# Patient Record
Sex: MALE | Race: BLACK OR AFRICAN AMERICAN | NOT HISPANIC OR LATINO | Employment: OTHER | URBAN - METROPOLITAN AREA
[De-identification: names, ages, dates, MRNs, and addresses within clinical notes are randomized per-mention and may not be internally consistent; named-entity substitution may affect disease eponyms.]

---

## 2017-03-02 DIAGNOSIS — E53.8 B12 DEFICIENCY: ICD-10-CM

## 2017-03-02 DIAGNOSIS — L30.9 ECZEMA, UNSPECIFIED TYPE: ICD-10-CM

## 2017-03-02 DIAGNOSIS — I10 ESSENTIAL HYPERTENSION, BENIGN: ICD-10-CM

## 2017-03-02 DIAGNOSIS — I10 HTN (HYPERTENSION), BENIGN: ICD-10-CM

## 2017-03-02 RX ORDER — LOSARTAN POTASSIUM 100 MG/1
100 TABLET ORAL DAILY
Qty: 90 TABLET | Refills: 1 | Status: SHIPPED | OUTPATIENT
Start: 2017-03-02 | End: 2018-01-03 | Stop reason: SDUPTHER

## 2017-03-02 RX ORDER — METOPROLOL SUCCINATE 50 MG/1
50 TABLET, EXTENDED RELEASE ORAL DAILY
Qty: 90 TABLET | Refills: 1 | Status: SHIPPED | OUTPATIENT
Start: 2017-03-02 | End: 2018-01-03 | Stop reason: SDUPTHER

## 2017-03-02 RX ORDER — CLOTRIMAZOLE AND BETAMETHASONE DIPROPIONATE 10; .64 MG/G; MG/G
CREAM TOPICAL 2 TIMES DAILY
Qty: 1 TUBE | Refills: 1 | Status: SHIPPED | OUTPATIENT
Start: 2017-03-02 | End: 2017-08-08 | Stop reason: SDUPTHER

## 2017-03-02 RX ORDER — CYANOCOBALAMIN 1000 UG/ML
1500 INJECTION, SOLUTION INTRAMUSCULAR; SUBCUTANEOUS
Qty: 10 ML | Refills: 5 | Status: SHIPPED | OUTPATIENT
Start: 2017-03-02 | End: 2018-05-09 | Stop reason: SDUPTHER

## 2017-05-16 ENCOUNTER — OFFICE VISIT (OUTPATIENT)
Dept: INTERNAL MEDICINE | Facility: CLINIC | Age: 72
End: 2017-05-16
Attending: INTERNAL MEDICINE
Payer: MEDICARE

## 2017-05-16 VITALS
BODY MASS INDEX: 34.54 KG/M2 | HEART RATE: 97 BPM | HEIGHT: 72 IN | OXYGEN SATURATION: 97 % | WEIGHT: 255 LBS | DIASTOLIC BLOOD PRESSURE: 62 MMHG | SYSTOLIC BLOOD PRESSURE: 112 MMHG

## 2017-05-16 DIAGNOSIS — E53.8 B12 DEFICIENCY: ICD-10-CM

## 2017-05-16 DIAGNOSIS — E11.9 TYPE 2 DIABETES MELLITUS WITHOUT COMPLICATION, UNSPECIFIED LONG TERM INSULIN USE STATUS: Primary | ICD-10-CM

## 2017-05-16 DIAGNOSIS — J30.1 SEASONAL ALLERGIC RHINITIS DUE TO POLLEN: ICD-10-CM

## 2017-05-16 DIAGNOSIS — E78.9 DISORDER OF LIPID METABOLISM: ICD-10-CM

## 2017-05-16 DIAGNOSIS — E03.9 HYPOTHYROIDISM, UNSPECIFIED TYPE: ICD-10-CM

## 2017-05-16 DIAGNOSIS — D51.0 PERNICIOUS ANEMIA: ICD-10-CM

## 2017-05-16 DIAGNOSIS — E66.9 OBESITY (BMI 30-39.9): ICD-10-CM

## 2017-05-16 DIAGNOSIS — I25.10 CORONARY ARTERY DISEASE INVOLVING NATIVE CORONARY ARTERY WITHOUT ANGINA PECTORIS, UNSPECIFIED WHETHER NATIVE OR TRANSPLANTED HEART: ICD-10-CM

## 2017-05-16 DIAGNOSIS — R79.89 OTHER ABNORMAL BLOOD CHEMISTRY: ICD-10-CM

## 2017-05-16 DIAGNOSIS — E55.9 VITAMIN D DEFICIENCY: ICD-10-CM

## 2017-05-16 DIAGNOSIS — C85.90 LYMPHOMA IN REMISSION: Primary | ICD-10-CM

## 2017-05-16 DIAGNOSIS — D50.9 IRON DEFICIENCY ANEMIA, UNSPECIFIED IRON DEFICIENCY ANEMIA TYPE: ICD-10-CM

## 2017-05-16 DIAGNOSIS — Z12.5 SCREENING FOR PROSTATE CANCER: ICD-10-CM

## 2017-05-16 PROCEDURE — 99214 OFFICE O/P EST MOD 30 MIN: CPT | Mod: 25,S$GLB,, | Performed by: INTERNAL MEDICINE

## 2017-05-16 PROCEDURE — 90715 TDAP VACCINE 7 YRS/> IM: CPT | Mod: S$GLB,,, | Performed by: INTERNAL MEDICINE

## 2017-05-16 PROCEDURE — G0447 BEHAVIOR COUNSEL OBESITY 15M: HCPCS | Mod: 59,S$GLB,, | Performed by: INTERNAL MEDICINE

## 2017-05-16 PROCEDURE — 3017F COLORECTAL CA SCREEN DOC REV: CPT | Mod: S$GLB,,, | Performed by: INTERNAL MEDICINE

## 2017-05-16 PROCEDURE — 1101F PT FALLS ASSESS-DOCD LE1/YR: CPT | Mod: S$GLB,,, | Performed by: INTERNAL MEDICINE

## 2017-05-16 PROCEDURE — G0444 DEPRESSION SCREEN ANNUAL: HCPCS | Mod: 59,S$GLB,, | Performed by: INTERNAL MEDICINE

## 2017-05-16 PROCEDURE — 1090F PRES/ABSN URINE INCON ASSESS: CPT | Mod: S$GLB,,, | Performed by: INTERNAL MEDICINE

## 2017-05-16 PROCEDURE — G0439 PPPS, SUBSEQ VISIT: HCPCS | Mod: S$GLB,,, | Performed by: INTERNAL MEDICINE

## 2017-05-16 PROCEDURE — 90471 IMMUNIZATION ADMIN: CPT | Mod: S$GLB,,, | Performed by: INTERNAL MEDICINE

## 2017-05-16 PROCEDURE — 3066F NEPHROPATHY DOC TX: CPT | Mod: S$GLB,,, | Performed by: INTERNAL MEDICINE

## 2017-05-16 PROCEDURE — 3008F BODY MASS INDEX DOCD: CPT | Mod: S$GLB,,, | Performed by: INTERNAL MEDICINE

## 2017-05-16 PROCEDURE — 1160F RVW MEDS BY RX/DR IN RCRD: CPT | Mod: S$GLB,,, | Performed by: INTERNAL MEDICINE

## 2017-05-16 PROCEDURE — G0442 ANNUAL ALCOHOL SCREEN 15 MIN: HCPCS | Mod: 59,S$GLB,, | Performed by: INTERNAL MEDICINE

## 2017-05-16 PROCEDURE — 3074F SYST BP LT 130 MM HG: CPT | Mod: S$GLB,,, | Performed by: INTERNAL MEDICINE

## 2017-05-16 PROCEDURE — 1170F FXNL STATUS ASSESSED: CPT | Mod: S$GLB,,, | Performed by: INTERNAL MEDICINE

## 2017-05-16 PROCEDURE — 0521F PLAN OF CARE 4 PAIN DOCD: CPT | Mod: S$GLB,,, | Performed by: INTERNAL MEDICINE

## 2017-05-16 PROCEDURE — 1159F MED LIST DOCD IN RCRD: CPT | Mod: S$GLB,,, | Performed by: INTERNAL MEDICINE

## 2017-05-16 PROCEDURE — 3288F FALL RISK ASSESSMENT DOCD: CPT | Mod: S$GLB,,, | Performed by: INTERNAL MEDICINE

## 2017-05-16 PROCEDURE — 3078F DIAST BP <80 MM HG: CPT | Mod: S$GLB,,, | Performed by: INTERNAL MEDICINE

## 2017-05-16 NOTE — MR AVS SNAPSHOT
"[facility]  [address], [address]  [facility] [address]  Phone: [phone]  Fax: [phone]                  Sam Gamino   2017 1:45 PM   Office Visit    Description:  Male : 1945   Provider:  LUDIVINA Zamora MD   Department:  [facility]           Reason for Visit     Follow-up           Diagnoses this Visit        Comments    Lymphoma in remission    -  Primary     B12 deficiency                To Do List           Goals (5 Years of Data)     None      Follow-Up and Disposition     Return in about 6 months (around 2017).       These Medications        Disp Refills Start End    syringe with needle 3 mL 23 x 1" Syrg 50 Syringe 11 2017     1 Syringe by Misc.(Non-Drug; Combo Route) route every 30 days. - Misc.(Non-Drug; Combo Route)    [facility] [address] [place] [address] Roost  AT [employer] [address] [employer] [address] Ph #: [phone]         [facility]     [facility] -  Assistance  Unless otherwise directed by your provider, please contact [facility], our nurse care line that is available for  assistance.     Registered nurses in the [facility] provide: appointment scheduling, clinical advisement, health education, and other advisory services.  Call: [phone] (toll free)               Medications           Message regarding Medications     Verify the changes and/or additions to your medication regime listed below are the same as discussed with your clinician today.  If any of these changes or additions are incorrect, please notify your healthcare provider.             Verify that the below list of medications is an accurate representation of the medications you are currently taking.  If none reported, the list may be blank. If incorrect, please contact your healthcare provider. Carry this list with you in case of emergency.           Current Medications     aspirin 81 MG Chew Take 81 mg by " "mouth once daily.    atorvastatin (LIPITOR) 80 MG tablet Take 0.5 tablets (40 mg total) by mouth once daily.    cholecalciferol, vitamin D3, (VITAMIN D3) 2,000 unit Cap Take 1 capsule by mouth once daily.    clotrimazole-betamethasone 1-0.05% (LOTRISONE) cream Apply topically 2 (two) times daily.    cyanocobalamin 1,000 mcg/mL injection Inject 1.5 mLs (1,500 mcg total) into the muscle every 30 days.    DUREZOL 0.05 % Drop ophthalmic solution     fluticasone (FLONASE) 50 mcg/actuation nasal spray 2 sprays by Each Nare route once daily.    ILEVRO 0.3 % DrpS     losartan (COZAAR) 100 MG tablet Take 1 tablet (100 mg total) by mouth once daily.    metoprolol succinate (TOPROL-XL) 50 MG 24 hr tablet Take 1 tablet (50 mg total) by mouth once daily.    syringe with needle 3 mL 23 x 1" Syrg 1 Syringe by Misc.(Non-Drug; Combo Route) route every 30 days.           Clinical Reference Information           Your Vitals Were     BP Pulse Height Weight SpO2 BMI    112/62 97 5' 11.5" (1.816 m) 115.7 kg (255 lb) 97% 35.07 kg/m2      Blood Pressure          Most Recent Value    BP  112/62      Allergies as of 5/16/2017     Lotensin [Benazepril]      Immunizations Administered on Date of Encounter - 5/16/2017     Name Date Dose VIS Date Route    TDAP 5/16/2017 0.5 mL 2/24/2015 Intramuscular      Orders Placed During Today's Visit      Normal Orders This Visit    Tdap Vaccine       Instructions    Coordinated Plan of Care - 2017        You have been identified as having 2 or more chronic medical problems.  The goal of this plan is to minimize the impact of these medical problems on your health and lifespan.      Regaurdless of your medical problems there are 7 things you can do to likely make you live longer.  They are:  1. Do not smoke or quit smoking.   Quit smoking aids (gum, lozenges, and medications) are paid for by registering with the [place] [employer] at   [facility] - [phone]  Toll Free - [phone]  Web - " "[email or web address]    2. Maintain a Body Mass Index < 27. BMI = Your weight in Pounds / (Your Height in Inches)2 × 703.  Examples: 5'0" < 138 lbs., 5'4" < 157 lbs, 5'8" < 177 lbs., 6' < 199 lbs.  (Add about 5 lbs. per additional inch).    3. Engage in regular physical activity.   Exercise aerobically with a target heart rate of (220-age) x 0.8  Exercise 30-45 minutes on most days of the week.  If you are out of shape you may have to start with 5-10 minutes per day and slowly increase your time.    4. Eat a healthy diet.  Fat intake: Not to exceed 30% of total daily calories.  Salt: 1.2 - 3 grams of Sodium each day.  (If you have high blood pressure it should be < 1.2 grams each day).  Supplements  All supplements can be obtained through a varied, healthy diet.   Calcium: 1,000 - 1,200 mg each day.  (1,500 mg each day if you have osteoporosis).   8 oz milk or Calcium fortified O.J. = 300 mg,  1oz of cheese = 100-200 mg  Vitamin D: 1,000 iu each day- Can probably be obtained by 30 min. of direct sunlight each day       3 oz. Dqfxxg=639 iu,  3 oz. Tuna =150 iu, Milk or fortified O.J. = 120 iu  Fish oil: 1-2 grams each day or about 840 mg of EPA and DHA (Omega-3 fatty acids) each day       3 oz. [place]=2 grams,  3 oz. Tuna=1.3 grams,  3 oz. drained light Tuna= 0.25 grams    5. Maintain a normal cholesterol.  Your Bad cholesterol (LDL) should be < 130.  (LDL < 100 if you have heart artery disease, a stroke, vascular disease, or diabetes).    6. Maintain a normal Blood Pressure.  BP < 150/90 if over 60 years old.  BP < 140/90 if age under 60, if you have diabetes, or kidney disease.    7. Maintain a normal Glucose (blood sugar).  Fasting Glucose < 100.  Hgb A1c < 6.0.  If you have diabetes a Hgb A1C < 7.0.  If you have diabetes and are older than 65 a Hgb A1C < 8.0.        Other Lifestyle guidelines  Alcohol: 1 drink = 12 oz. domestic beer, 4 oz. wine, or 1 oz. hard (80 proof) liquor             Males: </= " 14 drinks per week with no more than 4 in any one day             Females: </= 7 drinks per week with no more than 3 in any one day    Depression: If you feel depressed discuss with your doctor.    Routine tests  You should be see by your Primary Care Doctor at least every 6 months.  Blood pressure check at each visit.  Cholesterol check every year.  Glucose check every year. Every 3-6 months if you have diabetes.  TSH (thyroid screen) every 2 years. Every 6-12 months if you take thyroid medicine.  Colonoscopy at age 50, and repeat every 10 years. until age 75.  Vision screen at age 65. Annual dilated retinal exam if you have diabetes.  Females - Mammogram every 1-2 years from age 40-75.                   Bone density scan at about age 65.  Males: Consider PSA screening annually at age 50, age 45 for  Americans, up to age 75.    Immunizations  Influenza vaccine every year in the fall.  Tetnus/Diptheria/Pertusis(Tdap) vaccine once, then Tetnus/Diptheria(Td) vaccine every 10 years.  Shingles vaccine at age 60.  Pneumonia vaccine at age 65. 2nd Pneumonia vaccine at least 1 year later (1st should be Prevnar-13 and 2nd should be Pneumovax-23).                 Language Assistance Services     ATTENTION: Language assistance services are available, free of charge. Please call [phone].      ATENCIÓN: Si amauri ayers, tiene a jordan disposición servicios gratuitos de asistencia lingüística. Llame al [phone].     [facility] Ý: N?u b?n nói Ti?ng Vi?t, có các d?ch v? h? tr? ngôn ng? mi?n phí dành cho b?n. G?i s? [phone].         [facility] complies with applicable Federal civil rights laws and does not discriminate on the basis of race, color, national origin, age, disability, or sex.

## 2017-05-16 NOTE — PATIENT INSTRUCTIONS
"Coordinated Plan of Care - 2017        You have been identified as having 2 or more chronic medical problems.  The goal of this plan is to minimize the impact of these medical problems on your health and lifespan.      Regaurdless of your medical problems there are 7 things you can do to likely make you live longer.  They are:  1. Do not smoke or quit smoking.   Quit smoking aids (gum, lozenges, and medications) are paid for by registering with the [place] [employer] at   [facility] - [phone]  Toll Free - [phone]  Web - [email or web address]    2. Maintain a Body Mass Index < 27. BMI = Your weight in Pounds / (Your Height in Inches)2 × 703.  Examples: 5'0" < 138 lbs., 5'4" < 157 lbs, 5'8" < 177 lbs., 6' < 199 lbs.  (Add about 5 lbs. per additional inch).    3. Engage in regular physical activity.   Exercise aerobically with a target heart rate of (220-age) x 0.8  Exercise 30-45 minutes on most days of the week.  If you are out of shape you may have to start with 5-10 minutes per day and slowly increase your time.    4. Eat a healthy diet.  Fat intake: Not to exceed 30% of total daily calories.  Salt: 1.2 - 3 grams of Sodium each day.  (If you have high blood pressure it should be < 1.2 grams each day).  Supplements  All supplements can be obtained through a varied, healthy diet.   Calcium: 1,000 - 1,200 mg each day.  (1,500 mg each day if you have osteoporosis).   8 oz milk or Calcium fortified O.J. = 300 mg,  1oz of cheese = 100-200 mg  Vitamin D: 1,000 iu each day- Can probably be obtained by 30 min. of direct sunlight each day       3 oz. Xuuqtw=598 iu,  3 oz. Tuna =150 iu, Milk or fortified O.J. = 120 iu  Fish oil: 1-2 grams each day or about 840 mg of EPA and DHA (Omega-3 fatty acids) each day       3 oz. [place]=2 grams,  3 oz. Tuna=1.3 grams,  3 oz. drained light Tuna= 0.25 grams    5. Maintain a normal cholesterol.  Your Bad cholesterol (LDL) should be < 130.  (LDL < 100 if you have " heart artery disease, a stroke, vascular disease, or diabetes).    6. Maintain a normal Blood Pressure.  BP < 150/90 if over 60 years old.  BP < 140/90 if age under 60, if you have diabetes, or kidney disease.    7. Maintain a normal Glucose (blood sugar).  Fasting Glucose < 100.  Hgb A1c < 6.0.  If you have diabetes a Hgb A1C < 7.0.  If you have diabetes and are older than 65 a Hgb A1C < 8.0.        Other Lifestyle guidelines  Alcohol: 1 drink = 12 oz. domestic beer, 4 oz. wine, or 1 oz. hard (80 proof) liquor             Males: </= 14 drinks per week with no more than 4 in any one day             Females: </= 7 drinks per week with no more than 3 in any one day    Depression: If you feel depressed discuss with your doctor.    Routine tests  You should be see by your Primary Care Doctor at least every 6 months.  Blood pressure check at each visit.  Cholesterol check every year.  Glucose check every year. Every 3-6 months if you have diabetes.  TSH (thyroid screen) every 2 years. Every 6-12 months if you take thyroid medicine.  Colonoscopy at age 50, and repeat every 10 years. until age 75.  Vision screen at age 65. Annual dilated retinal exam if you have diabetes.  Females - Mammogram every 1-2 years from age 40-75.                   Bone density scan at about age 65.  Males: Consider PSA screening annually at age 50, age 45 for  Americans, up to age 75.    Immunizations  Influenza vaccine every year in the fall.  Tetnus/Diptheria/Pertusis(Tdap) vaccine once, then Tetnus/Diptheria(Td) vaccine every 10 years.  Shingles vaccine at age 60.  Pneumonia vaccine at age 65. 2nd Pneumonia vaccine at least 1 year later (1st should be Prevnar-13 and 2nd should be Pneumovax-23).

## 2017-05-17 PROBLEM — E66.9 OBESITY (BMI 30-39.9): Status: ACTIVE | Noted: 2017-05-17

## 2017-05-17 NOTE — PROGRESS NOTES
Subjective:       Patient ID: Sam Gamino is a 71 y.o. male.    Chief Complaint: Follow-up (eye itching, sneezing)    HPI Comments: The patient presents today for an annual wellness exam and also has specific complaints and medical problems to be addressed and managed.    Runny nose.    Review of Systems   Constitutional: Negative.    HENT: Positive for postnasal drip.    Respiratory: Negative.    Cardiovascular: Negative.    Psychiatric/Behavioral: Negative for dysphoric mood.       Objective:      Physical Exam   Constitutional: He appears well-developed and well-nourished.   HENT:   Head: Normocephalic and atraumatic.   Eyes: Pupils are equal, round, and reactive to light.   Cardiovascular: Normal rate, regular rhythm and normal heart sounds.    Pulmonary/Chest: Effort normal.   Musculoskeletal: He exhibits no edema.   Neurological: He is alert.       Assessment:       1. Lymphoma in remission    2. B12 deficiency        Plan:       See A/P for other Progress Note dated today.

## 2017-05-17 NOTE — PROGRESS NOTES
Subjective:       Patient ID: Sam Gamino is a 71 y.o. male.    Chief Complaint: Follow-up (eye itching, sneezing)    HPI Comments: Annual Wellness Exam:    Cognitive Impairment: None    Mental Conditions: None    Depression Risk Factors: None    Functional Ability:    Steady gait: Yes  Self reliant: Yes   Safe home: Yes  Hearing Difficulties: No   Vision Difficulties: No    Physical Impairment: None    Living Will: See Patient Demographics    Functional assessment:  Able to do all ADL's  Fall Risk: Low  Urinary incontinence: No  Energy level: Good  Mental well-being: Good    HEIDS Measure screening dates:  BMI:  35     DEXA:               See Health Maintenance Report  colon screen:    See Health Maintenance Report      Mammogram:   See Health Maintenance Report                   Glaucoma screen:  per Opth[facility]                    Vaccines: See Health Maintenance Report  Flu:            Pneumovax:  Zostavax:       Pain management: Denies pain       The patient's current health status is: Good   Patient was educated on all medical problems. See A/P.                             Review of Systems   Constitutional: Negative.    HENT: Positive for rhinorrhea.    Respiratory: Negative.    Cardiovascular: Negative.    Psychiatric/Behavioral: Negative for dysphoric mood.       Objective:      Physical Exam   Constitutional: He appears well-developed and well-nourished.   HENT:   Head: Normocephalic and atraumatic.   Eyes: Pupils are equal, round, and reactive to light.   Cardiovascular: Normal rate, regular rhythm and normal heart sounds.    Pulmonary/Chest: Effort normal.   Musculoskeletal: He exhibits no edema.   Neurological: He is alert.       Assessment:       1. Lymphoma in remission    2. B12 deficiency    3. Coronary artery disease involving native coronary artery without angina pectoris, unspecified whether native or transplanted heart    4. Pernicious anemia    5. Seasonal allergic rhinitis due to pollen         Plan:       Per orders and D/C instructions.  Continue meds/diet for Lymphoma, B12 defic, DAD, and anemia, which are stable.  Check labs.  Xyzal for AR.    Screening: The patient was screened for depression with the PHQ2 questionnaire and possible health consequences were discussed with the patient, who understands (15 minutes spent). The patient was screened for the misuse of alcohol, by asking the number of drinks per average week, and if pt has had more than 4 drinks (more than 3 for women and elderly) in 1 day within the past year. The health and legal consequences of misuse were discussed (15 minutes spent). The patient was screened for obesity (BMI>30), If the current BMI > 30, then the possible consequences of obesity, as well as the benefits of diet, exercise, and weight loss were discussed (15 minutes spent).

## 2017-05-18 LAB
25(OH)D3+25(OH)D2 SERPL-MCNC: 40 NG/ML (ref 30–100)
ALBUMIN SERPL-MCNC: 3.9 G/DL (ref 3.6–5.1)
ALBUMIN/GLOB SERPL: 1.3 (CALC) (ref 1–2.5)
ALP SERPL-CCNC: 60 U/L (ref 40–115)
ALT SERPL-CCNC: 16 U/L (ref 9–46)
AST SERPL-CCNC: 18 U/L (ref 10–35)
BASOPHILS # BLD AUTO: 27 CELLS/UL (ref 0–200)
BASOPHILS NFR BLD AUTO: 0.5 %
BILIRUB SERPL-MCNC: 0.7 MG/DL (ref 0.2–1.2)
BUN SERPL-MCNC: 21 MG/DL (ref 7–25)
BUN/CREAT SERPL: ABNORMAL (CALC) (ref 6–22)
CALCIUM SERPL-MCNC: 9.1 MG/DL (ref 8.6–10.3)
CHLORIDE SERPL-SCNC: 108 MMOL/L (ref 98–110)
CHOLEST SERPL-MCNC: 143 MG/DL (ref 125–200)
CHOLEST/HDLC SERPL: 3.7 (CALC)
CK SERPL-CCNC: 199 U/L (ref 44–196)
CO2 SERPL-SCNC: 24 MMOL/L (ref 20–31)
CREAT SERPL-MCNC: 0.92 MG/DL (ref 0.7–1.25)
EOSINOPHIL # BLD AUTO: 170 CELLS/UL (ref 15–500)
EOSINOPHIL NFR BLD AUTO: 3.2 %
ERYTHROCYTE [DISTWIDTH] IN BLOOD BY AUTOMATED COUNT: 13.2 % (ref 11–15)
GFR SERPL CREATININE-BSD FRML MDRD: 85 ML/MIN/1.73M2
GLOBULIN SER CALC-MCNC: 3.1 G/DL (CALC) (ref 1.9–3.7)
GLUCOSE SERPL-MCNC: 101 MG/DL (ref 65–99)
HBA1C MFR BLD: 4.8 % OF TOTAL HGB
HCT VFR BLD AUTO: 40.9 % (ref 38.5–50)
HDLC SERPL-MCNC: 39 MG/DL
HGB BLD-MCNC: 13.3 G/DL (ref 13.2–17.1)
LDLC SERPL CALC-MCNC: 83 MG/DL (CALC)
LYMPHOCYTES # BLD AUTO: 1659 CELLS/UL (ref 850–3900)
LYMPHOCYTES NFR BLD AUTO: 31.3 %
MCH RBC QN AUTO: 32.3 PG (ref 27–33)
MCHC RBC AUTO-ENTMCNC: 32.5 G/DL (ref 32–36)
MCV RBC AUTO: 99.4 FL (ref 80–100)
MONOCYTES # BLD AUTO: 429 CELLS/UL (ref 200–950)
MONOCYTES NFR BLD AUTO: 8.1 %
NEUTROPHILS # BLD AUTO: 3016 CELLS/UL (ref 1500–7800)
NEUTROPHILS NFR BLD AUTO: 56.9 %
NONHDLC SERPL-MCNC: 104 MG/DL (CALC)
PLATELET # BLD AUTO: 80 THOUSAND/UL (ref 140–400)
PMV BLD REES-ECKER: 10.5 FL (ref 7.5–12.5)
POTASSIUM SERPL-SCNC: 4.2 MMOL/L (ref 3.5–5.3)
PROT SERPL-MCNC: 7 G/DL (ref 6.1–8.1)
PSA SERPL-MCNC: 0.6 NG/ML
RBC # BLD AUTO: 4.11 MILLION/UL (ref 4.2–5.8)
SODIUM SERPL-SCNC: 140 MMOL/L (ref 135–146)
TRIGL SERPL-MCNC: 103 MG/DL
TSH SERPL-ACNC: 1.49 MIU/L (ref 0.4–4.5)
VIT B12 SERPL-MCNC: 464 PG/ML (ref 200–1100)
VITAMIN D2 SERPL-MCNC: <4 NG/ML
VITAMIN D3 SERPL-MCNC: 40 NG/ML
WBC # BLD AUTO: 5.3 THOUSAND/UL (ref 3.8–10.8)

## 2017-08-08 ENCOUNTER — OFFICE VISIT (OUTPATIENT)
Dept: INTERNAL MEDICINE | Facility: CLINIC | Age: 72
End: 2017-08-08
Attending: INTERNAL MEDICINE
Payer: MEDICARE

## 2017-08-08 VITALS
WEIGHT: 260 LBS | SYSTOLIC BLOOD PRESSURE: 108 MMHG | DIASTOLIC BLOOD PRESSURE: 70 MMHG | HEIGHT: 72 IN | OXYGEN SATURATION: 98 % | HEART RATE: 98 BPM | BODY MASS INDEX: 35.21 KG/M2

## 2017-08-08 DIAGNOSIS — K59.01 SLOW TRANSIT CONSTIPATION: ICD-10-CM

## 2017-08-08 DIAGNOSIS — L30.9 ECZEMA, UNSPECIFIED TYPE: ICD-10-CM

## 2017-08-08 PROCEDURE — 1159F MED LIST DOCD IN RCRD: CPT | Mod: S$GLB,,, | Performed by: INTERNAL MEDICINE

## 2017-08-08 PROCEDURE — 3008F BODY MASS INDEX DOCD: CPT | Mod: S$GLB,,, | Performed by: INTERNAL MEDICINE

## 2017-08-08 PROCEDURE — 3078F DIAST BP <80 MM HG: CPT | Mod: S$GLB,,, | Performed by: INTERNAL MEDICINE

## 2017-08-08 PROCEDURE — 99213 OFFICE O/P EST LOW 20 MIN: CPT | Mod: S$GLB,,, | Performed by: INTERNAL MEDICINE

## 2017-08-08 PROCEDURE — 3074F SYST BP LT 130 MM HG: CPT | Mod: S$GLB,,, | Performed by: INTERNAL MEDICINE

## 2017-08-08 RX ORDER — AMOXICILLIN 250 MG
1 CAPSULE ORAL DAILY
Start: 2017-08-08 | End: 2021-12-06

## 2017-08-08 RX ORDER — CLOTRIMAZOLE AND BETAMETHASONE DIPROPIONATE 10; .64 MG/G; MG/G
CREAM TOPICAL 2 TIMES DAILY
Qty: 1 TUBE | Refills: 1 | Status: SHIPPED | OUTPATIENT
Start: 2017-08-08 | End: 2018-09-18 | Stop reason: SDUPTHER

## 2017-08-08 NOTE — PROGRESS NOTES
Subjective:       Patient ID: Sam Gamino is a 72 y.o. male.    Chief Complaint: GI Problem (left side pain) and Groin Pain (both sides)    Left side aches when leaning forward.  Conspipation. Takes MOM and Senokot-S 2-4x/week.      Groin Pain   Associated symptoms include constipation.     Review of Systems   Constitutional: Negative.    Respiratory: Negative.    Cardiovascular: Negative.    Gastrointestinal: Positive for constipation.       Objective:      Physical Exam   Constitutional: He appears well-developed and well-nourished.   HENT:   Head: Normocephalic and atraumatic.   Eyes: Pupils are equal, round, and reactive to light.   Cardiovascular: Normal rate, regular rhythm and normal heart sounds.    Pulmonary/Chest: Effort normal.   Abdominal: There is no hepatosplenomegaly. There is tenderness in the left upper quadrant. There is no rigidity and no rebound.       Mildly tender abdominal muscles.   Musculoskeletal: He exhibits no edema.   Neurological: He is alert.       Assessment:       1. Eczema, unspecified type    2. Slow transit constipation        Plan:       Per orders and D/C instructions.  Senokot-S daily.

## 2017-09-05 ENCOUNTER — OFFICE VISIT (OUTPATIENT)
Dept: INTERNAL MEDICINE | Facility: CLINIC | Age: 72
End: 2017-09-05
Attending: INTERNAL MEDICINE
Payer: MEDICARE

## 2017-09-05 VITALS
OXYGEN SATURATION: 97 % | HEART RATE: 95 BPM | BODY MASS INDEX: 35.62 KG/M2 | HEIGHT: 72 IN | SYSTOLIC BLOOD PRESSURE: 102 MMHG | WEIGHT: 263 LBS | DIASTOLIC BLOOD PRESSURE: 68 MMHG

## 2017-09-05 DIAGNOSIS — K59.01 SLOW TRANSIT CONSTIPATION: ICD-10-CM

## 2017-09-05 DIAGNOSIS — R49.0 HOARSE VOICE QUALITY: ICD-10-CM

## 2017-09-05 DIAGNOSIS — I25.10 CORONARY ARTERY DISEASE INVOLVING NATIVE CORONARY ARTERY WITHOUT ANGINA PECTORIS, UNSPECIFIED WHETHER NATIVE OR TRANSPLANTED HEART: Primary | ICD-10-CM

## 2017-09-05 DIAGNOSIS — K21.9 GASTROESOPHAGEAL REFLUX DISEASE, ESOPHAGITIS PRESENCE NOT SPECIFIED: ICD-10-CM

## 2017-09-05 PROCEDURE — 3074F SYST BP LT 130 MM HG: CPT | Mod: S$GLB,,, | Performed by: INTERNAL MEDICINE

## 2017-09-05 PROCEDURE — G0008 ADMIN INFLUENZA VIRUS VAC: HCPCS | Mod: S$GLB,,, | Performed by: INTERNAL MEDICINE

## 2017-09-05 PROCEDURE — 99214 OFFICE O/P EST MOD 30 MIN: CPT | Mod: S$GLB,,, | Performed by: INTERNAL MEDICINE

## 2017-09-05 PROCEDURE — 3078F DIAST BP <80 MM HG: CPT | Mod: S$GLB,,, | Performed by: INTERNAL MEDICINE

## 2017-09-05 PROCEDURE — 1159F MED LIST DOCD IN RCRD: CPT | Mod: S$GLB,,, | Performed by: INTERNAL MEDICINE

## 2017-09-05 PROCEDURE — 90686 IIV4 VACC NO PRSV 0.5 ML IM: CPT | Mod: S$GLB,,, | Performed by: INTERNAL MEDICINE

## 2017-09-05 RX ORDER — PHENOL/SODIUM PHENOLATE
1 AEROSOL, SPRAY (ML) MUCOUS MEMBRANE DAILY
COMMUNITY

## 2017-09-05 RX ORDER — IBUPROFEN 800 MG/1
800 TABLET ORAL 2 TIMES DAILY PRN
Qty: 60 TABLET | Refills: 5 | Status: SHIPPED | OUTPATIENT
Start: 2017-09-05 | End: 2018-09-13 | Stop reason: SDUPTHER

## 2017-09-05 NOTE — PROGRESS NOTES
Subjective:       Patient ID: Sam Gamino is a 72 y.o. male.    Chief Complaint: Follow-up    Conspitation better with Senokot-S.  Gets hoarse voice. Saw Dr. Solorio in the past. Told it was GERD. Gets better with Omeprazole.      Review of Systems   Constitutional: Negative.    HENT: Positive for voice change.    Respiratory: Negative.    Cardiovascular: Negative.        Objective:      Physical Exam   Constitutional: He appears well-developed and well-nourished.   HENT:   Head: Normocephalic and atraumatic.   Mouth/Throat: Oropharynx is clear and moist and mucous membranes are normal.   Eyes: Pupils are equal, round, and reactive to light.   Cardiovascular: Normal rate, regular rhythm and normal heart sounds.    Pulmonary/Chest: Effort normal.   Musculoskeletal: He exhibits no edema.   Neurological: He is alert.       Assessment:       1. Coronary artery disease involving native coronary artery without angina pectoris, unspecified whether native or transplanted heart    2. Slow transit constipation    3. Hoarse voice quality    4. Gastroesophageal reflux disease, esophagitis presence not specified        Plan:       Per orders and D/C instructions.  Continue meds/diet for CAD, constipation, and GERD, which are stable.  Omeprazole and GERD diet, prn hoarseness.

## 2017-09-05 NOTE — PATIENT INSTRUCTIONS
Tips to Control Acid Reflux    To control acid reflux, youll need to make some basic diet and lifestyle changes. The simple steps outlined below may be all youll need to ease discomfort.  Watch what you eat  · Avoid fatty foods and spicy foods.  · Eat fewer acidic foods, such as citrus and tomato-based foods. These can increase symptoms.  · Limit drinking alcohol, caffeine, and fizzy beverages. All increase acid reflux.  · Try limiting chocolate, peppermint, and spearmint. These can worsen acid reflux in some people.  Watch when you eat  · Avoid lying down for 3 hours after eating.  · Do not snack before going to bed.  Raise your head  Raising your head and upper body by 4 to 6 inches helps limit reflux when youre lying down. Put blocks under the head of your bed frame to raise it.  Other changes  · Lose weight, if you need to  · Dont exercise near bedtime  · Avoid tight-fitting clothes  · Limit aspirin and ibuprofen  · Stop smoking   Date Last Reviewed: 7/1/2016  © 2120-8977 [employer]. [address] [place] [address], [place] [address]. All rights reserved. This information is not intended as a substitute for professional medical care. Always follow your healthcare professional's instructions.

## 2018-01-03 DIAGNOSIS — I10 HTN (HYPERTENSION), BENIGN: ICD-10-CM

## 2018-01-03 DIAGNOSIS — I10 ESSENTIAL HYPERTENSION, BENIGN: ICD-10-CM

## 2018-01-03 DIAGNOSIS — E78.00 ELEVATED CHOLESTEROL: ICD-10-CM

## 2018-01-03 RX ORDER — LOSARTAN POTASSIUM 100 MG/1
100 TABLET ORAL DAILY
Qty: 90 TABLET | Refills: 1 | Status: SHIPPED | OUTPATIENT
Start: 2018-01-03 | End: 2018-06-22 | Stop reason: SDUPTHER

## 2018-01-03 RX ORDER — ATORVASTATIN CALCIUM 80 MG/1
40 TABLET, FILM COATED ORAL DAILY
Qty: 90 TABLET | Refills: 1 | Status: SHIPPED | OUTPATIENT
Start: 2018-01-03 | End: 2019-02-28 | Stop reason: SDUPTHER

## 2018-01-03 RX ORDER — METOPROLOL SUCCINATE 50 MG/1
50 TABLET, EXTENDED RELEASE ORAL DAILY
Qty: 90 TABLET | Refills: 1 | Status: SHIPPED | OUTPATIENT
Start: 2018-01-03 | End: 2018-06-22 | Stop reason: SDUPTHER

## 2018-04-17 ENCOUNTER — DOCUMENTATION ONLY (OUTPATIENT)
Dept: INTERNAL MEDICINE | Facility: CLINIC | Age: 73
End: 2018-04-17
Payer: MEDICARE

## 2018-04-17 DIAGNOSIS — C85.90 LYMPHOMA IN REMISSION: Primary | ICD-10-CM

## 2018-04-17 DIAGNOSIS — I25.10 CORONARY ARTERY DISEASE INVOLVING NATIVE CORONARY ARTERY WITHOUT ANGINA PECTORIS, UNSPECIFIED WHETHER NATIVE OR TRANSPLANTED HEART: ICD-10-CM

## 2018-04-17 DIAGNOSIS — Z78.9 KNOWN MEDICAL PROBLEMS: ICD-10-CM

## 2018-04-17 PROCEDURE — 99490 CHRNC CARE MGMT STAFF 1ST 20: CPT | Mod: S$GLB,,, | Performed by: INTERNAL MEDICINE

## 2018-04-17 NOTE — PROGRESS NOTES
Reviewed and scanned medical records from an outside source.          The patient's electronic chart was reviewed during this month. The patient's medical, functional, and psychosocial needs were assessed. Need for Home health care, PT, OT, , psychiatric care, and hospice was assessed. All preventative care measures were reviewed and updated. All medications were reviewed and reconciled. Potential drug interactions and medication adherence was reviewed. Prescriptions were renewed as appropriate. Education was provided to the patient and/or caregiver. All questions were answered. Over 20 minutes were spent providing these non-face-to-face services during this calendar month.

## 2018-05-09 DIAGNOSIS — E53.8 B12 DEFICIENCY: ICD-10-CM

## 2018-05-09 RX ORDER — CYANOCOBALAMIN 1000 UG/ML
INJECTION, SOLUTION INTRAMUSCULAR; SUBCUTANEOUS
Qty: 10 ML | Refills: 0 | Status: SHIPPED | OUTPATIENT
Start: 2018-05-09 | End: 2018-12-24 | Stop reason: SDUPTHER

## 2018-06-19 ENCOUNTER — HOSPITAL ENCOUNTER (EMERGENCY)
Facility: OTHER | Age: 73
Discharge: HOME | End: 2018-06-19
Attending: EMERGENCY MEDICINE
Payer: MEDICARE

## 2018-06-19 VITALS
OXYGEN SATURATION: 96 % | HEIGHT: 72 IN | HEART RATE: 82 BPM | WEIGHT: 270.06 LBS | RESPIRATION RATE: 20 BRPM | SYSTOLIC BLOOD PRESSURE: 125 MMHG | TEMPERATURE: 98 F | BODY MASS INDEX: 36.58 KG/M2 | DIASTOLIC BLOOD PRESSURE: 74 MMHG

## 2018-06-19 DIAGNOSIS — R51.9 NONINTRACTABLE EPISODIC HEADACHE, UNSPECIFIED HEADACHE TYPE: ICD-10-CM

## 2018-06-19 DIAGNOSIS — M54.2 NECK PAIN: Primary | ICD-10-CM

## 2018-06-19 PROCEDURE — 99283 EMERGENCY DEPT VISIT LOW MDM: CPT | Mod: 25

## 2018-06-19 PROCEDURE — 25000003 PHARM REV CODE 250: Performed by: EMERGENCY MEDICINE

## 2018-06-19 PROCEDURE — 96372 THER/PROPH/DIAG INJ SC/IM: CPT

## 2018-06-19 PROCEDURE — 63600175 PHARM REV CODE 636 W HCPCS: Performed by: EMERGENCY MEDICINE

## 2018-06-19 RX ORDER — KETOROLAC TROMETHAMINE 30 MG/ML
15 INJECTION, SOLUTION INTRAMUSCULAR; INTRAVENOUS
Status: COMPLETED | OUTPATIENT
Start: 2018-06-19 | End: 2018-06-19

## 2018-06-19 RX ORDER — ACETAMINOPHEN 500 MG
1000 TABLET ORAL
Status: COMPLETED | OUTPATIENT
Start: 2018-06-19 | End: 2018-06-19

## 2018-06-19 RX ORDER — CETIRIZINE HYDROCHLORIDE 10 MG/1
10 TABLET ORAL DAILY
COMMUNITY
End: 2021-12-06

## 2018-06-19 RX ADMIN — ACETAMINOPHEN 1000 MG: 500 TABLET, FILM COATED ORAL at 03:06

## 2018-06-19 RX ADMIN — KETOROLAC TROMETHAMINE 15 MG: 30 INJECTION, SOLUTION INTRAMUSCULAR at 03:06

## 2018-06-19 NOTE — ED NOTES
Pt presents with HA, onset 1 month ago. Pt points to occipital and left temporal scalp to locate pain, pt rates pain 6/10 and describes as dull. Pt states HA worse in the past 3 days and pain radiates to left neck. Pt presented to ED as he was afraid he was having a stroke. Pt denies any paraesthesias, denies any unilateral weakness. Pt denies any vision changes or loss in vision. PMH lymphoma in remission, HTN. Pt is well appearing, pt in NAD.

## 2018-06-19 NOTE — ED PROVIDER NOTES
Encounter Date: 6/19/2018    SCRIBE #1 NOTE: I, Terri Quintero, am scribing for, and in the presence of, Dr. Boothe.       History     Chief Complaint   Patient presents with    Headache     x 1 month, worse in the last 2 days, also pain to the L side of the neck w/ limited mobility when turning the neck     Time seen by provider: 2:40 AM    This is a 72 y.o. male who presents with complaint of chronic intermittent headache. Pain has worsened over the last three days. He reports sore throat, worsening neck pain, neck stiffness, and bilateral shoulder pain. He has experienced similar neck pain in the past and had ultrasound of the neck with no significant findings. He notes improvement with aspirin. He denies fever, chills, blurred vision, nausea, vomiting, difficulty ambulating, numbness, or weakness. He notes driving trucks for work. 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      The history is provided by the patient.     Review of patient's allergies indicates:   Allergen Reactions    Lotensin [benazepril]      cough     Past Medical History:   Diagnosis Date    Abnormal echocardiogram 2/8/2013    Mild MVR 3/07    CAD (coronary artery disease) 2/8/2013    Angio 3/07 Dr. Lin    Lymphoma in remission 2/8/2013    S/p chemo/XRT 1992 Relapse 1993 BMT 1993    Normal cardiac stress test 2/8/2013    Nuclear stress 2/09    Pernicious anemia 2/8/2013    Thyroid nodule 2/8/2013    Right s/p Bx 6/08 Dr. Pelayo     Past Surgical History:   Procedure Laterality Date    EYE SURGERY Bilateral 2016    Cataracts     History reviewed. No pertinent family history.  Social History   Substance Use Topics    Smoking status: Never Smoker    Smokeless tobacco: Never Used    Alcohol use 0.6 oz/week     1 Standard drinks or equivalent per week      Comment: ocas     Review of Systems   Constitutional: Negative for chills, diaphoresis and fever.   HENT: Positive for sore throat. Negative for  facial swelling, trouble swallowing and voice change.    Eyes: Negative for visual disturbance.   Respiratory: Negative for shortness of breath.    Cardiovascular: Negative for chest pain.   Gastrointestinal: Negative for abdominal pain, blood in stool, constipation, diarrhea, nausea and vomiting.   Genitourinary: Negative for dysuria.   Musculoskeletal: Positive for arthralgias (shoulders), neck pain and neck stiffness. Negative for back pain, gait problem, joint swelling and myalgias.   Skin: Negative for rash.   Neurological: Positive for headaches. Negative for dizziness, weakness, light-headedness and numbness.   Hematological: Does not bruise/bleed easily.       Physical Exam     Initial Vitals [06/19/18 0231]   BP Pulse Resp Temp SpO2   122/66 87 20 97.7 °F (36.5 °C) 96 %      MAP       --         Physical Exam    Nursing note and vitals reviewed.  Constitutional: He appears well-developed and well-nourished. He is not diaphoretic. No distress.   HENT:   Head: Normocephalic and atraumatic.   Mouth/Throat: Oropharynx is clear and moist.   Eyes: Conjunctivae and EOM are normal. Pupils are equal, round, and reactive to light. No scleral icterus.   Neck: Normal range of motion. Neck supple.   Cardiovascular: Normal rate, regular rhythm and normal heart sounds. Exam reveals no gallop and no friction rub.    No murmur heard.  Pulmonary/Chest: Breath sounds normal. No respiratory distress. He has no wheezes. He has no rhonchi. He has no rales.   Musculoskeletal: Normal range of motion. He exhibits no edema or tenderness.   Lymphadenopathy:     He has no cervical adenopathy.   Neurological: He is alert and oriented to person, place, and time.   Skin: Skin is warm and dry. No rash noted. No pallor.   Psychiatric: He has a normal mood and affect. His behavior is normal. Judgment and thought content normal.         ED Course   Procedures  Labs Reviewed - No data to display       Imaging Results    None          Medical  Decision Making:   Initial Assessment:   Urgent evaluation of renetta 80-year-old gentleman with history of remote lymphoma, here with concerns for gradual onset of posterior headache, and left-sided neck pain.  Patient has had similar discomfort in the past, had a carotid ultrasound without abnormalities.  Patient denies vomiting, no vision changes, pain worsens with head movement, but without paresthesias.  On exam patient has no meningismus, nonfocal neuro exam, mild spasm to the left paracervical region, but otherwise no concerning exam findings to suggest needed further imaging at this time.The patient has no focal weakness, numbness, meningismus, other focal neurologic deficit, history of trauma, fevers, elevated blood pressure to suggest meningitis, subarachnoid hemorrhage, TIA, stroke, mass, or hypertensive urgency. I do not feel a CT of the brain or blood work are necessary at this time. I will treat the patient with analgesia and have them follow up with their regular doctor.               Scribe Attestation:   Scribe #1: I performed the above scribed service and the documentation accurately describes the services I performed. I attest to the accuracy of the note.    Attending Attestation:           Physician Attestation for Scribe:  Physician Attestation Statement for Scribe #1: I, Dr. Boothe, reviewed documentation, as scribed by Terri Quintero in my presence, and it is both accurate and complete.                    Clinical Impression:     1. Neck pain    2. Nonintractable episodic headache, unspecified headache type          Disposition:   Disposition: Discharged  Condition: Stable                        Janice Boothe MD  06/19/18 0673

## 2018-06-19 NOTE — ED NOTES
NEURO: Pt AAO x 4. Behavior and speech appropriate to situation.   CARDIAC: pt denies chest pain   RESPIRATORY: Respirations even and unlabored   MUSCULOSKELETAL: Active ROM noted to extremities. Pain with lateral rotation

## 2018-06-22 DIAGNOSIS — I10 HTN (HYPERTENSION), BENIGN: ICD-10-CM

## 2018-06-22 DIAGNOSIS — I10 ESSENTIAL HYPERTENSION, BENIGN: ICD-10-CM

## 2018-06-22 RX ORDER — LOSARTAN POTASSIUM 100 MG/1
TABLET ORAL
Qty: 90 TABLET | Refills: 0 | Status: SHIPPED | OUTPATIENT
Start: 2018-06-22 | End: 2019-02-03 | Stop reason: SDUPTHER

## 2018-06-22 RX ORDER — METOPROLOL SUCCINATE 50 MG/1
TABLET, EXTENDED RELEASE ORAL
Qty: 90 TABLET | Refills: 0 | Status: SHIPPED | OUTPATIENT
Start: 2018-06-22 | End: 2019-02-03 | Stop reason: SDUPTHER

## 2018-09-06 ENCOUNTER — DOCUMENTATION ONLY (OUTPATIENT)
Dept: INTERNAL MEDICINE | Facility: CLINIC | Age: 73
End: 2018-09-06
Payer: MEDICARE

## 2018-09-06 DIAGNOSIS — D51.0 PERNICIOUS ANEMIA: ICD-10-CM

## 2018-09-06 DIAGNOSIS — I25.10 CORONARY ARTERY DISEASE INVOLVING NATIVE CORONARY ARTERY WITHOUT ANGINA PECTORIS, UNSPECIFIED WHETHER NATIVE OR TRANSPLANTED HEART: Primary | ICD-10-CM

## 2018-09-06 DIAGNOSIS — Z78.9 KNOWN MEDICAL PROBLEMS: ICD-10-CM

## 2018-09-06 PROCEDURE — 99490 CHRNC CARE MGMT STAFF 1ST 20: CPT | Mod: S$GLB,,, | Performed by: INTERNAL MEDICINE

## 2018-09-06 NOTE — PROGRESS NOTES
Contacted patient to re-schedule no show appointment.          The patient's electronic chart was reviewed during this month. The patient's medical, functional, and psychosocial needs were assessed. Need for Home health care, PT, OT, , psychiatric care, and hospice was assessed. All preventative care measures were reviewed and updated. All medications were reviewed and reconciled. Potential drug interactions and medication adherence was reviewed. Prescriptions were renewed as appropriate. Education was provided to the patient and/or caregiver. All questions were answered. Over 20 minutes were spent providing these non-face-to-face services during this calendar month.

## 2018-09-13 ENCOUNTER — LAB VISIT (OUTPATIENT)
Dept: LAB | Facility: OTHER | Age: 73
End: 2018-09-13
Attending: INTERNAL MEDICINE
Payer: MEDICARE

## 2018-09-13 ENCOUNTER — OFFICE VISIT (OUTPATIENT)
Dept: INTERNAL MEDICINE | Facility: CLINIC | Age: 73
End: 2018-09-13
Attending: INTERNAL MEDICINE
Payer: MEDICARE

## 2018-09-13 VITALS
DIASTOLIC BLOOD PRESSURE: 62 MMHG | SYSTOLIC BLOOD PRESSURE: 110 MMHG | WEIGHT: 255 LBS | OXYGEN SATURATION: 97 % | BODY MASS INDEX: 34.54 KG/M2 | HEIGHT: 72 IN | HEART RATE: 92 BPM

## 2018-09-13 DIAGNOSIS — E55.9 VITAMIN D DEFICIENCY: ICD-10-CM

## 2018-09-13 DIAGNOSIS — Z13.31 SCREENING FOR DEPRESSION: ICD-10-CM

## 2018-09-13 DIAGNOSIS — Z00.00 ROUTINE ADULT HEALTH MAINTENANCE: ICD-10-CM

## 2018-09-13 DIAGNOSIS — E66.9 OBESITY (BMI 30-39.9): ICD-10-CM

## 2018-09-13 DIAGNOSIS — K59.01 SLOW TRANSIT CONSTIPATION: ICD-10-CM

## 2018-09-13 DIAGNOSIS — M54.41 MIDLINE LOW BACK PAIN WITH BILATERAL SCIATICA, UNSPECIFIED CHRONICITY: ICD-10-CM

## 2018-09-13 DIAGNOSIS — E03.9 HYPOTHYROIDISM, UNSPECIFIED TYPE: ICD-10-CM

## 2018-09-13 DIAGNOSIS — D50.8 OTHER IRON DEFICIENCY ANEMIA: ICD-10-CM

## 2018-09-13 DIAGNOSIS — Z12.5 SCREENING FOR PROSTATE CANCER: ICD-10-CM

## 2018-09-13 DIAGNOSIS — E78.9 DISORDER OF LIPID METABOLISM: ICD-10-CM

## 2018-09-13 DIAGNOSIS — D51.0 PERNICIOUS ANEMIA: ICD-10-CM

## 2018-09-13 DIAGNOSIS — C85.90 LYMPHOMA IN REMISSION: ICD-10-CM

## 2018-09-13 DIAGNOSIS — Z13.39 SCREENING FOR ALCOHOLISM: ICD-10-CM

## 2018-09-13 DIAGNOSIS — M54.42 MIDLINE LOW BACK PAIN WITH BILATERAL SCIATICA, UNSPECIFIED CHRONICITY: ICD-10-CM

## 2018-09-13 DIAGNOSIS — R79.89 OTHER SPECIFIED ABNORMAL FINDINGS OF BLOOD CHEMISTRY: ICD-10-CM

## 2018-09-13 DIAGNOSIS — Z13.89 SCREENING FOR OBESITY: ICD-10-CM

## 2018-09-13 DIAGNOSIS — D51.0 PERNICIOUS ANEMIA: Primary | ICD-10-CM

## 2018-09-13 DIAGNOSIS — I25.10 CORONARY ARTERY DISEASE INVOLVING NATIVE CORONARY ARTERY WITHOUT ANGINA PECTORIS, UNSPECIFIED WHETHER NATIVE OR TRANSPLANTED HEART: ICD-10-CM

## 2018-09-13 LAB
25(OH)D3+25(OH)D2 SERPL-MCNC: 51 NG/ML
ALBUMIN SERPL BCP-MCNC: 3.7 G/DL
ALP SERPL-CCNC: 67 U/L
ALT SERPL W/O P-5'-P-CCNC: 18 U/L
ANION GAP SERPL CALC-SCNC: 7 MMOL/L
AST SERPL-CCNC: 19 U/L
BASOPHILS # BLD AUTO: 0.02 K/UL
BASOPHILS NFR BLD: 0.4 %
BILIRUB SERPL-MCNC: 1.3 MG/DL
BUN SERPL-MCNC: 19 MG/DL
CALCIUM SERPL-MCNC: 9.4 MG/DL
CHLORIDE SERPL-SCNC: 110 MMOL/L
CHOLEST SERPL-MCNC: 129 MG/DL
CHOLEST/HDLC SERPL: 3.3 {RATIO}
CK SERPL-CCNC: 149 U/L
CO2 SERPL-SCNC: 25 MMOL/L
COMPLEXED PSA SERPL-MCNC: 0.45 NG/ML
CREAT SERPL-MCNC: 0.9 MG/DL
DIFFERENTIAL METHOD: ABNORMAL
EOSINOPHIL # BLD AUTO: 0.1 K/UL
EOSINOPHIL NFR BLD: 2 %
ERYTHROCYTE [DISTWIDTH] IN BLOOD BY AUTOMATED COUNT: 12.3 %
EST. GFR  (AFRICAN AMERICAN): >60 ML/MIN/1.73 M^2
EST. GFR  (NON AFRICAN AMERICAN): >60 ML/MIN/1.73 M^2
ESTIMATED AVG GLUCOSE: 88 MG/DL
GLUCOSE SERPL-MCNC: 80 MG/DL
HBA1C MFR BLD HPLC: 4.7 %
HCT VFR BLD AUTO: 37.2 %
HDLC SERPL-MCNC: 39 MG/DL
HDLC SERPL: 30.2 %
HGB BLD-MCNC: 11.9 G/DL
LDLC SERPL CALC-MCNC: 74.2 MG/DL
LYMPHOCYTES # BLD AUTO: 1.7 K/UL
LYMPHOCYTES NFR BLD: 33.4 %
MCH RBC QN AUTO: 32.5 PG
MCHC RBC AUTO-ENTMCNC: 32 G/DL
MCV RBC AUTO: 102 FL
MONOCYTES # BLD AUTO: 0.4 K/UL
MONOCYTES NFR BLD: 8 %
NEUTROPHILS # BLD AUTO: 2.9 K/UL
NEUTROPHILS NFR BLD: 56 %
NONHDLC SERPL-MCNC: 90 MG/DL
PLATELET # BLD AUTO: 212 K/UL
PMV BLD AUTO: 10.3 FL
POTASSIUM SERPL-SCNC: 4.1 MMOL/L
PROT SERPL-MCNC: 7.1 G/DL
RBC # BLD AUTO: 3.66 M/UL
SODIUM SERPL-SCNC: 142 MMOL/L
TRIGL SERPL-MCNC: 79 MG/DL
TSH SERPL DL<=0.005 MIU/L-ACNC: 1.61 UIU/ML
VIT B12 SERPL-MCNC: 680 PG/ML
WBC # BLD AUTO: 5.12 K/UL

## 2018-09-13 PROCEDURE — 90662 IIV NO PRSV INCREASED AG IM: CPT | Mod: S$GLB,,, | Performed by: INTERNAL MEDICINE

## 2018-09-13 PROCEDURE — 80061 LIPID PANEL: CPT

## 2018-09-13 PROCEDURE — 36415 COLL VENOUS BLD VENIPUNCTURE: CPT

## 2018-09-13 PROCEDURE — 82607 VITAMIN B-12: CPT

## 2018-09-13 PROCEDURE — 3017F COLORECTAL CA SCREEN DOC REV: CPT | Mod: S$GLB,,, | Performed by: INTERNAL MEDICINE

## 2018-09-13 PROCEDURE — 1090F PRES/ABSN URINE INCON ASSESS: CPT | Mod: S$GLB,,, | Performed by: INTERNAL MEDICINE

## 2018-09-13 PROCEDURE — 84153 ASSAY OF PSA TOTAL: CPT

## 2018-09-13 PROCEDURE — 1160F RVW MEDS BY RX/DR IN RCRD: CPT | Mod: S$GLB,,, | Performed by: INTERNAL MEDICINE

## 2018-09-13 PROCEDURE — G0439 PPPS, SUBSEQ VISIT: HCPCS | Mod: S$GLB,,, | Performed by: INTERNAL MEDICINE

## 2018-09-13 PROCEDURE — 1170F FXNL STATUS ASSESSED: CPT | Mod: S$GLB,,, | Performed by: INTERNAL MEDICINE

## 2018-09-13 PROCEDURE — 85025 COMPLETE CBC W/AUTO DIFF WBC: CPT

## 2018-09-13 PROCEDURE — 3066F NEPHROPATHY DOC TX: CPT | Mod: S$GLB,,, | Performed by: INTERNAL MEDICINE

## 2018-09-13 PROCEDURE — 82550 ASSAY OF CK (CPK): CPT

## 2018-09-13 PROCEDURE — G0008 ADMIN INFLUENZA VIRUS VAC: HCPCS | Mod: S$GLB,,, | Performed by: INTERNAL MEDICINE

## 2018-09-13 PROCEDURE — 83036 HEMOGLOBIN GLYCOSYLATED A1C: CPT

## 2018-09-13 PROCEDURE — 80053 COMPREHEN METABOLIC PANEL: CPT

## 2018-09-13 PROCEDURE — G0447 BEHAVIOR COUNSEL OBESITY 15M: HCPCS | Mod: 59,S$GLB,, | Performed by: INTERNAL MEDICINE

## 2018-09-13 PROCEDURE — 1159F MED LIST DOCD IN RCRD: CPT | Mod: S$GLB,,, | Performed by: INTERNAL MEDICINE

## 2018-09-13 PROCEDURE — G0444 DEPRESSION SCREEN ANNUAL: HCPCS | Mod: 59,S$GLB,, | Performed by: INTERNAL MEDICINE

## 2018-09-13 PROCEDURE — 82306 VITAMIN D 25 HYDROXY: CPT

## 2018-09-13 PROCEDURE — 84443 ASSAY THYROID STIM HORMONE: CPT

## 2018-09-13 PROCEDURE — G0442 ANNUAL ALCOHOL SCREEN 15 MIN: HCPCS | Mod: 59,S$GLB,, | Performed by: INTERNAL MEDICINE

## 2018-09-13 RX ORDER — IBUPROFEN 800 MG/1
800 TABLET ORAL 2 TIMES DAILY PRN
Qty: 60 TABLET | Refills: 5 | Status: SHIPPED | OUTPATIENT
Start: 2018-09-13 | End: 2019-06-17 | Stop reason: SDUPTHER

## 2018-09-13 NOTE — PATIENT INSTRUCTIONS
"Coordinated Plan of Care - 2017        You have been identified as having 2 or more chronic medical problems.  The goal of this plan is to minimize the impact of these medical problems on your health and lifespan.      Regaurdless of your medical problems there are 7 things you can do to likely make you live longer.  They are:  1. Do not smoke or quit smoking.   Quit smoking aids (gum, lozenges, and medications) are paid for by registering with the [place] [employer] at   [facility] - [phone]  Toll Free - [phone]  Web - service@smokingcessationtrust.org    2. Maintain a Body Mass Index < 27. BMI = Your weight in Pounds / (Your Height in Inches)2 × 703.  Examples: 5'0" < 138 lbs., 5'4" < 157 lbs, 5'8" < 177 lbs., 6' < 199 lbs.  (Add about 5 lbs. per additional inch).    3. Engage in regular physical activity.   Exercise aerobically with a target heart rate of (220-age) x 0.8  Exercise 30-45 minutes on most days of the week.  If you are out of shape you may have to start with 5-10 minutes per day and slowly increase your time.    4. Eat a healthy diet.  Salt: 1.2 - 3 grams of Sodium each day.  (If you have high blood pressure it should be < 1.2 grams each day).  Supplements  All supplements can be obtained through a varied, healthy diet.   Calcium: 1,000 - 1,200 mg each day.  (1,500 mg each day if you have osteoporosis).   8 oz milk or Calcium fortified O.J. = 300 mg,  1oz of cheese = 100-200 mg  Vitamin D: 800 iu each day- Can probably be obtained by 30 min. of direct sunlight each day       3 oz. Otwsib=002 iu,  3 oz. Tuna =150 iu, Milk or fortified O.J. = 120 iu  Fish oil: 1-2 grams each day or about 840 mg of EPA and DHA (Omega-3 fatty acids) each day       3 oz. Jeannette=2 grams,  3 oz. Tuna=1.3 grams,  3 oz. drained light Tuna= 0.25 grams    5. Maintain a normal cholesterol.  Your Bad cholesterol (LDL) should be < 130.  (LDL < 100 if you have heart artery disease, a stroke, vascular disease, or " diabetes).    6. Maintain a normal Blood Pressure.  BP < 130/80    7. Maintain a normal Glucose (blood sugar).  Fasting Glucose < 100.  Hgb A1c < 6.0.  If you have diabetes a Hgb A1C < 7.0.  If you have diabetes and are older than 65 a Hgb A1C < 8.0.        Other Lifestyle guidelines  Alcohol: 1 drink = 12 oz. domestic beer, 4 oz. wine, or 1 oz. hard (80 proof) liquor             Males: </= 14 drinks per week with no more than 4 in any one day             Females: </= 7 drinks per week with no more than 3 in any one day    Depression: If you feel depressed discuss with your doctor.    Routine tests  You should be see by your Primary Care Doctor at least every 6 months.  Blood pressure check at each visit.  Cholesterol check every year.  Glucose check every year. Every 3-6 months if you have diabetes.  TSH (thyroid screen) every 2 years. Every 6-12 months if you take thyroid medicine.  Colonoscopy at age 50, and repeat every 10 years. until age 75.  Vision screen at age 65. Annual dilated retinal exam if you have diabetes.  Females - Mammogram every 1-2 years from age 40-75.                   Bone density scan at about age 65.  Males: Consider PSA screening annually at age 50, age 45 for  Americans, up to age 75.    Immunizations  Influenza vaccine every year in the fall.  Tetnus/Diptheria/Pertusis(Tdap) vaccine once, then Tetnus/Diptheria(Td) vaccine every 10 years.  Shingles vaccine at age 60.  Pneumonia vaccine at age 65. 2nd Pneumonia vaccine at least 1 year later (1st should be Prevnar-13 and 2nd should be Pneumovax-23).

## 2018-09-14 NOTE — PROGRESS NOTES
Subjective:       Patient ID: Sam Gamino is a 73 y.o. male.    Chief Complaint: Follow-up (6 month ); GI Problem (pressure in the center of the chest upper abdomen); Back Pain (wants script for motrin); and Extremity Weakness (sometimes legs tingle)    Gets LBP to posterior thighs while walking. Has to sit down for it to go away. Relief with Motrin.      Annual Wellness Exam:    Cognitive Impairment: None    Mental Conditions: None    Depression Risk Factors: None    Functional Ability:    Steady gait: Yes  Self reliant: Yes   Safe home: Yes  Hearing Difficulties: No   Vision Difficulties: No    Physical Impairment: None    Living Will: See Patient Demographics    Functional assessment:  Able to do all ADL's  Fall Risk: Low  Urinary incontinence: No  Energy level: Good  Mental well-being: Good    HEIDS Measure screening dates:  BMI: See Vital signs      DEXA:               See Health Maintenance Report  colon screen:    See Health Maintenance Report      Mammogram:   See Health Maintenance Report                   Glaucoma screen:  per Optho                    Vaccines: See Health Maintenance Report  Flu:            Pneumovax:  Shingrix:       Pain management: Takes Motrin prn LBP.       The patient's current health status is: Good   Patient was educated on all medical problems. See A/P from note dated today.                               Back Pain   The current episode started more than 1 month ago.     Review of Systems   Constitutional: Negative.    Respiratory: Negative.    Cardiovascular: Negative.    Musculoskeletal: Positive for back pain.   Psychiatric/Behavioral: Negative for dysphoric mood.       Objective:      Physical Exam   Constitutional: He appears well-developed and well-nourished.   HENT:   Head: Normocephalic and atraumatic.   Mouth/Throat: Oropharynx is clear and moist and mucous membranes are normal.   Eyes: Pupils are equal, round, and reactive to light.   Cardiovascular: Normal rate,  regular rhythm and normal heart sounds.   Pulmonary/Chest: Effort normal.   Musculoskeletal: He exhibits no edema.   Neurological: He is alert.       Assessment:       1. Pernicious anemia    2. Obesity (BMI 30-39.9)    3. Slow transit constipation    4. Lymphoma in remission    5. Coronary artery disease involving native coronary artery without angina pectoris, unspecified whether native or transplanted heart    6. Midline low back pain with bilateral sciatica, unspecified chronicity    7. Routine adult health maintenance    8. Screening for depression    9. Screening for alcoholism    10. Screening for obesity        Plan:       Per orders and D/C instructions.  Continue meds/diet for anemia, constipation, CAD, and Hx lymphoma, which are stable.  Motrin prn LBP.  Check labs.    Screening: The patient was screened for depression with the PHQ2 questionnaire and possible health consequences were discussed with the patient, who understands (15 minutes spent). The patient was screened for the misuse of alcohol, by asking the number of drinks per average week, and if pt has had more than 4 drinks (more than 3 for women and elderly) in 1 day within the past year. The health and legal consequences of misuse were discussed (15 minutes spent). The patient was screened for obesity (BMI>30), If the current BMI > 30, then the possible consequences of obesity, as well as the benefits of diet, exercise, and weight loss were discussed (15 minutes spent).

## 2018-09-18 DIAGNOSIS — L30.9 ECZEMA, UNSPECIFIED TYPE: ICD-10-CM

## 2018-09-18 RX ORDER — CLOTRIMAZOLE AND BETAMETHASONE DIPROPIONATE 10; .64 MG/G; MG/G
CREAM TOPICAL
Qty: 15 G | Refills: 0 | Status: SHIPPED | OUTPATIENT
Start: 2018-09-18 | End: 2018-11-26 | Stop reason: SDUPTHER

## 2018-11-26 DIAGNOSIS — L30.9 ECZEMA, UNSPECIFIED TYPE: ICD-10-CM

## 2018-11-26 RX ORDER — CLOTRIMAZOLE AND BETAMETHASONE DIPROPIONATE 10; .64 MG/G; MG/G
CREAM TOPICAL
Qty: 15 G | Refills: 0 | Status: SHIPPED | OUTPATIENT
Start: 2018-11-26 | End: 2018-12-11 | Stop reason: SDUPTHER

## 2018-12-11 DIAGNOSIS — L30.9 ECZEMA, UNSPECIFIED TYPE: ICD-10-CM

## 2018-12-12 ENCOUNTER — DOCUMENTATION ONLY (OUTPATIENT)
Dept: INTERNAL MEDICINE | Facility: CLINIC | Age: 73
End: 2018-12-12
Payer: MEDICARE

## 2018-12-12 DIAGNOSIS — I25.10 CORONARY ARTERY DISEASE INVOLVING NATIVE CORONARY ARTERY WITHOUT ANGINA PECTORIS, UNSPECIFIED WHETHER NATIVE OR TRANSPLANTED HEART: ICD-10-CM

## 2018-12-12 DIAGNOSIS — Z78.9 KNOWN MEDICAL PROBLEMS: ICD-10-CM

## 2018-12-12 DIAGNOSIS — D51.0 PERNICIOUS ANEMIA: Primary | ICD-10-CM

## 2018-12-12 PROCEDURE — 99490 CHRNC CARE MGMT STAFF 1ST 20: CPT | Mod: S$GLB,,, | Performed by: INTERNAL MEDICINE

## 2018-12-12 RX ORDER — CLOTRIMAZOLE AND BETAMETHASONE DIPROPIONATE 10; .64 MG/G; MG/G
CREAM TOPICAL
Qty: 15 G | Refills: 0 | Status: SHIPPED | OUTPATIENT
Start: 2018-12-12 | End: 2019-02-03 | Stop reason: SDUPTHER

## 2018-12-24 DIAGNOSIS — E53.8 B12 DEFICIENCY: ICD-10-CM

## 2018-12-24 RX ORDER — CYANOCOBALAMIN 1000 UG/ML
INJECTION, SOLUTION INTRAMUSCULAR; SUBCUTANEOUS
Qty: 10 ML | Refills: 0 | Status: SHIPPED | OUTPATIENT
Start: 2018-12-24 | End: 2019-11-30 | Stop reason: SDUPTHER

## 2018-12-28 NOTE — PROGRESS NOTES
Refilled clotrimazole-betamethasone cream          The patient's electronic chart was reviewed during this month. The patient's medical, functional, and psychosocial needs were assessed. Need for Home health care, PT, OT, , psychiatric care, and hospice was assessed. All preventative care measures were reviewed and updated. All medications were reviewed and reconciled. Potential drug interactions and medication adherence was reviewed. Prescriptions were renewed as appropriate. Education was provided to the patient and/or caregiver. All questions were answered. Over 20 minutes were spent providing these non-face-to-face services during this calendar month.

## 2019-02-03 DIAGNOSIS — I10 HTN (HYPERTENSION), BENIGN: ICD-10-CM

## 2019-02-03 DIAGNOSIS — L30.9 ECZEMA, UNSPECIFIED TYPE: ICD-10-CM

## 2019-02-03 DIAGNOSIS — I10 ESSENTIAL HYPERTENSION, BENIGN: ICD-10-CM

## 2019-02-04 RX ORDER — METOPROLOL SUCCINATE 50 MG/1
TABLET, EXTENDED RELEASE ORAL
Qty: 90 TABLET | Refills: 0 | Status: SHIPPED | OUTPATIENT
Start: 2019-02-04 | End: 2019-09-23 | Stop reason: SDUPTHER

## 2019-02-04 RX ORDER — LOSARTAN POTASSIUM 100 MG/1
TABLET ORAL
Qty: 90 TABLET | Refills: 0 | Status: SHIPPED | OUTPATIENT
Start: 2019-02-04 | End: 2019-09-04 | Stop reason: SDUPTHER

## 2019-02-04 RX ORDER — CLOTRIMAZOLE AND BETAMETHASONE DIPROPIONATE 10; .64 MG/G; MG/G
CREAM TOPICAL
Qty: 15 G | Refills: 0 | Status: SHIPPED | OUTPATIENT
Start: 2019-02-04 | End: 2019-07-14 | Stop reason: SDUPTHER

## 2019-02-28 DIAGNOSIS — E78.00 ELEVATED CHOLESTEROL: ICD-10-CM

## 2019-02-28 RX ORDER — ATORVASTATIN CALCIUM 80 MG/1
TABLET, FILM COATED ORAL
Qty: 90 TABLET | Refills: 1 | Status: SHIPPED | OUTPATIENT
Start: 2019-02-28 | End: 2020-04-09

## 2019-03-18 ENCOUNTER — DOCUMENTATION ONLY (OUTPATIENT)
Dept: INTERNAL MEDICINE | Facility: CLINIC | Age: 74
End: 2019-03-18
Payer: MEDICARE

## 2019-03-18 DIAGNOSIS — Z78.9 KNOWN MEDICAL PROBLEMS: ICD-10-CM

## 2019-03-18 DIAGNOSIS — I25.10 CORONARY ARTERY DISEASE INVOLVING NATIVE CORONARY ARTERY WITHOUT ANGINA PECTORIS, UNSPECIFIED WHETHER NATIVE OR TRANSPLANTED HEART: Primary | ICD-10-CM

## 2019-03-18 DIAGNOSIS — D51.0 PERNICIOUS ANEMIA: ICD-10-CM

## 2019-03-18 PROCEDURE — 99490 PR CHRONIC CARE MGMT, 1ST 20 MIN: ICD-10-PCS | Mod: S$GLB,,, | Performed by: INTERNAL MEDICINE

## 2019-03-18 PROCEDURE — 99490 CHRNC CARE MGMT STAFF 1ST 20: CPT | Mod: S$GLB,,, | Performed by: INTERNAL MEDICINE

## 2019-03-18 NOTE — PROGRESS NOTES
Contacted patient to R/S missed appointment with DAB.  Updated demographics          The patient's electronic chart was reviewed during this month. The patient's medical, functional, and psychosocial needs were assessed. Need for Home health care, PT, OT, , psychiatric care, and hospice was assessed. All preventative care measures were reviewed and updated. All medications were reviewed and reconciled. Potential drug interactions and medication adherence was reviewed. Prescriptions were renewed as appropriate. Education was provided to the patient and/or caregiver. All questions were answered. Over 20 minutes were spent providing these non-face-to-face services during this calendar month.

## 2019-06-07 ENCOUNTER — DOCUMENTATION ONLY (OUTPATIENT)
Dept: INTERNAL MEDICINE | Facility: CLINIC | Age: 74
End: 2019-06-07
Payer: MEDICARE

## 2019-06-07 DIAGNOSIS — D51.0 PERNICIOUS ANEMIA: ICD-10-CM

## 2019-06-07 DIAGNOSIS — I25.10 CORONARY ARTERY DISEASE INVOLVING NATIVE CORONARY ARTERY WITHOUT ANGINA PECTORIS, UNSPECIFIED WHETHER NATIVE OR TRANSPLANTED HEART: Primary | ICD-10-CM

## 2019-06-07 DIAGNOSIS — Z78.9 KNOWN MEDICAL PROBLEMS: ICD-10-CM

## 2019-06-13 NOTE — PROGRESS NOTES
Contacted patient to re-schedule his missed appointment with           The patient's electronic chart was reviewed during this month. The patient's medical, functional, and psychosocial needs were assessed. Need for Home health care, PT, OT, , psychiatric care, and hospice was assessed. All preventative care measures were reviewed and updated. All medications were reviewed and reconciled. Potential drug interactions and medication adherence was reviewed. Prescriptions were renewed as appropriate. Education was provided to the patient and/or caregiver. All questions were answered. Over 20 minutes were spent providing these non-face-to-face services during this calendar month.

## 2019-06-17 ENCOUNTER — OFFICE VISIT (OUTPATIENT)
Dept: INTERNAL MEDICINE | Facility: CLINIC | Age: 74
End: 2019-06-17
Attending: INTERNAL MEDICINE
Payer: MEDICARE

## 2019-06-17 ENCOUNTER — LAB VISIT (OUTPATIENT)
Dept: LAB | Facility: OTHER | Age: 74
End: 2019-06-17
Attending: INTERNAL MEDICINE
Payer: MEDICARE

## 2019-06-17 VITALS
HEART RATE: 107 BPM | SYSTOLIC BLOOD PRESSURE: 120 MMHG | HEIGHT: 72 IN | OXYGEN SATURATION: 98 % | BODY MASS INDEX: 35.47 KG/M2 | WEIGHT: 261.88 LBS | DIASTOLIC BLOOD PRESSURE: 70 MMHG

## 2019-06-17 DIAGNOSIS — G89.29 CHRONIC BILATERAL LOW BACK PAIN WITH BILATERAL SCIATICA: ICD-10-CM

## 2019-06-17 DIAGNOSIS — E78.9 DISORDER OF LIPID METABOLISM: ICD-10-CM

## 2019-06-17 DIAGNOSIS — M54.42 CHRONIC BILATERAL LOW BACK PAIN WITH BILATERAL SCIATICA: ICD-10-CM

## 2019-06-17 DIAGNOSIS — M54.41 CHRONIC BILATERAL LOW BACK PAIN WITH BILATERAL SCIATICA: ICD-10-CM

## 2019-06-17 DIAGNOSIS — Z12.5 SCREENING FOR PROSTATE CANCER: ICD-10-CM

## 2019-06-17 DIAGNOSIS — D51.0 PERNICIOUS ANEMIA: ICD-10-CM

## 2019-06-17 DIAGNOSIS — E55.9 VITAMIN D DEFICIENCY: ICD-10-CM

## 2019-06-17 DIAGNOSIS — K63.5 POLYP OF COLON, UNSPECIFIED PART OF COLON, UNSPECIFIED TYPE: Primary | ICD-10-CM

## 2019-06-17 DIAGNOSIS — E03.9 HYPOTHYROIDISM, UNSPECIFIED TYPE: ICD-10-CM

## 2019-06-17 DIAGNOSIS — R79.89 OTHER SPECIFIED ABNORMAL FINDINGS OF BLOOD CHEMISTRY: ICD-10-CM

## 2019-06-17 DIAGNOSIS — D50.8 OTHER IRON DEFICIENCY ANEMIA: ICD-10-CM

## 2019-06-17 LAB
25(OH)D3+25(OH)D2 SERPL-MCNC: 49 NG/ML (ref 30–96)
ALBUMIN SERPL BCP-MCNC: 3.7 G/DL (ref 3.5–5.2)
ALP SERPL-CCNC: 57 U/L (ref 55–135)
ALT SERPL W/O P-5'-P-CCNC: 28 U/L (ref 10–44)
ANION GAP SERPL CALC-SCNC: 9 MMOL/L (ref 8–16)
AST SERPL-CCNC: 23 U/L (ref 10–40)
BASOPHILS # BLD AUTO: 0.01 K/UL (ref 0–0.2)
BASOPHILS NFR BLD: 0.2 % (ref 0–1.9)
BILIRUB SERPL-MCNC: 0.9 MG/DL (ref 0.1–1)
BUN SERPL-MCNC: 18 MG/DL (ref 8–23)
CALCIUM SERPL-MCNC: 9.2 MG/DL (ref 8.7–10.5)
CHLORIDE SERPL-SCNC: 108 MMOL/L (ref 95–110)
CHOLEST SERPL-MCNC: 145 MG/DL (ref 120–199)
CHOLEST/HDLC SERPL: 3.2 {RATIO} (ref 2–5)
CK SERPL-CCNC: 162 U/L (ref 20–200)
CO2 SERPL-SCNC: 25 MMOL/L (ref 23–29)
COMPLEXED PSA SERPL-MCNC: 0.36 NG/ML (ref 0–4)
CREAT SERPL-MCNC: 1.4 MG/DL (ref 0.5–1.4)
DIFFERENTIAL METHOD: ABNORMAL
EOSINOPHIL # BLD AUTO: 0.1 K/UL (ref 0–0.5)
EOSINOPHIL NFR BLD: 1.6 % (ref 0–8)
ERYTHROCYTE [DISTWIDTH] IN BLOOD BY AUTOMATED COUNT: 12.3 % (ref 11.5–14.5)
EST. GFR  (AFRICAN AMERICAN): 58 ML/MIN/1.73 M^2
EST. GFR  (NON AFRICAN AMERICAN): 50 ML/MIN/1.73 M^2
ESTIMATED AVG GLUCOSE: 91 MG/DL (ref 68–131)
GLUCOSE SERPL-MCNC: 106 MG/DL (ref 70–110)
HBA1C MFR BLD HPLC: 4.8 % (ref 4–5.6)
HCT VFR BLD AUTO: 36.8 % (ref 40–54)
HDLC SERPL-MCNC: 46 MG/DL (ref 40–75)
HDLC SERPL: 31.7 % (ref 20–50)
HGB BLD-MCNC: 12.1 G/DL (ref 14–18)
LDLC SERPL CALC-MCNC: 79.4 MG/DL (ref 63–159)
LYMPHOCYTES # BLD AUTO: 1.9 K/UL (ref 1–4.8)
LYMPHOCYTES NFR BLD: 30.1 % (ref 18–48)
MCH RBC QN AUTO: 33.2 PG (ref 27–31)
MCHC RBC AUTO-ENTMCNC: 32.9 G/DL (ref 32–36)
MCV RBC AUTO: 101 FL (ref 82–98)
MONOCYTES # BLD AUTO: 0.5 K/UL (ref 0.3–1)
MONOCYTES NFR BLD: 7.5 % (ref 4–15)
NEUTROPHILS # BLD AUTO: 3.7 K/UL (ref 1.8–7.7)
NEUTROPHILS NFR BLD: 60.3 % (ref 38–73)
NONHDLC SERPL-MCNC: 99 MG/DL
PLATELET # BLD AUTO: 205 K/UL (ref 150–350)
PMV BLD AUTO: 10 FL (ref 9.2–12.9)
POTASSIUM SERPL-SCNC: 4.2 MMOL/L (ref 3.5–5.1)
PROT SERPL-MCNC: 7.1 G/DL (ref 6–8.4)
RBC # BLD AUTO: 3.65 M/UL (ref 4.6–6.2)
SODIUM SERPL-SCNC: 142 MMOL/L (ref 136–145)
TRIGL SERPL-MCNC: 98 MG/DL (ref 30–150)
TSH SERPL DL<=0.005 MIU/L-ACNC: 2.18 UIU/ML (ref 0.4–4)
VIT B12 SERPL-MCNC: 417 PG/ML (ref 210–950)
WBC # BLD AUTO: 6.14 K/UL (ref 3.9–12.7)

## 2019-06-17 PROCEDURE — 80061 LIPID PANEL: CPT

## 2019-06-17 PROCEDURE — 82550 ASSAY OF CK (CPK): CPT

## 2019-06-17 PROCEDURE — 99214 PR OFFICE/OUTPT VISIT, EST, LEVL IV, 30-39 MIN: ICD-10-PCS | Mod: S$GLB,,, | Performed by: INTERNAL MEDICINE

## 2019-06-17 PROCEDURE — 84153 ASSAY OF PSA TOTAL: CPT

## 2019-06-17 PROCEDURE — 82607 VITAMIN B-12: CPT

## 2019-06-17 PROCEDURE — 83036 HEMOGLOBIN GLYCOSYLATED A1C: CPT

## 2019-06-17 PROCEDURE — 36415 COLL VENOUS BLD VENIPUNCTURE: CPT

## 2019-06-17 PROCEDURE — 85025 COMPLETE CBC W/AUTO DIFF WBC: CPT

## 2019-06-17 PROCEDURE — 82306 VITAMIN D 25 HYDROXY: CPT

## 2019-06-17 PROCEDURE — 80053 COMPREHEN METABOLIC PANEL: CPT

## 2019-06-17 PROCEDURE — 99214 OFFICE O/P EST MOD 30 MIN: CPT | Mod: S$GLB,,, | Performed by: INTERNAL MEDICINE

## 2019-06-17 PROCEDURE — 84443 ASSAY THYROID STIM HORMONE: CPT

## 2019-06-17 RX ORDER — IBUPROFEN 800 MG/1
800 TABLET ORAL 2 TIMES DAILY PRN
Qty: 60 TABLET | Refills: 5 | Status: SHIPPED | OUTPATIENT
Start: 2019-06-17 | End: 2019-12-30 | Stop reason: SDUPTHER

## 2019-06-17 NOTE — PROGRESS NOTES
Subjective:       Patient ID: Sam Gamino is a 73 y.o. male.    Chief Complaint: Annual Exam and Leg Pain (bilat leg cramping)    LBP rad to posterior thighs. Better with Ibuprofen.    Leg Pain    The incident occurred more than 1 week ago.     Review of Systems   Constitutional: Negative.    Respiratory: Negative.    Cardiovascular: Negative.    Musculoskeletal: Positive for back pain.       Objective:      Physical Exam   Constitutional: He appears well-developed and well-nourished.   HENT:   Head: Normocephalic and atraumatic.   Mouth/Throat: Oropharynx is clear and moist and mucous membranes are normal.   Eyes: Pupils are equal, round, and reactive to light.   Cardiovascular: Normal rate, regular rhythm and normal heart sounds.   Pulmonary/Chest: Effort normal.   Musculoskeletal: He exhibits no edema.        Lumbar back: Normal.   Neurological: He is alert.   SLR neg       Assessment:       1. Polyp of colon, unspecified part of colon, unspecified type    2. Chronic bilateral low back pain with bilateral sciatica    3. Pernicious anemia        Plan:       Per orders and D/C instructions.  C-scope for colon polyps.  Ibuprofen for LBP.  Check labs for anemia.

## 2019-06-27 ENCOUNTER — DOCUMENTATION ONLY (OUTPATIENT)
Dept: INTERNAL MEDICINE | Facility: CLINIC | Age: 74
End: 2019-06-27
Payer: MEDICARE

## 2019-06-27 DIAGNOSIS — M54.42 CHRONIC BILATERAL LOW BACK PAIN WITH BILATERAL SCIATICA: ICD-10-CM

## 2019-06-27 DIAGNOSIS — I25.10 CORONARY ARTERY DISEASE INVOLVING NATIVE CORONARY ARTERY WITHOUT ANGINA PECTORIS, UNSPECIFIED WHETHER NATIVE OR TRANSPLANTED HEART: Primary | ICD-10-CM

## 2019-06-27 DIAGNOSIS — M54.41 CHRONIC BILATERAL LOW BACK PAIN WITH BILATERAL SCIATICA: ICD-10-CM

## 2019-06-27 DIAGNOSIS — Z78.9 KNOWN MEDICAL PROBLEMS: ICD-10-CM

## 2019-06-27 DIAGNOSIS — G89.29 CHRONIC BILATERAL LOW BACK PAIN WITH BILATERAL SCIATICA: ICD-10-CM

## 2019-06-27 PROCEDURE — 99490 CHRNC CARE MGMT STAFF 1ST 20: CPT | Mod: S$GLB,,, | Performed by: INTERNAL MEDICINE

## 2019-06-27 PROCEDURE — 99490 PR CHRONIC CARE MGMT, 1ST 20 MIN: ICD-10-PCS | Mod: S$GLB,,, | Performed by: INTERNAL MEDICINE

## 2019-06-27 NOTE — PROGRESS NOTES
Patient called to let us know he had contacted  to set up colonoscopy and was told he it was not necessary for 2 more years.  I discussed the reccomendation from  was because the colonoscopy done by  in 2015 says to repeat the test in 3 years (2018).  I did not adjust the overdue health maintenance because the previous tests states to repeat in 3 years not 5 years.          The patient's electronic chart was reviewed during this month. The patient's medical, functional, and psychosocial needs were assessed. Need for Home health care, PT, OT, , psychiatric care, and hospice was assessed. All preventative care measures were reviewed and updated. All medications were reviewed and reconciled. Potential drug interactions and medication adherence was reviewed. Prescriptions were renewed as appropriate. Education was provided to the patient and/or caregiver. All questions were answered. Over 20 minutes were spent providing these non-face-to-face services during this calendar month.

## 2019-07-14 DIAGNOSIS — L30.9 ECZEMA, UNSPECIFIED TYPE: ICD-10-CM

## 2019-07-14 RX ORDER — CLOTRIMAZOLE AND BETAMETHASONE DIPROPIONATE 10; .64 MG/G; MG/G
CREAM TOPICAL 2 TIMES DAILY PRN
Qty: 15 G | Refills: 0 | Status: SHIPPED | OUTPATIENT
Start: 2019-07-14 | End: 2019-07-15 | Stop reason: SDUPTHER

## 2019-07-15 RX ORDER — CLOTRIMAZOLE AND BETAMETHASONE DIPROPIONATE 10; .64 MG/G; MG/G
CREAM TOPICAL 2 TIMES DAILY PRN
Qty: 15 G | Refills: 0 | OUTPATIENT
Start: 2019-07-15

## 2019-07-15 RX ORDER — CLOTRIMAZOLE AND BETAMETHASONE DIPROPIONATE 10; .64 MG/G; MG/G
CREAM TOPICAL 2 TIMES DAILY PRN
Qty: 15 G | Refills: 0 | Status: SHIPPED | OUTPATIENT
Start: 2019-07-15 | End: 2019-09-23 | Stop reason: SDUPTHER

## 2019-09-04 DIAGNOSIS — I10 HTN (HYPERTENSION), BENIGN: ICD-10-CM

## 2019-09-04 RX ORDER — LOSARTAN POTASSIUM 100 MG/1
TABLET ORAL
Qty: 90 TABLET | Refills: 1 | Status: SHIPPED | OUTPATIENT
Start: 2019-09-04 | End: 2020-07-19

## 2019-09-23 DIAGNOSIS — I10 ESSENTIAL HYPERTENSION, BENIGN: ICD-10-CM

## 2019-09-23 DIAGNOSIS — L30.9 ECZEMA, UNSPECIFIED TYPE: ICD-10-CM

## 2019-09-23 RX ORDER — CLOTRIMAZOLE AND BETAMETHASONE DIPROPIONATE 10; .64 MG/G; MG/G
CREAM TOPICAL
Qty: 15 G | Refills: 0 | Status: SHIPPED | OUTPATIENT
Start: 2019-09-23 | End: 2019-12-27

## 2019-09-23 RX ORDER — METOPROLOL SUCCINATE 50 MG/1
TABLET, EXTENDED RELEASE ORAL
Qty: 90 TABLET | Refills: 1 | Status: SHIPPED | OUTPATIENT
Start: 2019-09-23 | End: 2020-06-22

## 2019-11-30 DIAGNOSIS — E53.8 B12 DEFICIENCY: ICD-10-CM

## 2019-12-01 RX ORDER — CYANOCOBALAMIN 1000 UG/ML
INJECTION, SOLUTION INTRAMUSCULAR; SUBCUTANEOUS
Qty: 10 ML | Refills: 0 | Status: SHIPPED | OUTPATIENT
Start: 2019-12-01 | End: 2020-05-10

## 2019-12-26 DIAGNOSIS — L30.9 ECZEMA, UNSPECIFIED TYPE: ICD-10-CM

## 2019-12-27 RX ORDER — CLOTRIMAZOLE AND BETAMETHASONE DIPROPIONATE 10; .64 MG/G; MG/G
CREAM TOPICAL 2 TIMES DAILY PRN
Qty: 15 G | Refills: 0 | Status: SHIPPED | OUTPATIENT
Start: 2019-12-27 | End: 2020-06-08

## 2019-12-30 ENCOUNTER — OFFICE VISIT (OUTPATIENT)
Dept: INTERNAL MEDICINE | Facility: CLINIC | Age: 74
End: 2019-12-30
Attending: INTERNAL MEDICINE
Payer: MEDICARE

## 2019-12-30 VITALS
OXYGEN SATURATION: 99 % | HEART RATE: 95 BPM | BODY MASS INDEX: 35.21 KG/M2 | SYSTOLIC BLOOD PRESSURE: 122 MMHG | HEIGHT: 72 IN | DIASTOLIC BLOOD PRESSURE: 70 MMHG | WEIGHT: 260 LBS

## 2019-12-30 DIAGNOSIS — I25.10 CORONARY ARTERY DISEASE INVOLVING NATIVE CORONARY ARTERY WITHOUT ANGINA PECTORIS, UNSPECIFIED WHETHER NATIVE OR TRANSPLANTED HEART: ICD-10-CM

## 2019-12-30 DIAGNOSIS — Z23 FLU VACCINE NEED: Primary | ICD-10-CM

## 2019-12-30 PROCEDURE — 99213 OFFICE O/P EST LOW 20 MIN: CPT | Mod: 25,S$GLB,, | Performed by: INTERNAL MEDICINE

## 2019-12-30 PROCEDURE — 90662 FLU VACCINE - HIGH DOSE (65+) PRESERVATIVE FREE IM: ICD-10-PCS | Mod: S$GLB,,, | Performed by: INTERNAL MEDICINE

## 2019-12-30 PROCEDURE — 1159F MED LIST DOCD IN RCRD: CPT | Mod: S$GLB,,, | Performed by: INTERNAL MEDICINE

## 2019-12-30 PROCEDURE — 90662 IIV NO PRSV INCREASED AG IM: CPT | Mod: S$GLB,,, | Performed by: INTERNAL MEDICINE

## 2019-12-30 PROCEDURE — 1159F PR MEDICATION LIST DOCUMENTED IN MEDICAL RECORD: ICD-10-PCS | Mod: S$GLB,,, | Performed by: INTERNAL MEDICINE

## 2019-12-30 PROCEDURE — 99213 PR OFFICE/OUTPT VISIT, EST, LEVL III, 20-29 MIN: ICD-10-PCS | Mod: 25,S$GLB,, | Performed by: INTERNAL MEDICINE

## 2019-12-30 PROCEDURE — G0008 ADMIN INFLUENZA VIRUS VAC: HCPCS | Mod: S$GLB,,, | Performed by: INTERNAL MEDICINE

## 2019-12-30 PROCEDURE — G0008 FLU VACCINE - HIGH DOSE (65+) PRESERVATIVE FREE IM: ICD-10-PCS | Mod: S$GLB,,, | Performed by: INTERNAL MEDICINE

## 2019-12-30 RX ORDER — IBUPROFEN 800 MG/1
800 TABLET ORAL 2 TIMES DAILY PRN
Qty: 60 TABLET | Refills: 5 | Status: SHIPPED | OUTPATIENT
Start: 2019-12-30 | End: 2021-01-04

## 2019-12-30 NOTE — PROGRESS NOTES
Subjective:       Patient ID: aSm Gamino is a 74 y.o. male.    Chief Complaint: Follow-up (6 month / concerned about prostate cancer)    He generally feels well.  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     Follow-up   This is a chronic problem. The current episode started more than 1 year ago.     Review of Systems   Constitutional: Negative.    Respiratory: Negative.    Cardiovascular: Negative.        Objective:      Physical Exam   Constitutional: He appears well-developed and well-nourished.   HENT:   Head: Normocephalic and atraumatic.   Mouth/Throat: Oropharynx is clear and moist and mucous membranes are normal.   Eyes: Pupils are equal, round, and reactive to light.   Cardiovascular: Normal rate, regular rhythm and normal heart sounds.   Pulmonary/Chest: Effort normal.   Musculoskeletal: He exhibits no edema.        Lumbar back: Normal.   Neurological: He is alert.   SLR neg       Assessment:       1. Flu vaccine need    2. Coronary artery disease involving native coronary artery without angina pectoris, unspecified whether native or transplanted heart        Plan:       Per orders and D/C instructions.    Healthy.

## 2020-04-09 DIAGNOSIS — E78.00 ELEVATED CHOLESTEROL: ICD-10-CM

## 2020-04-09 RX ORDER — ATORVASTATIN CALCIUM 80 MG/1
TABLET, FILM COATED ORAL
Qty: 90 TABLET | Refills: 1 | Status: SHIPPED | OUTPATIENT
Start: 2020-04-09 | End: 2021-05-15

## 2020-05-09 DIAGNOSIS — E53.8 B12 DEFICIENCY: ICD-10-CM

## 2020-05-10 RX ORDER — CYANOCOBALAMIN 1000 UG/ML
INJECTION, SOLUTION INTRAMUSCULAR; SUBCUTANEOUS
Qty: 10 ML | Refills: 0 | Status: SHIPPED | OUTPATIENT
Start: 2020-05-10 | End: 2020-06-29

## 2020-05-29 ENCOUNTER — HOSPITAL ENCOUNTER (OUTPATIENT)
Dept: RADIOLOGY | Facility: OTHER | Age: 75
Discharge: HOME | End: 2020-05-29
Attending: INTERNAL MEDICINE
Payer: MEDICARE

## 2020-05-29 ENCOUNTER — OFFICE VISIT (OUTPATIENT)
Dept: INTERNAL MEDICINE | Facility: CLINIC | Age: 75
End: 2020-05-29
Attending: INTERNAL MEDICINE
Payer: MEDICARE

## 2020-05-29 VITALS
DIASTOLIC BLOOD PRESSURE: 70 MMHG | BODY MASS INDEX: 35.89 KG/M2 | HEART RATE: 101 BPM | WEIGHT: 265 LBS | HEIGHT: 72 IN | SYSTOLIC BLOOD PRESSURE: 100 MMHG | OXYGEN SATURATION: 97 %

## 2020-05-29 DIAGNOSIS — Z00.00 ROUTINE ADULT HEALTH MAINTENANCE: ICD-10-CM

## 2020-05-29 DIAGNOSIS — Z13.39 SCREENING FOR ALCOHOLISM: ICD-10-CM

## 2020-05-29 DIAGNOSIS — D50.8 OTHER IRON DEFICIENCY ANEMIA: ICD-10-CM

## 2020-05-29 DIAGNOSIS — M54.41 CHRONIC BILATERAL LOW BACK PAIN WITH BILATERAL SCIATICA: Primary | ICD-10-CM

## 2020-05-29 DIAGNOSIS — R79.89 OTHER SPECIFIED ABNORMAL FINDINGS OF BLOOD CHEMISTRY: ICD-10-CM

## 2020-05-29 DIAGNOSIS — C85.90 LYMPHOMA IN REMISSION: ICD-10-CM

## 2020-05-29 DIAGNOSIS — E03.9 HYPOTHYROIDISM, UNSPECIFIED TYPE: ICD-10-CM

## 2020-05-29 DIAGNOSIS — Z13.89 SCREENING FOR OBESITY: ICD-10-CM

## 2020-05-29 DIAGNOSIS — G89.29 CHRONIC BILATERAL LOW BACK PAIN WITH BILATERAL SCIATICA: Primary | ICD-10-CM

## 2020-05-29 DIAGNOSIS — D51.0 PERNICIOUS ANEMIA: ICD-10-CM

## 2020-05-29 DIAGNOSIS — E78.9 DISORDER OF LIPID METABOLISM: ICD-10-CM

## 2020-05-29 DIAGNOSIS — E55.9 VITAMIN D DEFICIENCY: ICD-10-CM

## 2020-05-29 DIAGNOSIS — Z12.5 SCREENING FOR PROSTATE CANCER: ICD-10-CM

## 2020-05-29 DIAGNOSIS — R60.9 EDEMA, UNSPECIFIED TYPE: ICD-10-CM

## 2020-05-29 DIAGNOSIS — I25.10 CORONARY ARTERY DISEASE INVOLVING NATIVE CORONARY ARTERY WITHOUT ANGINA PECTORIS, UNSPECIFIED WHETHER NATIVE OR TRANSPLANTED HEART: ICD-10-CM

## 2020-05-29 DIAGNOSIS — Z13.31 SCREENING FOR DEPRESSION: ICD-10-CM

## 2020-05-29 DIAGNOSIS — M54.42 CHRONIC BILATERAL LOW BACK PAIN WITH BILATERAL SCIATICA: Primary | ICD-10-CM

## 2020-05-29 DIAGNOSIS — R97.20 ELEVATED PSA: Primary | ICD-10-CM

## 2020-05-29 DIAGNOSIS — E66.9 OBESITY (BMI 30-39.9): ICD-10-CM

## 2020-05-29 PROCEDURE — 99214 PR OFFICE/OUTPT VISIT, EST, LEVL IV, 30-39 MIN: ICD-10-PCS | Mod: S$GLB,,, | Performed by: INTERNAL MEDICINE

## 2020-05-29 PROCEDURE — 93970 US LOWER EXTREMITY VEINS BILATERAL: ICD-10-PCS | Mod: 26,,, | Performed by: RADIOLOGY

## 2020-05-29 PROCEDURE — 3017F PR COLORECTAL CANCER SCREEN RESULTS DOCUMENT/REVIEW: ICD-10-PCS | Mod: S$GLB,,, | Performed by: INTERNAL MEDICINE

## 2020-05-29 PROCEDURE — G0444 DEPRESSION SCREEN ANNUAL: HCPCS | Mod: 59,S$GLB,, | Performed by: INTERNAL MEDICINE

## 2020-05-29 PROCEDURE — 93970 EXTREMITY STUDY: CPT | Mod: 26,,, | Performed by: RADIOLOGY

## 2020-05-29 PROCEDURE — 93970 EXTREMITY STUDY: CPT | Mod: TC

## 2020-05-29 PROCEDURE — 3017F COLORECTAL CA SCREEN DOC REV: CPT | Mod: S$GLB,,, | Performed by: INTERNAL MEDICINE

## 2020-05-29 PROCEDURE — G0444 PR DEPRESSION SCREENING: ICD-10-PCS | Mod: 59,S$GLB,, | Performed by: INTERNAL MEDICINE

## 2020-05-29 PROCEDURE — 99214 OFFICE O/P EST MOD 30 MIN: CPT | Mod: S$GLB,,, | Performed by: INTERNAL MEDICINE

## 2020-05-29 NOTE — PROGRESS NOTES
Annual Wellness Exam:    Cognitive Impairment: None    Mental Conditions: None    Depression Risk Factors: None    Functional Ability:    Steady gait: Yes  Self reliant: Yes   Safe home: Yes  Hearing Difficulties: No   Vision Difficulties: No    Physical Impairment: None    Living Will: See Patient Demographics    Functional assessment:  Able to do all ADL's (including dressing, feeding, toileting, grooming, bathing, and ambulating).  Fall Risk: Low  Energy level: Good  Mental well-being: Good    HEIDS Measure screening dates:  BMI: See Vital signs      DEXA:               See Health Maintenance Report  colon screen:    See Health Maintenance Report      Mammogram:   See Health Maintenance Report                   Glaucoma screen:  per Optho                    Vaccines: See Health Maintenance Report  Flu:            Pneumovax:  Shingrix:           The patient's current health status is: Good   Patient was educated on all medical problems. See A/P from note dated today.    A written screening and health advice schedule was printed for the patient under Patient Instructions.    Screening: The patient was screened for depression with the PHQ2 questionnaire and possible health consequences were discussed with the patient, who understands (15 minutes spent). The patient was screened for the misuse of alcohol, by asking the number of drinks per average week, and if pt has had more than 4 drinks (more than 3 for women and elderly) in 1 day within the past year. The health and legal consequences of misuse were discussed (15 minutes spent). The patient was screened for obesity (BMI>30), If the current BMI > 30, then the possible consequences of obesity, as well as the benefits of diet, exercise, and weight loss were discussed. Any behavioral risks were identified, and methods to achieve appropriate treatment goals were discussed (15 minutes spent).

## 2020-05-29 NOTE — PROGRESS NOTES
Subjective:       Patient ID: Sam Gamino is a 74 y.o. male.    Chief Complaint: Leg Swelling (bilat. 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  He has noticed swelling in both of his legs for over 1 week.  It has gotten better over the last 2 days.  He denies any trauma to his legs.    Review of Systems   Constitutional: Negative.    Respiratory: Negative.    Cardiovascular: Negative.    Psychiatric/Behavioral: Negative for dysphoric mood.       Objective:      Physical Exam   Constitutional: He appears well-developed and well-nourished.   HENT:   Head: Normocephalic and atraumatic.   Mouth/Throat: Oropharynx is clear and moist and mucous membranes are normal.   Eyes: Pupils are equal, round, and reactive to light.   Cardiovascular: Normal rate, regular rhythm and normal heart sounds.   Pulmonary/Chest: Effort normal.   Musculoskeletal: He exhibits edema.        Lumbar back: Normal.   Calf size equal.  1-2+ edema bilateral.   Neurological: He is alert.   SLR neg       Assessment:       1. Chronic bilateral low back pain with bilateral sciatica    2. Edema, unspecified type    3. Lymphoma in remission    4. Pernicious anemia    5. Coronary artery disease involving native coronary artery without angina pectoris, unspecified whether native or transplanted heart    6. Obesity (BMI 30-39.9)    7. Routine adult health maintenance    8. Screening for alcoholism    9. Screening for obesity    10. Screening for depression        Plan:       Per orders and D/C instructions.  Continue meds/diet for CAD, Hx lymphoma, anemia, and LBP, which are stable.     ultrasound to evaluate edema.  Elevate legs and low-sodium diet for edema.  Check labs.     Screening: The patient was screened for depression with the PHQ2 questionnaire and possible health consequences were discussed with the patient, who understands (15 minutes spent). The patient was screened for the misuse of alcohol, by asking the number of drinks per average  week, and if pt has had more than 4 drinks (more than 3 for women and elderly) in 1 day within the past year. The health and legal consequences of misuse were discussed (15 minutes spent). The patient was screened for obesity (BMI>30), If the current BMI > 30, then the possible consequences of obesity, as well as the benefits of diet, exercise, and weight loss were discussed. Any behavioral risks were identified, and methods to achieve appropriate treatment goals were discussed (15 minutes spent).

## 2020-05-29 NOTE — PATIENT INSTRUCTIONS
"Elevate your feet over the weekend.  Follow a low-sodium diet.  Less than 2000 mg of sodium per day.    Coordinated Plan of Care - 2020        You have been identified as having 2 or more chronic medical problems.  The goal of this plan is to minimize the impact of these medical problems on your health and lifespan.      Regardless of your medical problems there are 7 things you can do to likely make you live longer.  They are:  1. Do not smoke or quit smoking.   Quit smoking aids (gum, lozenges, and medications) are paid for by registering with the [place] [employer] at   [facility] - [phone]  Toll Free - [phone]  Web - service@smokingcessationtrust.org    2. Maintain a Body Mass Index < 27. BMI = Your weight in Pounds / (Your Height in Inches)2 × 703.  Examples: 5'0" < 138 lbs., 5'4" < 157 lbs, 5'8" < 177 lbs., 6' < 199 lbs.  (Add about 5 lbs. per additional inch).    3. Engage in regular physical activity.   Exercise aerobically with a target heart rate of (220-age) x 0.8  Exercise 30-45 minutes on most days of the week.  If you are out of shape you may have to start with 5-10 minutes per day and slowly increase your time.    4. Eat a healthy diet.  Salt: 1.2 - 3 grams of Sodium each day.  (If you have high blood pressure it should be < 1.2 grams each day).  Supplements  All supplements can be obtained through a varied, healthy diet.   Calcium: 1,000 - 1,200 mg each day.  (1,500 mg each day if you have osteoporosis).   8 oz milk or Calcium fortified O.J. = 300 mg,  1oz of cheese = 100-200 mg  Vitamin D: 800 iu each day- Can probably be obtained by 30 min. of direct sunlight each day       3 oz. Akwgwh=905 iu,  3 oz. Tuna =150 iu, Milk or fortified O.J. = 120 iu  Fish oil: 1-2 grams each day or about 840 mg of EPA and DHA (Omega-3 fatty acids) each day       3 oz. Concrete=2 grams,  3 oz. Tuna=1.3 grams,  3 oz. drained light Tuna= 0.25 grams    5. Maintain a normal cholesterol.  Your Bad cholesterol " (LDL) should be < 130.  (LDL < 100 if you have heart artery disease, a stroke, vascular disease, or diabetes).    6. Maintain a normal Blood Pressure.  BP < 130/80    7. Maintain a normal Glucose (blood sugar).  Fasting Glucose < 100.  Hgb A1c < 6.0.  If you have diabetes a Hgb A1C < 7.0.  If you have diabetes and are older than 65 a Hgb A1C < 8.0.        Other Lifestyle guidelines  Alcohol: 1 drink = 12 oz. domestic beer, 4 oz. wine, or 1 oz. hard (80 proof) liquor             Males: </= 14 drinks per week with no more than 4 in any one day             Females: </= 7 drinks per week with no more than 3 in any one day    Depression: If you feel depressed discuss with your doctor.    Routine tests  You should be seen by your Primary Care Doctor at least every 6 months.  Blood pressure check at each visit.  Cholesterol check every year.  Glucose check every year. Every 3-6 months if you have diabetes.  TSH (thyroid screen) every 2 years. Every 6-12 months if you take thyroid medicine.  Colonoscopy at age 50 and repeat every 10 years. until age 75.  Vision screen at age 65. Annual dilated retinal exam if you have diabetes.  Females - Mammogram every 1-2 years from age 40-75.                   Bone density scan at about age 65.  Males: Consider PSA screening annually at age 50, age 45 for  Americans, up to age 75.    Immunizations  Influenza vaccine every year in the fall.  Tetnus/Diptheria/Pertusis(Tdap) vaccine once, then Tetnus/Diptheria(Td) vaccine every 10 years.  Shingrix vaccine after age 50 and repeat in 2-6 months.  Pneumonia vaccine at age 65. 2nd Pneumonia vaccine at least 1 year later (1st should be Prevnar-13 and 2nd should be Pneumovax-23).

## 2020-07-17 DIAGNOSIS — Z71.89 COMPLEX CARE COORDINATION: ICD-10-CM

## 2020-08-10 ENCOUNTER — TELEPHONE (OUTPATIENT)
Dept: PRIMARY CARE CLINIC | Facility: CLINIC | Age: 75
End: 2020-08-10

## 2021-03-29 ENCOUNTER — LAB VISIT (OUTPATIENT)
Dept: LAB | Facility: OTHER | Age: 76
End: 2021-03-29
Attending: INTERNAL MEDICINE
Payer: MEDICARE

## 2021-03-29 ENCOUNTER — OFFICE VISIT (OUTPATIENT)
Dept: INTERNAL MEDICINE | Facility: CLINIC | Age: 76
End: 2021-03-29
Attending: INTERNAL MEDICINE
Payer: MEDICARE

## 2021-03-29 VITALS
DIASTOLIC BLOOD PRESSURE: 62 MMHG | SYSTOLIC BLOOD PRESSURE: 110 MMHG | WEIGHT: 259 LBS | BODY MASS INDEX: 35.08 KG/M2 | HEART RATE: 117 BPM | HEIGHT: 72 IN | OXYGEN SATURATION: 99 %

## 2021-03-29 DIAGNOSIS — D50.8 OTHER IRON DEFICIENCY ANEMIA: ICD-10-CM

## 2021-03-29 DIAGNOSIS — D51.0 PERNICIOUS ANEMIA: ICD-10-CM

## 2021-03-29 DIAGNOSIS — Z12.5 SCREENING FOR PROSTATE CANCER: ICD-10-CM

## 2021-03-29 DIAGNOSIS — E03.9 HYPOTHYROIDISM, UNSPECIFIED TYPE: ICD-10-CM

## 2021-03-29 DIAGNOSIS — E78.9 DISORDER OF LIPID METABOLISM: ICD-10-CM

## 2021-03-29 DIAGNOSIS — E55.9 VITAMIN D DEFICIENCY: ICD-10-CM

## 2021-03-29 DIAGNOSIS — R79.89 OTHER SPECIFIED ABNORMAL FINDINGS OF BLOOD CHEMISTRY: ICD-10-CM

## 2021-03-29 DIAGNOSIS — J02.9 SORE THROAT: ICD-10-CM

## 2021-03-29 DIAGNOSIS — Z12.11 COLON CANCER SCREENING: Primary | ICD-10-CM

## 2021-03-29 LAB
ALBUMIN SERPL BCP-MCNC: 3.9 G/DL (ref 3.5–5.2)
ALP SERPL-CCNC: 65 U/L (ref 55–135)
ALT SERPL W/O P-5'-P-CCNC: 26 U/L (ref 10–44)
ANION GAP SERPL CALC-SCNC: 9 MMOL/L (ref 8–16)
AST SERPL-CCNC: 29 U/L (ref 10–40)
BASOPHILS # BLD AUTO: 0.03 K/UL (ref 0–0.2)
BASOPHILS NFR BLD: 0.5 % (ref 0–1.9)
BILIRUB SERPL-MCNC: 1 MG/DL (ref 0.1–1)
BUN SERPL-MCNC: 27 MG/DL (ref 8–23)
CALCIUM SERPL-MCNC: 9.6 MG/DL (ref 8.7–10.5)
CHLORIDE SERPL-SCNC: 109 MMOL/L (ref 95–110)
CO2 SERPL-SCNC: 25 MMOL/L (ref 23–29)
CREAT SERPL-MCNC: 1.2 MG/DL (ref 0.5–1.4)
DIFFERENTIAL METHOD: ABNORMAL
EOSINOPHIL # BLD AUTO: 0.1 K/UL (ref 0–0.5)
EOSINOPHIL NFR BLD: 1.7 % (ref 0–8)
ERYTHROCYTE [DISTWIDTH] IN BLOOD BY AUTOMATED COUNT: 11.9 % (ref 11.5–14.5)
EST. GFR  (AFRICAN AMERICAN): >60 ML/MIN/1.73 M^2
EST. GFR  (NON AFRICAN AMERICAN): 60 ML/MIN/1.73 M^2
GLUCOSE SERPL-MCNC: 94 MG/DL (ref 70–110)
HCT VFR BLD AUTO: 37.4 % (ref 40–54)
HGB BLD-MCNC: 11.9 G/DL (ref 14–18)
IMM GRANULOCYTES # BLD AUTO: 0.02 K/UL (ref 0–0.04)
IMM GRANULOCYTES NFR BLD AUTO: 0.3 % (ref 0–0.5)
LYMPHOCYTES # BLD AUTO: 1.7 K/UL (ref 1–4.8)
LYMPHOCYTES NFR BLD: 26.4 % (ref 18–48)
MCH RBC QN AUTO: 32.8 PG (ref 27–31)
MCHC RBC AUTO-ENTMCNC: 31.8 G/DL (ref 32–36)
MCV RBC AUTO: 103 FL (ref 82–98)
MONOCYTES # BLD AUTO: 0.5 K/UL (ref 0.3–1)
MONOCYTES NFR BLD: 7.8 % (ref 4–15)
NEUTROPHILS # BLD AUTO: 4 K/UL (ref 1.8–7.7)
NEUTROPHILS NFR BLD: 63.3 % (ref 38–73)
NRBC BLD-RTO: 0 /100 WBC
PLATELET # BLD AUTO: 197 K/UL (ref 150–450)
PMV BLD AUTO: 10.3 FL (ref 9.2–12.9)
POTASSIUM SERPL-SCNC: 3.9 MMOL/L (ref 3.5–5.1)
PROT SERPL-MCNC: 7.5 G/DL (ref 6–8.4)
RBC # BLD AUTO: 3.63 M/UL (ref 4.6–6.2)
SODIUM SERPL-SCNC: 143 MMOL/L (ref 136–145)
TSH SERPL DL<=0.005 MIU/L-ACNC: 2.02 UIU/ML (ref 0.4–4)
WBC # BLD AUTO: 6.32 K/UL (ref 3.9–12.7)

## 2021-03-29 PROCEDURE — 85025 COMPLETE CBC W/AUTO DIFF WBC: CPT | Performed by: INTERNAL MEDICINE

## 2021-03-29 PROCEDURE — 84443 ASSAY THYROID STIM HORMONE: CPT | Performed by: INTERNAL MEDICINE

## 2021-03-29 PROCEDURE — 80053 COMPREHEN METABOLIC PANEL: CPT | Performed by: INTERNAL MEDICINE

## 2021-03-29 PROCEDURE — 99213 PR OFFICE/OUTPT VISIT, EST, LEVL III, 20-29 MIN: ICD-10-PCS | Mod: S$GLB,,, | Performed by: INTERNAL MEDICINE

## 2021-03-29 PROCEDURE — 99213 OFFICE O/P EST LOW 20 MIN: CPT | Mod: S$GLB,,, | Performed by: INTERNAL MEDICINE

## 2021-03-29 PROCEDURE — 83036 HEMOGLOBIN GLYCOSYLATED A1C: CPT | Performed by: INTERNAL MEDICINE

## 2021-03-29 PROCEDURE — 82607 VITAMIN B-12: CPT | Performed by: INTERNAL MEDICINE

## 2021-03-29 PROCEDURE — 80061 LIPID PANEL: CPT | Performed by: INTERNAL MEDICINE

## 2021-03-29 PROCEDURE — 36415 COLL VENOUS BLD VENIPUNCTURE: CPT | Performed by: INTERNAL MEDICINE

## 2021-03-29 PROCEDURE — 84153 ASSAY OF PSA TOTAL: CPT | Performed by: INTERNAL MEDICINE

## 2021-03-29 PROCEDURE — 82306 VITAMIN D 25 HYDROXY: CPT | Performed by: INTERNAL MEDICINE

## 2021-03-29 RX ORDER — CETIRIZINE HYDROCHLORIDE 10 MG/1
10 TABLET ORAL DAILY PRN
COMMUNITY

## 2021-03-30 LAB
25(OH)D3+25(OH)D2 SERPL-MCNC: 60 NG/ML (ref 30–96)
CHOLEST SERPL-MCNC: 128 MG/DL (ref 120–199)
CHOLEST/HDLC SERPL: 3.2 {RATIO} (ref 2–5)
COMPLEXED PSA SERPL-MCNC: 0.3 NG/ML (ref 0–4)
ESTIMATED AVG GLUCOSE: 85 MG/DL (ref 68–131)
HBA1C MFR BLD: 4.6 % (ref 4–5.6)
HDLC SERPL-MCNC: 40 MG/DL (ref 40–75)
HDLC SERPL: 31.3 % (ref 20–50)
LDLC SERPL CALC-MCNC: 67.4 MG/DL (ref 63–159)
NONHDLC SERPL-MCNC: 88 MG/DL
TRIGL SERPL-MCNC: 103 MG/DL (ref 30–150)
VIT B12 SERPL-MCNC: 393 PG/ML (ref 210–950)

## 2021-05-25 DIAGNOSIS — L30.9 ECZEMA, UNSPECIFIED TYPE: ICD-10-CM

## 2021-05-25 DIAGNOSIS — E53.8 B12 DEFICIENCY: ICD-10-CM

## 2021-05-25 RX ORDER — CYANOCOBALAMIN 1000 UG/ML
1000 INJECTION, SOLUTION INTRAMUSCULAR; SUBCUTANEOUS
Qty: 10 ML | Refills: 0 | Status: SHIPPED | OUTPATIENT
Start: 2021-05-25 | End: 2021-12-06 | Stop reason: SDUPTHER

## 2021-05-25 RX ORDER — CLOTRIMAZOLE AND BETAMETHASONE DIPROPIONATE 10; .64 MG/G; MG/G
CREAM TOPICAL
Qty: 15 G | Refills: 0 | Status: SHIPPED | OUTPATIENT
Start: 2021-05-25 | End: 2023-01-23

## 2021-06-07 RX ORDER — IBUPROFEN 800 MG/1
800 TABLET ORAL 2 TIMES DAILY PRN
Qty: 60 TABLET | Refills: 2 | Status: SHIPPED | OUTPATIENT
Start: 2021-06-07 | End: 2022-04-19

## 2021-06-17 ENCOUNTER — PES CALL (OUTPATIENT)
Dept: ADMINISTRATIVE | Facility: CLINIC | Age: 76
End: 2021-06-17

## 2021-11-23 ENCOUNTER — CLINICAL SUPPORT (OUTPATIENT)
Dept: INTERNAL MEDICINE | Facility: CLINIC | Age: 76
End: 2021-11-23
Payer: MEDICARE

## 2021-11-23 DIAGNOSIS — Z23 FLU VACCINE NEED: Primary | ICD-10-CM

## 2021-11-23 PROCEDURE — 90694 VACC AIIV4 NO PRSRV 0.5ML IM: CPT | Mod: S$GLB,,, | Performed by: INTERNAL MEDICINE

## 2021-11-23 PROCEDURE — G0008 FLU VACCINE - QUADRIVALENT - ADJUVANTED: ICD-10-PCS | Mod: S$GLB,,, | Performed by: INTERNAL MEDICINE

## 2021-11-23 PROCEDURE — 90694 FLU VACCINE - QUADRIVALENT - ADJUVANTED: ICD-10-PCS | Mod: S$GLB,,, | Performed by: INTERNAL MEDICINE

## 2021-11-23 PROCEDURE — G0008 ADMIN INFLUENZA VIRUS VAC: HCPCS | Mod: S$GLB,,, | Performed by: INTERNAL MEDICINE

## 2021-12-06 ENCOUNTER — OFFICE VISIT (OUTPATIENT)
Dept: INTERNAL MEDICINE | Facility: CLINIC | Age: 76
End: 2021-12-06
Attending: INTERNAL MEDICINE
Payer: MEDICARE

## 2021-12-06 VITALS
WEIGHT: 253 LBS | SYSTOLIC BLOOD PRESSURE: 122 MMHG | HEART RATE: 85 BPM | DIASTOLIC BLOOD PRESSURE: 75 MMHG | OXYGEN SATURATION: 98 % | HEIGHT: 72 IN | BODY MASS INDEX: 34.27 KG/M2

## 2021-12-06 DIAGNOSIS — K59.01 SLOW TRANSIT CONSTIPATION: ICD-10-CM

## 2021-12-06 DIAGNOSIS — R35.1 NOCTURIA: ICD-10-CM

## 2021-12-06 DIAGNOSIS — N40.1 BENIGN PROSTATIC HYPERPLASIA WITH URINARY FREQUENCY: ICD-10-CM

## 2021-12-06 DIAGNOSIS — E53.8 B12 DEFICIENCY: ICD-10-CM

## 2021-12-06 DIAGNOSIS — R35.0 BENIGN PROSTATIC HYPERPLASIA WITH URINARY FREQUENCY: ICD-10-CM

## 2021-12-06 DIAGNOSIS — D51.0 PERNICIOUS ANEMIA: Primary | ICD-10-CM

## 2021-12-06 PROCEDURE — 99214 PR OFFICE/OUTPT VISIT, EST, LEVL IV, 30-39 MIN: ICD-10-PCS | Mod: S$GLB,,, | Performed by: INTERNAL MEDICINE

## 2021-12-06 PROCEDURE — 99214 OFFICE O/P EST MOD 30 MIN: CPT | Mod: S$GLB,,, | Performed by: INTERNAL MEDICINE

## 2021-12-06 RX ORDER — DUTASTERIDE AND TAMSULOSIN HYDROCHLORIDE CAPSULES .5; .4 MG/1; MG/1
1 CAPSULE ORAL DAILY
Qty: 30 CAPSULE | Refills: 11 | Status: SHIPPED | OUTPATIENT
Start: 2021-12-06 | End: 2022-04-12 | Stop reason: SDUPTHER

## 2021-12-06 RX ORDER — CYANOCOBALAMIN 1000 UG/ML
1000 INJECTION, SOLUTION INTRAMUSCULAR; SUBCUTANEOUS
Qty: 10 ML | Refills: 1 | Status: SHIPPED | OUTPATIENT
Start: 2021-12-06 | End: 2022-08-09

## 2021-12-29 DIAGNOSIS — K59.00 CONSTIPATION, UNSPECIFIED CONSTIPATION TYPE: Primary | ICD-10-CM

## 2022-01-07 RX ORDER — FLUTICASONE PROPIONATE 50 MCG
2 SPRAY, SUSPENSION (ML) NASAL DAILY
Qty: 16 G | Refills: 3 | Status: SHIPPED | OUTPATIENT
Start: 2022-01-07 | End: 2023-01-07

## 2022-02-09 ENCOUNTER — PES CALL (OUTPATIENT)
Dept: ADMINISTRATIVE | Facility: CLINIC | Age: 77
End: 2022-02-09
Payer: MEDICARE

## 2022-02-15 ENCOUNTER — OFFICE VISIT (OUTPATIENT)
Dept: INTERNAL MEDICINE | Facility: CLINIC | Age: 77
End: 2022-02-15
Payer: MEDICARE

## 2022-02-15 VITALS
BODY MASS INDEX: 35.44 KG/M2 | HEART RATE: 76 BPM | DIASTOLIC BLOOD PRESSURE: 58 MMHG | OXYGEN SATURATION: 98 % | SYSTOLIC BLOOD PRESSURE: 122 MMHG | WEIGHT: 261.69 LBS | HEIGHT: 72 IN

## 2022-02-15 DIAGNOSIS — M54.41 CHRONIC BILATERAL LOW BACK PAIN WITH BILATERAL SCIATICA: ICD-10-CM

## 2022-02-15 DIAGNOSIS — C85.90 LYMPHOMA IN REMISSION: ICD-10-CM

## 2022-02-15 DIAGNOSIS — I25.10 CORONARY ARTERY DISEASE INVOLVING NATIVE CORONARY ARTERY WITHOUT ANGINA PECTORIS, UNSPECIFIED WHETHER NATIVE OR TRANSPLANTED HEART: ICD-10-CM

## 2022-02-15 DIAGNOSIS — N40.1 BENIGN PROSTATIC HYPERPLASIA WITH URINARY FREQUENCY: ICD-10-CM

## 2022-02-15 DIAGNOSIS — R93.1 ABNORMAL ECHOCARDIOGRAM: ICD-10-CM

## 2022-02-15 DIAGNOSIS — R35.0 BENIGN PROSTATIC HYPERPLASIA WITH URINARY FREQUENCY: ICD-10-CM

## 2022-02-15 DIAGNOSIS — Z00.00 ENCOUNTER FOR PREVENTIVE HEALTH EXAMINATION: Primary | ICD-10-CM

## 2022-02-15 DIAGNOSIS — G89.29 CHRONIC BILATERAL LOW BACK PAIN WITH BILATERAL SCIATICA: ICD-10-CM

## 2022-02-15 DIAGNOSIS — E66.9 OBESITY (BMI 30-39.9): ICD-10-CM

## 2022-02-15 DIAGNOSIS — E04.1 THYROID NODULE: ICD-10-CM

## 2022-02-15 DIAGNOSIS — M54.42 CHRONIC BILATERAL LOW BACK PAIN WITH BILATERAL SCIATICA: ICD-10-CM

## 2022-02-15 DIAGNOSIS — K59.01 SLOW TRANSIT CONSTIPATION: ICD-10-CM

## 2022-02-15 DIAGNOSIS — D51.0 PERNICIOUS ANEMIA: ICD-10-CM

## 2022-02-15 PROCEDURE — G0439 PR MEDICARE ANNUAL WELLNESS SUBSEQUENT VISIT: ICD-10-PCS | Mod: S$PBB,,, | Performed by: NURSE PRACTITIONER

## 2022-02-15 PROCEDURE — G0439 PPPS, SUBSEQ VISIT: HCPCS | Mod: S$PBB,,, | Performed by: NURSE PRACTITIONER

## 2022-02-15 PROCEDURE — 99999 PR PBB SHADOW E&M-EST. PATIENT-LVL III: CPT | Mod: PBBFAC,,, | Performed by: NURSE PRACTITIONER

## 2022-02-15 PROCEDURE — 99213 OFFICE O/P EST LOW 20 MIN: CPT | Mod: PBBFAC | Performed by: NURSE PRACTITIONER

## 2022-02-15 PROCEDURE — 99999 PR PBB SHADOW E&M-EST. PATIENT-LVL III: ICD-10-PCS | Mod: PBBFAC,,, | Performed by: NURSE PRACTITIONER

## 2022-02-15 NOTE — PATIENT INSTRUCTIONS
Counseling and Referral of Other Preventative  (Italic type indicates deductible and co-insurance are waived)    Patient Name: Sam Gamino  Today's Date: 2/15/2022    Health Maintenance       Date Due Completion Date    Shingles Vaccine (2 of 2) 06/04/2018 4/9/2018    PROSTATE-SPECIFIC ANTIGEN 03/29/2022 3/29/2021    Lipid Panel 03/29/2022 3/29/2021    High Dose Statin 05/15/2022 5/15/2021    Colorectal Cancer Screening 04/16/2023 4/16/2018    Override on 3/26/2015: Done    TETANUS VACCINE 05/16/2027 5/16/2017        No orders of the defined types were placed in this encounter.    The following information is provided to all patients.  This information is to help you find resources for any of the problems found today that may be affecting your health:                Living healthy guide: www.[facility].[place].gov      Understanding Diabetes: www.diabetes.org      Eating healthy: www.cdc.gov/healthyweight      [facility] home safety checklist: www.cdc.gov/steadi/patient.html      Agency on Aging: www.goea.[place].[facility]      Alcoholics anonymous (AA): www.aa.org      Physical Activity: www.sola.nih.gov/bc9lyft      Tobacco use: www.quitwithusla.org

## 2022-02-15 NOTE — PROGRESS NOTES
Sam Gamino presented for a  Medicare AWV and comprehensive Health Risk Assessment today. The following components were reviewed and updated:    · Medical history  · Family History  · Social history  · Allergies and Current Medications  · Health Risk Assessment  · Health Maintenance  · Care Team         ** See Completed Assessments for Annual Wellness Visit within the encounter summary.**         The following assessments were completed:  · Living Situation  · CAGE  · Depression Screening  · Timed Get Up and Go  · Whisper Test  · Cognitive Function Screening  · Nutrition Screening  · ADL Screening  · PAQ Screening            Vitals:    02/15/22 1507   BP: (!) 122/58   Pulse: 76   SpO2: 98%   Weight: 118.7 kg (261 lb 11 oz)   Height: 6' (1.829 m)     Body mass index is 35.49 kg/m².     Physical Exam  Constitutional:       Appearance: He is well-developed and well-nourished. He is obese.   HENT:      Head: Normocephalic and atraumatic. Not macrocephalic and not microcephalic. No raccoon eyes, Pride's sign, abrasion, contusion, right periorbital erythema, left periorbital erythema or laceration. Hair is normal.      Right Ear: No decreased hearing noted. No laceration, drainage, swelling or tenderness. No middle ear effusion. No foreign body. No mastoid tenderness. No hemotympanum. Tympanic membrane is not injected, scarred, perforated, erythematous, retracted or bulging. Tympanic membrane has normal mobility.      Left Ear: No decreased hearing noted. No laceration, drainage, swelling or tenderness.  No middle ear effusion. No foreign body. No mastoid tenderness. No hemotympanum. Tympanic membrane is not injected, scarred, perforated, erythematous, retracted or bulging. Tympanic membrane has normal mobility.      Nose: Nose normal. No nasal deformity, laceration, sinus tenderness or mucosal edema.      Mouth/Throat:      Pharynx: Uvula midline.   Eyes:      General: Lids are normal. No scleral icterus.      Extraocular Movements: EOM normal.      Conjunctiva/sclera: Conjunctivae normal.   Neck:      Thyroid: No thyroid mass or thyromegaly.      Trachea: Trachea normal.   Cardiovascular:      Rate and Rhythm: Normal rate and regular rhythm.      Heart sounds: No murmur heard.  No friction rub. No gallop.    Pulmonary:      Effort: Pulmonary effort is normal. No respiratory distress.      Breath sounds: Normal breath sounds. No stridor. No wheezing, rhonchi or rales.   Chest:      Chest wall: No tenderness.   Abdominal:      Palpations: Abdomen is soft.   Musculoskeletal:         General: Normal range of motion.      Cervical back: Neck supple. No edema or erythema. No spinous process tenderness or muscular tenderness. Normal range of motion.   Lymphadenopathy:      Head:      Right side of head: No submental, submandibular, tonsillar, preauricular or posterior auricular adenopathy.      Left side of head: No submental, submandibular, tonsillar, preauricular, posterior auricular or occipital adenopathy.   Skin:     General: Skin is warm and dry.   Neurological:      Mental Status: He is alert and oriented to person, place, and time.   Psychiatric:         Mood and Affect: Mood and affect normal.         Behavior: Behavior normal.         Thought Content: Thought content normal.         Judgment: Judgment normal.             Diagnoses and health risks identified today and associated recommendations/orders:    1. Encounter for preventive health examination  Annual Health Risk Assessment (HRA) visit today.  Counseling and referral of health maintenance and preventative health measures performed.  Patient given annual wellness paperwork to take home.  Encouraged to return in 1 year for subsequent HRA visit.     2. Coronary artery disease involving native coronary artery without angina pectoris, unspecified whether native or transplanted heart  Chronic. Stable. Continue current treatment plan as previously prescribed by  PCP.    3. Obesity (BMI 30-39.9)  Chronic. Stable. Encouraged to increase exercise as tolerated and improve diet to heart healthy, low sodium diet. Continue current treatment plan as previously prescribed by PCP.    4. Benign prostatic hyperplasia with urinary frequency  Chronic. Stable. Continue current treatment plan as previously prescribed by PCP.    5. Abnormal echocardiogram  Chronic. Stable. Continue current treatment plan as previously prescribed by PCP.    6. Pernicious anemia  Chronic. Stable. Continue current treatment plan as previously prescribed by PCP.    7. Thyroid nodule  Chronic. Stable. Continue current treatment plan as previously prescribed by PCP.    8. Slow transit constipation  Chronic. Stable. Continue current treatment plan as previously prescribed by PCP.    9. Chronic bilateral low back pain with bilateral sciatica  Chronic. Stable. Continue current treatment plan as previously prescribed by PCP.    10. Lymphoma in remission  Chronic. Stable. Continue current treatment plan as previously prescribed by PCP.        Provided Sam with a 5-10 year written screening schedule and personal prevention plan. Recommendations were developed using the USPSTF age appropriate recommendations. Education, counseling, and referrals were provided as needed. After Visit Summary printed and given to patient which includes a list of additional screenings\tests needed.      I offered to discuss end of life issues, including information on how to make advance directives that the patient could use to name someone who would make medical decisions on their behalf if they became too ill to make themselves.    ___Patient declined  _X_Patient is interested, I provided paper work and offered to discuss.    Follow up in about 1 year (around 2/15/2023).    Isaac Castro NP  I offered to discuss advanced care planning, including how to pick a person who would make decisions for you if you were unable to make them  for yourself, called a health care power of , and what kind of decisions you might make such as use of life sustaining treatments such as ventilators and tube feeding when faced with a life limiting illness recorded on a living will that they will need to know. (How you want to be cared for as you near the end of your natural life)     X Patient is interested in learning more about how to make advanced directives.  I provided them paperwork and offered to discuss this with them.

## 2022-04-12 ENCOUNTER — OFFICE VISIT (OUTPATIENT)
Dept: INTERNAL MEDICINE | Facility: CLINIC | Age: 77
End: 2022-04-12
Attending: INTERNAL MEDICINE
Payer: MEDICARE

## 2022-04-12 ENCOUNTER — LAB VISIT (OUTPATIENT)
Dept: LAB | Facility: OTHER | Age: 77
End: 2022-04-12
Attending: INTERNAL MEDICINE
Payer: MEDICARE

## 2022-04-12 VITALS
HEART RATE: 98 BPM | OXYGEN SATURATION: 98 % | SYSTOLIC BLOOD PRESSURE: 100 MMHG | HEIGHT: 72 IN | WEIGHT: 264 LBS | BODY MASS INDEX: 35.76 KG/M2 | DIASTOLIC BLOOD PRESSURE: 62 MMHG

## 2022-04-12 DIAGNOSIS — D50.8 OTHER IRON DEFICIENCY ANEMIA: ICD-10-CM

## 2022-04-12 DIAGNOSIS — R79.89 OTHER SPECIFIED ABNORMAL FINDINGS OF BLOOD CHEMISTRY: ICD-10-CM

## 2022-04-12 DIAGNOSIS — I34.0 NONRHEUMATIC MITRAL VALVE REGURGITATION: ICD-10-CM

## 2022-04-12 DIAGNOSIS — E03.9 HYPOTHYROIDISM, UNSPECIFIED TYPE: ICD-10-CM

## 2022-04-12 DIAGNOSIS — Z12.5 SCREENING FOR PROSTATE CANCER: ICD-10-CM

## 2022-04-12 DIAGNOSIS — E66.9 OBESITY (BMI 30-39.9): ICD-10-CM

## 2022-04-12 DIAGNOSIS — R60.9 EDEMA, UNSPECIFIED TYPE: Primary | ICD-10-CM

## 2022-04-12 DIAGNOSIS — R60.9 EDEMA, UNSPECIFIED TYPE: ICD-10-CM

## 2022-04-12 DIAGNOSIS — Z13.31 SCREENING FOR DEPRESSION: ICD-10-CM

## 2022-04-12 DIAGNOSIS — Z13.89 SCREENING FOR OBESITY: ICD-10-CM

## 2022-04-12 DIAGNOSIS — E78.9 DISORDER OF LIPID METABOLISM: ICD-10-CM

## 2022-04-12 DIAGNOSIS — D51.0 PERNICIOUS ANEMIA: ICD-10-CM

## 2022-04-12 DIAGNOSIS — N40.1 BENIGN PROSTATIC HYPERPLASIA WITH URINARY FREQUENCY: Primary | ICD-10-CM

## 2022-04-12 DIAGNOSIS — Z13.39 SCREENING FOR ALCOHOLISM: ICD-10-CM

## 2022-04-12 DIAGNOSIS — C85.90 LYMPHOMA IN REMISSION: ICD-10-CM

## 2022-04-12 DIAGNOSIS — R35.0 BENIGN PROSTATIC HYPERPLASIA WITH URINARY FREQUENCY: Primary | ICD-10-CM

## 2022-04-12 DIAGNOSIS — E55.9 VITAMIN D DEFICIENCY: ICD-10-CM

## 2022-04-12 LAB
ALBUMIN SERPL BCP-MCNC: 3.9 G/DL (ref 3.5–5.2)
ALP SERPL-CCNC: 62 U/L (ref 55–135)
ALT SERPL W/O P-5'-P-CCNC: 24 U/L (ref 10–44)
ANION GAP SERPL CALC-SCNC: 13 MMOL/L (ref 8–16)
AST SERPL-CCNC: 20 U/L (ref 10–40)
BASOPHILS # BLD AUTO: 0.03 K/UL (ref 0–0.2)
BASOPHILS NFR BLD: 0.6 % (ref 0–1.9)
BILIRUB SERPL-MCNC: 1.1 MG/DL (ref 0.1–1)
BNP SERPL-MCNC: 80 PG/ML (ref 0–99)
BUN SERPL-MCNC: 19 MG/DL (ref 8–23)
CALCIUM SERPL-MCNC: 9.3 MG/DL (ref 8.7–10.5)
CHLORIDE SERPL-SCNC: 108 MMOL/L (ref 95–110)
CHOLEST SERPL-MCNC: 132 MG/DL (ref 120–199)
CHOLEST/HDLC SERPL: 3.2 {RATIO} (ref 2–5)
CK SERPL-CCNC: 134 U/L (ref 20–200)
CO2 SERPL-SCNC: 21 MMOL/L (ref 23–29)
COMPLEXED PSA SERPL-MCNC: 0.34 NG/ML (ref 0–4)
CREAT SERPL-MCNC: 1 MG/DL (ref 0.5–1.4)
D DIMER PPP IA.FEU-MCNC: 0.89 MG/L FEU
DIFFERENTIAL METHOD: ABNORMAL
EOSINOPHIL # BLD AUTO: 0.1 K/UL (ref 0–0.5)
EOSINOPHIL NFR BLD: 1.4 % (ref 0–8)
ERYTHROCYTE [DISTWIDTH] IN BLOOD BY AUTOMATED COUNT: 12.2 % (ref 11.5–14.5)
EST. GFR  (AFRICAN AMERICAN): >60 ML/MIN/1.73 M^2
EST. GFR  (NON AFRICAN AMERICAN): >60 ML/MIN/1.73 M^2
ESTIMATED AVG GLUCOSE: 88 MG/DL (ref 68–131)
GLUCOSE SERPL-MCNC: 83 MG/DL (ref 70–110)
HBA1C MFR BLD: 4.7 % (ref 4–5.6)
HCT VFR BLD AUTO: 39.4 % (ref 40–54)
HDLC SERPL-MCNC: 41 MG/DL (ref 40–75)
HDLC SERPL: 31.1 % (ref 20–50)
HGB BLD-MCNC: 12.8 G/DL (ref 14–18)
IMM GRANULOCYTES # BLD AUTO: 0.01 K/UL (ref 0–0.04)
IMM GRANULOCYTES NFR BLD AUTO: 0.2 % (ref 0–0.5)
LDLC SERPL CALC-MCNC: 70.8 MG/DL (ref 63–159)
LYMPHOCYTES # BLD AUTO: 1.3 K/UL (ref 1–4.8)
LYMPHOCYTES NFR BLD: 25.5 % (ref 18–48)
MCH RBC QN AUTO: 34 PG (ref 27–31)
MCHC RBC AUTO-ENTMCNC: 32.5 G/DL (ref 32–36)
MCV RBC AUTO: 105 FL (ref 82–98)
MONOCYTES # BLD AUTO: 0.4 K/UL (ref 0.3–1)
MONOCYTES NFR BLD: 6.9 % (ref 4–15)
NEUTROPHILS # BLD AUTO: 3.3 K/UL (ref 1.8–7.7)
NEUTROPHILS NFR BLD: 65.4 % (ref 38–73)
NONHDLC SERPL-MCNC: 91 MG/DL
NRBC BLD-RTO: 0 /100 WBC
PLATELET # BLD AUTO: 164 K/UL (ref 150–450)
PMV BLD AUTO: 10.3 FL (ref 9.2–12.9)
POTASSIUM SERPL-SCNC: 4 MMOL/L (ref 3.5–5.1)
PROT SERPL-MCNC: 7.5 G/DL (ref 6–8.4)
RBC # BLD AUTO: 3.77 M/UL (ref 4.6–6.2)
SODIUM SERPL-SCNC: 142 MMOL/L (ref 136–145)
TRIGL SERPL-MCNC: 101 MG/DL (ref 30–150)
TSH SERPL DL<=0.005 MIU/L-ACNC: 3.09 UIU/ML (ref 0.4–4)
VIT B12 SERPL-MCNC: 582 PG/ML (ref 210–950)
WBC # BLD AUTO: 5.06 K/UL (ref 3.9–12.7)

## 2022-04-12 PROCEDURE — 84153 ASSAY OF PSA TOTAL: CPT | Performed by: INTERNAL MEDICINE

## 2022-04-12 PROCEDURE — 85379 FIBRIN DEGRADATION QUANT: CPT | Performed by: INTERNAL MEDICINE

## 2022-04-12 PROCEDURE — G0442 ANNUAL ALCOHOL SCREEN 15 MIN: HCPCS | Mod: 59,S$GLB,, | Performed by: INTERNAL MEDICINE

## 2022-04-12 PROCEDURE — G0447 PR OBESITY COUNSELING: ICD-10-PCS | Mod: 59,S$GLB,, | Performed by: INTERNAL MEDICINE

## 2022-04-12 PROCEDURE — 82550 ASSAY OF CK (CPK): CPT | Performed by: INTERNAL MEDICINE

## 2022-04-12 PROCEDURE — 82607 VITAMIN B-12: CPT | Performed by: INTERNAL MEDICINE

## 2022-04-12 PROCEDURE — 83036 HEMOGLOBIN GLYCOSYLATED A1C: CPT | Performed by: INTERNAL MEDICINE

## 2022-04-12 PROCEDURE — 99214 PR OFFICE/OUTPT VISIT, EST, LEVL IV, 30-39 MIN: ICD-10-PCS | Mod: 25,S$GLB,, | Performed by: INTERNAL MEDICINE

## 2022-04-12 PROCEDURE — 84443 ASSAY THYROID STIM HORMONE: CPT | Performed by: INTERNAL MEDICINE

## 2022-04-12 PROCEDURE — 80053 COMPREHEN METABOLIC PANEL: CPT | Performed by: INTERNAL MEDICINE

## 2022-04-12 PROCEDURE — 80061 LIPID PANEL: CPT | Performed by: INTERNAL MEDICINE

## 2022-04-12 PROCEDURE — G0447 BEHAVIOR COUNSEL OBESITY 15M: HCPCS | Mod: 59,S$GLB,, | Performed by: INTERNAL MEDICINE

## 2022-04-12 PROCEDURE — G0444 PR DEPRESSION SCREENING: ICD-10-PCS | Mod: 59,S$GLB,, | Performed by: INTERNAL MEDICINE

## 2022-04-12 PROCEDURE — G0444 DEPRESSION SCREEN ANNUAL: HCPCS | Mod: 59,S$GLB,, | Performed by: INTERNAL MEDICINE

## 2022-04-12 PROCEDURE — 83880 ASSAY OF NATRIURETIC PEPTIDE: CPT | Performed by: INTERNAL MEDICINE

## 2022-04-12 PROCEDURE — G0442 PR  ALCOHOL SCREENING: ICD-10-PCS | Mod: 59,S$GLB,, | Performed by: INTERNAL MEDICINE

## 2022-04-12 PROCEDURE — 36415 COLL VENOUS BLD VENIPUNCTURE: CPT | Performed by: INTERNAL MEDICINE

## 2022-04-12 PROCEDURE — 85025 COMPLETE CBC W/AUTO DIFF WBC: CPT | Performed by: INTERNAL MEDICINE

## 2022-04-12 PROCEDURE — 99214 OFFICE O/P EST MOD 30 MIN: CPT | Mod: 25,S$GLB,, | Performed by: INTERNAL MEDICINE

## 2022-04-12 RX ORDER — DUTASTERIDE AND TAMSULOSIN HYDROCHLORIDE CAPSULES .5; .4 MG/1; MG/1
1 CAPSULE ORAL DAILY
Qty: 30 CAPSULE | Refills: 11 | Status: SHIPPED | OUTPATIENT
Start: 2022-04-12 | End: 2023-01-23

## 2022-04-12 NOTE — PROGRESS NOTES
Subjective:       Patient ID: Sam Gamino is a 76 y.o. male.    Chief Complaint: Leg Swelling (Left ankle/leg) and Urinary Frequency    His legs have been swollen for several months.  Occasionally he wears support hose which helps.  His left leg is more swollen than his right.    He took Rosa for over a month with good improvement in his BPH symptoms, but then he quit taking it and the symptoms returned.    Urinary Frequency   This is a recurrent problem. The current episode started more than 1 month ago. Associated symptoms include frequency. His past medical history is significant for hypertension.   Hypertension  This is a chronic problem. The current episode started more than 1 year ago. The problem is controlled.     Review of Systems   Constitutional: Negative.    Respiratory: Negative.    Cardiovascular: Negative.    Genitourinary: Positive for frequency.         Objective:      Physical Exam  Vitals and nursing note reviewed.   Constitutional:       Appearance: He is well-developed.   HENT:      Head: Normocephalic and atraumatic.   Eyes:      Pupils: Pupils are equal, round, and reactive to light.   Cardiovascular:      Rate and Rhythm: Normal rate and regular rhythm.      Heart sounds: Normal heart sounds.      Comments: The left calf is 3 cm larger than the right calf.  Pulmonary:      Effort: Pulmonary effort is normal.   Musculoskeletal:      Right lower le+ Edema present.      Left lower le+ Edema present.   Neurological:      Mental Status: He is alert.         Assessment:       Problem List Items Addressed This Visit        Unprioritized    Pernicious anemia    Obesity (BMI 30-39.9)    Lymphoma in remission    Benign prostatic hyperplasia with urinary frequency - Primary    Relevant Medications    dutasteride-tamsulosin 0.5-0.4 mg CM24      Other Visit Diagnoses     Nonrheumatic mitral valve regurgitation        Relevant Orders    Echo Saline Bubble? No    EKG 12-lead    Edema,  unspecified type        Relevant Orders    US Lower Extremity Veins Bilateral    Screening for depression        Screening for alcoholism        Screening for obesity              Plan:       Per orders and D/C instructions.  Continue diet and/or meds for pernicious anemia and obesity, which are stable.     he has no sign of recurrence of lymphoma.  Restart Rosa for BPH with nocturia.  Ultrasound of the lower extremity veins to evaluate peripheral edema.  Echo to evaluate mitral valve regurg.  Check routine labs and CK, BNP, and D-dimer.    Screening: The patient was screened for depression with the PHQ2 questionnaire and possible health consequences were discussed with the patient, who understands (15 minutes spent).       The patient was screened for the misuse of alcohol, by asking the number of drinks per average week, and if pt has had more than 4 drinks (more than 3 for women and elderly) in 1 day within the past year. The health and legal consequences of misuse were discussed (15 minutes spent).       The patient was screened for obesity (BMI>30), Since the current BMI is > 30, the possible consequences of obesity, as well as the benefits of diet, exercise, and weight loss were discussed. Any behavioral risks were identified, and methods to achieve appropriate treatment goals were discussed (15 minutes spent).

## 2022-04-26 ENCOUNTER — CLINICAL SUPPORT (OUTPATIENT)
Dept: INTERNAL MEDICINE | Facility: CLINIC | Age: 77
End: 2022-04-26
Attending: INTERNAL MEDICINE
Payer: MEDICARE

## 2022-04-26 DIAGNOSIS — I34.0 NONRHEUMATIC MITRAL VALVE REGURGITATION: ICD-10-CM

## 2022-04-26 DIAGNOSIS — I38 MURMUR, DIASTOLIC: ICD-10-CM

## 2022-04-26 DIAGNOSIS — R60.0 EDEMA OF BOTH LOWER EXTREMITIES: Primary | ICD-10-CM

## 2022-04-26 DIAGNOSIS — R06.02 SOB (SHORTNESS OF BREATH): Primary | ICD-10-CM

## 2022-04-26 DIAGNOSIS — R60.9 EDEMA, UNSPECIFIED TYPE: ICD-10-CM

## 2022-04-26 PROCEDURE — 93970 EXTREMITY STUDY: CPT | Mod: S$GLB,,, | Performed by: INTERNAL MEDICINE

## 2022-04-26 PROCEDURE — 93000 ELECTROCARDIOGRAM COMPLETE: CPT | Mod: S$GLB,,, | Performed by: INTERNAL MEDICINE

## 2022-04-26 PROCEDURE — 93000 PR ELECTROCARDIOGRAM, COMPLETE: ICD-10-PCS | Mod: S$GLB,,, | Performed by: INTERNAL MEDICINE

## 2022-04-26 PROCEDURE — 93306 TTE W/DOPPLER COMPLETE: CPT | Mod: S$GLB,,, | Performed by: INTERNAL MEDICINE

## 2022-04-26 PROCEDURE — 93970 PR US DUPLEX, UPPER OR LOWER EXT VENOUS,COMPLETE BILAT: ICD-10-PCS | Mod: S$GLB,,, | Performed by: INTERNAL MEDICINE

## 2022-04-26 PROCEDURE — 93306 PR ECHO HEART XTHORACIC,COMPLETE W DOPPLER: ICD-10-PCS | Mod: S$GLB,,, | Performed by: INTERNAL MEDICINE

## 2022-04-27 DIAGNOSIS — I07.1 SEVERE TRICUSPID VALVE REGURGITATION: ICD-10-CM

## 2022-04-27 DIAGNOSIS — I50.811 ACUTE RIGHT-SIDED HEART FAILURE: ICD-10-CM

## 2022-04-27 DIAGNOSIS — I34.0 NONRHEUMATIC MITRAL VALVE REGURGITATION: ICD-10-CM

## 2022-04-27 DIAGNOSIS — R60.9 EDEMA, UNSPECIFIED TYPE: ICD-10-CM

## 2022-04-27 DIAGNOSIS — R79.89 POSITIVE D-DIMER: Primary | ICD-10-CM

## 2022-04-28 ENCOUNTER — HOSPITAL ENCOUNTER (OUTPATIENT)
Dept: RADIOLOGY | Facility: OTHER | Age: 77
Discharge: HOME | End: 2022-04-28
Attending: INTERNAL MEDICINE
Payer: MEDICARE

## 2022-04-28 DIAGNOSIS — R79.89 POSITIVE D-DIMER: ICD-10-CM

## 2022-04-28 DIAGNOSIS — I50.811 ACUTE RIGHT-SIDED HEART FAILURE: ICD-10-CM

## 2022-04-28 DIAGNOSIS — R60.9 EDEMA, UNSPECIFIED TYPE: ICD-10-CM

## 2022-04-28 PROCEDURE — 71275 CT ANGIOGRAPHY CHEST: CPT | Mod: TC

## 2022-04-28 PROCEDURE — 71275 CTA CHEST NON CORONARY: ICD-10-PCS | Mod: 26,,, | Performed by: RADIOLOGY

## 2022-04-28 PROCEDURE — 25500020 PHARM REV CODE 255: Performed by: INTERNAL MEDICINE

## 2022-04-28 PROCEDURE — 71275 CT ANGIOGRAPHY CHEST: CPT | Mod: 26,,, | Performed by: RADIOLOGY

## 2022-04-28 RX ADMIN — IOHEXOL 75 ML: 350 INJECTION, SOLUTION INTRAVENOUS at 10:04

## 2022-05-16 ENCOUNTER — HOSPITAL ENCOUNTER (OUTPATIENT)
Facility: OTHER | Age: 77
Discharge: HOME | End: 2022-05-18
Attending: EMERGENCY MEDICINE | Admitting: INTERNAL MEDICINE
Payer: MEDICARE

## 2022-05-16 DIAGNOSIS — I50.33 ACUTE ON CHRONIC DIASTOLIC CONGESTIVE HEART FAILURE: ICD-10-CM

## 2022-05-16 DIAGNOSIS — I50.9 CHF (CONGESTIVE HEART FAILURE): ICD-10-CM

## 2022-05-16 DIAGNOSIS — R07.9 CHEST PAIN: ICD-10-CM

## 2022-05-16 DIAGNOSIS — R60.0 LOWER EXTREMITY EDEMA: ICD-10-CM

## 2022-05-16 DIAGNOSIS — I50.9 CONGESTIVE HEART FAILURE, UNSPECIFIED HF CHRONICITY, UNSPECIFIED HEART FAILURE TYPE: ICD-10-CM

## 2022-05-16 DIAGNOSIS — J81.0 ACUTE PULMONARY EDEMA: Primary | ICD-10-CM

## 2022-05-16 DIAGNOSIS — D51.0 PERNICIOUS ANEMIA: ICD-10-CM

## 2022-05-16 DIAGNOSIS — I34.0 NONRHEUMATIC MITRAL VALVE REGURGITATION: ICD-10-CM

## 2022-05-16 DIAGNOSIS — I10 ESSENTIAL HYPERTENSION: ICD-10-CM

## 2022-05-16 PROBLEM — I50.31 ACUTE DIASTOLIC CONGESTIVE HEART FAILURE: Status: ACTIVE | Noted: 2022-05-16

## 2022-05-16 PROBLEM — R06.02 SOB (SHORTNESS OF BREATH): Status: ACTIVE | Noted: 2022-05-16

## 2022-05-16 LAB
ALBUMIN SERPL BCP-MCNC: 3.5 G/DL (ref 3.5–5.2)
ALP SERPL-CCNC: 58 U/L (ref 55–135)
ALT SERPL W/O P-5'-P-CCNC: 27 U/L (ref 10–44)
ANION GAP SERPL CALC-SCNC: 11 MMOL/L (ref 8–16)
ASCENDING AORTA: 3.31 CM
AST SERPL-CCNC: 28 U/L (ref 10–40)
AV INDEX (PROSTH): 1.28
AV MEAN GRADIENT: 2 MMHG
AV PEAK GRADIENT: 3 MMHG
AV REGURGITATION PRESSURE HALF TIME: 448 MS
AV VALVE AREA: 4.95 CM2
AV VELOCITY RATIO: 1.29
BASOPHILS # BLD AUTO: 0.04 K/UL (ref 0–0.2)
BASOPHILS NFR BLD: 0.8 % (ref 0–1.9)
BILIRUB SERPL-MCNC: 1 MG/DL (ref 0.1–1)
BNP SERPL-MCNC: 95 PG/ML (ref 0–99)
BSA FOR ECHO PROCEDURE: 2.46 M2
BUN SERPL-MCNC: 22 MG/DL (ref 8–23)
CALCIUM SERPL-MCNC: 8.9 MG/DL (ref 8.7–10.5)
CHLORIDE SERPL-SCNC: 110 MMOL/L (ref 95–110)
CO2 SERPL-SCNC: 19 MMOL/L (ref 23–29)
CREAT SERPL-MCNC: 1 MG/DL (ref 0.5–1.4)
CTP QC/QA: YES
CV ECHO LV RWT: 0.41 CM
DIFFERENTIAL METHOD: ABNORMAL
DOP CALC AO PEAK VEL: 0.87 M/S
DOP CALC AO VTI: 16.11 CM
DOP CALC LVOT AREA: 3.9 CM2
DOP CALC LVOT DIAMETER: 2.22 CM
DOP CALC LVOT PEAK VEL: 1.12 M/S
DOP CALC LVOT STROKE VOLUME: 79.81 CM3
DOP CALCLVOT PEAK VEL VTI: 20.63 CM
E WAVE DECELERATION TIME: 78.31 MSEC
E/A RATIO: 0.77
E/E' RATIO: 13.67 M/S
ECHO LV POSTERIOR WALL: 0.86 CM (ref 0.6–1.1)
EJECTION FRACTION: 55 %
EOSINOPHIL # BLD AUTO: 0.1 K/UL (ref 0–0.5)
EOSINOPHIL NFR BLD: 2.8 % (ref 0–8)
ERYTHROCYTE [DISTWIDTH] IN BLOOD BY AUTOMATED COUNT: 12.3 % (ref 11.5–14.5)
EST. GFR  (AFRICAN AMERICAN): >60 ML/MIN/1.73 M^2
EST. GFR  (NON AFRICAN AMERICAN): >60 ML/MIN/1.73 M^2
ESTIMATED AVG GLUCOSE: 85 MG/DL (ref 68–131)
FRACTIONAL SHORTENING: 29 % (ref 28–44)
GLUCOSE SERPL-MCNC: 101 MG/DL (ref 70–110)
HBA1C MFR BLD: 4.6 % (ref 4–5.6)
HCT VFR BLD AUTO: 35.1 % (ref 40–54)
HGB BLD-MCNC: 11.5 G/DL (ref 14–18)
IMM GRANULOCYTES # BLD AUTO: 0.01 K/UL (ref 0–0.04)
IMM GRANULOCYTES NFR BLD AUTO: 0.2 % (ref 0–0.5)
INTERVENTRICULAR SEPTUM: 0.93 CM (ref 0.6–1.1)
IVRT: 82.78 MSEC
LA MAJOR: 5.5 CM
LA MINOR: 5.45 CM
LA WIDTH: 3.77 CM
LEFT ATRIUM SIZE: 4.23 CM
LEFT ATRIUM VOLUME INDEX MOD: 23 ML/M2
LEFT ATRIUM VOLUME INDEX: 31.1 ML/M2
LEFT ATRIUM VOLUME MOD: 55 CM3
LEFT ATRIUM VOLUME: 74.21 CM3
LEFT INTERNAL DIMENSION IN SYSTOLE: 3.01 CM (ref 2.1–4)
LEFT VENTRICLE DIASTOLIC VOLUME INDEX: 33.07 ML/M2
LEFT VENTRICLE DIASTOLIC VOLUME: 79.04 ML
LEFT VENTRICLE MASS INDEX: 49 G/M2
LEFT VENTRICLE SYSTOLIC VOLUME INDEX: 14.8 ML/M2
LEFT VENTRICLE SYSTOLIC VOLUME: 35.32 ML
LEFT VENTRICULAR INTERNAL DIMENSION IN DIASTOLE: 4.21 CM (ref 3.5–6)
LEFT VENTRICULAR MASS: 118.23 G
LV LATERAL E/E' RATIO: 10.25 M/S
LV SEPTAL E/E' RATIO: 20.5 M/S
LYMPHOCYTES # BLD AUTO: 1.5 K/UL (ref 1–4.8)
LYMPHOCYTES NFR BLD: 29.2 % (ref 18–48)
MAGNESIUM SERPL-MCNC: 1.8 MG/DL (ref 1.6–2.6)
MCH RBC QN AUTO: 34.3 PG (ref 27–31)
MCHC RBC AUTO-ENTMCNC: 32.8 G/DL (ref 32–36)
MCV RBC AUTO: 105 FL (ref 82–98)
MONOCYTES # BLD AUTO: 0.5 K/UL (ref 0.3–1)
MONOCYTES NFR BLD: 9.1 % (ref 4–15)
MV A" WAVE DURATION": 8.75 MSEC
MV PEAK A VEL: 1.06 M/S
MV PEAK E VEL: 0.82 M/S
MV STENOSIS PRESSURE HALF TIME: 22.71 MS
MV VALVE AREA P 1/2 METHOD: 9.69 CM2
NEUTROPHILS # BLD AUTO: 2.9 K/UL (ref 1.8–7.7)
NEUTROPHILS NFR BLD: 57.9 % (ref 38–73)
NRBC BLD-RTO: 0 /100 WBC
PISA MRMAX VEL: 0.04 M/S
PISA TR MAX VEL: 3.18 M/S
PLATELET # BLD AUTO: 164 K/UL (ref 150–450)
PMV BLD AUTO: 10.1 FL (ref 9.2–12.9)
POTASSIUM SERPL-SCNC: 4 MMOL/L (ref 3.5–5.1)
PROT SERPL-MCNC: 6.8 G/DL (ref 6–8.4)
PULM VEIN S/D RATIO: 1.94
PV PEAK D VEL: 0.33 M/S
PV PEAK S VEL: 0.64 M/S
PV PEAK VELOCITY: 0.98 CM/S
RA MAJOR: 4.6 CM
RA PRESSURE: 3 MMHG
RA WIDTH: 3.64 CM
RBC # BLD AUTO: 3.35 M/UL (ref 4.6–6.2)
RIGHT VENTRICULAR END-DIASTOLIC DIMENSION: 3.1 CM
RV TISSUE DOPPLER FREE WALL SYSTOLIC VELOCITY 1 (APICAL 4 CHAMBER VIEW): 12.78 CM/S
SARS-COV-2 RDRP RESP QL NAA+PROBE: NEGATIVE
SINUS: 3.24 CM
SODIUM SERPL-SCNC: 140 MMOL/L (ref 136–145)
STJ: 3.27 CM
TDI LATERAL: 0.08 M/S
TDI SEPTAL: 0.04 M/S
TDI: 0.06 M/S
TR MAX PG: 40 MMHG
TRICUSPID ANNULAR PLANE SYSTOLIC EXCURSION: 1.62 CM
TROPONIN I SERPL DL<=0.01 NG/ML-MCNC: 0.01 NG/ML (ref 0–0.03)
TV REST PULMONARY ARTERY PRESSURE: 43 MMHG
WBC # BLD AUTO: 5.07 K/UL (ref 3.9–12.7)

## 2022-05-16 PROCEDURE — 99220 PR INITIAL OBSERVATION CARE,LEVL III: ICD-10-PCS | Mod: 25,,, | Performed by: INTERNAL MEDICINE

## 2022-05-16 PROCEDURE — 84484 ASSAY OF TROPONIN QUANT: CPT | Performed by: EMERGENCY MEDICINE

## 2022-05-16 PROCEDURE — 99220 PR INITIAL OBSERVATION CARE,LEVL III: CPT | Mod: ,,, | Performed by: PHYSICIAN ASSISTANT

## 2022-05-16 PROCEDURE — G0378 HOSPITAL OBSERVATION PER HR: HCPCS

## 2022-05-16 PROCEDURE — 99220 PR INITIAL OBSERVATION CARE,LEVL III: CPT | Mod: 25,,, | Performed by: INTERNAL MEDICINE

## 2022-05-16 PROCEDURE — U0002 COVID-19 LAB TEST NON-CDC: HCPCS | Performed by: EMERGENCY MEDICINE

## 2022-05-16 PROCEDURE — 99285 EMERGENCY DEPT VISIT HI MDM: CPT | Mod: 25

## 2022-05-16 PROCEDURE — 63600175 PHARM REV CODE 636 W HCPCS: Performed by: PHYSICIAN ASSISTANT

## 2022-05-16 PROCEDURE — 63600175 PHARM REV CODE 636 W HCPCS: Performed by: EMERGENCY MEDICINE

## 2022-05-16 PROCEDURE — 93005 ELECTROCARDIOGRAM TRACING: CPT

## 2022-05-16 PROCEDURE — 83735 ASSAY OF MAGNESIUM: CPT | Performed by: EMERGENCY MEDICINE

## 2022-05-16 PROCEDURE — 25000003 PHARM REV CODE 250: Performed by: PHYSICIAN ASSISTANT

## 2022-05-16 PROCEDURE — 83036 HEMOGLOBIN GLYCOSYLATED A1C: CPT | Performed by: PHYSICIAN ASSISTANT

## 2022-05-16 PROCEDURE — 80053 COMPREHEN METABOLIC PANEL: CPT | Performed by: EMERGENCY MEDICINE

## 2022-05-16 PROCEDURE — 96372 THER/PROPH/DIAG INJ SC/IM: CPT | Mod: 59 | Performed by: PHYSICIAN ASSISTANT

## 2022-05-16 PROCEDURE — 93010 ELECTROCARDIOGRAM REPORT: CPT | Mod: ,,, | Performed by: INTERNAL MEDICINE

## 2022-05-16 PROCEDURE — 83880 ASSAY OF NATRIURETIC PEPTIDE: CPT | Performed by: EMERGENCY MEDICINE

## 2022-05-16 PROCEDURE — 96376 TX/PRO/DX INJ SAME DRUG ADON: CPT

## 2022-05-16 PROCEDURE — 93010 EKG 12-LEAD: ICD-10-PCS | Mod: ,,, | Performed by: INTERNAL MEDICINE

## 2022-05-16 PROCEDURE — 85025 COMPLETE CBC W/AUTO DIFF WBC: CPT | Performed by: EMERGENCY MEDICINE

## 2022-05-16 PROCEDURE — 96374 THER/PROPH/DIAG INJ IV PUSH: CPT

## 2022-05-16 PROCEDURE — 99220 PR INITIAL OBSERVATION CARE,LEVL III: ICD-10-PCS | Mod: ,,, | Performed by: PHYSICIAN ASSISTANT

## 2022-05-16 RX ORDER — ENOXAPARIN SODIUM 100 MG/ML
40 INJECTION SUBCUTANEOUS EVERY 24 HOURS
Status: DISCONTINUED | OUTPATIENT
Start: 2022-05-16 | End: 2022-05-18 | Stop reason: HOSPADM

## 2022-05-16 RX ORDER — LOSARTAN POTASSIUM 50 MG/1
100 TABLET ORAL DAILY
Status: DISCONTINUED | OUTPATIENT
Start: 2022-05-16 | End: 2022-05-18 | Stop reason: HOSPADM

## 2022-05-16 RX ORDER — SODIUM CHLORIDE 0.9 % (FLUSH) 0.9 %
10 SYRINGE (ML) INJECTION
Status: DISCONTINUED | OUTPATIENT
Start: 2022-05-16 | End: 2022-05-18 | Stop reason: HOSPADM

## 2022-05-16 RX ORDER — ATORVASTATIN CALCIUM 20 MG/1
40 TABLET, FILM COATED ORAL DAILY
Status: DISCONTINUED | OUTPATIENT
Start: 2022-05-16 | End: 2022-05-18 | Stop reason: HOSPADM

## 2022-05-16 RX ORDER — NAPROXEN SODIUM 220 MG/1
81 TABLET, FILM COATED ORAL DAILY
Status: DISCONTINUED | OUTPATIENT
Start: 2022-05-16 | End: 2022-05-18 | Stop reason: HOSPADM

## 2022-05-16 RX ORDER — CETIRIZINE HYDROCHLORIDE 10 MG/1
10 TABLET ORAL DAILY
Status: DISCONTINUED | OUTPATIENT
Start: 2022-05-16 | End: 2022-05-18 | Stop reason: HOSPADM

## 2022-05-16 RX ORDER — FUROSEMIDE 10 MG/ML
40 INJECTION INTRAMUSCULAR; INTRAVENOUS
Status: DISCONTINUED | OUTPATIENT
Start: 2022-05-16 | End: 2022-05-18 | Stop reason: HOSPADM

## 2022-05-16 RX ORDER — FUROSEMIDE 10 MG/ML
40 INJECTION INTRAMUSCULAR; INTRAVENOUS
Status: COMPLETED | OUTPATIENT
Start: 2022-05-16 | End: 2022-05-16

## 2022-05-16 RX ORDER — PANTOPRAZOLE SODIUM 40 MG/1
40 TABLET, DELAYED RELEASE ORAL DAILY
Status: DISCONTINUED | OUTPATIENT
Start: 2022-05-16 | End: 2022-05-18 | Stop reason: HOSPADM

## 2022-05-16 RX ORDER — METOPROLOL SUCCINATE 50 MG/1
50 TABLET, EXTENDED RELEASE ORAL DAILY
Status: DISCONTINUED | OUTPATIENT
Start: 2022-05-16 | End: 2022-05-18 | Stop reason: HOSPADM

## 2022-05-16 RX ADMIN — PANTOPRAZOLE SODIUM 40 MG: 40 TABLET, DELAYED RELEASE ORAL at 10:05

## 2022-05-16 RX ADMIN — FUROSEMIDE 40 MG: 10 INJECTION, SOLUTION INTRAMUSCULAR; INTRAVENOUS at 07:05

## 2022-05-16 RX ADMIN — ATORVASTATIN CALCIUM 40 MG: 20 TABLET, FILM COATED ORAL at 10:05

## 2022-05-16 RX ADMIN — FUROSEMIDE 40 MG: 40 INJECTION, SOLUTION INTRAMUSCULAR; INTRAVENOUS at 10:05

## 2022-05-16 RX ADMIN — ENOXAPARIN SODIUM 40 MG: 100 INJECTION SUBCUTANEOUS at 05:05

## 2022-05-16 RX ADMIN — ASPIRIN 81 MG CHEWABLE TABLET 81 MG: 81 TABLET CHEWABLE at 10:05

## 2022-05-16 RX ADMIN — METOPROLOL SUCCINATE 50 MG: 50 TABLET, EXTENDED RELEASE ORAL at 10:05

## 2022-05-16 RX ADMIN — LOSARTAN POTASSIUM 100 MG: 50 TABLET, FILM COATED ORAL at 10:05

## 2022-05-16 RX ADMIN — CETIRIZINE HYDROCHLORIDE 10 MG: 10 TABLET, FILM COATED ORAL at 10:05

## 2022-05-16 NOTE — ED PROVIDER NOTES
Encounter Date: 5/16/2022    SCRIBE #1 NOTE: I, Lawanda Raygoza, am scribing for, and in the presence of, Marck Garcia MD.       History     Chief Complaint   Patient presents with    Shortness of Breath     Pt c/o SOB x2d worsening this am, also c/o weakness     Time seen by provider: 6:14 AM    This is a 76 y.o. male with PMHx of HTN and lymphoma who presents with complaint of shortness of breath onset 2 days ago. The patient reports he is a recently retired  and went to his PCP 1 month ago for bilateral leg swelling. He has a follow up with his PCP this week but his wife states his shortness of breath has been worsening. Associating symptoms include dyspnea which is exacerbated when laying down. He also reports being congested and taking Zyrtec daily. Denies chest pain, cough, fever, or chills. The patient states he does not smoke but drinks alcohol three times a week. This is the extent of the patient's complaints at this time.    The history is provided by the patient and the spouse.     Review of patient's allergies indicates:   Allergen Reactions    Lotensin [benazepril]      cough     Past Medical History:   Diagnosis Date    Abnormal echocardiogram 2/8/2013    Mild MVR 3/07    CAD (coronary artery disease) 2/8/2013    Angio 3/07 Dr. Lin    Lymphoma in remission 2/8/2013    S/p chemo/XRT 1992 Relapse 1993 BMT 1993    Normal cardiac stress test 2/8/2013    Nuclear stress 2/09    Pernicious anemia 2/8/2013    Thyroid nodule 2/8/2013    Right s/p Bx 6/08 Dr. Pelayo     Past Surgical History:   Procedure Laterality Date    EYE SURGERY Bilateral 2016    Cataracts     Family History   Problem Relation Age of Onset    Cancer Mother         uterine    No Known Problems Father     Hypertension Brother     No Known Problems Daughter     No Known Problems Sister     No Known Problems Sister     No Known Problems Brother     No Known Problems Brother     No Known Problems Brother      Thyroid disease Sister      Social History     Tobacco Use    Smoking status: Never Smoker    Smokeless tobacco: Never Used   Substance Use Topics    Alcohol use: Yes     Alcohol/week: 1.0 standard drink     Types: 1 Standard drinks or equivalent per week     Comment: ocassionally    Drug use: No     Review of Systems   Constitutional: Negative for fever.   HENT: Negative for sore throat.    Respiratory: Positive for shortness of breath. Negative for cough.         Positive for dyspnea.   Cardiovascular: Negative for chest pain.   Gastrointestinal: Negative for nausea.   Genitourinary: Negative for dysuria.   Musculoskeletal: Negative for back pain.        Positive for bilateral lower extremity swelling.   Skin: Negative for rash.   Neurological: Negative for weakness.   Hematological: Does not bruise/bleed easily.       Physical Exam     Initial Vitals [05/16/22 0445]   BP Pulse Resp Temp SpO2   133/87 97 20 97.7 °F (36.5 °C) 98 %      MAP       --         Physical Exam    Nursing note and vitals reviewed.  Constitutional: He appears well-developed and well-nourished. He is not diaphoretic. No distress.   HENT:   Head: Normocephalic and atraumatic.   Right Ear: External ear normal.   Left Ear: External ear normal.   Nose: Nose normal.   Eyes: Conjunctivae and EOM are normal. Pupils are equal, round, and reactive to light.   Neck: Neck supple. No tracheal deviation present. No JVD present.   Normal range of motion.  Cardiovascular: Normal rate, regular rhythm, normal heart sounds and intact distal pulses. Exam reveals no gallop and no friction rub.    No murmur heard.  Pulmonary/Chest: No respiratory distress. He has no wheezes. He has no rhonchi. He has rales. He exhibits no tenderness.   Positive for abnormal lung sounds.   Abdominal: Abdomen is soft. Bowel sounds are normal. He exhibits no distension and no mass. There is no abdominal tenderness. There is no rebound and no guarding.   Musculoskeletal:          General: No tenderness. Normal range of motion.      Cervical back: Normal range of motion and neck supple.      Comments: Positive for bilateral pitting edema.     Neurological: He is alert and oriented to person, place, and time. He has normal strength. He displays normal reflexes. No cranial nerve deficit or sensory deficit.   Skin: Skin is warm and dry. No rash noted. No erythema.   Psychiatric: He has a normal mood and affect. His behavior is normal. Judgment and thought content normal.         ED Course   Critical Care    Date/Time: 5/16/2022 6:18 AM  Performed by: Marck Garcia MD  Authorized by: Estrella Bryant MD   Direct patient critical care time: 15 minutes  Additional history critical care time: 7 minutes  Ordering / reviewing critical care time: 7 minutes  Documentation critical care time: 10 minutes  Consulting other physicians critical care time: 7 minutes  Total critical care time (exclusive of procedural time) : 46 minutes  Critical care time was exclusive of separately billable procedures and treating other patients and teaching time.  Critical care was necessary to treat or prevent imminent or life-threatening deterioration of the following conditions: Pulmonary edema requiring Lasix.  Critical care was time spent personally by me on the following activities: discussions with consultants, evaluation of patient's response to treatment, obtaining history from patient or surrogate, pulse oximetry, review of old charts, examination of patient, ordering and performing treatments and interventions, ordering and review of radiographic studies and re-evaluation of patient's condition.        Labs Reviewed   CBC W/ AUTO DIFFERENTIAL - Abnormal; Notable for the following components:       Result Value    RBC 3.35 (*)     Hemoglobin 11.5 (*)     Hematocrit 35.1 (*)      (*)     MCH 34.3 (*)     All other components within normal limits   COMPREHENSIVE METABOLIC PANEL - Abnormal; Notable for the  "following components:    CO2 19 (*)     All other components within normal limits   TROPONIN I   B-TYPE NATRIURETIC PEPTIDE   B-TYPE NATRIURETIC PEPTIDE   MAGNESIUM   MAGNESIUM   SARS-COV-2 RDRP GENE     EKG Readings: (Independently Interpreted)   Initial Reading: No STEMI. Rhythm: Normal Sinus Rhythm. Heart Rate: 91. Conduction: 1st Degree AV Block.       Imaging Results          X-Ray Chest PA And Lateral (Final result)  Result time 05/16/22 06:06:45    Final result by Alfonso Ramirez MD (05/16/22 06:06:45)                 Impression:      Bibasilar subsegmental atelectasis.  No confluent area of consolidation.      Electronically signed by: Alfonso Ramirez MD  Date:    05/16/2022  Time:    06:06             Narrative:    EXAMINATION:  XR CHEST PA AND LATERAL    CLINICAL HISTORY:  Provided history is "Chest Pain;  ".    TECHNIQUE:  Frontal and lateral views of the chest were performed.    COMPARISON:  01/05/2009.    FINDINGS:  Cardiac wires overlie the chest.  Cardiomediastinal silhouette is not significantly enlarged.  Coarsened interstitial lung markings but no large focal area of consolidation.  Probable bilateral lower lobe subsegmental atelectasis.  Right hemidiaphragm is slightly elevated.  No sizable pleural effusion.  No pneumothorax.                              X-Rays:   Independently Interpreted Readings:   Chest X-Ray: No focal infiltrate. No pleural effusion. No pneumothorax.     Medications   furosemide injection 40 mg (40 mg Intravenous Given 5/16/22 0727)     Medical Decision Making:   History:   Old Medical Records: I decided to obtain old medical records.  Differential Diagnosis:   Asthma exacerbation, COPD exacerbation, pulmonary edema, aspiration, pulmonary embolism, acute bronchitis, carcinoid syndrome, bronchiectasis, parasitic lung infection, malignancy, anaphylaxis, CHF Exacerbation    Independently Interpreted Test(s):   I have ordered and independently interpreted X-rays - see prior " notes.  I have ordered and independently interpreted EKG Reading(s) - see prior notes  Clinical Tests:   Lab Tests: Ordered and Reviewed  Radiological Study: Ordered and Reviewed  Medical Tests: Ordered and Reviewed  ED Management:  75yo with hx of mitral regurgitation presents with acute  pulmonary edema by exam - his CXR and BNP are normal, but his lungs are 3/4 full of rales and faint wheezes, plus 3+ pitting bilateral edema. Recent echo has worsening regurgitation which I believe is the cause of his edema. Needs to be admitted for diuresis and cards eval.          Scribe Attestation:   Scribe #1: I performed the above scribed service and the documentation accurately describes the services I performed. I attest to the accuracy of the note.        ED Course as of 06/15/22 8993   Mon May 16, 2022   0711 Case discussed with Dr. Carnes, will admit to Dr. Sanchez under observation. [MA]      ED Course User Index  [MA] Marck Garcia MD           Physician Attestation for Scribe: I, MAA, reviewed documentation as scribed in my presence, which is both accurate and complete.  Clinical Impression:   Final diagnoses:  [R07.9] Chest pain  [J81.0] Acute pulmonary edema (Primary)  [I34.0] Nonrheumatic mitral valve regurgitation          ED Disposition Condition    Observation               Marck Garcia MD  05/19/22 5742       Marck Garcia MD  06/15/22 6511

## 2022-05-16 NOTE — ED NOTES
PT arrives to Ed with c/o SOB when walking, and increased SOB when lying flat. Pt reports increased pressure to chest. Pt lying in bed, respirations even unlabored, NAD noted, pt placed on BP cycling, pulse ox, and cardiac monitoring.

## 2022-05-16 NOTE — NURSING
Pt arrived to unit via stretcher accompanied by transport w/ nad noted. Pt admission currently in progress

## 2022-05-16 NOTE — ED NOTES
Report received from MAT [facility]. Pt resting comfortably in bed, AAO x4. Pt reports pressure to chest, resp even & unlabored. Comfort/restroom needs addressed. Call light and personal items within reach. bed in lowest position, wheels locked, siderails up x2. Pt on continuous cardiac monitoring & pulse ox with BP cycling every 30 min.

## 2022-05-16 NOTE — CONSULTS
Cardiology Consult  5/16/2022  10:17 AM    Attending Cardiologist: Tyree Amezcua M.D.  Primary Care Provider: JAYLYN Zamora MD  Chief Complaint/Reason For Consultation:  CHF      Problem list  Patient Active Problem List   Diagnosis    Lymphoma in remission    Pernicious anemia    CAD (coronary artery disease)    Thyroid nodule    Normal cardiac stress test    Abnormal echocardiogram    Obesity (BMI 30-39.9)    Slow transit constipation    Chronic bilateral low back pain with bilateral sciatica    Benign prostatic hyperplasia with urinary frequency    Nonrheumatic mitral valve regurgitation    Severe tricuspid valve regurgitation    Lower extremity edema    Essential hypertension    SOB (shortness of breath)    Acute diastolic congestive heart failure       CC:  SOB    HPI:  Sam Gamino is a 76 y.o.year-old male with PMH of HTN, lymphoma 1992, pernicious anemia, BPH, mitral regurgitation who came to ER with 2 day of worsening SOB, PND.  He states he has been having lower extremity swelling for the past few months.  She had an echocardiogram performed in Dr. Zamora's office which reportedly showed EF of 60% with moderately severe mitral regurgitation and severe TR with PAP 93mmHg.  CTA of lungs were ordered by Dr. Zamora.  While in ER, he has been treated with IV lasix and feels better.      Medications  Current Facility-Administered Medications   Medication Dose Route Frequency Provider Last Rate Last Admin    aspirin chewable tablet 81 mg  81 mg Oral Daily Anastacia Tenorio PA-C        atorvastatin tablet 40 mg  40 mg Oral Daily Anastacia Tenorio PA-C        cetirizine tablet 10 mg  10 mg Oral Daily Anastacia Tenorio PA-C        enoxaparin injection 40 mg  40 mg Subcutaneous Daily Anastacia Tenorio PA-C        furosemide injection 40 mg  40 mg Intravenous Q12H Anastacia Tenorio PA-C        losartan tablet 100 mg  100 mg Oral Daily Anastacia Tenorio PA-C        metoprolol succinate  "(TOPROL-XL) 24 hr tablet 50 mg  50 mg Oral Daily Anastacia Tenorio PA-C        pantoprazole EC tablet 40 mg  40 mg Oral Daily Anastacia Tenorio PA-C        sodium chloride 0.9% flush 10 mL  10 mL Intravenous PRN Marck Garcia MD        sodium chloride 0.9% flush 10 mL  10 mL Intravenous PRN Anastacia Tenorio PA-C         Current Outpatient Medications   Medication Sig Dispense Refill    aspirin 81 MG Chew Take 81 mg by mouth once daily.      atorvastatin (LIPITOR) 80 MG tablet TAKE ONE-HALF TABLET BY MOUTH EVERY DAY 90 tablet 1    cetirizine (ZYRTEC) 10 MG tablet Take 10 mg by mouth daily as needed.      cholecalciferol, vitamin D3, (VITAMIN D3) 50 mcg (2,000 unit) Cap Take 1 capsule by mouth once daily.      clotrimazole-betamethasone 1-0.05% (LOTRISONE) cream APPLY TOPICALLY TO THE AFFECTED AREA TWICE DAILY AS NEEDED 15 g 0    cyanocobalamin 1,000 mcg/mL injection Inject 1 mL (1,000 mcg total) into the muscle every 14 (fourteen) days. 10 mL 1    dutasteride-tamsulosin 0.5-0.4 mg CM24 Take 1 capsule by mouth once daily. 30 capsule 11    fluticasone propionate (FLONASE) 50 mcg/actuation nasal spray 2 sprays (100 mcg total) by Each Nostril route once daily. 16 g 3    ibuprofen (ADVIL,MOTRIN) 800 MG tablet TAKE 1 TABLET(800 MG) BY MOUTH TWICE DAILY AS NEEDED FOR PAIN 60 tablet 2    linaCLOtide (LINZESS) 145 mcg Cap capsule Take 1 capsule (145 mcg total) by mouth before breakfast. 30 capsule 11    losartan (COZAAR) 100 MG tablet TAKE 1 TABLET(100 MG) BY MOUTH EVERY DAY 90 tablet 1    metoprolol succinate (TOPROL-XL) 50 MG 24 hr tablet TAKE 1 TABLET(50 MG) BY MOUTH EVERY DAY 90 tablet 1    omeprazole 20 mg TbEC Take 1 tablet by mouth once daily.      syringe with needle 3 mL 23 x 1" Syrg 1 Syringe by Misc.(Non-Drug; Combo Route) route every 30 days. 50 Syringe 11      Prior to Admission medications    Medication Sig Start Date End Date Taking? Authorizing Provider   aspirin 81 MG Chew Take 81 mg by " "mouth once daily.    Historical Provider   atorvastatin (LIPITOR) 80 MG tablet TAKE ONE-HALF TABLET BY MOUTH EVERY DAY 4/28/22   D Herber Zamora MD   cetirizine (ZYRTEC) 10 MG tablet Take 10 mg by mouth daily as needed.    Historical Provider   cholecalciferol, vitamin D3, (VITAMIN D3) 50 mcg (2,000 unit) Cap Take 1 capsule by mouth once daily.    Historical Provider   clotrimazole-betamethasone 1-0.05% (LOTRISONE) cream APPLY TOPICALLY TO THE AFFECTED AREA TWICE DAILY AS NEEDED 5/25/21   D Herber Zamora MD   cyanocobalamin 1,000 mcg/mL injection Inject 1 mL (1,000 mcg total) into the muscle every 14 (fourteen) days. 12/6/21   D Herber Zamora MD   dutasteride-tamsulosin 0.5-0.4 mg CM24 Take 1 capsule by mouth once daily. 4/12/22 4/12/23  D Herber Zamora MD   fluticasone propionate (FLONASE) 50 mcg/actuation nasal spray 2 sprays (100 mcg total) by Each Nostril route once daily. 1/7/22   D Herber Zamora MD   ibuprofen (ADVIL,MOTRIN) 800 MG tablet TAKE 1 TABLET(800 MG) BY MOUTH TWICE DAILY AS NEEDED FOR PAIN 4/19/22   D Herber Zamora MD   linaCLOtide (LINZESS) 145 mcg Cap capsule Take 1 capsule (145 mcg total) by mouth before breakfast. 12/29/21   D Herber Zamora MD   losartan (COZAAR) 100 MG tablet TAKE 1 TABLET(100 MG) BY MOUTH EVERY DAY 1/1/22   D Herber Zamora MD   metoprolol succinate (TOPROL-XL) 50 MG 24 hr tablet TAKE 1 TABLET(50 MG) BY MOUTH EVERY DAY 1/1/22   JAYLYN Zamora MD   omeprazole 20 mg TbEC Take 1 tablet by mouth once daily.    Historical Provider   syringe with needle 3 mL 23 x 1" Syrg 1 Syringe by Misc.(Non-Drug; Combo Route) route every 30 days. 5/16/17   D Herber Zamora MD   DUREZOL 0.05 % Drop ophthalmic solution  4/2/16 5/16/22  Historical Provider         History  Past Medical History:   Diagnosis Date    Abnormal echocardiogram 2/8/2013    Mild MVR 3/07    CAD (coronary artery disease) 2/8/2013    Angio 3/07 Dr. Lin    Lymphoma in remission 2/8/2013    S/p chemo/XRT 1992 Relapse 1993 BMT 1993 "    Normal cardiac stress test 2/8/2013    Nuclear stress 2/09    Pernicious anemia 2/8/2013    Thyroid nodule 2/8/2013    Right s/p Bx 6/08 Dr. Pelayo     Past Surgical History:   Procedure Laterality Date    EYE SURGERY Bilateral 2016    Cataracts     Social History     Socioeconomic History    Marital status:      Spouse name: Eileen    Number of children: 1+3   Occupational History    Occupation:    Tobacco Use    Smoking status: Never Smoker    Smokeless tobacco: Never Used   Substance and Sexual Activity    Alcohol use: Yes     Alcohol/week: 1.0 standard drink     Types: 1 Standard drinks or equivalent per week     Comment: ocassionally    Drug use: No    Sexual activity: Not Currently     Partners: Female   Social History Narrative    No exercise.    Wants to retire in 2019    Going to retire - 12/21         Allergies  Review of patient's allergies indicates:   Allergen Reactions    Lotensin [benazepril]      cough         Review of Systems   Review of Systems   Constitutional: Negative for decreased appetite, fever and weight loss.   HENT: Negative for congestion and nosebleeds.    Eyes: Negative for double vision, vision loss in left eye, vision loss in right eye and visual disturbance.   Cardiovascular: Positive for dyspnea on exertion, leg swelling, orthopnea and paroxysmal nocturnal dyspnea. Negative for chest pain, claudication, cyanosis, irregular heartbeat, near-syncope, palpitations and syncope.   Respiratory: Positive for shortness of breath. Negative for cough, hemoptysis, sleep disturbances due to breathing, snoring, sputum production and wheezing.    Endocrine: Negative for cold intolerance and heat intolerance.   Skin: Negative for nail changes and rash.   Musculoskeletal: Negative for joint pain, muscle cramps, muscle weakness and myalgias.   Gastrointestinal: Negative for change in bowel habit, heartburn, hematemesis, hematochezia, hemorrhoids and melena.    Neurological: Negative for dizziness, focal weakness and headaches.         Physical Exam  Wt Readings from Last 1 Encounters:   05/16/22 119.3 kg (263 lb)     BP Readings from Last 3 Encounters:   05/16/22 135/73   04/12/22 100/62   02/15/22 (!) 122/58     Pulse Readings from Last 1 Encounters:   05/16/22 97     Body mass index is 35.67 kg/m².    Physical Exam  Constitutional:       General: He is not in acute distress.  Neck:      Vascular: JVD present. No carotid bruit.   Cardiovascular:      Rate and Rhythm: Normal rate and regular rhythm.      Pulses:           Carotid pulses are 2+ on the right side and 2+ on the left side.       Radial pulses are 2+ on the right side and 2+ on the left side.      Heart sounds: S1 normal and S2 normal. Murmur heard.    Systolic murmur is present with a grade of 2/6.  Pulmonary:      Breath sounds: Rhonchi present.   Musculoskeletal:      Right lower leg: Edema present.      Left lower leg: Edema present.   Neurological:      Mental Status: He is alert.                   Assessment and Plan:  SOB, PND.  Acute diastolic HF  -continue iv lasix 40mg BID  -continue losartan 100mg and metoprolol XL 50mg qd  -f/u echo to assess MR severity    Severe TR with severe PHTN (SPAP 93mmHg)  -need to evaluate for PE        Thank you for allowing me to participate in the care of Sam Gamino.      Tyree Amezcua MD, F.A.C.C, F.S.C.A.I.

## 2022-05-16 NOTE — HPI
"From H&P by Anastacia CHAN:  "76-year-old male with past medical history hypertension, pernicious anemia, lymphoma in remission, BPH, nonrheumatic mitral valve regurgitation presented to the ED with complaint of shortness of breath associated with lower extremity edema.  Also reported some congestion and dry cough.  Patient reported symptoms started 2 nights ago but worsened last night, worse with lying flat.  Stated he saw his primary care provider about a month ago and had an echocardiogram and was scheduled to follow-up with cardiology next week but symptoms worsened.  Denies any sick contacts, fever, chills, chest pain, abdominal pain, N/V/D.  Denied tobacco use, occasional alcohol use. ED evaluation tachypneic on presentation, received 40 mg Lasix IV, Labs unremarkable, BNP 95, troponin 0.06.  Patient placed in observation for further evaluation and treatment."  "

## 2022-05-16 NOTE — H&P
[facility]  History & Physical    Patient Name: Sam Gamino  MRN: [id number]  Patient Class: OP- Observation  Admission Date: 5/16/2022  Attending Physician: Isabella Sanchez MD   Primary Care Provider: JAYLYN Zamora MD         Patient information was obtained from patient, past medical records and ER records.     Subjective:     Principal Problem:Acute diastolic congestive heart failure    Chief Complaint:   Chief Complaint   Patient presents with    Shortness of Breath     Pt c/o SOB x2d worsening this am, also c/o weakness        HPI: 76-year-old male with past medical history hypertension, pernicious anemia, lymphoma in remission, BPH, nonrheumatic mitral valve regurgitation presented to the ED with complaint of shortness of breath associated with lower extremity edema.  Also reported some congestion and dry cough.  Patient reported symptoms started 2 nights ago but worsened last night, worse with lying flat.  Stated he saw his primary care provider about a month ago and had an echocardiogram and was scheduled to follow-up with cardiology next week but symptoms worsened.  Denies any sick contacts, fever, chills, chest pain, abdominal pain, N/V/D.  Denied tobacco use, occasional alcohol use. ED evaluation tachypneic on presentation, received 40 mg Lasix IV, Labs unremarkable, BNP 95, troponin 0.06.  Patient placed in observation for further evaluation and treatment.      Past Medical History:   Diagnosis Date    Abnormal echocardiogram 2/8/2013    Mild MVR 3/07    CAD (coronary artery disease) 2/8/2013    Angio 3/07 Dr. Lin    Lymphoma in remission 2/8/2013    S/p chemo/XRT 1992 Relapse 1993 BMT 1993    Normal cardiac stress test 2/8/2013    Nuclear stress 2/09    Pernicious anemia 2/8/2013    Thyroid nodule 2/8/2013    Right s/p Bx 6/08 Dr. Pelayo       Past Surgical History:   Procedure Laterality Date    EYE SURGERY Bilateral 2016    Cataracts       Review of  "patient's allergies indicates:   Allergen Reactions    Lotensin [benazepril]      cough       No current facility-administered medications on file prior to encounter.     Current Outpatient Medications on File Prior to Encounter   Medication Sig    aspirin 81 MG Chew Take 81 mg by mouth once daily.    atorvastatin (LIPITOR) 80 MG tablet TAKE ONE-HALF TABLET BY MOUTH EVERY DAY    cetirizine (ZYRTEC) 10 MG tablet Take 10 mg by mouth daily as needed.    cholecalciferol, vitamin D3, (VITAMIN D3) 50 mcg (2,000 unit) Cap Take 1 capsule by mouth once daily.    clotrimazole-betamethasone 1-0.05% (LOTRISONE) cream APPLY TOPICALLY TO THE AFFECTED AREA TWICE DAILY AS NEEDED    cyanocobalamin 1,000 mcg/mL injection Inject 1 mL (1,000 mcg total) into the muscle every 14 (fourteen) days.    dutasteride-tamsulosin 0.5-0.4 mg CM24 Take 1 capsule by mouth once daily.    fluticasone propionate (FLONASE) 50 mcg/actuation nasal spray 2 sprays (100 mcg total) by Each Nostril route once daily.    ibuprofen (ADVIL,MOTRIN) 800 MG tablet TAKE 1 TABLET(800 MG) BY MOUTH TWICE DAILY AS NEEDED FOR PAIN    linaCLOtide (LINZESS) 145 mcg Cap capsule Take 1 capsule (145 mcg total) by mouth before breakfast.    losartan (COZAAR) 100 MG tablet TAKE 1 TABLET(100 MG) BY MOUTH EVERY DAY    metoprolol succinate (TOPROL-XL) 50 MG 24 hr tablet TAKE 1 TABLET(50 MG) BY MOUTH EVERY DAY    omeprazole 20 mg TbEC Take 1 tablet by mouth once daily.    syringe with needle 3 mL 23 x 1" Syrg 1 Syringe by Misc.(Non-Drug; Combo Route) route every 30 days.    [DISCONTINUED] DUREZOL 0.05 % Drop ophthalmic solution      Family History       Problem Relation (Age of Onset)    Cancer Mother    Hypertension Brother    No Known Problems Father, Daughter, Sister, Sister, Brother, Brother, Brother    Thyroid disease Sister          Tobacco Use    Smoking status: Never Smoker    Smokeless tobacco: Never Used   Substance and Sexual Activity    Alcohol use: Yes "     Alcohol/week: 1.0 standard drink     Types: 1 Standard drinks or equivalent per week     Comment: ocassionally    Drug use: No    Sexual activity: Not Currently     Partners: Female     Review of Systems   Constitutional:  Negative for appetite change, chills and fever.   HENT:  Negative for congestion, rhinorrhea and sore throat.    Eyes: Negative.    Respiratory:  Positive for cough and shortness of breath. Negative for wheezing.    Cardiovascular:  Positive for leg swelling. Negative for chest pain and palpitations.   Gastrointestinal:  Negative for abdominal distention, abdominal pain, constipation, diarrhea, nausea and vomiting.   Endocrine: Negative for polydipsia and polyuria.   Genitourinary:  Negative for difficulty urinating, frequency and hematuria.   Musculoskeletal:  Negative for back pain and neck pain.   Skin: Negative.    Neurological:  Negative for dizziness, syncope, weakness, light-headedness and headaches.   Psychiatric/Behavioral:  Negative for confusion and decreased concentration.    Objective:     Vital Signs (Most Recent):  Temp: 97.7 °F (36.5 °C) (05/16/22 0445)  Pulse: 97 (05/16/22 0831)  Resp: 17 (05/16/22 0507)  BP: 135/73 (05/16/22 0831)  SpO2: 97 % (05/16/22 0831) Vital Signs (24h Range):  Temp:  [97.7 °F (36.5 °C)] 97.7 °F (36.5 °C)  Pulse:  [87-97] 97  Resp:  [17-20] 17  SpO2:  [97 %-98 %] 97 %  BP: (133-145)/(73-87) 135/73     Weight: 119.3 kg (263 lb)  Body mass index is 35.67 kg/m².    Physical Exam  Vitals and nursing note reviewed.   Constitutional:       General: He is not in acute distress.     Appearance: He is well-developed. He is not diaphoretic.   HENT:      Head: Normocephalic and atraumatic.   Eyes:      Conjunctiva/sclera: Conjunctivae normal.   Cardiovascular:      Rate and Rhythm: Normal rate and regular rhythm.      Heart sounds: Murmur heard.   Pulmonary:      Effort: Pulmonary effort is normal.      Breath sounds: Rales present.   Abdominal:      General:  "Bowel sounds are normal. There is no distension.      Palpations: Abdomen is soft.      Tenderness: There is no abdominal tenderness.   Musculoskeletal:         General: Normal range of motion.      Cervical back: Normal range of motion and neck supple.      Right lower leg: Edema (3+) present.      Left lower leg: Edema (3+) present.   Skin:     General: Skin is warm and dry.   Neurological:      Mental Status: He is alert and oriented to person, place, and time.           Significant Labs: All pertinent labs within the past 24 hours have been reviewed.  CBC:   Recent Labs   Lab 05/16/22  0510   WBC 5.07   HGB 11.5*   HCT 35.1*        CMP:   Recent Labs   Lab 05/16/22  0510      K 4.0      CO2 19*      BUN 22   CREATININE 1.0   CALCIUM 8.9   PROT 6.8   ALBUMIN 3.5   BILITOT 1.0   ALKPHOS 58   AST 28   ALT 27   ANIONGAP 11   EGFRNONAA >60     Cardiac Markers:   Recent Labs   Lab 05/16/22  0651   BNP 95     Troponin:   Recent Labs   Lab 05/16/22  0510   TROPONINI 0.006       Significant Imaging: I have reviewed all pertinent imaging results/findings within the past 24 hours.  Imaging Results              X-Ray Chest PA And Lateral (Final result)  Result time 05/16/22 06:06:45      Final result by Alfonso Ramirez MD (05/16/22 06:06:45)                   Impression:      Bibasilar subsegmental atelectasis.  No confluent area of consolidation.      Electronically signed by: Alfonso Ramirez MD  Date:    05/16/2022  Time:    06:06               Narrative:    EXAMINATION:  XR CHEST PA AND LATERAL    CLINICAL HISTORY:  Provided history is "Chest Pain;  ".    TECHNIQUE:  Frontal and lateral views of the chest were performed.    COMPARISON:  01/05/2009.    FINDINGS:  Cardiac wires overlie the chest.  Cardiomediastinal silhouette is not significantly enlarged.  Coarsened interstitial lung markings but no large focal area of consolidation.  Probable bilateral lower lobe subsegmental atelectasis.  " Right hemidiaphragm is slightly elevated.  No sizable pleural effusion.  No pneumothorax.                                        Assessment/Plan:     * Acute diastolic congestive heart failure  BISHOP  CAD  - present there with shortness of breath unable to lie flat, symptoms have been worsening  - rec'd 40 of IV Lasix in the ED  - recent echo 04/26/2022 showed LV normal in size with estimated EF 60% no WMA, unable to estimate diastolic function RV hypertrophied, normal systolic function and the no evidence of stenosis, moderate to severe mitral regurgitation noted, severe tricuspid regurgitation and moderate pulmonic regurgitation RSVP 93 mmHg  - CTA from 4/28/2022 neg for PE  - CHF pathway initiated  - continue aspirin statin metoprolol and losartan  - patient was scheduled to see Dr. Montemayor on 05/27 will consult  - monitor    Essential hypertension  - stable  - continue losartan 100 mg, metoprolol succinate 50 mg daily  - monitor      Pernicious anemia  - stable      VTE Risk Mitigation (From admission, onward)         Ordered     enoxaparin injection 40 mg  Daily         05/16/22 0842     IP VTE HIGH RISK PATIENT  Once         05/16/22 0842     Place sequential compression device  Until discontinued         05/16/22 0842                   Anastacia Tenorio PA-C  Department of Hospital Medicine   [facility] - Emergency Dept

## 2022-05-16 NOTE — SUBJECTIVE & OBJECTIVE
Past Medical History:   Diagnosis Date    Abnormal echocardiogram 2/8/2013    Mild MVR 3/07    CAD (coronary artery disease) 2/8/2013    Angio 3/07 Dr. Lin    Lymphoma in remission 2/8/2013    S/p chemo/XRT 1992 Relapse 1993 BMT 1993    Normal cardiac stress test 2/8/2013    Nuclear stress 2/09    Pernicious anemia 2/8/2013    Thyroid nodule 2/8/2013    Right s/p Bx 6/08 Dr. Pelayo       Past Surgical History:   Procedure Laterality Date    EYE SURGERY Bilateral 2016    Cataracts       Review of patient's allergies indicates:   Allergen Reactions    Lotensin [benazepril]      cough       No current facility-administered medications on file prior to encounter.     Current Outpatient Medications on File Prior to Encounter   Medication Sig    aspirin 81 MG Chew Take 81 mg by mouth once daily.    atorvastatin (LIPITOR) 80 MG tablet TAKE ONE-HALF TABLET BY MOUTH EVERY DAY    cetirizine (ZYRTEC) 10 MG tablet Take 10 mg by mouth daily as needed.    cholecalciferol, vitamin D3, (VITAMIN D3) 50 mcg (2,000 unit) Cap Take 1 capsule by mouth once daily.    clotrimazole-betamethasone 1-0.05% (LOTRISONE) cream APPLY TOPICALLY TO THE AFFECTED AREA TWICE DAILY AS NEEDED    cyanocobalamin 1,000 mcg/mL injection Inject 1 mL (1,000 mcg total) into the muscle every 14 (fourteen) days.    dutasteride-tamsulosin 0.5-0.4 mg CM24 Take 1 capsule by mouth once daily.    fluticasone propionate (FLONASE) 50 mcg/actuation nasal spray 2 sprays (100 mcg total) by Each Nostril route once daily.    ibuprofen (ADVIL,MOTRIN) 800 MG tablet TAKE 1 TABLET(800 MG) BY MOUTH TWICE DAILY AS NEEDED FOR PAIN    linaCLOtide (LINZESS) 145 mcg Cap capsule Take 1 capsule (145 mcg total) by mouth before breakfast.    losartan (COZAAR) 100 MG tablet TAKE 1 TABLET(100 MG) BY MOUTH EVERY DAY    metoprolol succinate (TOPROL-XL) 50 MG 24 hr tablet TAKE 1 TABLET(50 MG) BY MOUTH EVERY DAY    omeprazole 20 mg TbEC Take 1 tablet by mouth once daily.    syringe with  "needle 3 mL 23 x 1" Syrg 1 Syringe by Misc.(Non-Drug; Combo Route) route every 30 days.    [DISCONTINUED] DUREZOL 0.05 % Drop ophthalmic solution      Family History       Problem Relation (Age of Onset)    Cancer Mother    Hypertension Brother    No Known Problems Father, Daughter, Sister, Sister, Brother, Brother, Brother    Thyroid disease Sister          Tobacco Use    Smoking status: Never Smoker    Smokeless tobacco: Never Used   Substance and Sexual Activity    Alcohol use: Yes     Alcohol/week: 1.0 standard drink     Types: 1 Standard drinks or equivalent per week     Comment: ocassionally    Drug use: No    Sexual activity: Not Currently     Partners: Female     Review of Systems   Constitutional:  Negative for appetite change, chills and fever.   HENT:  Negative for congestion, rhinorrhea and sore throat.    Eyes: Negative.    Respiratory:  Positive for cough and shortness of breath. Negative for wheezing.    Cardiovascular:  Positive for leg swelling. Negative for chest pain and palpitations.   Gastrointestinal:  Negative for abdominal distention, abdominal pain, constipation, diarrhea, nausea and vomiting.   Endocrine: Negative for polydipsia and polyuria.   Genitourinary:  Negative for difficulty urinating, frequency and hematuria.   Musculoskeletal:  Negative for back pain and neck pain.   Skin: Negative.    Neurological:  Negative for dizziness, syncope, weakness, light-headedness and headaches.   Psychiatric/Behavioral:  Negative for confusion and decreased concentration.    Objective:     Vital Signs (Most Recent):  Temp: 97.7 °F (36.5 °C) (05/16/22 0445)  Pulse: 97 (05/16/22 0831)  Resp: 17 (05/16/22 0507)  BP: 135/73 (05/16/22 0831)  SpO2: 97 % (05/16/22 0831) Vital Signs (24h Range):  Temp:  [97.7 °F (36.5 °C)] 97.7 °F (36.5 °C)  Pulse:  [87-97] 97  Resp:  [17-20] 17  SpO2:  [97 %-98 %] 97 %  BP: (133-145)/(73-87) 135/73     Weight: 119.3 kg (263 lb)  Body mass index is 35.67 kg/m².    Physical " Exam  Vitals and nursing note reviewed.   Constitutional:       General: He is not in acute distress.     Appearance: He is well-developed. He is not diaphoretic.   HENT:      Head: Normocephalic and atraumatic.   Eyes:      Conjunctiva/sclera: Conjunctivae normal.   Cardiovascular:      Rate and Rhythm: Normal rate and regular rhythm.      Heart sounds: Murmur heard.   Pulmonary:      Effort: Pulmonary effort is normal.      Breath sounds: Rales present.   Abdominal:      General: Bowel sounds are normal. There is no distension.      Palpations: Abdomen is soft.      Tenderness: There is no abdominal tenderness.   Musculoskeletal:         General: Normal range of motion.      Cervical back: Normal range of motion and neck supple.      Right lower leg: Edema (3+) present.      Left lower leg: Edema (3+) present.   Skin:     General: Skin is warm and dry.   Neurological:      Mental Status: He is alert and oriented to person, place, and time.           Significant Labs: All pertinent labs within the past 24 hours have been reviewed.  CBC:   Recent Labs   Lab 05/16/22  0510   WBC 5.07   HGB 11.5*   HCT 35.1*        CMP:   Recent Labs   Lab 05/16/22  0510      K 4.0      CO2 19*      BUN 22   CREATININE 1.0   CALCIUM 8.9   PROT 6.8   ALBUMIN 3.5   BILITOT 1.0   ALKPHOS 58   AST 28   ALT 27   ANIONGAP 11   EGFRNONAA >60     Cardiac Markers:   Recent Labs   Lab 05/16/22  0651   BNP 95     Troponin:   Recent Labs   Lab 05/16/22  0510   TROPONINI 0.006       Significant Imaging: I have reviewed all pertinent imaging results/findings within the past 24 hours.  Imaging Results              X-Ray Chest PA And Lateral (Final result)  Result time 05/16/22 06:06:45      Final result by Alfonso Ramirez MD (05/16/22 06:06:45)                   Impression:      Bibasilar subsegmental atelectasis.  No confluent area of consolidation.      Electronically signed by: Alfonso Ramirez  "MD  Date:    05/16/2022  Time:    06:06               Narrative:    EXAMINATION:  XR CHEST PA AND LATERAL    CLINICAL HISTORY:  Provided history is "Chest Pain;  ".    TECHNIQUE:  Frontal and lateral views of the chest were performed.    COMPARISON:  01/05/2009.    FINDINGS:  Cardiac wires overlie the chest.  Cardiomediastinal silhouette is not significantly enlarged.  Coarsened interstitial lung markings but no large focal area of consolidation.  Probable bilateral lower lobe subsegmental atelectasis.  Right hemidiaphragm is slightly elevated.  No sizable pleural effusion.  No pneumothorax.                                      "

## 2022-05-16 NOTE — ASSESSMENT & PLAN NOTE
BISHOP  CAD  - present there with shortness of breath unable to lie flat, symptoms have been worsening  - rec'd 40 of IV Lasix in the ED  - recent echo 04/26/2022 showed LV normal in size with estimated EF 60% no WMA, unable to estimate diastolic function RV hypertrophied, normal systolic function and the no evidence of stenosis, moderate to severe mitral regurgitation noted, severe tricuspid regurgitation and moderate pulmonic regurgitation RSVP 93 mmHg  - CHF pathway initiated  - continue aspirin statin metoprolol and losartan  - patient was scheduled to see Dr. Montemayor on 05/27 will consult  - monitor

## 2022-05-16 NOTE — ASSESSMENT & PLAN NOTE
BISHOP  CAD  Bilateral lower extremity edema  - present there with shortness of breath unable to lie flat, symptoms have been worsening  - rec'd 40 of IV Lasix in the ED  - recent echo 04/26/2022 showed LV normal in size with estimated EF 60% no WMA, unable to estimate diastolic function RV hypertrophied, normal systolic function and the no evidence of stenosis, moderate to severe mitral regurgitation noted, severe tricuspid regurgitation and moderate pulmonic regurgitation RSVP 93 mmHg  - Repeat echo 5/16 - normal LV and RV function, still unable to estimate diastolic function, moderate MR and mild-moderate TR.  - CTA from 4/28/2022 neg for PE  - CHF pathway initiated  - continue aspirin statin metoprolol and losartan  - patient was scheduled to see Dr. Montemayor on 05/27 will consult  - monitor  - Edema improved but he has been sitting with legs down and this afternoon he has pitting edema.

## 2022-05-16 NOTE — ED NOTES
Pt rounding complete. Pt resting comfortably in bed, AAO x4. Resp even & unlabored, denies dyspnea or pain at this time. Comfort/restroom needs addressed. Call light and personal items within reach. Bed in lowest position, wheels locked, siderails up x2. Pt on continuous cardiac monitoring & pulse ox with BP cycling every 30 min. Will continue to monitor.

## 2022-05-17 PROBLEM — I50.33 ACUTE ON CHRONIC DIASTOLIC CONGESTIVE HEART FAILURE: Status: ACTIVE | Noted: 2022-05-16

## 2022-05-17 LAB
ANION GAP SERPL CALC-SCNC: 13 MMOL/L (ref 8–16)
BUN SERPL-MCNC: 19 MG/DL (ref 8–23)
CALCIUM SERPL-MCNC: 9.4 MG/DL (ref 8.7–10.5)
CHLORIDE SERPL-SCNC: 105 MMOL/L (ref 95–110)
CO2 SERPL-SCNC: 21 MMOL/L (ref 23–29)
CREAT SERPL-MCNC: 1 MG/DL (ref 0.5–1.4)
EST. GFR  (AFRICAN AMERICAN): >60 ML/MIN/1.73 M^2
EST. GFR  (NON AFRICAN AMERICAN): >60 ML/MIN/1.73 M^2
GLUCOSE SERPL-MCNC: 90 MG/DL (ref 70–110)
POTASSIUM SERPL-SCNC: 4.2 MMOL/L (ref 3.5–5.1)
SODIUM SERPL-SCNC: 139 MMOL/L (ref 136–145)

## 2022-05-17 PROCEDURE — 25000003 PHARM REV CODE 250: Performed by: PHYSICIAN ASSISTANT

## 2022-05-17 PROCEDURE — 99225 PR SUBSEQUENT OBSERVATION CARE,LEVEL II: CPT | Mod: ,,, | Performed by: INTERNAL MEDICINE

## 2022-05-17 PROCEDURE — 94761 N-INVAS EAR/PLS OXIMETRY MLT: CPT

## 2022-05-17 PROCEDURE — 96376 TX/PRO/DX INJ SAME DRUG ADON: CPT

## 2022-05-17 PROCEDURE — 99226 PR SUBSEQUENT OBSERVATION CARE,LEVEL III: ICD-10-PCS | Mod: ,,, | Performed by: HOSPITALIST

## 2022-05-17 PROCEDURE — 96372 THER/PROPH/DIAG INJ SC/IM: CPT | Performed by: PHYSICIAN ASSISTANT

## 2022-05-17 PROCEDURE — 63600175 PHARM REV CODE 636 W HCPCS: Performed by: PHYSICIAN ASSISTANT

## 2022-05-17 PROCEDURE — 80048 BASIC METABOLIC PNL TOTAL CA: CPT | Performed by: PHYSICIAN ASSISTANT

## 2022-05-17 PROCEDURE — G0378 HOSPITAL OBSERVATION PER HR: HCPCS

## 2022-05-17 PROCEDURE — 99226 PR SUBSEQUENT OBSERVATION CARE,LEVEL III: CPT | Mod: ,,, | Performed by: HOSPITALIST

## 2022-05-17 PROCEDURE — 36415 COLL VENOUS BLD VENIPUNCTURE: CPT | Performed by: PHYSICIAN ASSISTANT

## 2022-05-17 PROCEDURE — 99225 PR SUBSEQUENT OBSERVATION CARE,LEVEL II: ICD-10-PCS | Mod: ,,, | Performed by: INTERNAL MEDICINE

## 2022-05-17 RX ADMIN — ENOXAPARIN SODIUM 40 MG: 100 INJECTION SUBCUTANEOUS at 05:05

## 2022-05-17 RX ADMIN — ASPIRIN 81 MG CHEWABLE TABLET 81 MG: 81 TABLET CHEWABLE at 09:05

## 2022-05-17 RX ADMIN — METOPROLOL SUCCINATE 50 MG: 50 TABLET, EXTENDED RELEASE ORAL at 09:05

## 2022-05-17 RX ADMIN — ATORVASTATIN CALCIUM 40 MG: 20 TABLET, FILM COATED ORAL at 09:05

## 2022-05-17 RX ADMIN — FUROSEMIDE 40 MG: 40 INJECTION, SOLUTION INTRAMUSCULAR; INTRAVENOUS at 11:05

## 2022-05-17 RX ADMIN — FUROSEMIDE 40 MG: 40 INJECTION, SOLUTION INTRAMUSCULAR; INTRAVENOUS at 12:05

## 2022-05-17 RX ADMIN — CETIRIZINE HYDROCHLORIDE 10 MG: 10 TABLET, FILM COATED ORAL at 09:05

## 2022-05-17 RX ADMIN — PANTOPRAZOLE SODIUM 40 MG: 40 TABLET, DELAYED RELEASE ORAL at 09:05

## 2022-05-17 NOTE — PROGRESS NOTES
Cardiology Progress Note    5/17/2022  12:59 PM    Subjective/Interim:      He feels much better today.  He was able to lie down last night to sleep without being short of breath.  Edema in both legs has almost resolved.  Diuresed 3.2L     Objective:   24-hour Vitals:  Temp:  [97.6 °F (36.4 °C)-98.5 °F (36.9 °C)] 97.6 °F (36.4 °C)  Pulse:  [85-98] 92  Resp:  [16-18] 16  SpO2:  [91 %-98 %] 91 %  BP: (104-135)/(58-85) 104/59    Physical Examination:  Vitals: BP (!) 104/59 (BP Location: Left arm, Patient Position: Lying)   Pulse 92   Temp 97.6 °F (36.4 °C) (Oral)   Resp 16   Ht 6' (1.829 m)   Wt 114.9 kg (253 lb 4.8 oz)   SpO2 (!) 91%   BMI 34.35 kg/m²     Physical Exam  Constitutional:       General: He is not in acute distress.  Neck:      Vascular: JVD present. No carotid bruit.   Cardiovascular:      Rate and Rhythm: Normal rate and regular rhythm.      Pulses:           Carotid pulses are 2+ on the right side and 2+ on the left side.       Radial pulses are 2+ on the right side and 2+ on the left side.      Heart sounds: S1 normal and S2 normal. Murmur heard.    Systolic murmur is present with a grade of 2/6.  Pulmonary:      Breath sounds: Rhonchi present.   Musculoskeletal:      Right lower leg: Edema (improved to trace) present.      Left lower leg: Edema (improved to trace) present.   Neurological:      Mental Status: He is alert.           Intake/Output Summary (Last 24 hours) at 5/17/2022 1259  Last data filed at 5/17/2022 1100  Gross per 24 hour   Intake --   Output 1775 ml   Net -1775 ml       Laboratory:  Trended Lab Data:  Recent Labs   Lab 05/16/22  0510   WBC 5.07   HGB 11.5*   HCT 35.1*          Recent Labs   Lab 05/16/22  0510 05/17/22  0454    139   K 4.0 4.2    105   CO2 19* 21*   BUN 22 19    90   CALCIUM 8.9 9.4   MG 1.8  --        Recent Labs   Lab 05/16/22  0510   PROT 6.8   ALBUMIN 3.5   BILITOT 1.0   AST 28   ALT 27   ALKPHOS 58       No results for  input(s): PROTIME, PTT, INR in the last 168 hours.    Cardiac:   Recent Labs   Lab 05/16/22  0510 05/16/22  0651   TROPONINI 0.006  --    BNP  --  95       FLP:   Lab Results   Component Value Date    CHOL 132 04/12/2022    HDL 41 04/12/2022    LDLCALC 70.8 04/12/2022    TRIG 101 04/12/2022    CHOLHDL 31.1 04/12/2022     DM:   Lab Results   Component Value Date    HGBA1C 4.6 05/16/2022    HGBA1C 4.7 04/12/2022    HGBA1C 4.6 03/29/2021    LDLCALC 70.8 04/12/2022    CREATININE 1.0 05/17/2022     Thyroid:   Lab Results   Component Value Date    TSH 3.086 04/12/2022     Anemia:   Lab Results   Component Value Date    IRON 74 06/01/2016    TIBC 313 06/01/2016    FERRITIN 272 12/10/2015    KTOXMHYO85 582 04/12/2022     Urinalysis: No results found for: LABURIN, COLORU, CLARITYU, SPECGRAV, LABSPEC, NITRITE, PROTEINUR, GLUCOSEU, KETONESU, UROBILINOGEN, BILIRUBINUR, BLOODU  @      Other Results:  EKG (my interpretation):    TELEMETRY:  sinus    Echo:   Results for orders placed or performed during the hospital encounter of 05/16/22   Echo   Result Value Ref Range    Ascending aorta 3.31 cm    STJ 3.27 cm    AV mean gradient 2 mmHg    Ao peak mak 0.87 m/s    Ao VTI 16.11 cm    IVRT 82.78 msec    IVS 0.93 0.6 - 1.1 cm    LA size 4.23 cm    Left Atrium Major Axis 5.50 cm    Left Atrium Minor Axis 5.45 cm    LVIDd 4.21 3.5 - 6.0 cm    LVIDs 3.01 2.1 - 4.0 cm    LVOT diameter 2.22 cm    LVOT peak VTI 20.63 cm    Posterior Wall 0.86 0.6 - 1.1 cm    MV Peak A Mak 1.06 m/s    E wave deceleration time 78.31 msec    MV Peak E Mak 0.82 m/s    PV Peak D Mak 0.33 m/s    PV Peak S Mak 0.64 m/s    RA Major Axis 4.60 cm    RA Width 3.64 cm    RVDD 3.10 cm    Sinus 3.24 cm    TAPSE 1.62 cm    TR Max Mak 3.18 m/s    LA WIDTH 3.77 cm    PV PEAK VELOCITY 0.98 cm/s    MV stenosis pressure 1/2 time 22.71 ms    LV Diastolic Volume 79.04 mL    LV Systolic Volume 35.32 mL    RV S' 12.78 cm/s    LVOT peak mak 1.12 m/s    TDI LATERAL 0.08 m/s    TDI  "SEPTAL 0.04 m/s    Mr max petr 0.04 m/s    LA volume (mod) 55.00 cm3    MV "A" wave duration 8.75 msec    LV LATERAL E/E' RATIO 10.25 m/s    LV SEPTAL E/E' RATIO 20.50 m/s    FS 29 %    LA volume 74.21 cm3    LV mass 118.23 g    Left Ventricle Relative Wall Thickness 0.41 cm    AV valve area 4.95 cm2    AV Velocity Ratio 1.29     AV index (prosthetic) 1.28     MV valve area p 1/2 method 9.69 cm2    E/A ratio 0.77     Mean e' 0.06 m/s    Pulm vein S/D ratio 1.94     LVOT area 3.9 cm2    LVOT stroke volume 79.81 cm3    AV peak gradient 3 mmHg    E/E' ratio 13.67 m/s    LV Systolic Volume Index 14.8 mL/m2    LV Diastolic Volume Index 33.07 mL/m2    LA Volume Index 31.1 mL/m2    LV Mass Index 49 g/m2    Triscuspid Valve Regurgitation Peak Gradient 40 mmHg    LA Volume Index (Mod) 23.0 mL/m2    BSA 2.46 m2    Right Atrial Pressure (from IVC) 3 mmHg    AV regurgitation pressure 1/2 time 448 ms    TV rest pulmonary artery pressure 43 mmHg    EF 55 %    Narrative    · Normal central venous pressure (3 mmHg).  · The estimated PA systolic pressure is 43 mmHg.  · The left ventricle is normal in size with normal systolic function.  · The estimated ejection fraction is 55%.  · Indeterminate left ventricular diastolic function.  · Normal right ventricular size with normal right ventricular systolic   function.  · Mild aortic regurgitation.  · Moderate mitral regurgitation.  · Mild to moderate tricuspid regurgitation.  · Trivial circumferential pericardial effusion.          Radiology:  X-Ray Chest PA And Lateral    Result Date: 5/16/2022  EXAMINATION: XR CHEST PA AND LATERAL CLINICAL HISTORY: Provided history is "Chest Pain;  ". TECHNIQUE: Frontal and lateral views of the chest were performed. COMPARISON: 01/05/2009. FINDINGS: Cardiac wires overlie the chest.  Cardiomediastinal silhouette is not significantly enlarged.  Coarsened interstitial lung markings but no large focal area of consolidation.  Probable bilateral lower lobe " subsegmental atelectasis.  Right hemidiaphragm is slightly elevated.  No sizable pleural effusion.  No pneumothorax.     Bibasilar subsegmental atelectasis.  No confluent area of consolidation. Electronically signed by: Alfonso Ramirez MD Date:    05/16/2022 Time:    06:06    CTA Chest Non-Coronary(PE Studies)    Result Date: 4/28/2022  EXAMINATION: CTA CHEST NON CORONARY CLINICAL HISTORY: Pulmonary embolism (PE) suspected, high prob;Pulmonary embolism (PE) suspected, positive D-dimer;Right heart failure on Echo;Other specified abnormal findings of blood chemistry TECHNIQUE: Low dose axial images, sagittal and coronal reformations were obtained from the thoracic inlet to the lung bases following the IV administration of 75 mL of Omnipaque 350.  Contrast timing was optimized to evaluate the pulmonary arteries.  MIP images were performed. FINDINGS: This is an adequate pulmonary embolus study which is negative for pulmonary embolus.  There is no thoracic aortic aneurysm. The soft tissues and vascular structures at the base the neck are unremarkable.  The mediastinum including the heart and great vessels is significant for calcification of the aorta, aortic annulus, and coronary arteries.  The visualized intra-abdominal content is unremarkable.  The osseous structures demonstrate degenerative change. The trachea is patent and free of any intraluminal filling defects.  The lungs are well expanded.  There is mild bilateral apical pleural thickening and/or scar.  There is no pleural or pericardial fluid.     No evidence of pulmonary embolus. Electronically signed by: Victor Hugo Larson MD Date:    04/28/2022 Time:    11:10    Echo    Result Date: 5/16/2022  · Normal central venous pressure (3 mmHg). · The estimated PA systolic pressure is 43 mmHg. · The left ventricle is normal in size with normal systolic function. · The estimated ejection fraction is 55%. · Indeterminate left ventricular diastolic function. · Normal right  ventricular size with normal right ventricular systolic function. · Mild aortic regurgitation. · Moderate mitral regurgitation. · Mild to moderate tricuspid regurgitation. · Trivial circumferential pericardial effusion.          Current Medications:     Infusions:       Scheduled:   aspirin  81 mg Oral Daily    atorvastatin  40 mg Oral Daily    cetirizine  10 mg Oral Daily    enoxaparin  40 mg Subcutaneous Daily    furosemide (LASIX) injection  40 mg Intravenous Q12H    losartan  100 mg Oral Daily    metoprolol succinate  50 mg Oral Daily    pantoprazole  40 mg Oral Daily        PRN:  sodium chloride 0.9%, sodium chloride 0.9%     Assessment and Plan:     Sam Gamino is a 76 y.o.male with  SOB, PND.  Acute diastolic HF  -continue iv lasix 40mg BID today  -continue losartan 100mg and metoprolol XL 50mg qd  -echo noted     Severe TR with severe PHTN (SPAP 93mmHg)  -CTA negative for PE            Tyree Amezcua MD        Disclaimer: This document was created using voice recognition software (M*WorkTouch Fluency Direct). Although it may be edited, this document may contain errors related to incorrect recognition of the spoken word. Please call the physician if clarification is needed.

## 2022-05-17 NOTE — PLAN OF CARE
05/17/22 1246   Discharge Assessment   Assessment Type Discharge Planning Assessment   Confirmed/corrected address, phone number and insurance Yes   Confirmed Demographics Correct on Facesheet   Source of Information patient   When was your last doctors appointment? 05/03/22   Does patient/caregiver understand observation status Yes   Communicated ROLANDO with patient/caregiver Yes;Date not available/Unable to determine   Lives With spouse   Do you expect to return to your current living situation? Yes   Do you have help at home or someone to help you manage your care at home? Yes   Who are your caregiver(s) and their phone number(s)? Spouse   Prior to hospitilization cognitive status: Alert/Oriented   Current cognitive status: Alert/Oriented   Walking or Climbing Stairs Difficulty none   Dressing/Bathing Difficulty none   Equipment Currently Used at Home none   Readmission within 30 days? No   Patient currently being followed by outpatient case management? No   Do you currently have service(s) that help you manage your care at home? No   Do you take prescription medications? Yes   Who is going to help you get home at discharge? Spouse   Are you on dialysis? No   Do you take coumadin? Yes   Discharge Plan A Home   Discharge Plan B Home with family   Discharge Barriers Identified None

## 2022-05-17 NOTE — PROGRESS NOTES
"[facility] Medicine  Progress Note    Patient Name: Sam Gamino  MRN: [id number]  Patient Class: OP- Observation   Admission Date: 5/16/2022  Length of Stay: 0 days  Attending Physician: Estrella Bryant MD  Primary Care Provider: JAYLYN Zamora MD        Subjective:     Principal Problem:Acute diastolic congestive heart failure        HPI:  From H&P by Anastacia Tenorio PA:  "76-year-old male with past medical history hypertension, pernicious anemia, lymphoma in remission, BPH, nonrheumatic mitral valve regurgitation presented to the ED with complaint of shortness of breath associated with lower extremity edema.  Also reported some congestion and dry cough.  Patient reported symptoms started 2 nights ago but worsened last night, worse with lying flat.  Stated he saw his primary care provider about a month ago and had an echocardiogram and was scheduled to follow-up with cardiology next week but symptoms worsened.  Denies any sick contacts, fever, chills, chest pain, abdominal pain, N/V/D.  Denied tobacco use, occasional alcohol use. ED evaluation tachypneic on presentation, received 40 mg Lasix IV, Labs unremarkable, BNP 95, troponin 0.06.  Patient placed in observation for further evaluation and treatment."      Overview/Hospital Course:  Patient was admitted for treatment of heart failure.  2D echo was repeated and showed indeterminate diastolic function and valvular heart disease with MR and TR.  He was seen by cardiology and treated with IV diuresis with improvement in shortness of breath and leg swelling.      Interval History:  Patient is new to me.  Tells me he feels much better and leg swelling has gone down.  He is sitting with his legs down and edema appears to be significant.  He has been urinating quite a bit.  Wife at bedside.  He is eager to go home soon.    Review of Systems   Constitutional:  Negative for chills and fever.   Respiratory:  Negative for cough and shortness of " breath.    Cardiovascular:  Positive for leg swelling. Negative for chest pain and palpitations.   Objective:     Vital Signs (Most Recent):  Temp: 97.2 °F (36.2 °C) (05/17/22 1650)  Pulse: 82 (05/17/22 1800)  Resp: 18 (05/17/22 1650)  BP: 113/63 (05/17/22 1650)  SpO2: (!) 93 % (05/17/22 1650)   Vital Signs (24h Range):  Temp:  [97.2 °F (36.2 °C)-98.5 °F (36.9 °C)] 97.2 °F (36.2 °C)  Pulse:  [82-98] 82  Resp:  [16-18] 18  SpO2:  [91 %-97 %] 93 %  BP: (104-132)/(58-83) 113/63     Weight: 114.9 kg (253 lb 4.8 oz)  Body mass index is 34.35 kg/m².    Intake/Output Summary (Last 24 hours) at 5/17/2022 1841  Last data filed at 5/17/2022 1500  Gross per 24 hour   Intake 830 ml   Output 2275 ml   Net -1445 ml      Physical Exam  Cardiovascular:      Rate and Rhythm: Normal rate and regular rhythm.      Pulses: Normal pulses.      Heart sounds: Murmur heard.     No gallop.   Pulmonary:      Effort: Pulmonary effort is normal.      Breath sounds: Normal breath sounds.   Abdominal:      General: Bowel sounds are normal.      Palpations: Abdomen is soft.   Musculoskeletal:      Right lower leg: Edema present.      Left lower leg: Edema present.      Comments: Has bilateral pitting with legs in dependent position.   Skin:     General: Skin is warm and dry.   Neurological:      General: No focal deficit present.      Mental Status: He is alert and oriented to person, place, and time.   Psychiatric:         Mood and Affect: Mood normal.         Behavior: Behavior normal.         Thought Content: Thought content normal.       Significant Labs: All pertinent labs within the past 24 hours have been reviewed.    Significant Imaging: I have reviewed all pertinent imaging results/findings within the past 24 hours.      Assessment/Plan:      * Acute diastolic congestive heart failure  BISHOP  CAD  Bilateral lower extremity edema  - present there with shortness of breath unable to lie flat, symptoms have been worsening  - rec'd 40 of IV  "Lasix in the ED  - recent echo 04/26/2022 showed LV normal in size with estimated EF 60% no WMA, unable to estimate diastolic function RV hypertrophied, normal systolic function and the no evidence of stenosis, moderate to severe mitral regurgitation noted, severe tricuspid regurgitation and moderate pulmonic regurgitation RSVP 93 mmHg  - Repeat echo 5/16 - normal LV and RV function, still unable to estimate diastolic function, moderate MR and mild-moderate TR.  - CTA from 4/28/2022 neg for PE  - CHF pathway initiated  - continue aspirin statin metoprolol and losartan  - patient was scheduled to see Dr. Montemayor on 05/27 will consult  - monitor  - Edema improved but he has been sitting with legs down and this afternoon he has pitting edema.    Essential hypertension  - stable  - continue losartan 100 mg, metoprolol succinate 50 mg daily  - monitor      Lower extremity edema  See "heart failure"      Severe tricuspid valve regurgitation  See "heart failure."  Has improved, now reported as moderate TR.      CAD (coronary artery disease)        Pernicious anemia  - stable        VTE Risk Mitigation (From admission, onward)         Ordered     enoxaparin injection 40 mg  Daily         05/16/22 0842     IP VTE HIGH RISK PATIENT  Once         05/16/22 0842     Place sequential compression device  Until discontinued         05/16/22 0842                          Estrella Blue MD  Department of Hospital Medicine   [facility] - Med Surg ([place])  "

## 2022-05-17 NOTE — HOSPITAL COURSE
Patient was admitted for treatment of heart failure.  2D echo was repeated and showed indeterminate diastolic function and valvular heart disease with MR and TR.  He was seen by cardiology and treated with IV diuresis with improvement in shortness of breath and leg swelling.  Added furosemide to his usual outpatient medications and he was discharged in improved condition.

## 2022-05-17 NOTE — SUBJECTIVE & OBJECTIVE
Interval History:  Patient is new to me.  Tells me he feels much better and leg swelling has gone down.  He is sitting with his legs down and edema appears to be significant.  He has been urinating quite a bit.  Wife at bedside.  He is eager to go home soon.    Review of Systems   Constitutional:  Negative for chills and fever.   Respiratory:  Negative for cough and shortness of breath.    Cardiovascular:  Positive for leg swelling. Negative for chest pain and palpitations.   Objective:     Vital Signs (Most Recent):  Temp: 97.2 °F (36.2 °C) (05/17/22 1650)  Pulse: 82 (05/17/22 1800)  Resp: 18 (05/17/22 1650)  BP: 113/63 (05/17/22 1650)  SpO2: (!) 93 % (05/17/22 1650)   Vital Signs (24h Range):  Temp:  [97.2 °F (36.2 °C)-98.5 °F (36.9 °C)] 97.2 °F (36.2 °C)  Pulse:  [82-98] 82  Resp:  [16-18] 18  SpO2:  [91 %-97 %] 93 %  BP: (104-132)/(58-83) 113/63     Weight: 114.9 kg (253 lb 4.8 oz)  Body mass index is 34.35 kg/m².    Intake/Output Summary (Last 24 hours) at 5/17/2022 1841  Last data filed at 5/17/2022 1500  Gross per 24 hour   Intake 830 ml   Output 2275 ml   Net -1445 ml      Physical Exam  Cardiovascular:      Rate and Rhythm: Normal rate and regular rhythm.      Pulses: Normal pulses.      Heart sounds: Murmur heard.     No gallop.   Pulmonary:      Effort: Pulmonary effort is normal.      Breath sounds: Normal breath sounds.   Abdominal:      General: Bowel sounds are normal.      Palpations: Abdomen is soft.   Musculoskeletal:      Right lower leg: Edema present.      Left lower leg: Edema present.      Comments: Has bilateral pitting with legs in dependent position.   Skin:     General: Skin is warm and dry.   Neurological:      General: No focal deficit present.      Mental Status: He is alert and oriented to person, place, and time.   Psychiatric:         Mood and Affect: Mood normal.         Behavior: Behavior normal.         Thought Content: Thought content normal.       Significant Labs: All pertinent  labs within the past 24 hours have been reviewed.    Significant Imaging: I have reviewed all pertinent imaging results/findings within the past 24 hours.

## 2022-05-18 VITALS
WEIGHT: 253.31 LBS | SYSTOLIC BLOOD PRESSURE: 114 MMHG | DIASTOLIC BLOOD PRESSURE: 71 MMHG | OXYGEN SATURATION: 96 % | BODY MASS INDEX: 34.31 KG/M2 | HEIGHT: 72 IN | RESPIRATION RATE: 16 BRPM | HEART RATE: 97 BPM | TEMPERATURE: 98 F

## 2022-05-18 LAB
ANION GAP SERPL CALC-SCNC: 11 MMOL/L (ref 8–16)
BUN SERPL-MCNC: 25 MG/DL (ref 8–23)
CALCIUM SERPL-MCNC: 9.6 MG/DL (ref 8.7–10.5)
CHLORIDE SERPL-SCNC: 104 MMOL/L (ref 95–110)
CO2 SERPL-SCNC: 26 MMOL/L (ref 23–29)
CREAT SERPL-MCNC: 1.2 MG/DL (ref 0.5–1.4)
EST. GFR  (AFRICAN AMERICAN): >60 ML/MIN/1.73 M^2
EST. GFR  (NON AFRICAN AMERICAN): 59 ML/MIN/1.73 M^2
GLUCOSE SERPL-MCNC: 104 MG/DL (ref 70–110)
POTASSIUM SERPL-SCNC: 3.8 MMOL/L (ref 3.5–5.1)
SODIUM SERPL-SCNC: 141 MMOL/L (ref 136–145)

## 2022-05-18 PROCEDURE — 99217 PR OBSERVATION CARE DISCHARGE: ICD-10-PCS | Mod: ,,, | Performed by: HOSPITALIST

## 2022-05-18 PROCEDURE — 99213 OFFICE O/P EST LOW 20 MIN: CPT | Mod: ,,, | Performed by: INTERNAL MEDICINE

## 2022-05-18 PROCEDURE — 94761 N-INVAS EAR/PLS OXIMETRY MLT: CPT

## 2022-05-18 PROCEDURE — 36415 COLL VENOUS BLD VENIPUNCTURE: CPT | Performed by: PHYSICIAN ASSISTANT

## 2022-05-18 PROCEDURE — 63600175 PHARM REV CODE 636 W HCPCS: Performed by: PHYSICIAN ASSISTANT

## 2022-05-18 PROCEDURE — 80048 BASIC METABOLIC PNL TOTAL CA: CPT | Performed by: PHYSICIAN ASSISTANT

## 2022-05-18 PROCEDURE — 99213 PR OFFICE/OUTPT VISIT, EST, LEVL III, 20-29 MIN: ICD-10-PCS | Mod: ,,, | Performed by: INTERNAL MEDICINE

## 2022-05-18 PROCEDURE — 99217 PR OBSERVATION CARE DISCHARGE: CPT | Mod: ,,, | Performed by: HOSPITALIST

## 2022-05-18 PROCEDURE — G0378 HOSPITAL OBSERVATION PER HR: HCPCS

## 2022-05-18 PROCEDURE — 25000003 PHARM REV CODE 250: Performed by: PHYSICIAN ASSISTANT

## 2022-05-18 PROCEDURE — 96376 TX/PRO/DX INJ SAME DRUG ADON: CPT

## 2022-05-18 RX ORDER — FUROSEMIDE 40 MG/1
40 TABLET ORAL DAILY
Qty: 90 TABLET | Refills: 0 | Status: SHIPPED | OUTPATIENT
Start: 2022-05-18 | End: 2022-06-27 | Stop reason: SDUPTHER

## 2022-05-18 RX ORDER — POTASSIUM CHLORIDE 20 MEQ/1
20 TABLET, EXTENDED RELEASE ORAL DAILY
Qty: 90 TABLET | Refills: 0 | Status: SHIPPED | OUTPATIENT
Start: 2022-05-18 | End: 2022-06-27 | Stop reason: SDUPTHER

## 2022-05-18 RX ADMIN — LOSARTAN POTASSIUM 100 MG: 50 TABLET, FILM COATED ORAL at 09:05

## 2022-05-18 RX ADMIN — METOPROLOL SUCCINATE 50 MG: 50 TABLET, EXTENDED RELEASE ORAL at 09:05

## 2022-05-18 RX ADMIN — ATORVASTATIN CALCIUM 40 MG: 20 TABLET, FILM COATED ORAL at 09:05

## 2022-05-18 RX ADMIN — CETIRIZINE HYDROCHLORIDE 10 MG: 10 TABLET, FILM COATED ORAL at 09:05

## 2022-05-18 RX ADMIN — PANTOPRAZOLE SODIUM 40 MG: 40 TABLET, DELAYED RELEASE ORAL at 09:05

## 2022-05-18 RX ADMIN — FUROSEMIDE 40 MG: 40 INJECTION, SOLUTION INTRAMUSCULAR; INTRAVENOUS at 10:05

## 2022-05-18 RX ADMIN — ASPIRIN 81 MG CHEWABLE TABLET 81 MG: 81 TABLET CHEWABLE at 09:05

## 2022-05-18 NOTE — PROGRESS NOTES
Cardiology Progress Note    5/18/2022  12:01 PM    Subjective/Interim:      No complaints today.  He was able to lie down last night to sleep without being short of breath.  Edema in both legs has resolved.  Diuresed 1.5L     Objective:   24-hour Vitals:  Temp:  [97.2 °F (36.2 °C)-98.1 °F (36.7 °C)] 98 °F (36.7 °C)  Pulse:  [81-97] 97  Resp:  [16-18] 16  SpO2:  [91 %-96 %] 96 %  BP: (104-142)/(59-75) 114/71    Physical Examination:  Vitals: /71 (Patient Position: Sitting)   Pulse 97   Temp 98 °F (36.7 °C) (Oral)   Resp 16   Ht 6' (1.829 m)   Wt 114.9 kg (253 lb 4.8 oz)   SpO2 96%   BMI 34.35 kg/m²     Physical Exam  Constitutional:       General: He is not in acute distress.  Neck:      Vascular: JVD present. No carotid bruit.   Cardiovascular:      Rate and Rhythm: Normal rate and regular rhythm.      Pulses:           Carotid pulses are 2+ on the right side and 2+ on the left side.       Radial pulses are 2+ on the right side and 2+ on the left side.      Heart sounds: S1 normal and S2 normal. Murmur heard.    Systolic murmur is present with a grade of 2/6.  Pulmonary:      Breath sounds: No rhonchi.   Musculoskeletal:      Right lower leg: No edema.      Left lower leg: No edema.   Neurological:      Mental Status: He is alert.           Intake/Output Summary (Last 24 hours) at 5/18/2022 1201  Last data filed at 5/18/2022 0900  Gross per 24 hour   Intake 590 ml   Output 1450 ml   Net -860 ml       Laboratory:  Trended Lab Data:  Recent Labs   Lab 05/16/22  0510   WBC 5.07   HGB 11.5*   HCT 35.1*          Recent Labs   Lab 05/16/22  0510 05/17/22  0454 05/18/22  0515    139 141   K 4.0 4.2 3.8    105 104   CO2 19* 21* 26   BUN 22 19 25*    90 104   CALCIUM 8.9 9.4 9.6   MG 1.8  --   --        Recent Labs   Lab 05/16/22  0510   PROT 6.8   ALBUMIN 3.5   BILITOT 1.0   AST 28   ALT 27   ALKPHOS 58       No results for input(s): PROTIME, PTT, INR in the last 168  hours.    Cardiac:   Recent Labs   Lab 05/16/22  0510 05/16/22  0651   TROPONINI 0.006  --    BNP  --  95       FLP:   Lab Results   Component Value Date    CHOL 132 04/12/2022    HDL 41 04/12/2022    LDLCALC 70.8 04/12/2022    TRIG 101 04/12/2022    CHOLHDL 31.1 04/12/2022     DM:   Lab Results   Component Value Date    HGBA1C 4.6 05/16/2022    HGBA1C 4.7 04/12/2022    HGBA1C 4.6 03/29/2021    LDLCALC 70.8 04/12/2022    CREATININE 1.2 05/18/2022     Thyroid:   Lab Results   Component Value Date    TSH 3.086 04/12/2022     Anemia:   Lab Results   Component Value Date    IRON 74 06/01/2016    TIBC 313 06/01/2016    FERRITIN 272 12/10/2015    NVDYYAWD43 582 04/12/2022     Urinalysis: No results found for: LABURIN, COLORU, CLARITYU, SPECGRAV, LABSPEC, NITRITE, PROTEINUR, GLUCOSEU, KETONESU, UROBILINOGEN, BILIRUBINUR, BLOODU  @      Other Results:  EKG (my interpretation):    TELEMETRY:  sinus    Echo:   Results for orders placed or performed during the hospital encounter of 05/16/22   Echo   Result Value Ref Range    Ascending aorta 3.31 cm    STJ 3.27 cm    AV mean gradient 2 mmHg    Ao peak mak 0.87 m/s    Ao VTI 16.11 cm    IVRT 82.78 msec    IVS 0.93 0.6 - 1.1 cm    LA size 4.23 cm    Left Atrium Major Axis 5.50 cm    Left Atrium Minor Axis 5.45 cm    LVIDd 4.21 3.5 - 6.0 cm    LVIDs 3.01 2.1 - 4.0 cm    LVOT diameter 2.22 cm    LVOT peak VTI 20.63 cm    Posterior Wall 0.86 0.6 - 1.1 cm    MV Peak A Mak 1.06 m/s    E wave deceleration time 78.31 msec    MV Peak E Mak 0.82 m/s    PV Peak D Mak 0.33 m/s    PV Peak S Mak 0.64 m/s    RA Major Axis 4.60 cm    RA Width 3.64 cm    RVDD 3.10 cm    Sinus 3.24 cm    TAPSE 1.62 cm    TR Max Mak 3.18 m/s    LA WIDTH 3.77 cm    PV PEAK VELOCITY 0.98 cm/s    MV stenosis pressure 1/2 time 22.71 ms    LV Diastolic Volume 79.04 mL    LV Systolic Volume 35.32 mL    RV S' 12.78 cm/s    LVOT peak mak 1.12 m/s    TDI LATERAL 0.08 m/s    TDI SEPTAL 0.04 m/s    Mr max mak 0.04 m/s    LA  "volume (mod) 55.00 cm3    MV "A" wave duration 8.75 msec    LV LATERAL E/E' RATIO 10.25 m/s    LV SEPTAL E/E' RATIO 20.50 m/s    FS 29 %    LA volume 74.21 cm3    LV mass 118.23 g    Left Ventricle Relative Wall Thickness 0.41 cm    AV valve area 4.95 cm2    AV Velocity Ratio 1.29     AV index (prosthetic) 1.28     MV valve area p 1/2 method 9.69 cm2    E/A ratio 0.77     Mean e' 0.06 m/s    Pulm vein S/D ratio 1.94     LVOT area 3.9 cm2    LVOT stroke volume 79.81 cm3    AV peak gradient 3 mmHg    E/E' ratio 13.67 m/s    LV Systolic Volume Index 14.8 mL/m2    LV Diastolic Volume Index 33.07 mL/m2    LA Volume Index 31.1 mL/m2    LV Mass Index 49 g/m2    Triscuspid Valve Regurgitation Peak Gradient 40 mmHg    LA Volume Index (Mod) 23.0 mL/m2    BSA 2.46 m2    Right Atrial Pressure (from IVC) 3 mmHg    AV regurgitation pressure 1/2 time 448 ms    TV rest pulmonary artery pressure 43 mmHg    EF 55 %    Narrative    · Normal central venous pressure (3 mmHg).  · The estimated PA systolic pressure is 43 mmHg.  · The left ventricle is normal in size with normal systolic function.  · The estimated ejection fraction is 55%.  · Indeterminate left ventricular diastolic function.  · Normal right ventricular size with normal right ventricular systolic   function.  · Mild aortic regurgitation.  · Moderate mitral regurgitation.  · Mild to moderate tricuspid regurgitation.  · Trivial circumferential pericardial effusion.          Radiology:  X-Ray Chest PA And Lateral    Result Date: 5/16/2022  EXAMINATION: XR CHEST PA AND LATERAL CLINICAL HISTORY: Provided history is "Chest Pain;  ". TECHNIQUE: Frontal and lateral views of the chest were performed. COMPARISON: 01/05/2009. FINDINGS: Cardiac wires overlie the chest.  Cardiomediastinal silhouette is not significantly enlarged.  Coarsened interstitial lung markings but no large focal area of consolidation.  Probable bilateral lower lobe subsegmental atelectasis.  Right hemidiaphragm " is slightly elevated.  No sizable pleural effusion.  No pneumothorax.     Bibasilar subsegmental atelectasis.  No confluent area of consolidation. Electronically signed by: Alfonso Ramirez MD Date:    05/16/2022 Time:    06:06    CTA Chest Non-Coronary(PE Studies)    Result Date: 4/28/2022  EXAMINATION: CTA CHEST NON CORONARY CLINICAL HISTORY: Pulmonary embolism (PE) suspected, high prob;Pulmonary embolism (PE) suspected, positive D-dimer;Right heart failure on Echo;Other specified abnormal findings of blood chemistry TECHNIQUE: Low dose axial images, sagittal and coronal reformations were obtained from the thoracic inlet to the lung bases following the IV administration of 75 mL of Omnipaque 350.  Contrast timing was optimized to evaluate the pulmonary arteries.  MIP images were performed. FINDINGS: This is an adequate pulmonary embolus study which is negative for pulmonary embolus.  There is no thoracic aortic aneurysm. The soft tissues and vascular structures at the base the neck are unremarkable.  The mediastinum including the heart and great vessels is significant for calcification of the aorta, aortic annulus, and coronary arteries.  The visualized intra-abdominal content is unremarkable.  The osseous structures demonstrate degenerative change. The trachea is patent and free of any intraluminal filling defects.  The lungs are well expanded.  There is mild bilateral apical pleural thickening and/or scar.  There is no pleural or pericardial fluid.     No evidence of pulmonary embolus. Electronically signed by: Victor Hugo Larson MD Date:    04/28/2022 Time:    11:10    Echo    Result Date: 5/16/2022  · Normal central venous pressure (3 mmHg). · The estimated PA systolic pressure is 43 mmHg. · The left ventricle is normal in size with normal systolic function. · The estimated ejection fraction is 55%. · Indeterminate left ventricular diastolic function. · Normal right ventricular size with normal right ventricular  systolic function. · Mild aortic regurgitation. · Moderate mitral regurgitation. · Mild to moderate tricuspid regurgitation. · Trivial circumferential pericardial effusion.          Current Medications:     Infusions:       Scheduled:   aspirin  81 mg Oral Daily    atorvastatin  40 mg Oral Daily    cetirizine  10 mg Oral Daily    enoxaparin  40 mg Subcutaneous Daily    furosemide (LASIX) injection  40 mg Intravenous Q12H    losartan  100 mg Oral Daily    metoprolol succinate  50 mg Oral Daily    pantoprazole  40 mg Oral Daily        PRN:  sodium chloride 0.9%, sodium chloride 0.9%     Assessment and Plan:     Sam Gamino is a 76 y.o.male with  SOB, PND.  Acute diastolic HF  -ok to discharge on lasix 40mg BID   -continue losartan 100mg and metoprolol XL 50mg qd  -echo noted  -f/u in office 2-4 weeks     Severe TR with severe PHTN (SPAP 93mmHg)  -CTA negative for PE            Tyree Amezcua MD        Disclaimer: This document was created using voice recognition software ([email or web address]). Although it may be edited, this document may contain errors related to incorrect recognition of the spoken word. Please call the physician if clarification is needed.

## 2022-05-18 NOTE — CONSULTS
Food & Nutrition  Education    Diet Education: Heart Failure    Time Spent: 15 minuets   Learners: Patient       Nutrition Education provided with handouts: Heart Failure Nutrition Therapy; Shake the Salt Habit; Fluid Restriction Nutrition Therapy       Comments: RD consulted for low Na + FR diet. Presented with SOB + LE edema. Is eager to go home.  Pt was unsure of diet restrictions. Explains how this will be a challenge at home for him. Was interactive/ receptive with diet edu. Asked appropriate questions. Will continue to monitor.      All questions and concerns answered. Dietitian's contact information provided.     Follow-up: 5/25/2022   Please Re-consult as needed        Thanks!

## 2022-05-18 NOTE — NURSING
Pt is being discharged home per MD orders, no distressed noted. Pt discharged planning and follow up appt reviewed with pt at bedside. Pt stated understanding of AVS. Pt medication was delivery at bedside. Pt is being transported via wheelchair to personal car.

## 2022-05-18 NOTE — DISCHARGE SUMMARY
"[facility] Medicine  Discharge Summary      Patient Name: Sam Gamino  MRN: [id number]  Patient Class: OP- Observation  Admission Date: 5/16/2022  Hospital Length of Stay: 0 days  Discharge Date and Time: 5/18/2022  2:36 PM  Attending Physician: No att. providers found   Discharging Provider: Estrella Blue MD  Primary Care Provider: JAYLYN Zamora MD      HPI:   From H&P by Anastacia Tenorio PA:  "76-year-old male with past medical history hypertension, pernicious anemia, lymphoma in remission, BPH, nonrheumatic mitral valve regurgitation presented to the ED with complaint of shortness of breath associated with lower extremity edema.  Also reported some congestion and dry cough.  Patient reported symptoms started 2 nights ago but worsened last night, worse with lying flat.  Stated he saw his primary care provider about a month ago and had an echocardiogram and was scheduled to follow-up with cardiology next week but symptoms worsened.  Denies any sick contacts, fever, chills, chest pain, abdominal pain, N/V/D.  Denied tobacco use, occasional alcohol use. ED evaluation tachypneic on presentation, received 40 mg Lasix IV, Labs unremarkable, BNP 95, troponin 0.06.  Patient placed in observation for further evaluation and treatment."            Hospital Course:   Patient was admitted for treatment of heart failure.  2D echo was repeated and showed indeterminate diastolic function and valvular heart disease with MR and TR.  He was seen by cardiology and treated with IV diuresis with improvement in shortness of breath and leg swelling.  Added furosemide to his usual outpatient medications and he was discharged in improved condition.       Goals of Care Treatment Preferences:  Code Status: Full Code      Consults:   Consults (From admission, onward)        Status Ordering Provider     Inpatient consult to Registered Dietitian/Nutritionist  Once        Provider:  (Not yet assigned)    Completed " TOMY JONES     Inpatient consult to Cardiology  Once        Provider:  Tyree Amezcua MD    Completed TOMY JONES            Final Active Diagnoses:    Diagnosis Date Noted POA    PRINCIPAL PROBLEM:  Acute on chronic diastolic congestive heart failure [I50.33] 05/16/2022 Yes    Acute pulmonary edema [J81.0]  Yes    Lower extremity edema [R60.0] 05/16/2022 Yes    Essential hypertension [I10] 05/16/2022 Yes    Severe tricuspid valve regurgitation [I07.1] 04/27/2022 Yes    CAD (coronary artery disease) [I25.10] 02/08/2013 Yes    Pernicious anemia [D51.0] 02/08/2013 Yes      Problems Resolved During this Admission:       Discharged Condition: stable    Disposition: Home or Self Care    Follow Up:   Follow-up Information     D Herber Zamora MD Follow up.    Specialty: Internal Medicine  Why: Follow up in 1-2 weeks  Contact information:  [address]  [address]  [facility] [address]  [phone]             Tyree Amezcua MD Follow up.    Specialties: Interventional Cardiology, Nuclear Medicine, Cardiovascular Disease, Cardiology  Why: Clinic will contact you to make appointment for next week.  Contact information:  [address]   [address]  [facility] [id number] [phone]                       Patient Instructions:      Diet Adult Regular     Order Specific Question Answer Comments   Na restriction, if any: 2gNa      Activity as tolerated       Significant Diagnostic Studies:  2D Echocardiogram    Summary  · Normal central venous pressure (3 mmHg).  · The estimated PA systolic pressure is 43 mmHg.  · The left ventricle is normal in size with normal systolic function.  · The estimated ejection fraction is 55%.  · Indeterminate left ventricular diastolic function.  · Normal right ventricular size with normal right ventricular systolic function.  · Mild aortic regurgitation.  · Moderate mitral regurgitation.  · Mild to moderate tricuspid regurgitation.  · Trivial circumferential pericardial  "effusion.      Medications:  Reconciled Home Medications:      Medication List      START taking these medications    furosemide 40 MG tablet  Commonly known as: LASIX  Take 1 tablet (40 mg total) by mouth once daily.     potassium chloride SA 20 MEQ tablet  Commonly known as: K-DUR,KLOR-CON  Take 1 tablet (20 mEq total) by mouth once daily.        CONTINUE taking these medications    aspirin 81 MG Chew  Take 81 mg by mouth once daily.     atorvastatin 80 MG tablet  Commonly known as: LIPITOR  TAKE ONE-HALF TABLET BY MOUTH EVERY DAY     cetirizine 10 MG tablet  Commonly known as: ZYRTEC  Take 10 mg by mouth daily as needed.     cholecalciferol (vitamin D3) 50 mcg (2,000 unit) Cap capsule  Commonly known as: VITAMIN D3  Take 1 capsule by mouth once daily.     clotrimazole-betamethasone 1-0.05% cream  Commonly known as: LOTRISONE  APPLY TOPICALLY TO THE AFFECTED AREA TWICE DAILY AS NEEDED     cyanocobalamin 1,000 mcg/mL injection  Inject 1 mL (1,000 mcg total) into the muscle every 14 (fourteen) days.     dutasteride-tamsulosin 0.5-0.4 mg Cm24  Take 1 capsule by mouth once daily.     fluticasone propionate 50 mcg/actuation nasal spray  Commonly known as: FLONASE  2 sprays (100 mcg total) by Each Nostril route once daily.     ibuprofen 800 MG tablet  Commonly known as: ADVIL,MOTRIN  TAKE 1 TABLET(800 MG) BY MOUTH TWICE DAILY AS NEEDED FOR PAIN     linaCLOtide 145 mcg Cap capsule  Commonly known as: LINZESS  Take 1 capsule (145 mcg total) by mouth before breakfast.     losartan 100 MG tablet  Commonly known as: COZAAR  TAKE 1 TABLET(100 MG) BY MOUTH EVERY DAY     metoprolol succinate 50 MG 24 hr tablet  Commonly known as: TOPROL-XL  TAKE 1 TABLET(50 MG) BY MOUTH EVERY DAY     omeprazole 20 mg Tbec  Take 1 tablet by mouth once daily.     syringe with needle 3 mL 23 x 1" Syrg  1 Syringe by Misc.(Non-Drug; Combo Route) route every 30 days.            Time spent on the discharge of patient: >30 minutes         Estrella CHAVES " MD Mirza  Department of Hospital Medicine  [facility] - Med Surg ([place])

## 2022-05-18 NOTE — PLAN OF CARE
05/18/22 1401   Final Note   Assessment Type Final Discharge Note   Anticipated Discharge Disposition Home   Post-Acute Status   Discharge Delays None known at this time

## 2022-06-06 ENCOUNTER — OFFICE VISIT (OUTPATIENT)
Dept: INTERNAL MEDICINE | Facility: CLINIC | Age: 77
End: 2022-06-06
Attending: INTERNAL MEDICINE
Payer: MEDICARE

## 2022-06-06 VITALS
HEIGHT: 72 IN | BODY MASS INDEX: 35.21 KG/M2 | SYSTOLIC BLOOD PRESSURE: 104 MMHG | HEART RATE: 97 BPM | OXYGEN SATURATION: 98 % | DIASTOLIC BLOOD PRESSURE: 66 MMHG | WEIGHT: 260 LBS

## 2022-06-06 DIAGNOSIS — I10 ESSENTIAL HYPERTENSION: ICD-10-CM

## 2022-06-06 DIAGNOSIS — I34.0 NONRHEUMATIC MITRAL VALVE REGURGITATION: Primary | ICD-10-CM

## 2022-06-06 DIAGNOSIS — C85.90 LYMPHOMA IN REMISSION: ICD-10-CM

## 2022-06-06 DIAGNOSIS — Z13.31 SCREENING FOR DEPRESSION: ICD-10-CM

## 2022-06-06 DIAGNOSIS — R35.0 BENIGN PROSTATIC HYPERPLASIA WITH URINARY FREQUENCY: ICD-10-CM

## 2022-06-06 DIAGNOSIS — R60.0 LOWER EXTREMITY EDEMA: ICD-10-CM

## 2022-06-06 DIAGNOSIS — I07.1 SEVERE TRICUSPID VALVE REGURGITATION: ICD-10-CM

## 2022-06-06 DIAGNOSIS — N40.1 BENIGN PROSTATIC HYPERPLASIA WITH URINARY FREQUENCY: ICD-10-CM

## 2022-06-06 DIAGNOSIS — Z13.89 SCREENING FOR OBESITY: ICD-10-CM

## 2022-06-06 DIAGNOSIS — Z13.39 SCREENING FOR ALCOHOLISM: ICD-10-CM

## 2022-06-06 PROCEDURE — G0447 BEHAVIOR COUNSEL OBESITY 15M: HCPCS | Mod: 59,S$GLB,, | Performed by: INTERNAL MEDICINE

## 2022-06-06 PROCEDURE — G0447 PR OBESITY COUNSELING: ICD-10-PCS | Mod: 59,S$GLB,, | Performed by: INTERNAL MEDICINE

## 2022-06-06 PROCEDURE — 99214 OFFICE O/P EST MOD 30 MIN: CPT | Mod: 25,S$GLB,, | Performed by: INTERNAL MEDICINE

## 2022-06-06 PROCEDURE — 99214 PR OFFICE/OUTPT VISIT, EST, LEVL IV, 30-39 MIN: ICD-10-PCS | Mod: 25,S$GLB,, | Performed by: INTERNAL MEDICINE

## 2022-06-06 NOTE — PROGRESS NOTES
Subjective:       Patient ID: Sam Gamino is a 76 y.o. male.    Chief Complaint: Follow-up (Discuss recent hospital visit and testing)    He developed orthopnea and shortness of breath and was hospitalized from May 16th through May 18th.  He was given IV Lasix with significant improvement, and discharged on Lasix 40 mg daily.  Repeat echo revealed improvement in the pulmonary artery pressure and moderate mitral valve regurgitation.  His weight is down 4 lb since he last saw me.  He currently denies shortness of breath or orthopnea.    Follow-up    Hypertension  This is a chronic problem. The current episode started more than 1 year ago. The problem is controlled.     Review of Systems   Constitutional: Negative.    Respiratory: Negative.    Cardiovascular: Negative.          Objective:      Physical Exam  Vitals and nursing note reviewed.   Constitutional:       Appearance: He is well-developed.   HENT:      Head: Normocephalic and atraumatic.   Eyes:      Pupils: Pupils are equal, round, and reactive to light.   Cardiovascular:      Rate and Rhythm: Normal rate and regular rhythm.      Heart sounds: Normal heart sounds.   Pulmonary:      Effort: Pulmonary effort is normal.   Musculoskeletal:      Right lower le+ Edema present.      Left lower le+ Edema present.   Neurological:      Mental Status: He is alert.         Assessment:       Problem List Items Addressed This Visit        Unprioritized    Severe tricuspid valve regurgitation    Nonrheumatic mitral valve regurgitation - Primary    Lymphoma in remission    Lower extremity edema    Essential hypertension    Benign prostatic hyperplasia with urinary frequency      Other Visit Diagnoses     Screening for depression        Screening for alcoholism        Screening for obesity              Plan:       Per orders and D/C instructions.  Continue diet and/or meds for Obesity, HTN, and BPH, which are stable.     follow-up with cardiology for recent CHF,  mitral valve regurgitation, and tricuspid valve regurgitation.    Screening: The patient was screened for depression with the PHQ2 questionnaire and possible health consequences were discussed with the patient, who understands (15 minutes spent).       The patient was screened for the misuse of alcohol, by asking the number of drinks per average week, and if pt has had more than 4 drinks (more than 3 for women and elderly) in 1 day within the past year. The health and legal consequences of misuse were discussed (15 minutes spent).       The patient was screened for obesity (BMI>30), Since the current BMI is > 30, the possible consequences of obesity, as well as the benefits of diet, exercise, and weight loss were discussed. Any behavioral risks were identified, and methods to achieve appropriate treatment goals were discussed (15 minutes spent).

## 2022-06-27 ENCOUNTER — OFFICE VISIT (OUTPATIENT)
Dept: CARDIOLOGY | Facility: CLINIC | Age: 77
End: 2022-06-27
Attending: INTERNAL MEDICINE
Payer: MEDICARE

## 2022-06-27 VITALS
HEIGHT: 72 IN | WEIGHT: 257.94 LBS | BODY MASS INDEX: 34.94 KG/M2 | HEART RATE: 92 BPM | DIASTOLIC BLOOD PRESSURE: 60 MMHG | SYSTOLIC BLOOD PRESSURE: 118 MMHG | OXYGEN SATURATION: 97 %

## 2022-06-27 DIAGNOSIS — I34.0 NONRHEUMATIC MITRAL VALVE REGURGITATION: ICD-10-CM

## 2022-06-27 DIAGNOSIS — I10 ESSENTIAL HYPERTENSION: ICD-10-CM

## 2022-06-27 DIAGNOSIS — I25.10 CORONARY ARTERY DISEASE INVOLVING NATIVE CORONARY ARTERY OF NATIVE HEART WITHOUT ANGINA PECTORIS: ICD-10-CM

## 2022-06-27 DIAGNOSIS — C85.90 LYMPHOMA IN REMISSION: ICD-10-CM

## 2022-06-27 DIAGNOSIS — I07.1 SEVERE TRICUSPID VALVE REGURGITATION: ICD-10-CM

## 2022-06-27 DIAGNOSIS — I50.811 ACUTE RIGHT-SIDED HEART FAILURE: ICD-10-CM

## 2022-06-27 DIAGNOSIS — E78.00 HYPERCHOLESTEROLEMIA: ICD-10-CM

## 2022-06-27 DIAGNOSIS — I50.33 ACUTE ON CHRONIC DIASTOLIC CONGESTIVE HEART FAILURE: ICD-10-CM

## 2022-06-27 DIAGNOSIS — E04.1 THYROID NODULE: ICD-10-CM

## 2022-06-27 DIAGNOSIS — E66.9 OBESITY (BMI 30-39.9): ICD-10-CM

## 2022-06-27 PROCEDURE — 99214 OFFICE O/P EST MOD 30 MIN: CPT | Mod: PBBFAC | Performed by: INTERNAL MEDICINE

## 2022-06-27 PROCEDURE — 99999 PR PBB SHADOW E&M-EST. PATIENT-LVL IV: ICD-10-PCS | Mod: PBBFAC,,, | Performed by: INTERNAL MEDICINE

## 2022-06-27 PROCEDURE — 99214 PR OFFICE/OUTPT VISIT, EST, LEVL IV, 30-39 MIN: ICD-10-PCS | Mod: S$PBB,,, | Performed by: INTERNAL MEDICINE

## 2022-06-27 PROCEDURE — 99999 PR PBB SHADOW E&M-EST. PATIENT-LVL IV: CPT | Mod: PBBFAC,,, | Performed by: INTERNAL MEDICINE

## 2022-06-27 PROCEDURE — 99214 OFFICE O/P EST MOD 30 MIN: CPT | Mod: S$PBB,,, | Performed by: INTERNAL MEDICINE

## 2022-06-27 RX ORDER — POTASSIUM CHLORIDE 20 MEQ/1
20 TABLET, EXTENDED RELEASE ORAL DAILY
Qty: 120 TABLET | Refills: 3 | Status: SHIPPED | OUTPATIENT
Start: 2022-06-27 | End: 2022-08-08

## 2022-06-27 RX ORDER — FUROSEMIDE 40 MG/1
40 TABLET ORAL DAILY
Qty: 120 TABLET | Refills: 3 | Status: SHIPPED | OUTPATIENT
Start: 2022-06-27 | End: 2022-11-22 | Stop reason: SDUPTHER

## 2022-06-27 NOTE — PROGRESS NOTES
Subjective:     Sam Gamino is a 76 y.o. male with hypertension and hypercholesterolemia. He is severely obese. He had lymphoma in 1992. He underwent an autologous bone marrow transplantation at [facility]. In 5/2022 he was admitted with heart failure. He was noted to have moderate to severe mitral regurgitation and severe pulmonary hypertension. He was diuresed. He denies any exertional chest pain. He is able to walk about 2 blocks before he has to stop due to dyspnea. No orthopnea. No palpitations or weak spells. Feeling fair overall. He set up an appointment with me for follow up.        Congestive Heart Failure  Presents for follow-up visit. Associated symptoms include shortness of breath. Pertinent negatives include no abdominal pain, chest pain, chest pressure, claudication, edema, fatigue, muscle weakness, near-syncope, nocturia, orthopnea, palpitations, paroxysmal nocturnal dyspnea or unexpected weight change. The symptoms have been improving.   Hypertension  This is a chronic problem. The current episode started more than 1 year ago. The problem is unchanged. The problem is controlled (usually 110-120/70-80 mmHg at home). Associated symptoms include shortness of breath. Pertinent negatives include no anxiety, blurred vision, chest pain, headaches, malaise/fatigue, neck pain, orthopnea, palpitations, peripheral edema, PND or sweats. There is no history of chronic renal disease.   Hyperlipidemia  This is a chronic problem. The current episode started more than 1 year ago. The problem is controlled. Recent lipid tests were reviewed and are normal. Exacerbating diseases include obesity. He has no history of chronic renal disease, diabetes, hypothyroidism, liver disease or nephrotic syndrome. Associated symptoms include shortness of breath. Pertinent negatives include no chest pain, focal sensory loss, focal weakness, leg pain or myalgias.       Review of Systems   Constitutional: Negative  for chills, fatigue, fever, malaise/fatigue and unexpected weight change.   HENT: Negative for nosebleeds and tinnitus.    Eyes: Negative for blurred vision, double vision, vision loss in left eye and vision loss in right eye.   Cardiovascular: Positive for dyspnea on exertion. Negative for chest pain, claudication, irregular heartbeat, leg swelling, near-syncope, orthopnea, palpitations, paroxysmal nocturnal dyspnea and syncope.   Respiratory: Positive for shortness of breath. Negative for cough, hemoptysis and wheezing.    Endocrine: Negative for cold intolerance and heat intolerance.   Hematologic/Lymphatic: Negative for bleeding problem. Does not bruise/bleed easily.   Skin: Negative for color change and rash.   Musculoskeletal: Negative for back pain, falls, muscle weakness, myalgias and neck pain.   Gastrointestinal: Negative for abdominal pain, diarrhea, dysphagia, heartburn, hematemesis, hematochezia, hemorrhoids, jaundice, melena, nausea and vomiting.   Genitourinary: Negative for dysuria, hematuria and nocturia.   Neurological: Negative for dizziness, focal weakness, headaches, light-headedness, loss of balance, numbness, tremors, vertigo and weakness.   Psychiatric/Behavioral: Negative for altered mental status, depression and memory loss. The patient is not nervous/anxious.    Allergic/Immunologic: Negative for hives and persistent infections.     Current Outpatient Medications on File Prior to Visit   Medication Sig Dispense Refill    aspirin 81 MG Chew Take 81 mg by mouth once daily.      atorvastatin (LIPITOR) 80 MG tablet TAKE ONE-HALF TABLET BY MOUTH EVERY DAY 90 tablet 1    cetirizine (ZYRTEC) 10 MG tablet Take 10 mg by mouth daily as needed.      cholecalciferol, vitamin D3, (VITAMIN D3) 50 mcg (2,000 unit) Cap Take 1 capsule by mouth once daily.      clotrimazole-betamethasone 1-0.05% (LOTRISONE) cream APPLY TOPICALLY TO THE AFFECTED AREA TWICE DAILY AS NEEDED 15 g 0    cyanocobalamin 1,000  "mcg/mL injection Inject 1 mL (1,000 mcg total) into the muscle every 14 (fourteen) days. 10 mL 1    dutasteride-tamsulosin 0.5-0.4 mg CM24 Take 1 capsule by mouth once daily. 30 capsule 11    fluticasone propionate (FLONASE) 50 mcg/actuation nasal spray 2 sprays (100 mcg total) by Each Nostril route once daily. 16 g 3    furosemide (LASIX) 40 MG tablet Take 1 tablet (40 mg total) by mouth once daily. 90 tablet 0    ibuprofen (ADVIL,MOTRIN) 800 MG tablet TAKE 1 TABLET(800 MG) BY MOUTH TWICE DAILY AS NEEDED FOR PAIN 60 tablet 2    linaCLOtide (LINZESS) 145 mcg Cap capsule Take 1 capsule (145 mcg total) by mouth before breakfast. 30 capsule 11    metoprolol succinate (TOPROL-XL) 50 MG 24 hr tablet TAKE 1 TABLET(50 MG) BY MOUTH EVERY DAY 90 tablet 1    omeprazole 20 mg TbEC Take 1 tablet by mouth once daily.      potassium chloride SA (K-DUR,KLOR-CON) 20 MEQ tablet Take 1 tablet (20 mEq total) by mouth once daily. 90 tablet 0    syringe with needle 3 mL 23 x 1" Syrg 1 Syringe by Misc.(Non-Drug; Combo Route) route every 30 days. 50 Syringe 11    [DISCONTINUED] losartan (COZAAR) 100 MG tablet TAKE 1 TABLET(100 MG) BY MOUTH EVERY DAY 90 tablet 1     No current facility-administered medications on file prior to visit.       /60 (BP Location: Left arm, Patient Position: Sitting, BP Method: Large (Manual))   Pulse 92   Ht 6' (1.829 m)   Wt 117 kg (257 lb 15 oz)   SpO2 97%   BMI 34.98 kg/m²       Objective:     Physical Exam  Constitutional:       General: He is not in acute distress.     Appearance: Normal appearance. He is well-developed. He is not toxic-appearing or diaphoretic.   HENT:      Head: Normocephalic and atraumatic.      Nose: Nose normal.   Eyes:      General:         Right eye: No discharge.         Left eye: No discharge.      Conjunctiva/sclera:      Right eye: Right conjunctiva is not injected.      Left eye: Left conjunctiva is not injected.      Pupils: Pupils are equal.      Right " eye: Pupil is round.      Left eye: Pupil is round.   Neck:      Thyroid: No thyromegaly.      Vascular: No carotid bruit or JVD.   Cardiovascular:      Rate and Rhythm: Normal rate and regular rhythm.  No extrasystoles are present.     Chest Wall: PMI is not displaced.      Pulses:           Radial pulses are 2+ on the right side and 2+ on the left side.        Femoral pulses are 2+ on the right side and 2+ on the left side.       Dorsalis pedis pulses are 2+ on the right side and 2+ on the left side.        Posterior tibial pulses are 2+ on the right side and 2+ on the left side.      Heart sounds: S1 normal. Murmur heard.   High-pitched blowing holosystolic murmur is present at the apex. P2 pronounced.     No gallop.   Pulmonary:      Effort: Pulmonary effort is normal.      Breath sounds: Examination of the right-lower field reveals rales. Examination of the left-lower field reveals rales. Rales present.   Abdominal:      Palpations: Abdomen is soft.      Tenderness: There is no abdominal tenderness.   Musculoskeletal:      Cervical back: Neck supple.      Right lower leg: Swelling present. 1+ Edema present.      Left lower leg: Swelling present. 1+ Edema present.   Lymphadenopathy:      Head:      Right side of head: No submandibular adenopathy.      Left side of head: No submandibular adenopathy.      Cervical: No cervical adenopathy.   Skin:     General: Skin is warm and dry.      Findings: No rash.      Nails: There is no clubbing.   Neurological:      General: No focal deficit present.      Mental Status: He is alert and oriented to person, place, and time. He is not disoriented.      Cranial Nerves: No cranial nerve deficit.   Psychiatric:         Attention and Perception: Attention and perception normal.         Mood and Affect: Mood and affect normal.         Speech: Speech normal.         Behavior: Behavior normal.         Thought Content: Thought content normal.         Cognition and Memory: Cognition  and memory normal.         Judgment: Judgment normal.         Assessment:     1. Acute right-sided heart failure    2. Acute on chronic diastolic congestive heart failure    3. Nonrheumatic mitral valve regurgitation    4. Severe tricuspid valve regurgitation    5. Coronary artery disease involving native coronary artery of native heart without angina pectoris    6. Essential hypertension    7. Hypercholesterolemia    8. Obesity (BMI 30-39.9)    9. Lymphoma in remission    10. Thyroid nodule        Plan:     1. Heart Failure, Diastolic, Chronic   5/16/2022: Echo: Normal left ventricular size and systolic function. EF 55%.Moderate MR. Mild to moderate TR.   On metoprolol 50 mg Q24, losartan 100 mg Q24, furosemide 40 mg Q24 and KCl 20 mEq Q24.   Appears well compensated.   May take additional furosemide 40 mg Q24 PRN for edema.   Spironolactone and SGLT2i would be reasonable options long term.    2. Mitral Regurgitation   5/16/2022: Echo: Moderate MR.   As above.    3. Tricuspid Regurgitation   5/16/2022: Echo: Mild to moderate TR.    4. Coronary Artery Disease   3/2007: Underwent coronary angiogram. Appears to have had mild disease.    5. Hypertension   2002: Diagnosed.   On metoprolol 50 mg Q24, losartan 100 mg Q24, furosemide 40 mg Q24 and KCl 20 mEq Q24.   Keeping log at home.   Appears to have been well controlled.    6. Hypercholesterolemia   2002: Statin was begun.   On atorvastatin 40 mg Q24.    7. Mild Obesity   6/27/2022: Weight 117 kg. BMI 35.    8. 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    9. Primary Care   Dr. Herber Zamora.    F/u 6 weeks.    Ema Montemayor M.D.

## 2022-08-08 ENCOUNTER — OFFICE VISIT (OUTPATIENT)
Dept: CARDIOLOGY | Facility: CLINIC | Age: 77
End: 2022-08-08
Attending: INTERNAL MEDICINE
Payer: MEDICARE

## 2022-08-08 VITALS
SYSTOLIC BLOOD PRESSURE: 116 MMHG | WEIGHT: 257.94 LBS | BODY MASS INDEX: 34.94 KG/M2 | OXYGEN SATURATION: 97 % | HEIGHT: 72 IN | HEART RATE: 89 BPM | DIASTOLIC BLOOD PRESSURE: 60 MMHG

## 2022-08-08 DIAGNOSIS — E78.00 HYPERCHOLESTEROLEMIA: ICD-10-CM

## 2022-08-08 DIAGNOSIS — E66.9 MILD OBESITY: ICD-10-CM

## 2022-08-08 DIAGNOSIS — I50.33 HEART FAILURE, DIASTOLIC, ACUTE ON CHRONIC: ICD-10-CM

## 2022-08-08 DIAGNOSIS — I34.0 NONRHEUMATIC MITRAL VALVE REGURGITATION: ICD-10-CM

## 2022-08-08 DIAGNOSIS — I36.1 NONRHEUMATIC TRICUSPID VALVE REGURGITATION: ICD-10-CM

## 2022-08-08 DIAGNOSIS — I10 PRIMARY HYPERTENSION: ICD-10-CM

## 2022-08-08 DIAGNOSIS — I25.10 CORONARY ARTERY DISEASE INVOLVING NATIVE CORONARY ARTERY OF NATIVE HEART WITHOUT ANGINA PECTORIS: ICD-10-CM

## 2022-08-08 DIAGNOSIS — C85.90 LYMPHOMA IN REMISSION: ICD-10-CM

## 2022-08-08 DIAGNOSIS — E04.1 THYROID NODULE: ICD-10-CM

## 2022-08-08 PROCEDURE — 99213 OFFICE O/P EST LOW 20 MIN: CPT | Mod: PBBFAC | Performed by: INTERNAL MEDICINE

## 2022-08-08 PROCEDURE — 99999 PR PBB SHADOW E&M-EST. PATIENT-LVL III: CPT | Mod: PBBFAC,,, | Performed by: INTERNAL MEDICINE

## 2022-08-08 PROCEDURE — 99214 PR OFFICE/OUTPT VISIT, EST, LEVL IV, 30-39 MIN: ICD-10-PCS | Mod: S$PBB,,, | Performed by: INTERNAL MEDICINE

## 2022-08-08 PROCEDURE — 99999 PR PBB SHADOW E&M-EST. PATIENT-LVL III: ICD-10-PCS | Mod: PBBFAC,,, | Performed by: INTERNAL MEDICINE

## 2022-08-08 PROCEDURE — 99214 OFFICE O/P EST MOD 30 MIN: CPT | Mod: S$PBB,,, | Performed by: INTERNAL MEDICINE

## 2022-08-08 RX ORDER — SPIRONOLACTONE 25 MG/1
25 TABLET ORAL DAILY
Qty: 90 TABLET | Refills: 3 | Status: SHIPPED | OUTPATIENT
Start: 2022-08-08 | End: 2022-11-22 | Stop reason: SDUPTHER

## 2022-08-08 NOTE — PROGRESS NOTES
Subjective:     Sam Gamino is a 77 y.o. male with hypertension and hypercholesterolemia. He is mildly obese but used to be severely obese. He had lymphoma in 1992. He underwent an autologous bone marrow transplantation at [facility]. In 5/2022 he was admitted with heart failure. He was noted to have moderate to severe mitral regurgitation and severe pulmonary hypertension. He was diuresed. On 5/16/2022 he had an echocardiogram that revealed normal left ventricular size and systolic function. The ejection fraction was 55%. There was moderate mitral regurgitation. There was mild to moderate tricuspid regurgitation. He denies any exertional chest pain. He is able to walk about two blocks before he has to stop due to dyspnea. He has mild edema of his legs. No orthopnea. No palpitations or weak spells. Feeling fair overall.         Congestive Heart Failure  Presents for follow-up visit. Associated symptoms include shortness of breath. Pertinent negatives include no abdominal pain, chest pain, chest pressure, claudication, edema, fatigue, muscle weakness, near-syncope, nocturia, orthopnea, palpitations, paroxysmal nocturnal dyspnea or unexpected weight change. The symptoms have been improving. His past medical history is significant for CAD.   Hypertension  This is a chronic problem. The current episode started more than 1 year ago. The problem is unchanged. The problem is controlled (usually 110-120/70-80 mmHg at home). Associated symptoms include shortness of breath. Pertinent negatives include no anxiety, blurred vision, chest pain, headaches, malaise/fatigue, neck pain, orthopnea, palpitations, peripheral edema, PND or sweats. There is no history of chronic renal disease.   Hyperlipidemia  This is a chronic problem. The current episode started more than 1 year ago. The problem is controlled. Recent lipid tests were reviewed and are normal. Exacerbating diseases include obesity. He has no history  of chronic renal disease, diabetes, hypothyroidism, liver disease or nephrotic syndrome. Associated symptoms include shortness of breath. Pertinent negatives include no chest pain, focal sensory loss, focal weakness, leg pain or myalgias.   Coronary Artery Disease  Symptoms include shortness of breath. Pertinent negatives include no chest pain, chest pressure, dizziness, leg swelling, muscle weakness or palpitations. Risk factors include hyperlipidemia and obesity. His past medical history is significant for CHF.       Review of Systems   Constitutional: Negative for chills, fatigue, fever, malaise/fatigue and unexpected weight change.   HENT: Negative for nosebleeds and tinnitus.    Eyes: Negative for blurred vision, double vision, vision loss in left eye and vision loss in right eye.   Cardiovascular: Positive for dyspnea on exertion. Negative for chest pain, claudication, irregular heartbeat, leg swelling, near-syncope, orthopnea, palpitations, paroxysmal nocturnal dyspnea and syncope.   Respiratory: Positive for shortness of breath. Negative for cough, hemoptysis and wheezing.    Endocrine: Negative for cold intolerance and heat intolerance.   Hematologic/Lymphatic: Negative for bleeding problem. Does not bruise/bleed easily.   Skin: Negative for color change and rash.   Musculoskeletal: Negative for back pain, falls, muscle weakness, myalgias and neck pain.   Gastrointestinal: Negative for abdominal pain, diarrhea, dysphagia, heartburn, hematemesis, hematochezia, hemorrhoids, jaundice, melena, nausea and vomiting.   Genitourinary: Negative for dysuria, hematuria and nocturia.   Neurological: Negative for dizziness, focal weakness, headaches, light-headedness, loss of balance, numbness, tremors, vertigo and weakness.   Psychiatric/Behavioral: Negative for altered mental status, depression and memory loss. The patient is not nervous/anxious.    Allergic/Immunologic: Negative for hives and persistent infections.  "      Current Outpatient Medications on File Prior to Visit   Medication Sig Dispense Refill    aspirin 81 MG Chew Take 81 mg by mouth once daily.      atorvastatin (LIPITOR) 80 MG tablet TAKE ONE-HALF TABLET BY MOUTH EVERY DAY 90 tablet 1    cetirizine (ZYRTEC) 10 MG tablet Take 10 mg by mouth daily as needed.      cholecalciferol, vitamin D3, (VITAMIN D3) 50 mcg (2,000 unit) Cap Take 1 capsule by mouth once daily.      cyanocobalamin 1,000 mcg/mL injection Inject 1 mL (1,000 mcg total) into the muscle every 14 (fourteen) days. 10 mL 1    dutasteride-tamsulosin 0.5-0.4 mg CM24 Take 1 capsule by mouth once daily. 30 capsule 11    fluticasone propionate (FLONASE) 50 mcg/actuation nasal spray 2 sprays (100 mcg total) by Each Nostril route once daily. 16 g 3    furosemide (LASIX) 40 MG tablet Take 1 tablet (40 mg total) by mouth once daily. 120 tablet 3    ibuprofen (ADVIL,MOTRIN) 800 MG tablet TAKE 1 TABLET(800 MG) BY MOUTH TWICE DAILY AS NEEDED FOR PAIN 60 tablet 2    losartan (COZAAR) 100 MG tablet TAKE 1 TABLET(100 MG) BY MOUTH EVERY DAY 90 tablet 1    metoprolol succinate (TOPROL-XL) 50 MG 24 hr tablet TAKE 1 TABLET(50 MG) BY MOUTH EVERY DAY 90 tablet 1    omeprazole 20 mg TbEC Take 1 tablet by mouth once daily.      potassium chloride SA (K-DUR,KLOR-CON) 20 MEQ tablet Take 1 tablet (20 mEq total) by mouth once daily. 120 tablet 3    clotrimazole-betamethasone 1-0.05% (LOTRISONE) cream APPLY TOPICALLY TO THE AFFECTED AREA TWICE DAILY AS NEEDED 15 g 0    linaCLOtide (LINZESS) 145 mcg Cap capsule Take 1 capsule (145 mcg total) by mouth before breakfast. 30 capsule 11    syringe with needle 3 mL 23 x 1" Syrg 1 Syringe by Misc.(Non-Drug; Combo Route) route every 30 days. 50 Syringe 11     No current facility-administered medications on file prior to visit.       /60 (BP Location: Right arm, Patient Position: Sitting)   Pulse 89   Ht 6' (1.829 m)   Wt 117 kg (257 lb 15 oz)   SpO2 97%   BMI " 34.98 kg/m²       Objective:     Physical Exam  Constitutional:       General: He is not in acute distress.     Appearance: Normal appearance. He is well-developed. He is not toxic-appearing or diaphoretic.   HENT:      Head: Normocephalic and atraumatic.      Nose: Nose normal.   Eyes:      General:         Right eye: No discharge.         Left eye: No discharge.      Conjunctiva/sclera:      Right eye: Right conjunctiva is not injected.      Left eye: Left conjunctiva is not injected.      Pupils: Pupils are equal.      Right eye: Pupil is round.      Left eye: Pupil is round.   Neck:      Thyroid: No thyromegaly.      Vascular: No carotid bruit or JVD.   Cardiovascular:      Rate and Rhythm: Normal rate and regular rhythm.  No extrasystoles are present.     Chest Wall: PMI is not displaced.      Pulses:           Radial pulses are 2+ on the right side and 2+ on the left side.        Femoral pulses are 2+ on the right side and 2+ on the left side.       Dorsalis pedis pulses are 2+ on the right side and 2+ on the left side.        Posterior tibial pulses are 2+ on the right side and 2+ on the left side.      Heart sounds: S1 normal. Murmur heard.   High-pitched blowing holosystolic murmur is present at the apex. P2 pronounced.     No gallop.   Pulmonary:      Effort: Pulmonary effort is normal.      Breath sounds: Examination of the right-lower field reveals rales. Examination of the left-lower field reveals rales. Rales present.   Abdominal:      Palpations: Abdomen is soft.      Tenderness: There is no abdominal tenderness.   Musculoskeletal:      Cervical back: Neck supple.      Right lower leg: Swelling present. 1+ Edema present.      Left lower leg: Swelling present. 1+ Edema present.   Lymphadenopathy:      Head:      Right side of head: No submandibular adenopathy.      Left side of head: No submandibular adenopathy.      Cervical: No cervical adenopathy.   Skin:     General: Skin is warm and dry.       Findings: No rash.      Nails: There is no clubbing.   Neurological:      General: No focal deficit present.      Mental Status: He is alert and oriented to person, place, and time. He is not disoriented.      Cranial Nerves: No cranial nerve deficit.   Psychiatric:         Attention and Perception: Attention and perception normal.         Mood and Affect: Mood and affect normal.         Speech: Speech normal.         Behavior: Behavior normal.         Thought Content: Thought content normal.         Cognition and Memory: Cognition and memory normal.         Judgment: Judgment normal.         Assessment:     1. Heart failure, diastolic, acute on chronic    2. Nonrheumatic mitral valve regurgitation    3. Nonrheumatic tricuspid valve regurgitation    4. Coronary artery disease involving native coronary artery of native heart without angina pectoris    5. Primary hypertension    6. Hypercholesterolemia    7. Mild obesity    8. Lymphoma in remission    9. Thyroid nodule        Plan:     1. Heart Failure, Diastolic, Chronic   5/16/2022: Echo: Normal left ventricular size and systolic function. EF 55%.Moderate MR. Mild to moderate TR.   On metoprolol 50 mg Q24, losartan 100 mg Q24, furosemide 40 mg Q24 and KCl 20 mEq Q24.   Appears well compensated.   May take additional furosemide 40 mg Q24 PRN for edema.   8/8/2022: Spironolactone 25 mg Q24 to begin and KCl 20 mEq Q24 to be discontinued. Do BMP and BNP in 2 weeks.    SGLT2i would be reasonable options long term.    2. Mitral Regurgitation   5/16/2022: Echo: Moderate MR.   As above.    3. Tricuspid Regurgitation   5/16/2022: Echo: Mild to moderate TR.    4. Coronary Artery Disease   3/2007: Underwent coronary angiogram. Appears to have had mild disease.    5. Hypertension   2002: Diagnosed.   On metoprolol 50 mg Q24, losartan 100 mg Q24, furosemide 40 mg Q24 and KCl 20 mEq Q24.   Keeping log at home.   Appears to have been well controlled.   As above.     6.  Hypercholesterolemia   2002: Statin was begun.   On atorvastatin 40 mg Q24.    7. Mild Obesity   6/27/2022: Weight 117 kg. BMI 35.   Encouraged to lose weight.    8. 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    9. Primary Care   Dr. Herber Zamora.    F/u 2 months.    Ema Montemayor M.D.

## 2022-08-22 ENCOUNTER — LAB VISIT (OUTPATIENT)
Dept: LAB | Facility: OTHER | Age: 77
End: 2022-08-22
Attending: INTERNAL MEDICINE
Payer: MEDICARE

## 2022-08-22 DIAGNOSIS — I50.33 HEART FAILURE, DIASTOLIC, ACUTE ON CHRONIC: ICD-10-CM

## 2022-08-22 LAB
ANION GAP SERPL CALC-SCNC: 10 MMOL/L (ref 8–16)
BNP SERPL-MCNC: 87 PG/ML (ref 0–99)
BUN SERPL-MCNC: 20 MG/DL (ref 8–23)
CALCIUM SERPL-MCNC: 9.7 MG/DL (ref 8.7–10.5)
CHLORIDE SERPL-SCNC: 108 MMOL/L (ref 95–110)
CO2 SERPL-SCNC: 23 MMOL/L (ref 23–29)
CREAT SERPL-MCNC: 1 MG/DL (ref 0.5–1.4)
EST. GFR  (NO RACE VARIABLE): >60 ML/MIN/1.73 M^2
GLUCOSE SERPL-MCNC: 90 MG/DL (ref 70–110)
POTASSIUM SERPL-SCNC: 4 MMOL/L (ref 3.5–5.1)
SODIUM SERPL-SCNC: 141 MMOL/L (ref 136–145)

## 2022-08-22 PROCEDURE — 80048 BASIC METABOLIC PNL TOTAL CA: CPT | Performed by: INTERNAL MEDICINE

## 2022-08-22 PROCEDURE — 36415 COLL VENOUS BLD VENIPUNCTURE: CPT | Performed by: INTERNAL MEDICINE

## 2022-08-22 PROCEDURE — 83880 ASSAY OF NATRIURETIC PEPTIDE: CPT | Performed by: INTERNAL MEDICINE

## 2022-09-06 ENCOUNTER — OFFICE VISIT (OUTPATIENT)
Dept: INTERNAL MEDICINE | Facility: CLINIC | Age: 77
End: 2022-09-06
Attending: INTERNAL MEDICINE
Payer: MEDICARE

## 2022-09-06 VITALS
HEIGHT: 72 IN | WEIGHT: 259 LBS | BODY MASS INDEX: 35.08 KG/M2 | OXYGEN SATURATION: 98 % | DIASTOLIC BLOOD PRESSURE: 60 MMHG | HEART RATE: 87 BPM | SYSTOLIC BLOOD PRESSURE: 110 MMHG

## 2022-09-06 DIAGNOSIS — E78.00 HYPERCHOLESTEROLEMIA: ICD-10-CM

## 2022-09-06 DIAGNOSIS — I50.33 HEART FAILURE, DIASTOLIC, ACUTE ON CHRONIC: ICD-10-CM

## 2022-09-06 DIAGNOSIS — K59.00 CONSTIPATION, UNSPECIFIED CONSTIPATION TYPE: ICD-10-CM

## 2022-09-06 DIAGNOSIS — I34.0 NONRHEUMATIC MITRAL VALVE REGURGITATION: Primary | ICD-10-CM

## 2022-09-06 DIAGNOSIS — I10 PRIMARY HYPERTENSION: ICD-10-CM

## 2022-09-06 PROCEDURE — G0008 ADMIN INFLUENZA VIRUS VAC: HCPCS | Mod: S$GLB,,, | Performed by: INTERNAL MEDICINE

## 2022-09-06 PROCEDURE — G0439 PR MEDICARE ANNUAL WELLNESS SUBSEQUENT VISIT: ICD-10-PCS | Mod: S$GLB,,, | Performed by: INTERNAL MEDICINE

## 2022-09-06 PROCEDURE — G0008 FLU VACCINE - QUADRIVALENT - ADJUVANTED: ICD-10-PCS | Mod: S$GLB,,, | Performed by: INTERNAL MEDICINE

## 2022-09-06 PROCEDURE — 90694 FLU VACCINE - QUADRIVALENT - ADJUVANTED: ICD-10-PCS | Mod: S$GLB,,, | Performed by: INTERNAL MEDICINE

## 2022-09-06 PROCEDURE — G0439 PPPS, SUBSEQ VISIT: HCPCS | Mod: S$GLB,,, | Performed by: INTERNAL MEDICINE

## 2022-09-06 PROCEDURE — 90694 VACC AIIV4 NO PRSRV 0.5ML IM: CPT | Mod: S$GLB,,, | Performed by: INTERNAL MEDICINE

## 2022-09-06 NOTE — PROGRESS NOTES
Subjective:       Patient ID: Sam Gamino is a 77 y.o. male.    Chief Complaint: Follow-up (Refill Linzess)    Annual Wellness Exam and Health Risk Assessement (HRA)    Cognitive Impairment: None    Mental Conditions: None    Depression Risk Factors: None    How often do you use opioids? Rarely    Functional Ability:    Steady gait: Yes  Self-reliant: Yes   Safe home: Yes  Hearing Difficulties: No   Vision Difficulties: No    Physical Impairment: None    Living Will: See Patient Demographics  Information on how to obtain a [employer] form is included in the patient information which prints out under Patient instructions.  I did not directly discuss advanced directives/end of life care planning with the patient unless I have included a summary of the discussion in my assessment and plan.    Functional assessment:  Able to do all ADL's (including dressing, feeding, toileting, grooming, bathing, and ambulating).  Fall Risk: Low  Energy level: Good  Mental well-being: Good    HEIDS Measure screening dates:  BMI: See Vital signs      DEXA:               See Health Maintenance Report  colon screen:    See Health Maintenance Report      Mammogram:   See Health Maintenance Report                   Glaucoma screen:  per Optho            A list of current providers and suppliers is in The Problem list under the relevant problem.      Vaccines: See Health Maintenance Report  Flu:            Pneumovax:  Shingrix:           The patient's current health status is: Fair/Good   Patient was educated on all medical problems. See A/P from note dated today.    A written screening and health advice schedule was printed for the patient under Patient Instructions.    Screening: The patient was screened for depression with the PHQ2 questionnaire and possible health consequences were discussed with the patient, who understands (15 minutes spent). The patient was screened for opioid use (the NAGA screen) by asking how often do you use  opioids? The patient was screened for the misuse of alcohol, by asking the number of drinks per average week, and if pt has had more than 4 drinks (more than 3 for women and elderly) in 1 day within the past year. The health and legal consequences of misuse were discussed (15 minutes spent). The patient was screened for obesity (BMI>30), If the current BMI > 30, then the possible consequences of obesity, as well as the benefits of diet, exercise, and weight loss were discussed. Any behavioral risks were identified, and methods to achieve appropriate treatment goals were discussed (15 minutes spent).                                 Follow-up    Hypertension  This is a chronic problem. The current episode started more than 1 year ago. The problem is controlled.   Review of Systems   Constitutional: Negative.    Respiratory: Negative.     Cardiovascular: Negative.        Objective:      Physical Exam  Vitals and nursing note reviewed.   Constitutional:       Appearance: He is well-developed.   HENT:      Head: Normocephalic and atraumatic.   Eyes:      Pupils: Pupils are equal, round, and reactive to light.   Cardiovascular:      Rate and Rhythm: Normal rate and regular rhythm.      Heart sounds: Normal heart sounds.      Comments: Trace edema bilat  Pulmonary:      Effort: Pulmonary effort is normal.   Musculoskeletal:      Right lower leg: Edema present.      Left lower leg: Edema present.   Neurological:      Mental Status: He is alert.       Assessment:       Problem List Items Addressed This Visit          Unprioritized    Mitral regurgitation - Primary    Primary hypertension    Heart failure, diastolic, acute on chronic    Hypercholesterolemia     Other Visit Diagnoses       Constipation, unspecified constipation type        Relevant Medications    linaCLOtide (LINZESS) 145 mcg Cap capsule              Plan:       Per orders and D/C instructions.  Continue diet and/or meds for constipation, HTN, and high  cholesterol, which are stable.    Follow-up with cardiology for mitral valve regurgitation and chronic heart failure.

## 2022-09-06 NOTE — PATIENT INSTRUCTIONS
Tips for Healthy Living and Routine Preventative Care - 2022                                                            (These guidelines are intended for healthy adults)      1. Exercise  Exercise aerobically with a target heart rate of (220-age) x 0.8  Exercise 30-45 minutes on most days of the week    2. Diet and Supplements- All supplements can be obtained through a varied, healthy diet   Calcium: 1,000 - 1,200 mg each day   8 oz milk or Calcium fortified O.J. = 300, 8 oz Yogurt = 400 mg, 1 oz of cheese =100-200 mg              8 oz Oatmeal = 215 mg, 3 oz [place] = 240 mg  Vitamin D: 800 iu each day- Can probably be obtained by 30 min. of direct sunlight    each day             3 oz. [place] = 800 iu,  3 oz. Tuna =150 iu, Milk or fortified O.J. = 120 iu  Fish oil: 1-2 grams each day or about 840 mg of EPA and DHA (Omega-3 fatty acids) each day             3 oz. [place]=2 grams,  3 oz. Tuna=1.3 grams,  3 oz. drained light Tuna= 0.25 grams  Folic acid 800 mcg each day for all women planning or capable of pregnancy    3. Lifestyle  Alcohol: 1 drink = 12 oz. domestic beer, 4 oz. wine, or 1 oz. hard (80 proof) liquor             Males: </= 14 drinks per week with no more than 4 in any one day             Females: </= 7 drinks per week with no more than 3 in any one day  Salt: 1.2 - 3 grams of Sodium each day.  Tobacco: Dont smoke, or quit smoking (discuss with your doctor)  Depression: If you feel depressed discuss with your doctor  Weight: Maintain a healthy body weight. Stay within 10% of:             Males: 106 lbs. + 6 lbs per inch height above 5 feet             Females: 100 lbs + 5 lbs per inch height above 5 feet    4. Routine tests  Blood pressure check at each visit, or at least once each year  HIV screening (one time) if less than 65 years old  Hepatitis C screen (one time) if less than 80 years old  Cholesterol screening every 3 years starting at age 21  Glucose/Hemaglobin A1C check every 2-3 years starting  at age 45. Check at age 35 if overweight  TSH (thyroid screen) every 2 years starting at age 50  Colonoscopy at age 45, and repeat every 10 years until age 75. Consider screening until age 85  Vision screen at age 65    Females:  Gyn exam with cervical HPV test every 3 years or Pap smear every three years starting at age 25                  Stop screening at age 65 if past 3 exams were normal                  No screening for women who have had a hysterectomy with removal of cervix  Mammogram every 1-2 years starting at age 40 (or age 50) until age 75  Consider continuing Mammograms every other year for those older than 75 with a life expectancy of more than 10 years  Bone density scan at about age 65    Males:  PSA screening annually at age 50, age 45 for  Americans, until age 75. Consider annual screening after age 75                   5. Immunizations  Influenza vaccine every year in the fall, especially if >50 or with a chronic disease  Tetanus/Diphtheria/Pertussis (Tdap) vaccine once (after the age of 18), then Tetanus/Diphtheria (Td) or Tdap vaccine every 10 years  Shingles (Shingrix) vaccine after age 50 and get a 2nd dose after 2-6 months  Pneumonia vaccine (Prevnar-20) at age 65       6. Advanced Directive/End of life care planning  You should consider having a signed document which informs physicians and family of your end of life care wishes.  You can go to [employer]/DNR/[place]. Under Step 1 click download AdobePDF and print.  This is the [place] physician order for scope of Treatment (La[employer]) form.  You can also request a blank copy of the [employer] form from my office.  Bring a copy of the signed document to my office so we can scan it in your medical chart.

## 2022-10-10 ENCOUNTER — OFFICE VISIT (OUTPATIENT)
Dept: CARDIOLOGY | Facility: CLINIC | Age: 77
End: 2022-10-10
Attending: INTERNAL MEDICINE
Payer: MEDICARE

## 2022-10-10 VITALS
BODY MASS INDEX: 35 KG/M2 | HEART RATE: 85 BPM | WEIGHT: 258.38 LBS | OXYGEN SATURATION: 98 % | HEIGHT: 72 IN | DIASTOLIC BLOOD PRESSURE: 62 MMHG | SYSTOLIC BLOOD PRESSURE: 100 MMHG

## 2022-10-10 DIAGNOSIS — C85.90 LYMPHOMA IN REMISSION: ICD-10-CM

## 2022-10-10 DIAGNOSIS — I10 PRIMARY HYPERTENSION: ICD-10-CM

## 2022-10-10 DIAGNOSIS — E78.00 HYPERCHOLESTEROLEMIA: ICD-10-CM

## 2022-10-10 DIAGNOSIS — I50.33 HEART FAILURE, DIASTOLIC, ACUTE ON CHRONIC: ICD-10-CM

## 2022-10-10 DIAGNOSIS — E66.9 MILD OBESITY: ICD-10-CM

## 2022-10-10 DIAGNOSIS — I34.0 NONRHEUMATIC MITRAL VALVE REGURGITATION: ICD-10-CM

## 2022-10-10 DIAGNOSIS — I36.1 NONRHEUMATIC TRICUSPID VALVE REGURGITATION: ICD-10-CM

## 2022-10-10 DIAGNOSIS — I25.10 CORONARY ARTERY DISEASE INVOLVING NATIVE CORONARY ARTERY OF NATIVE HEART WITHOUT ANGINA PECTORIS: ICD-10-CM

## 2022-10-10 DIAGNOSIS — E04.1 THYROID NODULE: ICD-10-CM

## 2022-10-10 PROCEDURE — 99214 OFFICE O/P EST MOD 30 MIN: CPT | Mod: S$PBB,,, | Performed by: INTERNAL MEDICINE

## 2022-10-10 PROCEDURE — 99999 PR PBB SHADOW E&M-EST. PATIENT-LVL III: CPT | Mod: PBBFAC,,, | Performed by: INTERNAL MEDICINE

## 2022-10-10 PROCEDURE — 99999 PR PBB SHADOW E&M-EST. PATIENT-LVL III: ICD-10-PCS | Mod: PBBFAC,,, | Performed by: INTERNAL MEDICINE

## 2022-10-10 PROCEDURE — 99214 PR OFFICE/OUTPT VISIT, EST, LEVL IV, 30-39 MIN: ICD-10-PCS | Mod: S$PBB,,, | Performed by: INTERNAL MEDICINE

## 2022-10-10 PROCEDURE — 99213 OFFICE O/P EST LOW 20 MIN: CPT | Mod: PBBFAC | Performed by: INTERNAL MEDICINE

## 2022-10-10 NOTE — PROGRESS NOTES
Subjective:     Sam Gamino is a 77 y.o. male with hypertension and hypercholesterolemia. He is mildly obese but used to be severely obese. He had lymphoma in 1992. He underwent an autologous bone marrow [facility] at [facility]. In 5/2022 he was admitted with heart failure. He was noted to have moderate to severe mitral regurgitation and severe pulmonary hypertension. He was diuresed. On 5/16/2022 he had an echocardiogram that revealed normal left ventricular size and systolic function. The ejection fraction was 55%. There was moderate mitral regurgitation. There was mild to moderate tricuspid regurgitation. He denies any exertional chest pain. He is able to walk about two blocks before he has to stop due to dyspnea. He has mild edema of his legs. No orthopnea. No palpitations or weak spells. Feeling fair overall.         Congestive Heart Failure  Presents for follow-up visit. Associated symptoms include shortness of breath. Pertinent negatives include no abdominal pain, chest pain, chest pressure, claudication, edema, fatigue, muscle weakness, near-syncope, nocturia, orthopnea, palpitations, paroxysmal nocturnal dyspnea or unexpected weight change. The symptoms have been improving. His past medical history is significant for CAD.   Coronary Artery Disease  Symptoms include shortness of breath. Pertinent negatives include no chest pain, chest pressure, dizziness, leg swelling, muscle weakness or palpitations. Risk factors include hyperlipidemia and obesity. His past medical history is significant for CHF.   Hypertension  This is a chronic problem. The current episode started more than 1 year ago. The problem is unchanged. The problem is controlled (usually 110-120/70-80 mmHg at home). Associated symptoms include shortness of breath. Pertinent negatives include no anxiety, blurred vision, chest pain, headaches, malaise/fatigue, neck pain, orthopnea, palpitations, peripheral edema, PND or  sweats. There is no history of chronic renal disease.   Hyperlipidemia  This is a chronic problem. The current episode started more than 1 year ago. The problem is controlled. Recent lipid tests were reviewed and are normal. Exacerbating diseases include obesity. He has no history of chronic renal disease, diabetes, hypothyroidism, liver disease or nephrotic syndrome. Associated symptoms include shortness of breath. Pertinent negatives include no chest pain, focal sensory loss, focal weakness, leg pain or myalgias.     Review of Systems   Constitutional: Negative for chills, fatigue, fever, malaise/fatigue and unexpected weight change.   HENT:  Negative for nosebleeds and tinnitus.    Eyes:  Negative for blurred vision, double vision, vision loss in left eye and vision loss in right eye.   Cardiovascular:  Positive for dyspnea on exertion. Negative for chest pain, claudication, irregular heartbeat, leg swelling, near-syncope, orthopnea, palpitations, paroxysmal nocturnal dyspnea and syncope.   Respiratory:  Positive for shortness of breath. Negative for cough, hemoptysis and wheezing.    Endocrine: Negative for cold intolerance and heat intolerance.   Hematologic/Lymphatic: Negative for bleeding problem. Does not bruise/bleed easily.   Skin:  Negative for color change and rash.   Musculoskeletal:  Negative for back pain, falls, muscle weakness, myalgias and neck pain.   Gastrointestinal:  Negative for abdominal pain, diarrhea, dysphagia, heartburn, hematemesis, hematochezia, hemorrhoids, jaundice, melena, nausea and vomiting.   Genitourinary:  Negative for dysuria, hematuria and nocturia.   Neurological:  Negative for dizziness, focal weakness, headaches, light-headedness, loss of balance, numbness, tremors, vertigo and weakness.   Psychiatric/Behavioral:  Negative for altered mental status, depression and memory loss. The patient is not nervous/anxious.    Allergic/Immunologic: Negative for hives and persistent  "infections.       Current Outpatient Medications on File Prior to Visit   Medication Sig Dispense Refill    aspirin 81 MG Chew Take 81 mg by mouth once daily.      atorvastatin (LIPITOR) 80 MG tablet TAKE ONE-HALF TABLET BY MOUTH EVERY DAY 90 tablet 1    cetirizine (ZYRTEC) 10 MG tablet Take 10 mg by mouth daily as needed.      cholecalciferol, vitamin D3, (VITAMIN D3) 50 mcg (2,000 unit) Cap Take 1 capsule by mouth once daily.      clotrimazole-betamethasone 1-0.05% (LOTRISONE) cream APPLY TOPICALLY TO THE AFFECTED AREA TWICE DAILY AS NEEDED 15 g 0    cyanocobalamin 1,000 mcg/mL injection INJECT 1 ML IN THE MUSCLE EVERY 30 DAYS 10 mL 1    fluticasone propionate (FLONASE) 50 mcg/actuation nasal spray 2 sprays (100 mcg total) by Each Nostril route once daily. 16 g 3    furosemide (LASIX) 40 MG tablet Take 1 tablet (40 mg total) by mouth once daily. 120 tablet 3    ibuprofen (ADVIL,MOTRIN) 800 MG tablet TAKE 1 TABLET(800 MG) BY MOUTH TWICE DAILY AS NEEDED FOR PAIN 60 tablet 2    linaCLOtide (LINZESS) 145 mcg Cap capsule Take 1 capsule (145 mcg total) by mouth before breakfast. 30 capsule 11    losartan (COZAAR) 100 MG tablet TAKE 1 TABLET(100 MG) BY MOUTH EVERY DAY 90 tablet 1    metoprolol succinate (TOPROL-XL) 50 MG 24 hr tablet TAKE 1 TABLET(50 MG) BY MOUTH EVERY DAY 90 tablet 1    omeprazole 20 mg TbEC Take 1 tablet by mouth once daily.      spironolactone (ALDACTONE) 25 MG tablet Take 1 tablet (25 mg total) by mouth once daily. 90 tablet 3    dutasteride-tamsulosin 0.5-0.4 mg CM24 Take 1 capsule by mouth once daily. (Patient not taking: Reported on 10/10/2022) 30 capsule 11    syringe with needle 3 mL 23 x 1" Syrg 1 Syringe by Misc.(Non-Drug; Combo Route) route every 30 days. 50 Syringe 11     No current facility-administered medications on file prior to visit.       /62 (BP Location: Right arm, Patient Position: Sitting, BP Method: Large (Manual))   Pulse 85   Ht 6' (1.829 m)   Wt 117.2 kg (258 lb 6.1 " oz)   SpO2 98%   BMI 35.04 kg/m²       Objective:     Physical Exam  Constitutional:       General: He is not in acute distress.     Appearance: Normal appearance. He is well-developed. He is not toxic-appearing or diaphoretic.   HENT:      Head: Normocephalic and atraumatic.      Nose: Nose normal.   Eyes:      General:         Right eye: No discharge.         Left eye: No discharge.      Conjunctiva/sclera:      Right eye: Right conjunctiva is not injected.      Left eye: Left conjunctiva is not injected.      Pupils: Pupils are equal.      Right eye: Pupil is round.      Left eye: Pupil is round.   Neck:      Thyroid: No thyromegaly.      Vascular: No carotid bruit or JVD.   Cardiovascular:      Rate and Rhythm: Normal rate and regular rhythm. No extrasystoles are present.     Chest Wall: PMI is not displaced.      Pulses:           Radial pulses are 2+ on the right side and 2+ on the left side.        Femoral pulses are 2+ on the right side and 2+ on the left side.       Dorsalis pedis pulses are 2+ on the right side and 2+ on the left side.        Posterior tibial pulses are 2+ on the right side and 2+ on the left side.      Heart sounds: S1 normal. Murmur heard.   High-pitched blowing holosystolic murmur is present at the apex. P2 pronounced.     No gallop.   Pulmonary:      Effort: Pulmonary effort is normal.      Breath sounds: Examination of the right-lower field reveals rales. Examination of the left-lower field reveals rales. Rales present.   Abdominal:      Palpations: Abdomen is soft.      Tenderness: There is no abdominal tenderness.   Musculoskeletal:      Cervical back: Neck supple.      Right lower leg: Swelling present. 1+ Edema present.      Left lower leg: Swelling present. 1+ Edema present.   Lymphadenopathy:      Head:      Right side of head: No submandibular adenopathy.      Left side of head: No submandibular adenopathy.      Cervical: No cervical adenopathy.   Skin:     General: Skin is  warm and dry.      Findings: No rash.      Nails: There is no clubbing.   Neurological:      General: No focal deficit present.      Mental Status: He is alert and oriented to person, place, and time. He is not disoriented.      Cranial Nerves: No cranial nerve deficit.   Psychiatric:         Attention and Perception: Attention and perception normal.         Mood and Affect: Mood and affect normal.         Speech: Speech normal.         Behavior: Behavior normal.         Thought Content: Thought content normal.         Cognition and Memory: Cognition and memory normal.         Judgment: Judgment normal.       Assessment:     1. Heart failure, diastolic, acute on chronic    2. Nonrheumatic mitral valve regurgitation    3. Nonrheumatic tricuspid valve regurgitation    4. Coronary artery disease involving native coronary artery of native heart without angina pectoris    5. Primary hypertension    6. Hypercholesterolemia    7. Mild obesity    8. Lymphoma in remission    9. Thyroid nodule        Plan:     1. Heart Failure, Diastolic, Chronic   5/16/2022: Echo: Normal left ventricular size and systolic function. EF 55%. Moderate MR. Mild to moderate TR.   8/8/2022: Spironolactone 25 mg Q24 was begun and KCl 20 mEq Q24 was discontinued.   On metoprolol 50 mg Q24, losartan 100 mg Q24, spironolactone 25 mg Q24 and furosemide 40 mg Q24.   Appears well compensated.   May take additional furosemide 40 mg Q24 PRN for edema.   10/10/2022: Empagliflozin 10 mg to begin. BMP and BNP in 2 weeks.     2. Mitral Regurgitation   5/16/2022: Echo: Moderate MR.   As above.    3. Tricuspid Regurgitation   5/16/2022: Echo: Mild to moderate TR.    4. Coronary Artery Disease   3/2007: Underwent coronary angiogram. Appears to have had mild disease.    5. Hypertension   2002: Diagnosed.   On metoprolol 50 mg Q24, losartan 100 mg Q24, spironolactone 25 mg Q24 and furosemide 40 mg Q24.   Keeping log at home.   Appears to have been well  controlled.     6. Hypercholesterolemia   2002: Statin was begun.   On atorvastatin 40 mg Q24.    7. Mild Obesity   6/27/2022: Weight 117 kg. BMI 35.   Encouraged to lose weight.    8. 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    9. Primary Care   Dr. Herber Zamora.    F/u 1 month.    Ema Montemayor M.D.

## 2022-10-24 ENCOUNTER — LAB VISIT (OUTPATIENT)
Dept: LAB | Facility: OTHER | Age: 77
End: 2022-10-24
Attending: INTERNAL MEDICINE
Payer: MEDICARE

## 2022-10-24 DIAGNOSIS — I50.33 HEART FAILURE, DIASTOLIC, ACUTE ON CHRONIC: ICD-10-CM

## 2022-10-24 LAB
ANION GAP SERPL CALC-SCNC: 7 MMOL/L (ref 8–16)
BNP SERPL-MCNC: 69 PG/ML (ref 0–99)
BUN SERPL-MCNC: 19 MG/DL (ref 8–23)
CALCIUM SERPL-MCNC: 9.2 MG/DL (ref 8.7–10.5)
CHLORIDE SERPL-SCNC: 110 MMOL/L (ref 95–110)
CO2 SERPL-SCNC: 23 MMOL/L (ref 23–29)
CREAT SERPL-MCNC: 0.9 MG/DL (ref 0.5–1.4)
EST. GFR  (NO RACE VARIABLE): >60 ML/MIN/1.73 M^2
GLUCOSE SERPL-MCNC: 93 MG/DL (ref 70–110)
POTASSIUM SERPL-SCNC: 4.6 MMOL/L (ref 3.5–5.1)
SODIUM SERPL-SCNC: 140 MMOL/L (ref 136–145)

## 2022-10-24 PROCEDURE — 83880 ASSAY OF NATRIURETIC PEPTIDE: CPT | Performed by: INTERNAL MEDICINE

## 2022-10-24 PROCEDURE — 36415 COLL VENOUS BLD VENIPUNCTURE: CPT | Performed by: INTERNAL MEDICINE

## 2022-10-24 PROCEDURE — 80048 BASIC METABOLIC PNL TOTAL CA: CPT | Performed by: INTERNAL MEDICINE

## 2022-10-25 ENCOUNTER — TELEPHONE (OUTPATIENT)
Dept: CARDIOLOGY | Facility: CLINIC | Age: 77
End: 2022-10-25
Payer: MEDICARE

## 2022-10-25 NOTE — TELEPHONE ENCOUNTER
Patient notified and verbalized understanding.      ----- Message from Ema Montemayor MD sent at 10/24/2022  3:03 PM CDT -----  Laboratory data reviewed. All results acceptable.     Stay with plan.    Please call the patient and inform the patient about results.     Ema Montemayor M.D.

## 2022-11-22 ENCOUNTER — OFFICE VISIT (OUTPATIENT)
Dept: CARDIOLOGY | Facility: CLINIC | Age: 77
End: 2022-11-22
Attending: INTERNAL MEDICINE
Payer: MEDICARE

## 2022-11-22 VITALS
BODY MASS INDEX: 35.13 KG/M2 | HEART RATE: 66 BPM | HEIGHT: 72 IN | OXYGEN SATURATION: 99 % | DIASTOLIC BLOOD PRESSURE: 66 MMHG | SYSTOLIC BLOOD PRESSURE: 106 MMHG | WEIGHT: 259.38 LBS

## 2022-11-22 DIAGNOSIS — I25.10 CORONARY ARTERY DISEASE INVOLVING NATIVE CORONARY ARTERY OF NATIVE HEART WITHOUT ANGINA PECTORIS: ICD-10-CM

## 2022-11-22 DIAGNOSIS — I50.42 HEART FAILURE, SYSTOLIC AND DIASTOLIC, CHRONIC: ICD-10-CM

## 2022-11-22 DIAGNOSIS — I50.33 HEART FAILURE, DIASTOLIC, ACUTE ON CHRONIC: ICD-10-CM

## 2022-11-22 DIAGNOSIS — I10 ESSENTIAL HYPERTENSION, BENIGN: ICD-10-CM

## 2022-11-22 DIAGNOSIS — I34.0 NONRHEUMATIC MITRAL VALVE REGURGITATION: ICD-10-CM

## 2022-11-22 DIAGNOSIS — I10 HTN (HYPERTENSION), BENIGN: ICD-10-CM

## 2022-11-22 DIAGNOSIS — E78.00 HYPERCHOLESTEROLEMIA: ICD-10-CM

## 2022-11-22 DIAGNOSIS — I50.33 ACUTE ON CHRONIC DIASTOLIC CONGESTIVE HEART FAILURE: ICD-10-CM

## 2022-11-22 DIAGNOSIS — E66.9 MILD OBESITY: ICD-10-CM

## 2022-11-22 DIAGNOSIS — I10 PRIMARY HYPERTENSION: ICD-10-CM

## 2022-11-22 DIAGNOSIS — I36.1 NONRHEUMATIC TRICUSPID VALVE REGURGITATION: ICD-10-CM

## 2022-11-22 PROCEDURE — 99999 PR PBB SHADOW E&M-EST. PATIENT-LVL III: CPT | Mod: PBBFAC,,, | Performed by: INTERNAL MEDICINE

## 2022-11-22 PROCEDURE — 99999 PR PBB SHADOW E&M-EST. PATIENT-LVL III: ICD-10-PCS | Mod: PBBFAC,,, | Performed by: INTERNAL MEDICINE

## 2022-11-22 PROCEDURE — 99214 OFFICE O/P EST MOD 30 MIN: CPT | Mod: S$PBB,,, | Performed by: INTERNAL MEDICINE

## 2022-11-22 PROCEDURE — 99213 OFFICE O/P EST LOW 20 MIN: CPT | Mod: PBBFAC | Performed by: INTERNAL MEDICINE

## 2022-11-22 PROCEDURE — 99214 PR OFFICE/OUTPT VISIT, EST, LEVL IV, 30-39 MIN: ICD-10-PCS | Mod: S$PBB,,, | Performed by: INTERNAL MEDICINE

## 2022-11-22 RX ORDER — SPIRONOLACTONE 25 MG/1
25 TABLET ORAL DAILY
Qty: 90 TABLET | Refills: 3 | Status: SHIPPED | OUTPATIENT
Start: 2022-11-22 | End: 2023-11-01 | Stop reason: SDUPTHER

## 2022-11-22 RX ORDER — METOPROLOL SUCCINATE 50 MG/1
50 TABLET, EXTENDED RELEASE ORAL DAILY
Qty: 90 TABLET | Refills: 3 | Status: SHIPPED | OUTPATIENT
Start: 2022-11-22 | End: 2023-11-01

## 2022-11-22 RX ORDER — FUROSEMIDE 40 MG/1
40 TABLET ORAL DAILY
Qty: 120 TABLET | Refills: 3 | Status: SHIPPED | OUTPATIENT
Start: 2022-11-22 | End: 2023-11-01 | Stop reason: SDUPTHER

## 2022-11-22 RX ORDER — LOSARTAN POTASSIUM 100 MG/1
100 TABLET ORAL DAILY
Qty: 90 TABLET | Refills: 3 | Status: SHIPPED | OUTPATIENT
Start: 2022-11-22 | End: 2023-11-01 | Stop reason: SDUPTHER

## 2022-11-22 NOTE — PROGRESS NOTES
Subjective:     Sam Gamino is a 77 y.o. male with hypertension and hypercholesterolemia. He is mildly obese but used to be severely obese. 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 In 5/2022 he was admitted with heart failure. He was noted to have moderate to severe mitral regurgitation and severe pulmonary hypertension. He was diuresed. On 5/16/2022 he had an echocardiogram that revealed normal left ventricular size and systolic function. The ejection fraction was 55%. There was moderate mitral regurgitation. There was mild to moderate tricuspid regurgitation. He denies any exertional chest pain. In 9/2022 he was able to walk about two blocks before he had to stop due to dyspnea. He had mild edema of his legs. He was given empagliflozin and he is now breathing better able to walk farther and the edema has resolved. No orthopnea. No palpitations or weak spells. No issues with any of his prescribed medications. Feeling fair overall.         Congestive Heart Failure  Presents for follow-up visit. Pertinent negatives include no abdominal pain, chest pain, chest pressure, claudication, edema, fatigue, muscle weakness, near-syncope, nocturia, orthopnea, palpitations, paroxysmal nocturnal dyspnea, shortness of breath or unexpected weight change. The symptoms have been improving. His past medical history is significant for CAD.   Coronary Artery Disease  Presents for follow-up visit. Pertinent negatives include no chest pain, chest pressure, chest tightness, dizziness, leg swelling, muscle weakness, palpitations, shortness of breath or weight gain. Risk factors include hyperlipidemia and obesity. His past medical history is significant for CHF.   Hypertension  This is a chronic problem. The current episode started more than 1 year ago. The problem is unchanged. The problem is controlled (usually 110-120/70-80 mmHg at home). Pertinent negatives  include no anxiety, blurred vision, chest pain, headaches, malaise/fatigue, neck pain, orthopnea, palpitations, peripheral edema, PND, shortness of breath or sweats. There is no history of chronic renal disease.   Hyperlipidemia  This is a chronic problem. The current episode started more than 1 year ago. The problem is controlled. Recent lipid tests were reviewed and are normal. Exacerbating diseases include obesity. He has no history of chronic renal disease, diabetes, hypothyroidism, liver disease or nephrotic syndrome. Pertinent negatives include no chest pain, focal sensory loss, focal weakness, leg pain, myalgias or shortness of breath.     Review of Systems   Constitutional: Negative for chills, fatigue, fever, malaise/fatigue, unexpected weight change and weight gain.   HENT:  Negative for nosebleeds and tinnitus.    Eyes:  Negative for blurred vision, double vision, vision loss in left eye and vision loss in right eye.   Cardiovascular:  Negative for chest pain, claudication, dyspnea on exertion, irregular heartbeat, leg swelling, near-syncope, orthopnea, palpitations, paroxysmal nocturnal dyspnea and syncope.   Respiratory:  Negative for chest tightness, cough, hemoptysis, shortness of breath and wheezing.    Endocrine: Negative for cold intolerance and heat intolerance.   Hematologic/Lymphatic: Negative for bleeding problem. Does not bruise/bleed easily.   Skin:  Negative for color change and rash.   Musculoskeletal:  Negative for back pain, falls, muscle weakness, myalgias and neck pain.   Gastrointestinal:  Negative for abdominal pain, diarrhea, dysphagia, heartburn, hematemesis, hematochezia, hemorrhoids, jaundice, melena, nausea and vomiting.   Genitourinary:  Negative for dysuria, hematuria and nocturia.   Neurological:  Negative for dizziness, focal weakness, headaches, light-headedness, loss of balance, numbness, tremors, vertigo and weakness.   Psychiatric/Behavioral:  Negative for altered mental  "status, depression and memory loss. The patient is not nervous/anxious.    Allergic/Immunologic: Negative for hives and persistent infections.       Current Outpatient Medications on File Prior to Visit   Medication Sig Dispense Refill    aspirin 81 MG Chew Take 81 mg by mouth once daily.      atorvastatin (LIPITOR) 80 MG tablet TAKE ONE-HALF TABLET BY MOUTH EVERY DAY 90 tablet 1    cetirizine (ZYRTEC) 10 MG tablet Take 10 mg by mouth daily as needed.      cholecalciferol, vitamin D3, (VITAMIN D3) 50 mcg (2,000 unit) Cap Take 1 capsule by mouth once daily.      clotrimazole-betamethasone 1-0.05% (LOTRISONE) cream APPLY TOPICALLY TO THE AFFECTED AREA TWICE DAILY AS NEEDED 15 g 0    cyanocobalamin 1,000 mcg/mL injection INJECT 1 ML IN THE MUSCLE EVERY 30 DAYS 10 mL 1    empagliflozin (JARDIANCE) 10 mg tablet Take 1 tablet (10 mg total) by mouth once daily. 30 tablet 11    furosemide (LASIX) 40 MG tablet Take 1 tablet (40 mg total) by mouth once daily. 120 tablet 3    ibuprofen (ADVIL,MOTRIN) 800 MG tablet TAKE 1 TABLET(800 MG) BY MOUTH TWICE DAILY AS NEEDED FOR PAIN 60 tablet 2    linaCLOtide (LINZESS) 145 mcg Cap capsule Take 1 capsule (145 mcg total) by mouth before breakfast. 30 capsule 11    losartan (COZAAR) 100 MG tablet TAKE 1 TABLET(100 MG) BY MOUTH EVERY DAY 90 tablet 1    metoprolol succinate (TOPROL-XL) 50 MG 24 hr tablet TAKE 1 TABLET(50 MG) BY MOUTH EVERY DAY 90 tablet 1    omeprazole 20 mg TbEC Take 1 tablet by mouth once daily.      spironolactone (ALDACTONE) 25 MG tablet Take 1 tablet (25 mg total) by mouth once daily. 90 tablet 3    dutasteride-tamsulosin 0.5-0.4 mg CM24 Take 1 capsule by mouth once daily. (Patient not taking: Reported on 10/10/2022) 30 capsule 11    fluticasone propionate (FLONASE) 50 mcg/actuation nasal spray 2 sprays (100 mcg total) by Each Nostril route once daily. 16 g 3    syringe with needle 3 mL 23 x 1" Syrg 1 Syringe by Misc.(Non-Drug; Combo Route) route every 30 days. 50 " Syringe 11     No current facility-administered medications on file prior to visit.       /66 (BP Location: Right arm, Patient Position: Sitting, BP Method: Large (Manual))   Pulse 66   Ht 6' (1.829 m)   Wt 117.7 kg (259 lb 5.9 oz)   SpO2 99%   BMI 35.18 kg/m²       Objective:     Physical Exam  Constitutional:       General: He is not in acute distress.     Appearance: Normal appearance. He is well-developed. He is not toxic-appearing or diaphoretic.   HENT:      Head: Normocephalic and atraumatic.      Nose: Nose normal.   Eyes:      General:         Right eye: No discharge.         Left eye: No discharge.      Conjunctiva/sclera:      Right eye: Right conjunctiva is not injected.      Left eye: Left conjunctiva is not injected.      Pupils: Pupils are equal.      Right eye: Pupil is round.      Left eye: Pupil is round.   Neck:      Thyroid: No thyromegaly.      Vascular: No carotid bruit or JVD.   Cardiovascular:      Rate and Rhythm: Normal rate and regular rhythm. No extrasystoles are present.     Chest Wall: PMI is not displaced.      Pulses:           Radial pulses are 2+ on the right side and 2+ on the left side.        Femoral pulses are 2+ on the right side and 2+ on the left side.       Dorsalis pedis pulses are 2+ on the right side and 2+ on the left side.        Posterior tibial pulses are 2+ on the right side and 2+ on the left side.      Heart sounds: S1 normal and S2 normal. Murmur heard.   High-pitched blowing holosystolic murmur is present at the apex. P2 pronounced.     Gallop present. S3 and S4 sounds present.   Pulmonary:      Effort: Pulmonary effort is normal.      Breath sounds: No rales.   Abdominal:      Palpations: Abdomen is soft.      Tenderness: There is no abdominal tenderness.   Musculoskeletal:      Cervical back: Neck supple.      Right lower leg: No swelling. No edema.      Left lower leg: No swelling. No edema.   Lymphadenopathy:      Head:      Right side of head: No  submandibular adenopathy.      Left side of head: No submandibular adenopathy.      Cervical: No cervical adenopathy.   Skin:     General: Skin is warm and dry.      Findings: No rash.      Nails: There is no clubbing.   Neurological:      General: No focal deficit present.      Mental Status: He is alert and oriented to person, place, and time. He is not disoriented.      Cranial Nerves: No cranial nerve deficit.   Psychiatric:         Attention and Perception: Attention and perception normal.         Mood and Affect: Mood and affect normal.         Speech: Speech normal.         Behavior: Behavior normal.         Thought Content: Thought content normal.         Cognition and Memory: Cognition and memory normal.         Judgment: Judgment normal.       Assessment:     1. Heart failure, systolic and diastolic, chronic    2. Nonrheumatic mitral valve regurgitation    3. Nonrheumatic tricuspid valve regurgitation    4. Coronary artery disease involving native coronary artery of native heart without angina pectoris    5. Primary hypertension    6. Hypercholesterolemia    7. Mild obesity        Plan:     1. Heart Failure, Diastolic, Chronic   5/16/2022: Echo: Normal left ventricular size and systolic function. EF 55%. Moderate MR. Mild to moderate TR.   8/8/2022: Spironolactone 25 mg Q24 was begun and KCl 20 mEq Q24 was discontinued.   10/10/2022: Empagliflozin 10 mg Q24 was begun.   On metoprolol 50 mg Q24, empagliflozin 10 mg Q24, losartan 100 mg Q24, spironolactone 25 mg Q24 and furosemide 40 mg Q24.   May take additional furosemide 40 mg Q24 PRN for edema which he has not needed.   10/27/2022: BNP 69.   Appears well compensated.      2. Mitral Regurgitation   5/16/2022: Echo: Moderate MR.   As above.    3. Tricuspid Regurgitation   5/16/2022: Echo: Mild to moderate TR.    4. Coronary Artery Disease   3/2007: Underwent coronary angiogram. Appears to have had mild disease.    5. Hypertension   2002: Diagnosed.   On  metoprolol 50 mg Q24, losartan 100 mg Q24, spironolactone 25 mg Q24 and furosemide 40 mg Q24.   Keeping log at home.   Well controlled.     6. Hypercholesterolemia   2002: Statin was begun.   On atorvastatin 40 mg Q24.    7. Mild Obesity   6/27/2022: Weight 117 kg. BMI 35.   Encouraged to lose weight.    8. 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    9. Primary Care   Dr. Herber Zamora.    F/u 3 months.    Ema Montemayor M.D.

## 2023-01-23 ENCOUNTER — OFFICE VISIT (OUTPATIENT)
Dept: INTERNAL MEDICINE | Facility: CLINIC | Age: 78
End: 2023-01-23
Attending: INTERNAL MEDICINE
Payer: MEDICARE

## 2023-01-23 VITALS
OXYGEN SATURATION: 99 % | DIASTOLIC BLOOD PRESSURE: 56 MMHG | SYSTOLIC BLOOD PRESSURE: 98 MMHG | BODY MASS INDEX: 35.08 KG/M2 | HEIGHT: 72 IN | HEART RATE: 98 BPM | WEIGHT: 259 LBS

## 2023-01-23 DIAGNOSIS — K59.01 SLOW TRANSIT CONSTIPATION: ICD-10-CM

## 2023-01-23 DIAGNOSIS — E66.9 MILD OBESITY: Primary | ICD-10-CM

## 2023-01-23 DIAGNOSIS — I34.0 NONRHEUMATIC MITRAL VALVE REGURGITATION: ICD-10-CM

## 2023-01-23 DIAGNOSIS — I10 PRIMARY HYPERTENSION: ICD-10-CM

## 2023-01-23 DIAGNOSIS — E78.00 HYPERCHOLESTEROLEMIA: ICD-10-CM

## 2023-01-23 DIAGNOSIS — R14.0 ABDOMINAL DISTENSION: ICD-10-CM

## 2023-01-23 PROCEDURE — 99214 PR OFFICE/OUTPT VISIT, EST, LEVL IV, 30-39 MIN: ICD-10-PCS | Mod: S$GLB,,, | Performed by: INTERNAL MEDICINE

## 2023-01-23 PROCEDURE — 99214 OFFICE O/P EST MOD 30 MIN: CPT | Mod: S$GLB,,, | Performed by: INTERNAL MEDICINE

## 2023-01-23 NOTE — PROGRESS NOTES
Subjective:       Patient ID: Sam Gamino is a 77 y.o. male.    Chief Complaint: Abdominal Pain (Concerned with prostate (urinating regularly w/ strong stream)) and Groin Pain    Every few weeks he gets abdominal bloating for a day or 2, then it gets better.  He is not sure if it is related to constipation, he is not sure if improves after bowel movements.  He takes Linzess intermittently.    Abdominal Pain  This is a recurrent problem. The current episode started more than 1 month ago. The onset quality is gradual. The problem occurs intermittently. Associated symptoms include constipation.   Groin Pain  Associated symptoms include constipation.   Hypertension  This is a chronic problem. The current episode started more than 1 year ago. The problem is controlled.   Review of Systems   Constitutional: Negative.    Respiratory: Negative.     Cardiovascular: Negative.    Gastrointestinal:  Positive for abdominal distention and constipation.       Objective:      Physical Exam  Vitals and nursing note reviewed.   Constitutional:       Appearance: He is well-developed.   HENT:      Head: Normocephalic and atraumatic.   Eyes:      Pupils: Pupils are equal, round, and reactive to light.   Cardiovascular:      Rate and Rhythm: Normal rate and regular rhythm.      Heart sounds: Murmur heard.   Systolic murmur is present with a grade of 2/6.   Pulmonary:      Effort: Pulmonary effort is normal.   Abdominal:      General: Abdomen is protuberant. Bowel sounds are normal.      Palpations: Abdomen is soft.      Tenderness: There is no rebound.   Musculoskeletal:      Right lower le+ Edema present.      Left lower le+ Edema present.   Neurological:      Mental Status: He is alert.       Assessment:       Problem List Items Addressed This Visit          Unprioritized    Mild obesity - Primary    Slow transit constipation    Mitral regurgitation    Primary hypertension    Hypercholesterolemia     Other Visit Diagnoses        Abdominal distension                  Plan:       Per orders and D/C instructions.  Continue diet and/or meds for mitral valve regurgitation, obesity, HTN, and high cholesterol, which are stable.    He will take Linzess 145 mg daily for abdominal distension.  If it does not improve in a few weeks he will follow-up with GI.  Return in 2 months with routine labs.

## 2023-02-22 ENCOUNTER — OFFICE VISIT (OUTPATIENT)
Dept: CARDIOLOGY | Facility: CLINIC | Age: 78
End: 2023-02-22
Attending: INTERNAL MEDICINE
Payer: MEDICARE

## 2023-02-22 VITALS
DIASTOLIC BLOOD PRESSURE: 58 MMHG | BODY MASS INDEX: 35.21 KG/M2 | HEIGHT: 72 IN | HEART RATE: 92 BPM | WEIGHT: 260 LBS | RESPIRATION RATE: 97 BRPM | SYSTOLIC BLOOD PRESSURE: 110 MMHG

## 2023-02-22 DIAGNOSIS — I34.0 NONRHEUMATIC MITRAL VALVE REGURGITATION: ICD-10-CM

## 2023-02-22 DIAGNOSIS — I10 PRIMARY HYPERTENSION: ICD-10-CM

## 2023-02-22 DIAGNOSIS — I50.42 HEART FAILURE, SYSTOLIC AND DIASTOLIC, CHRONIC: ICD-10-CM

## 2023-02-22 DIAGNOSIS — I25.10 CORONARY ARTERY DISEASE INVOLVING NATIVE CORONARY ARTERY OF NATIVE HEART WITHOUT ANGINA PECTORIS: ICD-10-CM

## 2023-02-22 DIAGNOSIS — C85.90 LYMPHOMA IN REMISSION: ICD-10-CM

## 2023-02-22 DIAGNOSIS — I36.1 NONRHEUMATIC TRICUSPID VALVE REGURGITATION: ICD-10-CM

## 2023-02-22 DIAGNOSIS — E78.00 ELEVATED CHOLESTEROL: ICD-10-CM

## 2023-02-22 DIAGNOSIS — E66.01 SEVERE OBESITY: ICD-10-CM

## 2023-02-22 DIAGNOSIS — E78.00 HYPERCHOLESTEROLEMIA: ICD-10-CM

## 2023-02-22 PROCEDURE — 99214 OFFICE O/P EST MOD 30 MIN: CPT | Mod: S$PBB,,, | Performed by: INTERNAL MEDICINE

## 2023-02-22 PROCEDURE — 99214 PR OFFICE/OUTPT VISIT, EST, LEVL IV, 30-39 MIN: ICD-10-PCS | Mod: S$PBB,,, | Performed by: INTERNAL MEDICINE

## 2023-02-22 PROCEDURE — 99213 OFFICE O/P EST LOW 20 MIN: CPT | Mod: PBBFAC | Performed by: INTERNAL MEDICINE

## 2023-02-22 PROCEDURE — 99999 PR PBB SHADOW E&M-EST. PATIENT-LVL III: ICD-10-PCS | Mod: PBBFAC,,, | Performed by: INTERNAL MEDICINE

## 2023-02-22 PROCEDURE — 99999 PR PBB SHADOW E&M-EST. PATIENT-LVL III: CPT | Mod: PBBFAC,,, | Performed by: INTERNAL MEDICINE

## 2023-02-22 RX ORDER — ATORVASTATIN CALCIUM 80 MG/1
40 TABLET, FILM COATED ORAL DAILY
Qty: 90 TABLET | Refills: 3 | Status: SHIPPED | OUTPATIENT
Start: 2023-02-22 | End: 2023-07-06

## 2023-02-22 NOTE — PROGRESS NOTES
Subjective:     Sam Gamino is a 77 y.o. male with hypertension and hypercholesterolemia. He is mildly to severely obese. He had lymphoma in 1992. He underwent an autologous bone marrow transplantation at [facility]. In 5/2022 he was admitted with heart failure. He was noted to have moderate to severe mitral regurgitation and severe pulmonary hypertension. He was diuresed. On 5/16/2022 he had an echocardiogram that revealed normal left ventricular size and systolic function. The ejection fraction was 55%. There was moderate mitral regurgitation. There was mild to moderate tricuspid regurgitation. He denies any exertional chest pain. In 9/2022 he was able to walk about two blocks before he had to stop due to dyspnea. He had mild edema of his legs. He was given empagliflozin and he was breathing better able to walk farther. The edema resolved. No orthopnea. No palpitations or weak spells. No issues with any of his prescribed medications. Feeling fair overall.         Congestive Heart Failure  Presents for follow-up visit. Pertinent negatives include no abdominal pain, chest pain, chest pressure, claudication, edema, fatigue, muscle weakness, near-syncope, nocturia, orthopnea, palpitations, paroxysmal nocturnal dyspnea, shortness of breath or unexpected weight change. The symptoms have been improving. His past medical history is significant for CAD.   Coronary Artery Disease  Presents for follow-up visit. Pertinent negatives include no chest pain, chest pressure, chest tightness, dizziness, leg swelling, muscle weakness, palpitations, shortness of breath or weight gain. Risk factors include hyperlipidemia and obesity. His past medical history is significant for CHF.   Hypertension  This is a chronic problem. The current episode started more than 1 year ago. The problem is unchanged. The problem is controlled (usually 110-120/70-80 mmHg at home). Pertinent negatives include no anxiety, blurred  vision, chest pain, headaches, malaise/fatigue, neck pain, orthopnea, palpitations, peripheral edema, PND, shortness of breath or sweats. There is no history of chronic renal disease.   Hyperlipidemia  This is a chronic problem. The current episode started more than 1 year ago. The problem is controlled. Recent lipid tests were reviewed and are normal. Exacerbating diseases include obesity. He has no history of chronic renal disease, diabetes, hypothyroidism, liver disease or nephrotic syndrome. Pertinent negatives include no chest pain, focal sensory loss, focal weakness, leg pain, myalgias or shortness of breath.     Review of Systems   Constitutional: Negative for chills, fatigue, fever, malaise/fatigue, unexpected weight change and weight gain.   HENT:  Negative for nosebleeds and tinnitus.    Eyes:  Negative for blurred vision, double vision, vision loss in left eye and vision loss in right eye.   Cardiovascular:  Negative for chest pain, claudication, dyspnea on exertion, irregular heartbeat, leg swelling, near-syncope, orthopnea, palpitations, paroxysmal nocturnal dyspnea and syncope.   Respiratory:  Negative for chest tightness, cough, hemoptysis, shortness of breath and wheezing.    Endocrine: Negative for cold intolerance and heat intolerance.   Hematologic/Lymphatic: Negative for bleeding problem. Does not bruise/bleed easily.   Skin:  Negative for color change and rash.   Musculoskeletal:  Negative for back pain, falls, muscle weakness, myalgias and neck pain.   Gastrointestinal:  Negative for abdominal pain, diarrhea, dysphagia, heartburn, hematemesis, hematochezia, hemorrhoids, jaundice, melena, nausea and vomiting.   Genitourinary:  Negative for dysuria, hematuria and nocturia.   Neurological:  Negative for dizziness, focal weakness, headaches, light-headedness, loss of balance, numbness, tremors, vertigo and weakness.   Psychiatric/Behavioral:  Negative for altered mental status, depression and  "memory loss. The patient is not nervous/anxious.    Allergic/Immunologic: Negative for hives and persistent infections.       Current Outpatient Medications on File Prior to Visit   Medication Sig Dispense Refill    aspirin 81 MG Chew Take 81 mg by mouth once daily.      atorvastatin (LIPITOR) 80 MG tablet TAKE ONE-HALF TABLET BY MOUTH EVERY DAY 90 tablet 1    cetirizine (ZYRTEC) 10 MG tablet Take 10 mg by mouth daily as needed.      cholecalciferol, vitamin D3, (VITAMIN D3) 50 mcg (2,000 unit) Cap Take 1 capsule by mouth once daily.      cyanocobalamin 1,000 mcg/mL injection INJECT 1 ML IN THE MUSCLE EVERY 30 DAYS 10 mL 1    empagliflozin (JARDIANCE) 10 mg tablet Take 1 tablet (10 mg total) by mouth once daily. 30 tablet 11    fluticasone propionate (FLONASE) 50 mcg/actuation nasal spray SHAKE LIQUID AND USE 2 SPRAYS(100 MCG) IN EACH NOSTRIL EVERY DAY 16 g 3    furosemide (LASIX) 40 MG tablet Take 1 tablet (40 mg total) by mouth once daily. 120 tablet 3    linaCLOtide (LINZESS) 145 mcg Cap capsule Take 1 capsule (145 mcg total) by mouth before breakfast. 30 capsule 11    losartan (COZAAR) 100 MG tablet Take 1 tablet (100 mg total) by mouth once daily. 90 tablet 3    metoprolol succinate (TOPROL-XL) 50 MG 24 hr tablet Take 1 tablet (50 mg total) by mouth once daily. 90 tablet 3    omeprazole 20 mg TbEC Take 1 tablet by mouth once daily.      spironolactone (ALDACTONE) 25 MG tablet Take 1 tablet (25 mg total) by mouth once daily. 90 tablet 3    syringe with needle 3 mL 23 x 1" Syrg 1 Syringe by Misc.(Non-Drug; Combo Route) route every 30 days. 50 Syringe 11     No current facility-administered medications on file prior to visit.       BP (!) 110/58 (BP Location: Right arm, Patient Position: Sitting, BP Method: Large (Manual))   Pulse 92   Resp (!) 97   Ht 6' (1.829 m)   Wt 117.9 kg (260 lb)   BMI 35.26 kg/m²       Objective:     Physical Exam  Constitutional:       General: He is not in acute distress.     " Appearance: Normal appearance. He is well-developed. He is not toxic-appearing or diaphoretic.   HENT:      Head: Normocephalic and atraumatic.      Nose: Nose normal.   Eyes:      General:         Right eye: No discharge.         Left eye: No discharge.      Conjunctiva/sclera:      Right eye: Right conjunctiva is not injected.      Left eye: Left conjunctiva is not injected.      Pupils: Pupils are equal.      Right eye: Pupil is round.      Left eye: Pupil is round.   Neck:      Thyroid: No thyromegaly.      Vascular: No carotid bruit or JVD.   Cardiovascular:      Rate and Rhythm: Normal rate and regular rhythm. No extrasystoles are present.     Chest Wall: PMI is not displaced.      Pulses:           Radial pulses are 2+ on the right side and 2+ on the left side.        Femoral pulses are 2+ on the right side and 2+ on the left side.       Dorsalis pedis pulses are 2+ on the right side and 2+ on the left side.        Posterior tibial pulses are 2+ on the right side and 2+ on the left side.      Heart sounds: S1 normal and S2 normal. Murmur heard.   High-pitched blowing holosystolic murmur is present at the apex. P2 pronounced.     Gallop present. S3 and S4 sounds present.   Pulmonary:      Effort: Pulmonary effort is normal.      Breath sounds: No rales.   Abdominal:      Palpations: Abdomen is soft.      Tenderness: There is no abdominal tenderness.   Musculoskeletal:      Cervical back: Neck supple.      Right lower leg: No swelling. No edema.      Left lower leg: No swelling. No edema.   Lymphadenopathy:      Head:      Right side of head: No submandibular adenopathy.      Left side of head: No submandibular adenopathy.      Cervical: No cervical adenopathy.   Skin:     General: Skin is warm and dry.      Findings: No rash.      Nails: There is no clubbing.   Neurological:      General: No focal deficit present.      Mental Status: He is alert and oriented to person, place, and time. He is not disoriented.       Cranial Nerves: No cranial nerve deficit.   Psychiatric:         Attention and Perception: Attention and perception normal.         Mood and Affect: Mood and affect normal.         Speech: Speech normal.         Behavior: Behavior normal.         Thought Content: Thought content normal.         Cognition and Memory: Cognition and memory normal.         Judgment: Judgment normal.       Assessment:     1. Heart failure, systolic and diastolic, chronic    2. Nonrheumatic mitral valve regurgitation    3. Nonrheumatic tricuspid valve regurgitation    4. Coronary artery disease involving native coronary artery of native heart without angina pectoris    5. Primary hypertension    6. Hypercholesterolemia    7. Severe obesity    8. Lymphoma in remission        Plan:     1. Heart Failure, Diastolic, Chronic   5/16/2022: Echo: Normal left ventricular size and systolic function. EF 55%. Moderate MR. Mild to moderate TR.   8/8/2022: Spironolactone 25 mg Q24 was begun and KCl 20 mEq Q24 was discontinued.   10/10/2022: Empagliflozin 10 mg Q24 was begun.   10/27/2022: BNP 69.   On metoprolol 50 mg Q24, empagliflozin 10 mg Q24, losartan 100 mg Q24, spironolactone 25 mg Q24 and furosemide 40 mg Q24.   May take additional furosemide 40 mg Q24 PRN for edema which he has not needed.   Appears well compensated.   5/2023: Plan next Echo.      2. Mitral Regurgitation   5/16/2022: Echo: Moderate MR.   As above.    3. Tricuspid Regurgitation   5/16/2022: Echo: Mild to moderate TR.    4. Coronary Artery Disease   3/2007: Underwent coronary angiogram. Appears to have had mild disease.    5. Hypertension   2002: Diagnosed.   On metoprolol 50 mg Q24, losartan 100 mg Q24, spironolactone 25 mg Q24 and furosemide 40 mg Q24.   Keeping log at home.   Well controlled.     6. Hypercholesterolemia   2002: Statin was begun.   On atorvastatin 40 mg Q24.   4/12/2022: Chol 132. HDL 41. LDL 71. .   On atorvastatin 40 mg Q24.   Very favorable lipid  panel.    7. Mild Obesity   6/27/2022: Weight 117 kg. BMI 35.   Encouraged to lose weight.    8. 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    9. Primary Care   Dr. Herber Zamora.    F/u 4 months.    Ema Montemayor M.D.      6/6/2023 5:47 PM, Addendum:    6/6/2023: Echo: Normal left ventricular size and systolic function. EF 60%. LVGLS -15%. Moderate diastolic dysfunction. Mild to moderate AR. Mild to moderate MR. Mildly enlarged ascending aorta. Small pericardial effusion.    I discussed the above test result and the implications of the findings over the phone.    Ema Montemayor M.D.

## 2023-03-07 ENCOUNTER — OFFICE VISIT (OUTPATIENT)
Dept: INTERNAL MEDICINE | Facility: CLINIC | Age: 78
End: 2023-03-07
Attending: INTERNAL MEDICINE
Payer: MEDICARE

## 2023-03-07 VITALS
BODY MASS INDEX: 35.08 KG/M2 | DIASTOLIC BLOOD PRESSURE: 68 MMHG | SYSTOLIC BLOOD PRESSURE: 122 MMHG | HEIGHT: 72 IN | OXYGEN SATURATION: 98 % | WEIGHT: 259 LBS | HEART RATE: 101 BPM

## 2023-03-07 DIAGNOSIS — Z12.5 SCREENING FOR PROSTATE CANCER: ICD-10-CM

## 2023-03-07 DIAGNOSIS — E03.9 HYPOTHYROIDISM, UNSPECIFIED TYPE: ICD-10-CM

## 2023-03-07 DIAGNOSIS — I50.42 HEART FAILURE, SYSTOLIC AND DIASTOLIC, CHRONIC: Primary | ICD-10-CM

## 2023-03-07 DIAGNOSIS — Z13.39 SCREENING FOR ALCOHOLISM: ICD-10-CM

## 2023-03-07 DIAGNOSIS — I10 PRIMARY HYPERTENSION: ICD-10-CM

## 2023-03-07 DIAGNOSIS — E66.9 OBESITY (BMI 30-39.9): ICD-10-CM

## 2023-03-07 DIAGNOSIS — I34.0 NONRHEUMATIC MITRAL VALVE REGURGITATION: ICD-10-CM

## 2023-03-07 DIAGNOSIS — R79.89 OTHER SPECIFIED ABNORMAL FINDINGS OF BLOOD CHEMISTRY: ICD-10-CM

## 2023-03-07 DIAGNOSIS — D50.8 OTHER IRON DEFICIENCY ANEMIA: ICD-10-CM

## 2023-03-07 DIAGNOSIS — K59.01 SLOW TRANSIT CONSTIPATION: Primary | ICD-10-CM

## 2023-03-07 DIAGNOSIS — R35.0 BENIGN PROSTATIC HYPERPLASIA WITH URINARY FREQUENCY: ICD-10-CM

## 2023-03-07 DIAGNOSIS — E78.00 HYPERCHOLESTEROLEMIA: ICD-10-CM

## 2023-03-07 DIAGNOSIS — D51.0 PERNICIOUS ANEMIA: ICD-10-CM

## 2023-03-07 DIAGNOSIS — I50.42 HEART FAILURE, SYSTOLIC AND DIASTOLIC, CHRONIC: ICD-10-CM

## 2023-03-07 DIAGNOSIS — E55.9 VITAMIN D DEFICIENCY: ICD-10-CM

## 2023-03-07 DIAGNOSIS — I36.1 NONRHEUMATIC TRICUSPID VALVE REGURGITATION: ICD-10-CM

## 2023-03-07 DIAGNOSIS — Z13.31 SCREENING FOR DEPRESSION: ICD-10-CM

## 2023-03-07 DIAGNOSIS — Z13.89 SCREENING FOR OBESITY: ICD-10-CM

## 2023-03-07 DIAGNOSIS — E78.9 DISORDER OF LIPID METABOLISM: ICD-10-CM

## 2023-03-07 DIAGNOSIS — N40.1 BENIGN PROSTATIC HYPERPLASIA WITH URINARY FREQUENCY: ICD-10-CM

## 2023-03-07 PROCEDURE — G0444 DEPRESSION SCREEN ANNUAL: HCPCS | Mod: 59,S$GLB,, | Performed by: INTERNAL MEDICINE

## 2023-03-07 PROCEDURE — G0444 PR DEPRESSION SCREENING: ICD-10-PCS | Mod: 59,S$GLB,, | Performed by: INTERNAL MEDICINE

## 2023-03-07 PROCEDURE — G0447 BEHAVIOR COUNSEL OBESITY 15M: HCPCS | Mod: 59,S$GLB,, | Performed by: INTERNAL MEDICINE

## 2023-03-07 PROCEDURE — G0447 PR OBESITY COUNSELING: ICD-10-PCS | Mod: 59,S$GLB,, | Performed by: INTERNAL MEDICINE

## 2023-03-07 PROCEDURE — G0442 ANNUAL ALCOHOL SCREEN 15 MIN: HCPCS | Mod: 59,S$GLB,, | Performed by: INTERNAL MEDICINE

## 2023-03-07 PROCEDURE — 99214 PR OFFICE/OUTPT VISIT, EST, LEVL IV, 30-39 MIN: ICD-10-PCS | Mod: S$GLB,,, | Performed by: INTERNAL MEDICINE

## 2023-03-07 PROCEDURE — G0442 PR  ALCOHOL SCREENING: ICD-10-PCS | Mod: 59,S$GLB,, | Performed by: INTERNAL MEDICINE

## 2023-03-07 PROCEDURE — 99214 OFFICE O/P EST MOD 30 MIN: CPT | Mod: S$GLB,,, | Performed by: INTERNAL MEDICINE

## 2023-03-07 NOTE — PROGRESS NOTES
Subjective:       Patient ID: Sam Gamino is a 77 y.o. male.    Chief Complaint: Follow-up (Annual Due April 13)    He does still suffer with constipation.  He takes Linzess 145 mg as needed with partial relief.  He also takes an over-the-counter medication for constipation.    Follow-up    Hypertension  This is a chronic problem. The current episode started more than 1 year ago. The problem is controlled.   Review of Systems   Constitutional: Negative.    Respiratory: Negative.     Cardiovascular: Negative.    Gastrointestinal:  Positive for constipation.       Objective:      Physical Exam  Vitals and nursing note reviewed.   Constitutional:       Appearance: He is well-developed.   HENT:      Head: Normocephalic and atraumatic.   Eyes:      Pupils: Pupils are equal, round, and reactive to light.   Cardiovascular:      Rate and Rhythm: Normal rate and regular rhythm.      Heart sounds: Heart sounds are distant. Murmur heard.   Systolic murmur is present with a grade of 1/6.      Comments: Trace edema bilaterally  Pulmonary:      Effort: Pulmonary effort is normal.   Abdominal:      General: Abdomen is protuberant. Bowel sounds are normal.      Palpations: Abdomen is soft.      Tenderness: There is no rebound.   Musculoskeletal:      Right lower leg: Edema present.      Left lower leg: Edema present.   Neurological:      Mental Status: He is alert.       Assessment:       Problem List Items Addressed This Visit          Unprioritized    Obesity (BMI 30-39.9)    Slow transit constipation - Primary    Relevant Medications    linaCLOtide (LINZESS) 290 mcg Cap capsule    Benign prostatic hyperplasia with urinary frequency    Mitral regurgitation    Tricuspid regurgitation    Primary hypertension    Heart failure, systolic and diastolic, chronic    Hypercholesterolemia     Other Visit Diagnoses       Screening for depression        Screening for alcoholism        Screening for obesity                  Plan:        Per orders and D/C instructions.  Continue diet and/or meds for obesity, BPH, HTN, and high cholesterol, which are stable.    Increase to Linzess 290 mg daily for constipation.  Follow-up with cardiology for heart failure and mitral valve regurgitation.  Check routine labs.    Screening: The patient was screened for depression with the PHQ2 questionnaire and possible health consequences were discussed with the patient, who understands (15 minutes spent).       The patient was screened for the misuse of alcohol, by asking the number of drinks per average week, and if pt has had more than 4 drinks (more than 3 for women and elderly) in 1 day within the past year. The health and legal consequences of misuse were discussed (15 minutes spent).       The patient was screened for obesity (BMI>30), Since the current BMI is > 30, the possible consequences of obesity, as well as the benefits of diet, exercise, and weight loss were discussed. Any behavioral risks were identified, and methods to achieve appropriate treatment goals were discussed (15 minutes spent).

## 2023-03-28 DIAGNOSIS — E53.8 B12 DEFICIENCY: ICD-10-CM

## 2023-03-28 RX ORDER — CYANOCOBALAMIN 1000 UG/ML
INJECTION, SOLUTION INTRAMUSCULAR; SUBCUTANEOUS
Qty: 10 ML | Refills: 1 | OUTPATIENT
Start: 2023-03-28

## 2023-03-29 ENCOUNTER — LAB VISIT (OUTPATIENT)
Dept: LAB | Facility: OTHER | Age: 78
End: 2023-03-29
Attending: INTERNAL MEDICINE
Payer: MEDICARE

## 2023-03-29 DIAGNOSIS — I50.42 HEART FAILURE, SYSTOLIC AND DIASTOLIC, CHRONIC: ICD-10-CM

## 2023-03-29 DIAGNOSIS — D51.0 PERNICIOUS ANEMIA: ICD-10-CM

## 2023-03-29 DIAGNOSIS — R79.89 OTHER SPECIFIED ABNORMAL FINDINGS OF BLOOD CHEMISTRY: ICD-10-CM

## 2023-03-29 DIAGNOSIS — D50.8 OTHER IRON DEFICIENCY ANEMIA: ICD-10-CM

## 2023-03-29 DIAGNOSIS — E03.9 HYPOTHYROIDISM, UNSPECIFIED TYPE: ICD-10-CM

## 2023-03-29 DIAGNOSIS — Z12.5 SCREENING FOR PROSTATE CANCER: ICD-10-CM

## 2023-03-29 DIAGNOSIS — E55.9 VITAMIN D DEFICIENCY: ICD-10-CM

## 2023-03-29 DIAGNOSIS — E78.9 DISORDER OF LIPID METABOLISM: ICD-10-CM

## 2023-03-29 LAB
25(OH)D3+25(OH)D2 SERPL-MCNC: 64 NG/ML (ref 30–96)
ALBUMIN SERPL BCP-MCNC: 3.6 G/DL (ref 3.5–5.2)
ALP SERPL-CCNC: 61 U/L (ref 55–135)
ALT SERPL W/O P-5'-P-CCNC: 23 U/L (ref 10–44)
ANION GAP SERPL CALC-SCNC: 5 MMOL/L (ref 8–16)
AST SERPL-CCNC: 24 U/L (ref 10–40)
BASOPHILS # BLD AUTO: 0.03 K/UL (ref 0–0.2)
BASOPHILS NFR BLD: 0.5 % (ref 0–1.9)
BILIRUB SERPL-MCNC: 1 MG/DL (ref 0.1–1)
BNP SERPL-MCNC: 100 PG/ML (ref 0–99)
BUN SERPL-MCNC: 27 MG/DL (ref 8–23)
CALCIUM SERPL-MCNC: 9.1 MG/DL (ref 8.7–10.5)
CHLORIDE SERPL-SCNC: 112 MMOL/L (ref 95–110)
CHOLEST SERPL-MCNC: 107 MG/DL (ref 120–199)
CHOLEST/HDLC SERPL: 2.9 {RATIO} (ref 2–5)
CO2 SERPL-SCNC: 23 MMOL/L (ref 23–29)
COMPLEXED PSA SERPL-MCNC: 0.08 NG/ML (ref 0–4)
CREAT SERPL-MCNC: 1 MG/DL (ref 0.5–1.4)
DIFFERENTIAL METHOD: ABNORMAL
EOSINOPHIL # BLD AUTO: 0.1 K/UL (ref 0–0.5)
EOSINOPHIL NFR BLD: 2.2 % (ref 0–8)
ERYTHROCYTE [DISTWIDTH] IN BLOOD BY AUTOMATED COUNT: 11.9 % (ref 11.5–14.5)
EST. GFR  (NO RACE VARIABLE): >60 ML/MIN/1.73 M^2
ESTIMATED AVG GLUCOSE: 85 MG/DL (ref 68–131)
GLUCOSE SERPL-MCNC: 106 MG/DL (ref 70–110)
HBA1C MFR BLD: 4.6 % (ref 4–5.6)
HCT VFR BLD AUTO: 39.1 % (ref 40–54)
HDLC SERPL-MCNC: 37 MG/DL (ref 40–75)
HDLC SERPL: 34.6 % (ref 20–50)
HGB BLD-MCNC: 12.5 G/DL (ref 14–18)
IMM GRANULOCYTES # BLD AUTO: 0.02 K/UL (ref 0–0.04)
IMM GRANULOCYTES NFR BLD AUTO: 0.4 % (ref 0–0.5)
LDLC SERPL CALC-MCNC: 56.8 MG/DL (ref 63–159)
LYMPHOCYTES # BLD AUTO: 1.5 K/UL (ref 1–4.8)
LYMPHOCYTES NFR BLD: 27.8 % (ref 18–48)
MCH RBC QN AUTO: 34 PG (ref 27–31)
MCHC RBC AUTO-ENTMCNC: 32 G/DL (ref 32–36)
MCV RBC AUTO: 106 FL (ref 82–98)
MONOCYTES # BLD AUTO: 0.4 K/UL (ref 0.3–1)
MONOCYTES NFR BLD: 6.9 % (ref 4–15)
NEUTROPHILS # BLD AUTO: 3.4 K/UL (ref 1.8–7.7)
NEUTROPHILS NFR BLD: 62.2 % (ref 38–73)
NONHDLC SERPL-MCNC: 70 MG/DL
NRBC BLD-RTO: 0 /100 WBC
PLATELET # BLD AUTO: 178 K/UL (ref 150–450)
PMV BLD AUTO: 10.1 FL (ref 9.2–12.9)
POTASSIUM SERPL-SCNC: 3.9 MMOL/L (ref 3.5–5.1)
PROT SERPL-MCNC: 7.1 G/DL (ref 6–8.4)
RBC # BLD AUTO: 3.68 M/UL (ref 4.6–6.2)
SODIUM SERPL-SCNC: 140 MMOL/L (ref 136–145)
TRIGL SERPL-MCNC: 66 MG/DL (ref 30–150)
TSH SERPL DL<=0.005 MIU/L-ACNC: 2.41 UIU/ML (ref 0.4–4)
VIT B12 SERPL-MCNC: 561 PG/ML (ref 210–950)
WBC # BLD AUTO: 5.47 K/UL (ref 3.9–12.7)

## 2023-03-29 PROCEDURE — 82607 VITAMIN B-12: CPT | Performed by: INTERNAL MEDICINE

## 2023-03-29 PROCEDURE — 80053 COMPREHEN METABOLIC PANEL: CPT | Performed by: INTERNAL MEDICINE

## 2023-03-29 PROCEDURE — 83880 ASSAY OF NATRIURETIC PEPTIDE: CPT | Performed by: INTERNAL MEDICINE

## 2023-03-29 PROCEDURE — 83036 HEMOGLOBIN GLYCOSYLATED A1C: CPT | Performed by: INTERNAL MEDICINE

## 2023-03-29 PROCEDURE — 84153 ASSAY OF PSA TOTAL: CPT | Performed by: INTERNAL MEDICINE

## 2023-03-29 PROCEDURE — 36415 COLL VENOUS BLD VENIPUNCTURE: CPT | Performed by: INTERNAL MEDICINE

## 2023-03-29 PROCEDURE — 80061 LIPID PANEL: CPT | Performed by: INTERNAL MEDICINE

## 2023-03-29 PROCEDURE — 84443 ASSAY THYROID STIM HORMONE: CPT | Performed by: INTERNAL MEDICINE

## 2023-03-29 PROCEDURE — 85025 COMPLETE CBC W/AUTO DIFF WBC: CPT | Performed by: INTERNAL MEDICINE

## 2023-03-29 PROCEDURE — 82306 VITAMIN D 25 HYDROXY: CPT | Performed by: INTERNAL MEDICINE

## 2023-04-13 ENCOUNTER — OFFICE VISIT (OUTPATIENT)
Dept: INTERNAL MEDICINE | Facility: CLINIC | Age: 78
End: 2023-04-13
Attending: INTERNAL MEDICINE
Payer: MEDICARE

## 2023-04-13 VITALS
HEIGHT: 72 IN | WEIGHT: 260 LBS | BODY MASS INDEX: 35.21 KG/M2 | OXYGEN SATURATION: 97 % | HEART RATE: 101 BPM | DIASTOLIC BLOOD PRESSURE: 70 MMHG | SYSTOLIC BLOOD PRESSURE: 110 MMHG

## 2023-04-13 DIAGNOSIS — R60.0 LOWER EXTREMITY EDEMA: ICD-10-CM

## 2023-04-13 DIAGNOSIS — I10 PRIMARY HYPERTENSION: ICD-10-CM

## 2023-04-13 DIAGNOSIS — E78.00 HYPERCHOLESTEROLEMIA: ICD-10-CM

## 2023-04-13 DIAGNOSIS — I34.0 NONRHEUMATIC MITRAL VALVE REGURGITATION: Primary | ICD-10-CM

## 2023-04-13 DIAGNOSIS — I50.42 HEART FAILURE, SYSTOLIC AND DIASTOLIC, CHRONIC: ICD-10-CM

## 2023-04-13 LAB
BILIRUB SERPL-MCNC: NEGATIVE MG/DL
BLOOD URINE, POC: NEGATIVE
COLOR, POC UA: YELLOW
GLUCOSE UR QL STRIP: NEGATIVE
KETONES UR QL STRIP: NEGATIVE
LEUKOCYTE ESTERASE URINE, POC: NEGATIVE
NITRITE, POC UA: NEGATIVE
PH, POC UA: 5.5
PROTEIN, POC: NEGATIVE
SPECIFIC GRAVITY, POC UA: 1.03
UROBILINOGEN, POC UA: 1

## 2023-04-13 PROCEDURE — 99215 OFFICE O/P EST HI 40 MIN: CPT | Mod: S$GLB,,, | Performed by: INTERNAL MEDICINE

## 2023-04-13 PROCEDURE — 81003 POCT URINALYSIS, DIPSTICK OR TABLET REAGENT, AUTOMATED, WITH MICROSCOP: ICD-10-PCS | Mod: QW,,, | Performed by: INTERNAL MEDICINE

## 2023-04-13 PROCEDURE — 81003 URINALYSIS AUTO W/O SCOPE: CPT | Mod: QW,,, | Performed by: INTERNAL MEDICINE

## 2023-04-13 PROCEDURE — 99215 PR OFFICE/OUTPT VISIT, EST, LEVL V, 40-54 MIN: ICD-10-PCS | Mod: S$GLB,,, | Performed by: INTERNAL MEDICINE

## 2023-04-13 NOTE — PROGRESS NOTES
Subjective     Patient ID: Sam Gamino is a 77 y.o. male.    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  His urinalysis had 1+ blood and he was noted to have peripheral edema.  He was given forms to be filled out by his personal physician to explain the previous findings.  He was diagnosed with moderate mitral valve regurgitation in May of 2022.  Several medications were added and he has done very well since that time.  He denies any shortness of breath.  He can walk up 1 flight of stairs without shortness of breath.  His weight has been stable.    Hematuria  This is a new problem. The current episode started today. The problem has been resolved since onset. He describes the hematuria as microscopic hematuria. He is experiencing no pain. He describes his urine color as clear.   Hypertension  This is a chronic problem. The current episode started more than 1 year ago. The problem is controlled.   Review of Systems   Constitutional: Negative.    Respiratory: Negative.     Cardiovascular: Negative.         Objective     Physical Exam  Vitals and nursing note reviewed.   Constitutional:       Appearance: He is well-developed.   HENT:      Head: Normocephalic and atraumatic.   Eyes:      Pupils: Pupils are equal, round, and reactive to light.   Neck:      Vascular: Hepatojugular reflux present. No JVD.   Cardiovascular:      Rate and Rhythm: Normal rate and regular rhythm.      Heart sounds: Murmur heard.   Systolic murmur is present with a grade of 2/6.      Comments: Holosystolic murmur greatest at the left lower sternal border.  Pulmonary:      Effort: Pulmonary effort is normal.   Abdominal:      General: Abdomen is protuberant. Bowel sounds are normal.      Palpations: Abdomen is soft.      Tenderness: There is no rebound.   Musculoskeletal:      Right lower le+ Edema present.      Left lower le+ Edema present.   Neurological:       Mental Status: He is alert.          Assessment and Plan     Problem List Items Addressed This Visit          10     Mitral regurgitation - Primary    Primary hypertension    Heart failure, systolic and diastolic, chronic    Relevant Medications    empagliflozin (JARDIANCE) 10 mg tablet    Hypercholesterolemia       Unprioritized    Lower extremity edema    Relevant Orders    POCT urinalysis, dipstick or tablet reag (Completed)       Per orders and D/C instructions.  Continue diet and/or meds for moderate mitral valve regurgitation, HTN, and high cholesterol, which are stable.   Urinalysis at my office today is negative for blood.  He does have chronic well compensated heart failure from mitral valve regurgitation.  He is stable on his current medical regimen.  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  To sets of forms were filled out for [employer] and copies of medical records were made.    Between 40-54 minutes of total time for evaluation and management services were spent on the patient today.  The medical problems and treatment options were discussed, and all questions were answered.

## 2023-05-30 ENCOUNTER — OFFICE VISIT (OUTPATIENT)
Dept: INTERNAL MEDICINE | Facility: CLINIC | Age: 78
End: 2023-05-30
Attending: INTERNAL MEDICINE
Payer: MEDICARE

## 2023-05-30 VITALS
WEIGHT: 262 LBS | DIASTOLIC BLOOD PRESSURE: 72 MMHG | HEART RATE: 93 BPM | OXYGEN SATURATION: 98 % | SYSTOLIC BLOOD PRESSURE: 116 MMHG | BODY MASS INDEX: 35.49 KG/M2 | HEIGHT: 72 IN

## 2023-05-30 DIAGNOSIS — I50.42 HEART FAILURE, SYSTOLIC AND DIASTOLIC, CHRONIC: ICD-10-CM

## 2023-05-30 DIAGNOSIS — I34.0 NONRHEUMATIC MITRAL VALVE REGURGITATION: Primary | ICD-10-CM

## 2023-05-30 DIAGNOSIS — R35.1 NOCTURIA: ICD-10-CM

## 2023-05-30 DIAGNOSIS — I10 PRIMARY HYPERTENSION: ICD-10-CM

## 2023-05-30 DIAGNOSIS — R35.0 URINARY FREQUENCY: ICD-10-CM

## 2023-05-30 DIAGNOSIS — E78.00 HYPERCHOLESTEROLEMIA: ICD-10-CM

## 2023-05-30 PROCEDURE — 99214 PR OFFICE/OUTPT VISIT, EST, LEVL IV, 30-39 MIN: ICD-10-PCS | Mod: S$GLB,,, | Performed by: INTERNAL MEDICINE

## 2023-05-30 PROCEDURE — 99214 OFFICE O/P EST MOD 30 MIN: CPT | Mod: S$GLB,,, | Performed by: INTERNAL MEDICINE

## 2023-05-30 RX ORDER — TAMSULOSIN HYDROCHLORIDE 0.4 MG/1
0.4 CAPSULE ORAL DAILY
Qty: 90 CAPSULE | Refills: 1 | Status: SHIPPED | OUTPATIENT
Start: 2023-05-30 | End: 2024-01-25

## 2023-05-30 NOTE — PROGRESS NOTES
Subjective     Patient ID: Sam Gamino is a 77 y.o. male.    Chief Complaint: Follow-up (Needs disability paper work filled out, Refill Tamsulosin)    He has been on Flomax for about 6 weeks without much improvement.  He has urinary frequency during the day and nocturia about every 2 hours.    Follow-up    Hypertension  This is a chronic problem. The current episode started more than 1 year ago. The problem is controlled.   Review of Systems   Constitutional: Negative.    Respiratory: Negative.     Cardiovascular: Negative.    Genitourinary:  Positive for frequency and urgency.        Objective     Physical Exam  Vitals and nursing note reviewed.   Constitutional:       Appearance: He is well-developed.   HENT:      Head: Normocephalic and atraumatic.   Eyes:      Pupils: Pupils are equal, round, and reactive to light.   Neck:      Vascular: Hepatojugular reflux present. No JVD.   Cardiovascular:      Rate and Rhythm: Normal rate and regular rhythm.      Heart sounds: Murmur heard.   Systolic murmur is present with a grade of 2/6.      Comments: Holosystolic murmur greatest at the left lower sternal border.  Pulmonary:      Effort: Pulmonary effort is normal.   Abdominal:      General: Abdomen is protuberant. Bowel sounds are normal.      Palpations: Abdomen is soft.      Tenderness: There is no rebound.   Musculoskeletal:      Right lower le+ Edema present.      Left lower le+ Edema present.   Neurological:      Mental Status: He is alert.          Assessment and Plan     1. Nonrheumatic mitral valve regurgitation  Overview:   - Mild   - Mod/Severe      2. Primary hypertension  Overview:  : Diagnosed.         3. Heart failure, systolic and diastolic, chronic  Overview:   2022: Echo: Normal left ventricular size and systolic function. EF 55%. Moderate MR. Mild to moderate TR.         4. Hypercholesterolemia  Overview:  : Statin was begun.      5. Urinary frequency  -     US Bladder;  Future; Expected date: 05/30/2023    6. Nocturia    Other orders  -     tamsulosin (FLOMAX) 0.4 mg Cap; Take 1 capsule (0.4 mg total) by mouth once daily.  Dispense: 90 capsule; Refill: 1        Per orders and D/C instructions.  Continue diet and/or meds for HTN, and high cholesterol, which are stable.  Follow-up with cardiology for mitral valve regurgitation and systolic and diastolic heart failure.  Bladder ultrasound with PVR to evaluate urinary frequency and nocturia.  He will continue Flomax for now.    Between 30 and 39 min of total time for evaluation and management services were spent on the patient today.  The medical problems and treatment options were discussed, and all questions were answered.             Follow up in 5 weeks (on 7/7/2023).

## 2023-05-30 NOTE — PATIENT INSTRUCTIONS
If they do not call you and schedule within 2 business days, then please call [facility] radiology at [phone] to schedule your radiology test(s).  Bladder US

## 2023-06-06 ENCOUNTER — HOSPITAL ENCOUNTER (OUTPATIENT)
Dept: CARDIOLOGY | Facility: OTHER | Age: 78
Discharge: HOME | End: 2023-06-06
Attending: INTERNAL MEDICINE
Payer: MEDICARE

## 2023-06-06 VITALS
WEIGHT: 262 LBS | DIASTOLIC BLOOD PRESSURE: 72 MMHG | HEIGHT: 72 IN | SYSTOLIC BLOOD PRESSURE: 116 MMHG | BODY MASS INDEX: 35.49 KG/M2

## 2023-06-06 DIAGNOSIS — I50.42 HEART FAILURE, SYSTOLIC AND DIASTOLIC, CHRONIC: ICD-10-CM

## 2023-06-06 LAB
ASCENDING AORTA: 3.18 CM
AV INDEX (PROSTH): 0.71
AV MEAN GRADIENT: 3 MMHG
AV PEAK GRADIENT: 5 MMHG
AV REGURGITATION PRESSURE HALF TIME: 482.55 MS
AV VALVE AREA: 3.08 CM2
AV VELOCITY RATIO: 0.71
BSA FOR ECHO PROCEDURE: 2.46 M2
CV ECHO LV RWT: 0.47 CM
DOP CALC AO PEAK VEL: 1.15 M/S
DOP CALC AO VTI: 22.8 CM
DOP CALC LVOT AREA: 4.3 CM2
DOP CALC LVOT DIAMETER: 2.35 CM
DOP CALC LVOT PEAK VEL: 0.82 M/S
DOP CALC LVOT STROKE VOLUME: 70.23 CM3
DOP CALCLVOT PEAK VEL VTI: 16.2 CM
E WAVE DECELERATION TIME: 82.31 MSEC
E/E' RATIO: 11.22 M/S
ECHO LV POSTERIOR WALL: 0.95 CM (ref 0.6–1.1)
EJECTION FRACTION: 60 %
FRACTIONAL SHORTENING: 31 % (ref 28–44)
INTERVENTRICULAR SEPTUM: 0.93 CM (ref 0.6–1.1)
IVRT: 68.51 MSEC
LA MAJOR: 5.72 CM
LA MINOR: 5.95 CM
LA WIDTH: 4.1 CM
LEFT ATRIUM SIZE: 3.69 CM
LEFT ATRIUM VOLUME INDEX MOD: 29.7 ML/M2
LEFT ATRIUM VOLUME INDEX: 31.4 ML/M2
LEFT ATRIUM VOLUME MOD: 71 CM3
LEFT ATRIUM VOLUME: 75.01 CM3
LEFT INTERNAL DIMENSION IN SYSTOLE: 2.79 CM (ref 2.1–4)
LEFT VENTRICLE DIASTOLIC VOLUME INDEX: 30.59 ML/M2
LEFT VENTRICLE DIASTOLIC VOLUME: 73.1 ML
LEFT VENTRICLE MASS INDEX: 50 G/M2
LEFT VENTRICLE SYSTOLIC VOLUME INDEX: 12.2 ML/M2
LEFT VENTRICLE SYSTOLIC VOLUME: 29.25 ML
LEFT VENTRICULAR INTERNAL DIMENSION IN DIASTOLE: 4.07 CM (ref 3.5–6)
LEFT VENTRICULAR MASS: 119.76 G
LV LATERAL E/E' RATIO: 14.33 M/S
LV SEPTAL E/E' RATIO: 9.21 M/S
LVOT MG: 1.63 MMHG
LVOT MV: 0.61 CM/S
MV PEAK E VEL: 1.29 M/S
MV STENOSIS PRESSURE HALF TIME: 23.87 MS
MV VALVE AREA P 1/2 METHOD: 9.22 CM2
PISA AR MAX VEL: 3.78 M/S
PISA MRMAX VEL: 2.54 M/S
PISA TR MAX VEL: 2.45 M/S
PV MV: 0.57 M/S
PV PEAK VELOCITY: 0.84 CM/S
RA MAJOR: 5.12 CM
RA PRESSURE: 3 MMHG
RA WIDTH: 3.4 CM
RIGHT VENTRICULAR END-DIASTOLIC DIMENSION: 3.18 CM
SINUS: 3.7 CM
STJ: 2.99 CM
TDI LATERAL: 0.09 M/S
TDI SEPTAL: 0.14 M/S
TDI: 0.12 M/S
TR MAX PG: 24 MMHG
TRICUSPID ANNULAR PLANE SYSTOLIC EXCURSION: 2 CM
TV REST PULMONARY ARTERY PRESSURE: 27 MMHG

## 2023-06-06 PROCEDURE — 93356 MYOCRD STRAIN IMG SPCKL TRCK: CPT

## 2023-06-06 PROCEDURE — 93356 MYOCRD STRAIN IMG SPCKL TRCK: CPT | Mod: ,,, | Performed by: INTERNAL MEDICINE

## 2023-06-06 PROCEDURE — 93356 PR IMG, MYOCARDIAL STRAIN, SPECKLE TRACKING: ICD-10-PCS | Mod: ,,, | Performed by: INTERNAL MEDICINE

## 2023-06-06 PROCEDURE — 93306 ECHO (CUPID ONLY): ICD-10-PCS | Mod: 26,,, | Performed by: INTERNAL MEDICINE

## 2023-06-06 PROCEDURE — 93306 TTE W/DOPPLER COMPLETE: CPT | Mod: 26,,, | Performed by: INTERNAL MEDICINE

## 2023-06-22 ENCOUNTER — DOCUMENTATION ONLY (OUTPATIENT)
Dept: INTERNAL MEDICINE | Facility: CLINIC | Age: 78
End: 2023-06-22
Payer: MEDICARE

## 2023-06-22 ENCOUNTER — HOSPITAL ENCOUNTER (OUTPATIENT)
Dept: RADIOLOGY | Facility: OTHER | Age: 78
Discharge: HOME | End: 2023-06-22
Attending: INTERNAL MEDICINE
Payer: MEDICARE

## 2023-06-22 DIAGNOSIS — R35.0 URINARY FREQUENCY: Primary | ICD-10-CM

## 2023-06-22 DIAGNOSIS — I10 PRIMARY HYPERTENSION: Primary | ICD-10-CM

## 2023-06-22 DIAGNOSIS — E78.00 HYPERCHOLESTEROLEMIA: ICD-10-CM

## 2023-06-22 PROCEDURE — 76857 US BLADDER: ICD-10-PCS | Mod: 26,,, | Performed by: RADIOLOGY

## 2023-06-22 PROCEDURE — 99491 PR CHRONIC CARE MGMT SVCS, 1ST 30 MIN, PER MONTH: ICD-10-PCS | Mod: S$GLB,,, | Performed by: INTERNAL MEDICINE

## 2023-06-22 PROCEDURE — 76857 US EXAM PELVIC LIMITED: CPT | Mod: TC

## 2023-06-22 PROCEDURE — 76857 US EXAM PELVIC LIMITED: CPT | Mod: 26,,, | Performed by: RADIOLOGY

## 2023-06-22 PROCEDURE — 99491 CHRNC CARE MGMT PHYS 1ST 30: CPT | Mod: S$GLB,,, | Performed by: INTERNAL MEDICINE

## 2023-06-22 RX ORDER — SOLIFENACIN SUCCINATE 5 MG/1
TABLET, FILM COATED ORAL
Qty: 30 TABLET | Refills: 3 | Status: SHIPPED | OUTPATIENT
Start: 2023-06-22 | End: 2023-06-26 | Stop reason: DRUGHIGH

## 2023-06-22 NOTE — PROGRESS NOTES
The patient's electronic chart was reviewed during this month. The patient's medical, functional, and psychosocial needs were assessed. Need for Home health care, PT, OT, , psychiatric care, and hospice was assessed. All preventative care measures were reviewed and updated. All medications were reviewed and reconciled. Potential drug interactions and medication adherence was reviewed. Prescriptions were renewed as appropriate. Education was provided to the patient and/or caregiver as needed, and all questions were answered. Over 30 minutes were spent providing these non-face-to-face services during this calendar month.    These services were provided directly by myself, the physician.    Bladder ultrasound results were reviewed and chart was updated.  Patient was contacted with the results in a prescription for VESIcare was sent to the pharmacy

## 2023-06-26 DIAGNOSIS — R35.0 URINARY FREQUENCY: Primary | ICD-10-CM

## 2023-06-26 RX ORDER — SOLIFENACIN SUCCINATE 10 MG/1
10 TABLET, FILM COATED ORAL DAILY
Qty: 30 TABLET | Refills: 11 | Status: SHIPPED | OUTPATIENT
Start: 2023-06-26 | End: 2024-06-25

## 2023-06-28 ENCOUNTER — OFFICE VISIT (OUTPATIENT)
Dept: CARDIOLOGY | Facility: CLINIC | Age: 78
End: 2023-06-28
Attending: INTERNAL MEDICINE
Payer: MEDICARE

## 2023-06-28 VITALS
WEIGHT: 261.38 LBS | SYSTOLIC BLOOD PRESSURE: 112 MMHG | HEIGHT: 72 IN | OXYGEN SATURATION: 98 % | DIASTOLIC BLOOD PRESSURE: 60 MMHG | BODY MASS INDEX: 35.4 KG/M2 | HEART RATE: 99 BPM

## 2023-06-28 DIAGNOSIS — C85.90 LYMPHOMA IN REMISSION: ICD-10-CM

## 2023-06-28 DIAGNOSIS — I34.0 NONRHEUMATIC MITRAL VALVE REGURGITATION: ICD-10-CM

## 2023-06-28 DIAGNOSIS — I25.10 CORONARY ARTERY DISEASE INVOLVING NATIVE CORONARY ARTERY OF NATIVE HEART WITHOUT ANGINA PECTORIS: ICD-10-CM

## 2023-06-28 DIAGNOSIS — E04.1 THYROID NODULE: ICD-10-CM

## 2023-06-28 DIAGNOSIS — I10 PRIMARY HYPERTENSION: ICD-10-CM

## 2023-06-28 DIAGNOSIS — I36.1 NONRHEUMATIC TRICUSPID VALVE REGURGITATION: ICD-10-CM

## 2023-06-28 DIAGNOSIS — I50.32 HEART FAILURE, DIASTOLIC, CHRONIC: ICD-10-CM

## 2023-06-28 DIAGNOSIS — E78.00 HYPERCHOLESTEROLEMIA: ICD-10-CM

## 2023-06-28 DIAGNOSIS — E66.01 SEVERE OBESITY: ICD-10-CM

## 2023-06-28 PROCEDURE — 99999 PR PBB SHADOW E&M-EST. PATIENT-LVL IV: CPT | Mod: PBBFAC,,, | Performed by: INTERNAL MEDICINE

## 2023-06-28 PROCEDURE — 99214 OFFICE O/P EST MOD 30 MIN: CPT | Mod: S$PBB,,, | Performed by: INTERNAL MEDICINE

## 2023-06-28 PROCEDURE — 99999 PR PBB SHADOW E&M-EST. PATIENT-LVL IV: ICD-10-PCS | Mod: PBBFAC,,, | Performed by: INTERNAL MEDICINE

## 2023-06-28 PROCEDURE — 99214 PR OFFICE/OUTPT VISIT, EST, LEVL IV, 30-39 MIN: ICD-10-PCS | Mod: S$PBB,,, | Performed by: INTERNAL MEDICINE

## 2023-06-28 PROCEDURE — 99214 OFFICE O/P EST MOD 30 MIN: CPT | Mod: PBBFAC | Performed by: INTERNAL MEDICINE

## 2023-06-28 NOTE — PROGRESS NOTES
Subjective:     Sam Gamino is a 77 y.o. male with hypertension and hypercholesterolemia. He is mildly to severely obese. 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 In 5/2022 he was admitted with heart failure. He was noted to have moderate to severe mitral regurgitation and severe pulmonary hypertension. He was diuresed. On 5/16/2022 he had an echocardiogram that revealed normal left ventricular size and systolic function. The ejection fraction was 55%. There was moderate mitral regurgitation. There was mild to moderate tricuspid regurgitation. He denies any exertional chest pain. In 9/2022 he was able to walk about two blocks before he had to stop due to dyspnea. He had mild edema of his legs. He was given empagliflozin and he was breathing better and able to walk farther. The edema resolved. No orthopnea. No palpitations or weak spells. No issues with any of his prescribed medications. Feeling fair overall.      Congestive Heart Failure  Presents for follow-up visit. Pertinent negatives include no abdominal pain, chest pain, chest pressure, claudication, edema, fatigue, muscle weakness, near-syncope, nocturia, orthopnea, palpitations, paroxysmal nocturnal dyspnea, shortness of breath or unexpected weight change. The symptoms have been improving. His past medical history is significant for CAD.   Coronary Artery Disease  Presents for follow-up visit. Pertinent negatives include no chest pain, chest pressure, chest tightness, dizziness, leg swelling, muscle weakness, palpitations, shortness of breath or weight gain. Risk factors include hyperlipidemia and obesity. His past medical history is significant for CHF.   Hypertension  This is a chronic problem. The current episode started more than 1 year ago. The problem is unchanged. The problem is controlled (usually 110-120/70-80 mmHg at home). Pertinent negatives include no anxiety, blurred  vision, chest pain, headaches, malaise/fatigue, neck pain, orthopnea, palpitations, peripheral edema, PND, shortness of breath or sweats. There is no history of chronic renal disease.   Hyperlipidemia  This is a chronic problem. The current episode started more than 1 year ago. The problem is controlled. Recent lipid tests were reviewed and are normal. Exacerbating diseases include obesity. He has no history of chronic renal disease, diabetes, hypothyroidism, liver disease or nephrotic syndrome. Pertinent negatives include no chest pain, focal sensory loss, focal weakness, leg pain, myalgias or shortness of breath.     Review of Systems   Constitutional: Negative for chills, fatigue, fever, malaise/fatigue, unexpected weight change and weight gain.   HENT:  Negative for nosebleeds and tinnitus.    Eyes:  Negative for blurred vision, double vision, vision loss in left eye and vision loss in right eye.   Cardiovascular:  Negative for chest pain, claudication, dyspnea on exertion, irregular heartbeat, leg swelling, near-syncope, orthopnea, palpitations, paroxysmal nocturnal dyspnea and syncope.   Respiratory:  Negative for chest tightness, cough, hemoptysis, shortness of breath and wheezing.    Endocrine: Negative for cold intolerance and heat intolerance.   Hematologic/Lymphatic: Negative for bleeding problem. Does not bruise/bleed easily.   Skin:  Negative for color change and rash.   Musculoskeletal:  Negative for back pain, falls, muscle weakness, myalgias and neck pain.   Gastrointestinal:  Negative for abdominal pain, diarrhea, dysphagia, heartburn, hematemesis, hematochezia, hemorrhoids, jaundice, melena, nausea and vomiting.   Genitourinary:  Negative for dysuria, hematuria and nocturia.   Neurological:  Negative for dizziness, focal weakness, headaches, light-headedness, loss of balance, numbness, tremors, vertigo and weakness.   Psychiatric/Behavioral:  Negative for altered mental status, depression and  "memory loss. The patient is not nervous/anxious.    Allergic/Immunologic: Negative for hives and persistent infections.       Current Outpatient Medications on File Prior to Visit   Medication Sig Dispense Refill    aspirin 81 MG Chew Take 81 mg by mouth once daily.      atorvastatin (LIPITOR) 80 MG tablet Take 0.5 tablets (40 mg total) by mouth once daily. 90 tablet 3    cetirizine (ZYRTEC) 10 MG tablet Take 10 mg by mouth daily as needed.      cholecalciferol, vitamin D3, (VITAMIN D3) 50 mcg (2,000 unit) Cap Take 1 capsule by mouth once daily.      cyanocobalamin 1,000 mcg/mL injection INJECT 1 ML IN THE MUSCLE EVERY 30 DAYS 10 mL 1    empagliflozin (JARDIANCE) 10 mg tablet Take 1 tablet (10 mg total) by mouth once daily. 90 tablet 1    fluticasone propionate (FLONASE) 50 mcg/actuation nasal spray SHAKE LIQUID AND USE 2 SPRAYS(100 MCG) IN EACH NOSTRIL EVERY DAY 16 g 3    furosemide (LASIX) 40 MG tablet Take 1 tablet (40 mg total) by mouth once daily. 120 tablet 3    linaCLOtide (LINZESS) 290 mcg Cap capsule Take 1 capsule (290 mcg total) by mouth before breakfast. 90 capsule 3    losartan (COZAAR) 100 MG tablet Take 1 tablet (100 mg total) by mouth once daily. 90 tablet 3    metoprolol succinate (TOPROL-XL) 50 MG 24 hr tablet Take 1 tablet (50 mg total) by mouth once daily. 90 tablet 3    omeprazole 20 mg TbEC Take 1 tablet by mouth once daily.      solifenacin (VESICARE) 10 MG tablet Take 1 tablet (10 mg total) by mouth once daily. 30 tablet 11    spironolactone (ALDACTONE) 25 MG tablet Take 1 tablet (25 mg total) by mouth once daily. 90 tablet 3    tamsulosin (FLOMAX) 0.4 mg Cap Take 1 capsule (0.4 mg total) by mouth once daily. 90 capsule 1    syringe with needle 3 mL 23 x 1" Syrg 1 Syringe by Misc.(Non-Drug; Combo Route) route every 30 days. 50 Syringe 11     No current facility-administered medications on file prior to visit.       /60 (BP Location: Left arm, Patient Position: Sitting, BP Method: Large " (Manual))   Pulse 99   Ht 6' (1.829 m)   Wt 118.6 kg (261 lb 5.7 oz)   SpO2 98%   BMI 35.45 kg/m²       Objective:     Physical Exam  Constitutional:       General: He is not in acute distress.     Appearance: Normal appearance. He is well-developed. He is not toxic-appearing or diaphoretic.   HENT:      Head: Normocephalic and atraumatic.      Nose: Nose normal.   Eyes:      General:         Right eye: No discharge.         Left eye: No discharge.      Conjunctiva/sclera:      Right eye: Right conjunctiva is not injected.      Left eye: Left conjunctiva is not injected.      Pupils: Pupils are equal.      Right eye: Pupil is round.      Left eye: Pupil is round.   Neck:      Thyroid: No thyromegaly.      Vascular: No carotid bruit or JVD.   Cardiovascular:      Rate and Rhythm: Normal rate and regular rhythm. No extrasystoles are present.     Chest Wall: PMI is not displaced.      Pulses:           Radial pulses are 2+ on the right side and 2+ on the left side.        Femoral pulses are 2+ on the right side and 2+ on the left side.       Dorsalis pedis pulses are 2+ on the right side and 2+ on the left side.        Posterior tibial pulses are 2+ on the right side and 2+ on the left side.      Heart sounds: S1 normal and S2 normal. Murmur heard.   High-pitched blowing holosystolic murmur is present at the apex. P2 pronounced.     Gallop present. S3 and S4 sounds present.   Pulmonary:      Effort: Pulmonary effort is normal.      Breath sounds: No rales.   Abdominal:      Palpations: Abdomen is soft.      Tenderness: There is no abdominal tenderness.   Musculoskeletal:      Cervical back: Neck supple.      Right lower leg: No swelling. No edema.      Left lower leg: No swelling. No edema.   Lymphadenopathy:      Head:      Right side of head: No submandibular adenopathy.      Left side of head: No submandibular adenopathy.      Cervical: No cervical adenopathy.   Skin:     General: Skin is warm and dry.       Findings: No rash.      Nails: There is no clubbing.   Neurological:      General: No focal deficit present.      Mental Status: He is alert and oriented to person, place, and time. He is not disoriented.      Cranial Nerves: No cranial nerve deficit.   Psychiatric:         Attention and Perception: Attention and perception normal.         Mood and Affect: Mood and affect normal.         Speech: Speech normal.         Behavior: Behavior normal.         Thought Content: Thought content normal.         Cognition and Memory: Cognition and memory normal.         Judgment: Judgment normal.       Assessment:     1. Heart failure, diastolic, chronic    2. Nonrheumatic mitral valve regurgitation    3. Nonrheumatic tricuspid valve regurgitation    4. Coronary artery disease involving native coronary artery of native heart without angina pectoris    5. Primary hypertension    6. Hypercholesterolemia    7. Severe obesity    8. Lymphoma in remission    9. Thyroid nodule        Plan:     1. Heart Failure, Diastolic, Chronic   5/16/2022: Echo: Normal left ventricular size and systolic function. EF 55%. Moderate MR. Mild to moderate TR.   6/6/2023: Echo: Normal left ventricular size and systolic function. EF 60%. LVGLS -15%. Moderate diastolic dysfunction. Mild to moderate AR. Mild to moderate MR. Mildly enlarged ascending aorta. Small pericardial effusion.   8/8/2022: Spironolactone 25 mg Q24 was begun and KCl 20 mEq Q24 was discontinued.   10/10/2022: Empagliflozin 10 mg Q24 was begun.   10/27/2022: BNP 69.   On metoprolol 50 mg Q24, empagliflozin 10 mg Q24, losartan 100 mg Q24, spironolactone 25 mg Q24 and furosemide 40 mg Q24.   May take additional furosemide 40 mg Q24 PRN for edema which he has not needed.   Well compensated.   6/2024: Plan next Echo.      2. Mitral Regurgitation   5/16/2022: Echo: Moderate MR.   6/6/2023: Echo: Mild to moderate MR.     3. Tricuspid Regurgitation   5/16/2022: Echo: Mild to moderate TR.    4.  Coronary Artery Disease   3/2007: Underwent coronary angiogram. Appears to have had mild disease.    5. Hypertension   2002: Diagnosed.   On metoprolol 50 mg Q24, losartan 100 mg Q24, spironolactone 25 mg Q24 and furosemide 40 mg Q24.   Keeping log at home.   Well controlled.     6. Hypercholesterolemia   2002: Statin was begun.   On atorvastatin 40 mg Q24.   4/12/2022: Chol 132. HDL 41. LDL 71. .   On atorvastatin 40 mg Q24.   Very favorable lipid panel.    7. Severe Obesity   6/27/2022: Weight 117 kg. BMI 35.   6/28/2023: Weight 119 kg. BMI 35.   Encouraged to lose weight.    8. 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    9. Primary Care   Dr. Herber Zamora.    F/u 4 months.    Ema Montemayor M.D.

## 2023-07-06 DIAGNOSIS — E78.00 HYPERCHOLESTEROLEMIA: ICD-10-CM

## 2023-07-06 RX ORDER — ATORVASTATIN CALCIUM 80 MG/1
TABLET, FILM COATED ORAL
Qty: 90 TABLET | Refills: 3 | Status: SHIPPED | OUTPATIENT
Start: 2023-07-06 | End: 2024-03-04

## 2023-07-07 ENCOUNTER — OFFICE VISIT (OUTPATIENT)
Dept: INTERNAL MEDICINE | Facility: CLINIC | Age: 78
End: 2023-07-07
Attending: INTERNAL MEDICINE
Payer: MEDICARE

## 2023-07-07 VITALS
BODY MASS INDEX: 35.49 KG/M2 | DIASTOLIC BLOOD PRESSURE: 60 MMHG | HEIGHT: 72 IN | SYSTOLIC BLOOD PRESSURE: 96 MMHG | WEIGHT: 262 LBS | OXYGEN SATURATION: 97 % | HEART RATE: 88 BPM

## 2023-07-07 DIAGNOSIS — N40.1 BENIGN PROSTATIC HYPERPLASIA WITH URINARY FREQUENCY: ICD-10-CM

## 2023-07-07 DIAGNOSIS — R35.0 URINARY FREQUENCY: ICD-10-CM

## 2023-07-07 DIAGNOSIS — E78.00 HYPERCHOLESTEROLEMIA: ICD-10-CM

## 2023-07-07 DIAGNOSIS — I34.0 NONRHEUMATIC MITRAL VALVE REGURGITATION: Primary | ICD-10-CM

## 2023-07-07 DIAGNOSIS — I10 PRIMARY HYPERTENSION: ICD-10-CM

## 2023-07-07 DIAGNOSIS — R35.0 BENIGN PROSTATIC HYPERPLASIA WITH URINARY FREQUENCY: ICD-10-CM

## 2023-07-07 PROCEDURE — 99214 PR OFFICE/OUTPT VISIT, EST, LEVL IV, 30-39 MIN: ICD-10-PCS | Mod: S$GLB,,, | Performed by: INTERNAL MEDICINE

## 2023-07-07 PROCEDURE — 99214 OFFICE O/P EST MOD 30 MIN: CPT | Mod: S$GLB,,, | Performed by: INTERNAL MEDICINE

## 2023-07-07 NOTE — PROGRESS NOTES
Subjective     Patient ID: Sam Gamino is a 77 y.o. male.    Chief Complaint: Follow-up (4 mth )    He is taking Flomax and VESIcare and his urination is back to normal.    Follow-up    Hypertension  This is a chronic problem. The current episode started more than 1 year ago. The problem is controlled.   Review of Systems   Constitutional: Negative.    Respiratory: Negative.     Cardiovascular: Negative.         Objective     Physical Exam  Vitals and nursing note reviewed.   Constitutional:       Appearance: He is well-developed.   HENT:      Head: Normocephalic and atraumatic.   Eyes:      Pupils: Pupils are equal, round, and reactive to light.   Neck:      Vascular: Hepatojugular reflux present. No JVD.   Cardiovascular:      Rate and Rhythm: Normal rate and regular rhythm.      Heart sounds: Murmur heard.   Systolic murmur is present with a grade of 2/6.      Comments: Holosystolic murmur greatest at the left lower sternal border.  Pulmonary:      Effort: Pulmonary effort is normal.      Breath sounds: Examination of the right-lower field reveals rales. Examination of the left-lower field reveals rales. Rales present.   Abdominal:      General: Abdomen is protuberant. Bowel sounds are normal.      Palpations: Abdomen is soft.      Tenderness: There is no rebound.   Musculoskeletal:      Right lower le+ Edema present.      Left lower le+ Edema present.   Neurological:      Mental Status: He is alert.          Assessment and Plan     1. Nonrheumatic mitral valve regurgitation  Overview:   - Mild   - Mod/Severe      2. Primary hypertension  Overview:  : Diagnosed.         3. Hypercholesterolemia  Overview:  : Statin was begun.      4. Benign prostatic hyperplasia with urinary frequency    5. Urinary frequency  Overview:  PVR  = 5 cc          Per orders and D/C instructions.  Continue diet and/or meds for BPH, urinary frequency, HTN, and high cholesterol, which are stable.  Follow-up  with cardiology for mitral valve regurgitation.    Between 30 and 39 min of total time for evaluation and management services were spent on the patient today.  The medical problems and treatment options were discussed, and all questions were answered.             Follow up in about 4 months (around 11/7/2023).

## 2023-08-01 DIAGNOSIS — J31.0 RHINITIS, UNSPECIFIED TYPE: Primary | ICD-10-CM

## 2023-08-01 RX ORDER — FLUTICASONE PROPIONATE 50 MCG
SPRAY, SUSPENSION (ML) NASAL
Qty: 16 G | Refills: 3 | Status: SHIPPED | OUTPATIENT
Start: 2023-08-01 | End: 2024-03-07

## 2023-09-20 ENCOUNTER — DOCUMENTATION ONLY (OUTPATIENT)
Dept: INTERNAL MEDICINE | Facility: CLINIC | Age: 78
End: 2023-09-20
Payer: MEDICARE

## 2023-09-20 DIAGNOSIS — E78.00 HYPERCHOLESTEROLEMIA: ICD-10-CM

## 2023-09-20 DIAGNOSIS — I50.33 HEART FAILURE, DIASTOLIC, ACUTE ON CHRONIC: ICD-10-CM

## 2023-09-20 DIAGNOSIS — Z78.9 KNOWN MEDICAL PROBLEMS: ICD-10-CM

## 2023-09-20 DIAGNOSIS — I50.42 HEART FAILURE, SYSTOLIC AND DIASTOLIC, CHRONIC: ICD-10-CM

## 2023-09-20 DIAGNOSIS — I10 ESSENTIAL HYPERTENSION: Primary | ICD-10-CM

## 2023-09-20 PROCEDURE — 99490 PR CHRONIC CARE MGMT, 1ST 20 MIN: ICD-10-PCS | Mod: S$GLB,,, | Performed by: INTERNAL MEDICINE

## 2023-09-20 PROCEDURE — 99490 CHRNC CARE MGMT STAFF 1ST 20: CPT | Mod: S$GLB,,, | Performed by: INTERNAL MEDICINE

## 2023-09-20 RX ORDER — EMPAGLIFLOZIN 10 MG/1
10 TABLET, FILM COATED ORAL
Qty: 90 TABLET | Refills: 3 | Status: SHIPPED | OUTPATIENT
Start: 2023-09-20

## 2023-09-26 NOTE — PROGRESS NOTES
The patient's electronic chart was reviewed during this month. The patient's medical, functional, and psychosocial needs were assessed. Need for Home health care, PT, OT, , psychiatric care, and hospice was assessed. All preventative care measures were reviewed and updated. All medications were reviewed and reconciled. Potential drug interactions and medication adherence was reviewed. Prescriptions were renewed as appropriate. Education was provided to the patient and/or caregiver as needed, and all questions were answered. Over 20 minutes were spent providing these non-face-to-face services during this calendar month.     Refilled    JARDIANCE 10 mg tablet 90 tablet 3 9/20/2023 -- No   Sig - Route: TAKE 1 TABLET DAILY - Oral   Sent to pharmacy as: JARDIANCE 10 mg tablet   Class: Normal   Order: [id number]   Date/Time Signed: 9/20/2023 10:11       E-Prescribing Status: Receipt confirmed by pharmacy (9/20/2023 10:11 AM CDT)

## 2023-10-05 RX ORDER — IBUPROFEN 800 MG/1
800 TABLET ORAL 2 TIMES DAILY PRN
Qty: 60 TABLET | Refills: 2 | Status: ON HOLD | OUTPATIENT
Start: 2023-10-05 | End: 2023-11-30 | Stop reason: HOSPADM

## 2023-11-01 ENCOUNTER — OFFICE VISIT (OUTPATIENT)
Dept: CARDIOLOGY | Facility: CLINIC | Age: 78
DRG: 064 | End: 2023-11-01
Attending: INTERNAL MEDICINE
Payer: MEDICARE

## 2023-11-01 VITALS
HEIGHT: 72 IN | DIASTOLIC BLOOD PRESSURE: 68 MMHG | SYSTOLIC BLOOD PRESSURE: 127 MMHG | BODY MASS INDEX: 35.8 KG/M2 | HEART RATE: 89 BPM | OXYGEN SATURATION: 96 % | WEIGHT: 264.31 LBS

## 2023-11-01 DIAGNOSIS — I44.0 ATRIOVENTRICULAR BLOCK, FIRST DEGREE: ICD-10-CM

## 2023-11-01 DIAGNOSIS — E78.00 HYPERCHOLESTEROLEMIA: ICD-10-CM

## 2023-11-01 DIAGNOSIS — I50.32 HEART FAILURE, DIASTOLIC, CHRONIC: ICD-10-CM

## 2023-11-01 DIAGNOSIS — E66.01 SEVERE OBESITY: ICD-10-CM

## 2023-11-01 DIAGNOSIS — C85.90 LYMPHOMA IN REMISSION: ICD-10-CM

## 2023-11-01 DIAGNOSIS — I36.1 NONRHEUMATIC TRICUSPID VALVE REGURGITATION: ICD-10-CM

## 2023-11-01 DIAGNOSIS — I70.0 ATHEROSCLEROSIS OF AORTA: ICD-10-CM

## 2023-11-01 DIAGNOSIS — I25.10 CORONARY ARTERY DISEASE INVOLVING NATIVE CORONARY ARTERY OF NATIVE HEART WITHOUT ANGINA PECTORIS: ICD-10-CM

## 2023-11-01 DIAGNOSIS — I34.0 NONRHEUMATIC MITRAL VALVE REGURGITATION: ICD-10-CM

## 2023-11-01 DIAGNOSIS — I10 PRIMARY HYPERTENSION: ICD-10-CM

## 2023-11-01 PROCEDURE — 93010 ELECTROCARDIOGRAM REPORT: CPT | Mod: ,,, | Performed by: INTERNAL MEDICINE

## 2023-11-01 PROCEDURE — 93005 ELECTROCARDIOGRAM TRACING: CPT

## 2023-11-01 PROCEDURE — 93005 ELECTROCARDIOGRAM TRACING: CPT | Mod: PBBFAC | Performed by: INTERNAL MEDICINE

## 2023-11-01 PROCEDURE — 93010 EKG 12-LEAD: ICD-10-PCS | Mod: ,,, | Performed by: INTERNAL MEDICINE

## 2023-11-01 PROCEDURE — 99999 PR PBB SHADOW E&M-EST. PATIENT-LVL III: ICD-10-PCS | Mod: PBBFAC,,, | Performed by: INTERNAL MEDICINE

## 2023-11-01 PROCEDURE — 99214 OFFICE O/P EST MOD 30 MIN: CPT | Mod: S$PBB,25,, | Performed by: INTERNAL MEDICINE

## 2023-11-01 PROCEDURE — 99999 PR PBB SHADOW E&M-EST. PATIENT-LVL III: CPT | Mod: PBBFAC,,, | Performed by: INTERNAL MEDICINE

## 2023-11-01 PROCEDURE — 99214 PR OFFICE/OUTPT VISIT, EST, LEVL IV, 30-39 MIN: ICD-10-PCS | Mod: S$PBB,25,, | Performed by: INTERNAL MEDICINE

## 2023-11-01 PROCEDURE — 99213 OFFICE O/P EST LOW 20 MIN: CPT | Mod: PBBFAC | Performed by: INTERNAL MEDICINE

## 2023-11-01 RX ORDER — LOSARTAN POTASSIUM 100 MG/1
100 TABLET ORAL DAILY
Qty: 90 TABLET | Refills: 3 | Status: ON HOLD | OUTPATIENT
Start: 2023-11-01 | End: 2023-11-30 | Stop reason: SDUPTHER

## 2023-11-01 RX ORDER — FUROSEMIDE 40 MG/1
40 TABLET ORAL DAILY
Qty: 120 TABLET | Refills: 3 | Status: ON HOLD | OUTPATIENT
Start: 2023-11-01 | End: 2023-11-30 | Stop reason: SDUPTHER

## 2023-11-01 RX ORDER — SPIRONOLACTONE 25 MG/1
25 TABLET ORAL DAILY
Qty: 90 TABLET | Refills: 3 | Status: ON HOLD | OUTPATIENT
Start: 2023-11-01 | End: 2023-11-30 | Stop reason: HOSPADM

## 2023-11-01 NOTE — PROGRESS NOTES
Subjective:     Sam Gamino is a 78 y.o. male with hypertension and hypercholesterolemia. He is severely obese. 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 In 5/2022 he was admitted with heart failure. He was noted to have moderate to severe mitral regurgitation and severe pulmonary hypertension. He was diuresed. On 5/16/2022 he had an echocardiogram that revealed normal left ventricular size and systolic function. The ejection fraction was 55%. There was moderate mitral regurgitation. There was mild to moderate tricuspid regurgitation. He denies any exertional chest pain. In 9/2022 he was able to walk about two blocks before he had to stop due to dyspnea. He had mild edema of his legs. He was given empagliflozin and he was breathing better and able to walk farther. The edema resolved. On 11/1/2023 he is noted to have a first degree atrioventricular block with a LA interval of 320 ms. No orthopnea. No palpitations or weak spells. No clear issues with any of his prescribed medications. Feeling well overall.      Congestive Heart Failure  Presents for follow-up visit. Pertinent negatives include no abdominal pain, chest pain, chest pressure, claudication, edema, fatigue, muscle weakness, near-syncope, nocturia, orthopnea, palpitations, paroxysmal nocturnal dyspnea, shortness of breath or unexpected weight change. The symptoms have been improving. His past medical history is significant for CAD.   Coronary Artery Disease  Presents for follow-up visit. Pertinent negatives include no chest pain, chest pressure, chest tightness, dizziness, leg swelling, muscle weakness, palpitations, shortness of breath or weight gain. Risk factors include hyperlipidemia and obesity. His past medical history is significant for CHF.   Hypertension  This is a chronic problem. The current episode started more than 1 year ago. The problem is unchanged. The problem is  controlled (usually 110-120/70-80 mmHg at home). Pertinent negatives include no anxiety, blurred vision, chest pain, headaches, malaise/fatigue, neck pain, orthopnea, palpitations, peripheral edema, PND, shortness of breath or sweats. There is no history of chronic renal disease.   Hyperlipidemia  This is a chronic problem. The current episode started more than 1 year ago. The problem is controlled. Recent lipid tests were reviewed and are normal. Exacerbating diseases include obesity. He has no history of chronic renal disease, diabetes, hypothyroidism, liver disease or nephrotic syndrome. Pertinent negatives include no chest pain, focal sensory loss, focal weakness, leg pain, myalgias or shortness of breath.       Review of Systems   Constitutional: Negative for chills, fatigue, fever, malaise/fatigue, unexpected weight change and weight gain.   HENT:  Negative for nosebleeds and tinnitus.    Eyes:  Negative for blurred vision, double vision, vision loss in left eye and vision loss in right eye.   Cardiovascular:  Negative for chest pain, claudication, dyspnea on exertion, irregular heartbeat, leg swelling, near-syncope, orthopnea, palpitations, paroxysmal nocturnal dyspnea and syncope.   Respiratory:  Negative for chest tightness, cough, hemoptysis, shortness of breath and wheezing.    Endocrine: Negative for cold intolerance and heat intolerance.   Hematologic/Lymphatic: Negative for bleeding problem. Does not bruise/bleed easily.   Skin:  Negative for color change and rash.   Musculoskeletal:  Negative for back pain, falls, muscle weakness, myalgias and neck pain.   Gastrointestinal:  Negative for abdominal pain, diarrhea, dysphagia, heartburn, hematemesis, hematochezia, hemorrhoids, jaundice, melena, nausea and vomiting.   Genitourinary:  Negative for dysuria, hematuria and nocturia.   Neurological:  Negative for dizziness, focal weakness, headaches, light-headedness, loss of balance, numbness, tremors,  "vertigo and weakness.   Psychiatric/Behavioral:  Negative for altered mental status, depression and memory loss. The patient is not nervous/anxious.    Allergic/Immunologic: Negative for hives and persistent infections.       Current Outpatient Medications on File Prior to Visit   Medication Sig Dispense Refill    aspirin 81 MG Chew Take 81 mg by mouth once daily.      atorvastatin (LIPITOR) 80 MG tablet TAKE ONE-HALF TABLET BY MOUTH ONCE DAILY 90 tablet 3    cetirizine (ZYRTEC) 10 MG tablet Take 10 mg by mouth daily as needed.      cholecalciferol, vitamin D3, (VITAMIN D3) 50 mcg (2,000 unit) Cap Take 1 capsule by mouth once daily.      cyanocobalamin 1,000 mcg/mL injection INJECT 1 ML IN THE MUSCLE EVERY 30 DAYS 10 mL 1    fluticasone propionate (FLONASE) 50 mcg/actuation nasal spray SHAKE LIQUID AND USE 2 SPRAYS(100 MCG) IN EACH NOSTRIL EVERY DAY 16 g 3    furosemide (LASIX) 40 MG tablet Take 1 tablet (40 mg total) by mouth once daily. 120 tablet 3    ibuprofen (ADVIL,MOTRIN) 800 MG tablet Take 1 tablet (800 mg total) by mouth 2 (two) times daily as needed for Pain. 60 tablet 2    JARDIANCE 10 mg tablet TAKE 1 TABLET DAILY 90 tablet 3    linaCLOtide (LINZESS) 290 mcg Cap capsule Take 1 capsule (290 mcg total) by mouth before breakfast. 90 capsule 3    losartan (COZAAR) 100 MG tablet Take 1 tablet (100 mg total) by mouth once daily. 90 tablet 3    metoprolol succinate (TOPROL-XL) 50 MG 24 hr tablet Take 1 tablet (50 mg total) by mouth once daily. 90 tablet 3    omeprazole 20 mg TbEC Take 1 tablet by mouth once daily.      solifenacin (VESICARE) 10 MG tablet Take 1 tablet (10 mg total) by mouth once daily. 30 tablet 11    spironolactone (ALDACTONE) 25 MG tablet Take 1 tablet (25 mg total) by mouth once daily. 90 tablet 3    syringe with needle 3 mL 23 x 1" Syrg 1 Syringe by Misc.(Non-Drug; Combo Route) route every 30 days. 50 Syringe 11    tamsulosin (FLOMAX) 0.4 mg Cap Take 1 capsule (0.4 mg total) by mouth once " daily. 90 capsule 1     No current facility-administered medications on file prior to visit.       /68 (BP Location: Right arm, Patient Position: Sitting, BP Method: Medium (Manual))   Pulse 89   Ht 6' (1.829 m)   Wt 119.9 kg (264 lb 5.3 oz)   SpO2 96%   BMI 35.85 kg/m²     Objective:     Physical Exam  Constitutional:       General: He is not in acute distress.     Appearance: Normal appearance. He is well-developed. He is not toxic-appearing or diaphoretic.   HENT:      Head: Normocephalic and atraumatic.      Nose: Nose normal.   Eyes:      General:         Right eye: No discharge.         Left eye: No discharge.      Conjunctiva/sclera:      Right eye: Right conjunctiva is not injected.      Left eye: Left conjunctiva is not injected.      Pupils: Pupils are equal.      Right eye: Pupil is round.      Left eye: Pupil is round.   Neck:      Thyroid: No thyromegaly.      Vascular: No carotid bruit or JVD.   Cardiovascular:      Rate and Rhythm: Normal rate and regular rhythm. No extrasystoles are present.     Chest Wall: PMI is not displaced.      Pulses:           Radial pulses are 2+ on the right side and 2+ on the left side.        Femoral pulses are 2+ on the right side and 2+ on the left side.       Dorsalis pedis pulses are 2+ on the right side and 2+ on the left side.        Posterior tibial pulses are 2+ on the right side and 2+ on the left side.      Heart sounds: S1 normal and S2 normal. Murmur heard.      High-pitched blowing holosystolic murmur is present at the apex. P2 pronounced.      Gallop present. S3 and S4 sounds present.   Pulmonary:      Effort: Pulmonary effort is normal.      Breath sounds: No rales.   Abdominal:      Palpations: Abdomen is soft.      Tenderness: There is no abdominal tenderness.   Musculoskeletal:      Cervical back: Neck supple.      Right lower leg: No swelling. No edema.      Left lower leg: No swelling. No edema.   Lymphadenopathy:      Head:      Right side  of head: No submandibular adenopathy.      Left side of head: No submandibular adenopathy.      Cervical: No cervical adenopathy.   Skin:     General: Skin is warm and dry.      Findings: No rash.      Nails: There is no clubbing.   Neurological:      General: No focal deficit present.      Mental Status: He is alert and oriented to person, place, and time. He is not disoriented.      Cranial Nerves: No cranial nerve deficit.   Psychiatric:         Attention and Perception: Attention and perception normal.         Mood and Affect: Mood and affect normal.         Speech: Speech normal.         Behavior: Behavior normal.         Thought Content: Thought content normal.         Cognition and Memory: Cognition and memory normal.         Judgment: Judgment normal.         Assessment:     1. Heart failure, diastolic, chronic    2. Nonrheumatic mitral valve regurgitation    3. Nonrheumatic tricuspid valve regurgitation    4. Coronary artery disease involving native coronary artery of native heart without angina pectoris    5. Atrioventricular block, first degree    6. Primary hypertension    7. Hypercholesterolemia    8. Severe obesity    9. Lymphoma in remission        Plan:     1. Heart Failure, Diastolic, Chronic   5/16/2022: Echo: Normal left ventricular size and systolic function. EF 55%. Moderate MR. Mild to moderate TR.   6/6/2023: Echo: Normal left ventricular size and systolic function. EF 60%. LVGLS -15%. Moderate diastolic dysfunction. Mild to moderate AR. Mild to moderate MR. Mildly enlarged ascending aorta. Small pericardial effusion.   8/8/2022: Spironolactone 25 mg Q24 was begun and KCl 20 mEq Q24 was discontinued.   10/10/2022: Empagliflozin 10 mg Q24 was begun.   10/27/2022: BNP 69.   On metoprolol 50 mg Q24, empagliflozin 10 mg Q24, losartan 100 mg Q24, spironolactone 25 mg Q24 and furosemide 40 mg Q24.   May take additional furosemide 40 mg Q24 PRN for edema which he has not needed.   Well  compensated.   11/1/2023: ECG:  ms. Metoprolol 50 mg Q24 to be discontinued.    6/2024: Plan next Echo.      2. Mitral Regurgitation   5/16/2022: Echo: Moderate MR.   6/6/2023: Echo: Mild to moderate MR.     3. Tricuspid Regurgitation   5/16/2022: Echo: Mild to moderate TR.    4. Coronary Artery Disease   3/2007: Underwent coronary angiogram. Appears to have had mild disease.    5. Atherosclerosis of Aorta   2023: Mentioned in chart.    6. Atrioventricular Block, First Degree   11/1/2023: ECG: ND interval 320 ms.   11/1/2023: Metoprolol 50 mg Q24 to be discontinued. In 1 week do Holter x 48 hours.      7. Hypertension   2002: Diagnosed.   On metoprolol 50 mg Q24, losartan 100 mg Q24, spironolactone 25 mg Q24 and furosemide 40 mg Q24.   Keeping log at home.   Well controlled.   11/1/2023: Metoprolol 50 mg Q24 to be discontinued. In 1 week do Holter x 48 hours.       8. Hypercholesterolemia   2002: Statin was begun.   On atorvastatin 40 mg Q24.   4/12/2022: Chol 132. HDL 41. LDL 71. .   On atorvastatin 40 mg Q24.   Very favorable lipid panel.    9. Severe Obesity   6/27/2022: Weight 117 kg. BMI 35.   6/28/2023: Weight 119 kg. BMI 35.   Encouraged to lose weight.    10. 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    11. Primary Care   Dr. Herber Zamora.    F/u 1 month.    Ema Montemayor M.D.

## 2023-11-02 ENCOUNTER — TELEPHONE (OUTPATIENT)
Dept: CARDIOLOGY | Facility: CLINIC | Age: 78
End: 2023-11-02
Payer: MEDICARE

## 2023-11-02 NOTE — TELEPHONE ENCOUNTER
Left message on voice-48 hour monitor scheduled for 11/7/23 at 3:15 pm @ [facility], requested patient to call back confirming received message

## 2023-11-04 ENCOUNTER — HOSPITAL ENCOUNTER (INPATIENT)
Facility: HOSPITAL | Age: 78
LOS: 13 days | Discharge: NURSING, REHAB OR LONG-TERM CARE | DRG: 064 | End: 2023-11-17
Attending: EMERGENCY MEDICINE | Admitting: PSYCHIATRY & NEUROLOGY
Payer: MEDICARE

## 2023-11-04 DIAGNOSIS — I62.9 INTRACRANIAL BLEEDING: ICD-10-CM

## 2023-11-04 DIAGNOSIS — R55 SYNCOPAL EPISODES: ICD-10-CM

## 2023-11-04 DIAGNOSIS — I60.9 SAH (SUBARACHNOID HEMORRHAGE): ICD-10-CM

## 2023-11-04 DIAGNOSIS — I62.9 NON-TRAUMATIC INTRACRANIAL HEMORRHAGE: Primary | ICD-10-CM

## 2023-11-04 DIAGNOSIS — R94.31 QT PROLONGATION: ICD-10-CM

## 2023-11-04 DIAGNOSIS — I26.99 PULMONARY EMBOLI: ICD-10-CM

## 2023-11-04 LAB
ALBUMIN SERPL BCP-MCNC: 3.9 G/DL (ref 3.5–5.2)
ALP SERPL-CCNC: 59 U/L (ref 55–135)
ALT SERPL W/O P-5'-P-CCNC: 20 U/L (ref 10–44)
ANION GAP SERPL CALC-SCNC: 12 MMOL/L (ref 8–16)
AST SERPL-CCNC: 34 U/L (ref 10–40)
BASOPHILS # BLD AUTO: 0.02 K/UL (ref 0–0.2)
BASOPHILS NFR BLD: 0.3 % (ref 0–1.9)
BILIRUB SERPL-MCNC: 1.1 MG/DL (ref 0.1–1)
BUN SERPL-MCNC: 14 MG/DL (ref 8–23)
CALCIUM SERPL-MCNC: 9.6 MG/DL (ref 8.7–10.5)
CHLORIDE SERPL-SCNC: 106 MMOL/L (ref 95–110)
CO2 SERPL-SCNC: 19 MMOL/L (ref 23–29)
CREAT SERPL-MCNC: 1.1 MG/DL (ref 0.5–1.4)
CREAT SERPL-MCNC: 1.1 MG/DL (ref 0.5–1.4)
DIFFERENTIAL METHOD: ABNORMAL
EOSINOPHIL # BLD AUTO: 0 K/UL (ref 0–0.5)
EOSINOPHIL NFR BLD: 0.3 % (ref 0–8)
ERYTHROCYTE [DISTWIDTH] IN BLOOD BY AUTOMATED COUNT: 12.2 % (ref 11.5–14.5)
EST. GFR  (NO RACE VARIABLE): >60 ML/MIN/1.73 M^2
GLUCOSE SERPL-MCNC: 107 MG/DL (ref 70–110)
HCT VFR BLD AUTO: 42.9 % (ref 40–54)
HGB BLD-MCNC: 13.7 G/DL (ref 14–18)
IMM GRANULOCYTES # BLD AUTO: 0.04 K/UL (ref 0–0.04)
IMM GRANULOCYTES NFR BLD AUTO: 0.5 % (ref 0–0.5)
LYMPHOCYTES # BLD AUTO: 0.9 K/UL (ref 1–4.8)
LYMPHOCYTES NFR BLD: 12.5 % (ref 18–48)
MCH RBC QN AUTO: 33.5 PG (ref 27–31)
MCHC RBC AUTO-ENTMCNC: 31.9 G/DL (ref 32–36)
MCV RBC AUTO: 105 FL (ref 82–98)
MONOCYTES # BLD AUTO: 0.3 K/UL (ref 0.3–1)
MONOCYTES NFR BLD: 4.4 % (ref 4–15)
NEUTROPHILS # BLD AUTO: 6 K/UL (ref 1.8–7.7)
NEUTROPHILS NFR BLD: 82 % (ref 38–73)
NRBC BLD-RTO: 0 /100 WBC
PLATELET # BLD AUTO: 177 K/UL (ref 150–450)
PMV BLD AUTO: 9.7 FL (ref 9.2–12.9)
POCT GLUCOSE: 110 MG/DL (ref 70–110)
POTASSIUM SERPL-SCNC: 5.3 MMOL/L (ref 3.5–5.1)
PROT SERPL-MCNC: 7.9 G/DL (ref 6–8.4)
RBC # BLD AUTO: 4.09 M/UL (ref 4.6–6.2)
SAMPLE: NORMAL
SODIUM SERPL-SCNC: 137 MMOL/L (ref 136–145)
WBC # BLD AUTO: 7.35 K/UL (ref 3.9–12.7)

## 2023-11-04 PROCEDURE — 96375 TX/PRO/DX INJ NEW DRUG ADDON: CPT

## 2023-11-04 PROCEDURE — 63600175 PHARM REV CODE 636 W HCPCS

## 2023-11-04 PROCEDURE — 99285 EMERGENCY DEPT VISIT HI MDM: CPT | Mod: 25

## 2023-11-04 PROCEDURE — 25000003 PHARM REV CODE 250: Performed by: NURSE PRACTITIONER

## 2023-11-04 PROCEDURE — 99291 PR CRITICAL CARE, E/M 30-74 MINUTES: ICD-10-PCS | Mod: ,,, | Performed by: NURSE PRACTITIONER

## 2023-11-04 PROCEDURE — 80053 COMPREHEN METABOLIC PANEL: CPT

## 2023-11-04 PROCEDURE — 85025 COMPLETE CBC W/AUTO DIFF WBC: CPT

## 2023-11-04 PROCEDURE — 25500020 PHARM REV CODE 255

## 2023-11-04 PROCEDURE — 36415 COLL VENOUS BLD VENIPUNCTURE: CPT

## 2023-11-04 PROCEDURE — 20000000 HC ICU ROOM

## 2023-11-04 PROCEDURE — 99291 CRITICAL CARE FIRST HOUR: CPT | Mod: ,,, | Performed by: NURSE PRACTITIONER

## 2023-11-04 PROCEDURE — 25000003 PHARM REV CODE 250

## 2023-11-04 PROCEDURE — 96374 THER/PROPH/DIAG INJ IV PUSH: CPT

## 2023-11-04 RX ORDER — ACETAMINOPHEN 325 MG/1
650 TABLET ORAL EVERY 6 HOURS PRN
Status: DISCONTINUED | OUTPATIENT
Start: 2023-11-04 | End: 2023-11-17 | Stop reason: HOSPADM

## 2023-11-04 RX ORDER — GLUCAGON 1 MG
1 KIT INJECTION
Status: DISCONTINUED | OUTPATIENT
Start: 2023-11-04 | End: 2023-11-07

## 2023-11-04 RX ORDER — NIMODIPINE 30 MG/1
60 CAPSULE, LIQUID FILLED ORAL EVERY 4 HOURS
Status: DISCONTINUED | OUTPATIENT
Start: 2023-11-04 | End: 2023-11-08

## 2023-11-04 RX ORDER — ONDANSETRON 2 MG/ML
8 INJECTION INTRAMUSCULAR; INTRAVENOUS
Status: COMPLETED | OUTPATIENT
Start: 2023-11-04 | End: 2023-11-04

## 2023-11-04 RX ORDER — ONDANSETRON 2 MG/ML
4 INJECTION INTRAMUSCULAR; INTRAVENOUS EVERY 6 HOURS PRN
Status: DISCONTINUED | OUTPATIENT
Start: 2023-11-04 | End: 2023-11-17 | Stop reason: HOSPADM

## 2023-11-04 RX ORDER — INSULIN ASPART 100 [IU]/ML
0-10 INJECTION, SOLUTION INTRAVENOUS; SUBCUTANEOUS EVERY 6 HOURS PRN
Status: DISCONTINUED | OUTPATIENT
Start: 2023-11-04 | End: 2023-11-07

## 2023-11-04 RX ORDER — ATORVASTATIN CALCIUM 40 MG/1
80 TABLET, FILM COATED ORAL DAILY
Status: DISCONTINUED | OUTPATIENT
Start: 2023-11-05 | End: 2023-11-17 | Stop reason: HOSPADM

## 2023-11-04 RX ORDER — TAMSULOSIN HYDROCHLORIDE 0.4 MG/1
0.4 CAPSULE ORAL DAILY
Status: DISCONTINUED | OUTPATIENT
Start: 2023-11-05 | End: 2023-11-17 | Stop reason: HOSPADM

## 2023-11-04 RX ORDER — HYDROMORPHONE HYDROCHLORIDE 1 MG/ML
1 INJECTION, SOLUTION INTRAMUSCULAR; INTRAVENOUS; SUBCUTANEOUS
Status: COMPLETED | OUTPATIENT
Start: 2023-11-04 | End: 2023-11-04

## 2023-11-04 RX ORDER — LABETALOL HCL 20 MG/4 ML
10 SYRINGE (ML) INTRAVENOUS EVERY 4 HOURS PRN
Status: DISCONTINUED | OUTPATIENT
Start: 2023-11-04 | End: 2023-11-09

## 2023-11-04 RX ORDER — HYDRALAZINE HYDROCHLORIDE 20 MG/ML
10 INJECTION INTRAMUSCULAR; INTRAVENOUS EVERY 4 HOURS PRN
Status: DISCONTINUED | OUTPATIENT
Start: 2023-11-04 | End: 2023-11-09

## 2023-11-04 RX ORDER — LOSARTAN POTASSIUM 50 MG/1
100 TABLET ORAL DAILY
Status: DISCONTINUED | OUTPATIENT
Start: 2023-11-05 | End: 2023-11-17 | Stop reason: HOSPADM

## 2023-11-04 RX ORDER — LEVETIRACETAM 500 MG/5ML
500 INJECTION, SOLUTION, CONCENTRATE INTRAVENOUS EVERY 12 HOURS
Status: DISCONTINUED | OUTPATIENT
Start: 2023-11-04 | End: 2023-11-06

## 2023-11-04 RX ADMIN — LEVETIRACETAM 500 MG: 100 INJECTION INTRAVENOUS at 04:11

## 2023-11-04 RX ADMIN — NIMODIPINE 60 MG: 30 CAPSULE, LIQUID FILLED ORAL at 10:11

## 2023-11-04 RX ADMIN — IOHEXOL 100 ML: 350 INJECTION, SOLUTION INTRAVENOUS at 02:11

## 2023-11-04 RX ADMIN — HYDROMORPHONE HYDROCHLORIDE 1 MG: 1 INJECTION, SOLUTION INTRAMUSCULAR; INTRAVENOUS; SUBCUTANEOUS at 03:11

## 2023-11-04 RX ADMIN — NIMODIPINE 60 MG: 30 CAPSULE, LIQUID FILLED ORAL at 08:11

## 2023-11-04 RX ADMIN — DESMOPRESSIN ACETATE 36.28 MCG: 4 SOLUTION INTRAVENOUS at 04:11

## 2023-11-04 RX ADMIN — ONDANSETRON 8 MG: 2 INJECTION INTRAMUSCULAR; INTRAVENOUS at 04:11

## 2023-11-04 NOTE — ED NOTES
Remains oriented times 4, able to GUAMAN's, follows commands and informed of treatment plan, agreeable at present

## 2023-11-04 NOTE — NURSING
Patient arrived to [facility] from [facility] by Billy     Report received from:  MAT Hernandez     Current symptoms: Headache     Skin Assessment done: Yes  Wounds noted: None     Skin Assessment Verified by: MAT Webb Completed? Yes Passed     Patient Belongings on Admit: shoes, shirt, boxers, pants, phone     NCC notified: SIENNA Mullen ; SIENNA Lopez with NCC team

## 2023-11-04 NOTE — HPI
The patient is a 78-year-old male with PMHx of  lymphoma in remission, CAD, nonrheumatic mitral regurg, CHF, first-degree AV block, aortic arthrosclerosis admitted to [facility] with aSAH/IVH. Initially presented initially to outside hospital ED with complaints of bilateral neck pain that happened approximately 2 hours prior to arrival.  Reports that the pain started immediately after having a bowel movement.  He does have associated headache that radiates up to his temples. Patient is on baby ASA. Also reports some associated weakness with walking. States he woke up this morning feeling normal without any pain or weakness.

## 2023-11-04 NOTE — ED PROVIDER NOTES
Encounter Date: 11/4/2023       History     Chief Complaint   Patient presents with    Neck Pain     Neck pain after going to the bathroom for the past two hours. Denies trauma and states his neck was fine when he woke up this am. Pain increases with any movement     This is a 78-year-old male with lymphoma in remission, CAD, nonrheumatic mitral regurg, CHF, first-degree AV block, aortic arthrosclerosis who presents to the ED with complaints of bilateral neck pain that happened approximately 2 hours prior to arrival.  Reports that the pain started immediately after having a bowel movement.  He does have associated headache that radiates up to his temples. Also reports some associated weakness with walking. States he woke up this morning feeling normal without any pain or weakness.  Has not taken anything for alleviation of symptoms.  Denies vision changes, nausea or vomiting, chest pain, shortness of breath.    The history is provided by the patient.     Review of patient's allergies indicates:   Allergen Reactions    Lotensin [benazepril]      cough     Past Medical History:   Diagnosis Date    Abnormal echocardiogram 2/8/2013    Mild MVR 3/07    CAD (coronary artery disease) 2/8/2013    Angio 3/07 Dr. Lin    Hypercholesterolemia 6/27/2022    Lymphoma in remission 2/8/2013    S/p chemo/XRT 1992 Relapse 1993 BMT 1993    Normal cardiac stress test 2/8/2013    Nuclear stress 2/09    Pernicious anemia 2/8/2013    Thyroid nodule 2/8/2013    Right s/p Bx 6/08 Dr. Pelayo     Past Surgical History:   Procedure Laterality Date    EYE SURGERY Bilateral 2016    Cataracts     Family History   Problem Relation Age of Onset    Cancer Mother         uterine    No Known Problems Father     Hypertension Brother     No Known Problems Daughter     No Known Problems Sister     No Known Problems Sister     No Known Problems Brother     No Known Problems Brother     No Known Problems Brother     Thyroid disease Sister      Social  History     Tobacco Use    Smoking status: Never    Smokeless tobacco: Never   Substance Use Topics    Alcohol use: Yes     Alcohol/week: 1.0 standard drink of alcohol     Types: 1 Standard drinks or equivalent per week     Comment: ocassionally    Drug use: No     Review of Systems  As per HPI above  Physical Exam     Initial Vitals [11/04/23 1218]   BP Pulse Resp Temp SpO2   (!) 127/54 81 18 97.3 °F (36.3 °C) 95 %      MAP       --         Physical Exam    Constitutional: Vital signs are normal. He appears well-developed and well-nourished. He is cooperative.  Non-toxic appearance. He does not appear ill. No distress.   HENT:   Head: Normocephalic and atraumatic.   Eyes: Conjunctivae and lids are normal.   Neck: Trachea normal. Neck supple. No stridor present. Carotid bruit is not present. Normal carotid pulses present.   Cardiovascular:  Normal rate and regular rhythm.           Pulmonary/Chest: Effort normal. No tachypnea. No respiratory distress.   Abdominal: Abdomen is soft.   Musculoskeletal:      Cervical back: Neck supple. Tenderness present. No spasms. Decreased range of motion.     Neurological: He is alert and oriented to person, place, and time. He has normal strength. No cranial nerve deficit or sensory deficit. He exhibits normal muscle tone. GCS eye subscore is 4. GCS verbal subscore is 5. GCS motor subscore is 6.   Skin: Skin is warm, dry and intact. No rash noted.   Psychiatric: He has a normal mood and affect. His speech is normal and behavior is normal. Thought content normal.         ED Course   Critical Care    Date/Time: 11/4/2023 3:37 PM    Performed by: Odette Denson PA-C  Authorized by: Zachery Odell MD  Direct patient critical care time: 20 minutes  Ordering / reviewing critical care time: 10 minutes  Documentation critical care time: 5 minutes  Consulting other physicians critical care time: 10 minutes  Total critical care time (exclusive of procedural time) : 45  minutes  Critical care was necessary to treat or prevent imminent or life-threatening deterioration of the following conditions: CNS failure or compromise.  Critical care was time spent personally by me on the following activities: development of treatment plan with patient or surrogate, examination of patient, obtaining history from patient or surrogate, ordering and review of radiographic studies, pulse oximetry, re-evaluation of patient's condition and review of old charts.        Labs Reviewed   CBC W/ AUTO DIFFERENTIAL - Abnormal; Notable for the following components:       Result Value    RBC 4.09 (*)     Hemoglobin 13.7 (*)      (*)     MCH 33.5 (*)     MCHC 31.9 (*)     Lymph # 0.9 (*)     Gran % 82.0 (*)     Lymph % 12.5 (*)     All other components within normal limits   COMPREHENSIVE METABOLIC PANEL - Abnormal; Notable for the following components:    Potassium 5.3 (*)     CO2 19 (*)     Total Bilirubin 1.1 (*)     All other components within normal limits   ISTAT CREATININE          Imaging Results              CTA Head and Neck (xpd) (Final result)  Result time 11/04/23 15:27:58      Final result by Lalito Suarez DO (11/04/23 15:27:58)                   Impression:      CTA head: Unremarkable CTA of the head specifically without evidence for proximal significant stenosis or large vessel occlusion or definite aneurysm.    CTA neck: No significant arterial stenosis throughout the neck..    Superimposed degenerative change of the cervical spine as detailed above.    CT head: Small volume scattered intraventricular hemorrhage.  Please note there is moderate distension lateral and 3rd ventricles which may be compensatory to volume loss however component of early hydrocephalus not excluded without priors for comparison.    In addition there is small volume subdural hemorrhage along the ventral and dorsal aspect of the skull base and extending to the proximal cervical canal through the  craniocervical junction allowing for CT technique.    Clinical correlation, neuro surgical consultation and further evaluation with MRI brain and cervical spine recommended if patient compatible.      Electronically signed by: Lalito Suarez DO  Date:    11/04/2023  Time:    15:27               Narrative:    EXAMINATION:  CTA HEAD AND NECK (XPD)    CLINICAL HISTORY:  sudden onset neck pain and headache after BM;    TECHNIQUE:  5 mm axial images of the head pre and post contrast with 0.625 mm axial CTA images of the head neck post-contrast.  Coronal and sagittal MPR and MIP imaging was performed 100 ml of Omnipaque 350 contrast was injected intravenously    COMPARISON:  None    FINDINGS:  CT head with and  without contrast: Generalized cerebral volume loss.  There is distortion of the examination by motion and beam hardening artifacts particularly along the skull base and posterior fossa.  In addition there is trace layering hemorrhage within the lateral ventricles bilaterally as well as within the dorsal aspect of the 4th ventricle extending along the root foramen Luschka bilaterally.  There is distension of the lateral and 3rd ventricles which may be in part related to volume loss though cannot exclude early developing hydrocephalus.    Allowing for CT technique there is no definite abnormal parenchymal enhancement    CTA head:    Anterior circulation: The bilateral distal cervical, petrous, cavernous, and supraclinoid segments of the ICAs are patent without significant focal stenosis or aneurysm.    The anterior and middle cerebral arteries are patent without focal stenosis or aneurysm.    Posterior circulation: The distal left vertebral artery is hypoplastic.  The basilar artery and posterior cerebral arteries are patent without significant focal stenosis.  There are bilateral posterior communicating arteries left greater than right.  No proximal high-grade focal stenosis or definite aneurysm posterior  circulation..    CTA neck: Common origin the right brachiocephalic and left common carotid artery, origin of the left vertebral artery and origin of the left subclavian arteries from the arch are within normal limits.    Origin of the right vertebral artery from the right subclavian arteries within normal limits.  The right vertebral artery is dominant with distortion by motion without significant focal stenosis.  The left vertebral artery is hypoplastic and distorted by motion artifact without definite focal proximal stenosis.    Right carotid: The right common carotid artery, carotid bifurcation and extracranial portions of the internal carotid artery are patent without significant focal stenosis.    Left carotid: The left common carotid artery, carotid bifurcation and extracranial portions of the internal carotid arteries are patent without significant focal stenosis.    There is less than 50% stenosis of the proximal ICAs by NASCET criteria.    Pharynx/larynx: Allowing for artifact from dental metal no definite focal lesion of the pharynx/larynx:.    Glands: No focal parotid, submandibular or thyroid lesion..    No evidence for adenopathy throughout the neck by size criteria.    Multilevel degenerative change cervical spine.  There is subtle hyperdensity along the periphery of the thecal sac at the C2 vertebral level concerning for extension of subdural hemorrhage into the cervical canal.    Evaluation of the central canal neural foraminal stenosis is distorted by artifact from motion however despite this degenerative change most prominent at the C3/C4 level with posterior disc osteophyte, uncovertebral joint hypertrophy and facet arthropathy with moderate central canal stenosis and moderate right greater than left bilateral bony neural foraminal stenosis.  No consolidation in the visualized lungs.    COMMUNICATION  This critical result was discovered/received on 11/04/2023 at 15 15 and received via epic secure  chat to Odette Becerra PA-C on 11/04/2023 at 15:21                                       Medications   levETIRAcetam injection 500 mg (500 mg Intravenous Given 11/4/23 1602)   iohexoL (OMNIPAQUE 350) injection 100 mL (100 mLs Intravenous Given 11/4/23 1448)   HYDROmorphone injection 1 mg (1 mg Intravenous Given 11/4/23 1555)   desmopressin (DDAVP) 36.28 mcg in sodium chloride 0.9% 50 mL IVPB (0 mcg Intravenous Stopped 11/4/23 1629)   ondansetron injection 8 mg (8 mg Intravenous Given 11/4/23 1602)     Medical Decision Making  Urgent evaluation of an afebrile 78-year-old man with nontraumatic bilateral neck pain with associated headache that started this morning. There is no carotid bruit or decreased carotid pulses on exam or no focal neurological deficit to warrant code stroke at this time. However, will obtain CTA to rule out infarction or dissection given associated subjective weakness while walking, specifically given onset of symptoms immediately after bowel movement and concern for possible rupture of vessel after valsalva. He does have some cervical paraspinal tenderness with decreased ROM bilaterally which could possibly be due to muscle spasm versus cervical strain.    Differential diagnosis includes but is not limited to:  Cervical strain, muscle spasm, stroke, ischemic stroke, hemorrhagic stroke, subarachnoid hemorrhage/ruptured aneurysm, carotid dissection, TIA    Amount and/or Complexity of Data Reviewed  Labs: ordered.  Radiology: ordered.    Risk  Prescription drug management.               ED Course as of 11/04/23 1709   Sat Nov 04, 2023   1519 Message by radiologist reading CTA that there is concern for IVH with distension of 3rd ventricle. Patient AAOx3, GCS 15. Transfer center contacted to initiate stat transfer for neuro critical care [FRANKO]   1529 Physician Attestation Statement:   I had face-to-face time with this patient.   I reviewed the non-physician provider documentation, treatment plan,  and medical decision making.     I performed the substantive portion of the visit.     [RC]   1532 Small volume scattered intraventricular hemorrhage.  Please note there is moderate distension lateral and 3rd ventricles which may be compensatory to volume loss however component of early hydrocephalus not excluded without priors for comparison.     In addition there is small volume subdural hemorrhage along the ventral and dorsal aspect of the skull base and extending to the proximal cervical canal through the craniocervical junction allowing for CT technique.     Clinical correlation, neuro surgical consultation and further evaluation with MRI brain and cervical spine recommended if patient compatible.    [FRANKO]   1537   Patient is a 78-year-old male with CAD, history of pernicious anemia, history of lymphoma who presents with acute-onset headache, neck pain immediately after having a bowel movement.  Here patient had normal neurologic exam apart from having intermittent unsteadiness with walking.   Initial differential included cervical vessel dissection, intracranial bleeding, acute stroke.   I independently interpreted and reviewed patient's CTA which is notable for acute intracranial bleeding. Will transfer for neurosurgery.   Pt's /104, less than generally accepted  goal for intracranial hemorrhage. I expect his pressure will further improve with analgesic dose. Will clarify BP goals with neurosurgery.   =====================================  Critical Care:  30 minutes total critical care time was personally spent by me, exclusive of procedures and separately billable time.   Critical care was necessary to treat or prevent imminent or life-threatening deterioration of the following conditions:  Acute intracranial hemorrhage.    =====================================     [RC]   1545 Patient remains with GCS 15. Blood pressure increasing, but still >150 sys. Awaiting neurosurgery recommendations on BP  goals and seizure ppx [FRANKO]   1552 Discussed with Dr. Yusuf with neurosurgery at [facility] who has accepted this patient as stat direct admission to neuro ICU. Recommended DDAVP, keppra, and SBP goal < 140. Meds initiated. He remains GCS 15. [FRANKO]      ED Course User Index  [FRANKO] Odette Denson PA-C  [RC] Zachery Odell MD                      Clinical Impression:   Final diagnoses:  [I62.9] Intracranial bleeding  [I62.9] Non-traumatic intracranial hemorrhage (Primary)        ED Disposition Condition    Transfer to Another Facility Stable                Odette Denson PA-C  11/04/23 1708       Zachery Odell MD  11/04/23 2329

## 2023-11-04 NOTE — SUBJECTIVE & OBJECTIVE
Past Medical History:   Diagnosis Date    Abnormal echocardiogram 2/8/2013    Mild MVR 3/07    CAD (coronary artery disease) 2/8/2013    Angio 3/07 Dr. Lin    Hypercholesterolemia 6/27/2022    Lymphoma in remission 2/8/2013    S/p chemo/XRT 1992 Relapse 1993 BMT 1993    Normal cardiac stress test 2/8/2013    Nuclear stress 2/09    Pernicious anemia 2/8/2013    Thyroid nodule 2/8/2013    Right s/p Bx 6/08 Dr. Pelayo     Past Surgical History:   Procedure Laterality Date    EYE SURGERY Bilateral 2016    Cataracts      No current facility-administered medications on file prior to encounter.     Current Outpatient Medications on File Prior to Encounter   Medication Sig Dispense Refill    aspirin 81 MG Chew Take 81 mg by mouth once daily.      atorvastatin (LIPITOR) 80 MG tablet TAKE ONE-HALF TABLET BY MOUTH ONCE DAILY 90 tablet 3    cetirizine (ZYRTEC) 10 MG tablet Take 10 mg by mouth daily as needed.      cholecalciferol, vitamin D3, (VITAMIN D3) 50 mcg (2,000 unit) Cap Take 1 capsule by mouth once daily.      cyanocobalamin 1,000 mcg/mL injection INJECT 1 ML IN THE MUSCLE EVERY 30 DAYS 10 mL 1    fluticasone propionate (FLONASE) 50 mcg/actuation nasal spray SHAKE LIQUID AND USE 2 SPRAYS(100 MCG) IN EACH NOSTRIL EVERY DAY 16 g 3    furosemide (LASIX) 40 MG tablet Take 1 tablet (40 mg total) by mouth once daily. 120 tablet 3    ibuprofen (ADVIL,MOTRIN) 800 MG tablet Take 1 tablet (800 mg total) by mouth 2 (two) times daily as needed for Pain. 60 tablet 2    JARDIANCE 10 mg tablet TAKE 1 TABLET DAILY 90 tablet 3    linaCLOtide (LINZESS) 290 mcg Cap capsule Take 1 capsule (290 mcg total) by mouth before breakfast. 90 capsule 3    losartan (COZAAR) 100 MG tablet Take 1 tablet (100 mg total) by mouth once daily. 90 tablet 3    omeprazole 20 mg TbEC Take 1 tablet by mouth once daily.      solifenacin (VESICARE) 10 MG tablet Take 1 tablet (10 mg total) by mouth once daily. 30 tablet 11    spironolactone (ALDACTONE) 25  "MG tablet Take 1 tablet (25 mg total) by mouth once daily. 90 tablet 3    syringe with needle 3 mL 23 x 1" Syrg 1 Syringe by Misc.(Non-Drug; Combo Route) route every 30 days. 50 Syringe 11    tamsulosin (FLOMAX) 0.4 mg Cap Take 1 capsule (0.4 mg total) by mouth once daily. 90 capsule 1      Allergies: Lotensin [benazepril]    N/A  Family history is reviewed and has not changed   Social History     Tobacco Use    Smoking status: Never    Smokeless tobacco: Never   Substance Use Topics    Alcohol use: Yes     Alcohol/week: 1.0 standard drink of alcohol     Types: 1 Standard drinks or equivalent per week     Comment: ocassionally    Drug use: No     Review of Systems  Constitutional: Denies fevers, weight loss, chills, or weakness.  Eyes: Denies changes in vision.  ENT: Denies dysphagia, nasal discharge, ear pain or discharge.  Cardiovascular: Denies chest pain, palpitations, orthopnea, or claudication.  Respiratory: Denies shortness of breath, cough, hemoptysis, or wheezing.  GI: Denies nausea/vomitting, hematochezia, melena, abd pain, or changes in appetite.  : Denies dysuria, incontinence, or hematuria.  Musculoskeletal: Denies joint pain or myalgias.  Skin/breast: Denies rashes, lumps, lesions, or discharge.  Neurologic: Denies headache, dizziness, vertigo, or paresthesias.  Psychiatric: Denies changes in mood or hallucinations.  Endocrine: Denies polyuria, polydipsia, heat/cold intolerance.  Hematologic/Lymph: Denies lymphadenopathy, easy bruising or easy bleeding.  Allergic/Immunologic: Denies rash, rhinitis.    Objective:     Vitals:    Temp: 98 °F (36.7 °C)  Pulse: 106  Rhythm: normal sinus rhythm  BP: (!) 157/77  MAP (mmHg): 107  Resp: (!) 21  SpO2: 98 %    Temp  Min: 97.3 °F (36.3 °C)  Max: 98 °F (36.7 °C)  Pulse  Min: 79  Max: 179  BP  Min: 126/60  Max: 165/101  MAP (mmHg)  Min: 86  Max: 127  Resp  Min: 16  Max: 21  SpO2  Min: 82 %  Max: 98 %    No intake/output data recorded.            Physical Exam  GA: " Alert, comfortable, no acute distress.   HEENT: No scleral icterus or JVD.   Pulmonary: Clear to auscultation A/L.   Cardiac: RRR S1 & S2 w/o rubs/murmurs/gallops.   Abdominal: Bowel sounds present x 4. No appreciable hepatosplenomegaly.  Skin: No jaundice, rashes, or visible lesions.  Neuro:  --GCS: E4 V5 M6  --Mental Status:  awake, oriented X4, follows commands  --CN II-XII grossly intact.   --Pupils 3mm, PERRL.   --Corneal reflex, gag, cough intact.  --LUE strength: 5/5  --RUE strength: 5/5  --LLE strength: 5/5  --RLE strength: 5/5        Today I personally reviewed pertinent medications, lines/drains/airways, imaging, cardiology results, laboratory results, microbiology results,

## 2023-11-04 NOTE — ED NOTES
Pt transferred from wheelchair to room 2, pt w/ 2 nurse assistance to transfer from wheelchair to bed, pt reporting neck pain and now headache, side rails up times 2, wife at bedside

## 2023-11-04 NOTE — ED TRIAGE NOTES
77 yo male reports to ed with co R sided neck pain and headache onset 2 hours pta. States pain began this morning after attempting to have a BM. Reports nausea, denies vomiting.

## 2023-11-05 LAB
ALBUMIN SERPL BCP-MCNC: 3.5 G/DL (ref 3.5–5.2)
ALP SERPL-CCNC: 64 U/L (ref 55–135)
ALT SERPL W/O P-5'-P-CCNC: 18 U/L (ref 10–44)
ANION GAP SERPL CALC-SCNC: 9 MMOL/L (ref 8–16)
ASCENDING AORTA: 3.23 CM
AST SERPL-CCNC: 20 U/L (ref 10–40)
AV INDEX (PROSTH): 1.1
AV MEAN GRADIENT: 4 MMHG
AV PEAK GRADIENT: 6 MMHG
AV VALVE AREA BY VELOCITY RATIO: 2.92 CM²
AV VALVE AREA: 3.69 CM²
AV VELOCITY RATIO: 0.87
BASOPHILS # BLD AUTO: 0.01 K/UL (ref 0–0.2)
BASOPHILS NFR BLD: 0.1 % (ref 0–1.9)
BILIRUB SERPL-MCNC: 1 MG/DL (ref 0.1–1)
BSA FOR ECHO PROCEDURE: 2.41 M2
BUN SERPL-MCNC: 15 MG/DL (ref 8–23)
CALCIUM SERPL-MCNC: 9.3 MG/DL (ref 8.7–10.5)
CHLORIDE SERPL-SCNC: 104 MMOL/L (ref 95–110)
CO2 SERPL-SCNC: 24 MMOL/L (ref 23–29)
CREAT SERPL-MCNC: 0.9 MG/DL (ref 0.5–1.4)
CV ECHO LV RWT: 0.29 CM
DIFFERENTIAL METHOD: ABNORMAL
DOP CALC AO PEAK VEL: 1.21 M/S
DOP CALC AO VTI: 19.84 CM
DOP CALC LVOT AREA: 3.4 CM2
DOP CALC LVOT DIAMETER: 2.07 CM
DOP CALC LVOT PEAK VEL: 1.05 M/S
DOP CALC LVOT STROKE VOLUME: 73.26 CM3
DOP CALCLVOT PEAK VEL VTI: 21.78 CM
E WAVE DECELERATION TIME: 108.09 MSEC
E/A RATIO: 0.96
E/E' RATIO: 10.48 M/S
ECHO LV POSTERIOR WALL: 0.69 CM (ref 0.6–1.1)
EOSINOPHIL # BLD AUTO: 0 K/UL (ref 0–0.5)
EOSINOPHIL NFR BLD: 0.1 % (ref 0–8)
ERYTHROCYTE [DISTWIDTH] IN BLOOD BY AUTOMATED COUNT: 12.3 % (ref 11.5–14.5)
EST. GFR  (NO RACE VARIABLE): >60 ML/MIN/1.73 M^2
ESTIMATED AVG GLUCOSE: 100 MG/DL (ref 68–131)
FRACTIONAL SHORTENING: 30 % (ref 28–44)
GLUCOSE SERPL-MCNC: 103 MG/DL (ref 70–110)
HBA1C MFR BLD: 5.1 % (ref 4–5.6)
HCT VFR BLD AUTO: 39.6 % (ref 40–54)
HGB BLD-MCNC: 12.5 G/DL (ref 14–18)
IMM GRANULOCYTES # BLD AUTO: 0.02 K/UL (ref 0–0.04)
IMM GRANULOCYTES NFR BLD AUTO: 0.2 % (ref 0–0.5)
INTERVENTRICULAR SEPTUM: 0.83 CM (ref 0.6–1.1)
IVRT: 74.22 MSEC
LA MAJOR: 5.62 CM
LA MINOR: 5.65 CM
LA WIDTH: 3.76 CM
LEFT ATRIUM SIZE: 3.83 CM
LEFT ATRIUM VOLUME INDEX: 29.4 ML/M2
LEFT ATRIUM VOLUME: 68.98 CM3
LEFT INTERNAL DIMENSION IN SYSTOLE: 3.3 CM (ref 2.1–4)
LEFT VENTRICLE DIASTOLIC VOLUME INDEX: 43.52 ML/M2
LEFT VENTRICLE DIASTOLIC VOLUME: 102.27 ML
LEFT VENTRICLE MASS INDEX: 49 G/M2
LEFT VENTRICLE SYSTOLIC VOLUME INDEX: 18.8 ML/M2
LEFT VENTRICLE SYSTOLIC VOLUME: 44.23 ML
LEFT VENTRICULAR INTERNAL DIMENSION IN DIASTOLE: 4.7 CM (ref 3.5–6)
LEFT VENTRICULAR MASS: 114.43 G
LV LATERAL E/E' RATIO: 7.86 M/S
LV SEPTAL E/E' RATIO: 15.71 M/S
LYMPHOCYTES # BLD AUTO: 0.9 K/UL (ref 1–4.8)
LYMPHOCYTES NFR BLD: 11.1 % (ref 18–48)
MAGNESIUM SERPL-MCNC: 1.9 MG/DL (ref 1.6–2.6)
MCH RBC QN AUTO: 33.2 PG (ref 27–31)
MCHC RBC AUTO-ENTMCNC: 31.6 G/DL (ref 32–36)
MCV RBC AUTO: 105 FL (ref 82–98)
MONOCYTES # BLD AUTO: 0.5 K/UL (ref 0.3–1)
MONOCYTES NFR BLD: 6.4 % (ref 4–15)
MV PEAK A VEL: 1.15 M/S
MV PEAK E VEL: 1.1 M/S
MV STENOSIS PRESSURE HALF TIME: 31.35 MS
MV VALVE AREA P 1/2 METHOD: 7.02 CM2
NEUTROPHILS # BLD AUTO: 6.9 K/UL (ref 1.8–7.7)
NEUTROPHILS NFR BLD: 82.1 % (ref 38–73)
NRBC BLD-RTO: 0 /100 WBC
PHOSPHATE SERPL-MCNC: 2.9 MG/DL (ref 2.7–4.5)
PISA TR MAX VEL: 3 M/S
PLATELET # BLD AUTO: 182 K/UL (ref 150–450)
PMV BLD AUTO: 10.3 FL (ref 9.2–12.9)
POTASSIUM SERPL-SCNC: 4.8 MMOL/L (ref 3.5–5.1)
PROT SERPL-MCNC: 7.1 G/DL (ref 6–8.4)
RA MAJOR: 5.92 CM
RA PRESSURE ESTIMATED: 3 MMHG
RA WIDTH: 4.31 CM
RBC # BLD AUTO: 3.77 M/UL (ref 4.6–6.2)
RIGHT VENTRICULAR END-DIASTOLIC DIMENSION: 2.81 CM
RV TB RVSP: 6 MMHG
SINUS: 3.46 CM
SODIUM SERPL-SCNC: 137 MMOL/L (ref 136–145)
STJ: 2.95 CM
TDI LATERAL: 0.14 M/S
TDI SEPTAL: 0.07 M/S
TDI: 0.11 M/S
TR MAX PG: 36 MMHG
TRICUSPID ANNULAR PLANE SYSTOLIC EXCURSION: 1.29 CM
TSH SERPL DL<=0.005 MIU/L-ACNC: 1.29 UIU/ML (ref 0.4–4)
TV REST PULMONARY ARTERY PRESSURE: 39 MMHG
WBC # BLD AUTO: 8.4 K/UL (ref 3.9–12.7)
Z-SCORE OF LEFT VENTRICULAR DIMENSION IN END DIASTOLE: -7.38
Z-SCORE OF LEFT VENTRICULAR DIMENSION IN END SYSTOLE: -4.6

## 2023-11-05 PROCEDURE — 99223 1ST HOSP IP/OBS HIGH 75: CPT | Mod: 24,,, | Performed by: NEUROLOGICAL SURGERY

## 2023-11-05 PROCEDURE — 83036 HEMOGLOBIN GLYCOSYLATED A1C: CPT | Performed by: NURSE PRACTITIONER

## 2023-11-05 PROCEDURE — 99223 PR INITIAL HOSPITAL CARE,LEVL III: ICD-10-PCS | Mod: 24,,, | Performed by: NEUROLOGICAL SURGERY

## 2023-11-05 PROCEDURE — 85025 COMPLETE CBC W/AUTO DIFF WBC: CPT | Performed by: NURSE PRACTITIONER

## 2023-11-05 PROCEDURE — 97530 THERAPEUTIC ACTIVITIES: CPT

## 2023-11-05 PROCEDURE — 25000003 PHARM REV CODE 250: Performed by: NURSE PRACTITIONER

## 2023-11-05 PROCEDURE — 84443 ASSAY THYROID STIM HORMONE: CPT | Performed by: NURSE PRACTITIONER

## 2023-11-05 PROCEDURE — A9585 GADOBUTROL INJECTION: HCPCS | Performed by: PSYCHIATRY & NEUROLOGY

## 2023-11-05 PROCEDURE — 83735 ASSAY OF MAGNESIUM: CPT | Performed by: NURSE PRACTITIONER

## 2023-11-05 PROCEDURE — 25500020 PHARM REV CODE 255: Performed by: PSYCHIATRY & NEUROLOGY

## 2023-11-05 PROCEDURE — 63600175 PHARM REV CODE 636 W HCPCS

## 2023-11-05 PROCEDURE — 97162 PT EVAL MOD COMPLEX 30 MIN: CPT

## 2023-11-05 PROCEDURE — 97165 OT EVAL LOW COMPLEX 30 MIN: CPT

## 2023-11-05 PROCEDURE — 25000003 PHARM REV CODE 250: Performed by: NEUROLOGICAL SURGERY

## 2023-11-05 PROCEDURE — 94761 N-INVAS EAR/PLS OXIMETRY MLT: CPT

## 2023-11-05 PROCEDURE — 97116 GAIT TRAINING THERAPY: CPT

## 2023-11-05 PROCEDURE — 63600175 PHARM REV CODE 636 W HCPCS: Performed by: NEUROLOGICAL SURGERY

## 2023-11-05 PROCEDURE — 80053 COMPREHEN METABOLIC PANEL: CPT | Performed by: NURSE PRACTITIONER

## 2023-11-05 PROCEDURE — 63600175 PHARM REV CODE 636 W HCPCS: Performed by: PHYSICIAN ASSISTANT

## 2023-11-05 PROCEDURE — 84100 ASSAY OF PHOSPHORUS: CPT | Performed by: NURSE PRACTITIONER

## 2023-11-05 PROCEDURE — 20000000 HC ICU ROOM

## 2023-11-05 RX ORDER — MIDAZOLAM HYDROCHLORIDE 1 MG/ML
1 INJECTION INTRAMUSCULAR; INTRAVENOUS
Status: COMPLETED | OUTPATIENT
Start: 2023-11-05 | End: 2023-11-05

## 2023-11-05 RX ORDER — MIDAZOLAM HYDROCHLORIDE 1 MG/ML
INJECTION INTRAMUSCULAR; INTRAVENOUS
Status: COMPLETED
Start: 2023-11-05 | End: 2023-11-05

## 2023-11-05 RX ORDER — GADOBUTROL 604.72 MG/ML
10 INJECTION INTRAVENOUS
Status: COMPLETED | OUTPATIENT
Start: 2023-11-05 | End: 2023-11-05

## 2023-11-05 RX ORDER — MIDAZOLAM HYDROCHLORIDE 1 MG/ML
INJECTION INTRAMUSCULAR; INTRAVENOUS
Status: COMPLETED | OUTPATIENT
Start: 2023-11-05 | End: 2023-11-05

## 2023-11-05 RX ORDER — FENTANYL CITRATE 50 UG/ML
INJECTION, SOLUTION INTRAMUSCULAR; INTRAVENOUS
Status: COMPLETED | OUTPATIENT
Start: 2023-11-05 | End: 2023-11-05

## 2023-11-05 RX ORDER — HEPARIN SODIUM 1000 [USP'U]/ML
INJECTION, SOLUTION INTRAVENOUS; SUBCUTANEOUS
Status: COMPLETED | OUTPATIENT
Start: 2023-11-05 | End: 2023-11-05

## 2023-11-05 RX ORDER — POLYETHYLENE GLYCOL 3350 17 G/17G
17 POWDER, FOR SOLUTION ORAL DAILY
Status: DISCONTINUED | OUTPATIENT
Start: 2023-11-05 | End: 2023-11-10

## 2023-11-05 RX ORDER — AMOXICILLIN 250 MG
1 CAPSULE ORAL DAILY
Status: DISCONTINUED | OUTPATIENT
Start: 2023-11-05 | End: 2023-11-10

## 2023-11-05 RX ORDER — LIDOCAINE HYDROCHLORIDE 10 MG/ML
INJECTION INFILTRATION; PERINEURAL
Status: COMPLETED | OUTPATIENT
Start: 2023-11-05 | End: 2023-11-05

## 2023-11-05 RX ADMIN — MIDAZOLAM HYDROCHLORIDE 1 MG: 2 INJECTION, SOLUTION INTRAMUSCULAR; INTRAVENOUS at 05:11

## 2023-11-05 RX ADMIN — ACETAMINOPHEN 650 MG: 325 TABLET ORAL at 04:11

## 2023-11-05 RX ADMIN — NIMODIPINE 60 MG: 30 CAPSULE, LIQUID FILLED ORAL at 05:11

## 2023-11-05 RX ADMIN — MIDAZOLAM HYDROCHLORIDE 1 MG: 2 INJECTION, SOLUTION INTRAMUSCULAR; INTRAVENOUS at 04:11

## 2023-11-05 RX ADMIN — NIMODIPINE 60 MG: 30 CAPSULE, LIQUID FILLED ORAL at 01:11

## 2023-11-05 RX ADMIN — NIMODIPINE 60 MG: 30 CAPSULE, LIQUID FILLED ORAL at 09:11

## 2023-11-05 RX ADMIN — POLYETHYLENE GLYCOL 3350 17 G: 17 POWDER, FOR SOLUTION ORAL at 05:11

## 2023-11-05 RX ADMIN — LEVETIRACETAM 500 MG: 100 INJECTION INTRAVENOUS at 08:11

## 2023-11-05 RX ADMIN — MIDAZOLAM HYDROCHLORIDE 1 MG: 2 INJECTION, SOLUTION INTRAMUSCULAR; INTRAVENOUS at 02:11

## 2023-11-05 RX ADMIN — IOHEXOL 120 ML: 300 INJECTION, SOLUTION INTRAVENOUS at 03:11

## 2023-11-05 RX ADMIN — GADOBUTROL 10 ML: 604.72 INJECTION INTRAVENOUS at 05:11

## 2023-11-05 RX ADMIN — LOSARTAN POTASSIUM 100 MG: 50 TABLET, FILM COATED ORAL at 08:11

## 2023-11-05 RX ADMIN — FENTANYL CITRATE 50 MCG: 50 INJECTION, SOLUTION INTRAMUSCULAR; INTRAVENOUS at 02:11

## 2023-11-05 RX ADMIN — SENNOSIDES AND DOCUSATE SODIUM 1 TABLET: 8.6; 5 TABLET ORAL at 05:11

## 2023-11-05 RX ADMIN — TAMSULOSIN HYDROCHLORIDE 0.4 MG: 0.4 CAPSULE ORAL at 08:11

## 2023-11-05 RX ADMIN — HEPARIN SODIUM 2000 UNITS: 1000 INJECTION, SOLUTION INTRAVENOUS; SUBCUTANEOUS at 02:11

## 2023-11-05 RX ADMIN — LIDOCAINE HYDROCHLORIDE 5 ML: 10 INJECTION, SOLUTION INFILTRATION; PERINEURAL at 02:11

## 2023-11-05 RX ADMIN — ACETAMINOPHEN 650 MG: 325 TABLET ORAL at 09:11

## 2023-11-05 RX ADMIN — LEVETIRACETAM 500 MG: 100 INJECTION INTRAVENOUS at 09:11

## 2023-11-05 RX ADMIN — ATORVASTATIN CALCIUM 80 MG: 40 TABLET, FILM COATED ORAL at 08:11

## 2023-11-05 RX ADMIN — NIMODIPINE 60 MG: 30 CAPSULE, LIQUID FILLED ORAL at 06:11

## 2023-11-05 NOTE — SUBJECTIVE & OBJECTIVE
"Interval History: 11/05/2023: PBD 1. SHANNON. AFVSS. Exam stable, intact. MRI/MRA completed. On strict SAH protocol.    Medications:  Continuous Infusions:  Scheduled Meds:   atorvastatin  80 mg Oral Daily    levETIRAcetam (Keppra) IV (PEDS and ADULTS)  500 mg Intravenous Q12H    losartan  100 mg Oral Daily    niMODipine  60 mg Oral Q4H    tamsulosin  0.4 mg Oral Daily     PRN Meds:acetaminophen, dextrose 10%, dextrose 10%, glucagon (human recombinant), hydrALAZINE, insulin aspart U-100, labetalol, ondansetron     Review of Systems  Objective:     Weight: 115.2 kg (253 lb 15.5 oz)  Body mass index is 34.44 kg/m².  Vital Signs (Most Recent):  Temp: 97.7 °F (36.5 °C) (11/05/23 0305)  Pulse: 90 (11/05/23 0701)  Resp: (!) 23 (11/05/23 0605)  BP: 130/66 (11/05/23 0701)  SpO2: 100 % (11/05/23 0701) Vital Signs (24h Range):  Temp:  [97.3 °F (36.3 °C)-98 °F (36.7 °C)] 97.7 °F (36.5 °C)  Pulse:  [] 90  Resp:  [16-23] 23  SpO2:  [82 %-100 %] 100 %  BP: (110-165)/() 130/66                Resp Rate Total:  [20 br/min] 20 br/min                Physical Exam         Neurosurgery Physical Exam    GENERAL: resting comfortably  HEENT: NCAT, PERRL, mucous membranes moist  NECK: supple, trachea midline  CV: normal capillary refill  PULM: aerating well, symmetric expansion, no distress  ABD: soft, NT, ND  EXT: no c/c/e    NEURO:    AAO x 3  CN II-XII grossly intact  Fc x 4 antigravity  SILT    No drift or dysmetria      Significant Labs:  Recent Labs   Lab 11/04/23  1354 11/05/23  0148    103    137   K 5.3* 4.8    104   CO2 19* 24   BUN 14 15   CREATININE 1.1 0.9   CALCIUM 9.6 9.3   MG  --  1.9     Recent Labs   Lab 11/04/23  1354 11/05/23  0148   WBC 7.35 8.40   HGB 13.7* 12.5*   HCT 42.9 39.6*    182     No results for input(s): "LABPT", "INR", "APTT" in the last 48 hours.  Microbiology Results (last 7 days)       ** No results found for the last 168 hours. **          All pertinent labs from the " last 24 hours have been reviewed.    Significant Diagnostics:  I have reviewed all pertinent imaging results/findings within the past 24 hours.  CTA Head and Neck (xpd)    Result Date: 11/4/2023  CTA head: Unremarkable CTA of the head specifically without evidence for proximal significant stenosis or large vessel occlusion or definite aneurysm. CTA neck: No significant arterial stenosis throughout the neck.. Superimposed degenerative change of the cervical spine as detailed above. CT head: Small volume scattered intraventricular hemorrhage.  Please note there is moderate distension lateral and 3rd ventricles which may be compensatory to volume loss however component of early hydrocephalus not excluded without priors for comparison. In addition there is small volume subdural hemorrhage along the ventral and dorsal aspect of the skull base and extending to the proximal cervical canal through the craniocervical junction allowing for CT technique. Clinical correlation, neuro surgical consultation and further evaluation with MRI brain and cervical spine recommended if patient compatible. Electronically signed by: Lalito Suarez DO Date:    11/04/2023 Time:    15:27

## 2023-11-05 NOTE — PLAN OF CARE
Goals remain appropriate.  Problem: Occupational Therapy  Goal: Occupational Therapy Goal  Description: Goals set 11/5 to be addressed for 14 days with expiration date, 11/19:  Patient will increase functional independence with ADLs by performing:    Patient will demonstrate rolling to the right with modified independence.  Not met   Patient will demonstrate rolling to the left with modified independence.   Not met  Patient will demonstrate supine -sit with modified independence.   Not met  Patient will demonstrate stand pivot transfers with modified independence.   Not met  Patient will demonstrate grooming while standing with modified independence.   Not met  Patient will demonstrate upper body dressing with modified independence while seated EOB.   Not met  Patient will demonstrate lower body dressing with modified independence while seated EOB.   Not met  Patient will demonstrate toileting with modified independence.   Not met  Patient will demonstrate bathing while seated EOB with modified independence.   Not met  Patient's family / caregiver will demonstrate independence and safety with assisting patient with self-care skills and functional mobility.     Not met  Patient and/or patient's family will verbalize understanding of stroke prevention guidelines, personal risk factors and stroke warning signs via teachback method.  Not met             11/5/2023 1003 by Karrie Banerjee LOTR  Outcome: Ongoing, Progressing  11/5/2023 1001 by Karrie Banerjee LOTR  Outcome: Ongoing, Progressing  11/5/2023 1001 by Karrie Banerjee LOTR  Outcome: Ongoing, Progressing

## 2023-11-05 NOTE — CONSULTS
[facility]  Neurosurgery  Consult Note    Inpatient consult to Neurosurgery  Consult performed by: Asael Richardson MD  Consult ordered by: Jessica Olmstead NP        Subjective:     Chief Complaint/Reason for Admission: SAH    History of Present Illness: 78M h/o lymphoma in remission, CAD, nonrheumatic mitral regurg, CHF, first-degree AV block, aortic arthrosclerosis admitted to [facility] with SAH. Initially presented initially to outside hospital ED with complaints of bilateral neck pain that happened approximately 2 hours prior to arrival.  Reports that the pain started immediately after having a bowel movement.  He does have associated headache that radiates up to his temples. Patient is on baby ASA. Also reports some associated weakness with walking. States he woke up this morning feeling normal without any pain or weakness. Denies fever/chills, vision/hearing changes, dysphagia, dysarthria, vomiting, new-onset weakness or sensory change, bowel/bladder changes. CTH with prepontine SAH; CTA negative for vessel lesions.       Medications Prior to Admission   Medication Sig Dispense Refill Last Dose    aspirin 81 MG Chew Take 81 mg by mouth once daily.       atorvastatin (LIPITOR) 80 MG tablet TAKE ONE-HALF TABLET BY MOUTH ONCE DAILY 90 tablet 3     cetirizine (ZYRTEC) 10 MG tablet Take 10 mg by mouth daily as needed.       cholecalciferol, vitamin D3, (VITAMIN D3) 50 mcg (2,000 unit) Cap Take 1 capsule by mouth once daily.       cyanocobalamin 1,000 mcg/mL injection INJECT 1 ML IN THE MUSCLE EVERY 30 DAYS 10 mL 1     fluticasone propionate (FLONASE) 50 mcg/actuation nasal spray SHAKE LIQUID AND USE 2 SPRAYS(100 MCG) IN EACH NOSTRIL EVERY DAY 16 g 3     furosemide (LASIX) 40 MG tablet Take 1 tablet (40 mg total) by mouth once daily. 120 tablet 3     ibuprofen (ADVIL,MOTRIN) 800 MG tablet Take 1 tablet (800 mg total) by mouth 2 (two) times daily as needed for Pain. 60 tablet 2     JARDIANCE 10 mg  nausea and vomiting, "feeling hot" "tablet TAKE 1 TABLET DAILY 90 tablet 3     linaCLOtide (LINZESS) 290 mcg Cap capsule Take 1 capsule (290 mcg total) by mouth before breakfast. 90 capsule 3     losartan (COZAAR) 100 MG tablet Take 1 tablet (100 mg total) by mouth once daily. 90 tablet 3     omeprazole 20 mg TbEC Take 1 tablet by mouth once daily.       solifenacin (VESICARE) 10 MG tablet Take 1 tablet (10 mg total) by mouth once daily. 30 tablet 11     spironolactone (ALDACTONE) 25 MG tablet Take 1 tablet (25 mg total) by mouth once daily. 90 tablet 3     syringe with needle 3 mL 23 x 1" Syrg 1 Syringe by Misc.(Non-Drug; Combo Route) route every 30 days. 50 Syringe 11     tamsulosin (FLOMAX) 0.4 mg Cap Take 1 capsule (0.4 mg total) by mouth once daily. 90 capsule 1        Review of patient's allergies indicates:   Allergen Reactions    Lotensin [benazepril]      cough       Past Medical History:   Diagnosis Date    Abnormal echocardiogram 2/8/2013    Mild MVR 3/07    CAD (coronary artery disease) 2/8/2013    Angio 3/07 Dr. Lin    Hypercholesterolemia 6/27/2022    Lymphoma in remission 2/8/2013    S/p chemo/XRT 1992 Relapse 1993 BMT 1993    Normal cardiac stress test 2/8/2013    Nuclear stress 2/09    Pernicious anemia 2/8/2013    Thyroid nodule 2/8/2013    Right s/p Bx 6/08 Dr. Pelayo     Past Surgical History:   Procedure Laterality Date    EYE SURGERY Bilateral 2016    Cataracts     Family History       Problem Relation (Age of Onset)    Cancer Mother    Hypertension Brother    No Known Problems Father, Daughter, Sister, Sister, Brother, Brother, Brother    Thyroid disease Sister          Tobacco Use    Smoking status: Never    Smokeless tobacco: Never   Substance and Sexual Activity    Alcohol use: Yes     Alcohol/week: 1.0 standard drink of alcohol     Types: 1 Standard drinks or equivalent per week     Comment: ocassionally    Drug use: No    Sexual activity: Not Currently     Partners: Female     Review of Systems: see HPI for pertinent " "positives and negatives.  Objective:     Weight: 115.2 kg (253 lb 15.5 oz)  Body mass index is 34.44 kg/m².  Vital Signs (Most Recent):  Temp: 97.8 °F (36.6 °C) (11/04/23 2305)  Pulse: 104 (11/04/23 2305)  Resp: 17 (11/04/23 2305)  BP: 130/60 (11/04/23 2305)  SpO2: 98 % (11/04/23 2305) Vital Signs (24h Range):  Temp:  [97.3 °F (36.3 °C)-98 °F (36.7 °C)] 97.8 °F (36.6 °C)  Pulse:  [] 104  Resp:  [16-21] 17  SpO2:  [82 %-99 %] 98 %  BP: (126-165)/() 130/60                Resp Rate Total:  [20 br/min] 20 br/min                Physical Exam         Neurosurgery Physical Exam    GENERAL: resting comfortably  HEENT: NCAT, PERRL, mucous membranes moist  NECK: supple, trachea midline  CV: normal capillary refill  PULM: aerating well, symmetric expansion, no distress  ABD: soft, NT, ND  EXT: no c/c/e    NEURO:    AAO x 3  CN II-XII grossly intact  Fc x 4 antigravity  SILT    No drift or dysmetria      Significant Labs:  Recent Labs   Lab 11/04/23  1354         K 5.3*      CO2 19*   BUN 14   CREATININE 1.1   CALCIUM 9.6     Recent Labs   Lab 11/04/23  1354   WBC 7.35   HGB 13.7*   HCT 42.9        No results for input(s): "LABPT", "INR", "APTT" in the last 48 hours.  Microbiology Results (last 7 days)       ** No results found for the last 168 hours. **          All pertinent labs from the last 24 hours have been reviewed.    Significant Diagnostics:  I have reviewed all pertinent imaging results/findings within the past 24 hours.  CTA Head and Neck (xpd)    Result Date: 11/4/2023  CTA head: Unremarkable CTA of the head specifically without evidence for proximal significant stenosis or large vessel occlusion or definite aneurysm. CTA neck: No significant arterial stenosis throughout the neck.. Superimposed degenerative change of the cervical spine as detailed above. CT head: Small volume scattered intraventricular hemorrhage.  Please note there is moderate distension lateral and 3rd " ventricles which may be compensatory to volume loss however component of early hydrocephalus not excluded without priors for comparison. In addition there is small volume subdural hemorrhage along the ventral and dorsal aspect of the skull base and extending to the proximal cervical canal through the craniocervical junction allowing for CT technique. Clinical correlation, neuro surgical consultation and further evaluation with MRI brain and cervical spine recommended if patient compatible. Electronically signed by: Lalito Suarez DO Date:    11/04/2023 Time:    15:27     Assessment/Plan:     * SAH (subarachnoid hemorrhage)  78M h/o lymphoma in remission, CAD, nonrheumatic mitral regurg, CHF, first-degree AV block, aortic arthrosclerosis admitted to [facility] with SAH. CTH with prepontine SAH; CTA negative for vessel lesions.     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     Neuro:  --Patient admitted to Neuro-ICU; preponderance of medical care per [facility]      -Q1h neurochecks while in ICU  --HOB@30  --IR consulted for DSA  --Keppra 500 BID x7d  --TCDs daily x14d  --Nimotop 60q4 x 21d  --Continue to follow clinically and notify NSGY immediately if any neurostatus change    CVS:  --SBP <140 mmHg (cardene ggt; hydralazine & labetalol PRN; transition to home meds when appropriate per [facility])  --Recc EKG and Echo for baseline cardiac status    Pulm:  --Recc CXR for baseline resp status  --Aggressive pulm treatment per [facility]    GIT/Electrolytes:  --CMP daily      -Replete electrolytes PRN per NCC  --Na goal >135  --Recc Utox/UA  --ADAT  --PPI    Renal:  --Strict I/Os  --Goal of euvolemia or net +1L    Heme/ID:  --Hold therapeutic anti-plt/coag medications per NCC  --Daily CBC      -Transfuse PRN  --Trend WBC and T    PPx:  --TEDs/SCDs  --SQH appropriate once aneurysm secured  --PPI    Dispo: Continued ICU care at this time, PT/OT when appropriate per NCC, CM/SW consult for dispo planning    Plan  d/w Dr. Tolbert.        Thank you for your consult. I will follow-up with patient. Please contact us if you have any additional questions.    Asael Richardson MD  Neurosurgery  [facility]

## 2023-11-05 NOTE — ASSESSMENT & PLAN NOTE
78M h/o lymphoma in remission, CAD, nonrheumatic mitral regurg, CHF, first-degree AV block, aortic arthrosclerosis admitted to [facility] with SAH. CTH with prepontine SAH; CTA negative for vessel lesions.     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     Neuro:  --Patient admitted to Neuro-ICU; preponderance of medical care per NCC      -Q1h neurochecks while in ICU  --HOB@30  --IR consulted for DSA  --Keppra 500 BID x7d  --TCDs daily x14d  --Nimotop 60q4 x 21d  --Continue to follow clinically and notify NSGY immediately if any neurostatus change    CVS:  --SBP <140 mmHg (cardene ggt; hydralazine & labetalol PRN; transition to home meds when appropriate per [facility])  --Recc EKG and Echo for baseline cardiac status    Pulm:  --Recc CXR for baseline resp status  --Aggressive pulm treatment per [facility]    GIT/Electrolytes:  --CMP daily      -Replete electrolytes PRN per NCC  --Na goal >135  --Recc Utox/UA  --ADAT  --PPI    Renal:  --Strict I/Os  --Goal of euvolemia or net +1L    Heme/ID:  --Hold therapeutic anti-plt/coag medications per NCC  --Daily CBC      -Transfuse PRN  --Trend WBC and T    PPx:  --TEDs/SCDs  --SQH appropriate once aneurysm secured  --PPI    Dispo: Continued ICU care at this time, PT/OT when appropriate per [facility], CM/SW consult for dispo planning    Plan d/w Dr. Tolbert.

## 2023-11-05 NOTE — PLAN OF CARE
Pt has right groin site. Mynx closure device used. Pt to lay flat, keep leg straight for 2 hours. Hemostasis time 1514.

## 2023-11-05 NOTE — H&P
Interventional Neuroradiology Pre-procedure Note    Procedure: Diagnostic cerebral angiogram    History of Present Illness:  Sam Gamino is a 78 y.o. male  h/o lymphoma in remission, CAD, nonrheumatic mitral regurg, CHF, first-degree AV block, aortic arthrosclerosis who presents for nontraumatic SAH. He is currently admitted to [facility] for monitoring. He is neuro intact with GCS 15. Diagnostic angiogram is requested to rule out aneurysm. Admission H&P reviewed.    ROS:   Hematological: no known coagulopathies  Respiratory: no shortness of breath  Cardiovascular: no chest pain  Gastrointestinal: no abdominal pain  Genito-Urinary: no dysuria  Musculoskeletal: negative  Neurological: no TIA or stroke symptoms     Scheduled Meds:    atorvastatin  80 mg Oral Daily    levETIRAcetam (Keppra) IV (PEDS and ADULTS)  500 mg Intravenous Q12H    losartan  100 mg Oral Daily    niMODipine  60 mg Oral Q4H    tamsulosin  0.4 mg Oral Daily     Current Meds:   Current Facility-Administered Medications:     acetaminophen tablet 650 mg, 650 mg, Oral, Q6H PRN, Miinea, Jessica, NP, 650 mg at 11/05/23 0424    atorvastatin tablet 80 mg, 80 mg, Oral, Daily, Miinea, Jessica, NP, 80 mg at 11/05/23 0855    dextrose 10% bolus 125 mL 125 mL, 12.5 g, Intravenous, PRN, Miinea, Jessica, NP    dextrose 10% bolus 250 mL 250 mL, 25 g, Intravenous, PRN, Miinea, Jessica, NP    glucagon (human recombinant) injection 1 mg, 1 mg, Intramuscular, PRN, Miinea, Jessica, NP    hydrALAZINE injection 10 mg, 10 mg, Intravenous, Q4H PRN, Miinea, Jessica, NP    insulin aspart U-100 pen 0-10 Units, 0-10 Units, Subcutaneous, Q6H PRN, Miinea, Jessica, NP    labetalol 20 mg/4 mL (5 mg/mL) IV syring, 10 mg, Intravenous, Q4H PRN, Miinea, Jessica, NP    levETIRAcetam injection 500 mg, 500 mg, Intravenous, Q12H, Odette Denson PA-C, 500 mg at 11/05/23 0858    losartan tablet 100 mg, 100 mg, Oral, Daily, Miinea, Jessica, NP, 100 mg at 11/05/23 0855    niMODipine capsule 60 mg, 60 mg,  Oral, Q4H, Miinea, Jessica, NP, 60 mg at 11/05/23 0900    ondansetron injection 4 mg, 4 mg, Intravenous, Q6H PRN, Miinea, Jessica, NP    tamsulosin 24 hr capsule 0.4 mg, 0.4 mg, Oral, Daily, Miinea, Jessica, NP, 0.4 mg at 11/05/23 0855   Continuous Infusions:   PRN Meds:acetaminophen, dextrose 10%, dextrose 10%, glucagon (human recombinant), hydrALAZINE, insulin aspart U-100, labetalol, ondansetron    Allergies:   Review of patient's allergies indicates:   Allergen Reactions    Lotensin [benazepril]      cough     Sedation Hx: No adverse events.    Labs:     WBC/Hgb/Hct/Plts:  8.40/12.5/39.6/182 (11/05 0148)  BUN/Cr/glu/ALT/AST/amyl/lip:  15/0.9/--/18/20/--/-- (11/05 0148)     Objective:  Vitals: Blood pressure (!) 140/63, pulse 83, temperature 98.4 °F (36.9 °C), temperature source Oral, resp. rate 15, height 6' (1.829 m), weight 115.2 kg (253 lb 15.5 oz), SpO2 100 %.     Physical Exam:  General: well developed, well nourished, no distress.   Head: normocephalic, atraumatic  Neck: No tracheal deviation. No palpable masses. Full ROM.   Neurologic: Alert and oriented. Thought content appropriate.  GCS: E4 V5 M6; Total: 15  Language: No aphasia  Speech: No dysarthria  Cranial nerves: face symmetric, tongue midline, CN II-XII grossly intact.   Eyes: pupils equal, round, reactive to light with accomodation, EOMI.   Pulmonary: normal respirations, no signs of respiratory distress  Abdomen: soft, non-distended, not tender to palpation  Vascular: Pulses 2+ and symmetric radial and dorsalis pedis. No LE edema.   Skin: Skin is warm, dry and intact.  Sensory: intact to light touch throughout  Motor Strength: Moves all extremities spontaneously with good tone.  Full strength upper and lower extremities. No abnormal movements seen.     ASA: 2  MAL: 2    Plan:  -Sedation Plan: moderate sedation   -Patient will undergo: diagnostic cerebral angiogram         Duncan Fuller MD, MHA  Fellow, NeuroEndovascular Surgery, [facility]  [facility]  Neurologist, [facility] [place], [place]

## 2023-11-05 NOTE — PT/OT/SLP EVAL
Physical Therapy Evaluation    Patient Name:  Sam Gamino   MRN:  [id number]    Recommendations:     Discharge Recommendations: Low Intensity Therapy   Discharge Equipment Recommendations: walker, rolling   Barriers to discharge: None    Assessment:     Sam Gamino is a 78 y.o. male admitted with a medical diagnosis of SAH (subarachnoid hemorrhage). Patient initially experienced signficant bilateral neck and head pain following having a bowel movement. Noted to have a small volume intercranial hemmorhage. Also has CAD, lymphoma in remission and a first AV block. He presents with the following impairments/functional limitations: impaired balance, gait instability, impaired coordination, impaired endurance, impaired functional mobility, decreased lower extremity function . Patient demonstrates significant lack of insight into deficits and endorses not using any Ads for ambulation prior despite his debility and noted bilateral knee flexion with shuffling gait. Patient endorsed that his style of ambulation was very close to his baseline.     Rehab Prognosis: Fair; patient would benefit from acute skilled PT services to address these deficits and reach maximum level of function.    Recent Surgery: * No surgery found *      Plan:     During this hospitalization, patient to be seen 4 x/week to address the identified rehab impairments via gait training, therapeutic activities, therapeutic exercises, neuromuscular re-education and progress toward the following goals:    Plan of Care Expires:  11/26/23    Subjective     Chief Complaint: My head is hurting  Patient/Family Comments/goals: To get out of this bed and start moving around more  Pain/Comfort:  Pain Rating 1: 0/10    Patients cultural, spiritual, Mandaeism conflicts given the current situation: no    Living Environment:  Lives in a 3rd floor apartment with his wife with elevator access. 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    Prior to admission, patients level of function was Indp.  Equipment used at home: none.  DME owned (not currently used): none.  Upon discharge, patient will have assistance from Spouse.    Objective:     Communicated with Rn prior to session.  Patient found supine with bed alarm, telemetry, blood pressure cuff, pulse ox (continuous)  upon PT entry to room.    General Precautions: Standard, aspiration, fall  Orthopedic Precautions:N/A   Braces: N/A  Respiratory Status: Room air    Exams:  Cognitive Exam:  Patient is oriented to Person, Place, Time, and Situation  Gross Motor Coordination:  Unable to appropriate approximate bilateral heel with contralateral shins.  RLE Strength: WNL  LLE Strength: WNL    Functional Mobility:  Bed Mobility:     Rolling Left:  supervision  Rolling Right: supervision  Supine to Sit: supervision  Sit to Supine: supervision  Transfers:     Sit to Stand:  contact guard assistance with rolling walker  Bed to Chair: contact guard assistance with  rolling walker  using  Stand Pivot  Gait: 42' FWW reciprocal shuffle gait pattern with bilateral knee flexion observed  Balance: Fair      AM-PAC 6 CLICK MOBILITY  Total Score:20       Treatment & Education:  Supine to sit with additional time.   Education provided concerning the purpose of using a walker based off of his severe shuffling gait without any AD that was moderately improved with the utilization of FWW.   Patient verbalized understanding to this and was agreeable to this plan of care.    Amb in room.   C/o increase of headache with supine to sit and ambulation, although all vitals remained constant with within appropriate limits for patients.     Patient left up in chair with all lines intact, call button in reach, and RN present.    GOALS:   Multidisciplinary Problems       Physical Therapy Goals          Problem: Physical Therapy    Goal Priority Disciplines Outcome  Goal Variances Interventions   Physical Therapy Goal     PT, PT/OT Ongoing, Progressing     Description: Goals to be met by: 23     Patient will increase functional independence with mobility by performin. Supine to sit with [place]  2. Sit to supine with [place]  3. Sit to stand transfer with [place]  4. Bed to chair transfer with Modified [place] using Rolling Walker  5. Gait  x 150 feet with Modified [place] using Rolling Walker.                          History:     Past Medical History:   Diagnosis Date    Abnormal echocardiogram 2013    Mild MVR 3/07    CAD (coronary artery disease) 2013    Angio 3/07 Dr. Lin    Hypercholesterolemia 2022    Lymphoma in remission 2013    S/p chemo/XRT 1992 Relapse  BMT     Normal cardiac stress test 2013    Nuclear stress     Pernicious anemia 2013    Thyroid nodule 2013    Right s/p Bx  Dr. Pelayo       Past Surgical History:   Procedure Laterality Date    EYE SURGERY Bilateral 2016    Cataracts       Time Tracking:     PT Received On: 23  PT Start Time: 1110     PT Stop Time: 1148  PT Total Time (min): 38 min     Billable Minutes: Evaluation 15 min, Gait Training 15 min, and Therapeutic Activity 8 min      2023

## 2023-11-05 NOTE — PLAN OF CARE
Problem: Physical Therapy  Goal: Physical Therapy Goal  Description: Goals to be met by: 23     Patient will increase functional independence with mobility by performin. Supine to sit with [place]  2. Sit to supine with [place]  3. Sit to stand transfer with [place]  4. Bed to chair transfer with Modified [place] using Rolling Walker  5. Gait  x 150 feet with Modified [place] using Rolling Walker.     Outcome: Ongoing, Progressing

## 2023-11-05 NOTE — NURSING
Time 14:12 pt transported to IR from [facility]. Transported by RN X1 Ambu bag at the bedside, on continues portable monitor, room air.. pt transferred to procedure table safely, hooked up to monitor by IR RN. Report given to MAT Mullen [facility]

## 2023-11-05 NOTE — HOSPITAL COURSE
11/05/2023: PBD 1. NAEON. AFVSS. Exam stable, intact. MRI/MRA completed. On strict SAH protocol.  11/6: PBD 2. NAEON. AFVSS. -1L / 24 hrs. MRI/MRA negative. DSA yesterday negative. Plan for CTA PBD 7  11/07/2023: PBD 3. NAEON. AFVSS. Exam stable. I/O negative. HA improved. Denies new weakness/numbness/tingling.  11/08/2023: PBD 4. NAEON. AFVSS. HA nearly resolved.  11/9: NAEON. AFVSS. TCDs WNL. Plan for CTA PBD 7. Will discuss possibility of stepping down before CTA. Exam stable  11/10: NAEON. AFVSS. TCDs yesterday WNL. Planning for CTA tomorrow. Exam stable. Hopefully discharge tomorrow if CTA stable  11/11: NAEON. AFVSS. TCDs wnl yesterday. CTA today, pending. Exam stable. D/c today if CTA stable.   11/12: NAEON. AFVSS. CTA yesterday w/o evidence of vasospasm. Exam stable. DC recs changed to SNF, pending SNF  11/13: NAEON. AFVSS. No complaints. Neuro exam stable. Pending discharge to SNF.   11/14: Pt became acutely dizzy while working with PT this am, briefly unresponsive to staff then recovered. Became tachycardic/diaphoretic following episode. Neurologically intact on exam. CTA head/neck revealed b/l PE. Vitals stable. Starting on heparin gtt, Echo ordered.   11/15: NAEON. AFVSS. Pt reports feeling much improved today. Denies any SOB/CP/palpitations, denies HA. Neurologically stable. Sats 100% on 2L. Supratherapeutic on heparin gtt. CTH today. TTE negative for R heart strain.   11/16: NAEON. VSS. Pt without new complaints today. Continues to deny SOB ro CP. Eliquis started yesterday. CTH remained stable. + BM. SNF pending   11/17: NAEON. AFVSS. Pt doing well this morning, no acute complaints. Bowel regimen changed to PRN due to multiple BM's. Medically stable for discharge to SNF today. Will arrange follow up in NSGY clinic in about 6 weeks with CTA. Will need outpatient follow up with established cardiology, has appt scheduled for 12/7.

## 2023-11-05 NOTE — PLAN OF CARE
"[facility] Care Plan    POC reviewed with Sam Gamino and family at 0300. Pt verbalized understanding. Questions and concerns addressed. No acute events overnight. Pt progressing toward goals. Will continue to monitor. See below and flowsheets for full assessment and VS info.     -MRI completed   -Bath given and linens changed     Is this a stroke patient? yes- Stroke booklet reviewed with patient and family, risk factors identified for patient and stroke booklet remains at bedside for ongoing education.     Neuro:  [place] Coma Scale  Best Eye Response: 4-->(E4) spontaneous  Best Motor Response: 6-->(M6) obeys commands  Best Verbal Response: 5-->(V5) oriented  [place] Coma Scale Score: 15  Assessment Qualifiers: patient not sedated/intubated, no eye obstruction present  Pupil PERRLA: yes     24hr Temp:  [97.3 °F (36.3 °C)-98 °F (36.7 °C)]     CV:   Rhythm: normal sinus rhythm  BP goals:   SBP < 140  MAP > 65    Resp:           Plan: N/A    GI/:     Diet/Nutrition Received: NPO  Last Bowel Movement: 11/03/23       Intake/Output Summary (Last 24 hours) at 11/5/2023 0330  Last data filed at 11/5/2023 0130  Gross per 24 hour   Intake 250 ml   Output 550 ml   Net -300 ml          Labs/Accuchecks:  Recent Labs   Lab 11/05/23  0148   WBC 8.40   RBC 3.77*   HGB 12.5*   HCT 39.6*         Recent Labs   Lab 11/05/23  0148      K 4.8   CO2 24      BUN 15   CREATININE 0.9   ALKPHOS 64   ALT 18   AST 20   BILITOT 1.0    No results for input(s): "PROTIME", "INR", "APTT", "HEPANTIXA" in the last 168 hours. No results for input(s): "CPK", "CPKMB", "TROPONINI", "MB" in the last 168 hours.    Electrolytes: N/A - electrolytes WDL  Accuchecks: Q6H    Gtts:      LDA/Wounds:  Lines/Drains/Airways       Peripheral Intravenous Line  Duration                  Peripheral IV - Single Lumen 11/04/23 1352 20 G Anterior;Distal;Right Forearm <1 day         Peripheral IV - Single Lumen 11/04/23 1536 22 G Left Hand <1 day         "          Wounds: No  Wound care consulted: No

## 2023-11-05 NOTE — ASSESSMENT & PLAN NOTE
78-year-old male with lymphoma in remission, CAD, nonrheumatic mitral regurg, CHF, first-degree AV block, aortic arthrosclerosis who presents w/SAH/IVH    NSGY consulted  CTA head reviewed  MRI and MRA w/wo con (vessel wall imaging) pending  SBP <140  Nimodipine   TCD daily  Neuro IR consulted for DSA tomorrow   Neuro checks q 1 hr  PT/OT/SLP

## 2023-11-05 NOTE — PROCEDURES
Radiology Post-Procedure Note    Pre Op Diagnosis: SAH (HH1F1)    Post Op Diagnosis: Same      Procedure: Cerebral angiogram    Procedure performed by: Guille Rosen MD and Duncan Fuller MD      Written Informed Consent Obtained: Yes    Specimen Removed: NO    Estimated Blood Loss: less than 100     Procedure report:     A 5F sheath was placed into the right femoral artery and a 5F Charbel catheter was advanced into the aortic arch.  The right and left ECA, ICA and vertebral arteries were subselected and angiography of the brain was performed after injection into each of these vessels.    Preliminary interpretation: No evidence of aneurysm is seen on the diagnostic angiogram.  Please see Imaging report for full details.    A right femoral artery angiogram was performed, the sheath removed and hemostasis achieved using mynx device.  No hematoma was present at the time of hemostasis.    The patient tolerated the procedure well.         Duncan Fuller MD, MHA  Fellow, NeuroEndovascular Surgery, [facility]  Neurologist, [facility]

## 2023-11-05 NOTE — NURSING
Pt arrived back to room following MRI        Pt was escorted by RN on cardiac monitoring and ambu bag.        Patient placed back on bedside monitor with alarms audible, bed in low position with bed alarm on, call light within reach. Monitoring continued.

## 2023-11-05 NOTE — ASSESSMENT & PLAN NOTE
78M h/o lymphoma in remission, CAD, nonrheumatic mitral regurg, CHF, first-degree AV block, aortic arthrosclerosis admitted to [facility] with SAH. CTH with prepontine SAH; CTA negative for vessel lesions.     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     Neuro:  --Patient admitted to Neuro-ICU; preponderance of medical care per [facility]      -Q1h neurochecks while in ICU  --CTA 11/4: negative for vascular pathology  --MRI/MRA Brain 11/5: negative for underlying lesion  --HOB@30  --IR consulted for DSA  --Keppra 500 BID x7d  --TCDs daily x14d  --Nimotop 60q4 x 21d  --Continue to follow clinically and notify NSGY immediately if any neurostatus change    CVS:  --SBP <140 mmHg  --Recc EKG and Echo for baseline cardiac status    Pulm:  --Recc CXR for baseline resp status  --Aggressive pulm treatment per [facility]    GIT/Electrolytes:  --CMP daily      -Replete electrolytes PRN per NCC  --Na goal >135  --Recc Utox/UA  --ADAT  --PPI    Renal:  --Strict I/Os  --Goal of euvolemia or net +1L    Heme/ID:  --Hold therapeutic anti-plt/coag medications per NCC  --Daily CBC      -Transfuse PRN  --Trend WBC and T    PPx:  --TEDs/SCDs  --SQH appropriate once aneurysm secured  --PPI    Dispo: Continued ICU care at this time, PT/OT when appropriate per [facility], CM/SW consult for dispo planning    Plan d/w Dr. Tolbert.

## 2023-11-05 NOTE — SUBJECTIVE & OBJECTIVE
"Medications Prior to Admission   Medication Sig Dispense Refill Last Dose    aspirin 81 MG Chew Take 81 mg by mouth once daily.       atorvastatin (LIPITOR) 80 MG tablet TAKE ONE-HALF TABLET BY MOUTH ONCE DAILY 90 tablet 3     cetirizine (ZYRTEC) 10 MG tablet Take 10 mg by mouth daily as needed.       cholecalciferol, vitamin D3, (VITAMIN D3) 50 mcg (2,000 unit) Cap Take 1 capsule by mouth once daily.       cyanocobalamin 1,000 mcg/mL injection INJECT 1 ML IN THE MUSCLE EVERY 30 DAYS 10 mL 1     fluticasone propionate (FLONASE) 50 mcg/actuation nasal spray SHAKE LIQUID AND USE 2 SPRAYS(100 MCG) IN EACH NOSTRIL EVERY DAY 16 g 3     furosemide (LASIX) 40 MG tablet Take 1 tablet (40 mg total) by mouth once daily. 120 tablet 3     ibuprofen (ADVIL,MOTRIN) 800 MG tablet Take 1 tablet (800 mg total) by mouth 2 (two) times daily as needed for Pain. 60 tablet 2     JARDIANCE 10 mg tablet TAKE 1 TABLET DAILY 90 tablet 3     linaCLOtide (LINZESS) 290 mcg Cap capsule Take 1 capsule (290 mcg total) by mouth before breakfast. 90 capsule 3     losartan (COZAAR) 100 MG tablet Take 1 tablet (100 mg total) by mouth once daily. 90 tablet 3     omeprazole 20 mg TbEC Take 1 tablet by mouth once daily.       solifenacin (VESICARE) 10 MG tablet Take 1 tablet (10 mg total) by mouth once daily. 30 tablet 11     spironolactone (ALDACTONE) 25 MG tablet Take 1 tablet (25 mg total) by mouth once daily. 90 tablet 3     syringe with needle 3 mL 23 x 1" Syrg 1 Syringe by Misc.(Non-Drug; Combo Route) route every 30 days. 50 Syringe 11     tamsulosin (FLOMAX) 0.4 mg Cap Take 1 capsule (0.4 mg total) by mouth once daily. 90 capsule 1        Review of patient's allergies indicates:   Allergen Reactions    Lotensin [benazepril]      cough       Past Medical History:   Diagnosis Date    Abnormal echocardiogram 2/8/2013    Mild MVR 3/07    CAD (coronary artery disease) 2/8/2013    Angio 3/07 Dr. Lin    Hypercholesterolemia 6/27/2022    Lymphoma in " remission 2/8/2013    S/p chemo/XRT 1992 Relapse 1993 BMT 1993    Normal cardiac stress test 2/8/2013    Nuclear stress 2/09    Pernicious anemia 2/8/2013    Thyroid nodule 2/8/2013    Right s/p Bx 6/08 Dr. Pelayo     Past Surgical History:   Procedure Laterality Date    EYE SURGERY Bilateral 2016    Cataracts     Family History       Problem Relation (Age of Onset)    Cancer Mother    Hypertension Brother    No Known Problems Father, Daughter, Sister, Sister, Brother, Brother, Brother    Thyroid disease Sister          Tobacco Use    Smoking status: Never    Smokeless tobacco: Never   Substance and Sexual Activity    Alcohol use: Yes     Alcohol/week: 1.0 standard drink of alcohol     Types: 1 Standard drinks or equivalent per week     Comment: ocassionally    Drug use: No    Sexual activity: Not Currently     Partners: Female     Review of Systems: see HPI for pertinent positives and negatives.  Objective:     Weight: 115.2 kg (253 lb 15.5 oz)  Body mass index is 34.44 kg/m².  Vital Signs (Most Recent):  Temp: 97.8 °F (36.6 °C) (11/04/23 2305)  Pulse: 104 (11/04/23 2305)  Resp: 17 (11/04/23 2305)  BP: 130/60 (11/04/23 2305)  SpO2: 98 % (11/04/23 2305) Vital Signs (24h Range):  Temp:  [97.3 °F (36.3 °C)-98 °F (36.7 °C)] 97.8 °F (36.6 °C)  Pulse:  [] 104  Resp:  [16-21] 17  SpO2:  [82 %-99 %] 98 %  BP: (126-165)/() 130/60                Resp Rate Total:  [20 br/min] 20 br/min                Physical Exam         Neurosurgery Physical Exam    GENERAL: resting comfortably  HEENT: NCAT, PERRL, mucous membranes moist  NECK: supple, trachea midline  CV: normal capillary refill  PULM: aerating well, symmetric expansion, no distress  ABD: soft, NT, ND  EXT: no c/c/e    NEURO:    AAO x 3  CN II-XII grossly intact  Fc x 4 antigravity  SILT    No drift or dysmetria      Significant Labs:  Recent Labs   Lab 11/04/23  1354         K 5.3*      CO2 19*   BUN 14   CREATININE 1.1   CALCIUM 9.6  "    Recent Labs   Lab 11/04/23  1354   WBC 7.35   HGB 13.7*   HCT 42.9        No results for input(s): "LABPT", "INR", "APTT" in the last 48 hours.  Microbiology Results (last 7 days)       ** No results found for the last 168 hours. **          All pertinent labs from the last 24 hours have been reviewed.    Significant Diagnostics:  I have reviewed all pertinent imaging results/findings within the past 24 hours.  CTA Head and Neck (xpd)    Result Date: 11/4/2023  CTA head: Unremarkable CTA of the head specifically without evidence for proximal significant stenosis or large vessel occlusion or definite aneurysm. CTA neck: No significant arterial stenosis throughout the neck.. Superimposed degenerative change of the cervical spine as detailed above. CT head: Small volume scattered intraventricular hemorrhage.  Please note there is moderate distension lateral and 3rd ventricles which may be compensatory to volume loss however component of early hydrocephalus not excluded without priors for comparison. In addition there is small volume subdural hemorrhage along the ventral and dorsal aspect of the skull base and extending to the proximal cervical canal through the craniocervical junction allowing for CT technique. Clinical correlation, neuro surgical consultation and further evaluation with MRI brain and cervical spine recommended if patient compatible. Electronically signed by: Lalito Suarez DO Date:    11/04/2023 Time:    15:27     "

## 2023-11-05 NOTE — PROGRESS NOTES
[facility] - Neuro Critical Care  Neurosurgery  Progress Note    Subjective:     History of Present Illness: 78M h/o lymphoma in remission, CAD, nonrheumatic mitral regurg, CHF, first-degree AV block, aortic arthrosclerosis admitted to [facility] with SAH. Initially presented initially to outside hospital ED with complaints of bilateral neck pain that happened approximately 2 hours prior to arrival.  Reports that the pain started immediately after having a bowel movement.  He does have associated headache that radiates up to his temples. Patient is on baby ASA. Also reports some associated weakness with walking. States he woke up this morning feeling normal without any pain or weakness. Denies fever/chills, vision/hearing changes, dysphagia, dysarthria, vomiting, new-onset weakness or sensory change, bowel/bladder changes. CTH with prepontine SAH; CTA negative for vessel lesions.       Post-Op Info:  * No surgery found *       Interval History: 11/05/2023: PBD 1. NAEON. AFVSS. Exam stable, intact. MRI/MRA completed. On strict SAH protocol.    Medications:  Continuous Infusions:  Scheduled Meds:   atorvastatin  80 mg Oral Daily    levETIRAcetam (Keppra) IV (PEDS and ADULTS)  500 mg Intravenous Q12H    losartan  100 mg Oral Daily    niMODipine  60 mg Oral Q4H    tamsulosin  0.4 mg Oral Daily     PRN Meds:acetaminophen, dextrose 10%, dextrose 10%, glucagon (human recombinant), hydrALAZINE, insulin aspart U-100, labetalol, ondansetron     Review of Systems  Objective:     Weight: 115.2 kg (253 lb 15.5 oz)  Body mass index is 34.44 kg/m².  Vital Signs (Most Recent):  Temp: 97.7 °F (36.5 °C) (11/05/23 0305)  Pulse: 90 (11/05/23 0701)  Resp: (!) 23 (11/05/23 0605)  BP: 130/66 (11/05/23 0701)  SpO2: 100 % (11/05/23 0701) Vital Signs (24h Range):  Temp:  [97.3 °F (36.3 °C)-98 °F (36.7 °C)] 97.7 °F (36.5 °C)  Pulse:  [] 90  Resp:  [16-23] 23  SpO2:  [82 %-100 %] 100 %  BP: (110-165)/() 130/66                Resp Rate  "Total:  [20 br/min] 20 br/min                Physical Exam         Neurosurgery Physical Exam    GENERAL: resting comfortably  HEENT: NCAT, PERRL, mucous membranes moist  NECK: supple, trachea midline  CV: normal capillary refill  PULM: aerating well, symmetric expansion, no distress  ABD: soft, NT, ND  EXT: no c/c/e    NEURO:    AAO x 3  CN II-XII grossly intact  Fc x 4 antigravity  SILT    No drift or dysmetria      Significant Labs:  Recent Labs   Lab 11/04/23  1354 11/05/23  0148    103    137   K 5.3* 4.8    104   CO2 19* 24   BUN 14 15   CREATININE 1.1 0.9   CALCIUM 9.6 9.3   MG  --  1.9     Recent Labs   Lab 11/04/23  1354 11/05/23  0148   WBC 7.35 8.40   HGB 13.7* 12.5*   HCT 42.9 39.6*    182     No results for input(s): "LABPT", "INR", "APTT" in the last 48 hours.  Microbiology Results (last 7 days)       ** No results found for the last 168 hours. **          All pertinent labs from the last 24 hours have been reviewed.    Significant Diagnostics:  I have reviewed all pertinent imaging results/findings within the past 24 hours.  CTA Head and Neck (xpd)    Result Date: 11/4/2023  CTA head: Unremarkable CTA of the head specifically without evidence for proximal significant stenosis or large vessel occlusion or definite aneurysm. CTA neck: No significant arterial stenosis throughout the neck.. Superimposed degenerative change of the cervical spine as detailed above. CT head: Small volume scattered intraventricular hemorrhage.  Please note there is moderate distension lateral and 3rd ventricles which may be compensatory to volume loss however component of early hydrocephalus not excluded without priors for comparison. In addition there is small volume subdural hemorrhage along the ventral and dorsal aspect of the skull base and extending to the proximal cervical canal through the craniocervical junction allowing for CT technique. Clinical correlation, neuro surgical consultation and " further evaluation with MRI brain and cervical spine recommended if patient compatible. Electronically signed by: Lalito Suarez DO Date:    11/04/2023 Time:    15:27   Assessment/Plan:     * SAH (subarachnoid hemorrhage)  78M h/o lymphoma in remission, CAD, nonrheumatic mitral regurg, CHF, first-degree AV block, aortic arthrosclerosis admitted to [facility] with SAH. CTH with prepontine SAH; CTA negative for vessel lesions.     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     Neuro:  --Patient admitted to Neuro-ICU; preponderance of medical care per [facility]      -Q1h neurochecks while in ICU  --CTA 11/4: negative for vascular pathology  --MRI/MRA Brain 11/5: negative for underlying lesion  --HOB@30  --IR consulted for DSA  --Keppra 500 BID x7d  --TCDs daily x14d  --Nimotop 60q4 x 21d  --Continue to follow clinically and notify NSGY immediately if any neurostatus change    CVS:  --SBP <140 mmHg  --Recc EKG and Echo for baseline cardiac status    Pulm:  --Recc CXR for baseline resp status  --Aggressive pulm treatment per [facility]    GIT/Electrolytes:  --CMP daily      -Replete electrolytes PRN per [facility]  --Na goal >135  --Recc Utox/UA  --ADAT  --PPI    Renal:  --Strict I/Os  --Goal of euvolemia or net +1L    Heme/ID:  --Hold therapeutic anti-plt/coag medications per [facility]  --Daily CBC      -Transfuse PRN  --Trend WBC and T    PPx:  --TEDs/SCDs  --SQH appropriate once aneurysm secured  --PPI    Dispo: Continued ICU care at this time, PT/OT when appropriate per [facility], CM/SW consult for dispo planning    Plan d/w Dr. Tolbert.        Asael Richardson MD  Neurosurgery  [facility] - Neuro Critical Care

## 2023-11-05 NOTE — H&P
[facility] - Neuro Critical Care  Neurocritical Care  History & Physical    Admit Date: 11/4/2023  Service Date: 11/04/2023  Length of Stay: 0    Subjective:     Chief Complaint: SAH (subarachnoid hemorrhage)    History of Present Illness: The patient is a 78-year-old male with PMHx of  lymphoma in remission, CAD, nonrheumatic mitral regurg, CHF, first-degree AV block, aortic arthrosclerosis admitted to [facility] with aSAH/IVH. Initially presented initially to outside hospital ED with complaints of bilateral neck pain that happened approximately 2 hours prior to arrival.  Reports that the pain started immediately after having a bowel movement.  He does have associated headache that radiates up to his temples. Patient is on baby ASA. Also reports some associated weakness with walking. States he woke up this morning feeling normal without any pain or weakness.          Past Medical History:   Diagnosis Date    Abnormal echocardiogram 2/8/2013    Mild MVR 3/07    CAD (coronary artery disease) 2/8/2013    Angio 3/07 Dr. Lin    Hypercholesterolemia 6/27/2022    Lymphoma in remission 2/8/2013    S/p chemo/XRT 1992 Relapse 1993 BMT 1993    Normal cardiac stress test 2/8/2013    Nuclear stress 2/09    Pernicious anemia 2/8/2013    Thyroid nodule 2/8/2013    Right s/p Bx 6/08 Dr. Pelayo     Past Surgical History:   Procedure Laterality Date    EYE SURGERY Bilateral 2016    Cataracts      No current facility-administered medications on file prior to encounter.     Current Outpatient Medications on File Prior to Encounter   Medication Sig Dispense Refill    aspirin 81 MG Chew Take 81 mg by mouth once daily.      atorvastatin (LIPITOR) 80 MG tablet TAKE ONE-HALF TABLET BY MOUTH ONCE DAILY 90 tablet 3    cetirizine (ZYRTEC) 10 MG tablet Take 10 mg by mouth daily as needed.      cholecalciferol, vitamin D3, (VITAMIN D3) 50 mcg (2,000 unit) Cap Take 1 capsule by mouth once daily.      cyanocobalamin 1,000 mcg/mL  "injection INJECT 1 ML IN THE MUSCLE EVERY 30 DAYS 10 mL 1    fluticasone propionate (FLONASE) 50 mcg/actuation nasal spray SHAKE LIQUID AND USE 2 SPRAYS(100 MCG) IN EACH NOSTRIL EVERY DAY 16 g 3    furosemide (LASIX) 40 MG tablet Take 1 tablet (40 mg total) by mouth once daily. 120 tablet 3    ibuprofen (ADVIL,MOTRIN) 800 MG tablet Take 1 tablet (800 mg total) by mouth 2 (two) times daily as needed for Pain. 60 tablet 2    JARDIANCE 10 mg tablet TAKE 1 TABLET DAILY 90 tablet 3    linaCLOtide (LINZESS) 290 mcg Cap capsule Take 1 capsule (290 mcg total) by mouth before breakfast. 90 capsule 3    losartan (COZAAR) 100 MG tablet Take 1 tablet (100 mg total) by mouth once daily. 90 tablet 3    omeprazole 20 mg TbEC Take 1 tablet by mouth once daily.      solifenacin (VESICARE) 10 MG tablet Take 1 tablet (10 mg total) by mouth once daily. 30 tablet 11    spironolactone (ALDACTONE) 25 MG tablet Take 1 tablet (25 mg total) by mouth once daily. 90 tablet 3    syringe with needle 3 mL 23 x 1" Syrg 1 Syringe by Misc.(Non-Drug; Combo Route) route every 30 days. 50 Syringe 11    tamsulosin (FLOMAX) 0.4 mg Cap Take 1 capsule (0.4 mg total) by mouth once daily. 90 capsule 1      Allergies: Lotensin [benazepril]    N/A  Family history is reviewed and has not changed   Social History     Tobacco Use    Smoking status: Never    Smokeless tobacco: Never   Substance Use Topics    Alcohol use: Yes     Alcohol/week: 1.0 standard drink of alcohol     Types: 1 Standard drinks or equivalent per week     Comment: ocassionally    Drug use: No     Review of Systems  Constitutional: Denies fevers, weight loss, chills, or weakness.  Eyes: Denies changes in vision.  ENT: Denies dysphagia, nasal discharge, ear pain or discharge.  Cardiovascular: Denies chest pain, palpitations, orthopnea, or claudication.  Respiratory: Denies shortness of breath, cough, hemoptysis, or wheezing.  GI: Denies nausea/vomitting, hematochezia, melena, abd " pain, or changes in appetite.  : Denies dysuria, incontinence, or hematuria.  Musculoskeletal: Denies joint pain or myalgias.  Skin/breast: Denies rashes, lumps, lesions, or discharge.  Neurologic: Denies headache, dizziness, vertigo, or paresthesias.  Psychiatric: Denies changes in mood or hallucinations.  Endocrine: Denies polyuria, polydipsia, heat/cold intolerance.  Hematologic/Lymph: Denies lymphadenopathy, easy bruising or easy bleeding.  Allergic/Immunologic: Denies rash, rhinitis.    Objective:     Vitals:    Temp: 98 °F (36.7 °C)  Pulse: 106  Rhythm: normal sinus rhythm  BP: (!) 157/77  MAP (mmHg): 107  Resp: (!) 21  SpO2: 98 %    Temp  Min: 97.3 °F (36.3 °C)  Max: 98 °F (36.7 °C)  Pulse  Min: 79  Max: 179  BP  Min: 126/60  Max: 165/101  MAP (mmHg)  Min: 86  Max: 127  Resp  Min: 16  Max: 21  SpO2  Min: 82 %  Max: 98 %    No intake/output data recorded.            Physical Exam  GA: Alert, comfortable, no acute distress.   HEENT: No scleral icterus or JVD.   Pulmonary: Clear to auscultation A/L.   Cardiac: RRR S1 & S2 w/o rubs/murmurs/gallops.   Abdominal: Bowel sounds present x 4. No appreciable hepatosplenomegaly.  Skin: No jaundice, rashes, or visible lesions.  Neuro:  --GCS: E4 V5 M6  --Mental Status:  awake, oriented X4, follows commands  --CN II-XII grossly intact.   --Pupils 3mm, PERRL.   --Corneal reflex, gag, cough intact.  --LUE strength: 5/5  --RUE strength: 5/5  --LLE strength: 5/5  --RLE strength: 5/5        Today I personally reviewed pertinent medications, lines/drains/airways, imaging, cardiology results, laboratory results, microbiology results,          Assessment/Plan:     Neuro  * SAH (subarachnoid hemorrhage)   78-year-old male with lymphoma in remission, CAD, nonrheumatic mitral regurg, CHF, first-degree AV block, aortic arthrosclerosis who presents w/SAH/IVH    NSGY consulted  CTA head reviewed  MRI and MRA w/wo con (vessel wall imaging) pending  SBP <140  Nimodipine   TCD  daily  Neuro IR consulted for DSA tomorrow   Neuro checks q 1 hr  PT/OT/SLP    Cardiac/Vascular  Coronary artery disease  At home on ASA  -hold          The patient is being Prophylaxed for:  Venous Thromboembolism with: Mechanical  Stress Ulcer with: None  Ventilator Pneumonia with: not applicable    N/A  Family history is reviewed and has not changed     Activity Orders          Elevate HOB 45 starting at 11/04 1541        Full Code    Jessica Olmstead NP  Neurocritical Care  [facility] - Neuro Critical Care    Critical condition in that Patient has a condition that poses threat to life and bodily function: SAH/IVH     35 minutes of Critical care time was spent personally by me on the following activities: development of treatment plan with patient or surrogate and bedside caregivers, discussions with consultants, evaluation of patient's response to treatment, examination of patient, ordering and performing treatments and interventions, ordering and review of laboratory studies, ordering and review of radiographic studies, pulse oximetry, antibiotic titration if applicable, vasopressor titration if applicable, re-evaluation of patient's condition. This critical care time did not overlap with that of any other provider or involve time for any procedures. There is high probability for acute neurological change leading to clinical and possibly life-threatening deterioration requiring highest level of physician preparedness for urgent intervention.

## 2023-11-06 ENCOUNTER — TELEPHONE (OUTPATIENT)
Dept: CARDIOLOGY | Facility: CLINIC | Age: 78
End: 2023-11-06
Payer: MEDICARE

## 2023-11-06 LAB
ALBUMIN SERPL BCP-MCNC: 3.4 G/DL (ref 3.5–5.2)
ALP SERPL-CCNC: 59 U/L (ref 55–135)
ALT SERPL W/O P-5'-P-CCNC: 16 U/L (ref 10–44)
ANION GAP SERPL CALC-SCNC: 10 MMOL/L (ref 8–16)
AST SERPL-CCNC: 22 U/L (ref 10–40)
BASOPHILS # BLD AUTO: 0.03 K/UL (ref 0–0.2)
BASOPHILS NFR BLD: 0.5 % (ref 0–1.9)
BILIRUB SERPL-MCNC: 1.7 MG/DL (ref 0.1–1)
BUN SERPL-MCNC: 14 MG/DL (ref 8–23)
CALCIUM SERPL-MCNC: 8.8 MG/DL (ref 8.7–10.5)
CHLORIDE SERPL-SCNC: 103 MMOL/L (ref 95–110)
CO2 SERPL-SCNC: 19 MMOL/L (ref 23–29)
CREAT SERPL-MCNC: 0.8 MG/DL (ref 0.5–1.4)
DIFFERENTIAL METHOD: ABNORMAL
EOSINOPHIL # BLD AUTO: 0.1 K/UL (ref 0–0.5)
EOSINOPHIL NFR BLD: 0.8 % (ref 0–8)
ERYTHROCYTE [DISTWIDTH] IN BLOOD BY AUTOMATED COUNT: 11.9 % (ref 11.5–14.5)
EST. GFR  (NO RACE VARIABLE): >60 ML/MIN/1.73 M^2
GLUCOSE SERPL-MCNC: 87 MG/DL (ref 70–110)
HCT VFR BLD AUTO: 38.5 % (ref 40–54)
HGB BLD-MCNC: 12.4 G/DL (ref 14–18)
IMM GRANULOCYTES # BLD AUTO: 0.02 K/UL (ref 0–0.04)
IMM GRANULOCYTES NFR BLD AUTO: 0.3 % (ref 0–0.5)
LYMPHOCYTES # BLD AUTO: 1.3 K/UL (ref 1–4.8)
LYMPHOCYTES NFR BLD: 19.4 % (ref 18–48)
MAGNESIUM SERPL-MCNC: 1.9 MG/DL (ref 1.6–2.6)
MCH RBC QN AUTO: 33.8 PG (ref 27–31)
MCHC RBC AUTO-ENTMCNC: 32.2 G/DL (ref 32–36)
MCV RBC AUTO: 105 FL (ref 82–98)
MONOCYTES # BLD AUTO: 0.5 K/UL (ref 0.3–1)
MONOCYTES NFR BLD: 7.6 % (ref 4–15)
NEUTROPHILS # BLD AUTO: 4.7 K/UL (ref 1.8–7.7)
NEUTROPHILS NFR BLD: 71.4 % (ref 38–73)
NRBC BLD-RTO: 0 /100 WBC
PHOSPHATE SERPL-MCNC: 2.3 MG/DL (ref 2.7–4.5)
PLATELET # BLD AUTO: 164 K/UL (ref 150–450)
PMV BLD AUTO: 10.3 FL (ref 9.2–12.9)
POTASSIUM SERPL-SCNC: 4.1 MMOL/L (ref 3.5–5.1)
PROT SERPL-MCNC: 6.8 G/DL (ref 6–8.4)
RBC # BLD AUTO: 3.67 M/UL (ref 4.6–6.2)
SODIUM SERPL-SCNC: 132 MMOL/L (ref 136–145)
SODIUM SERPL-SCNC: 137 MMOL/L (ref 136–145)
WBC # BLD AUTO: 6.6 K/UL (ref 3.9–12.7)

## 2023-11-06 PROCEDURE — 25000003 PHARM REV CODE 250: Performed by: PHYSICIAN ASSISTANT

## 2023-11-06 PROCEDURE — 97530 THERAPEUTIC ACTIVITIES: CPT | Mod: CQ

## 2023-11-06 PROCEDURE — 99233 SBSQ HOSP IP/OBS HIGH 50: CPT | Mod: ,,, | Performed by: PHYSICIAN ASSISTANT

## 2023-11-06 PROCEDURE — 84295 ASSAY OF SERUM SODIUM: CPT | Performed by: PHYSICIAN ASSISTANT

## 2023-11-06 PROCEDURE — 80053 COMPREHEN METABOLIC PANEL: CPT | Performed by: PHYSICIAN ASSISTANT

## 2023-11-06 PROCEDURE — 94761 N-INVAS EAR/PLS OXIMETRY MLT: CPT

## 2023-11-06 PROCEDURE — 85025 COMPLETE CBC W/AUTO DIFF WBC: CPT | Performed by: PHYSICIAN ASSISTANT

## 2023-11-06 PROCEDURE — 20000000 HC ICU ROOM

## 2023-11-06 PROCEDURE — 63600175 PHARM REV CODE 636 W HCPCS

## 2023-11-06 PROCEDURE — 99233 PR SUBSEQUENT HOSPITAL CARE,LEVL III: ICD-10-PCS | Mod: ,,, | Performed by: PHYSICIAN ASSISTANT

## 2023-11-06 PROCEDURE — 84100 ASSAY OF PHOSPHORUS: CPT | Performed by: PHYSICIAN ASSISTANT

## 2023-11-06 PROCEDURE — 25000003 PHARM REV CODE 250: Performed by: NURSE PRACTITIONER

## 2023-11-06 PROCEDURE — 97116 GAIT TRAINING THERAPY: CPT | Mod: CQ

## 2023-11-06 PROCEDURE — 83735 ASSAY OF MAGNESIUM: CPT | Performed by: PHYSICIAN ASSISTANT

## 2023-11-06 RX ORDER — LEVETIRACETAM 500 MG/1
500 TABLET ORAL 2 TIMES DAILY
Status: DISCONTINUED | OUTPATIENT
Start: 2023-11-06 | End: 2023-11-08

## 2023-11-06 RX ORDER — METOPROLOL SUCCINATE 50 MG/1
50 TABLET, EXTENDED RELEASE ORAL DAILY
Status: ON HOLD | COMMUNITY
End: 2023-11-20

## 2023-11-06 RX ORDER — METOPROLOL SUCCINATE 50 MG/1
50 TABLET, EXTENDED RELEASE ORAL DAILY
Status: DISCONTINUED | OUTPATIENT
Start: 2023-11-06 | End: 2023-11-17 | Stop reason: HOSPADM

## 2023-11-06 RX ADMIN — NIMODIPINE 60 MG: 30 CAPSULE, LIQUID FILLED ORAL at 02:11

## 2023-11-06 RX ADMIN — SENNOSIDES AND DOCUSATE SODIUM 1 TABLET: 8.6; 5 TABLET ORAL at 09:11

## 2023-11-06 RX ADMIN — SODIUM CHLORIDE 1000 ML: 9 INJECTION, SOLUTION INTRAVENOUS at 04:11

## 2023-11-06 RX ADMIN — ATORVASTATIN CALCIUM 80 MG: 40 TABLET, FILM COATED ORAL at 09:11

## 2023-11-06 RX ADMIN — LEVETIRACETAM 500 MG: 100 INJECTION INTRAVENOUS at 09:11

## 2023-11-06 RX ADMIN — NIMODIPINE 60 MG: 30 CAPSULE, LIQUID FILLED ORAL at 06:11

## 2023-11-06 RX ADMIN — LOSARTAN POTASSIUM 100 MG: 50 TABLET, FILM COATED ORAL at 09:11

## 2023-11-06 RX ADMIN — METOPROLOL SUCCINATE 50 MG: 50 TABLET, EXTENDED RELEASE ORAL at 04:11

## 2023-11-06 RX ADMIN — NIMODIPINE 60 MG: 30 CAPSULE, LIQUID FILLED ORAL at 09:11

## 2023-11-06 RX ADMIN — LEVETIRACETAM 500 MG: 500 TABLET, FILM COATED ORAL at 09:11

## 2023-11-06 RX ADMIN — TAMSULOSIN HYDROCHLORIDE 0.4 MG: 0.4 CAPSULE ORAL at 09:11

## 2023-11-06 NOTE — PROGRESS NOTES
[facility]  Neurocritical Care  Progress Note    Admit Date: 11/4/2023  Service Date: 11/06/2023  Length of Stay: 2    Subjective:     Chief Complaint: SAH (subarachnoid hemorrhage)    History of Present Illness: The patient is a 78-year-old male with PMHx of  lymphoma in remission, CAD, nonrheumatic mitral regurg, CHF, first-degree AV block, aortic arthrosclerosis admitted to [facility] with aSAH/IVH. Initially presented initially to outside hospital ED with complaints of bilateral neck pain that happened approximately 2 hours prior to arrival.  Reports that the pain started immediately after having a bowel movement.  He does have associated headache that radiates up to his temples. Patient is on baby ASA. Also reports some associated weakness with walking. States he woke up this morning feeling normal without any pain or weakness.        Hospital Course: 11/06/2023: Angio negative yesterday, SAH protocol     Interval History: DSA negative yesterday, plan for repeat vessel imaging in one week. Continue SAH protocol, including keppra x7d, nimodipine, daily TCDs and hourly neuro checks. Restart home antihypertensives. No complaints today, eating breakfast comfortably. HA resolved.     Review of Systems: As above.     Vitals:   Temp: 98.5 °F (36.9 °C)  Pulse: 93  Rhythm: normal sinus rhythm  BP: (!) 140/68  MAP (mmHg): 95  Resp: 20  SpO2: 97 %    Temp  Min: 98 °F (36.7 °C)  Max: 98.6 °F (37 °C)  Pulse  Min: 83  Max: 94  BP  Min: 94/53  Max: 150/77  MAP (mmHg)  Min: 68  Max: 105  Resp  Min: 16  Max: 21  SpO2  Min: 92 %  Max: 100 %    11/05 0701 - 11/06 0700  In: 1114.9 [P.O.:200]  Out: 2115 [Urine:2115]   Unmeasured Output  Urine Occurrence: 1  Stool Occurrence: 1     Examination:   Constitutional: Well-nourished and -developed. No apparent distress.   Eyes: Conjunctiva clear, anicteric. Lids no lesions.  Head/Ears/Nose/Mouth/Throat/Neck: Moist mucous membranes. External ears, nose atraumatic.    Cardiovascular: Regular rhythm.   Respiratory: Comfortable respirations.     Neurologic:  -GCS E4V5M  -Alert. Oriented to person, place, and time. Speech fluent. Follows commands.  -Cranial nerves intact  -Motor: no focal weakness, antigravity in all four extremities   -Sensation intact to light touch     Medications:   Continuous Scheduledatorvastatin, 80 mg, Daily  levETIRAcetam, 500 mg, BID  losartan, 100 mg, Daily  metoprolol succinate, 50 mg, Daily  niMODipine, 60 mg, Q4H  polyethylene glycol, 17 g, Daily  senna-docusate 8.6-50 mg, 1 tablet, Daily  tamsulosin, 0.4 mg, Daily    PRNacetaminophen, 650 mg, Q6H PRN  dextrose 10%, 12.5 g, PRN  dextrose 10%, 25 g, PRN  glucagon (human recombinant), 1 mg, PRN  hydrALAZINE, 10 mg, Q4H PRN  insulin aspart U-100, 0-10 Units, Q6H PRN  labetalol, 10 mg, Q4H PRN  ondansetron, 4 mg, Q6H PRN       Today I independently reviewed pertinent medications, lines/drains/airways, imaging, laboratory results, notably:       Chem:   Recent Labs   Lab 11/06/23 0247 11/06/23  0653   * 137   K 4.1  --      --    CO2 19*  --    GLU 87  --    BUN 14  --    CREATININE 0.8  --    CALCIUM 8.8  --    MG 1.9  --    PHOS 2.3*  --    ANIONGAP 10  --    PROT 6.8  --    ALBUMIN 3.4*  --    BILITOT 1.7*  --    ALKPHOS 59  --    AST 22  --    ALT 16  --      Heme:   Recent Labs   Lab 11/06/23 0247   WBC 6.60   HGB 12.4*   HCT 38.5*          DSA from 11/5: negative for aneurysm     Assessment/Plan:     Neuro  * SAH (subarachnoid hemorrhage)   78-year-old male with lymphoma in remission, CAD, nonrheumatic mitral regurg, CHF, first-degree AV block, aortic arthrosclerosis who presents w/SAH/IVH    - Admitted to [facility] with neurosurgery and stroke teams following along   - CTA head, MRI and MRA with vessel wall imaging completed, reviewed, no evidence of aneurysm of other malformation   - DSA from 11/5 without aneurysm detected   - will continue to treat as aneurysmal until repeat CTA vs  DSA in one week   - SBP <140, currently patient is maintaining without IV antihypertensives, continue home losartan and metoprolol   - Nimodipine q4h   - Daily TCDs  - Keppra for 7 day total course   - Hourly neuro checks  - PT/OT/SLP following along    Cardiac/Vascular  Heart failure, diastolic, chronic  Followed by cardiology as OP   - Echo from 11/5: estimated ejection fraction of 55 - 60%. There is normal diastolic function.  - Continue home BB and ARB, hold diuretics in setting of SAH and goal euvolemia during this time    Coronary artery disease  At home on ASA  -hold at this time in setting of acute bleed   - continue home BB and ARB        The patient is being Prophylaxed for:  Venous Thromboembolism with: Mechanical  Stress Ulcer with: None  Ventilator Pneumonia with: none    Activity Orders          Diet Adult Regular (IDDSI Level 7): Regular starting at 11/05 1803    Elevate HOB 45 starting at 11/04 1541        Full Code    MIKKI KiserC  Neurocritical Care  [facility] - Neuro [facility]

## 2023-11-06 NOTE — PROGRESS NOTES
[facility] Neuro Critical Care  Neurosurgery  Progress Note    Subjective:     History of Present Illness: 78M h/o lymphoma in remission, CAD, nonrheumatic mitral regurg, CHF, first-degree AV block, aortic arthrosclerosis admitted to [facility] with SAH. Initially presented initially to outside hospital ED with complaints of bilateral neck pain that happened approximately 2 hours prior to arrival.  Reports that the pain started immediately after having a bowel movement.  He does have associated headache that radiates up to his temples. Patient is on baby ASA. Also reports some associated weakness with walking. States he woke up this morning feeling normal without any pain or weakness. Denies fever/chills, vision/hearing changes, dysphagia, dysarthria, vomiting, new-onset weakness or sensory change, bowel/bladder changes. CTH with prepontine SAH; CTA negative for vessel lesions.         Post-Op Info:  * No surgery found *         Interval History: 11/6: PBD 2. NAEON. AFVSS. -1L / 24 hrs. MRI/MRA negative. DSA yesterday negative. Plan for CTA PBD 7    Medications:  Continuous Infusions:  Scheduled Meds:   atorvastatin  80 mg Oral Daily    levETIRAcetam  500 mg Oral BID    losartan  100 mg Oral Daily    niMODipine  60 mg Oral Q4H    polyethylene glycol  17 g Oral Daily    senna-docusate 8.6-50 mg  1 tablet Oral Daily    tamsulosin  0.4 mg Oral Daily     PRN Meds:acetaminophen, dextrose 10%, dextrose 10%, glucagon (human recombinant), hydrALAZINE, insulin aspart U-100, labetalol, ondansetron     Review of Systems  Objective:     Weight: 114.8 kg (253 lb)  Body mass index is 34.31 kg/m².  Vital Signs (Most Recent):  Temp: 98.6 °F (37 °C) (11/06/23 0305)  Pulse: 84 (11/06/23 0814)  Resp: 18 (11/06/23 0605)  BP: (!) 150/77 (11/06/23 0605)  SpO2: 97 % (11/06/23 0814) Vital Signs (24h Range):  Temp:  [97.9 °F (36.6 °C)-98.6 °F (37 °C)] 98.6 °F (37 °C)  Pulse:  [80-91] 84  Resp:  [16-21] 18  SpO2:  [92 %-100 %] 97 %  BP:  "()/(53-93) 150/77                Resp Rate Total:  [16 br/min-21 br/min] 21 br/min               Physical Exam         Neurosurgery Physical Exam    GENERAL: resting comfortably  HEENT: NCAT, PERRL, mucous membranes moist  NECK: supple, trachea midline  CV: normal capillary refill  PULM: aerating well, symmetric expansion, no distress  ABD: soft, NT, ND  EXT: no c/c/e    NEURO:    AAO x 3  CN II-XII grossly intact  Fc x 4 antigravity  SILT    No drift or dysmetria      Significant Labs:  Recent Labs   Lab 11/04/23  1354 11/05/23  0148 11/06/23  0247 11/06/23  0653    103 87  --     137 132* 137   K 5.3* 4.8 4.1  --     104 103  --    CO2 19* 24 19*  --    BUN 14 15 14  --    CREATININE 1.1 0.9 0.8  --    CALCIUM 9.6 9.3 8.8  --    MG  --  1.9 1.9  --        Recent Labs   Lab 11/04/23  1354 11/05/23  0148 11/06/23  0247   WBC 7.35 8.40 6.60   HGB 13.7* 12.5* 12.4*   HCT 42.9 39.6* 38.5*    182 164       No results for input(s): "LABPT", "INR", "APTT" in the last 48 hours.  Microbiology Results (last 7 days)       ** No results found for the last 168 hours. **          All pertinent labs from the last 24 hours have been reviewed.    Significant Diagnostics:  I have reviewed all pertinent imaging results/findings within the past 24 hours.  IR Angiogram Cerebral Intracranial ea Add vessel    Result Date: 11/5/2023  1. Normal cerebral angiogram, no evidence of aneurysm, AVM, AVM to account for patient's most recent nontraumatic subarachnoid hemorrhage. Electronically signed by: Guille Cosby Date:    11/05/2023 Time:    15:45       Assessment/Plan:     * SAH (subarachnoid hemorrhage)  78M h/o lymphoma in remission, CAD, nonrheumatic mitral regurg, CHF, first-degree AV block, aortic arthrosclerosis admitted to [facility] with SAH. CTH with prepontine SAH; CTA negative for vessel lesions.     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     Neuro:  --Patient " admitted to Neuro-ICU; preponderance of medical care per NCC      -Q1h neurochecks while in ICU  --CTA 11/4: negative for vascular pathology  --MRI/MRA Brain 11/5: negative for underlying lesion  --DSA 11/5 negative  --Plan for CTA on PBD 7  --HOB@30  --Keppra 500 BID x7d  --TCDs daily x14d  --Nimotop 60q4 x 21d  --Continue to follow clinically and notify NSGY immediately if any neurostatus change    CVS:  --SBP <140 mmHg  --Recc EKG and Echo for baseline cardiac status    Pulm:  --Recc CXR for baseline resp status  --Aggressive pulm treatment per NCC    GIT/Electrolytes:  --CMP daily      -Replete electrolytes PRN per NCC  --Na goal >135  --Recc Utox/UA  --ADAT  --PPI    Renal:  --Strict I/Os  --Goal of euvolemia or net +1L    Heme/ID:  --Hold therapeutic anti-plt/coag medications per NCC  --Daily CBC      -Transfuse PRN  --Trend WBC and T    PPx:  --TEDs/SCDs  --SQH appropriate  --PPI    Dispo: Continued ICU care at this time, PT/OT when appropriate per NCC, CM/SW consult for dispo planning    Plan d/w Dr. Tolbert.        Melo Garcia MD  Neurosurgery  [facility] - Neuro Critical Care

## 2023-11-06 NOTE — PLAN OF CARE
"[facility] Care Plan    POC reviewed with Sam Gamino and family at 1400. Pt verbalized understanding. Questions and concerns addressed. No acute events today. Pt progressing toward goals. Will continue to monitor. See below and flowsheets for full assessment and VS info.     -pt up to chair with pt, weakness on whitney extremities requiring assistance and a walker.   -Angiogram completed.   -regular diet in place. Pt assisted with tray set up.   -vs stable throughout shift.        Is this a stroke patient? yes- Stroke booklet reviewed with patient, risk factors identified for patient and stroke booklet remains at bedside for ongoing education.     Neuro:  [place] Coma Scale  Best Eye Response: 4-->(E4) spontaneous  Best Motor Response: 6-->(M6) obeys commands  Best Verbal Response: 5-->(V5) oriented  [place] Coma Scale Score: 15  Assessment Qualifiers: no eye obstruction present, patient not sedated/intubated  Pupil PERRLA: yes     24 hr Temp:  [97.6 °F (36.4 °C)-98.4 °F (36.9 °C)]     CV:   Rhythm: normal sinus rhythm  BP goals:   SBP < 140  MAP > 65    Resp:           Plan: N/A    GI/:     Diet/Nutrition Received: NPO  Last Bowel Movement: 11/03/23       Intake/Output Summary (Last 24 hours) at 11/5/2023 1846  Last data filed at 11/5/2023 1301  Gross per 24 hour   Intake 300 ml   Output 1340 ml   Net -1040 ml     Unmeasured Output  Urine Occurrence: 300    Labs/Accuchecks:  Recent Labs   Lab 11/05/23 0148   WBC 8.40   RBC 3.77*   HGB 12.5*   HCT 39.6*         Recent Labs   Lab 11/05/23  0148      K 4.8   CO2 24      BUN 15   CREATININE 0.9   ALKPHOS 64   ALT 18   AST 20   BILITOT 1.0    No results for input(s): "PROTIME", "INR", "APTT", "HEPANTIXA" in the last 168 hours. No results for input(s): "CPK", "CPKMB", "TROPONINI", "MB" in the last 168 hours.    Electrolytes: N/A - electrolytes WDL  Accuchecks: none    Gtts:      LDA/Wounds:  Lines/Drains/Airways       Peripheral Intravenous Line  " Duration                  Peripheral IV - Single Lumen 11/04/23 1352 20 G Anterior;Distal;Right Forearm 1 day         Peripheral IV - Single Lumen 11/04/23 1536 22 G Left Hand 1 day                  Wounds: No  Wound care consulted: No

## 2023-11-06 NOTE — PT/OT/SLP PROGRESS
Physical Therapy Treatment    Patient Name:  Sam Gamino   MRN:  [id number]    Recommendations:     Discharge Recommendations: Low Intensity Therapy  Discharge Equipment Recommendations: walker, rolling  Barriers to discharge: None    Assessment:     Sam Gamino is a 78 y.o. male admitted with a medical diagnosis of SAH (subarachnoid hemorrhage).  He presents with the following impairments/functional limitations: impaired endurance, impaired self care skills, impaired functional mobility, gait instability, impaired balance, decreased lower extremity function, impaired coordination.    Pt tolerates session well with focus on bed mobility, transfers, and gait training. Pt progressing well with increased gait distance but pt continues to demonstrate instability with shuffled gait and B knee flexion in all phases. Pt reports shuffled gait was present at home prior to admission. Pt unable to adjust for step clearance of improved knee extension with cues. Pt progressing towards goals. Pt will continue to benefit from therapy services to address impairments listed above.       Rehab Prognosis: Fair; patient would benefit from acute skilled PT services to address these deficits and reach maximum level of function.    Recent Surgery: * No surgery found *      Plan:     During this hospitalization, patient to be seen 4 x/week to address the identified rehab impairments via gait training, therapeutic activities, neuromuscular re-education and progress toward the following goals:    Plan of Care Expires:  11/26/23    Subjective     Chief Complaint: no c/o  Pain/Comfort:  Pain Rating 1: 0/10  Pain Rating Post-Intervention 1: 0/10      Objective:     Communicated with RN prior to session.  Patient found HOB elevated with bed alarm, telemetry, blood pressure cuff, pulse ox (continuous) upon PTA entry to room.     General Precautions: Standard, aspiration, fall  Orthopedic Precautions: N/A  Braces: N/A  Respiratory Status:  Room air     Functional Mobility:  Bed Mobility:     Supine to Sit: modified independence  Transfers:     Sit to Stand:  contact guard assistance with rolling walker  Bed to Chair: contact guard assistance with  rolling walker  using  Step Transfer  Gait: Pt ambulates ~76 ft with RW and CGA. Slow cash. Pt unsteady with shuffled gait and B knee flexion in all phases. No adjustments when cued. Reliant on BUE support to stabilize. No overt LOB.         AM-PAC 6 CLICK MOBILITY  Turning over in bed (including adjusting bedclothes, sheets and blankets)?: 4  Sitting down on and standing up from a chair with arms (e.g., wheelchair, bedside commode, etc.): 3  Moving from lying on back to sitting on the side of the bed?: 4  Moving to and from a bed to a chair (including a wheelchair)?: 3  Need to walk in hospital room?: 3  Climbing 3-5 steps with a railing?: 2  Basic Mobility Total Score: 19       Patient left up in chair with all lines intact, call button in reach, and chair alarm on.    GOALS:   Multidisciplinary Problems       Physical Therapy Goals          Problem: Physical Therapy    Goal Priority Disciplines Outcome Goal Variances Interventions   Physical Therapy Goal     PT, PT/OT Ongoing, Progressing     Description: Goals to be met by: 23     Patient will increase functional independence with mobility by performin. Supine to sit with Nye  2. Sit to supine with Nye  3. Sit to stand transfer with Nye  4. Bed to chair transfer with Modified Nye using Rolling Walker  5. Gait  x 150 feet with Modified Nye using Rolling Walker.                          Time Tracking:     PT Received On: 23  PT Start Time: 809     PT Stop Time: 832  PT Total Time (min): 23 min     Billable Minutes: Gait Training 15 and Therapeutic Activity 8    Treatment Type: Treatment  PT/PTA: PTA     Number of PTA visits since last PT visit: 1     2023

## 2023-11-06 NOTE — ASSESSMENT & PLAN NOTE
78-year-old male with lymphoma in remission, CAD, nonrheumatic mitral regurg, CHF, first-degree AV block, aortic arthrosclerosis who presents w/SAH/IVH    - Admitted to [facility] with neurosurgery and stroke teams following along   - CTA head, MRI and MRA with vessel wall imaging completed, reviewed, no evidence of aneurysm of other malformation   - DSA from 11/5 without aneurysm detected   - will continue to treat as aneurysmal until repeat CTA vs DSA in one week   - SBP <140, currently patient is maintaining without IV antihypertensives, continue home losartan and metoprolol   - Nimodipine q4h   - Daily TCDs  - Keppra for 7 day total course   - Hourly neuro checks  - PT/OT/SLP following along

## 2023-11-06 NOTE — PLAN OF CARE
11/06/23 1615   Post-Acute Status   Post-Acute Authorization Home Health   Post-Acute Placement Status Patient List Provided   Patient choice form signed by patient/caregiver List from CMS Compare   Discharge Delays None known at this time       List of Home Health companies provided to patient and wife for choices.  Per wife, she will look over the list and provide a choice.     Discharge Plan A and Plan B have been determined by review of patient's clinical status, future medical and therapeutic needs, and coverage/benefits for post-acute care in coordination with multidisciplinary team members.    Geri Duffy RN, CCRN-K, [facility]  Neuro-Critical Care   [phone]

## 2023-11-06 NOTE — PLAN OF CARE
[facility]  Initial Discharge Assessment       Primary Care Provider: JAYLYN Zamora MD    Admission Diagnosis: Intracranial bleeding [I62.9]  Non-traumatic intracranial hemorrhage [I62.9]    Admission Date: 11/4/2023  Expected Discharge Date: 11/13/2023    Transition of Care Barriers: None    Payor: MEDICARE / Plan: MEDICARE PART A & B / Product Type: Government /     Extended Emergency Contact Information  Primary Emergency Contact: Eileen Gamino  Address: [address] [place] [address] [facility]  Home Phone: [phone]  Mobile Phone: [phone]  Relation: Spouse   needed? No    Discharge Plan A: Home Health  Discharge Plan B: Home      [id number] - [place], [place] - [email or web address]  [address] [facility]  [address] [facility] [address]  Phone: [phone] Fax: [phone]    [facility] - [address] [address]  Phone: [phone] Fax: [phone]      Transferred from:   [facility]    Past Medical History:   Diagnosis Date    Abnormal echocardiogram 2/8/2013    Mild MVR 3/07    CAD (coronary artery disease) 2/8/2013    Angio 3/07 Dr. Lin    Hypercholesterolemia 6/27/2022    Lymphoma in remission 2/8/2013    S/p chemo/XRT 1992 Relapse 1993 BMT 1993    Normal cardiac stress test 2/8/2013    Nuclear stress 2/09    Pernicious anemia 2/8/2013    Thyroid nodule 2/8/2013    Right s/p Bx 6/08 Dr. Pelayo         CM met with patient and Eileen Gamino (wife) [phone] in room for Discharge Planning Assessment.  Patient is sleeping and unable to answer questions.  Per wife, the patient lives with wife in a 3rd floor apartment with elevator access to enter.   Per wife, the patient was independent with ADLS and used no dme for ambulation.  Patient will have assistance from his wife upon discharge.   Discharge Planning  Booklet given to patient/family and discussed.  All questions addressed.  CM will follow for needs.      Discharge Plan A and Plan B have been determined by review of patient's clinical status, future medical and therapeutic needs, and coverage/benefits for post-acute care in coordination with multidisciplinary team members.    Home Health discussed and a list from Medicare.gov provided for choices.     Initial Assessment (most recent)       Adult Discharge Assessment - 11/06/23 1609          Discharge Assessment    Assessment Type Discharge Planning Assessment     Confirmed/corrected address, phone number and insurance Yes     Confirmed Demographics Correct on Facesheet     Source of Information patient     Communicated ROLANDO with patient/caregiver Date not available/Unable to determine     Reason For Admission SAH     People in Home spouse     [place] [facility] [place] [facility]     Do you expect to return to your current living situation? Yes     Do you have help at home or someone to help you manage your care at home? Yes     Who are your caregiver(s) and their phone number(s)? Eileen Gamino (wife) [phone]     Prior to hospitilization cognitive status: Alert/Oriented     Current cognitive status: Alert/Oriented     Walking or Climbing Stairs --   independent    Dressing/Bathing --   independent    Home Accessibility other (see comments)   elevator    Home Layout Other (see comments)   3rd floor apartment with elevator    Equipment Currently Used at Home none     Readmission within 30 days? No     Patient currently being followed by outpatient case management? No     Do you currently have service(s) that help you manage your care at home? No     Do you take prescription medications? Yes     Do you have prescription coverage? Yes     Coverage Medicare/     Do you have any problems affording any of your prescribed medications? No     Is the patient taking medications as prescribed? yes     Who is going to  help you get home at discharge? Eileen Gamino (wife) [phone]     How do you get to doctors appointments? family or friend will provide     Are you on dialysis? No     Do you take coumadin? No     DME Needed Upon Discharge  walker, rolling     Discharge Plan discussed with: Spouse/sig other     Name(s) and Number(s) Eileen Gamino (wife) [phone] (at bedside)     Transition of Care Barriers None     Discharge Plan A Home Health     Discharge Plan B Home        Physical Activity    On average, how many days per week do you engage in moderate to strenuous exercise (like a brisk walk)? --   every 2  weeks    On average, how many minutes do you engage in exercise at this level? 30 min        Financial Resource Strain    How hard is it for you to pay for the very basics like food, housing, medical care, and heating? Not hard at all        Housing Stability    In the last 12 months, was there a time when you were not able to pay the mortgage or rent on time? No     In the last 12 months, how many places have you lived? 1     In the last 12 months, was there a time when you did not have a steady place to sleep or slept in a shelter (including now)? No        Transportation Needs    In the past 12 months, has lack of transportation kept you from medical appointments or from getting medications? No     In the past 12 months, has lack of transportation kept you from meetings, work, or from getting things needed for daily living? No        Food Insecurity    Within the past 12 months, you worried that your food would run out before you got the money to buy more. Never true     Within the past 12 months, the food you bought just didn't last and you didn't have money to get more. Never true        Stress    Do you feel stress - tense, restless, nervous, or anxious, or unable to sleep at night because your mind is troubled all the time - these days? Not at all        Social Connections    How often do you get together with  friends or relatives? Once a week     How often do you attend Moravian or Mormon services? Never     Do you belong to any clubs or organizations such as Moravian groups, unions, fraternal or athletic groups, or school groups? No     How often do you attend meetings of the clubs or organizations you belong to? Never     Are you , , , , never , or living with a partner?         Alcohol Use    Q1: How often do you have a drink containing alcohol? Monthly or less     Q2: How many drinks containing alcohol do you have on a typical day when you are drinking? 1 or 2     Q3: How often do you have six or more drinks on one occasion? Never        OTHER    Name(s) of People in Home Eileen Gamino (wife) [phone]                   Geri Duffy RN, CCRN-K, [facility]  Neuro-Critical Care   [phone]

## 2023-11-06 NOTE — PLAN OF CARE
"[facility] Care Plan    POC reviewed with Sam Gamino and family at 0300. Pt verbalized understanding. Questions and concerns addressed. No acute events overnight. Pt progressing toward goals. Will continue to monitor. See below and flowsheets for full assessment and VS info.     -1L NS bolus administered for Na of 132. Pending recheck result.  -Mildly coarse lungs sounds noted overnight. Brief episode of expiratory wheezing noted 1x. O2 sats remained >93% and other VSS. Pt takes Lasix 40mg at home. KRISTOPHER Leonard notified.     Is this a stroke patient? yes- Stroke booklet reviewed with patient, risk factors identified for patient and stroke booklet remains at bedside for ongoing education.     Neuro:  [place] Coma Scale  Best Eye Response: 4-->(E4) spontaneous  Best Motor Response: 6-->(M6) obeys commands  Best Verbal Response: 5-->(V5) oriented  [place] Coma Scale Score: 15  Assessment Qualifiers: patient not sedated/intubated, no eye obstruction present  Pupil PERRLA: yes     24hr Temp:  [97.9 °F (36.6 °C)-98.6 °F (37 °C)]     CV:   Rhythm: normal sinus rhythm  BP goals:   SBP < 140  MAP > 65    Resp:           Plan: N/A    GI/:     Diet/Nutrition Received: NPO  Last Bowel Movement: 11/04/23       Intake/Output Summary (Last 24 hours) at 11/6/2023 0626  Last data filed at 11/6/2023 0405  Gross per 24 hour   Intake 100 ml   Output 1415 ml   Net -1315 ml     Unmeasured Output  Urine Occurrence: 300    Labs/Accuchecks:  Recent Labs   Lab 11/06/23  0247   WBC 6.60   RBC 3.67*   HGB 12.4*   HCT 38.5*         Recent Labs   Lab 11/06/23  0247   *   K 4.1   CO2 19*      BUN 14   CREATININE 0.8   ALKPHOS 59   ALT 16   AST 22   BILITOT 1.7*    No results for input(s): "PROTIME", "INR", "APTT", "HEPANTIXA" in the last 168 hours. No results for input(s): "CPK", "CPKMB", "TROPONINI", "MB" in the last 168 hours.    Electrolytes: Electrolytes replaced  Accuchecks: " Q6H    Gtts:      LDA/Wounds:  Lines/Drains/Airways       Peripheral Intravenous Line  Duration                  Peripheral IV - Single Lumen 11/04/23 1352 20 G Anterior;Distal;Right Forearm 1 day         Peripheral IV - Single Lumen 11/04/23 1536 22 G Left Hand 1 day                  Wounds: Yes  Wound care consulted: No

## 2023-11-06 NOTE — ASSESSMENT & PLAN NOTE
78M h/o lymphoma in remission, CAD, nonrheumatic mitral regurg, CHF, first-degree AV block, aortic arthrosclerosis admitted to [facility] with SAH. CTH with prepontine SAH; CTA negative for vessel lesions.     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     Neuro:  --Patient admitted to Neuro-ICU; preponderance of medical care per [facility]      -Q1h neurochecks while in ICU  --CTA 11/4: negative for vascular pathology  --MRI/MRA Brain 11/5: negative for underlying lesion  --DSA 11/5 negative  --Plan for CTA on PBD 7  --HOB@30  --Keppra 500 BID x7d  --TCDs daily x14d  --Nimotop 60q4 x 21d  --Continue to follow clinically and notify NSGY immediately if any neurostatus change    CVS:  --SBP <140 mmHg  --Recc EKG and Echo for baseline cardiac status    Pulm:  --Recc CXR for baseline resp status  --Aggressive pulm treatment per [facility]    GIT/Electrolytes:  --CMP daily      -Replete electrolytes PRN per NCC  --Na goal >135  --Recc Utox/UA  --ADAT  --PPI    Renal:  --Strict I/Os  --Goal of euvolemia or net +1L    Heme/ID:  --Hold therapeutic anti-plt/coag medications per NCC  --Daily CBC      -Transfuse PRN  --Trend WBC and T    PPx:  --TEDs/SCDs  --SQH appropriate  --PPI    Dispo: Continued ICU care at this time, PT/OT when appropriate per [facility], CM/SW consult for dispo planning    Plan d/w Dr. Tolbert.

## 2023-11-06 NOTE — HOSPITAL COURSE
11/06/2023: Angio negative yesterday, SAH protocol   11/07/2023: No events overnight.   11/08/2023: Increased delirium overnight, switch keppra to briviact   11/09/2023 Mild delirium overnight, improved from prior. 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 TCDs remain wnl, d/c'd. Discussed w/ NSGY attending, will step down to floor, strict delirium precautions. CM notified, will likely need placement, per pt report poor support at home (has elderly wife, cannot assist in caring for him)

## 2023-11-06 NOTE — SUBJECTIVE & OBJECTIVE
Interval History: 11/6: PBD 2. NAGORGE. AFVSS. -1L / 24 hrs. MRI/MRA negative. DSA yesterday negative. Plan for CTA PBD 7    Medications:  Continuous Infusions:  Scheduled Meds:   atorvastatin  80 mg Oral Daily    levETIRAcetam  500 mg Oral BID    losartan  100 mg Oral Daily    niMODipine  60 mg Oral Q4H    polyethylene glycol  17 g Oral Daily    senna-docusate 8.6-50 mg  1 tablet Oral Daily    tamsulosin  0.4 mg Oral Daily     PRN Meds:acetaminophen, dextrose 10%, dextrose 10%, glucagon (human recombinant), hydrALAZINE, insulin aspart U-100, labetalol, ondansetron     Review of Systems  Objective:     Weight: 114.8 kg (253 lb)  Body mass index is 34.31 kg/m².  Vital Signs (Most Recent):  Temp: 98.6 °F (37 °C) (11/06/23 0305)  Pulse: 84 (11/06/23 0814)  Resp: 18 (11/06/23 0605)  BP: (!) 150/77 (11/06/23 0605)  SpO2: 97 % (11/06/23 0814) Vital Signs (24h Range):  Temp:  [97.9 °F (36.6 °C)-98.6 °F (37 °C)] 98.6 °F (37 °C)  Pulse:  [80-91] 84  Resp:  [16-21] 18  SpO2:  [92 %-100 %] 97 %  BP: ()/(53-93) 150/77                Resp Rate Total:  [16 br/min-21 br/min] 21 br/min                Physical Exam         Neurosurgery Physical Exam    GENERAL: resting comfortably  HEENT: NCAT, PERRL, mucous membranes moist  NECK: supple, trachea midline  CV: normal capillary refill  PULM: aerating well, symmetric expansion, no distress  ABD: soft, NT, ND  EXT: no c/c/e    NEURO:    AAO x 3  CN II-XII grossly intact  Fc x 4 antigravity  SILT    No drift or dysmetria      Significant Labs:  Recent Labs   Lab 11/04/23  1354 11/05/23  0148 11/06/23  0247 11/06/23  0653    103 87  --     137 132* 137   K 5.3* 4.8 4.1  --     104 103  --    CO2 19* 24 19*  --    BUN 14 15 14  --    CREATININE 1.1 0.9 0.8  --    CALCIUM 9.6 9.3 8.8  --    MG  --  1.9 1.9  --        Recent Labs   Lab 11/04/23  1354 11/05/23  0148 11/06/23  0247   WBC 7.35 8.40 6.60   HGB 13.7* 12.5* 12.4*   HCT 42.9 39.6* 38.5*    182 164  "      No results for input(s): "LABPT", "INR", "APTT" in the last 48 hours.  Microbiology Results (last 7 days)       ** No results found for the last 168 hours. **          All pertinent labs from the last 24 hours have been reviewed.    Significant Diagnostics:  I have reviewed all pertinent imaging results/findings within the past 24 hours.  IR Angiogram Cerebral Intracranial ea Add vessel    Result Date: 11/5/2023  1. Normal cerebral angiogram, no evidence of aneurysm, AVM, AVM to account for patient's most recent nontraumatic subarachnoid hemorrhage. Electronically signed by: Guille Cosby Date:    11/05/2023 Time:    15:45     "

## 2023-11-06 NOTE — ASSESSMENT & PLAN NOTE
Followed by cardiology as OP   - Echo from 11/5: estimated ejection fraction of 55 - 60%. There is normal diastolic function.  - Continue home BB and ARB, hold diuretics in setting of SAH and goal euvolemia during this time

## 2023-11-06 NOTE — TELEPHONE ENCOUNTER
Reached out to pt to notify of holter monitor scheduled for 11/7/23.  Pt hospitalized at [facility] [place] [facility]. Will cancel holter, pt to call back once he is discharged.

## 2023-11-07 LAB
ALBUMIN SERPL BCP-MCNC: 3.5 G/DL (ref 3.5–5.2)
ALP SERPL-CCNC: 62 U/L (ref 55–135)
ALT SERPL W/O P-5'-P-CCNC: 17 U/L (ref 10–44)
ANION GAP SERPL CALC-SCNC: 11 MMOL/L (ref 8–16)
AST SERPL-CCNC: 21 U/L (ref 10–40)
BASOPHILS # BLD AUTO: 0.02 K/UL (ref 0–0.2)
BASOPHILS NFR BLD: 0.3 % (ref 0–1.9)
BILIRUB SERPL-MCNC: 1.5 MG/DL (ref 0.1–1)
BUN SERPL-MCNC: 13 MG/DL (ref 8–23)
CALCIUM SERPL-MCNC: 9.4 MG/DL (ref 8.7–10.5)
CHLORIDE SERPL-SCNC: 106 MMOL/L (ref 95–110)
CO2 SERPL-SCNC: 20 MMOL/L (ref 23–29)
CREAT SERPL-MCNC: 0.8 MG/DL (ref 0.5–1.4)
DIFFERENTIAL METHOD: ABNORMAL
EOSINOPHIL # BLD AUTO: 0 K/UL (ref 0–0.5)
EOSINOPHIL NFR BLD: 0.3 % (ref 0–8)
ERYTHROCYTE [DISTWIDTH] IN BLOOD BY AUTOMATED COUNT: 12.1 % (ref 11.5–14.5)
EST. GFR  (NO RACE VARIABLE): >60 ML/MIN/1.73 M^2
GLUCOSE SERPL-MCNC: 104 MG/DL (ref 70–110)
HCT VFR BLD AUTO: 38.6 % (ref 40–54)
HGB BLD-MCNC: 12.6 G/DL (ref 14–18)
IMM GRANULOCYTES # BLD AUTO: 0.03 K/UL (ref 0–0.04)
IMM GRANULOCYTES NFR BLD AUTO: 0.4 % (ref 0–0.5)
LYMPHOCYTES # BLD AUTO: 1.2 K/UL (ref 1–4.8)
LYMPHOCYTES NFR BLD: 16 % (ref 18–48)
MAGNESIUM SERPL-MCNC: 2 MG/DL (ref 1.6–2.6)
MCH RBC QN AUTO: 33.2 PG (ref 27–31)
MCHC RBC AUTO-ENTMCNC: 32.6 G/DL (ref 32–36)
MCV RBC AUTO: 102 FL (ref 82–98)
MONOCYTES # BLD AUTO: 0.5 K/UL (ref 0.3–1)
MONOCYTES NFR BLD: 7.1 % (ref 4–15)
NEUTROPHILS # BLD AUTO: 5.7 K/UL (ref 1.8–7.7)
NEUTROPHILS NFR BLD: 75.9 % (ref 38–73)
NRBC BLD-RTO: 0 /100 WBC
PHOSPHATE SERPL-MCNC: 2.9 MG/DL (ref 2.7–4.5)
PLATELET # BLD AUTO: 187 K/UL (ref 150–450)
PMV BLD AUTO: 10 FL (ref 9.2–12.9)
POTASSIUM SERPL-SCNC: 4.1 MMOL/L (ref 3.5–5.1)
PROT SERPL-MCNC: 7.2 G/DL (ref 6–8.4)
RBC # BLD AUTO: 3.79 M/UL (ref 4.6–6.2)
SODIUM SERPL-SCNC: 137 MMOL/L (ref 136–145)
WBC # BLD AUTO: 7.45 K/UL (ref 3.9–12.7)

## 2023-11-07 PROCEDURE — 97116 GAIT TRAINING THERAPY: CPT | Mod: CQ

## 2023-11-07 PROCEDURE — 25000003 PHARM REV CODE 250

## 2023-11-07 PROCEDURE — 94761 N-INVAS EAR/PLS OXIMETRY MLT: CPT

## 2023-11-07 PROCEDURE — 63600175 PHARM REV CODE 636 W HCPCS: Performed by: PHYSICIAN ASSISTANT

## 2023-11-07 PROCEDURE — 85025 COMPLETE CBC W/AUTO DIFF WBC: CPT | Performed by: PHYSICIAN ASSISTANT

## 2023-11-07 PROCEDURE — 84100 ASSAY OF PHOSPHORUS: CPT | Performed by: PHYSICIAN ASSISTANT

## 2023-11-07 PROCEDURE — 25000003 PHARM REV CODE 250: Performed by: PHYSICIAN ASSISTANT

## 2023-11-07 PROCEDURE — 97530 THERAPEUTIC ACTIVITIES: CPT | Mod: CQ

## 2023-11-07 PROCEDURE — 99233 PR SUBSEQUENT HOSPITAL CARE,LEVL III: ICD-10-PCS | Mod: ,,, | Performed by: PHYSICIAN ASSISTANT

## 2023-11-07 PROCEDURE — 20000000 HC ICU ROOM

## 2023-11-07 PROCEDURE — 80053 COMPREHEN METABOLIC PANEL: CPT | Performed by: PHYSICIAN ASSISTANT

## 2023-11-07 PROCEDURE — 83735 ASSAY OF MAGNESIUM: CPT | Performed by: PHYSICIAN ASSISTANT

## 2023-11-07 PROCEDURE — 99233 SBSQ HOSP IP/OBS HIGH 50: CPT | Mod: ,,, | Performed by: PHYSICIAN ASSISTANT

## 2023-11-07 PROCEDURE — 25000003 PHARM REV CODE 250: Performed by: NURSE PRACTITIONER

## 2023-11-07 RX ORDER — FAMOTIDINE 20 MG/1
20 TABLET, FILM COATED ORAL 2 TIMES DAILY
Status: DISCONTINUED | OUTPATIENT
Start: 2023-11-07 | End: 2023-11-17 | Stop reason: HOSPADM

## 2023-11-07 RX ORDER — ENOXAPARIN SODIUM 100 MG/ML
40 INJECTION SUBCUTANEOUS EVERY 24 HOURS
Status: DISCONTINUED | OUTPATIENT
Start: 2023-11-07 | End: 2023-11-14

## 2023-11-07 RX ADMIN — NIMODIPINE 60 MG: 30 CAPSULE, LIQUID FILLED ORAL at 09:11

## 2023-11-07 RX ADMIN — ENOXAPARIN SODIUM 40 MG: 40 INJECTION SUBCUTANEOUS at 06:11

## 2023-11-07 RX ADMIN — NIMODIPINE 60 MG: 30 CAPSULE, LIQUID FILLED ORAL at 02:11

## 2023-11-07 RX ADMIN — NIMODIPINE 60 MG: 30 CAPSULE, LIQUID FILLED ORAL at 10:11

## 2023-11-07 RX ADMIN — POLYETHYLENE GLYCOL 3350 17 G: 17 POWDER, FOR SOLUTION ORAL at 09:11

## 2023-11-07 RX ADMIN — NIMODIPINE 60 MG: 30 CAPSULE, LIQUID FILLED ORAL at 06:11

## 2023-11-07 RX ADMIN — LOSARTAN POTASSIUM 100 MG: 50 TABLET, FILM COATED ORAL at 09:11

## 2023-11-07 RX ADMIN — METOPROLOL SUCCINATE 50 MG: 50 TABLET, EXTENDED RELEASE ORAL at 09:11

## 2023-11-07 RX ADMIN — LEVETIRACETAM 500 MG: 500 TABLET, FILM COATED ORAL at 09:11

## 2023-11-07 RX ADMIN — ATORVASTATIN CALCIUM 80 MG: 40 TABLET, FILM COATED ORAL at 09:11

## 2023-11-07 RX ADMIN — FAMOTIDINE 20 MG: 20 TABLET ORAL at 09:11

## 2023-11-07 RX ADMIN — SENNOSIDES AND DOCUSATE SODIUM 1 TABLET: 8.6; 5 TABLET ORAL at 09:11

## 2023-11-07 RX ADMIN — ACETAMINOPHEN 650 MG: 325 TABLET ORAL at 10:11

## 2023-11-07 RX ADMIN — TAMSULOSIN HYDROCHLORIDE 0.4 MG: 0.4 CAPSULE ORAL at 09:11

## 2023-11-07 NOTE — PROGRESS NOTES
[facility] Critical [facility]  Neurocritical Care  Progress Note    Admit Date: 11/4/2023  Service Date: 11/07/2023  Length of Stay: 3    Subjective:     Chief Complaint: SAH (subarachnoid hemorrhage)    History of Present Illness: The patient is a 78-year-old male with PMHx of  lymphoma in remission, CAD, nonrheumatic mitral regurg, CHF, first-degree AV block, aortic arthrosclerosis admitted to [facility] with aSAH/IVH. Initially presented initially to outside hospital ED with complaints of bilateral neck pain that happened approximately 2 hours prior to arrival.  Reports that the pain started immediately after having a bowel movement.  He does have associated headache that radiates up to his temples. Patient is on baby ASA. Also reports some associated weakness with walking. States he woke up this morning feeling normal without any pain or weakness.        Hospital Course: 11/06/2023: Angio negative yesterday, SAH protocol   11/07/2023: No events overnight.       Interval History: No events overnight. Neurologically stable, walking through hallways with PT this morning. Begin enoxaparin for DVT ppx. TCDs from yesterday without evidence of vasospasm.      Review of Systems: As above     Vitals:   Temp: 98.5 °F (36.9 °C)  Pulse: 84  Rhythm: normal sinus rhythm  BP: 133/74  MAP (mmHg): 98  Resp: (!) 22  SpO2: 97 %    Temp  Min: 98.5 °F (36.9 °C)  Max: 98.6 °F (37 °C)  Pulse  Min: 81  Max: 94  BP  Min: 118/58  Max: 147/80  MAP (mmHg)  Min: 83  Max: 108  Resp  Min: 16  Max: 22  SpO2  Min: 93 %  Max: 97 %    11/06 0701 - 11/07 0700  In: -   Out: 1725 [Urine:1725]   Unmeasured Output  Urine Occurrence: 1  Stool Occurrence: 1        Examination:   Constitutional: Well-nourished and -developed. No apparent distress.   Eyes: Conjunctiva clear, anicteric. Lids no lesions.  Head/Ears/Nose/Mouth/Throat/Neck: Moist mucous membranes. External ears, nose atraumatic.   Cardiovascular: Regular rhythm.   Respiratory: Comfortable  "respirations.      Neurologic:  -GCS E4V5M  -Alert. Oriented to person, place, and time. Speech fluent. Follows commands.  -Cranial nerves intact  -Motor: no focal weakness, antigravity in all four extremities   -Sensation intact to light touch     Medications:   Continuous Scheduledatorvastatin, 80 mg, Daily  enoxparin, 40 mg, Q24H (prophylaxis, 1700)  levETIRAcetam, 500 mg, BID  losartan, 100 mg, Daily  metoprolol succinate, 50 mg, Daily  niMODipine, 60 mg, Q4H  polyethylene glycol, 17 g, Daily  senna-docusate 8.6-50 mg, 1 tablet, Daily  tamsulosin, 0.4 mg, Daily    PRNacetaminophen, 650 mg, Q6H PRN  dextrose 10%, 12.5 g, PRN  dextrose 10%, 25 g, PRN  hydrALAZINE, 10 mg, Q4H PRN  labetalol, 10 mg, Q4H PRN  ondansetron, 4 mg, Q6H PRN       Today I independently reviewed pertinent medications, lines/drains/airways, imaging, laboratory results, notably:     ISTAT: No results for input(s): "PH", "PCO2", "PO2", "POCSATURATED", "HCO3", "BE", "POCNA", "POCK", "POCTCO2", "POCGLU", "POCICA", "POCLAC", "SAMPLE" in the last 24 hours.   Chem:   Recent Labs   Lab 11/07/23  0246      K 4.1      CO2 20*      BUN 13   CREATININE 0.8   CALCIUM 9.4   MG 2.0   PHOS 2.9   ANIONGAP 11   PROT 7.2   ALBUMIN 3.5   BILITOT 1.5*   ALKPHOS 62   AST 21   ALT 17     Heme:   Recent Labs   Lab 11/07/23  0246   WBC 7.45   HGB 12.6*   HCT 38.6*        TCDs without elevated velocities yesterday.     Assessment/Plan:     Neuro  * SAH (subarachnoid hemorrhage)   78-year-old male with lymphoma in remission, CAD, nonrheumatic mitral regurg, CHF, first-degree AV block, aortic arthrosclerosis who presents w/SAH/IVH    - Admitted to [facility] with neurosurgery and stroke teams following along   - CTA head, MRI and MRA with vessel wall imaging completed, reviewed, no evidence of aneurysm of other malformation   - DSA from 11/5 without aneurysm detected   - will continue to treat as aneurysmal until repeat CTA vs DSA at PBD 7  - SBP " <140, currently patient is maintaining without IV antihypertensives, continue home losartan and metoprolol   - Nimodipine q4h   - Daily TCDs  - Keppra for 7 day total course   - Hourly neuro checks  - PT/OT/SLP following along    Cardiac/Vascular  Heart failure, diastolic, chronic  Followed by cardiology as OP   - Echo from 11/5: estimated ejection fraction of 55 - 60%. There is normal diastolic function.  - Continue home BB and ARB, hold diuretics in setting of SAH and goal euvolemia during this time    Coronary artery disease  At home on ASA  -hold at this time in setting of acute bleed   - continue home BB and ARB          The patient is being Prophylaxed for:  Venous Thromboembolism with: Mechanical or Chemical  Stress Ulcer with: Not Applicable   Ventilator Pneumonia with: not applicable    Activity Orders          Diet Adult Regular (IDDSI Level 7): Regular starting at 11/05 1803    Elevate HOB 45 starting at 11/04 1541        Full Code     Level 3     MIKKI KiserC  Neurocritical Care  [facility] Neuro Critical [facility]

## 2023-11-07 NOTE — PROGRESS NOTES
[facility] Neuro Critical Care  Neurosurgery  Progress Note    Subjective:     History of Present Illness: 78M h/o lymphoma in remission, CAD, nonrheumatic mitral regurg, CHF, first-degree AV block, aortic arthrosclerosis admitted to [facility] with SAH. Initially presented initially to outside hospital ED with complaints of bilateral neck pain that happened approximately 2 hours prior to arrival.  Reports that the pain started immediately after having a bowel movement.  He does have associated headache that radiates up to his temples. Patient is on baby ASA. Also reports some associated weakness with walking. States he woke up this morning feeling normal without any pain or weakness. Denies fever/chills, vision/hearing changes, dysphagia, dysarthria, vomiting, new-onset weakness or sensory change, bowel/bladder changes. CTH with prepontine SAH; CTA negative for vessel lesions.       Post-Op Info:  * No surgery found *       Interval History: 11/07/2023: PBD 3. NAEON. AFVSS. Exam stable. I/O negative. HA improved. Denies new weakness/numbness/tingling.    Medications:  Continuous Infusions:  Scheduled Meds:   atorvastatin  80 mg Oral Daily    levETIRAcetam  500 mg Oral BID    losartan  100 mg Oral Daily    metoprolol succinate  50 mg Oral Daily    niMODipine  60 mg Oral Q4H    polyethylene glycol  17 g Oral Daily    senna-docusate 8.6-50 mg  1 tablet Oral Daily    tamsulosin  0.4 mg Oral Daily     PRN Meds:acetaminophen, dextrose 10%, dextrose 10%, glucagon (human recombinant), hydrALAZINE, insulin aspart U-100, labetalol, ondansetron     Review of Systems  Objective:     Weight: 114.8 kg (253 lb)  Body mass index is 34.31 kg/m².  Vital Signs (Most Recent):  Temp: 98.5 °F (36.9 °C) (11/07/23 0705)  Pulse: 84 (11/07/23 0705)  Resp: 19 (11/07/23 0705)  BP: 136/69 (11/07/23 0705)  SpO2: 96 % (11/07/23 0705) Vital Signs (24h Range):  Temp:  [98.4 °F (36.9 °C)-98.6 °F (37 °C)] 98.5 °F (36.9 °C)  Pulse:  [83-94] 84  Resp:   "[16-22] 19  SpO2:  [93 %-98 %] 96 %  BP: (118-147)/(56-81) 136/69                                Physical Exam         Neurosurgery Physical Exam    GENERAL: resting comfortably  HEENT: NCAT, PERRL, mucous membranes moist  NECK: supple, trachea midline  CV: normal capillary refill  PULM: aerating well, symmetric expansion, no distress  ABD: soft, NT, ND  EXT: no c/c/e    NEURO:    AAO x 3  CN II-XII grossly intact  Fc x 4 antigravity  SILT    No drift or dysmetria      Significant Labs:  Recent Labs   Lab 11/06/23  0247 11/06/23  0653 11/07/23 0246   GLU 87  --  104   * 137 137   K 4.1  --  4.1     --  106   CO2 19*  --  20*   BUN 14  --  13   CREATININE 0.8  --  0.8   CALCIUM 8.8  --  9.4   MG 1.9  --  2.0       Recent Labs   Lab 11/06/23 0247 11/07/23 0246   WBC 6.60 7.45   HGB 12.4* 12.6*   HCT 38.5* 38.6*    187       No results for input(s): "LABPT", "INR", "APTT" in the last 48 hours.  Microbiology Results (last 7 days)       ** No results found for the last 168 hours. **          All pertinent labs from the last 24 hours have been reviewed.    Significant Diagnostics:  I have reviewed all pertinent imaging results/findings within the past 24 hours.  No results found in the last 24 hours.  Assessment/Plan:     * SAH (subarachnoid hemorrhage)  78M h/o lymphoma in remission, CAD, nonrheumatic mitral regurg, CHF, first-degree AV block, aortic arthrosclerosis admitted to [facility] with SAH. CTH with prepontine SAH; CTA negative for vessel lesions.     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     Neuro:  --Patient admitted to Neuro-ICU; preponderance of medical care per [facility]      -Q1h neurochecks while in ICU  --CTA 11/4: negative for vascular pathology  --MRI/MRA Brain 11/5: negative for underlying lesion  --DSA 11/5 negative  --Plan for CTA on PBD 7  --HOB@30  --Keppra 500 BID x7d  --TCDs daily x14d  --Nimotop 60q4 x 21d  --Continue to follow clinically and " notify NSGY immediately if any neurostatus change    CVS:  --SBP <140 mmHg    Pulm:  --Aggressive pulm treatment per NCC    GIT/Electrolytes:  --BMP qd      -Replete electrolytes PRN per NCC  --Na goal >135  --Regular diet  --PPI    Renal:  --Strict I/Os  --Goal of euvolemia or net +1L    Heme/ID:  --Hold therapeutic anti-plt/coag medications per NCC  --Daily CBC      -Transfuse PRN  --Trend WBC and T    PPx:  --TEDs/SCDs/LVX  --PPI    Dispo: Continued ICU care at this time, PT/OT when appropriate per NCC, CM/SW consult for dispo planning    Plan d/w Dr. Cosby.        Asael Richardson MD  Neurosurgery  [facility] - Neuro Critical Care

## 2023-11-07 NOTE — SUBJECTIVE & OBJECTIVE
"Interval History: 11/07/2023: PBD 3. NAEON. AFVSS. Exam stable. I/O negative. HA improved. Denies new weakness/numbness/tingling.    Medications:  Continuous Infusions:  Scheduled Meds:   atorvastatin  80 mg Oral Daily    levETIRAcetam  500 mg Oral BID    losartan  100 mg Oral Daily    metoprolol succinate  50 mg Oral Daily    niMODipine  60 mg Oral Q4H    polyethylene glycol  17 g Oral Daily    senna-docusate 8.6-50 mg  1 tablet Oral Daily    tamsulosin  0.4 mg Oral Daily     PRN Meds:acetaminophen, dextrose 10%, dextrose 10%, glucagon (human recombinant), hydrALAZINE, insulin aspart U-100, labetalol, ondansetron     Review of Systems  Objective:     Weight: 114.8 kg (253 lb)  Body mass index is 34.31 kg/m².  Vital Signs (Most Recent):  Temp: 98.5 °F (36.9 °C) (11/07/23 0705)  Pulse: 84 (11/07/23 0705)  Resp: 19 (11/07/23 0705)  BP: 136/69 (11/07/23 0705)  SpO2: 96 % (11/07/23 0705) Vital Signs (24h Range):  Temp:  [98.4 °F (36.9 °C)-98.6 °F (37 °C)] 98.5 °F (36.9 °C)  Pulse:  [83-94] 84  Resp:  [16-22] 19  SpO2:  [93 %-98 %] 96 %  BP: (118-147)/(56-81) 136/69                                 Physical Exam         Neurosurgery Physical Exam    GENERAL: resting comfortably  HEENT: NCAT, PERRL, mucous membranes moist  NECK: supple, trachea midline  CV: normal capillary refill  PULM: aerating well, symmetric expansion, no distress  ABD: soft, NT, ND  EXT: no c/c/e    NEURO:    AAO x 3  CN II-XII grossly intact  Fc x 4 antigravity  SILT    No drift or dysmetria      Significant Labs:  Recent Labs   Lab 11/06/23  0247 11/06/23  0653 11/07/23  0246   GLU 87  --  104   * 137 137   K 4.1  --  4.1     --  106   CO2 19*  --  20*   BUN 14  --  13   CREATININE 0.8  --  0.8   CALCIUM 8.8  --  9.4   MG 1.9  --  2.0       Recent Labs   Lab 11/06/23  0247 11/07/23  0246   WBC 6.60 7.45   HGB 12.4* 12.6*   HCT 38.5* 38.6*    187       No results for input(s): "LABPT", "INR", "APTT" in the last 48 " hours.  Microbiology Results (last 7 days)       ** No results found for the last 168 hours. **          All pertinent labs from the last 24 hours have been reviewed.    Significant Diagnostics:  I have reviewed all pertinent imaging results/findings within the past 24 hours.  No results found in the last 24 hours.

## 2023-11-07 NOTE — ASSESSMENT & PLAN NOTE
78-year-old male with lymphoma in remission, CAD, nonrheumatic mitral regurg, CHF, first-degree AV block, aortic arthrosclerosis who presents w/SAH/IVH    - Admitted to [facility] with neurosurgery and stroke teams following along   - CTA head, MRI and MRA with vessel wall imaging completed, reviewed, no evidence of aneurysm of other malformation   - DSA from 11/5 without aneurysm detected   - will continue to treat as aneurysmal until repeat CTA vs DSA at PBD 7  - SBP <140, currently patient is maintaining without IV antihypertensives, continue home losartan and metoprolol   - Nimodipine q4h   - Daily TCDs  - Keppra for 7 day total course   - Hourly neuro checks  - PT/OT/SLP following along

## 2023-11-07 NOTE — PLAN OF CARE
"[facility] Care Plan    POC reviewed with Sam Gamino and family at 0300. Pt verbalized understanding. Questions and concerns addressed. No acute events overnight. Pt progressing toward goals. See below and flowsheets for full assessment and VS info.     -No changes overnight   -2 BM overnight   -20G PIV placed in Rt FA  -Linens changed    Is this a stroke patient? yes- Stroke booklet reviewed with patient and family, risk factors identified for patient and stroke booklet remains at bedside for ongoing education.     Neuro:  Hung Coma Scale  Best Eye Response: 4-->(E4) spontaneous  Best Motor Response: 6-->(M6) obeys commands  Best Verbal Response: 5-->(V5) oriented  Hung Coma Scale Score: 15  Assessment Qualifiers: patient not sedated/intubated, no eye obstruction present  Pupil PERRLA: yes     24hr Temp:  [98.2 °F (36.8 °C)-98.6 °F (37 °C)]     CV:   Rhythm: normal sinus rhythm  BP goals:   SBP < 140  MAP > 65    Resp:           Plan: N/A    GI/:     Diet/Nutrition Received: NPO  Last Bowel Movement: 11/07/23       Intake/Output Summary (Last 24 hours) at 11/7/2023 0404  Last data filed at 11/6/2023 2205  Gross per 24 hour   Intake 1014.94 ml   Output 2625 ml   Net -1610.06 ml     Unmeasured Output  Urine Occurrence: 1  Stool Occurrence: 1    Labs/Accuchecks:  Recent Labs   Lab 11/07/23  0246   WBC 7.45   RBC 3.79*   HGB 12.6*   HCT 38.6*         Recent Labs   Lab 11/07/23  0246      K 4.1   CO2 20*      BUN 13   CREATININE 0.8   ALKPHOS 62   ALT 17   AST 21   BILITOT 1.5*    No results for input(s): "PROTIME", "INR", "APTT", "HEPANTIXA" in the last 168 hours. No results for input(s): "CPK", "CPKMB", "TROPONINI", "MB" in the last 168 hours.    Electrolytes: N/A - electrolytes WDL  Accuchecks: Q6H    Gtts:      LDA/Wounds:  Lines/Drains/Airways       Peripheral Intravenous Line  Duration                  Peripheral IV - Single Lumen 11/04/23 1352 20 G Anterior;Distal;Right Forearm 2 days    "      Peripheral IV - Single Lumen 11/07/23 0256 18 G Posterior;Right Forearm <1 day                  Wounds: No  Wound care consulted: No

## 2023-11-07 NOTE — ASSESSMENT & PLAN NOTE
78M h/o lymphoma in remission, CAD, nonrheumatic mitral regurg, CHF, first-degree AV block, aortic arthrosclerosis admitted to [facility] with SAH. CTH with prepontine SAH; CTA negative for vessel lesions.     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     Neuro:  --Patient admitted to Neuro-ICU; preponderance of medical care per [facility]      -Q1h neurochecks while in ICU  --CTA 11/4: negative for vascular pathology  --MRI/MRA Brain 11/5: negative for underlying lesion  --DSA 11/5 negative  --Plan for CTA on PBD 7  --HOB@30  --Keppra 500 BID x7d  --TCDs daily x14d  --Nimotop 60q4 x 21d  --Continue to follow clinically and notify NSGY immediately if any neurostatus change    CVS:  --SBP <140 mmHg    Pulm:  --Aggressive pulm treatment per [facility]    GIT/Electrolytes:  --BMP qd      -Replete electrolytes PRN per [facility]  --Na goal >135  --Regular diet  --PPI    Renal:  --Strict I/Os  --Goal of euvolemia or net +1L    Heme/ID:  --Hold therapeutic anti-plt/coag medications per [facility]  --Daily CBC      -Transfuse PRN  --Trend WBC and T    PPx:  --TEDs/SCDs/LVX  --PPI    Dispo: Continued ICU care at this time, PT/OT when appropriate per [facility], CM/SW consult for dispo planning    Plan d/w Dr. Cosby.

## 2023-11-07 NOTE — PT/OT/SLP PROGRESS
Physical Therapy Treatment    Patient Name:  Sam Gamino   MRN:  [id number]    Recommendations:     Discharge Recommendations: Low Intensity Therapy  Discharge Equipment Recommendations: walker, rolling  Barriers to discharge: None    Assessment:     Sam Gamino is a 78 y.o. male admitted with a medical diagnosis of SAH (subarachnoid hemorrhage).  He presents with the following impairments/functional limitations: weakness, impaired endurance, impaired self care skills, impaired functional mobility, gait instability, impaired balance, impaired coordination.    Pt tolerates session well with focus on bed mobility, transfers, and gait training. Pt progressing well with increased gait distance but pt continues to demonstrate instability with shuffled gait and B knee flexion in all phases. Mild improvement in cash and gait mechanics with cues today. Pt progressing towards goals. Pt will continue to benefit from therapy services to address impairments listed above.       Rehab Prognosis: Fair; patient would benefit from acute skilled PT services to address these deficits and reach maximum level of function.    Recent Surgery: * No surgery found *      Plan:     During this hospitalization, patient to be seen 4 x/week to address the identified rehab impairments via gait training, therapeutic activities, neuromuscular re-education and progress toward the following goals:    Plan of Care Expires:  11/26/23    Subjective     Chief Complaint: no c/o  Pain/Comfort:  Pain Rating 1: 0/10  Pain Rating Post-Intervention 1: 0/10      Objective:     Communicated with RN prior to session.  Patient found HOB elevated with bed alarm, telemetry, blood pressure cuff, pulse ox (continuous) upon PTA entry to room.     Pt on portable telemetry per ICU protocol for ambulation out of room.     General Precautions: Standard, aspiration, fall  Orthopedic Precautions: N/A  Braces: N/A  Respiratory Status: Room air     Functional  Mobility:  Bed Mobility:     Supine to Sit: modified independence  Transfers:     Sit to Stand:  contact guard assistance with rolling walker  Bed to Chair: contact guard assistance with  rolling walker  using  Step Transfer  Gait: Pt ambulates ~96 ft x 2 trials with RW and CGA. Slow cash. Pt unsteady with shuffled gait and B knee flexion in all phases. Minimal adjustments when cued. Reliant on BUE support to stabilize. No overt LOB. Close chair follow, seated rest between trials.          AM-PAC 6 CLICK MOBILITY  Turning over in bed (including adjusting bedclothes, sheets and blankets)?: 4  Sitting down on and standing up from a chair with arms (e.g., wheelchair, bedside commode, etc.): 3  Moving from lying on back to sitting on the side of the bed?: 4  Moving to and from a bed to a chair (including a wheelchair)?: 3  Need to walk in hospital room?: 3  Climbing 3-5 steps with a railing?: 2  Basic Mobility Total Score: 19       Patient left up in chair with all lines intact, call button in reach, and chair alarm on.    GOALS:   Multidisciplinary Problems       Physical Therapy Goals          Problem: Physical Therapy    Goal Priority Disciplines Outcome Goal Variances Interventions   Physical Therapy Goal     PT, PT/OT Ongoing, Progressing     Description: Goals to be met by: 23     Patient will increase functional independence with mobility by performin. Supine to sit with Bandera  2. Sit to supine with Bandera  3. Sit to stand transfer with Bandera  4. Bed to chair transfer with Modified Bandera using Rolling Walker  5. Gait  x 150 feet with Modified Bandera using Rolling Walker.                          Time Tracking:     PT Received On: 23  PT Start Time: 929     PT Stop Time: 954  PT Total Time (min): 25 min     Billable Minutes: Gait Training 15 and Therapeutic Activity 10    Treatment Type: Treatment  PT/PTA: PTA     Number of PTA visits since last PT visit: 2      11/07/2023

## 2023-11-08 LAB
ALBUMIN SERPL BCP-MCNC: 3.6 G/DL (ref 3.5–5.2)
ALP SERPL-CCNC: 70 U/L (ref 55–135)
ALT SERPL W/O P-5'-P-CCNC: 17 U/L (ref 10–44)
ANION GAP SERPL CALC-SCNC: 12 MMOL/L (ref 8–16)
AST SERPL-CCNC: 26 U/L (ref 10–40)
BASOPHILS # BLD AUTO: 0.03 K/UL (ref 0–0.2)
BASOPHILS NFR BLD: 0.3 % (ref 0–1.9)
BILIRUB SERPL-MCNC: 1.9 MG/DL (ref 0.1–1)
BUN SERPL-MCNC: 17 MG/DL (ref 8–23)
CALCIUM SERPL-MCNC: 9.7 MG/DL (ref 8.7–10.5)
CHLORIDE SERPL-SCNC: 106 MMOL/L (ref 95–110)
CO2 SERPL-SCNC: 19 MMOL/L (ref 23–29)
CREAT SERPL-MCNC: 0.9 MG/DL (ref 0.5–1.4)
DIFFERENTIAL METHOD: ABNORMAL
EOSINOPHIL # BLD AUTO: 0 K/UL (ref 0–0.5)
EOSINOPHIL NFR BLD: 0.2 % (ref 0–8)
ERYTHROCYTE [DISTWIDTH] IN BLOOD BY AUTOMATED COUNT: 11.7 % (ref 11.5–14.5)
EST. GFR  (NO RACE VARIABLE): >60 ML/MIN/1.73 M^2
GLUCOSE SERPL-MCNC: 105 MG/DL (ref 70–110)
HCT VFR BLD AUTO: 42.6 % (ref 40–54)
HGB BLD-MCNC: 13.8 G/DL (ref 14–18)
IMM GRANULOCYTES # BLD AUTO: 0.02 K/UL (ref 0–0.04)
IMM GRANULOCYTES NFR BLD AUTO: 0.2 % (ref 0–0.5)
LYMPHOCYTES # BLD AUTO: 1.6 K/UL (ref 1–4.8)
LYMPHOCYTES NFR BLD: 16.5 % (ref 18–48)
MAGNESIUM SERPL-MCNC: 2 MG/DL (ref 1.6–2.6)
MCH RBC QN AUTO: 32.9 PG (ref 27–31)
MCHC RBC AUTO-ENTMCNC: 32.4 G/DL (ref 32–36)
MCV RBC AUTO: 101 FL (ref 82–98)
MONOCYTES # BLD AUTO: 0.7 K/UL (ref 0.3–1)
MONOCYTES NFR BLD: 7.5 % (ref 4–15)
NEUTROPHILS # BLD AUTO: 7.1 K/UL (ref 1.8–7.7)
NEUTROPHILS NFR BLD: 75.3 % (ref 38–73)
NRBC BLD-RTO: 0 /100 WBC
PHOSPHATE SERPL-MCNC: 2.7 MG/DL (ref 2.7–4.5)
PLATELET # BLD AUTO: 207 K/UL (ref 150–450)
PMV BLD AUTO: 10.8 FL (ref 9.2–12.9)
POTASSIUM SERPL-SCNC: 4.3 MMOL/L (ref 3.5–5.1)
PROT SERPL-MCNC: 7.7 G/DL (ref 6–8.4)
RBC # BLD AUTO: 4.2 M/UL (ref 4.6–6.2)
SODIUM SERPL-SCNC: 137 MMOL/L (ref 136–145)
WBC # BLD AUTO: 9.46 K/UL (ref 3.9–12.7)

## 2023-11-08 PROCEDURE — 25000003 PHARM REV CODE 250: Performed by: PSYCHIATRY & NEUROLOGY

## 2023-11-08 PROCEDURE — 93005 ELECTROCARDIOGRAM TRACING: CPT

## 2023-11-08 PROCEDURE — 25000003 PHARM REV CODE 250

## 2023-11-08 PROCEDURE — 80053 COMPREHEN METABOLIC PANEL: CPT | Performed by: PHYSICIAN ASSISTANT

## 2023-11-08 PROCEDURE — 20000000 HC ICU ROOM

## 2023-11-08 PROCEDURE — 83735 ASSAY OF MAGNESIUM: CPT | Performed by: PHYSICIAN ASSISTANT

## 2023-11-08 PROCEDURE — 93010 EKG 12-LEAD: ICD-10-PCS | Mod: ,,, | Performed by: INTERNAL MEDICINE

## 2023-11-08 PROCEDURE — 84100 ASSAY OF PHOSPHORUS: CPT | Performed by: PHYSICIAN ASSISTANT

## 2023-11-08 PROCEDURE — 63600175 PHARM REV CODE 636 W HCPCS: Performed by: PSYCHIATRY & NEUROLOGY

## 2023-11-08 PROCEDURE — 99233 PR SUBSEQUENT HOSPITAL CARE,LEVL III: ICD-10-PCS | Mod: ,,, | Performed by: PHYSICIAN ASSISTANT

## 2023-11-08 PROCEDURE — 25000003 PHARM REV CODE 250: Performed by: NURSE PRACTITIONER

## 2023-11-08 PROCEDURE — 25000003 PHARM REV CODE 250: Performed by: PHYSICIAN ASSISTANT

## 2023-11-08 PROCEDURE — C9399 UNCLASSIFIED DRUGS OR BIOLOG: HCPCS | Performed by: PSYCHIATRY & NEUROLOGY

## 2023-11-08 PROCEDURE — 93010 ELECTROCARDIOGRAM REPORT: CPT | Mod: ,,, | Performed by: INTERNAL MEDICINE

## 2023-11-08 PROCEDURE — 25000003 PHARM REV CODE 250: Performed by: STUDENT IN AN ORGANIZED HEALTH CARE EDUCATION/TRAINING PROGRAM

## 2023-11-08 PROCEDURE — 63600175 PHARM REV CODE 636 W HCPCS: Performed by: PHYSICIAN ASSISTANT

## 2023-11-08 PROCEDURE — 85025 COMPLETE CBC W/AUTO DIFF WBC: CPT | Performed by: PHYSICIAN ASSISTANT

## 2023-11-08 PROCEDURE — 99233 SBSQ HOSP IP/OBS HIGH 50: CPT | Mod: ,,, | Performed by: PHYSICIAN ASSISTANT

## 2023-11-08 PROCEDURE — 97535 SELF CARE MNGMENT TRAINING: CPT

## 2023-11-08 RX ORDER — TALC
6 POWDER (GRAM) TOPICAL NIGHTLY
Status: DISCONTINUED | OUTPATIENT
Start: 2023-11-08 | End: 2023-11-17 | Stop reason: HOSPADM

## 2023-11-08 RX ADMIN — Medication 6 MG: at 08:11

## 2023-11-08 RX ADMIN — FAMOTIDINE 20 MG: 20 TABLET ORAL at 08:11

## 2023-11-08 RX ADMIN — ATORVASTATIN CALCIUM 80 MG: 40 TABLET, FILM COATED ORAL at 10:11

## 2023-11-08 RX ADMIN — TAMSULOSIN HYDROCHLORIDE 0.4 MG: 0.4 CAPSULE ORAL at 10:11

## 2023-11-08 RX ADMIN — NIMODIPINE 60 MG: 30 CAPSULE, LIQUID FILLED ORAL at 06:11

## 2023-11-08 RX ADMIN — NIMODIPINE 60 MG: 60 SOLUTION ORAL at 09:11

## 2023-11-08 RX ADMIN — NIMODIPINE 60 MG: 30 CAPSULE, LIQUID FILLED ORAL at 02:11

## 2023-11-08 RX ADMIN — ENOXAPARIN SODIUM 40 MG: 40 INJECTION SUBCUTANEOUS at 06:11

## 2023-11-08 RX ADMIN — BRIVARACETAM 25 MG: 10 SOLUTION ORAL at 02:11

## 2023-11-08 RX ADMIN — BRIVARACETAM 25 MG: 10 SOLUTION ORAL at 08:11

## 2023-11-08 RX ADMIN — METOPROLOL SUCCINATE 50 MG: 50 TABLET, EXTENDED RELEASE ORAL at 10:11

## 2023-11-08 RX ADMIN — FAMOTIDINE 20 MG: 20 TABLET ORAL at 09:11

## 2023-11-08 RX ADMIN — NIMODIPINE 60 MG: 30 CAPSULE, LIQUID FILLED ORAL at 05:11

## 2023-11-08 RX ADMIN — NIMODIPINE 60 MG: 30 CAPSULE, LIQUID FILLED ORAL at 10:11

## 2023-11-08 RX ADMIN — SENNOSIDES AND DOCUSATE SODIUM 1 TABLET: 8.6; 5 TABLET ORAL at 10:11

## 2023-11-08 RX ADMIN — LOSARTAN POTASSIUM 100 MG: 50 TABLET, FILM COATED ORAL at 10:11

## 2023-11-08 NOTE — PROGRESS NOTES
[facility] - [facility] Care  Neurosurgery  Progress Note    Subjective:     History of Present Illness: 78M h/o lymphoma in remission, CAD, nonrheumatic mitral regurg, CHF, first-degree AV block, aortic arthrosclerosis admitted to [facility] with SAH. Initially presented initially to outside hospital ED with complaints of bilateral neck pain that happened approximately 2 hours prior to arrival.  Reports that the pain started immediately after having a bowel movement.  He does have associated headache that radiates up to his temples. Patient is on baby ASA. Also reports some associated weakness with walking. States he woke up this morning feeling normal without any pain or weakness. Denies fever/chills, vision/hearing changes, dysphagia, dysarthria, vomiting, new-onset weakness or sensory change, bowel/bladder changes. CTH with prepontine SAH; CTA negative for vessel lesions.       Post-Op Info:  * No surgery found *       Interval History: 11/08/2023: PBD 4. NAEON. AFVSS. HA nearly resolved.    Medications:  Continuous Infusions:  Scheduled Meds:   atorvastatin  80 mg Oral Daily    enoxparin  40 mg Subcutaneous Q24H (prophylaxis, 1700)    famotidine  20 mg Oral BID    levETIRAcetam  500 mg Oral BID    losartan  100 mg Oral Daily    metoprolol succinate  50 mg Oral Daily    niMODipine  60 mg Oral Q4H    polyethylene glycol  17 g Oral Daily    senna-docusate 8.6-50 mg  1 tablet Oral Daily    tamsulosin  0.4 mg Oral Daily     PRN Meds:acetaminophen, dextrose 10%, dextrose 10%, hydrALAZINE, labetalol, ondansetron     Review of Systems  Objective:     Weight: 114.8 kg (253 lb)  Body mass index is 34.31 kg/m².  Vital Signs (Most Recent):  Temp: 98.2 °F (36.8 °C) (11/08/23 0705)  Pulse: 85 (11/08/23 0805)  Resp: (!) 22 (11/08/23 0805)  BP: 136/70 (11/08/23 0805)  SpO2: 96 % (11/08/23 0805) Vital Signs (24h Range):  Temp:  [98.2 °F (36.8 °C)-98.5 °F (36.9 °C)] 98.2 °F (36.8 °C)  Pulse:  [78-95] 85  Resp:  [17-23] 22  SpO2:  [92  "%-97 %] 96 %  BP: ()/(56-79) 136/70                                Physical Exam         Neurosurgery Physical Exam    GENERAL: resting comfortably  HEENT: NCAT, PERRL, mucous membranes moist  NECK: supple, trachea midline  CV: normal capillary refill  PULM: aerating well, symmetric expansion, no distress  ABD: soft, NT, ND  EXT: no c/c/e    NEURO:    AAO x 3  CN II-XII grossly intact  Fc x 4 antigravity  SILT    No drift or dysmetria      Significant Labs:  Recent Labs   Lab 11/07/23  0246 11/08/23  0020    105    137   K 4.1 4.3    106   CO2 20* 19*   BUN 13 17   CREATININE 0.8 0.9   CALCIUM 9.4 9.7   MG 2.0 2.0       Recent Labs   Lab 11/07/23  0246 11/08/23  0020   WBC 7.45 9.46   HGB 12.6* 13.8*   HCT 38.6* 42.6    207       No results for input(s): "LABPT", "INR", "APTT" in the last 48 hours.  Microbiology Results (last 7 days)       ** No results found for the last 168 hours. **          All pertinent labs from the last 24 hours have been reviewed.    Significant Diagnostics:  I have reviewed all pertinent imaging results/findings within the past 24 hours.  US Transcranial Doppler Arterial Comp    Result Date: 11/7/2023  No Doppler evidence of vasospasm. Electronically signed by: Pool Qureshi MD Date:    11/07/2023 Time:    13:27   Assessment/Plan:     * SAH (subarachnoid hemorrhage)  78M h/o lymphoma in remission, CAD, nonrheumatic mitral regurg, CHF, first-degree AV block, aortic arthrosclerosis admitted to [facility] with SAH. CTH with prepontine SAH; CTA negative for vessel lesions.     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     Neuro:  --Patient admitted to Neuro-ICU; preponderance of medical care per [facility]      -Q1h neurochecks while in ICU  --CTA 11/4: negative for vascular pathology  --MRI/MRA Brain 11/5: negative for underlying lesion  --DSA 11/5 negative  --Plan for CTA on PBD 7  --HOB@30  --Keppra 500 BID x7d  --TCDs daily " x14d  --Nimotop 60q4 x 21d  --Continue to follow clinically and notify NSGY immediately if any neurostatus change    CVS:  --SBP <140 mmHg    Pulm:  --Aggressive pulm treatment per [facility]    GIT/Electrolytes:  --BMP qd      -Replete electrolytes PRN per [facility]  --Na goal >135  --Regular diet  --PPI    Renal:  --Strict I/Os  --Goal of euvolemia or net +1L    Heme/ID:  --Hold therapeutic anti-plt/coag medications per [facility]  --Daily CBC      -Transfuse PRN  --Trend WBC and T    PPx:  --TEDs/SCDs/LVX  --PPI    Dispo: Continued ICU care at this time, PT/OT when appropriate per [facility], CM/SW consult for dispo planning    Plan d/w Dr. Cosby.        Asael Richardson MD  Neurosurgery  [facility] - Neuro Critical [facility]

## 2023-11-08 NOTE — PLAN OF CARE
Goals remain appropriate.  Problem: Occupational Therapy  Goal: Occupational Therapy Goal  Description: Goals set 11/5 to be addressed for 14 days with expiration date, 11/19:  Patient will increase functional independence with ADLs by performing:    Patient will demonstrate rolling to the right with modified independence.  Not met   Patient will demonstrate rolling to the left with modified independence.   Not met  Patient will demonstrate supine -sit with modified independence.   Not met  Patient will demonstrate stand pivot transfers with modified independence.   Not met  Patient will demonstrate grooming while standing with modified independence.   Not met  Patient will demonstrate upper body dressing with modified independence while seated EOB.   Not met  Patient will demonstrate lower body dressing with modified independence while seated EOB.   Not met  Patient will demonstrate toileting with modified independence.   Not met  Patient will demonstrate bathing while seated EOB with modified independence.   Not met  Patient's family / caregiver will demonstrate independence and safety with assisting patient with self-care skills and functional mobility.     Not met  Patient and/or patient's family will verbalize understanding of stroke prevention guidelines, personal risk factors and stroke warning signs via teachback method.  Not met             Outcome: Ongoing, Progressing

## 2023-11-08 NOTE — PT/OT/SLP PROGRESS
"Occupational Therapy   Treatment    Name: Sam Gamino  MRN: [id number]  Admitting Diagnosis:  SAH (subarachnoid hemorrhage)       Recommendations:     Discharge Recommendations: Mod Intensity Therapy  Discharge Equipment Recommendations:  walker, rolling  Barriers to discharge:  None    Assessment:     Sam Gamino is a 78 y.o. male with a medical diagnosis of SAH (subarachnoid hemorrhage).  He presents with performance deficits affecting function are weakness, impaired self care skills, impaired functional mobility, impaired endurance.     Rehab Prognosis:  Good; patient would benefit from acute skilled OT services to address these deficits and reach maximum level of function.       Plan:     Patient to be seen 3 x/week to address the above listed problems via neuromuscular re-education, therapeutic exercises, therapeutic activities, self-care/home management  Plan of Care Expires: 12/03/23  Plan of Care Reviewed with: patient    Subjective   Patient:  "I had a good night."  Pain/Comfort:  Pain Rating 1: 0/10  Pain Rating Post-Intervention 1: 0/10    Objective:     Communicated with: Nurse prior to session.  Patient found supine with bed alarm, telemetry, blood pressure cuff, pulse ox (continuous) upon OT entry to room.    General Precautions: Standard, aspiration, fall    Orthopedic Precautions:N/A  Braces: N/A  Respiratory Status: Room air     Occupational Performance:     Bed Mobility:    Patient completed Rolling/Turning to Left with  supervision  Patient completed Rolling/Turning to Right with supervision  Patient completed Supine to Sit with minimum assistance  Patient completed Sit to Supine with minimum assistance     Functional Mobility/Transfers:  Patient completed Sit <> Stand Transfer with contact guard assistance  with  no assistive device   Patient completed Bed <> Chair Transfer using Stand Pivot technique with minimum assistance with no assistive device    Activities of Daily Living:  Grooming: " minimum assistance while standing  Upper Body Dressing: stand by assistance while seated EOB    [facility] 6 Click ADL: 18    Treatment & Education:  Patient alert and oriented x 3; able to follow 4/4 one step commands.  Patient attentive and interactive throughout the session.  Patient able to identify 5/5 body parts.  Able to name 5/5 objects.  Able to sequence 7/7 days of the week and 12/12 months of the year.    Patient left supine with all lines intact, call button in reach, and bed alarm on    GOALS:   Multidisciplinary Problems       Occupational Therapy Goals          Problem: Occupational Therapy    Goal Priority Disciplines Outcome Interventions   Occupational Therapy Goal     OT, PT/OT Ongoing, Progressing    Description: Goals set 11/5 to be addressed for 14 days with expiration date, 11/19:  Patient will increase functional independence with ADLs by performing:    Patient will demonstrate rolling to the right with modified independence.  Not met   Patient will demonstrate rolling to the left with modified independence.   Not met  Patient will demonstrate supine -sit with modified independence.   Not met  Patient will demonstrate stand pivot transfers with modified independence.   Not met  Patient will demonstrate grooming while standing with modified independence.   Not met  Patient will demonstrate upper body dressing with modified independence while seated EOB.   Not met  Patient will demonstrate lower body dressing with modified independence while seated EOB.   Not met  Patient will demonstrate toileting with modified independence.   Not met  Patient will demonstrate bathing while seated EOB with modified independence.   Not met  Patient's family / caregiver will demonstrate independence and safety with assisting patient with self-care skills and functional mobility.     Not met  Patient and/or patient's family will verbalize understanding of stroke prevention guidelines, personal risk factors and stroke  warning signs via teachback method.  Not met                                  Time Tracking:     OT Date of Treatment: 11/08/23  OT Start Time: 0524  OT Stop Time: 0548  OT Total Time (min): 24 min    Billable Minutes:Self Care/Home Management 24    OT/RICHY: OT          11/8/2023

## 2023-11-08 NOTE — ASSESSMENT & PLAN NOTE
78-year-old male with lymphoma in remission, CAD, nonrheumatic mitral regurg, CHF, first-degree AV block, aortic arthrosclerosis who presents w/SAH/IVH    - Admitted to [facility] with neurosurgery and stroke teams following along   - CTA head, MRI and MRA with vessel wall imaging completed, reviewed, no evidence of aneurysm of other malformation   - DSA from 11/5 without aneurysm detected   - will continue to treat as aneurysmal until repeat vessel imaging  - SBP <140, currently patient is maintaining without IV antihypertensives, continue home losartan and metoprolol   - Nimodipine q4h   - Daily TCDs  - AED for 7 day total course -- switched Keppra to briviact due to delirium/agitation overnight   - Hourly neuro checks  - CTA at PBD 7 (11/11, order placed)   - PT/OT/SLP following along

## 2023-11-08 NOTE — SUBJECTIVE & OBJECTIVE
"Interval History: 11/08/2023: PBD 4. NAEON. AFVSS. HA nearly resolved.    Medications:  Continuous Infusions:  Scheduled Meds:   atorvastatin  80 mg Oral Daily    enoxparin  40 mg Subcutaneous Q24H (prophylaxis, 1700)    famotidine  20 mg Oral BID    levETIRAcetam  500 mg Oral BID    losartan  100 mg Oral Daily    metoprolol succinate  50 mg Oral Daily    niMODipine  60 mg Oral Q4H    polyethylene glycol  17 g Oral Daily    senna-docusate 8.6-50 mg  1 tablet Oral Daily    tamsulosin  0.4 mg Oral Daily     PRN Meds:acetaminophen, dextrose 10%, dextrose 10%, hydrALAZINE, labetalol, ondansetron     Review of Systems  Objective:     Weight: 114.8 kg (253 lb)  Body mass index is 34.31 kg/m².  Vital Signs (Most Recent):  Temp: 98.2 °F (36.8 °C) (11/08/23 0705)  Pulse: 85 (11/08/23 0805)  Resp: (!) 22 (11/08/23 0805)  BP: 136/70 (11/08/23 0805)  SpO2: 96 % (11/08/23 0805) Vital Signs (24h Range):  Temp:  [98.2 °F (36.8 °C)-98.5 °F (36.9 °C)] 98.2 °F (36.8 °C)  Pulse:  [78-95] 85  Resp:  [17-23] 22  SpO2:  [92 %-97 %] 96 %  BP: ()/(56-79) 136/70                                 Physical Exam         Neurosurgery Physical Exam    GENERAL: resting comfortably  HEENT: NCAT, PERRL, mucous membranes moist  NECK: supple, trachea midline  CV: normal capillary refill  PULM: aerating well, symmetric expansion, no distress  ABD: soft, NT, ND  EXT: no c/c/e    NEURO:    AAO x 3  CN II-XII grossly intact  Fc x 4 antigravity  SILT    No drift or dysmetria      Significant Labs:  Recent Labs   Lab 11/07/23  0246 11/08/23  0020    105    137   K 4.1 4.3    106   CO2 20* 19*   BUN 13 17   CREATININE 0.8 0.9   CALCIUM 9.4 9.7   MG 2.0 2.0       Recent Labs   Lab 11/07/23  0246 11/08/23  0020   WBC 7.45 9.46   HGB 12.6* 13.8*   HCT 38.6* 42.6    207       No results for input(s): "LABPT", "INR", "APTT" in the last 48 hours.  Microbiology Results (last 7 days)       ** No results found for the last 168 hours. ** "          All pertinent labs from the last 24 hours have been reviewed.    Significant Diagnostics:  I have reviewed all pertinent imaging results/findings within the past 24 hours.  US Transcranial Doppler Arterial Comp    Result Date: 11/7/2023  No Doppler evidence of vasospasm. Electronically signed by: Pool Qureshi MD Date:    11/07/2023 Time:    13:27

## 2023-11-08 NOTE — ASSESSMENT & PLAN NOTE
78M h/o lymphoma in remission, CAD, nonrheumatic mitral regurg, CHF, first-degree AV block, aortic arthrosclerosis admitted to [facility] with SAH. CTH with prepontine SAH; CTA negative for vessel lesions.     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     Neuro:  --Patient admitted to Neuro-ICU; preponderance of medical care per [facility]      -Q1h neurochecks while in ICU  --CTA 11/4: negative for vascular pathology  --MRI/MRA Brain 11/5: negative for underlying lesion  --DSA 11/5 negative  --Plan for CTA on PBD 7  --HOB@30  --Keppra 500 BID x7d  --TCDs daily x14d  --Nimotop 60q4 x 21d  --Continue to follow clinically and notify NSGY immediately if any neurostatus change    CVS:  --SBP <140 mmHg    Pulm:  --Aggressive pulm treatment per [facility]    GIT/Electrolytes:  --BMP qd      -Replete electrolytes PRN per [facility]  --Na goal >135  --Regular diet  --PPI    Renal:  --Strict I/Os  --Goal of euvolemia or net +1L    Heme/ID:  --Hold therapeutic anti-plt/coag medications per [facility]  --Daily CBC      -Transfuse PRN  --Trend WBC and T    PPx:  --TEDs/SCDs/LVX  --PPI    Dispo: Continued ICU care at this time, PT/OT when appropriate per [facility], CM/SW consult for dispo planning    Plan d/w Dr. Cosby.

## 2023-11-08 NOTE — PLAN OF CARE
"[facility] Care Plan    POC reviewed with Sam Gamino and family at 0300. Pt verbalized understanding. Questions and concerns addressed. No acute events overnight. Pt progressing toward goals. Will continue to monitor. See below and flowsheets for full assessment and VS info.     -Strict I/Os. Goal is to be euvolemic/fluid positive   -3 BM overnight   -Linens changed    Is this a stroke patient? yes- Stroke booklet reviewed with patient, risk factors identified for patient and stroke booklet remains at bedside for ongoing education.     Neuro:  Carmi Coma Scale  Best Eye Response: 4-->(E4) spontaneous  Best Motor Response: 6-->(M6) obeys commands  Best Verbal Response: 5-->(V5) oriented  Carmi Coma Scale Score: 15  Assessment Qualifiers: patient not sedated/intubated, no eye obstruction present  Pupil PERRLA: yes     24hr Temp:  [98.3 °F (36.8 °C)-98.5 °F (36.9 °C)]     CV:   Rhythm: normal sinus rhythm  BP goals:   SBP < 140  MAP > 65    Resp:           Plan: N/A    GI/:     Diet/Nutrition Received: NPO  Last Bowel Movement: 11/07/23       Intake/Output Summary (Last 24 hours) at 11/8/2023 0421  Last data filed at 11/8/2023 0205  Gross per 24 hour   Intake 1600 ml   Output 1525 ml   Net 75 ml     Unmeasured Output  Urine Occurrence: 1  Stool Occurrence: 0  Emesis Occurrence: 0    Labs/Accuchecks:  Recent Labs   Lab 11/08/23  0020   WBC 9.46   RBC 4.20*   HGB 13.8*   HCT 42.6         Recent Labs   Lab 11/08/23  0020      K 4.3   CO2 19*      BUN 17   CREATININE 0.9   ALKPHOS 70   ALT 17   AST 26   BILITOT 1.9*    No results for input(s): "PROTIME", "INR", "APTT", "HEPANTIXA" in the last 168 hours. No results for input(s): "CPK", "CPKMB", "TROPONINI", "MB" in the last 168 hours.    Electrolytes: N/A - electrolytes WDL  Accuchecks: Q6H    Gtts:      LDA/Wounds:  Lines/Drains/Airways       Peripheral Intravenous Line  Duration                  Peripheral IV - Single Lumen 11/04/23 1352 20 G " Anterior;Distal;Right Forearm 3 days         Peripheral IV - Single Lumen 11/07/23 0256 18 G Posterior;Right Forearm 1 day                  Wounds: No  Wound care consulted: No

## 2023-11-08 NOTE — PROGRESS NOTES
[facility]  Neurocritical Care  Progress Note    Admit Date: 11/4/2023  Service Date: 11/08/2023  Length of Stay: 4    Subjective:     Chief Complaint: SAH (subarachnoid hemorrhage)    History of Present Illness: The patient is a 78-year-old male with PMHx of  lymphoma in remission, CAD, nonrheumatic mitral regurg, CHF, first-degree AV block, aortic arthrosclerosis admitted to [facility] with aSAH/IVH. Initially presented initially to outside hospital ED with complaints of bilateral neck pain that happened approximately 2 hours prior to arrival.  Reports that the pain started immediately after having a bowel movement.  He does have associated headache that radiates up to his temples. Patient is on baby ASA. Also reports some associated weakness with walking. States he woke up this morning feeling normal without any pain or weakness.        Hospital Course: 11/06/2023: Angio negative yesterday, SAH protocol   11/07/2023: No events overnight.   11/08/2023: Increased delirium overnight, switch keppra to briviact       Interval History:  Increased delirium overnight, switch keppra to briviact in efforts to reduce delirium/agitation. CTA order placed for 11/11 (PBD7). No complaints this AM.     Review of Systems: As above      Vitals:   Temp: 98.2 °F (36.8 °C)  Pulse: 85  Rhythm: normal sinus rhythm  BP: 135/66  MAP (mmHg): 95  Resp: (!) 22  SpO2: 96 %    Temp  Min: 98.2 °F (36.8 °C)  Max: 98.5 °F (36.9 °C)  Pulse  Min: 78  Max: 95  BP  Min: 97/65  Max: 150/77  MAP (mmHg)  Min: 77  Max: 107  Resp  Min: 17  Max: 22  SpO2  Min: 92 %  Max: 97 %    11/07 0701 - 11/08 0700  In: 1600 [P.O.:1600]  Out: 1675 [Urine:1675]   Unmeasured Output  Urine Occurrence: 1  Stool Occurrence: 1  Emesis Occurrence: 0  Pad Count: 2     Examination:   Constitutional: Well-nourished and -developed. No apparent distress.   Eyes: Conjunctiva clear, anicteric. Lids no lesions.  Head/Ears/Nose/Mouth/Throat/Neck: Moist mucous membranes.  "External ears, nose atraumatic.   Cardiovascular: Regular rhythm.   Respiratory: Comfortable respirations.      Neurologic:  -GCS E4V5M  -Alert. Oriented to person, place, and time. Speech fluent. Follows commands.  -Cranial nerves intact  -Motor: no focal weakness, antigravity in all four extremities   -Sensation intact to light touch     Medications:   Continuous Scheduledatorvastatin, 80 mg, Daily  brivaracetam, 25 mg, BID  enoxparin, 40 mg, Q24H (prophylaxis, 1700)  famotidine, 20 mg, BID  losartan, 100 mg, Daily  melatonin, 6 mg, Nightly  metoprolol succinate, 50 mg, Daily  niMODipine, 60 mg, Q4H  polyethylene glycol, 17 g, Daily  senna-docusate 8.6-50 mg, 1 tablet, Daily  tamsulosin, 0.4 mg, Daily    PRNacetaminophen, 650 mg, Q6H PRN  dextrose 10%, 12.5 g, PRN  dextrose 10%, 25 g, PRN  hydrALAZINE, 10 mg, Q4H PRN  labetalol, 10 mg, Q4H PRN  ondansetron, 4 mg, Q6H PRN       Today I independently reviewed pertinent medications, lines/drains/airways, imaging, laboratory results, notably:     ISTAT: No results for input(s): "PH", "PCO2", "PO2", "POCSATURATED", "HCO3", "BE", "POCNA", "POCK", "POCTCO2", "POCGLU", "POCICA", "POCLAC", "SAMPLE" in the last 24 hours.   Chem:   Recent Labs   Lab 11/08/23  0020      K 4.3      CO2 19*      BUN 17   CREATININE 0.9   CALCIUM 9.7   MG 2.0   PHOS 2.7   ANIONGAP 12   PROT 7.7   ALBUMIN 3.6   BILITOT 1.9*   ALKPHOS 70   AST 26   ALT 17     Heme:   Recent Labs   Lab 11/08/23  0020   WBC 9.46   HGB 13.8*   HCT 42.6            Assessment/Plan:     Neuro  * SAH (subarachnoid hemorrhage)   78-year-old male with lymphoma in remission, CAD, nonrheumatic mitral regurg, CHF, first-degree AV block, aortic arthrosclerosis who presents w/SAH/IVH    - Admitted to [facility] with neurosurgery and stroke teams following along   - CTA head, MRI and MRA with vessel wall imaging completed, reviewed, no evidence of aneurysm of other malformation   - DSA from 11/5 without " aneurysm detected   - will continue to treat as aneurysmal until repeat vessel imaging  - SBP <140, currently patient is maintaining without IV antihypertensives, continue home losartan and metoprolol   - Nimodipine q4h   - Daily TCDs  - AED for 7 day total course -- switched Keppra to briviact due to delirium/agitation overnight   - Hourly neuro checks  - CTA at PBD 7 (11/11, order placed)   - PT/OT/SLP following along    Cardiac/Vascular  Hypercholesterolemia  Continue daily statin     Heart failure, diastolic, chronic  Followed by cardiology as OP   - Echo from 11/5: estimated ejection fraction of 55 - 60%. There is normal diastolic function.  - Continue home BB and ARB, hold diuretics in setting of SAH and goal euvolemia during this time    Primary hypertension  Continue home medications     Coronary artery disease  At home on ASA  - hold at this time in setting of acute bleed   - continue home BB and ARB    Renal/  Benign prostatic hyperplasia with urinary frequency  Continue home tamsulosin         The patient is being Prophylaxed for:  Venous Thromboembolism with: Mechanical or Chemical  Stress Ulcer with: H2B  Ventilator Pneumonia with: not applicable    Activity Orders            Diet Adult Regular (IDDSI Level 7): Regular starting at 11/05 1803    Elevate HOB 45 starting at 11/04 1541          Full Code     Level 3     Sheba Chavira PA-C  Neurocritical Care  [facility] - Neuro Critical Care

## 2023-11-09 LAB
ALBUMIN SERPL BCP-MCNC: 3.4 G/DL (ref 3.5–5.2)
ALP SERPL-CCNC: 67 U/L (ref 55–135)
ALT SERPL W/O P-5'-P-CCNC: 16 U/L (ref 10–44)
ANION GAP SERPL CALC-SCNC: 10 MMOL/L (ref 8–16)
AST SERPL-CCNC: 20 U/L (ref 10–40)
BASOPHILS # BLD AUTO: 0.02 K/UL (ref 0–0.2)
BASOPHILS NFR BLD: 0.3 % (ref 0–1.9)
BILIRUB SERPL-MCNC: 2 MG/DL (ref 0.1–1)
BUN SERPL-MCNC: 18 MG/DL (ref 8–23)
CALCIUM SERPL-MCNC: 9.5 MG/DL (ref 8.7–10.5)
CHLORIDE SERPL-SCNC: 104 MMOL/L (ref 95–110)
CO2 SERPL-SCNC: 22 MMOL/L (ref 23–29)
CREAT SERPL-MCNC: 0.9 MG/DL (ref 0.5–1.4)
DIFFERENTIAL METHOD: ABNORMAL
EOSINOPHIL # BLD AUTO: 0 K/UL (ref 0–0.5)
EOSINOPHIL NFR BLD: 0.3 % (ref 0–8)
ERYTHROCYTE [DISTWIDTH] IN BLOOD BY AUTOMATED COUNT: 11.9 % (ref 11.5–14.5)
EST. GFR  (NO RACE VARIABLE): >60 ML/MIN/1.73 M^2
GLUCOSE SERPL-MCNC: 107 MG/DL (ref 70–110)
HCT VFR BLD AUTO: 44.5 % (ref 40–54)
HGB BLD-MCNC: 14.7 G/DL (ref 14–18)
IMM GRANULOCYTES # BLD AUTO: 0.03 K/UL (ref 0–0.04)
IMM GRANULOCYTES NFR BLD AUTO: 0.4 % (ref 0–0.5)
LYMPHOCYTES # BLD AUTO: 1 K/UL (ref 1–4.8)
LYMPHOCYTES NFR BLD: 13 % (ref 18–48)
MAGNESIUM SERPL-MCNC: 2 MG/DL (ref 1.6–2.6)
MCH RBC QN AUTO: 33.6 PG (ref 27–31)
MCHC RBC AUTO-ENTMCNC: 33 G/DL (ref 32–36)
MCV RBC AUTO: 102 FL (ref 82–98)
MONOCYTES # BLD AUTO: 0.7 K/UL (ref 0.3–1)
MONOCYTES NFR BLD: 8.8 % (ref 4–15)
NEUTROPHILS # BLD AUTO: 6.2 K/UL (ref 1.8–7.7)
NEUTROPHILS NFR BLD: 77.2 % (ref 38–73)
NRBC BLD-RTO: 0 /100 WBC
PHOSPHATE SERPL-MCNC: 3 MG/DL (ref 2.7–4.5)
PLATELET # BLD AUTO: 181 K/UL (ref 150–450)
PMV BLD AUTO: 9.7 FL (ref 9.2–12.9)
POTASSIUM SERPL-SCNC: 3.8 MMOL/L (ref 3.5–5.1)
PROT SERPL-MCNC: 7.3 G/DL (ref 6–8.4)
RBC # BLD AUTO: 4.38 M/UL (ref 4.6–6.2)
SODIUM SERPL-SCNC: 136 MMOL/L (ref 136–145)
WBC # BLD AUTO: 8 K/UL (ref 3.9–12.7)

## 2023-11-09 PROCEDURE — 63600175 PHARM REV CODE 636 W HCPCS: Performed by: PSYCHIATRY & NEUROLOGY

## 2023-11-09 PROCEDURE — 83735 ASSAY OF MAGNESIUM: CPT | Performed by: PHYSICIAN ASSISTANT

## 2023-11-09 PROCEDURE — 25000003 PHARM REV CODE 250: Performed by: STUDENT IN AN ORGANIZED HEALTH CARE EDUCATION/TRAINING PROGRAM

## 2023-11-09 PROCEDURE — 80053 COMPREHEN METABOLIC PANEL: CPT | Performed by: PHYSICIAN ASSISTANT

## 2023-11-09 PROCEDURE — 25000003 PHARM REV CODE 250

## 2023-11-09 PROCEDURE — 99233 SBSQ HOSP IP/OBS HIGH 50: CPT | Mod: GC,,, | Performed by: PSYCHIATRY & NEUROLOGY

## 2023-11-09 PROCEDURE — 25000003 PHARM REV CODE 250: Performed by: PHYSICIAN ASSISTANT

## 2023-11-09 PROCEDURE — C9399 UNCLASSIFIED DRUGS OR BIOLOG: HCPCS | Performed by: PSYCHIATRY & NEUROLOGY

## 2023-11-09 PROCEDURE — 63600175 PHARM REV CODE 636 W HCPCS: Performed by: PHYSICIAN ASSISTANT

## 2023-11-09 PROCEDURE — 99233 PR SUBSEQUENT HOSPITAL CARE,LEVL III: ICD-10-PCS | Mod: GC,,, | Performed by: PSYCHIATRY & NEUROLOGY

## 2023-11-09 PROCEDURE — 94761 N-INVAS EAR/PLS OXIMETRY MLT: CPT

## 2023-11-09 PROCEDURE — 84100 ASSAY OF PHOSPHORUS: CPT | Performed by: PHYSICIAN ASSISTANT

## 2023-11-09 PROCEDURE — 85025 COMPLETE CBC W/AUTO DIFF WBC: CPT | Performed by: PHYSICIAN ASSISTANT

## 2023-11-09 PROCEDURE — 25000003 PHARM REV CODE 250: Performed by: NURSE PRACTITIONER

## 2023-11-09 PROCEDURE — 20000000 HC ICU ROOM

## 2023-11-09 PROCEDURE — 25000003 PHARM REV CODE 250: Performed by: PSYCHIATRY & NEUROLOGY

## 2023-11-09 RX ORDER — HYDRALAZINE HYDROCHLORIDE 20 MG/ML
10 INJECTION INTRAMUSCULAR; INTRAVENOUS EVERY 4 HOURS PRN
Status: DISCONTINUED | OUTPATIENT
Start: 2023-11-09 | End: 2023-11-17 | Stop reason: HOSPADM

## 2023-11-09 RX ORDER — LABETALOL HCL 20 MG/4 ML
10 SYRINGE (ML) INTRAVENOUS EVERY 4 HOURS PRN
Status: DISCONTINUED | OUTPATIENT
Start: 2023-11-09 | End: 2023-11-17 | Stop reason: HOSPADM

## 2023-11-09 RX ADMIN — NIMODIPINE 60 MG: 60 SOLUTION ORAL at 01:11

## 2023-11-09 RX ADMIN — BRIVARACETAM 25 MG: 10 SOLUTION ORAL at 09:11

## 2023-11-09 RX ADMIN — NIMODIPINE 60 MG: 60 SOLUTION ORAL at 10:11

## 2023-11-09 RX ADMIN — SENNOSIDES AND DOCUSATE SODIUM 1 TABLET: 8.6; 5 TABLET ORAL at 09:11

## 2023-11-09 RX ADMIN — ENOXAPARIN SODIUM 40 MG: 40 INJECTION SUBCUTANEOUS at 05:11

## 2023-11-09 RX ADMIN — METOPROLOL SUCCINATE 50 MG: 50 TABLET, EXTENDED RELEASE ORAL at 09:11

## 2023-11-09 RX ADMIN — TAMSULOSIN HYDROCHLORIDE 0.4 MG: 0.4 CAPSULE ORAL at 09:11

## 2023-11-09 RX ADMIN — FAMOTIDINE 20 MG: 20 TABLET ORAL at 08:11

## 2023-11-09 RX ADMIN — POLYETHYLENE GLYCOL 3350 17 G: 17 POWDER, FOR SOLUTION ORAL at 09:11

## 2023-11-09 RX ADMIN — LOSARTAN POTASSIUM 100 MG: 50 TABLET, FILM COATED ORAL at 09:11

## 2023-11-09 RX ADMIN — NIMODIPINE 60 MG: 60 SOLUTION ORAL at 05:11

## 2023-11-09 RX ADMIN — ATORVASTATIN CALCIUM 80 MG: 40 TABLET, FILM COATED ORAL at 09:11

## 2023-11-09 RX ADMIN — Medication 6 MG: at 08:11

## 2023-11-09 RX ADMIN — FAMOTIDINE 20 MG: 20 TABLET ORAL at 09:11

## 2023-11-09 NOTE — PLAN OF CARE
"[facility] Care Plan    POC reviewed with Sam Gamino and family at 0300. Pt verbalized understanding. Questions and concerns addressed. No acute events overnight. Pt progressing toward goals. Will continue to monitor. See below and flowsheets for full assessment and VS info.           Is this a stroke patient? yes- Stroke booklet reviewed with patient and family, risk factors identified for patient and stroke booklet remains at bedside for ongoing education.     Neuro:  [place] Coma Scale  Best Eye Response: 4-->(E4) spontaneous  Best Motor Response: 6-->(M6) obeys commands  Best Verbal Response: 5-->(V5) oriented  [place]ue Coma Scale Score: 15  Assessment Qualifiers: patient not sedated/intubated  Pupil PERRLA: yes     24hr Temp:  [98.2 °F (36.8 °C)-98.6 °F (37 °C)]     CV:   Rhythm: normal sinus rhythm  BP goals:   SBP < 140  MAP > 65    Resp:           Plan: N/A    GI/:     Diet/Nutrition Received: regular  Last Bowel Movement: 11/08/23       Intake/Output Summary (Last 24 hours) at 11/9/2023 0452  Last data filed at 11/8/2023 2305  Gross per 24 hour   Intake 960 ml   Output 1250 ml   Net -290 ml     Unmeasured Output  Urine Occurrence: 1  Stool Occurrence: 1  Emesis Occurrence: 0  Pad Count: 2    Labs/Accuchecks:  Recent Labs   Lab 11/09/23  0116   WBC 8.00   RBC 4.38*   HGB 14.7   HCT 44.5         Recent Labs   Lab 11/09/23  0116      K 3.8   CO2 22*      BUN 18   CREATININE 0.9   ALKPHOS 67   ALT 16   AST 20   BILITOT 2.0*    No results for input(s): "PROTIME", "INR", "APTT", "HEPANTIXA" in the last 168 hours. No results for input(s): "CPK", "CPKMB", "TROPONINI", "MB" in the last 168 hours.    Electrolytes: N/A - electrolytes WDL  Accuchecks: none    Gtts:      LDA/Wounds:  Lines/Drains/Airways       Peripheral Intravenous Line  Duration                  Peripheral IV - Single Lumen 11/04/23 1352 20 G Anterior;Distal;Right Forearm 4 days         Peripheral IV - Single Lumen 11/07/23 0256 18 " G Posterior;Right Forearm 2 days                  Wounds: No  Wound care consulted: No

## 2023-11-09 NOTE — SUBJECTIVE & OBJECTIVE
"Interval History: 11/9: SHANNONBrynn JAMES. TCDs WNL. Plan for CTA PBD 7. Will discuss possibility of stepping down before CTA. Exam stable    Medications:  Continuous Infusions:  Scheduled Meds:   atorvastatin  80 mg Oral Daily    enoxparin  40 mg Subcutaneous Q24H (prophylaxis, 1700)    famotidine  20 mg Oral BID    losartan  100 mg Oral Daily    melatonin  6 mg Oral Nightly    metoprolol succinate  50 mg Oral Daily    polyethylene glycol  17 g Oral Daily    senna-docusate 8.6-50 mg  1 tablet Oral Daily    tamsulosin  0.4 mg Oral Daily     PRN Meds:acetaminophen, dextrose 10%, dextrose 10%, hydrALAZINE, labetalol, ondansetron     Review of Systems  Objective:     Weight: 114.8 kg (253 lb)  Body mass index is 34.31 kg/m².  Vital Signs (Most Recent):  Temp: 98.9 °F (37.2 °C) (11/09/23 1101)  Pulse: 78 (11/09/23 1101)  Resp: 20 (11/09/23 1101)  BP: (!) 111/54 (11/09/23 1101)  SpO2: 98 % (11/09/23 1101) Vital Signs (24h Range):  Temp:  [98.4 °F (36.9 °C)-99 °F (37.2 °C)] 98.9 °F (37.2 °C)  Pulse:  [78-99] 78  Resp:  [18-22] 20  SpO2:  [92 %-98 %] 98 %  BP: (111-169)/(54-95) 111/54                                 Physical Exam         Neurosurgery Physical Exam    GENERAL: resting comfortably  HEENT: NCAT, PERRL, mucous membranes moist  NECK: supple, trachea midline  CV: normal capillary refill  PULM: aerating well, symmetric expansion, no distress  ABD: soft, NT, ND  EXT: no c/c/e    NEURO:    AAO x 3  CN II-XII grossly intact  Fc x 4 antigravity  SILT    No drift or dysmetria      Significant Labs:  Recent Labs   Lab 11/08/23  0020 11/09/23  0116    107    136   K 4.3 3.8    104   CO2 19* 22*   BUN 17 18   CREATININE 0.9 0.9   CALCIUM 9.7 9.5   MG 2.0 2.0       Recent Labs   Lab 11/08/23  0020 11/09/23  0116   WBC 9.46 8.00   HGB 13.8* 14.7   HCT 42.6 44.5    181       No results for input(s): "LABPT", "INR", "APTT" in the last 48 hours.  Microbiology Results (last 7 days)       ** No results " found for the last 168 hours. **          All pertinent labs from the last 24 hours have been reviewed.    Significant Diagnostics:  I have reviewed all pertinent imaging results/findings within the past 24 hours.  US Transcranial Doppler Arterial Comp    Result Date: 11/7/2023  No Doppler evidence of vasospasm. Electronically signed by: Pool Qureshi MD Date:    11/07/2023 Time:    13:27   No results found in the last 24 hours.

## 2023-11-09 NOTE — PROGRESS NOTES
[facility]  Neurosurgery  Progress Note    Subjective:     History of Present Illness: 78M h/o lymphoma in remission, CAD, nonrheumatic mitral regurg, CHF, first-degree AV block, aortic arthrosclerosis admitted to [facility] with SAH. Initially presented initially to outside hospital ED with complaints of bilateral neck pain that happened approximately 2 hours prior to arrival.  Reports that the pain started immediately after having a bowel movement.  He does have associated headache that radiates up to his temples. Patient is on baby ASA. Also reports some associated weakness with walking. States he woke up this morning feeling normal without any pain or weakness. Denies fever/chills, vision/hearing changes, dysphagia, dysarthria, vomiting, new-onset weakness or sensory change, bowel/bladder changes. CTH with prepontine SAH; CTA negative for vessel lesions.         Post-Op Info:  * No surgery found *         Interval History: 11/9: SHANNON. RADHAS. TCDs WNL. Plan for CTA PBD 7. Will discuss possibility of stepping down before CTA. Exam stable    Medications:  Continuous Infusions:  Scheduled Meds:   atorvastatin  80 mg Oral Daily    enoxparin  40 mg Subcutaneous Q24H (prophylaxis, 1700)    famotidine  20 mg Oral BID    losartan  100 mg Oral Daily    melatonin  6 mg Oral Nightly    metoprolol succinate  50 mg Oral Daily    polyethylene glycol  17 g Oral Daily    senna-docusate 8.6-50 mg  1 tablet Oral Daily    tamsulosin  0.4 mg Oral Daily     PRN Meds:acetaminophen, dextrose 10%, dextrose 10%, hydrALAZINE, labetalol, ondansetron     Review of Systems  Objective:     Weight: 114.8 kg (253 lb)  Body mass index is 34.31 kg/m².  Vital Signs (Most Recent):  Temp: 98.9 °F (37.2 °C) (11/09/23 1101)  Pulse: 78 (11/09/23 1101)  Resp: 20 (11/09/23 1101)  BP: (!) 111/54 (11/09/23 1101)  SpO2: 98 % (11/09/23 1101) Vital Signs (24h Range):  Temp:  [98.4 °F (36.9 °C)-99 °F (37.2 °C)] 98.9 °F (37.2 °C)  Pulse:   "[78-99] 78  Resp:  [18-22] 20  SpO2:  [92 %-98 %] 98 %  BP: (111-169)/(54-95) 111/54                                Physical Exam         Neurosurgery Physical Exam    GENERAL: resting comfortably  HEENT: NCAT, PERRL, mucous membranes moist  NECK: supple, trachea midline  CV: normal capillary refill  PULM: aerating well, symmetric expansion, no distress  ABD: soft, NT, ND  EXT: no c/c/e    NEURO:    AAO x 3  CN II-XII grossly intact  Fc x 4 antigravity  SILT    No drift or dysmetria      Significant Labs:  Recent Labs   Lab 11/08/23  0020 11/09/23  0116    107    136   K 4.3 3.8    104   CO2 19* 22*   BUN 17 18   CREATININE 0.9 0.9   CALCIUM 9.7 9.5   MG 2.0 2.0       Recent Labs   Lab 11/08/23  0020 11/09/23  0116   WBC 9.46 8.00   HGB 13.8* 14.7   HCT 42.6 44.5    181       No results for input(s): "LABPT", "INR", "APTT" in the last 48 hours.  Microbiology Results (last 7 days)       ** No results found for the last 168 hours. **          All pertinent labs from the last 24 hours have been reviewed.    Significant Diagnostics:  I have reviewed all pertinent imaging results/findings within the past 24 hours.  US Transcranial Doppler Arterial Comp    Result Date: 11/7/2023  No Doppler evidence of vasospasm. Electronically signed by: Pool Qureshi MD Date:    11/07/2023 Time:    13:27   No results found in the last 24 hours.      Assessment/Plan:     * SAH (subarachnoid hemorrhage)  78M h/o lymphoma in remission, CAD, nonrheumatic mitral regurg, CHF, first-degree AV block, aortic arthrosclerosis admitted to [facility] with SAH. CTH with prepontine SAH; CTA negative for vessel lesions.     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     Neuro:  --Patient admitted to Neuro-ICU; preponderance of medical care per [facility]      -Q1h neurochecks while in ICU  --CTA 11/4: negative for vascular pathology  --MRI/MRA Brain 11/5: negative for underlying lesion  --DSA 11/5 " negative  --Plan for CTA on PBD 7  --HOB@30  --Keppra 500 BID x7d  --TCDs daily x14d  --Nimotop 60q4 x 21d  --Continue to follow clinically and notify NSGY immediately if any neurostatus change    CVS:  --SBP <140 mmHg    Pulm:  --Aggressive pulm treatment per [facility]    GIT/Electrolytes:  --BMP qd      -Replete electrolytes PRN per [facility]  --Na goal >135  --Regular diet  --PPI    Renal:  --Strict I/Os  --Goal of euvolemia or net +1L    Heme/ID:  --Hold therapeutic anti-plt/coag medications per [facility]  --Daily CBC      -Transfuse PRN  --Trend WBC and T    PPx:  --TEDs/SCDs/LVX  --PPI    Dispo: Continued ICU care at this time, PT/OT when appropriate per [facility], CM/SW consult for dispo planning  Will discuss possibility of stepping down before CTA PBD 7    Plan d/w Dr. Cosby.        Melo Garcia MD  Neurosurgery  [facility] - Neuro Critical Care

## 2023-11-09 NOTE — PLAN OF CARE
[facility] Critical [facility]  Discharge Reassessment    Primary Care Provider: JAYLYN Zamora MD    Expected Discharge Date: 11/13/2023    Patient to have CTA PBD 7 per MD.  May step down to the floor.  Patient not medically ready for discharge.     Wife has home health list for choices.     Reassessment (most recent)       Discharge Reassessment - 11/09/23 1545          Discharge Reassessment    Assessment Type Discharge Planning Reassessment     Did the patient's condition or plan change since previous assessment? No     Communicated ROLANDO with patient/caregiver Date not available/Unable to determine     Discharge Plan A Home Health     Discharge Plan B Home     DME Needed Upon Discharge  walker, rolling     Transition of Care Barriers None     Why the patient remains in the hospital Requires continued medical care                   Geri Duffy, RN, CCRN-K, [facility]  Neuro-Critical Care   [phone]

## 2023-11-09 NOTE — ASSESSMENT & PLAN NOTE
78M h/o lymphoma in remission, CAD, nonrheumatic mitral regurg, CHF, first-degree AV block, aortic arthrosclerosis admitted to [facility] with SAH. CTH with prepontine SAH; CTA negative for vessel lesions.     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     Neuro:  --Patient admitted to Neuro-ICU; preponderance of medical care per [facility]      -Q1h neurochecks while in ICU  --CTA 11/4: negative for vascular pathology  --MRI/MRA Brain 11/5: negative for underlying lesion  --DSA 11/5 negative  --Plan for CTA on PBD 7  --HOB@30  --Keppra 500 BID x7d  --TCDs daily x14d  --Nimotop 60q4 x 21d  --Continue to follow clinically and notify NSGY immediately if any neurostatus change    CVS:  --SBP <140 mmHg    Pulm:  --Aggressive pulm treatment per [facility]    GIT/Electrolytes:  --BMP qd      -Replete electrolytes PRN per [facility]  --Na goal >135  --Regular diet  --PPI    Renal:  --Strict I/Os  --Goal of euvolemia or net +1L    Heme/ID:  --Hold therapeutic anti-plt/coag medications per [facility]  --Daily CBC      -Transfuse PRN  --Trend WBC and T    PPx:  --TEDs/SCDs/LVX  --PPI    Dispo: Continued ICU care at this time, PT/OT when appropriate per [facility], CM/SW consult for dispo planning  Will discuss possibility of stepping down before CTA PBD 7    Plan d/w Dr. Cosby.

## 2023-11-09 NOTE — PLAN OF CARE
"[facility] Care Plan    POC reviewed with Sam Gamino and family at 1400. Pt verbalized understanding. Questions and concerns addressed. No acute events today. Pt progressing toward goals. Will continue to monitor. See below and flowsheets for full assessment and VS info.             Is this a stroke patient? yes- Stroke booklet reviewed with patient and family, risk factors identified for patient and stroke booklet remains at bedside for ongoing education.     Neuro:  [place] Coma Scale  Best Eye Response: 4-->(E4) spontaneous  Best Motor Response: 6-->(M6) obeys commands  Best Verbal Response: 5-->(V5) oriented  [place] Coma Scale Score: 15  Assessment Qualifiers: no eye obstruction present, patient not sedated/intubated  Pupil PERRLA: yes     24 hr Temp:  [98.4 °F (36.9 °C)-99 °F (37.2 °C)]     CV:   Rhythm: normal sinus rhythm  BP goals:   SBP < 140  MAP > 65    Resp:           Plan: N/A    GI/:     Diet/Nutrition Received: regular  Last Bowel Movement: 11/09/23       Intake/Output Summary (Last 24 hours) at 11/9/2023 1754  Last data filed at 11/9/2023 0501  Gross per 24 hour   Intake --   Output 700 ml   Net -700 ml     Unmeasured Output  Urine Occurrence: 1  Stool Occurrence: 1  Emesis Occurrence: 0  Pad Count: 2    Labs/Accuchecks:  Recent Labs   Lab 11/09/23  0116   WBC 8.00   RBC 4.38*   HGB 14.7   HCT 44.5         Recent Labs   Lab 11/09/23  0116      K 3.8   CO2 22*      BUN 18   CREATININE 0.9   ALKPHOS 67   ALT 16   AST 20   BILITOT 2.0*    No results for input(s): "PROTIME", "INR", "APTT", "HEPANTIXA" in the last 168 hours. No results for input(s): "CPK", "CPKMB", "TROPONINI", "MB" in the last 168 hours.    Electrolytes: N/A - electrolytes WDL  Accuchecks: none    Gtts:      LDA/Wounds:  Lines/Drains/Airways       Peripheral Intravenous Line  Duration                  Peripheral IV - Single Lumen 11/04/23 1352 20 G Anterior;Distal;Right Forearm 5 days         Peripheral IV - Single " Lumen 11/07/23 0256 18 G Posterior;Right Forearm 2 days                  Wounds: No  Wound care consulted: No

## 2023-11-09 NOTE — PT/OT/SLP PROGRESS
Physical Therapy  Not Seen  Patient Name:  Sam Gamino   MRN:  [id number]  Admitting Diagnosis:  SAH (subarachnoid hemorrhage)   Recent Surgery: * No surgery found *    Admit Date: 11/4/2023  Length of Stay: 5 days    Patient not seen on this date for treatment - patient declined mobility, reporting increased pain. PT will continue to follow. Please continue progressive mobility as appropriate.      Liza Lawrence, PT, DPT  11/9/2023

## 2023-11-10 LAB
ALBUMIN SERPL BCP-MCNC: 3.3 G/DL (ref 3.5–5.2)
ALP SERPL-CCNC: 70 U/L (ref 55–135)
ALT SERPL W/O P-5'-P-CCNC: 17 U/L (ref 10–44)
ANION GAP SERPL CALC-SCNC: 11 MMOL/L (ref 8–16)
AST SERPL-CCNC: 21 U/L (ref 10–40)
BASOPHILS # BLD AUTO: 0.03 K/UL (ref 0–0.2)
BASOPHILS NFR BLD: 0.3 % (ref 0–1.9)
BILIRUB SERPL-MCNC: 1.6 MG/DL (ref 0.1–1)
BUN SERPL-MCNC: 21 MG/DL (ref 8–23)
CALCIUM SERPL-MCNC: 9.2 MG/DL (ref 8.7–10.5)
CHLORIDE SERPL-SCNC: 105 MMOL/L (ref 95–110)
CO2 SERPL-SCNC: 22 MMOL/L (ref 23–29)
CREAT SERPL-MCNC: 0.8 MG/DL (ref 0.5–1.4)
DIFFERENTIAL METHOD: ABNORMAL
EOSINOPHIL # BLD AUTO: 0 K/UL (ref 0–0.5)
EOSINOPHIL NFR BLD: 0.4 % (ref 0–8)
ERYTHROCYTE [DISTWIDTH] IN BLOOD BY AUTOMATED COUNT: 11.9 % (ref 11.5–14.5)
EST. GFR  (NO RACE VARIABLE): >60 ML/MIN/1.73 M^2
GLUCOSE SERPL-MCNC: 97 MG/DL (ref 70–110)
HCT VFR BLD AUTO: 43.8 % (ref 40–54)
HGB BLD-MCNC: 14.8 G/DL (ref 14–18)
IMM GRANULOCYTES # BLD AUTO: 0.03 K/UL (ref 0–0.04)
IMM GRANULOCYTES NFR BLD AUTO: 0.3 % (ref 0–0.5)
LYMPHOCYTES # BLD AUTO: 1.2 K/UL (ref 1–4.8)
LYMPHOCYTES NFR BLD: 12.2 % (ref 18–48)
MAGNESIUM SERPL-MCNC: 2 MG/DL (ref 1.6–2.6)
MCH RBC QN AUTO: 33.5 PG (ref 27–31)
MCHC RBC AUTO-ENTMCNC: 33.8 G/DL (ref 32–36)
MCV RBC AUTO: 99 FL (ref 82–98)
MONOCYTES # BLD AUTO: 0.9 K/UL (ref 0.3–1)
MONOCYTES NFR BLD: 8.9 % (ref 4–15)
NEUTROPHILS # BLD AUTO: 7.9 K/UL (ref 1.8–7.7)
NEUTROPHILS NFR BLD: 77.9 % (ref 38–73)
NRBC BLD-RTO: 0 /100 WBC
PHOSPHATE SERPL-MCNC: 2.9 MG/DL (ref 2.7–4.5)
PLATELET # BLD AUTO: 193 K/UL (ref 150–450)
PMV BLD AUTO: 9.7 FL (ref 9.2–12.9)
POTASSIUM SERPL-SCNC: 3.7 MMOL/L (ref 3.5–5.1)
PROT SERPL-MCNC: 7.2 G/DL (ref 6–8.4)
RBC # BLD AUTO: 4.42 M/UL (ref 4.6–6.2)
SODIUM SERPL-SCNC: 138 MMOL/L (ref 136–145)
WBC # BLD AUTO: 10.16 K/UL (ref 3.9–12.7)

## 2023-11-10 PROCEDURE — 97535 SELF CARE MNGMENT TRAINING: CPT

## 2023-11-10 PROCEDURE — 25000003 PHARM REV CODE 250: Performed by: PSYCHIATRY & NEUROLOGY

## 2023-11-10 PROCEDURE — 84100 ASSAY OF PHOSPHORUS: CPT | Performed by: PHYSICIAN ASSISTANT

## 2023-11-10 PROCEDURE — 83735 ASSAY OF MAGNESIUM: CPT | Performed by: PHYSICIAN ASSISTANT

## 2023-11-10 PROCEDURE — 25000003 PHARM REV CODE 250: Performed by: NURSE PRACTITIONER

## 2023-11-10 PROCEDURE — 20000000 HC ICU ROOM

## 2023-11-10 PROCEDURE — 25000003 PHARM REV CODE 250: Performed by: PHYSICIAN ASSISTANT

## 2023-11-10 PROCEDURE — 25000003 PHARM REV CODE 250

## 2023-11-10 PROCEDURE — 97116 GAIT TRAINING THERAPY: CPT | Mod: CQ

## 2023-11-10 PROCEDURE — 80053 COMPREHEN METABOLIC PANEL: CPT | Performed by: PHYSICIAN ASSISTANT

## 2023-11-10 PROCEDURE — 99233 SBSQ HOSP IP/OBS HIGH 50: CPT | Mod: GC,,, | Performed by: PSYCHIATRY & NEUROLOGY

## 2023-11-10 PROCEDURE — 63600175 PHARM REV CODE 636 W HCPCS: Performed by: PHYSICIAN ASSISTANT

## 2023-11-10 PROCEDURE — 97530 THERAPEUTIC ACTIVITIES: CPT | Mod: CQ

## 2023-11-10 PROCEDURE — 85025 COMPLETE CBC W/AUTO DIFF WBC: CPT | Performed by: PHYSICIAN ASSISTANT

## 2023-11-10 PROCEDURE — 94761 N-INVAS EAR/PLS OXIMETRY MLT: CPT

## 2023-11-10 PROCEDURE — 99233 PR SUBSEQUENT HOSPITAL CARE,LEVL III: ICD-10-PCS | Mod: GC,,, | Performed by: PSYCHIATRY & NEUROLOGY

## 2023-11-10 RX ADMIN — Medication 6 MG: at 08:11

## 2023-11-10 RX ADMIN — TAMSULOSIN HYDROCHLORIDE 0.4 MG: 0.4 CAPSULE ORAL at 08:11

## 2023-11-10 RX ADMIN — LOSARTAN POTASSIUM 100 MG: 50 TABLET, FILM COATED ORAL at 08:11

## 2023-11-10 RX ADMIN — ENOXAPARIN SODIUM 40 MG: 40 INJECTION SUBCUTANEOUS at 05:11

## 2023-11-10 RX ADMIN — FAMOTIDINE 20 MG: 20 TABLET ORAL at 08:11

## 2023-11-10 RX ADMIN — ATORVASTATIN CALCIUM 80 MG: 40 TABLET, FILM COATED ORAL at 08:11

## 2023-11-10 RX ADMIN — METOPROLOL SUCCINATE 50 MG: 50 TABLET, EXTENDED RELEASE ORAL at 08:11

## 2023-11-10 NOTE — SUBJECTIVE & OBJECTIVE
Interval History:  Please see hospital course above for full details    Review of Systems   Respiratory: Negative.     Cardiovascular: Negative.    Gastrointestinal:  Positive for diarrhea. Negative for abdominal pain, blood in stool, nausea and vomiting.   Musculoskeletal:  Positive for neck pain.   Neurological:  Positive for weakness and headaches.        HA improved from prior. Diffuse weakness, non-focal   Psychiatric/Behavioral:  Positive for confusion, decreased concentration and sleep disturbance.        Objective:     Vitals:  Temp: 98.3 °F (36.8 °C)  Pulse: 93  Rhythm: normal sinus rhythm  BP: (!) 144/76  MAP (mmHg): 105  Resp: 16  SpO2: 100 %    Temp  Min: 98 °F (36.7 °C)  Max: 98.8 °F (37.1 °C)  Pulse  Min: 89  Max: 173  BP  Min: 125/77  Max: 167/77  MAP (mmHg)  Min: 96  Max: 137  Resp  Min: 16  Max: 20  SpO2  Min: 81 %  Max: 100 %    No intake/output data recorded.   Unmeasured Output  Urine Occurrence: 1  Stool Occurrence: 1  Emesis Occurrence: 0  Pad Count: 2        Physical Exam  General Appearance: Not in acute hemodynamic distress  Mental Status Exam: awake, alert, aware, oriented, no aphasia, no dysarthria; able to follow simple commands b/l, unable to follow cross body or complex commands this AM  Cranial Nerves: VFF, EOM full, pupils are equal and reactive to light bilaterally, symmetric facial sensory, symmetric facial motor, hearing grossly normal, midline tongue  Motor: no drift in the UE/LE. Power is 5/5 in both UE/LE. Normal tone.  Sensory: Symmetric to LT in all 4 limbs without extinction  Vascular: S1/S2 of normal intensity, no S3/S4 appreciated, no murmurs appreciated  Lungs: CTA bilaterally without wheezing  Abdomen: Soft, non-distended, non-tender, BS +          Medications:  Continuous Scheduledatorvastatin, 80 mg, Daily  enoxparin, 40 mg, Q24H (prophylaxis, 1700)  famotidine, 20 mg, BID  losartan, 100 mg, Daily  melatonin, 6 mg, Nightly  metoprolol succinate, 50 mg,  Daily  tamsulosin, 0.4 mg, Daily    PRNacetaminophen, 650 mg, Q6H PRN  dextrose 10%, 12.5 g, PRN  dextrose 10%, 25 g, PRN  hydrALAZINE, 10 mg, Q4H PRN  labetalol, 10 mg, Q4H PRN  ondansetron, 4 mg, Q6H PRN      Today I personally reviewed pertinent imaging, laboratory results, notably: TCDs wnl, no vasospasm. CBC unremarkable, no leukocytosis. CMP unremarkable    Diet  Diet Adult Regular (IDDSI Level 7)  Diet Adult Regular (IDDSI Level 7)

## 2023-11-10 NOTE — SUBJECTIVE & OBJECTIVE
"Interval History: 11/10: SHANNON. MARANDAKHALIFS. TCDs yesterday WNL. Planning for CTA tomorrow. Exam stable. Hopefully discharge tomorrow if CTA stable    Medications:  Continuous Infusions:  Scheduled Meds:   atorvastatin  80 mg Oral Daily    enoxparin  40 mg Subcutaneous Q24H (prophylaxis, 1700)    famotidine  20 mg Oral BID    losartan  100 mg Oral Daily    melatonin  6 mg Oral Nightly    metoprolol succinate  50 mg Oral Daily    polyethylene glycol  17 g Oral Daily    senna-docusate 8.6-50 mg  1 tablet Oral Daily    tamsulosin  0.4 mg Oral Daily     PRN Meds:acetaminophen, dextrose 10%, dextrose 10%, hydrALAZINE, labetalol, ondansetron     Review of Systems  Objective:     Weight: 114.8 kg (253 lb)  Body mass index is 34.31 kg/m².  Vital Signs (Most Recent):  Temp: 98.6 °F (37 °C) (11/10/23 0305)  Pulse: 101 (11/10/23 0802)  Resp: 18 (11/10/23 0305)  BP: (!) 146/74 (11/10/23 0600)  SpO2: 97 % (11/10/23 0802) Vital Signs (24h Range):  Temp:  [98.6 °F (37 °C)-98.9 °F (37.2 °C)] 98.6 °F (37 °C)  Pulse:  [] 101  Resp:  [18-20] 18  SpO2:  [81 %-99 %] 97 %  BP: (111-167)/() 146/74                                 Physical Exam         Neurosurgery Physical Exam    GENERAL: resting comfortably  HEENT: NCAT, PERRL, mucous membranes moist  NECK: supple, trachea midline  CV: normal capillary refill  PULM: aerating well, symmetric expansion, no distress  ABD: soft, NT, ND  EXT: no c/c/e    NEURO:    AAO x 3  CN II-XII grossly intact  Fc x 4 antigravity  SILT    No drift or dysmetria      Significant Labs:  Recent Labs   Lab 11/09/23  0116 11/10/23  0131    97    138   K 3.8 3.7    105   CO2 22* 22*   BUN 18 21   CREATININE 0.9 0.8   CALCIUM 9.5 9.2   MG 2.0 2.0       Recent Labs   Lab 11/09/23  0116 11/10/23  0131   WBC 8.00 10.16   HGB 14.7 14.8   HCT 44.5 43.8    193       No results for input(s): "LABPT", "INR", "APTT" in the last 48 hours.  Microbiology Results (last 7 days)       ** No " results found for the last 168 hours. **          All pertinent labs from the last 24 hours have been reviewed.    Significant Diagnostics:  I have reviewed all pertinent imaging results/findings within the past 24 hours.  US Transcranial Doppler Arterial Comp    Result Date: 11/7/2023  No Doppler evidence of vasospasm. Electronically signed by: Pool Qureshi MD Date:    11/07/2023 Time:    13:27   No results found in the last 24 hours.  US Transcranial Doppler Arterial Comp    Result Date: 11/9/2023  No Doppler evidence of vasospasm within the insonated vessels.. Electronically signed by: Pool Qureshi MD Date:    11/09/2023 Time:    16:52

## 2023-11-10 NOTE — ASSESSMENT & PLAN NOTE
78-year-old male with lymphoma in remission, CAD, nonrheumatic mitral regurg, CHF, first-degree AV block, aortic arthrosclerosis who presents w/SAH/IVH    Strong clinical suspicion for perimesencephalic given pt presentation and location of bleed. 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 Rosen, will transfer to floor for ongoing management and placement

## 2023-11-10 NOTE — ASSESSMENT & PLAN NOTE
78M h/o lymphoma in remission, CAD, nonrheumatic mitral regurg, CHF, first-degree AV block, aortic arthrosclerosis admitted to [facility] with SAH. CTH with prepontine SAH; CTA negative for vessel lesions.     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     Neuro:  --Patient admitted to Neuro-ICU; preponderance of medical care per [facility]      -Q1h neurochecks while in ICU  --CTA 11/4: negative for vascular pathology  --MRI/MRA Brain 11/5: negative for underlying lesion  --DSA 11/5 negative  --Plan for CTA on PBD 7  --HOB@30  --Keppra 500 BID x7d  --TCDs daily x14d  --Nimotop 60q4 x 21d  --Continue to follow clinically and notify NSGY immediately if any neurostatus change    CVS:  --SBP <140 mmHg    Pulm:  --Aggressive pulm treatment per [facility]    GIT/Electrolytes:  --BMP qd      -Replete electrolytes PRN per [facility]  --Na goal >135  --Regular diet  --PPI    Renal:  --Strict I/Os  --Goal of euvolemia or net +1L    Heme/ID:  --Hold therapeutic anti-plt/coag medications per [facility]  --Daily CBC      -Transfuse PRN  --Trend WBC and T    PPx:  --TEDs/SCDs/LVX  --PPI    Dispo: Continued ICU care at this time, PT/OT when appropriate per [facility], CM/SW consult for dispo planning  Will discuss possibility of stepping down before CTA PBD 7    Plan d/w Dr. Cosby.

## 2023-11-10 NOTE — PLAN OF CARE
"[facility] Care Plan    POC reviewed with Sam Gamino and family at 0300. Pt verbalized understanding. Questions and concerns addressed. No acute events overnight. Pt progressing toward goals. Will continue to monitor. See below and flowsheets for full assessment and VS info.           Is this a stroke patient? yes- Stroke booklet reviewed with patient and family, risk factors identified for patient and stroke booklet remains at bedside for ongoing education.     Neuro:  [place] Coma Scale  Best Eye Response: 4-->(E4) spontaneous  Best Motor Response: 6-->(M6) obeys commands  Best Verbal Response: 5-->(V5) oriented  [place] Coma Scale Score: 15  Assessment Qualifiers: patient not sedated/intubated  Pupil PERRLA: yes     24hr Temp:  [98.6 °F (37 °C)-99 °F (37.2 °C)]     CV:   Rhythm: normal sinus rhythm  BP goals:   SBP < 160  MAP > 65    Resp:           Plan: N/A    GI/:     Diet/Nutrition Received: regular  Last Bowel Movement: 11/08/23       Intake/Output Summary (Last 24 hours) at 11/10/2023 0440  Last data filed at 11/9/2023 0501  Gross per 24 hour   Intake --   Output 450 ml   Net -450 ml     Unmeasured Output  Urine Occurrence: 1  Stool Occurrence: 1  Emesis Occurrence: 0  Pad Count: 2    Labs/Accuchecks:  Recent Labs   Lab 11/10/23  0131   WBC 10.16   RBC 4.42*   HGB 14.8   HCT 43.8         Recent Labs   Lab 11/10/23  0131      K 3.7   CO2 22*      BUN 21   CREATININE 0.8   ALKPHOS 70   ALT 17   AST 21   BILITOT 1.6*    No results for input(s): "PROTIME", "INR", "APTT", "HEPANTIXA" in the last 168 hours. No results for input(s): "CPK", "CPKMB", "TROPONINI", "MB" in the last 168 hours.    Electrolytes: N/A - electrolytes WDL  Accuchecks: none    Gtts:      LDA/Wounds:  Lines/Drains/Airways       Peripheral Intravenous Line  Duration                  Peripheral IV - Single Lumen 11/04/23 1352 20 G Anterior;Distal;Right Forearm 5 days         Peripheral IV - Single Lumen 11/07/23 0256 18 G " Posterior;Right Forearm 3 days                  Wounds: No  Wound care consulted: No

## 2023-11-10 NOTE — ASSESSMENT & PLAN NOTE
78-year-old male with lymphoma in remission, CAD, nonrheumatic mitral regurg, CHF, first-degree AV block, aortic arthrosclerosis who presents w/SAH/IVH    Strong clinical suspicion for perimesencephalic given pt presentation and location of bleed. 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

## 2023-11-10 NOTE — PT/OT/SLP PROGRESS
"Occupational Therapy   Treatment    Name: Sam Gamino  MRN: [id number]  Admitting Diagnosis:  SAH (subarachnoid hemorrhage)       Recommendations:     Discharge Recommendations: Low Intensity Therapy  Discharge Equipment Recommendations:  walker, rolling  Barriers to discharge:  None    Assessment:     Sam Gamino is a 78 y.o. male with a medical diagnosis of SAH (subarachnoid hemorrhage).  He presents with performance deficits affecting function are weakness, impaired endurance, impaired self care skills, impaired functional mobility, impaired balance, impaired cognition, decreased coordination, decreased upper extremity function, decreased lower extremity function.     Rehab Prognosis:  Good; patient would benefit from acute skilled OT services to address these deficits and reach maximum level of function.       Plan:     Patient to be seen 3 x/week to address the above listed problems via cognitive retraining, neuromuscular re-education, therapeutic exercises, therapeutic activities, self-care/home management  Plan of Care Expires: 12/03/23  Plan of Care Reviewed with: patient    Subjective     Patient:  "Thanks for making my day better."  Pain/Comfort:  Pain Rating 1: 0/10  Pain Rating Post-Intervention 1: 0/10    Objective:     Communicated with: Nurse prior to session.  Patient found supine with bed alarm, peripheral IV, telemetry, blood pressure cuff, pulse ox (continuous) upon OT entry to room.    General Precautions: Standard, aspiration, fall    Orthopedic Precautions:N/A  Braces: N/A  Respiratory Status: Room air     Occupational Performance:     Bed Mobility:    Patient completed Rolling/Turning to Left with  supervision  Patient completed Rolling/Turning to Right with supervision  Patient completed Supine to Sit with contact guard assistance  Patient completed Sit to Supine with contact guard assistance     Functional Mobility/Transfers:  Patient completed Sit <> Stand Transfer with minimum " assistance  with  no assistive device   Patient completed Bed <> Chair Transfer using Stand Pivot technique with minimum assistance with no assistive device    Activities of Daily Living:  Grooming: Min assist while standing  Upper Body Dressing: stand by assistance while seated EOB  Lower Body Dressing: minimum assistance      [facility] 6 Click ADL: 18    Treatment & Education:  Patient alert and oriented x 3; able to follow 4/4 one step commands.  Patient attentive and interactive throughout the session.  Min assist with standing balance during ADLs.     Patient left supine with all lines intact, call button in reach, and bed alarm on    GOALS:   Multidisciplinary Problems       Occupational Therapy Goals          Problem: Occupational Therapy    Goal Priority Disciplines Outcome Interventions   Occupational Therapy Goal     OT, PT/OT Ongoing, Progressing    Description: Goals set 11/5 to be addressed for 14 days with expiration date, 11/19:  Patient will increase functional independence with ADLs by performing:    Patient will demonstrate rolling to the right with modified independence.  Not met   Patient will demonstrate rolling to the left with modified independence.   Not met  Patient will demonstrate supine -sit with modified independence.   Not met  Patient will demonstrate stand pivot transfers with modified independence.   Not met  Patient will demonstrate grooming while standing with modified independence.   Not met  Patient will demonstrate upper body dressing with modified independence while seated EOB.   Not met  Patient will demonstrate lower body dressing with modified independence while seated EOB.   Not met  Patient will demonstrate toileting with modified independence.   Not met  Patient will demonstrate bathing while seated EOB with modified independence.   Not met  Patient's family / caregiver will demonstrate independence and safety with assisting patient with self-care skills and functional  mobility.     Not met  Patient and/or patient's family will verbalize understanding of stroke prevention guidelines, personal risk factors and stroke warning signs via teachback method.  Not met                                  Time Tracking:     OT Date of Treatment: 11/10/23  OT Start Time: 0535  OT Stop Time: 0600  OT Total Time (min): 25 min    Billable Minutes:Self Care/Home Management 25    OT/RICHY: OT          11/10/2023

## 2023-11-10 NOTE — SUBJECTIVE & OBJECTIVE
Interval History:  Please see hospital course above for full details    Review of Systems   Respiratory: Negative.     Cardiovascular: Negative.    Gastrointestinal:  Positive for constipation. Negative for nausea and vomiting.   Musculoskeletal:  Positive for neck pain.   Neurological:  Positive for headaches.   Psychiatric/Behavioral:  Positive for confusion, decreased concentration and sleep disturbance.        Objective:     Vitals:  Temp: 98.8 °F (37.1 °C)  Pulse: 90  Rhythm: normal sinus rhythm  BP: 137/73  MAP (mmHg): 99  Resp: 20  SpO2: 97 %    Temp  Min: 98.4 °F (36.9 °C)  Max: 99 °F (37.2 °C)  Pulse  Min: 75  Max: 99  BP  Min: 111/54  Max: 158/78  MAP (mmHg)  Min: 78  Max: 111  Resp  Min: 18  Max: 20  SpO2  Min: 92 %  Max: 99 %    11/08 0701 - 11/09 0700  In: 960 [P.O.:960]  Out: 1550 [Urine:1550]   Unmeasured Output  Urine Occurrence: 1  Stool Occurrence: 1  Emesis Occurrence: 0  Pad Count: 2        Physical Exam  General Appearance: Not in acute hemodynamic distress  Mental Status Exam: awake, alert, aware, oriented, no aphasia, no dysarthria  Cranial Nerves: VFF, EOM full, pupils are equal and reactive to light bilaterally, symmetric facial sensory, symmetric facial motor, hearing grossly normal, midline tongue  Motor: no drift in the UE/LE. Power is 5/5 in both UE/LE. Normal tone.  Sensory: Symmetric to LT in all 4 limbs without extinction  Vascular: S1/S2 of normal intensity, no S3/S4 appreciated, no murmurs appreciated  Lungs: CTA bilaterally without wheezing  Abdomen: Soft, non-distended, non-tender, BS +         Medications:  Continuous Scheduledatorvastatin, 80 mg, Daily  enoxparin, 40 mg, Q24H (prophylaxis, 1700)  famotidine, 20 mg, BID  losartan, 100 mg, Daily  melatonin, 6 mg, Nightly  metoprolol succinate, 50 mg, Daily  polyethylene glycol, 17 g, Daily  senna-docusate 8.6-50 mg, 1 tablet, Daily  tamsulosin, 0.4 mg, Daily    PRNacetaminophen, 650 mg, Q6H PRN  dextrose 10%, 12.5 g, PRN  dextrose  10%, 25 g, PRN  hydrALAZINE, 10 mg, Q4H PRN  labetalol, 10 mg, Q4H PRN  ondansetron, 4 mg, Q6H PRN      Today I personally reviewed pertinent imaging, cardiology results, laboratory results, notably: CTA and DSA w/o evidence of underlying vascular malformation. TCDs remain wnl. CBC and CMP unremarkable     Diet  Diet Adult Regular (IDDSI Level 7)  Diet Adult Regular (IDDSI Level 7)

## 2023-11-10 NOTE — PT/OT/SLP PROGRESS
Physical Therapy Treatment    Patient Name:  Sam Gamino   MRN:  [id number]    Recommendations:     Discharge Recommendations: Low Intensity Therapy  Discharge Equipment Recommendations: walker, rolling  Barriers to discharge: None    Assessment:     Sam Gamino is a 78 y.o. male admitted with a medical diagnosis of SAH (subarachnoid hemorrhage).  He presents with the following impairments/functional limitations: weakness, impaired endurance, impaired self care skills, impaired functional mobility, gait instability, impaired balance, impaired cognition, decreased coordination, decreased upper extremity function, decreased lower extremity function, decreased safety awareness.    Pt tolerates session poorly with focus on bed mobility, transfers, and gait training. Pt regressing from prior sessions with all functional mobility. Pt demonstrates significant instability with shuffled gait and B knee flexion in all phases and with L foot drag and leftward listing. Slowed cash and poor safety awareness/insight into deficits as pt ambulates into obstacles with no attempts to avoid including once in contact with obstacles. RN and attending team MD notified (via secure chat). Discussed with staff PT and plan for PT reassessment on next therapy visit.  Pt not progressing towards goals. Pt will continue to benefit from therapy services to address impairments listed above.     Rehab Prognosis: Fair; patient would benefit from acute skilled PT services to address these deficits and reach maximum level of function.    Recent Surgery: * No surgery found *      Plan:     During this hospitalization, patient to be seen 4 x/week to address the identified rehab impairments via gait training, therapeutic activities, neuromuscular re-education and progress toward the following goals:    Plan of Care Expires:  11/26/23    Subjective     Chief Complaint: no c/o  Pain/Comfort:  Pain Rating 1: 0/10  Pain Rating Post-Intervention 1:  0/10      Objective:     Communicated with RN prior to session.  Patient found HOB elevated with telemetry, blood pressure cuff, pulse ox (continuous) upon PTA entry to room.     Pt on portable telemetry per ICU protocol for ambulation out of room.     General Precautions: Standard, aspiration, fall  Orthopedic Precautions: N/A  Braces: N/A  Respiratory Status: Room air     Functional Mobility:  Bed Mobility:     Supine to Sit: minimum assistance  Transfers:     Sit to Stand:  contact guard assistance with rolling walker  Bed to Chair: minimum assistance with  rolling walker  using  Step Transfer  Gait: Pt ambulates ~60 ft  with RW and Moderate Assistance. Regression in gait stability and mechanics presenting with fairly consistent L foot drag and L listing with poor awareness of obstacles and safety. RW extended away from body for safe/proper support. Slowed cash. Pt unsteady with flexed posture, shuffled gait and B knee flexion in all phases. No adjustments when cued. Reliant on therapist to manage RW to steer out of Leftward listing. Unstable without overt LOB. Close chair follow.      AM-PAC 6 CLICK MOBILITY  Turning over in bed (including adjusting bedclothes, sheets and blankets)?: 4  Sitting down on and standing up from a chair with arms (e.g., wheelchair, bedside commode, etc.): 3  Moving from lying on back to sitting on the side of the bed?: 4  Moving to and from a bed to a chair (including a wheelchair)?: 3  Need to walk in hospital room?: 3  Climbing 3-5 steps with a railing?: 2  Basic Mobility Total Score: 19       Patient left up in chair with all lines intact, call button in reach, and chair alarm on.    GOALS:   Multidisciplinary Problems       Physical Therapy Goals          Problem: Physical Therapy    Goal Priority Disciplines Outcome Goal Variances Interventions   Physical Therapy Goal     PT, PT/OT Ongoing, Progressing     Description: Goals to be met by: 11/26/23     Patient will increase  functional independence with mobility by performin. Supine to sit with [place]  2. Sit to supine with [place]  3. Sit to stand transfer with [place]  4. Bed to chair transfer with Modified [place] using Rolling Walker  5. Gait  x 150 feet with Modified [place] using Rolling Walker.                          Time Tracking:     PT Received On: 11/10/23  PT Start Time: 727     PT Stop Time: 756  PT Total Time (min): 29 min     Billable Minutes: Gait Training 15 and Therapeutic Activity 14    Treatment Type: Treatment  PT/PTA: PTA     Number of PTA visits since last PT visit: 3     11/10/2023

## 2023-11-10 NOTE — PROGRESS NOTES
[facility]  Neurocritical Care  Progress Note    Admit Date: 11/4/2023  Service Date: 11/10/2023  Length of Stay: 6    Subjective:     Chief Complaint: SAH (subarachnoid hemorrhage)    History of Present Illness: The patient is a 78-year-old male with PMHx of  lymphoma in remission, CAD, nonrheumatic mitral regurg, CHF, first-degree AV block, aortic arthrosclerosis admitted to [facility] with aSAH/IVH. Initially presented initially to outside hospital ED with complaints of bilateral neck pain that happened approximately 2 hours prior to arrival.  Reports that the pain started immediately after having a bowel movement.  He does have associated headache that radiates up to his temples. Patient is on baby ASA. Also reports some associated weakness with walking. States he woke up this morning feeling normal without any pain or weakness.        Hospital Course: 11/06/2023: Angio negative yesterday, SAH protocol   11/07/2023: No events overnight.   11/08/2023: Increased delirium overnight, switch keppra to briviact   11/09/2023 Mild delirium overnight, improved from prior. 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 TCDs remain wnl, d/c'd. Discussed w/ NSGY attending, will step down to floor, strict delirium precautions. CM notified, will likely need placement, per pt report poor support at home (has elderly wife, cannot assist in caring for him)      Interval History:  Please see hospital course above for full details    Review of Systems   Respiratory: Negative.     Cardiovascular: Negative.    Gastrointestinal:  Positive for diarrhea. Negative for abdominal pain, blood in stool, nausea and vomiting.   Musculoskeletal:  Positive for neck pain.    Neurological:  Positive for weakness and headaches.        HA improved from prior. Diffuse weakness, non-focal   Psychiatric/Behavioral:  Positive for confusion, decreased concentration and sleep disturbance.        Objective:     Vitals:  Temp: 98.3 °F (36.8 °C)  Pulse: 93  Rhythm: normal sinus rhythm  BP: (!) 144/76  MAP (mmHg): 105  Resp: 16  SpO2: 100 %    Temp  Min: 98 °F (36.7 °C)  Max: 98.8 °F (37.1 °C)  Pulse  Min: 89  Max: 173  BP  Min: 125/77  Max: 167/77  MAP (mmHg)  Min: 96  Max: 137  Resp  Min: 16  Max: 20  SpO2  Min: 81 %  Max: 100 %    No intake/output data recorded.   Unmeasured Output  Urine Occurrence: 1  Stool Occurrence: 1  Emesis Occurrence: 0  Pad Count: 2        Physical Exam  General Appearance: Not in acute hemodynamic distress  Mental Status Exam: awake, alert, aware, oriented, no aphasia, no dysarthria; able to follow simple commands b/l, unable to follow cross body or complex commands this AM  Cranial Nerves: VFF, EOM full, pupils are equal and reactive to light bilaterally, symmetric facial sensory, symmetric facial motor, hearing grossly normal, midline tongue  Motor: no drift in the UE/LE. Power is 5/5 in both UE/LE. Normal tone.  Sensory: Symmetric to LT in all 4 limbs without extinction  Vascular: S1/S2 of normal intensity, no S3/S4 appreciated, no murmurs appreciated  Lungs: CTA bilaterally without wheezing  Abdomen: Soft, non-distended, non-tender, BS +          Medications:  Continuous Scheduledatorvastatin, 80 mg, Daily  enoxparin, 40 mg, Q24H (prophylaxis, 1700)  famotidine, 20 mg, BID  losartan, 100 mg, Daily  melatonin, 6 mg, Nightly  metoprolol succinate, 50 mg, Daily  tamsulosin, 0.4 mg, Daily    PRNacetaminophen, 650 mg, Q6H PRN  dextrose 10%, 12.5 g, PRN  dextrose 10%, 25 g, PRN  hydrALAZINE, 10 mg, Q4H PRN  labetalol, 10 mg, Q4H PRN  ondansetron, 4 mg, Q6H PRN      Today I personally reviewed pertinent imaging, laboratory results, notably: TCDs wnl, no vasospasm. CBC  unremarkable, no leukocytosis. CMP unremarkable    Diet  Diet Adult Regular (IDDSI Level 7)  Diet Adult Regular (IDDSI Level 7)        Assessment/Plan:     Neuro  * SAH (subarachnoid hemorrhage)   78-year-old male with lymphoma in remission, CAD, nonrheumatic mitral regurg, CHF, first-degree AV block, aortic arthrosclerosis who presents w/SAH/IVH    Strong clinical suspicion for perimesencephalic given pt presentation and location of bleed. 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 Rosen, will transfer to floor for ongoing management and placement    Cardiac/Vascular  Atrioventricular block, first degree  Hx of     - Intermittently bradycardiac to 30s, asymptomatic   - Monitor     Hypercholesterolemia  Continue daily statin     Heart failure, diastolic, chronic  Followed by cardiology as OP   - Echo from 11/5: estimated ejection fraction of 55 - 60%. There is normal diastolic function.  - Continue home BB and ARB, hold diuretics in setting of SAH and goal euvolemia during this time    Primary hypertension  Hx of    - Goal SBP<140  - Continue home medications     Coronary artery disease  At home on ASA  - hold at this time in setting of acute bleed   - continue home BB and ARB    Renal/  Benign prostatic hyperplasia with urinary frequency  Continue home tamsulosin           The patient is being Prophylaxed for:  Venous Thromboembolism with: Mechanical or Chemical  Stress Ulcer with: None  Ventilator Pneumonia with: not  applicable    Activity Orders          Diet Adult Regular (IDDSI Level 7): Regular starting at 11/05 1803    Elevate HOB 45 starting at 11/04 1541        Full Code     Transfer to floor    Level III visit    Gladis Jackson MD  Neurocritical Care  [facility] - Neuro Critical Care

## 2023-11-10 NOTE — ASSESSMENT & PLAN NOTE
78M h/o lymphoma in remission, CAD, nonrheumatic mitral regurg, CHF, first-degree AV block, aortic arthrosclerosis admitted to [facility] with SAH. CTH with prepontine SAH; CTA negative for vessel lesions.     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     Neuro:  --Stepped down to nsgy service; q4 nc/vs  --CTA 11/4: negative for vascular pathology  --MRI/MRA Brain 11/5: negative for underlying lesion  --DSA 11/5 negative  --Plan for CTA on PBD 7 (today)  --HOB@30  --Keppra 500 BID x7d  --TCDs daily x14d  --Nimotop 60q4 x 21d  --Continue to follow clinically and notify NSGY immediately if any neurostatus change    CVS:  --SBP <140 mmHg    Pulm:  --Aggressive pulm treatment     GIT/Electrolytes:  --BMP qd      -Replete electrolytes PRN  --Na goal >135  --Regular diet  --PPI    Renal:  --Strict I/Os  --Goal of euvolemia or net +1L    Heme/ID:  --Hold therapeutic anti-plt/coag medications per [facility]  --Daily CBC      -Transfuse PRN  --Trend WBC and T    PPx:  --TEDs/SCDs/LVX  --PPI    Dispo: PT/OT recs CM/SW consult for dispo planning  Anticipate discharge today after CTA    Plan d/w Dr. Cosby.

## 2023-11-10 NOTE — PROGRESS NOTES
[facility] Neuro Critical [facility]  Neurosurgery  Progress Note    Subjective:     History of Present Illness: 78M h/o lymphoma in remission, CAD, nonrheumatic mitral regurg, CHF, first-degree AV block, aortic arthrosclerosis admitted to [facility] with SAH. Initially presented initially to outside hospital ED with complaints of bilateral neck pain that happened approximately 2 hours prior to arrival.  Reports that the pain started immediately after having a bowel movement.  He does have associated headache that radiates up to his temples. Patient is on baby ASA. Also reports some associated weakness with walking. States he woke up this morning feeling normal without any pain or weakness. Denies fever/chills, vision/hearing changes, dysphagia, dysarthria, vomiting, new-onset weakness or sensory change, bowel/bladder changes. CTH with prepontine SAH; CTA negative for vessel lesions.         Post-Op Info:  * No surgery found *         Interval History: 11/10: NAEON. AFVSS. TCDs yesterday WNL. Planning for CTA tomorrow. Exam stable. Hopefully discharge tomorrow if CTA stable    Medications:  Continuous Infusions:  Scheduled Meds:   atorvastatin  80 mg Oral Daily    enoxparin  40 mg Subcutaneous Q24H (prophylaxis, 1700)    famotidine  20 mg Oral BID    losartan  100 mg Oral Daily    melatonin  6 mg Oral Nightly    metoprolol succinate  50 mg Oral Daily    polyethylene glycol  17 g Oral Daily    senna-docusate 8.6-50 mg  1 tablet Oral Daily    tamsulosin  0.4 mg Oral Daily     PRN Meds:acetaminophen, dextrose 10%, dextrose 10%, hydrALAZINE, labetalol, ondansetron     Review of Systems  Objective:     Weight: 114.8 kg (253 lb)  Body mass index is 34.31 kg/m².  Vital Signs (Most Recent):  Temp: 98.6 °F (37 °C) (11/10/23 0305)  Pulse: 101 (11/10/23 0802)  Resp: 18 (11/10/23 0305)  BP: (!) 146/74 (11/10/23 0600)  SpO2: 97 % (11/10/23 0802) Vital Signs (24h Range):  Temp:  [98.6 °F (37 °C)-98.9 °F (37.2 °C)] 98.6 °F (37 °C)  Pulse:   "[] 101  Resp:  [18-20] 18  SpO2:  [81 %-99 %] 97 %  BP: (111-167)/() 146/74                                Physical Exam         Neurosurgery Physical Exam    GENERAL: resting comfortably  HEENT: NCAT, PERRL, mucous membranes moist  NECK: supple, trachea midline  CV: normal capillary refill  PULM: aerating well, symmetric expansion, no distress  ABD: soft, NT, ND  EXT: no c/c/e    NEURO:    AAO x 3  CN II-XII grossly intact  Fc x 4 antigravity  SILT    No drift or dysmetria      Significant Labs:  Recent Labs   Lab 11/09/23  0116 11/10/23  0131    97    138   K 3.8 3.7    105   CO2 22* 22*   BUN 18 21   CREATININE 0.9 0.8   CALCIUM 9.5 9.2   MG 2.0 2.0       Recent Labs   Lab 11/09/23  0116 11/10/23  0131   WBC 8.00 10.16   HGB 14.7 14.8   HCT 44.5 43.8    193       No results for input(s): "LABPT", "INR", "APTT" in the last 48 hours.  Microbiology Results (last 7 days)       ** No results found for the last 168 hours. **          All pertinent labs from the last 24 hours have been reviewed.    Significant Diagnostics:  I have reviewed all pertinent imaging results/findings within the past 24 hours.  US Transcranial Doppler Arterial Comp    Result Date: 11/7/2023  No Doppler evidence of vasospasm. Electronically signed by: Pool Qureshi MD Date:    11/07/2023 Time:    13:27   No results found in the last 24 hours.  US Transcranial Doppler Arterial Comp    Result Date: 11/9/2023  No Doppler evidence of vasospasm within the insonated vessels.. Electronically signed by: Pool Qureshi MD Date:    11/09/2023 Time:    16:52       Assessment/Plan:     * SAH (subarachnoid hemorrhage)  78M h/o lymphoma in remission, CAD, nonrheumatic mitral regurg, CHF, first-degree AV block, aortic arthrosclerosis admitted to [facility] with SAH. CTH with prepontine SAH; CTA negative for vessel lesions.     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     Neuro:  --Patient " admitted to Neuro-ICU; preponderance of medical care per NCC      -Q1h neurochecks while in ICU  --CTA 11/4: negative for vascular pathology  --MRI/MRA Brain 11/5: negative for underlying lesion  --DSA 11/5 negative  --Plan for CTA on PBD 7  --HOB@30  --Keppra 500 BID x7d  --TCDs daily x14d  --Nimotop 60q4 x 21d  --Continue to follow clinically and notify NSGY immediately if any neurostatus change    CVS:  --SBP <140 mmHg    Pulm:  --Aggressive pulm treatment per NCC    GIT/Electrolytes:  --BMP qd      -Replete electrolytes PRN per NCC  --Na goal >135  --Regular diet  --PPI    Renal:  --Strict I/Os  --Goal of euvolemia or net +1L    Heme/ID:  --Hold therapeutic anti-plt/coag medications per NCC  --Daily CBC      -Transfuse PRN  --Trend WBC and T    PPx:  --TEDs/SCDs/LVX  --PPI    Dispo: Continued ICU care at this time, PT/OT when appropriate per NCC, CM/SW consult for dispo planning  Anticipate discharge PBD 7 after CTA    Plan d/w Dr. Cosby.        Melo Garcia MD  Neurosurgery  [facility] - Neuro Critical Care

## 2023-11-10 NOTE — PROGRESS NOTES
[facility] Neuro Critical [facility]  Neurocritical Care  Progress Note    Admit Date: 11/4/2023  Service Date: 11/09/2023  Length of Stay: 5    Subjective:     Chief Complaint: SAH (subarachnoid hemorrhage)    History of Present Illness: The patient is a 78-year-old male with PMHx of  lymphoma in remission, CAD, nonrheumatic mitral regurg, CHF, first-degree AV block, aortic arthrosclerosis admitted to [facility] with aSAH/IVH. Initially presented initially to outside hospital ED with complaints of bilateral neck pain that happened approximately 2 hours prior to arrival.  Reports that the pain started immediately after having a bowel movement.  He does have associated headache that radiates up to his temples. Patient is on baby ASA. Also reports some associated weakness with walking. States he woke up this morning feeling normal without any pain or weakness.        Hospital Course: 11/06/2023: Angio negative yesterday, SAH protocol   11/07/2023: No events overnight.   11/08/2023: Increased delirium overnight, switch keppra to briviact   11/09/2023 Mild delirium overnight, improved from prior. 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     Cardiovascular: Negative.    Gastrointestinal:  Positive for constipation. Negative for nausea and vomiting.   Musculoskeletal:  Positive for neck pain.   Neurological:  Positive for headaches.   Psychiatric/Behavioral:  Positive for confusion, decreased concentration and sleep disturbance.        Objective:     Vitals:  Temp: 98.8 °F (37.1 °C)  Pulse: 90  Rhythm: normal sinus rhythm  BP: 137/73  MAP (mmHg): 99  Resp: 20  SpO2: 97 %    Temp  Min: 98.4 °F (36.9 °C)  Max: 99 °F (37.2 °C)  Pulse  Min: 75  Max: 99  BP  Min: 111/54  Max: 158/78  MAP (mmHg)  Min: 78  Max: 111  Resp  Min: 18  Max:  20  SpO2  Min: 92 %  Max: 99 %    11/08 0701 - 11/09 0700  In: 960 [P.O.:960]  Out: 1550 [Urine:1550]   Unmeasured Output  Urine Occurrence: 1  Stool Occurrence: 1  Emesis Occurrence: 0  Pad Count: 2        Physical Exam  General Appearance: Not in acute hemodynamic distress  Mental Status Exam: awake, alert, aware, oriented, no aphasia, no dysarthria  Cranial Nerves: VFF, EOM full, pupils are equal and reactive to light bilaterally, symmetric facial sensory, symmetric facial motor, hearing grossly normal, midline tongue  Motor: no drift in the UE/LE. Power is 5/5 in both UE/LE. Normal tone.  Sensory: Symmetric to LT in all 4 limbs without extinction  Vascular: S1/S2 of normal intensity, no S3/S4 appreciated, no murmurs appreciated  Lungs: CTA bilaterally without wheezing  Abdomen: Soft, non-distended, non-tender, BS +         Medications:  Continuous Scheduledatorvastatin, 80 mg, Daily  enoxparin, 40 mg, Q24H (prophylaxis, 1700)  famotidine, 20 mg, BID  losartan, 100 mg, Daily  melatonin, 6 mg, Nightly  metoprolol succinate, 50 mg, Daily  polyethylene glycol, 17 g, Daily  senna-docusate 8.6-50 mg, 1 tablet, Daily  tamsulosin, 0.4 mg, Daily    PRNacetaminophen, 650 mg, Q6H PRN  dextrose 10%, 12.5 g, PRN  dextrose 10%, 25 g, PRN  hydrALAZINE, 10 mg, Q4H PRN  labetalol, 10 mg, Q4H PRN  ondansetron, 4 mg, Q6H PRN      Today I personally reviewed pertinent imaging, cardiology results, laboratory results, notably: CTA and DSA w/o evidence of underlying vascular malformation. TCDs remain wnl. CBC and CMP unremarkable     Diet  Diet Adult Regular (IDDSI Level 7)  Diet Adult Regular (IDDSI Level 7)          Assessment/Plan:     Neuro  * SAH (subarachnoid hemorrhage)   78-year-old male with lymphoma in remission, CAD, nonrheumatic mitral regurg, CHF, first-degree AV block, aortic arthrosclerosis who presents w/SAH/IVH    Strong clinical suspicion for perimesencephalic given pt presentation and location of bleed. 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 There is normal diastolic function.  - Continue home BB and ARB, hold diuretics in setting of SAH and goal euvolemia during this time    Primary hypertension  Hx of    - Goal SBP<140  - Continue home medications     Coronary artery disease  At home on ASA  - hold at this time in setting of acute bleed   - continue home BB and ARB    Renal/  Benign prostatic hyperplasia with urinary frequency  Continue home tamsulosin           The patient is being Prophylaxed for:  Venous Thromboembolism with: Mechanical or Chemical  Stress Ulcer with: None  Ventilator Pneumonia with: not applicable    Activity Orders          Diet Adult Regular (IDDSI Level 7): Regular starting at 11/05 1803    Elevate HOB 45 starting at 11/04 1541        Full Code     Discuss transfer to [facility] floor    Level III visit    Gladis Jackson MD  Neurocritical Care  [facility] - Neuro Critical Care

## 2023-11-11 PROCEDURE — 97112 NEUROMUSCULAR REEDUCATION: CPT

## 2023-11-11 PROCEDURE — 25500020 PHARM REV CODE 255: Performed by: NEUROLOGICAL SURGERY

## 2023-11-11 PROCEDURE — 11000001 HC ACUTE MED/SURG PRIVATE ROOM

## 2023-11-11 PROCEDURE — 97530 THERAPEUTIC ACTIVITIES: CPT

## 2023-11-11 PROCEDURE — 25000003 PHARM REV CODE 250

## 2023-11-11 PROCEDURE — 25000003 PHARM REV CODE 250: Performed by: PHYSICIAN ASSISTANT

## 2023-11-11 PROCEDURE — 63600175 PHARM REV CODE 636 W HCPCS: Performed by: PHYSICIAN ASSISTANT

## 2023-11-11 PROCEDURE — 97535 SELF CARE MNGMENT TRAINING: CPT

## 2023-11-11 PROCEDURE — 97116 GAIT TRAINING THERAPY: CPT

## 2023-11-11 PROCEDURE — 25000003 PHARM REV CODE 250: Performed by: NURSE PRACTITIONER

## 2023-11-11 PROCEDURE — 25000003 PHARM REV CODE 250: Performed by: PSYCHIATRY & NEUROLOGY

## 2023-11-11 RX ADMIN — Medication 6 MG: at 08:11

## 2023-11-11 RX ADMIN — FAMOTIDINE 20 MG: 20 TABLET ORAL at 08:11

## 2023-11-11 RX ADMIN — ENOXAPARIN SODIUM 40 MG: 40 INJECTION SUBCUTANEOUS at 04:11

## 2023-11-11 RX ADMIN — ATORVASTATIN CALCIUM 80 MG: 40 TABLET, FILM COATED ORAL at 08:11

## 2023-11-11 RX ADMIN — IOHEXOL 100 ML: 350 INJECTION, SOLUTION INTRAVENOUS at 10:11

## 2023-11-11 RX ADMIN — TAMSULOSIN HYDROCHLORIDE 0.4 MG: 0.4 CAPSULE ORAL at 08:11

## 2023-11-11 RX ADMIN — METOPROLOL SUCCINATE 50 MG: 50 TABLET, EXTENDED RELEASE ORAL at 08:11

## 2023-11-11 RX ADMIN — LOSARTAN POTASSIUM 100 MG: 50 TABLET, FILM COATED ORAL at 08:11

## 2023-11-11 NOTE — PLAN OF CARE
Goals remain appropriate.  Problem: Occupational Therapy  Goal: Occupational Therapy Goal  Description: Goals set 11/11 to be addressed for 14 days with expiration date, 11/25:  Patient will increase functional independence with ADLs by performing:    Patient will demonstrate rolling to the right with modified independence.  Not met   Patient will demonstrate rolling to the left with modified independence.   Not met  Patient will demonstrate supine -sit with modified independence.   Not met  Patient will demonstrate stand pivot transfers with modified independence.   Not met  Patient will demonstrate grooming while standing with modified independence.   Not met  Patient will demonstrate upper body dressing with modified independence while seated EOB.   Not met  Patient will demonstrate lower body dressing with modified independence while seated EOB.   Not met  Patient will demonstrate toileting with modified independence.   Not met  Patient will demonstrate bathing while seated EOB with modified independence.   Not met  Patient's family / caregiver will demonstrate independence and safety with assisting patient with self-care skills and functional mobility.     Not met  Patient and/or patient's family will verbalize understanding of stroke prevention guidelines, personal risk factors and stroke warning signs via teachback method.  Not met             11/11/2023 0737 by Karrie Banerjee LOTR  Outcome: Ongoing, Progressing  11/11/2023 0736 by Karrie Banerjee LOTR  Outcome: Ongoing, Progressing

## 2023-11-11 NOTE — PROGRESS NOTES
[facility] - Neurosurgery ([facility])  Neurosurgery  Progress Note    Subjective:     History of Present Illness: 78M h/o lymphoma in remission, CAD, nonrheumatic mitral regurg, CHF, first-degree AV block, aortic arthrosclerosis admitted to [facility] with SAH. Initially presented initially to outside hospital ED with complaints of bilateral neck pain that happened approximately 2 hours prior to arrival.  Reports that the pain started immediately after having a bowel movement.  He does have associated headache that radiates up to his temples. Patient is on baby ASA. Also reports some associated weakness with walking. States he woke up this morning feeling normal without any pain or weakness. Denies fever/chills, vision/hearing changes, dysphagia, dysarthria, vomiting, new-onset weakness or sensory change, bowel/bladder changes. CTH with prepontine SAH; CTA negative for vessel lesions.         Post-Op Info:  * No surgery found *         Interval History: 11/11: NAEON. AFVSS. TCDs wnl yesterday. CTA today, pending. Exam stable. D/c today if CTA stable.     Medications:  Continuous Infusions:  Scheduled Meds:   atorvastatin  80 mg Oral Daily    enoxparin  40 mg Subcutaneous Q24H (prophylaxis, 1700)    famotidine  20 mg Oral BID    losartan  100 mg Oral Daily    melatonin  6 mg Oral Nightly    metoprolol succinate  50 mg Oral Daily    tamsulosin  0.4 mg Oral Daily     PRN Meds:acetaminophen, dextrose 10%, dextrose 10%, hydrALAZINE, labetalol, ondansetron     Review of Systems  Objective:     Weight: 114.8 kg (253 lb)  Body mass index is 34.31 kg/m².  Vital Signs (Most Recent):  Temp: 98.4 °F (36.9 °C) (11/11/23 0736)  Pulse: 101 (11/11/23 0736)  Resp: 16 (11/11/23 0736)  BP: 135/66 (11/11/23 0736)  SpO2: (!) 94 % (11/11/23 0736) Vital Signs (24h Range):  Temp:  [98.3 °F (36.8 °C)-99.2 °F (37.3 °C)] 98.4 °F (36.9 °C)  Pulse:  [] 101  Resp:  [16-20] 16  SpO2:  [89 %-100 %] 94 %  BP: (114-164)/(63-87) 135/66            "                     Physical Exam         Neurosurgery Physical Exam    GENERAL: resting comfortably  HEENT: NCAT, PERRL, mucous membranes moist  NECK: supple, trachea midline  CV: normal capillary refill  PULM: aerating well, symmetric expansion, no distress  ABD: soft, NT, ND  EXT: no c/c/e    NEURO:    AAO x 3  CN II-XII grossly intact  Fc x 4 antigravity  SILT    No drift or dysmetria      Significant Labs:  Recent Labs   Lab 11/10/23  0131   GLU 97      K 3.7      CO2 22*   BUN 21   CREATININE 0.8   CALCIUM 9.2   MG 2.0       Recent Labs   Lab 11/10/23  0131   WBC 10.16   HGB 14.8   HCT 43.8          No results for input(s): "LABPT", "INR", "APTT" in the last 48 hours.  Microbiology Results (last 7 days)       ** No results found for the last 168 hours. **          All pertinent labs from the last 24 hours have been reviewed.    Significant Diagnostics:  I have reviewed all pertinent imaging results/findings within the past 24 hours.  US Transcranial Doppler Arterial Comp    Result Date: 11/7/2023  No Doppler evidence of vasospasm. Electronically signed by: Pool Qureshi MD Date:    11/07/2023 Time:    13:27   No results found in the last 24 hours.  US Transcranial Doppler Arterial Comp    Result Date: 11/9/2023  No Doppler evidence of vasospasm within the insonated vessels.. Electronically signed by: oPol Qureshi MD Date:    11/09/2023 Time:    16:52   US Transcranial Doppler Arterial Comp    Result Date: 11/10/2023  No Doppler evidence of vasospasm. Electronically signed by: Pool Qureshi MD Date:    11/10/2023 Time:    16:42       Assessment/Plan:     * SAH (subarachnoid hemorrhage)  78M h/o lymphoma in remission, CAD, nonrheumatic mitral regurg, CHF, first-degree AV block, aortic arthrosclerosis admitted to [facility] with SAH. CTH with prepontine SAH; CTA negative for vessel lesions.     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     Neuro:  --Stepped " down to nsgy service; q4 nc/vs  --CTA 11/4: negative for vascular pathology  --MRI/MRA Brain 11/5: negative for underlying lesion  --DSA 11/5 negative  --Plan for CTA on PBD 7 (today)  --HOB@30  --Keppra 500 BID x7d  --TCDs daily x14d  --Nimotop 60q4 x 21d  --Continue to follow clinically and notify NSGY immediately if any neurostatus change    CVS:  --SBP <140 mmHg    Pulm:  --Aggressive pulm treatment     GIT/Electrolytes:  --BMP qd      -Replete electrolytes PRN  --Na goal >135  --Regular diet  --PPI    Renal:  --Strict I/Os  --Goal of euvolemia or net +1L    Heme/ID:  --Hold therapeutic anti-plt/coag medications per NCC  --Daily CBC      -Transfuse PRN  --Trend WBC and T    PPx:  --TEDs/SCDs/LVX  --PPI    Dispo: PT/OT recs CM/SW consult for dispo planning  Anticipate discharge today after CTA    Plan d/w Dr. Cosby.        Melo Garcia MD  Neurosurgery  [facility] - Neurosurgery ([facility])

## 2023-11-11 NOTE — DISCHARGE INSTRUCTIONS
Diet Adult Regular      Notify your health care provider if you experience any of the following:  temperature >100.4      Notify your health care provider if you experience any of the following:  persistent nausea and vomiting or diarrhea      Notify your health care provider if you experience any of the following:  severe uncontrolled pain      Notify your health care provider if you experience any of the following:  redness, tenderness, or signs of infection (pain, swelling, redness, odor or green/yellow discharge around incision site)      Notify your health care provider if you experience any of the following:  difficulty breathing or increased cough      Notify your health care provider if you experience any of the following:  severe persistent headache      Notify your health care provider if you experience any of the following:  worsening rash      Notify your health care provider if you experience any of the following:  persistent dizziness, light-headedness, or visual disturbances      Notify your health care provider if you experience any of the following:  increased confusion or weakness      Remove dressing in 24 hours      Activity as tolerated

## 2023-11-11 NOTE — NURSING
Nurses Note -- 4 Eyes      11/11/2023   2:09 AM      Skin assessed during: Transfer      [] No Altered Skin Integrity Present    []Prevention Measures Documented      [x] Yes- Altered Skin Integrity Present or Discovered   [x] LDA Added if Not in Epic (Describe Wound)   [] New Altered Skin Integrity was Present on Admit and Documented in LDA   [] Wound Image Taken    Wound Care Consulted? No    Attending Nurse:  Lorraine Subramanian RN/Staff Member:  Edyta Phelps site open to air

## 2023-11-11 NOTE — SUBJECTIVE & OBJECTIVE
"Interval History: 11/11: SHANNON. AFVSS. TCDs wnl yesterday. CTA today, pending. Exam stable. D/c today if CTA stable.     Medications:  Continuous Infusions:  Scheduled Meds:   atorvastatin  80 mg Oral Daily    enoxparin  40 mg Subcutaneous Q24H (prophylaxis, 1700)    famotidine  20 mg Oral BID    losartan  100 mg Oral Daily    melatonin  6 mg Oral Nightly    metoprolol succinate  50 mg Oral Daily    tamsulosin  0.4 mg Oral Daily     PRN Meds:acetaminophen, dextrose 10%, dextrose 10%, hydrALAZINE, labetalol, ondansetron     Review of Systems  Objective:     Weight: 114.8 kg (253 lb)  Body mass index is 34.31 kg/m².  Vital Signs (Most Recent):  Temp: 98.4 °F (36.9 °C) (11/11/23 0736)  Pulse: 101 (11/11/23 0736)  Resp: 16 (11/11/23 0736)  BP: 135/66 (11/11/23 0736)  SpO2: (!) 94 % (11/11/23 0736) Vital Signs (24h Range):  Temp:  [98.3 °F (36.8 °C)-99.2 °F (37.3 °C)] 98.4 °F (36.9 °C)  Pulse:  [] 101  Resp:  [16-20] 16  SpO2:  [89 %-100 %] 94 %  BP: (114-164)/(63-87) 135/66                                 Physical Exam         Neurosurgery Physical Exam    GENERAL: resting comfortably  HEENT: NCAT, PERRL, mucous membranes moist  NECK: supple, trachea midline  CV: normal capillary refill  PULM: aerating well, symmetric expansion, no distress  ABD: soft, NT, ND  EXT: no c/c/e    NEURO:    AAO x 3  CN II-XII grossly intact  Fc x 4 antigravity  SILT    No drift or dysmetria      Significant Labs:  Recent Labs   Lab 11/10/23  0131   GLU 97      K 3.7      CO2 22*   BUN 21   CREATININE 0.8   CALCIUM 9.2   MG 2.0       Recent Labs   Lab 11/10/23  0131   WBC 10.16   HGB 14.8   HCT 43.8          No results for input(s): "LABPT", "INR", "APTT" in the last 48 hours.  Microbiology Results (last 7 days)       ** No results found for the last 168 hours. **          All pertinent labs from the last 24 hours have been reviewed.    Significant Diagnostics:  I have reviewed all pertinent imaging results/findings " within the past 24 hours.  US Transcranial Doppler Arterial Comp    Result Date: 11/7/2023  No Doppler evidence of vasospasm. Electronically signed by: Pool Qureshi MD Date:    11/07/2023 Time:    13:27   No results found in the last 24 hours.  US Transcranial Doppler Arterial Comp    Result Date: 11/9/2023  No Doppler evidence of vasospasm within the insonated vessels.. Electronically signed by: Pool Qureshi MD Date:    11/09/2023 Time:    16:52   US Transcranial Doppler Arterial Comp    Result Date: 11/10/2023  No Doppler evidence of vasospasm. Electronically signed by: Pool Qureshi MD Date:    11/10/2023 Time:    16:42

## 2023-11-11 NOTE — PT/OT/SLP PROGRESS
"Occupational Therapy   Treatment    Name: Sam Gamino  MRN: [id number]  Admitting Diagnosis:  SAH (subarachnoid hemorrhage)       Recommendations:     Discharge Recommendations: Low Intensity Therapy  Discharge Equipment Recommendations:  walker, rolling  Barriers to discharge:  None    Assessment:     Sam Gamino is a 78 y.o. male with a medical diagnosis of SAH (subarachnoid hemorrhage).  He presents with performance deficits affecting function are weakness, impaired endurance, impaired self care skills, impaired functional mobility.     Rehab Prognosis:  Good; patient would benefit from acute skilled OT services to address these deficits and reach maximum level of function.       Plan:     Patient to be seen 3 x/week to address the above listed problems via self-care/home management, therapeutic activities, therapeutic exercises, neuromuscular re-education  Plan of Care Expires: 12/03/23  Plan of Care Reviewed with: patient    Subjective   Patient:  "I really appreciate your help."  Pain/Comfort:  Pain Rating 1: 0/10  Pain Rating Post-Intervention 1: 0/10    Objective:     Communicated with: Nurse prior to session.  Patient found supine with telemetry, blood pressure cuff, pulse ox (continuous) upon OT entry to room.    General Precautions: Standard, aspiration, fall    Orthopedic Precautions:N/A  Braces: N/A  Respiratory Status: Room air     Occupational Performance:     Bed Mobility:    Patient completed Rolling/Turning to Left with  supervision  Patient completed Rolling/Turning to Right with supervision  Patient completed Supine to Sit with CGA    Functional Mobility/Transfers:  Patient completed Sit <> Stand Transfer with minimum assistance  with  no assistive device   Patient completed Bed <> Chair Transfer using Stand Pivot technique with minimum assistance with no assistive device    Activities of Daily Living:  Grooming: minimum assistance while standing  Upper Body Dressing: minimum assistance  "   Lower Body Dressing: moderate assistance while seated EOB    [facility] 6 Click ADL: 17    Treatment & Education:  Patient alert and oriented x 3; able to follow 4/4 one step commands.  Patient attentive and interactive throughout the session.     Patient left supine with all lines intact, call button in reach, and bed alarm on    GOALS:   Multidisciplinary Problems       Occupational Therapy Goals          Problem: Occupational Therapy    Goal Priority Disciplines Outcome Interventions   Occupational Therapy Goal     OT, PT/OT Ongoing, Progressing    Description: Goals set 11/11 to be addressed for 14 days with expiration date, 11/25:  Patient will increase functional independence with ADLs by performing:    Patient will demonstrate rolling to the right with modified independence.  Not met   Patient will demonstrate rolling to the left with modified independence.   Not met  Patient will demonstrate supine -sit with modified independence.   Not met  Patient will demonstrate stand pivot transfers with modified independence.   Not met  Patient will demonstrate grooming while standing with modified independence.   Not met  Patient will demonstrate upper body dressing with modified independence while seated EOB.   Not met  Patient will demonstrate lower body dressing with modified independence while seated EOB.   Not met  Patient will demonstrate toileting with modified independence.   Not met  Patient will demonstrate bathing while seated EOB with modified independence.   Not met  Patient's family / caregiver will demonstrate independence and safety with assisting patient with self-care skills and functional mobility.     Not met  Patient and/or patient's family will verbalize understanding of stroke prevention guidelines, personal risk factors and stroke warning signs via teachback method.  Not met                                  Time Tracking:     OT Date of Treatment: 11/11/23  OT Start Time: 0613  OT Stop Time:  0637  OT Total Time (min): 24 min    Billable Minutes:Self Care/Home Management 12  Neuromuscular Re-education 12    OT/RICHY: OT          11/11/2023

## 2023-11-11 NOTE — DISCHARGE SUMMARY
[facility] - Neurosurgery ([facility])  Neurosurgery  Discharge Summary      Patient Name: Sam Gamino  MRN: [id number]  Admission Date: 11/4/2023  Hospital Length of Stay: 7 days  Discharge Date and Time:  11/11/2023 8:52 AM  Attending Physician: Guille Cosby MD   Discharging Provider: Melo Garcia MD  Primary Care Provider: JAYLYN Zamora MD    HPI:   78M h/o lymphoma in remission, CAD, nonrheumatic mitral regurg, CHF, first-degree AV block, aortic arthrosclerosis admitted to [facility] with SAH. Initially presented initially to outside hospital ED with complaints of bilateral neck pain that happened approximately 2 hours prior to arrival.  Reports that the pain started immediately after having a bowel movement.  He does have associated headache that radiates up to his temples. Patient is on baby ASA. Also reports some associated weakness with walking. States he woke up this morning feeling normal without any pain or weakness. Denies fever/chills, vision/hearing changes, dysphagia, dysarthria, vomiting, new-onset weakness or sensory change, bowel/bladder changes. CTH with prepontine SAH; CTA negative for vessel lesions.       * No surgery found *     Hospital Course: 11/05/2023: PBD 1. NAEON. AFVSS. Exam stable, intact. MRI/MRA completed. On strict SAH protocol.  11/6: PBD 2. NAEON. AFVSS. -1L / 24 hrs. MRI/MRA negative. DSA yesterday negative. Plan for CTA PBD 7  11/07/2023: PBD 3. NAEON. AFVSS. Exam stable. I/O negative. HA improved. Denies new weakness/numbness/tingling.  11/08/2023: PBD 4. NAEON. AFVSS. HA nearly resolved.  11/9: NAEON. AFVSS. TCDs WNL. Plan for CTA PBD 7. Will discuss possibility of stepping down before CTA. Exam stable  11/10: NAEON. AFVSS. TCDs yesterday WNL. Planning for CTA tomorrow. Exam stable. Hopefully discharge tomorrow if CTA stable  11/11: NAEON. AFVSS. TCDs wnl yesterday. CTA today, pending. Exam stable. D/c today if CTA stable.       Goals of Care Treatment Preferences:  Code  Status: Full Code      Consults:   Consults (From admission, onward)          Status Ordering Provider     Inpatient consult to Interventional Radiology  Once        Provider:  (Not yet assigned)    Completed ANTOINETTE CHAVIRA     Inpatient consult to Neurosurgery  Once        Provider:  (Not yet assigned)    Completed ANTOINETTE CHAVIRA            Significant Diagnostic Studies: Labs: All labs within the past 24 hours have been reviewed    Pending Diagnostic Studies:       Procedure Component Value Units Date/Time    CTA Head [[id number]]     Order Status: Sent Lab Status: No result     EKG 12-lead [[id number]]     Order Status: Sent Lab Status: No result           Final Active Diagnoses:    Diagnosis Date Noted POA    PRINCIPAL PROBLEM:  SAH (subarachnoid hemorrhage) [I60.9] 11/04/2023 Yes    Atrioventricular block, first degree [I44.0] 11/01/2023 Yes    Hypercholesterolemia [E78.00] 06/27/2022 Yes    Heart failure, diastolic, chronic [I50.32] 05/16/2022 Yes    Primary hypertension [I10] 05/16/2022 Yes    Benign prostatic hyperplasia with urinary frequency [N40.1, R35.0] 12/06/2021 Yes    Coronary artery disease [I25.10] 02/08/2013 Yes      Problems Resolved During this Admission:      Discharged Condition: stable     Disposition: Home or Self Care    Follow Up:   Follow-up Information       Guille Cosby MD Follow up in 2 week(s).    Specialty: Neurosurgery  Contact information:  [address]  [facility] [id number]  [phone]                           Patient Instructions:      Ambulatory referral/consult to Physical/Occupational Therapy   Standing Status: Future   Referral Priority: Routine Referral Type: Physical Medicine   Referral Reason: Specialty Services Required   Number of Visits Requested: 1     Medications:  Reconciled Home Medications:      Medication List        CONTINUE taking these medications      aspirin 81 MG Chew  Take 81 mg by mouth once daily.     atorvastatin 80 MG tablet  Commonly  "known as: LIPITOR  TAKE ONE-HALF TABLET BY MOUTH ONCE DAILY     cetirizine 10 MG tablet  Commonly known as: ZYRTEC  Take 10 mg by mouth daily as needed.     cholecalciferol (vitamin D3) 50 mcg (2,000 unit) Cap capsule  Commonly known as: VITAMIN D3  Take 1 capsule by mouth once daily.     cyanocobalamin 1,000 mcg/mL injection  INJECT 1 ML IN THE MUSCLE EVERY 30 DAYS     fluticasone propionate 50 mcg/actuation nasal spray  Commonly known as: FLONASE  SHAKE LIQUID AND USE 2 SPRAYS(100 MCG) IN EACH NOSTRIL EVERY DAY     furosemide 40 MG tablet  Commonly known as: LASIX  Take 1 tablet (40 mg total) by mouth once daily.     ibuprofen 800 MG tablet  Commonly known as: ADVIL,MOTRIN  Take 1 tablet (800 mg total) by mouth 2 (two) times daily as needed for Pain.     JARDIANCE 10 mg tablet  Generic drug: empagliflozin  TAKE 1 TABLET DAILY     linaCLOtide 290 mcg Cap capsule  Commonly known as: LINZESS  Take 1 capsule (290 mcg total) by mouth before breakfast.     losartan 100 MG tablet  Commonly known as: COZAAR  Take 1 tablet (100 mg total) by mouth once daily.     metoprolol succinate 50 MG 24 hr tablet  Commonly known as: TOPROL-XL  Take 50 mg by mouth once daily.     omeprazole 20 mg Tbec  Take 1 tablet by mouth once daily.     solifenacin 10 MG tablet  Commonly known as: VESICARE  Take 1 tablet (10 mg total) by mouth once daily.     spironolactone 25 MG tablet  Commonly known as: ALDACTONE  Take 1 tablet (25 mg total) by mouth once daily.     syringe with needle 3 mL 23 x 1" Syrg  1 Syringe by Misc.(Non-Drug; Combo Route) route every 30 days.     tamsulosin 0.4 mg Cap  Commonly known as: FLOMAX  Take 1 capsule (0.4 mg total) by mouth once daily.              Melo Garcia MD  Neurosurgery  [facility] - Neurosurgery ([facility])  "

## 2023-11-11 NOTE — PLAN OF CARE
Problem: Adult Inpatient Plan of Care  Goal: Optimal Comfort and Wellbeing  Outcome: Ongoing, Progressing     Problem: Adult Inpatient Plan of Care  Goal: Readiness for Transition of Care  Outcome: Ongoing, Progressing     Problem: Skin Injury Risk Increased  Goal: Skin Health and Integrity  Outcome: Ongoing, Progressing     Problem: Fall Injury Risk  Goal: Absence of Fall and Fall-Related Injury  Outcome: Ongoing, Progressing

## 2023-11-11 NOTE — NURSING
Nurses Note -- 4 Eyes      11/11/2023   3:54 PM      Skin assessed during: Q Shift Change      [x] No Altered Skin Integrity Present    []Prevention Measures Documented      [] Yes- Altered Skin Integrity Present or Discovered   [] LDA Added if Not in Epic (Describe Wound)   [] New Altered Skin Integrity was Present on Admit and Documented in LDA   [] Wound Image Taken    Wound Care Consulted? No    Attending Nurse:  Chris Subramanian RN/Staff Member:  Edyta SANCHEZ

## 2023-11-11 NOTE — PT/OT/SLP PROGRESS
"Physical Therapy Treatment    Patient Name: Sam Gamino   MRN: [id number]    Therapy tech utilized for session to maximize physical performance and safety  Recommendations:     Discharge Recommendations: Moderate Intensity Therapy  Discharge Equipment Recommendations: walker, rolling, bedside commode, shower chair  Barriers to discharge: Increased level of assist    Assessment:     Sam Gamino is a 78 y.o. male admitted with a medical diagnosis of SAH (subarachnoid hemorrhage). He presents with the following impairments/functional limitations: weakness, impaired endurance, impaired self care skills, impaired functional mobility, gait instability, decreased lower extremity function, decreased safety awareness, impaired balance, impaired coordination. Pt with poor tolerance to therapy session on this date. Pt with increased difficulty noted with forward limb progression and RW management/navigation during ambulation trials. Pt relying heavily on therapist for additional trunk support and maximal verbal cueing for improved gait mechanics. Pt with very poor insight to current functional deficits, poor safety awareness, and poor activity tolerance placing pt at increased risk for falls. Recommend moderate intensity therapy following discharge in order to remedy the above impairments, decrease caregiver burden, reduce fall risk, and maximize functional independence.    Rehab Prognosis: Good; patient continues to benefit from acute skilled PT services to address these deficits and reach maximum level of function.  Recent Surgery: * No surgery found *      Plan:     During this hospitalization, patient to be seen 4 x/week to address the identified rehab impairments via gait training, therapeutic activities, therapeutic exercises, neuromuscular re-education and progress toward the following goals:    Plan of Care Expires:  11/26/23    Subjective     Chief Complaint: None verbalized  Patient/Family Comments/Goals: "I'll " "do whatever you want me to do."  Pain/Comfort:  Pain Rating 1: 0/10    Objective:     Communicated with RN prior to session. Patient found HOB elevated with telemetry, bed alarm upon PT entry to room.     General Precautions: Standard, aspiration, fall  Orthopedic Precautions: N/A  Braces: N/A    Functional Mobility:  Bed Mobility: Verbal cues for sequencing and technique    Supine to Sit: minimum assistance for LE management  Sit to Supine: contact guard assistance  Transfers:    Sit to Stand: x1 rep from EOB and x2 reps from bedside chair; minimum assistance with rolling walker with cues for hand placement and foot placement  Chair to Bed: moderate assistance with rolling walker using Stand Pivot  Gait: Patient ambulated 45' + 20' with seated rest break between trials with rolling walker and maximal assistance.   Patient demonstrates unsteady gait, decreased step length, narrow base of support, decreased weight shift, decreased foot clearance, ambulates outside ADRIAN of RW, flexed posture, decreased cash, inconsistent bilateral foot placement, and shuffle gait.   Patient required cues for upright posture, gluteal activation, sequencing, rolling walker management, obstacle navigation, safe rolling walker usage, to ambulate within ADRIAN of RW, increased step size, and increased foot clearance.  All lines remained intact throughout ambulation trial, gait belt utilized, chair follow for patient safety.    AM-PAC 6 CLICK MOBILITY  Turning over in bed (including adjusting bedclothes, sheets and blankets)?: 3  Sitting down on and standing up from a chair with arms (e.g., wheelchair, bedside commode, etc.): 3  Moving from lying on back to sitting on the side of the bed?: 3  Moving to and from a bed to a chair (including a wheelchair)?: 2  Need to walk in hospital room?: 2  Climbing 3-5 steps with a railing?: 2  Basic Mobility Total Score: 15     Therapeutic Activities and Exercises:  Patient educated on role of acute care " PT and PT POC, safety while in hospital including calling nurse for mobility, and call light usage  Pt educated on the effects of bed rest and the importance of OOB activity. Pt encouraged to sit UIC majority of day as tolerated. Pt verbalized understanding.  Answered all questions within PT scope of practice and addressed functional mobility concerns.    Patient left HOB elevated with all lines intact, call button in reach, RN notified, and bed alarm on.    GOALS:   Multidisciplinary Problems       Physical Therapy Goals          Problem: Physical Therapy    Goal Priority Disciplines Outcome Goal Variances Interventions   Physical Therapy Goal     PT, PT/OT Ongoing, Progressing     Description: Goals to be met by: 23     Patient will increase functional independence with mobility by performin. Supine to sit with [place]  2. Sit to supine with [place]  3. Sit to stand transfer with [place]  4. Bed to chair transfer with Modified [place] using Rolling Walker  5. Gait  x 150 feet with Modified [place] using Rolling Walker.                          Time Tracking:     PT Received On: 23  PT Start Time: 0833     PT Stop Time: 0856  PT Total Time (min): 23 min     Billable Minutes: Gait Training 10 and Therapeutic Activity 13      Treatment Type: Treatment  PT/PTA: PT     Number of PTA visits since last PT visit: 0     2023

## 2023-11-11 NOTE — PLAN OF CARE
"[facility] Care Plan    POC reviewed with Sam Gamino and family at 1400. Pt verbalized understanding. Questions and concerns addressed. No acute events today. Pt progressing toward goals. Will continue to monitor. See below and flowsheets for full assessment and VS info.   -delirium precautions, no neuro checks from 10pm-5am  -CT at 5am          Is this a stroke patient? yes- Stroke booklet reviewed with patient and family, risk factors identified for patient and stroke booklet remains at bedside for ongoing education.     Neuro:  [place] Coma Scale  Best Eye Response: 4-->(E4) spontaneous  Best Motor Response: 6-->(M6) obeys commands  Best Verbal Response: 5-->(V5) oriented  [place] Coma Scale Score: 15  Assessment Qualifiers: patient not sedated/intubated, no eye obstruction present  Pupil PERRLA: yes     24 hr Temp:  [98 °F (36.7 °C)-98.8 °F (37.1 °C)]     CV:   Rhythm: normal sinus rhythm  BP goals:   SBP < 160  MAP > 65    Resp:           Plan: N/A    GI/:     Diet/Nutrition Received: regular  Last Bowel Movement: 11/10/23       Intake/Output Summary (Last 24 hours) at 11/10/2023 1817  Last data filed at 11/10/2023 1800  Gross per 24 hour   Intake 650 ml   Output 450 ml   Net 200 ml     Unmeasured Output  Urine Occurrence: 1  Stool Occurrence: 1  Emesis Occurrence: 0  Pad Count: 2    Labs/Accuchecks:  Recent Labs   Lab 11/10/23  0131   WBC 10.16   RBC 4.42*   HGB 14.8   HCT 43.8         Recent Labs   Lab 11/10/23  0131      K 3.7   CO2 22*      BUN 21   CREATININE 0.8   ALKPHOS 70   ALT 17   AST 21   BILITOT 1.6*    No results for input(s): "PROTIME", "INR", "APTT", "HEPANTIXA" in the last 168 hours. No results for input(s): "CPK", "CPKMB", "TROPONINI", "MB" in the last 168 hours.    Electrolytes: N/A - electrolytes WDL  Accuchecks: none    Gtts:      LDA/Wounds:  Lines/Drains/Airways       Peripheral Intravenous Line  Duration                  Peripheral IV - Single Lumen 11/04/23 1352 20 G " Anterior;Distal;Right Forearm 6 days         Peripheral IV - Single Lumen 11/07/23 0256 18 G Posterior;Right Forearm 3 days                  Wounds: No  Wound care consulted: No

## 2023-11-11 NOTE — CARE UPDATE
RAPID RESPONSE NURSE CHART REVIEW        Chart Reviewed: 11/11/2023, 10:34 AM    MRN: [id number]  Bed: 910/910 A    Dx: SAH (subarachnoid hemorrhage)    Sam Gamino has a past medical history of Abnormal echocardiogram, CAD (coronary artery disease), Hypercholesterolemia, Lymphoma in remission, Normal cardiac stress test, Pernicious anemia, and Thyroid nodule.    Last VS: /66   Pulse (!) 133   Temp 98.4 °F (36.9 °C) (Oral)   Resp 16   Ht 6' (1.829 m)   Wt 114.8 kg (253 lb)   SpO2 (!) 94%   BMI 34.31 kg/m²     24H Vital Sign Range:  Temp:  [98.3 °F (36.8 °C)-99.2 °F (37.3 °C)]   Pulse:  []   Resp:  [16-20]   BP: (114-164)/(63-87)   SpO2:  [89 %-100 %]     Level of Consciousness (AVPU): alert    Recent Labs     11/09/23  0116 11/10/23  0131   WBC 8.00 10.16   HGB 14.7 14.8   HCT 44.5 43.8    193       Recent Labs     11/09/23  0116 11/10/23  0131    138   K 3.8 3.7    105   CO2 22* 22*   BUN 18 21   CREATININE 0.9 0.8    97   PHOS 3.0 2.9   MG 2.0 2.0          OXYGEN:             MEWS score: 2    Rounding completed with charge MAT Francis. contacted for Tachycardia. Reports lopressor given with AM meds. No additional concerns verbalized at this time. Instructed to call [phone] for further concerns or assistance.    Caleb Love RN

## 2023-11-11 NOTE — PLAN OF CARE
Met with patient and wife to review discharge recommendation of Skilled Nursing and is agreeable to plan    Patient/family provided list of facilities in-network with patient's payor plan. Providers that are owned, operated, or affiliated with [facility] are included on the list.     Notified that referral sent to below listed facilities from in-network list based on proximity to home/family support:   [facility]  2.   [facility]  3.   [facility]   4.    [facility]  5. (can send more than 5)    Patient/family instructed to identify preference.    Preferred Facility: (if more than 1, listed in order of descending preference)  1.    If an additional preferred facility not listed above is identified, additional referral to be sent. If above facilities unable to accept, will send additional referrals to in-network providers.     1: 45 PM-PT/OT  first recommended Home health. Today while this patient was completing his PT they noticed that he is very weak. Patient will now need SNF, I spoke to the patient and his wife and they both agree that he needs to go to a SNF to get stronger.

## 2023-11-11 NOTE — NURSING
Nursing Transfer Note       Transfer To 910    Transfer via bed    Transfer with cardiac monitoring    Transported by RN    Medicines sent: No    Chart sent with patient: Yes    Belongings sent with patient: (specify belongings) phone glasses    Notified: spouse    Bedside Neuro assessment performed: Yes    Bedside Handoff given to: demetrice    Upon arrival to floor: cardiac monitor applied, patient oriented to room, call bell in reach, and bed in lowest position

## 2023-11-11 NOTE — PLAN OF CARE
[facility] - Neurosurgery ([facility])      HOME HEALTH ORDERS  FACE TO FACE ENCOUNTER    Patient Name: Sam Gamino  YOB: 1945    PCP: JAYLYN Zamora MD   PCP Address: [address] [address]  PCP Phone Number: [phone]  PCP Fax: [phone]    Encounter Date: 11/4/23    Admit to Home Health    Diagnoses:  Active Hospital Problems    Diagnosis  POA    *SAH (subarachnoid hemorrhage) [I60.9]  Yes    Atrioventricular block, first degree [I44.0]  Yes     11/1/2023: ECG: ID interval 320 ms.      Hypercholesterolemia [E78.00]  Yes     2002: Statin was begun.      Heart failure, diastolic, chronic [I50.32]  Yes     5/16/2022: Echo: Normal left ventricular size and systolic function. EF 55%. Moderate MR. Mild to moderate TR.   6/6/2023: Echo: Normal left ventricular size and systolic function. EF 60%. LVGLS -15%. Moderate diastolic dysfunction. Mild to moderate AR. Mild to moderate MR. Mildly enlarged ascending aorta. Small pericardial effusion.          Primary hypertension [I10]  Yes     2002: Diagnosed.         Benign prostatic hyperplasia with urinary frequency [N40.1, R35.0]  Yes    Coronary artery disease [I25.10]  Yes     Angio 3/07  Dr. Lin        Resolved Hospital Problems   No resolved problems to display.       Follow Up Appointments:  Future Appointments   Date Time Provider Department Center   12/7/2023 10:30 AM Ema Montemayor MD [id number]       Allergies:  Review of patient's allergies indicates:   Allergen Reactions    Lotensin [benazepril]      cough       Medications: Review discharge medications with patient and family and provide education.    Current Facility-Administered Medications   Medication Dose Route Frequency Provider Last Rate Last Admin    acetaminophen tablet 650 mg  650 mg Oral Q6H PRN Miinea, Jessica, NP   650 mg at 11/07/23 2203    atorvastatin tablet 80 mg  80 mg Oral Daily Miinea, Jessica, NP   80 mg at 11/11/23 0813     dextrose 10% bolus 125 mL 125 mL  12.5 g Intravenous PRN Jessica Olmstead NP        dextrose 10% bolus 250 mL 250 mL  25 g Intravenous PRN Jessica Olmstead NP        enoxaparin injection 40 mg  40 mg Subcutaneous Q24H (prophylaxis, 1700) Sheba Chavira PA-C   40 mg at 11/10/23 1754    famotidine tablet 20 mg  20 mg Oral BID Philip Francis MD   20 mg at 11/11/23 0813    hydrALAZINE injection 10 mg  10 mg Intravenous Q4H PRN Fabricio Khan MD        labetalol 20 mg/4 mL (5 mg/mL) IV syring  10 mg Intravenous Q4H PRN Fabricio Khan MD        losartan tablet 100 mg  100 mg Oral Daily Jessica Olmstead NP   100 mg at 11/11/23 0812    melatonin tablet 6 mg  6 mg Oral Nightly Sebastian Mcknight MD   6 mg at 11/10/23 2004    metoprolol succinate (TOPROL-XL) 24 hr tablet 50 mg  50 mg Oral Daily Sheba Chavira PA-C   50 mg at 11/11/23 0812    ondansetron injection 4 mg  4 mg Intravenous Q6H PRN Jessica Olmstead NP        tamsulosin 24 hr capsule 0.4 mg  0.4 mg Oral Daily Jessica Olmstead NP   0.4 mg at 11/11/23 0813     Current Discharge Medication List        CONTINUE these medications which have NOT CHANGED    Details   aspirin 81 MG Chew Take 81 mg by mouth once daily.      atorvastatin (LIPITOR) 80 MG tablet TAKE ONE-HALF TABLET BY MOUTH ONCE DAILY  Qty: 90 tablet, Refills: 3    Associated Diagnoses: Hypercholesterolemia      cetirizine (ZYRTEC) 10 MG tablet Take 10 mg by mouth daily as needed.      cholecalciferol, vitamin D3, (VITAMIN D3) 50 mcg (2,000 unit) Cap Take 1 capsule by mouth once daily.      cyanocobalamin 1,000 mcg/mL injection INJECT 1 ML IN THE MUSCLE EVERY 30 DAYS  Qty: 10 mL, Refills: 1    Associated Diagnoses: B12 deficiency      fluticasone propionate (FLONASE) 50 mcg/actuation nasal spray SHAKE LIQUID AND USE 2 SPRAYS(100 MCG) IN EACH NOSTRIL EVERY DAY  Qty: 16 g, Refills: 3    Associated Diagnoses: Rhinitis, unspecified type      furosemide (LASIX) 40 MG tablet Take 1 tablet (40 mg total) by  "mouth once daily.  Qty: 120 tablet, Refills: 3    Associated Diagnoses: Heart failure, diastolic, chronic      ibuprofen (ADVIL,MOTRIN) 800 MG tablet Take 1 tablet (800 mg total) by mouth 2 (two) times daily as needed for Pain.  Qty: 60 tablet, Refills: 2      JARDIANCE 10 mg tablet TAKE 1 TABLET DAILY  Qty: 90 tablet, Refills: 3    Associated Diagnoses: Heart failure, systolic and diastolic, chronic      linaCLOtide (LINZESS) 290 mcg Cap capsule Take 1 capsule (290 mcg total) by mouth before breakfast.  Qty: 90 capsule, Refills: 3    Associated Diagnoses: Slow transit constipation      losartan (COZAAR) 100 MG tablet Take 1 tablet (100 mg total) by mouth once daily.  Qty: 90 tablet, Refills: 3    Associated Diagnoses: Heart failure, diastolic, chronic      metoprolol succinate (TOPROL-XL) 50 MG 24 hr tablet Take 50 mg by mouth once daily.      omeprazole 20 mg TbEC Take 1 tablet by mouth once daily.      solifenacin (VESICARE) 10 MG tablet Take 1 tablet (10 mg total) by mouth once daily.  Qty: 30 tablet, Refills: 11    Associated Diagnoses: Urinary frequency      spironolactone (ALDACTONE) 25 MG tablet Take 1 tablet (25 mg total) by mouth once daily.  Qty: 90 tablet, Refills: 3    Associated Diagnoses: Heart failure, diastolic, chronic      syringe with needle 3 mL 23 x 1" Syrg 1 Syringe by Misc.(Non-Drug; Combo Route) route every 30 days.  Qty: 50 Syringe, Refills: 11    Associated Diagnoses: B12 deficiency      tamsulosin (FLOMAX) 0.4 mg Cap Take 1 capsule (0.4 mg total) by mouth once daily.  Qty: 90 capsule, Refills: 1               I have seen and examined this patient within the last 30 days. My clinical findings that support the need for the home health skilled services and home bound status are the following:no   Weakness/numbness causing balance and gait disturbance due to SAH making it taxing to leave home.     Diet:   regular diet    Labs:  none    Referrals/ Consults  Physical Therapy to evaluate and " treat. Evaluate for home safety and equipment needs; Establish/upgrade home exercise program. Perform / instruct on therapeutic exercises, gait training, transfer training, and Range of Motion.  Occupational Therapy to evaluate and treat. Evaluate home environment for safety and equipment needs. Perform/Instruct on transfers, ADL training, ROM, and therapeutic exercises.    Activities:   activity as tolerated    Nursing:   Agency to admit patient within 24 hours of hospital discharge unless specified on physician order or at patient request    SN to complete comprehensive assessment including routine vital signs. Instruct on disease process and s/s of complications to report to MD. Review/verify medication list sent home with the patient at time of discharge  and instruct patient/caregiver as needed. Frequency may be adjusted depending on start of care date.     Skilled nurse to perform up to 3 visits PRN for symptoms related to diagnosis    Notify MD if SBP > 160 or < 90; DBP > 90 or < 50; HR > 120 or < 50; Temp > 101; O2 < 88%; Other:       Ok to schedule additional visits based on staff availability and patient request on consecutive days within the home health episode.    When multiple disciplines ordered:    Start of Care occurs on Sunday - Wednesday schedule remaining discipline evaluations as ordered on separate consecutive days following the start of care.    Thursday SOC -schedule subsequent evaluations Friday and Monday the following week.     Friday - Saturday SOC - schedule subsequent discipline evaluations on consecutive days starting Monday of the following week.    For all post-discharge communication and subsequent orders please contact patient's primary care physician. If unable to reach primary care physician or do not receive response within 30 minutes, please contact neurosurgery clinic for clinical staff order clarification    Miscellaneous   none    Home Health Aide:  Physical Therapy Three  times weekly and Occupational Therapy Three times weekly    Wound Care Orders  no    I certify that this patient is confined to his home and needs physical therapy and occupational therapy.

## 2023-11-12 PROCEDURE — 25000003 PHARM REV CODE 250: Performed by: NURSE PRACTITIONER

## 2023-11-12 PROCEDURE — 99231 SBSQ HOSP IP/OBS SF/LOW 25: CPT | Mod: ,,, | Performed by: STUDENT IN AN ORGANIZED HEALTH CARE EDUCATION/TRAINING PROGRAM

## 2023-11-12 PROCEDURE — 25000003 PHARM REV CODE 250: Performed by: PSYCHIATRY & NEUROLOGY

## 2023-11-12 PROCEDURE — 11000001 HC ACUTE MED/SURG PRIVATE ROOM

## 2023-11-12 PROCEDURE — 63600175 PHARM REV CODE 636 W HCPCS: Performed by: PHYSICIAN ASSISTANT

## 2023-11-12 PROCEDURE — 97112 NEUROMUSCULAR REEDUCATION: CPT

## 2023-11-12 PROCEDURE — 97129 THER IVNTJ 1ST 15 MIN: CPT

## 2023-11-12 PROCEDURE — 25000003 PHARM REV CODE 250

## 2023-11-12 PROCEDURE — 25000003 PHARM REV CODE 250: Performed by: PHYSICIAN ASSISTANT

## 2023-11-12 PROCEDURE — 99231 PR SUBSEQUENT HOSPITAL CARE,LEVL I: ICD-10-PCS | Mod: ,,, | Performed by: STUDENT IN AN ORGANIZED HEALTH CARE EDUCATION/TRAINING PROGRAM

## 2023-11-12 RX ADMIN — METOPROLOL SUCCINATE 50 MG: 50 TABLET, EXTENDED RELEASE ORAL at 08:11

## 2023-11-12 RX ADMIN — FAMOTIDINE 20 MG: 20 TABLET ORAL at 09:11

## 2023-11-12 RX ADMIN — ATORVASTATIN CALCIUM 80 MG: 40 TABLET, FILM COATED ORAL at 08:11

## 2023-11-12 RX ADMIN — LOSARTAN POTASSIUM 100 MG: 50 TABLET, FILM COATED ORAL at 08:11

## 2023-11-12 RX ADMIN — FAMOTIDINE 20 MG: 20 TABLET ORAL at 08:11

## 2023-11-12 RX ADMIN — Medication 6 MG: at 09:11

## 2023-11-12 RX ADMIN — TAMSULOSIN HYDROCHLORIDE 0.4 MG: 0.4 CAPSULE ORAL at 08:11

## 2023-11-12 RX ADMIN — ENOXAPARIN SODIUM 40 MG: 40 INJECTION SUBCUTANEOUS at 04:11

## 2023-11-12 NOTE — ASSESSMENT & PLAN NOTE
78M h/o lymphoma in remission, CAD, nonrheumatic mitral regurg, CHF, first-degree AV block, aortic arthrosclerosis admitted to [facility] with SAH. CTH with prepontine SAH; CTA negative for vessel lesions.     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     Neuro:  --Stepped down to nsgy service; q4 nc/vs  --CTA 11/4: negative for vascular pathology  --MRI/MRA Brain 11/5: negative for underlying lesion  --DSA 11/5 negative  --CTA 11/11: no evidence of vasospasm  --HOB@30  --Keppra 500 BID x7d (completed)  --TCDs daily x14d  --Nimotop 60q4 x 21d  --Continue to follow clinically and notify NSGY immediately if any neurostatus change    CVS:  --SBP <140 mmHg    Pulm:  --Aggressive pulm treatment     GIT/Electrolytes:  --BMP qd      -Replete electrolytes PRN  --Na goal >135  --Regular diet  --PPI    Renal:  --Strict I/Os  --Goal of euvolemia or net +1L    Heme/ID:  --Hold therapeutic anti-plt/coag medications per [facility]  --Daily CBC      -Transfuse PRN  --Trend WBC and T    PPx:  --TEDs/SCDs/LVX  --PPI    Dispo: PT/OT recs CM/SW consult for dispo planning  D/c recs changed to SNF, pending SNF    Plan d/w Dr. Cosby.

## 2023-11-12 NOTE — PLAN OF CARE
Goals remain appropriate.  Problem: Occupational Therapy  Goal: Occupational Therapy Goal  Description: Goals set 11/11 to be addressed for 14 days with expiration date, 11/25:  Patient will increase functional independence with ADLs by performing:    Patient will demonstrate rolling to the right with modified independence.  Not met   Patient will demonstrate rolling to the left with modified independence.   Not met  Patient will demonstrate supine -sit with modified independence.   Not met  Patient will demonstrate stand pivot transfers with modified independence.   Not met  Patient will demonstrate grooming while standing with modified independence.   Not met  Patient will demonstrate upper body dressing with modified independence while seated EOB.   Not met  Patient will demonstrate lower body dressing with modified independence while seated EOB.   Not met  Patient will demonstrate toileting with modified independence.   Not met  Patient will demonstrate bathing while seated EOB with modified independence.   Not met  Patient's family / caregiver will demonstrate independence and safety with assisting patient with self-care skills and functional mobility.     Not met  Patient and/or patient's family will verbalize understanding of stroke prevention guidelines, personal risk factors and stroke warning signs via teachback method.  Not met             Outcome: Ongoing, Progressing

## 2023-11-12 NOTE — PROGRESS NOTES
Elmo Alfaro - Neurosurgery ([facility])  Neurosurgery  Progress Note    Subjective:     History of Present Illness: 78M h/o lymphoma in remission, CAD, nonrheumatic mitral regurg, CHF, first-degree AV block, aortic arthrosclerosis admitted to [facility] with SAH. Initially presented initially to outside hospital ED with complaints of bilateral neck pain that happened approximately 2 hours prior to arrival.  Reports that the pain started immediately after having a bowel movement.  He does have associated headache that radiates up to his temples. Patient is on baby ASA. Also reports some associated weakness with walking. States he woke up this morning feeling normal without any pain or weakness. Denies fever/chills, vision/hearing changes, dysphagia, dysarthria, vomiting, new-onset weakness or sensory change, bowel/bladder changes. CTH with prepontine SAH; CTA negative for vessel lesions.         Post-Op Info:  * No surgery found *         Interval History: 11/12: NAEON. AFVSS. CTA yesterday w/o evidence of vasospasm. Exam stable. DC recs changed to SNF, pending SNF    Medications:  Continuous Infusions:  Scheduled Meds:   atorvastatin  80 mg Oral Daily    enoxparin  40 mg Subcutaneous Q24H (prophylaxis, 1700)    famotidine  20 mg Oral BID    losartan  100 mg Oral Daily    melatonin  6 mg Oral Nightly    metoprolol succinate  50 mg Oral Daily    tamsulosin  0.4 mg Oral Daily     PRN Meds:acetaminophen, dextrose 10%, dextrose 10%, hydrALAZINE, labetalol, ondansetron     Review of Systems  Objective:     Weight: 114.8 kg (253 lb)  Body mass index is 34.31 kg/m².  Vital Signs (Most Recent):  Temp: 98.8 °F (37.1 °C) (11/12/23 0722)  Pulse: 90 (11/12/23 0722)  Resp: 18 (11/12/23 0722)  BP: 113/75 (11/12/23 0722)  SpO2: 100 % (11/12/23 0722) Vital Signs (24h Range):  Temp:  [98.8 °F (37.1 °C)-99.6 °F (37.6 °C)] 98.8 °F (37.1 °C)  Pulse:  [] 90  Resp:  [16-20] 18  SpO2:  [95 %-100 %] 100 %  BP: (113-156)/(70-88) 113/75  "                               Physical Exam         Neurosurgery Physical Exam    GENERAL: resting comfortably  HEENT: NCAT, PERRL, mucous membranes moist  NECK: supple, trachea midline  CV: normal capillary refill  PULM: aerating well, symmetric expansion, no distress  ABD: soft, NT, ND  EXT: no c/c/e    NEURO:    AAO x 3  CN II-XII grossly intact  Fc x 4 antigravity  SILT    No drift or dysmetria      Significant Labs:  No results for input(s): "GLU", "NA", "K", "CL", "CO2", "BUN", "CREATININE", "CALCIUM", "MG" in the last 48 hours.    No results for input(s): "WBC", "HGB", "HCT", "PLT" in the last 48 hours.    No results for input(s): "LABPT", "INR", "APTT" in the last 48 hours.  Microbiology Results (last 7 days)       ** No results found for the last 168 hours. **          All pertinent labs from the last 24 hours have been reviewed.    Significant Diagnostics:  I have reviewed all pertinent imaging results/findings within the past 24 hours.  US Transcranial Doppler Arterial Comp    Result Date: 11/7/2023  No Doppler evidence of vasospasm. Electronically signed by: Pool Qureshi MD Date:    11/07/2023 Time:    13:27   No results found in the last 24 hours.  US Transcranial Doppler Arterial Comp    Result Date: 11/9/2023  No Doppler evidence of vasospasm within the insonated vessels.. Electronically signed by: Pool Qureshi MD Date:    11/09/2023 Time:    16:52   US Transcranial Doppler Arterial Comp    Result Date: 11/10/2023  No Doppler evidence of vasospasm. Electronically signed by: Pool Qureshi MD Date:    11/10/2023 Time:    16:42   CTA Head    Result Date: 11/11/2023  Evolving subarachnoid hemorrhage.  No acute territorial infarct. CTA: No aneurysm or AVM is identified.  No evidence of significant vaso spasm appreciated. Electronically signed by: Jatinder Meier Date:    11/11/2023 Time:    10:24       Assessment/Plan:     * SAH (subarachnoid hemorrhage)  78M h/o lymphoma in remission, CAD, nonrheumatic " mitral regurg, CHF, first-degree AV block, aortic arthrosclerosis admitted to [facility] with SAH. CTH with prepontine SAH; CTA negative for vessel lesions.     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     Neuro:  --Stepped down to nsgy service; q4 nc/vs  --CTA 11/4: negative for vascular pathology  --MRI/MRA Brain 11/5: negative for underlying lesion  --DSA 11/5 negative  --CTA 11/11: no evidence of vasospasm  --HOB@30  --Keppra 500 BID x7d (completed)  --TCDs daily x14d  --Nimotop 60q4 x 21d  --Continue to follow clinically and notify NSGY immediately if any neurostatus change    CVS:  --SBP <140 mmHg    Pulm:  --Aggressive pulm treatment     GIT/Electrolytes:  --BMP qd      -Replete electrolytes PRN  --Na goal >135  --Regular diet  --PPI    Renal:  --Strict I/Os  --Goal of euvolemia or net +1L    Heme/ID:  --Hold therapeutic anti-plt/coag medications per NCC  --Daily CBC      -Transfuse PRN  --Trend WBC and T    PPx:  --TEDs/SCDs/LVX  --PPI    Dispo: PT/OT recs CM/SW consult for dispo planning  D/c recs changed to SNF, pending SNF    Plan d/w Dr. Cosby.        Melo Garcia MD  Neurosurgery  [facility] - Neurosurgery ([facility])

## 2023-11-12 NOTE — PT/OT/SLP PROGRESS
"Occupational Therapy   Treatment    Name: Sam Gamino  MRN: [id number]  Admitting Diagnosis:  SAH (subarachnoid hemorrhage)       Recommendations:     Discharge Recommendations: Moderate Intensity Therapy  Discharge Equipment Recommendations:  bath bench, bedside commode, walker, rolling  Barriers to discharge:  None    Assessment:     Sam Gamino is a 78 y.o. male with a medical diagnosis of SAH (subarachnoid hemorrhage).  He presents with performance deficits affecting function are weakness, impaired endurance, impaired self care skills, impaired functional mobility, impaired balance, gait instability, impaired cognition.     Rehab Prognosis:  Good; patient would benefit from acute skilled OT services to address these deficits and reach maximum level of function.       Plan:     Patient to be seen 3 x/week to address the above listed problems via neuromuscular re-education, therapeutic exercises, self-care/home management, therapeutic activities, cognitive retraining  Plan of Care Expires: 12/03/23  Plan of Care Reviewed with: patient    Subjective     Patient:  "I am glad you came in.  I am not doing too good.  My mind is all messed up for the past 30 minutes.  I was going to call 911.  No one is responding to me."  "I need to get better with standing, walking and being independent."   Pain/Comfort:  Pain Rating 1: 0/10  Pain Rating Post-Intervention 1: 0/10    Objective:     Communicated with: Nurse prior to session.  Patient found supine with telemetry, bed alarm upon OT entry to room.    General Precautions: Standard, aspiration, fall    Orthopedic Precautions:N/A  Braces: N/A  Respiratory Status: Room air     Occupational Performance:     Bed Mobility:    Patient completed Rolling/Turning to Left with  contact guard assistance  Patient completed Rolling/Turning to Right with contact guard assistance  Patient completed Supine to Sit with minimum assistance  Patient completed Sit to Supine with minimum " assistance     Functional Mobility/Transfers:  Patient completed Sit <> Stand Transfer with minimum assistance  with  no assistive device     Activities of Daily Living:  Lower Body Dressing: moderate assistance while seated EOB    [facility] 6 Click ADL: 17    Treatment & Education:  Patient alert and oriented x 3; able to follow 4/4 one step commands.   Able to sequence 7/7 days of the week and 12/12 months of the year.    Patient left supine with all lines intact, call button in reach, and bed alarm on    GOALS:   Multidisciplinary Problems       Occupational Therapy Goals          Problem: Occupational Therapy    Goal Priority Disciplines Outcome Interventions   Occupational Therapy Goal     OT, PT/OT Ongoing, Progressing    Description: Goals set 11/11 to be addressed for 14 days with expiration date, 11/25:  Patient will increase functional independence with ADLs by performing:    Patient will demonstrate rolling to the right with modified independence.  Not met   Patient will demonstrate rolling to the left with modified independence.   Not met  Patient will demonstrate supine -sit with modified independence.   Not met  Patient will demonstrate stand pivot transfers with modified independence.   Not met  Patient will demonstrate grooming while standing with modified independence.   Not met  Patient will demonstrate upper body dressing with modified independence while seated EOB.   Not met  Patient will demonstrate lower body dressing with modified independence while seated EOB.   Not met  Patient will demonstrate toileting with modified independence.   Not met  Patient will demonstrate bathing while seated EOB with modified independence.   Not met  Patient's family / caregiver will demonstrate independence and safety with assisting patient with self-care skills and functional mobility.     Not met  Patient and/or patient's family will verbalize understanding of stroke prevention guidelines, personal risk factors  and stroke warning signs via teachback method.  Not met                                  Time Tracking:     OT Date of Treatment: 11/12/23  OT Start Time: 0506  OT Stop Time: 0530  OT Total Time (min): 24 min    Billable Minutes:Neuromuscular Re-education 14  Cognitive Retraining 10    OT/RICHY: OT          11/12/2023

## 2023-11-12 NOTE — NURSING
Nurses Note -- 4 Eyes      11/12/2023   1:56 AM      Skin assessed during: Q Shift Change      [x] No Altered Skin Integrity Present    [x]Prevention Measures Documented      [] Yes- Altered Skin Integrity Present or Discovered   [] LDA Added if Not in Epic (Describe Wound)   [] New Altered Skin Integrity was Present on Admit and Documented in LDA   [] Wound Image Taken    Wound Care Consulted? No    Attending Nurse:  Sage Subramanian RN/Staff Member:  carlos

## 2023-11-12 NOTE — PLAN OF CARE
Problem: Adult Inpatient Plan of Care  Goal: Plan of Care Review  Outcome: Ongoing, Progressing  Goal: Optimal Comfort and Wellbeing  Outcome: Ongoing, Progressing  Goal: Readiness for Transition of Care  Outcome: Ongoing, Progressing     Problem: Fall Injury Risk  Goal: Absence of Fall and Fall-Related Injury  Outcome: Ongoing, Progressing       PATIENT MEDICALLY STABLE: Yes    IVFs OR IV MEDICATIONS:  no    3. PAIN REGIMEN: tylenol    4. O2: room air      5. ALTAMIRANO: No     6. CENTRAL LINE No    7. 4 EYES AND SKIN ASSESSMENT: Yes  DATE COMPLETED: 11/11/23  WOUNDS: No   WOUND CARE CONSULTED/ORDERS: No    8. UP TO CHAIR: No      9. PT/OT ORDERS: Yes    10. ADL EQUIPMENT: Yes   Equipment reccomendations: rolling walker, bedside commode, and shower chair    11.  LBM: 11/11/23    12. PENDING TESTS/LABS: No      13. BARRIERS TO DISCHARGE: No

## 2023-11-12 NOTE — NURSING
Nurses Note -- 4 Eyes      11/12/2023   12:24 PM      Skin assessed during: Q Shift Change      [x] No Altered Skin Integrity Present    []Prevention Measures Documented      [] Yes- Altered Skin Integrity Present or Discovered   [] LDA Added if Not in Epic (Describe Wound)   [] New Altered Skin Integrity was Present on Admit and Documented in LDA   [] Wound Image Taken    Wound Care Consulted? No    Attending Nurse:  Helder Subramanian RN/Staff Member: Logan

## 2023-11-12 NOTE — NURSING
Nurses Note -- 4 Eyes      11/12/2023   12:20 PM      Skin assessed during: Q Shift Change      [x] No Altered Skin Integrity Present    []Prevention Measures Documented      [] Yes- Altered Skin Integrity Present or Discovered   [] LDA Added if Not in Epic (Describe Wound)   [] New Altered Skin Integrity was Present on Admit and Documented in LDA   [] Wound Image Taken    Wound Care Consulted? No    Attending Nurse:  Helder Subramanian RN/Staff Member Logan

## 2023-11-12 NOTE — SUBJECTIVE & OBJECTIVE
"Interval History: 11/12: SHANNON. RADHAS. CTA yesterday w/o evidence of vasospasm. Exam stable. DC recs changed to SNF, pending SNF    Medications:  Continuous Infusions:  Scheduled Meds:   atorvastatin  80 mg Oral Daily    enoxparin  40 mg Subcutaneous Q24H (prophylaxis, 1700)    famotidine  20 mg Oral BID    losartan  100 mg Oral Daily    melatonin  6 mg Oral Nightly    metoprolol succinate  50 mg Oral Daily    tamsulosin  0.4 mg Oral Daily     PRN Meds:acetaminophen, dextrose 10%, dextrose 10%, hydrALAZINE, labetalol, ondansetron     Review of Systems  Objective:     Weight: 114.8 kg (253 lb)  Body mass index is 34.31 kg/m².  Vital Signs (Most Recent):  Temp: 98.8 °F (37.1 °C) (11/12/23 0722)  Pulse: 90 (11/12/23 0722)  Resp: 18 (11/12/23 0722)  BP: 113/75 (11/12/23 0722)  SpO2: 100 % (11/12/23 0722) Vital Signs (24h Range):  Temp:  [98.8 °F (37.1 °C)-99.6 °F (37.6 °C)] 98.8 °F (37.1 °C)  Pulse:  [] 90  Resp:  [16-20] 18  SpO2:  [95 %-100 %] 100 %  BP: (113-156)/(70-88) 113/75                                 Physical Exam         Neurosurgery Physical Exam    GENERAL: resting comfortably  HEENT: NCAT, PERRL, mucous membranes moist  NECK: supple, trachea midline  CV: normal capillary refill  PULM: aerating well, symmetric expansion, no distress  ABD: soft, NT, ND  EXT: no c/c/e    NEURO:    AAO x 3  CN II-XII grossly intact  Fc x 4 antigravity  SILT    No drift or dysmetria      Significant Labs:  No results for input(s): "GLU", "NA", "K", "CL", "CO2", "BUN", "CREATININE", "CALCIUM", "MG" in the last 48 hours.    No results for input(s): "WBC", "HGB", "HCT", "PLT" in the last 48 hours.    No results for input(s): "LABPT", "INR", "APTT" in the last 48 hours.  Microbiology Results (last 7 days)       ** No results found for the last 168 hours. **          All pertinent labs from the last 24 hours have been reviewed.    Significant Diagnostics:  I have reviewed all pertinent imaging results/findings within the " past 24 hours.  US Transcranial Doppler Arterial Comp    Result Date: 11/7/2023  No Doppler evidence of vasospasm. Electronically signed by: Pool Qureshi MD Date:    11/07/2023 Time:    13:27   No results found in the last 24 hours.  US Transcranial Doppler Arterial Comp    Result Date: 11/9/2023  No Doppler evidence of vasospasm within the insonated vessels.. Electronically signed by: Pool Qureshi MD Date:    11/09/2023 Time:    16:52   US Transcranial Doppler Arterial Comp    Result Date: 11/10/2023  No Doppler evidence of vasospasm. Electronically signed by: Pool Qureshi MD Date:    11/10/2023 Time:    16:42   CTA Head    Result Date: 11/11/2023  Evolving subarachnoid hemorrhage.  No acute territorial infarct. CTA: No aneurysm or AVM is identified.  No evidence of significant vaso spasm appreciated. Electronically signed by: Jatinder Meier Date:    11/11/2023 Time:    10:24

## 2023-11-13 ENCOUNTER — TELEPHONE (OUTPATIENT)
Dept: NEUROSURGERY | Facility: CLINIC | Age: 78
End: 2023-11-13
Payer: MEDICARE

## 2023-11-13 DIAGNOSIS — I60.9 SAH (SUBARACHNOID HEMORRHAGE): Primary | ICD-10-CM

## 2023-11-13 PROCEDURE — 25000003 PHARM REV CODE 250: Performed by: PSYCHIATRY & NEUROLOGY

## 2023-11-13 PROCEDURE — 25000003 PHARM REV CODE 250: Performed by: PHYSICIAN ASSISTANT

## 2023-11-13 PROCEDURE — 25000003 PHARM REV CODE 250: Performed by: NURSE PRACTITIONER

## 2023-11-13 PROCEDURE — 11000001 HC ACUTE MED/SURG PRIVATE ROOM

## 2023-11-13 PROCEDURE — 99024 POSTOP FOLLOW-UP VISIT: CPT | Mod: POP,,, | Performed by: PHYSICIAN ASSISTANT

## 2023-11-13 PROCEDURE — 99024 PR POST-OP FOLLOW-UP VISIT: ICD-10-PCS | Mod: POP,,, | Performed by: PHYSICIAN ASSISTANT

## 2023-11-13 PROCEDURE — 63600175 PHARM REV CODE 636 W HCPCS: Performed by: PHYSICIAN ASSISTANT

## 2023-11-13 PROCEDURE — 25000003 PHARM REV CODE 250

## 2023-11-13 RX ADMIN — LOSARTAN POTASSIUM 100 MG: 50 TABLET, FILM COATED ORAL at 08:11

## 2023-11-13 RX ADMIN — ENOXAPARIN SODIUM 40 MG: 40 INJECTION SUBCUTANEOUS at 04:11

## 2023-11-13 RX ADMIN — METOPROLOL SUCCINATE 50 MG: 50 TABLET, EXTENDED RELEASE ORAL at 08:11

## 2023-11-13 RX ADMIN — FAMOTIDINE 20 MG: 20 TABLET ORAL at 09:11

## 2023-11-13 RX ADMIN — FAMOTIDINE 20 MG: 20 TABLET ORAL at 08:11

## 2023-11-13 RX ADMIN — TAMSULOSIN HYDROCHLORIDE 0.4 MG: 0.4 CAPSULE ORAL at 08:11

## 2023-11-13 RX ADMIN — Medication 6 MG: at 09:11

## 2023-11-13 RX ADMIN — ATORVASTATIN CALCIUM 80 MG: 40 TABLET, FILM COATED ORAL at 08:11

## 2023-11-13 NOTE — SUBJECTIVE & OBJECTIVE
Interval History: NAEON. AFVSS. No complaints. Neuro exam stable. Pending discharge to SNF.     Medications:  Continuous Infusions:  Scheduled Meds:   atorvastatin  80 mg Oral Daily    enoxparin  40 mg Subcutaneous Q24H (prophylaxis, 1700)    famotidine  20 mg Oral BID    losartan  100 mg Oral Daily    melatonin  6 mg Oral Nightly    metoprolol succinate  50 mg Oral Daily    tamsulosin  0.4 mg Oral Daily     PRN Meds:acetaminophen, dextrose 10%, dextrose 10%, hydrALAZINE, labetalol, ondansetron     Review of Systems  Objective:     Weight: 114.8 kg (253 lb)  Body mass index is 34.31 kg/m².  Vital Signs (Most Recent):  Temp: 98.6 °F (37 °C) (11/13/23 1128)  Pulse: 100 (11/13/23 1128)  Resp: 18 (11/13/23 1128)  BP: 123/77 (11/13/23 1128)  SpO2: 97 % (11/13/23 1128) Vital Signs (24h Range):  Temp:  [97.6 °F (36.4 °C)-98.8 °F (37.1 °C)] 98.6 °F (37 °C)  Pulse:  [] 100  Resp:  [17-18] 18  SpO2:  [95 %-100 %] 97 %  BP: (122-141)/(68-84) 123/77     Date 11/13/23 0700 - 11/14/23 0659   Shift 8753-7487 5499-6175 8132-5702 24 Hour Total   INTAKE   Shift Total(mL/kg)       OUTPUT   Urine(mL/kg/hr) 350   350   Shift Total(mL/kg) 350(3)   350(3)   Weight (kg) 114.8 114.8 114.8 114.8                               Physical Exam     Neurosurgery Physical Exam    General: well developed, well nourished, no distress.   Head: normocephalic, atraumatic  Neurologic: Alert and oriented. Thought content appropriate.  GCS: Motor: 6/Verbal: 5/Eyes: 4 GCS Total: 15  Mental Status: Awake, Alert, Oriented x 4  Language: No aphasia  Speech: No dysarthria  Cranial nerves: face symmetric, tongue midline, CN II-XII grossly intact.   Eyes: pupils equal, round, reactive to light with accomodation, EOMI.   Pulmonary: normal respirations, no signs of respiratory distress  Abdomen: soft, non-distended, not tender to palpation  Sensory: intact to light touch throughout  Motor Strength: Moves all extremities spontaneously with good tone.  Full  "strength upper and lower extremities. No abnormal movements seen.   Pronator Drift: no drift noted  Finger-to-nose: Intact bilaterally  Skin: Skin is warm, dry and intact.    Significant Labs:  No results for input(s): "GLU", "NA", "K", "CL", "CO2", "BUN", "CREATININE", "CALCIUM", "MG" in the last 48 hours.  No results for input(s): "WBC", "HGB", "HCT", "PLT" in the last 48 hours.  No results for input(s): "LABPT", "INR", "APTT" in the last 48 hours.  Microbiology Results (last 7 days)       ** No results found for the last 168 hours. **          Recent Lab Results       None          All pertinent labs from the last 24 hours have been reviewed.    Significant Diagnostics:  I have reviewed all pertinent imaging results/findings within the past 24 hours.  "

## 2023-11-13 NOTE — PT/OT/SLP PROGRESS
Physical Therapy      Patient Name:  Sam Gamino   MRN:  [id number]    Patient not seen today secondary to Toileting. Pt on bed pan on attempt in PM. Unable to return for additional attempts. Will follow-up per POC as appropriate.

## 2023-11-13 NOTE — NURSING
Nurses Note -- 4 Eyes      11/13/2023   9:31 AM      Skin assessed during: Q Shift Change      [x] No Altered Skin Integrity Present    []Prevention Measures Documented      [] Yes- Altered Skin Integrity Present or Discovered   [] LDA Added if Not in Epic (Describe Wound)   [] New Altered Skin Integrity was Present on Admit and Documented in LDA   [] Wound Image Taken    Wound Care Consulted? No    Attending Nurse:  Sage Subramanian RN/Staff Member:  Isaac

## 2023-11-13 NOTE — PLAN OF CARE
Elmo Alfaro - Neurosurgery ([facility])  Discharge Reassessment    Primary Care Provider: JAYLYN Zamora MD    Expected Discharge Date: 11/11/2023    Patient is medically ready for discharge.  Patient has SNF referrals out, however, does not have any accepting facility at this time.  Referrals are being reviewed.    Discharge Plan A and Plan B have been determined by review of patient's clinical status, future medical and therapeutic needs, and coverage/benefits for post-acute care in coordination with multidisciplinary team members.     Reassessment (most recent)       Discharge Reassessment - 11/13/23 1603          Discharge Reassessment    Assessment Type Discharge Planning Reassessment     Did the patient's condition or plan change since previous assessment? Yes     Discharge Plan discussed with: Patient;Spouse/sig other     Communicated ROLANDO with patient/caregiver Yes     Discharge Plan A Skilled Nursing Facility     Discharge Plan B Home with family;Home Health     DME Needed Upon Discharge  walker, rolling     Transition of Care Barriers None     Why the patient remains in the hospital Placement issues        Post-Acute Status    Post-Acute Authorization Placement     Post-Acute Placement Status Referrals Sent     Discharge Delays None known at this time

## 2023-11-13 NOTE — PLAN OF CARE
CHW met with patient/family at bedside. Patient experience rounding completed and reviewed the following.     Do you know your discharge plan?  Yes SNF    Have you discussed your needs and preferences with your SW/CM?  Yes    If you are discharging home, do you have help at home?  Yes    Do you think you will need help additional at home at discharge?  No     Do you currently have difficulty keeping up with bills, affording medicine or buying food?  No    Assigned SW/CM notified of any patient/family needs or concerns. Appropriate resources provided to address patient's needs.  Robyn YADAV  Case Management  [phone]

## 2023-11-13 NOTE — PLAN OF CARE
Problem: Adult Inpatient Plan of Care  Goal: Plan of Care Review  Outcome: Ongoing, Progressing  Goal: Optimal Comfort and Wellbeing  Outcome: Ongoing, Progressing     Problem: Fall Injury Risk  Goal: Absence of Fall and Fall-Related Injury  Outcome: Ongoing, Progressing           PATIENT MEDICALLY STABLE: Yes     IVFs OR IV MEDICATIONS:  no     3. PAIN REGIMEN: tylenol     4. O2: room air      5. ALTAMIRANO: No      6. CENTRAL LINE No     7. 4 EYES AND SKIN ASSESSMENT: Yes  DATE COMPLETED: 11/12/23  WOUNDS: No   WOUND CARE CONSULTED/ORDERS: No     8. UP TO CHAIR: No        9. PT/OT ORDERS: Yes     10. ADL EQUIPMENT: Yes   Equipment reccomendations: rolling walker, bedside commode, and shower chair     11.  LBM: 11/12/23     12. PENDING TESTS/LABS: No        13. BARRIERS TO DISCHARGE: No

## 2023-11-13 NOTE — PLAN OF CARE
Weekend CM/SW sent referrals out for SNF.  Spoke with spouse to review discharge recommendation of SNF and is agreeable to plan    Patient/family provided list of facilities in-network with patient's payor plan. Providers that are owned, operated, or affiliated with [facility] are included on the list.     Notified that referral sent to below listed facilities from in-network list based on proximity to home/family support:   1.[facility]  2.[facility]  3.[facility]  4.[place]    Patient/family instructed to identify preference.    Preferred Facility: (if more than 1, listed in order of descending preference)  [facility]    If an additional preferred facility not listed above is identified, additional referral to be sent. If above facilities unable to accept, will send additional referrals to in-network providers.

## 2023-11-13 NOTE — NURSING
Nurses Note -- 4 Eyes      11/13/2023   1:58 AM      Skin assessed during: Q Shift Change      [x] No Altered Skin Integrity Present    [x]Prevention Measures Documented      [] Yes- Altered Skin Integrity Present or Discovered   [] LDA Added if Not in Epic (Describe Wound)   [] New Altered Skin Integrity was Present on Admit and Documented in LDA   [] Wound Image Taken    Wound Care Consulted? No    Attending Nurse:  Sage Subramanian RN/Staff Member:  Logan

## 2023-11-13 NOTE — PROGRESS NOTES
Elmo Alfaro - Neurosurgery ([facility])  Neurosurgery  Progress Note    Subjective:     History of Present Illness: 78M h/o lymphoma in remission, CAD, nonrheumatic mitral regurg, CHF, first-degree AV block, aortic arthrosclerosis admitted to [facility] with SAH. Initially presented initially to outside hospital ED with complaints of bilateral neck pain that happened approximately 2 hours prior to arrival.  Reports that the pain started immediately after having a bowel movement.  He does have associated headache that radiates up to his temples. Patient is on baby ASA. Also reports some associated weakness with walking. States he woke up this morning feeling normal without any pain or weakness. Denies fever/chills, vision/hearing changes, dysphagia, dysarthria, vomiting, new-onset weakness or sensory change, bowel/bladder changes. CTH with prepontine SAH; CTA negative for vessel lesions.       Post-Op Info:  * No surgery found *       Interval History: NAEON. AFVSS. No complaints. Neuro exam stable. Pending discharge to SNF.     Medications:  Continuous Infusions:  Scheduled Meds:   atorvastatin  80 mg Oral Daily    enoxparin  40 mg Subcutaneous Q24H (prophylaxis, 1700)    famotidine  20 mg Oral BID    losartan  100 mg Oral Daily    melatonin  6 mg Oral Nightly    metoprolol succinate  50 mg Oral Daily    tamsulosin  0.4 mg Oral Daily     PRN Meds:acetaminophen, dextrose 10%, dextrose 10%, hydrALAZINE, labetalol, ondansetron     Review of Systems  Objective:     Weight: 114.8 kg (253 lb)  Body mass index is 34.31 kg/m².  Vital Signs (Most Recent):  Temp: 98.6 °F (37 °C) (11/13/23 1128)  Pulse: 100 (11/13/23 1128)  Resp: 18 (11/13/23 1128)  BP: 123/77 (11/13/23 1128)  SpO2: 97 % (11/13/23 1128) Vital Signs (24h Range):  Temp:  [97.6 °F (36.4 °C)-98.8 °F (37.1 °C)] 98.6 °F (37 °C)  Pulse:  [] 100  Resp:  [17-18] 18  SpO2:  [95 %-100 %] 97 %  BP: (122-141)/(68-84) 123/77     Date 11/13/23 0700 - 11/14/23 0659   Shift  "[id number] 24 Hour Total   INTAKE   Shift Total(mL/kg)       OUTPUT   Urine(mL/kg/hr) 350   350   Shift Total(mL/kg) 350(3)   350(3)   Weight (kg) 114.8 114.8 114.8 114.8                               Physical Exam     Neurosurgery Physical Exam    General: well developed, well nourished, no distress.   Head: normocephalic, atraumatic  Neurologic: Alert and oriented. Thought content appropriate.  GCS: Motor: 6/Verbal: 5/Eyes: 4 GCS Total: 15  Mental Status: Awake, Alert, Oriented x 4  Language: No aphasia  Speech: No dysarthria  Cranial nerves: face symmetric, tongue midline, CN II-XII grossly intact.   Eyes: pupils equal, round, reactive to light with accomodation, EOMI.   Pulmonary: normal respirations, no signs of respiratory distress  Abdomen: soft, non-distended, not tender to palpation  Sensory: intact to light touch throughout  Motor Strength: Moves all extremities spontaneously with good tone.  Full strength upper and lower extremities. No abnormal movements seen.   Pronator Drift: no drift noted  Finger-to-nose: Intact bilaterally  Skin: Skin is warm, dry and intact.    Significant Labs:  No results for input(s): "GLU", "NA", "K", "CL", "CO2", "BUN", "CREATININE", "CALCIUM", "MG" in the last 48 hours.  No results for input(s): "WBC", "HGB", "HCT", "PLT" in the last 48 hours.  No results for input(s): "LABPT", "INR", "APTT" in the last 48 hours.  Microbiology Results (last 7 days)       ** No results found for the last 168 hours. **          Recent Lab Results       None          All pertinent labs from the last 24 hours have been reviewed.    Significant Diagnostics:  I have reviewed all pertinent imaging results/findings within the past 24 hours.  Assessment/Plan:     * SAH (subarachnoid hemorrhage)  78M h/o lymphoma in remission, CAD, nonrheumatic mitral regurg, CHF, first-degree AV block, aortic arthrosclerosis admitted to [facility] with SAH. CTH with prepontine SAH; CTA negative for vessel " lesions.     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     Neuro:  --Stepped down to nsgy service; q4 nc/vs  --CTA 11/4: negative for vascular pathology  --MRI/MRA Brain 11/5: negative for underlying lesion  --DSA 11/5 negative  --CTA 11/11: no evidence of vasospasm  --HOB@30  --Keppra 500 BID x7d (completed)  --TCDs daily x14d (discontinued 11/10 per [facility] given low suspicion)  --Nimotop 60q4 x 21d (discontinued 11/9 per [facility] given low suspicion)  --Continue to follow clinically and notify NSGY immediately if any neurostatus change    CVS:  --SBP <140 mmHg    Pulm:  --Aggressive pulm treatment     GIT/Electrolytes:  --BMP q48h      -Replete electrolytes PRN  --Na goal >135  --Regular diet  --PPI    Renal:  --Strict I/Os  --Goal of euvolemia or net +1L    Heme/ID:  --Hold therapeutic anti-plt/coag medications per NCC  --Daily CBC      -Transfuse PRN  --Trend WBC and T    PPx:  --TEDs/SCDs/LVX  --PPI    Dispo: PT/OT recs CM/SW consult for dispo planning. Pending SNF.    Plan d/w Dr. Cosby.    Hypercholesterolemia  Continue home dose statin     Primary hypertension  Continue home dose losartan, metoprolol    Benign prostatic hyperplasia with urinary frequency  Continue home dose tamsulosin         Lizeth Martinez PA-C  Neurosurgery  Elmo Alfaro - Neurosurgery ([facility])

## 2023-11-13 NOTE — ASSESSMENT & PLAN NOTE
78M h/o lymphoma in remission, CAD, nonrheumatic mitral regurg, CHF, first-degree AV block, aortic arthrosclerosis admitted to [facility] with SAH. CTH with prepontine SAH; CTA negative for vessel lesions.     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     Neuro:  --Stepped down to nsgy service; q4 nc/vs  --CTA 11/4: negative for vascular pathology  --MRI/MRA Brain 11/5: negative for underlying lesion  --DSA 11/5 negative  --CTA 11/11: no evidence of vasospasm  --HOB@30  --Keppra 500 BID x7d (completed)  --TCDs daily x14d (discontinued 11/10 per [facility] given low suspicion)  --Nimotop 60q4 x 21d (discontinued 11/9 per [facility] given low suspicion)  --Continue to follow clinically and notify NSGY immediately if any neurostatus change    CVS:  --SBP <140 mmHg    Pulm:  --Aggressive pulm treatment     GIT/Electrolytes:  --BMP q48h      -Replete electrolytes PRN  --Na goal >135  --Regular diet  --PPI    Renal:  --Strict I/Os  --Goal of euvolemia or net +1L    Heme/ID:  --Hold therapeutic anti-plt/coag medications per [facility]  --Daily CBC      -Transfuse PRN  --Trend WBC and T    PPx:  --TEDs/SCDs/LVX  --PPI    Dispo: PT/OT recs CM/SW consult for dispo planning. Pending SNF.    Plan d/w Dr. Cosby.

## 2023-11-14 DIAGNOSIS — K59.01 SLOW TRANSIT CONSTIPATION: ICD-10-CM

## 2023-11-14 PROBLEM — E87.8 ELECTROLYTE ABNORMALITY: Status: ACTIVE | Noted: 2023-11-14

## 2023-11-14 PROBLEM — I26.99 ACUTE PULMONARY EMBOLISM: Status: ACTIVE | Noted: 2023-11-14

## 2023-11-14 LAB
ANION GAP SERPL CALC-SCNC: 9 MMOL/L (ref 8–16)
APTT PPP: 24 SEC (ref 21–32)
BASOPHILS # BLD AUTO: 0.04 K/UL (ref 0–0.2)
BASOPHILS NFR BLD: 0.6 % (ref 0–1.9)
BNP SERPL-MCNC: 63 PG/ML (ref 0–99)
BUN SERPL-MCNC: 18 MG/DL (ref 8–23)
CALCIUM SERPL-MCNC: 8.8 MG/DL (ref 8.7–10.5)
CHLORIDE SERPL-SCNC: 103 MMOL/L (ref 95–110)
CO2 SERPL-SCNC: 25 MMOL/L (ref 23–29)
CREAT SERPL-MCNC: 0.8 MG/DL (ref 0.5–1.4)
D DIMER PPP IA.FEU-MCNC: >33 MG/L FEU
DIFFERENTIAL METHOD: ABNORMAL
EOSINOPHIL # BLD AUTO: 0.1 K/UL (ref 0–0.5)
EOSINOPHIL NFR BLD: 1.8 % (ref 0–8)
ERYTHROCYTE [DISTWIDTH] IN BLOOD BY AUTOMATED COUNT: 11.9 % (ref 11.5–14.5)
EST. GFR  (NO RACE VARIABLE): >60 ML/MIN/1.73 M^2
GLUCOSE SERPL-MCNC: 118 MG/DL (ref 70–110)
HCT VFR BLD AUTO: 43.8 % (ref 40–54)
HGB BLD-MCNC: 14.1 G/DL (ref 14–18)
IMM GRANULOCYTES # BLD AUTO: 0.03 K/UL (ref 0–0.04)
IMM GRANULOCYTES NFR BLD AUTO: 0.5 % (ref 0–0.5)
INR PPP: 1 (ref 0.8–1.2)
LYMPHOCYTES # BLD AUTO: 1.4 K/UL (ref 1–4.8)
LYMPHOCYTES NFR BLD: 22.7 % (ref 18–48)
MCH RBC QN AUTO: 32.8 PG (ref 27–31)
MCHC RBC AUTO-ENTMCNC: 32.2 G/DL (ref 32–36)
MCV RBC AUTO: 102 FL (ref 82–98)
MONOCYTES # BLD AUTO: 0.5 K/UL (ref 0.3–1)
MONOCYTES NFR BLD: 8.8 % (ref 4–15)
NEUTROPHILS # BLD AUTO: 4 K/UL (ref 1.8–7.7)
NEUTROPHILS NFR BLD: 65.6 % (ref 38–73)
NRBC BLD-RTO: 0 /100 WBC
PLATELET # BLD AUTO: 166 K/UL (ref 150–450)
PMV BLD AUTO: 10 FL (ref 9.2–12.9)
POCT GLUCOSE: 106 MG/DL (ref 70–110)
POTASSIUM SERPL-SCNC: 3.2 MMOL/L (ref 3.5–5.1)
PROTHROMBIN TIME: 11.2 SEC (ref 9–12.5)
RBC # BLD AUTO: 4.3 M/UL (ref 4.6–6.2)
SODIUM SERPL-SCNC: 137 MMOL/L (ref 136–145)
TROPONIN I SERPL DL<=0.01 NG/ML-MCNC: 0.03 NG/ML (ref 0–0.03)
WBC # BLD AUTO: 6.16 K/UL (ref 3.9–12.7)

## 2023-11-14 PROCEDURE — 85730 THROMBOPLASTIN TIME PARTIAL: CPT | Performed by: PHYSICIAN ASSISTANT

## 2023-11-14 PROCEDURE — 36415 COLL VENOUS BLD VENIPUNCTURE: CPT | Performed by: PHYSICIAN ASSISTANT

## 2023-11-14 PROCEDURE — 93005 ELECTROCARDIOGRAM TRACING: CPT

## 2023-11-14 PROCEDURE — 80048 BASIC METABOLIC PNL TOTAL CA: CPT | Performed by: PHYSICIAN ASSISTANT

## 2023-11-14 PROCEDURE — 25000003 PHARM REV CODE 250: Performed by: PSYCHIATRY & NEUROLOGY

## 2023-11-14 PROCEDURE — 93010 ELECTROCARDIOGRAM REPORT: CPT | Mod: ,,, | Performed by: INTERNAL MEDICINE

## 2023-11-14 PROCEDURE — 25000003 PHARM REV CODE 250: Performed by: PHYSICIAN ASSISTANT

## 2023-11-14 PROCEDURE — 85610 PROTHROMBIN TIME: CPT | Performed by: PHYSICIAN ASSISTANT

## 2023-11-14 PROCEDURE — 84484 ASSAY OF TROPONIN QUANT: CPT | Performed by: PHYSICIAN ASSISTANT

## 2023-11-14 PROCEDURE — 25500020 PHARM REV CODE 255: Performed by: NEUROLOGICAL SURGERY

## 2023-11-14 PROCEDURE — 97530 THERAPEUTIC ACTIVITIES: CPT

## 2023-11-14 PROCEDURE — 63600175 PHARM REV CODE 636 W HCPCS: Performed by: PHYSICIAN ASSISTANT

## 2023-11-14 PROCEDURE — 27000221 HC OXYGEN, UP TO 24 HOURS

## 2023-11-14 PROCEDURE — 97112 NEUROMUSCULAR REEDUCATION: CPT

## 2023-11-14 PROCEDURE — 94761 N-INVAS EAR/PLS OXIMETRY MLT: CPT

## 2023-11-14 PROCEDURE — 85025 COMPLETE CBC W/AUTO DIFF WBC: CPT | Performed by: PHYSICIAN ASSISTANT

## 2023-11-14 PROCEDURE — 11000001 HC ACUTE MED/SURG PRIVATE ROOM

## 2023-11-14 PROCEDURE — 99900035 HC TECH TIME PER 15 MIN (STAT)

## 2023-11-14 PROCEDURE — 25000003 PHARM REV CODE 250: Performed by: NURSE PRACTITIONER

## 2023-11-14 PROCEDURE — 97535 SELF CARE MNGMENT TRAINING: CPT

## 2023-11-14 PROCEDURE — 93010 EKG 12-LEAD: ICD-10-PCS | Mod: ,,, | Performed by: INTERNAL MEDICINE

## 2023-11-14 PROCEDURE — 99233 PR SUBSEQUENT HOSPITAL CARE,LEVL III: ICD-10-PCS | Mod: ,,, | Performed by: PHYSICIAN ASSISTANT

## 2023-11-14 PROCEDURE — 99233 SBSQ HOSP IP/OBS HIGH 50: CPT | Mod: ,,, | Performed by: PHYSICIAN ASSISTANT

## 2023-11-14 PROCEDURE — 83880 ASSAY OF NATRIURETIC PEPTIDE: CPT | Performed by: PHYSICIAN ASSISTANT

## 2023-11-14 PROCEDURE — 85379 FIBRIN DEGRADATION QUANT: CPT | Performed by: PHYSICIAN ASSISTANT

## 2023-11-14 PROCEDURE — 25000003 PHARM REV CODE 250

## 2023-11-14 RX ORDER — HEPARIN SODIUM,PORCINE/D5W 25000/250
0-40 INTRAVENOUS SOLUTION INTRAVENOUS CONTINUOUS
Status: DISPENSED | OUTPATIENT
Start: 2023-11-14 | End: 2023-11-15

## 2023-11-14 RX ORDER — POTASSIUM CHLORIDE 7.45 MG/ML
10 INJECTION INTRAVENOUS
Status: DISCONTINUED | OUTPATIENT
Start: 2023-11-14 | End: 2023-11-14

## 2023-11-14 RX ADMIN — Medication 6 MG: at 09:11

## 2023-11-14 RX ADMIN — POTASSIUM BICARBONATE 25 MEQ: 978 TABLET, EFFERVESCENT ORAL at 02:11

## 2023-11-14 RX ADMIN — POTASSIUM CHLORIDE 10 MEQ: 7.46 INJECTION, SOLUTION INTRAVENOUS at 12:11

## 2023-11-14 RX ADMIN — SODIUM CHLORIDE 500 ML: 9 INJECTION, SOLUTION INTRAVENOUS at 12:11

## 2023-11-14 RX ADMIN — ATORVASTATIN CALCIUM 80 MG: 40 TABLET, FILM COATED ORAL at 10:11

## 2023-11-14 RX ADMIN — TAMSULOSIN HYDROCHLORIDE 0.4 MG: 0.4 CAPSULE ORAL at 10:11

## 2023-11-14 RX ADMIN — FAMOTIDINE 20 MG: 20 TABLET ORAL at 10:11

## 2023-11-14 RX ADMIN — POTASSIUM BICARBONATE 25 MEQ: 978 TABLET, EFFERVESCENT ORAL at 05:11

## 2023-11-14 RX ADMIN — METOPROLOL SUCCINATE 50 MG: 50 TABLET, EXTENDED RELEASE ORAL at 10:11

## 2023-11-14 RX ADMIN — HEPARIN SODIUM AND DEXTROSE 18 UNITS/KG/HR: 10000; 5 INJECTION INTRAVENOUS at 04:11

## 2023-11-14 RX ADMIN — IOHEXOL 100 ML: 350 INJECTION, SOLUTION INTRAVENOUS at 11:11

## 2023-11-14 RX ADMIN — FAMOTIDINE 20 MG: 20 TABLET ORAL at 09:11

## 2023-11-14 RX ADMIN — LOSARTAN POTASSIUM 100 MG: 50 TABLET, FILM COATED ORAL at 10:11

## 2023-11-14 NOTE — CODE/ RAPID DOCUMENTATION
RAPID RESPONSE NURSE NOTE        Admit Date: 2023  LOS: 10  Code Status: Full Code   Date of Consult: 2023  : 1945  Age: 78 y.o.  Weight:   Wt Readings from Last 1 Encounters:   23 114.8 kg (253 lb)     Sex: male  Race: Black or    Bed: [address] [place] [address] A:   MRN: [id number]  Time Rapid Response Team page Received: 0936  Time Rapid Response Team at Bedside: 0937  Time Rapid Response Team left Bedside: 1020  Was the patient discharged from an ICU this admission? Yes   Was the patient discharged from a PACU within last 24 hours? No   Did the patient receive conscious sedation/general anesthesia in last 24 hours? No  Was the patient in the ED within the past 24 hours? No  Was the patient on NIPPV within the past 24 hours? No   Did this progress into an ARC or CPA: No  Attending Physician: Guille Csoby MD  Primary Service: Neurosurgery       SITUATION    Notified by pager.  Reason for alert: syncopal episode   Called to evaluate the patient for Circulatory    BACKGROUND     Why is the patient in the hospital?: SAH (subarachnoid hemorrhage)    Patient has a past medical history of Abnormal echocardiogram, CAD (coronary artery disease), Hypercholesterolemia, Lymphoma in remission, Normal cardiac stress test, Pernicious anemia, and Thyroid nodule.    Last Vitals:  Temp: 98.6 °F (37 °C) ( 043)  Pulse: 113 (956)  Resp: 20 (956)  BP: 111/71 (956)  SpO2: 96 % (956)    24 Hours Vitals Range:  Temp:  [98.1 °F (36.7 °C)-98.7 °F (37.1 °C)]   Pulse:  []   Resp:  [16-22]   BP: (111-142)/(67-90)   SpO2:  [96 %-100 %]     Labs:  Recent Labs     23  09   WBC 6.16   HGB 14.1   HCT 43.8          Recent Labs     23  09      K 3.2*      CO2 25   BUN 18   CREATININE 0.8   *        ASSESSMENT    Physical Exam  Vitals and nursing note reviewed.   Constitutional:       General: He is awake. He is not in acute distress.      Appearance: He is diaphoretic.      Comments: Patient was up working with PT when he became dizzy and briefly unresponsive to staff. VS WNL after episode. On arrival to bedside, patient awake and diaphoretic. Endorsed feeling dizzy/light-headed    Cardiovascular:      Rate and Rhythm: Tachycardia present.      Comments: Airstrip reviewed- appeared to get tachycardic to 130s then back to NSR c 1AVB HR 80s during episode. BP stable.   Pulmonary:      Effort: Pulmonary effort is normal.      Comments: SpO2 96% on 2L NC  Skin:     Comments: Diaphoretic    Neurological:      General: No focal deficit present.      Mental Status: He is alert. Mental status is at baseline.      GCS: GCS eye subscore is 4. GCS verbal subscore is 5. GCS motor subscore is 6.       INTERVENTIONS    The patient was seen for Cardiac problem. Staff concerns included syncopal episode. The following interventions were performed: BMP, CBC, Troponin, EKG, and continuous cardiac monitoring.    RECOMMENDATIONS    We recommend: check labs, check EKG, continuous cardiac monitoring     PROVIDER ESCALATION    Orders received and case discussed with  DUSTIN Brasher.    Primary team arrival time: 0945    Disposition: Remain in room 910.    FOLLOW UP    Bedside Nancie RIVERO  updated on plan of care. Instructed to call the Rapid Response Nurse, Lorraine Grayson RN at [phone] for additional questions or concerns.

## 2023-11-14 NOTE — SUBJECTIVE & OBJECTIVE
Interval History: Pt became acutely dizzy while working with PT this am, briefly unresponsive to staff then recovered. Became tachycardic/diaphoretic following episode. Neurologically intact on exam. CTA head/neck revealed b/l PE. Vitals stable. Starting on heparin gtt, Echo ordered.     Medications:  Continuous Infusions:   heparin (porcine) in D5W       Scheduled Meds:   atorvastatin  80 mg Oral Daily    famotidine  20 mg Oral BID    heparin (PORCINE)  80 Units/kg (Adjusted) Intravenous Once    losartan  100 mg Oral Daily    melatonin  6 mg Oral Nightly    metoprolol succinate  50 mg Oral Daily    potassium chloride  10 mEq Intravenous Q1H    sodium chloride 0.9%  500 mL Intravenous Once    tamsulosin  0.4 mg Oral Daily     PRN Meds:acetaminophen, dextrose 10%, dextrose 10%, heparin (PORCINE), heparin (PORCINE), hydrALAZINE, labetalol, ondansetron     Review of Systems  Objective:     Weight: 114.8 kg (253 lb)  Body mass index is 34.31 kg/m².  Vital Signs (Most Recent):  Temp: 97.9 °F (36.6 °C) (11/14/23 1235)  Pulse: (!) 112 (11/14/23 1235)  Resp: 18 (11/14/23 1235)  BP: 126/78 (11/14/23 1235)  SpO2: 99 % (11/14/23 1235) Vital Signs (24h Range):  Temp:  [97.9 °F (36.6 °C)-98.7 °F (37.1 °C)] 97.9 °F (36.6 °C)  Pulse:  [] 112  Resp:  [16-22] 18  SpO2:  [96 %-100 %] 99 %  BP: (111-142)/(67-90) 126/78                                 Physical Exam         Neurosurgery Physical Exam    General: well developed, well nourished, no distress.   Head: normocephalic, atraumatic  Neurologic: Alert and oriented. Thought content appropriate.  GCS: Motor: 6/Verbal: 5/Eyes: 4 GCS Total: 15  Mental Status: Awake, Alert, Oriented x 4  Language: No aphasia  Speech: No dysarthria  Cranial nerves: face symmetric, tongue midline, CN II-XII grossly intact.   Eyes: pupils equal, round, reactive to light with accomodation, EOMI.   Pulmonary: normal respirations, no signs of respiratory distress  Abdomen: soft, non-distended, not  "tender to palpation  Sensory: intact to light touch throughout  Motor Strength: Moves all extremities spontaneously with good tone. Grossly full strength upper and lower extremities. No abnormal movements seen.   Pronator Drift: no drift noted  Finger-to-nose: Intact bilaterally  Skin: Skin is warm, intact. Diaphoretic.    Significant Labs:  Recent Labs   Lab 11/14/23  0902   *      K 3.2*      CO2 25   BUN 18   CREATININE 0.8   CALCIUM 8.8     Recent Labs   Lab 11/14/23  0902   WBC 6.16   HGB 14.1   HCT 43.8        No results for input(s): "LABPT", "INR", "APTT" in the last 48 hours.  Microbiology Results (last 7 days)       ** No results found for the last 168 hours. **          All pertinent labs from the last 24 hours have been reviewed.    Significant Diagnostics:  CTA Head and Neck 11/14/2023:    Impression:     Limited evaluation of the upper chest shows bilateral pulmonary thromboembolism.     CT head shows no acute major vascular territory infarct or new intracranial hemorrhage.  Continued interval decrease in patient's known subarachnoid and intraventricular hemorrhage.     CTA head and neck shows no high-grade stenosis, large vessel occlusion or aneurysm.  No findings to indicate significant vasospasm.     Findings were discussed with the Ashley Brasher PA-C at 12:22 on 11/14/2013 by Dr. Qureshi.        Electronically signed by: Pool Qureshi MD  Date:                                            11/14/2023  Time:                                           12:45    I have reviewed and interpreted all pertinent imaging results/findings within the past 24 hours.  "

## 2023-11-14 NOTE — ASSESSMENT & PLAN NOTE
78M h/o lymphoma in remission, CAD, nonrheumatic mitral regurg, CHF, first-degree AV block, aortic arthrosclerosis who was admitted to [facility] with spontaneous SAH. CTH with prepontine SAH; CTA negative for vessel lesions.     Imaging personally reviewed:  --CTA 11/4: negative for vascular pathology  --MRI/MRA Brain 11/5: negative for underlying lesion  --DSA 11/5: negative for vascular pathology  --CTA 11/11: no evidence of vasospasm  --CTA head/neck 11/14: Stable intracranial findings with interval decrease in SAH/IVH, no evidence of vasospasm. Bilateral pulmonary emboli visualized in the upper chest.     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    Neuro:  --Stepped down to nsgy service; q4 nc/vs  --HOB@30  --Keppra 500 BID x7d (completed)  --TCDs daily x14d (discontinued 11/10 per [facility] given low suspicion)  --Nimotop 60q4 x 21d (discontinued 11/9 per [facility] given low suspicion)  --Goal normotensive, SBP < 160  --Goal eunatremic  --Monitor I/O's, goal net even to +1L  --Regular diet as tolerated  --Continue to follow clinically and notify NSGY immediately if any neurostatus change    PPx:  --TEDs/SCDs/LVX    Dispo: PT/OT recs CM/SW consult for dispo planning. Plan for SNF placement once medically ready.    Plan d/w Dr. Cosby.

## 2023-11-14 NOTE — PROGRESS NOTES
Elmo Alfaro - Neurosurgery ([facility])  Neurosurgery  Progress Note    Subjective:     History of Present Illness: 78M h/o lymphoma in remission, CAD, nonrheumatic mitral regurg, CHF, first-degree AV block, aortic arthrosclerosis admitted to [facility] with SAH. Initially presented initially to outside hospital ED with complaints of bilateral neck pain that happened approximately 2 hours prior to arrival.  Reports that the pain started immediately after having a bowel movement.  He does have associated headache that radiates up to his temples. Patient is on baby ASA. Also reports some associated weakness with walking. States he woke up this morning feeling normal without any pain or weakness. Denies fever/chills, vision/hearing changes, dysphagia, dysarthria, vomiting, new-onset weakness or sensory change, bowel/bladder changes. CTH with prepontine SAH; CTA negative for vessel lesions.       Post-Op Info:  * No surgery found *       Interval History: Pt became acutely dizzy while working with PT this am, briefly unresponsive to staff then recovered. Became tachycardic/diaphoretic following episode. Neurologically intact on exam. CTA head/neck revealed b/l PE. Vitals stable. Starting on heparin gtt, Echo ordered.     Medications:  Continuous Infusions:   heparin (porcine) in D5W       Scheduled Meds:   atorvastatin  80 mg Oral Daily    famotidine  20 mg Oral BID    heparin (PORCINE)  80 Units/kg (Adjusted) Intravenous Once    losartan  100 mg Oral Daily    melatonin  6 mg Oral Nightly    metoprolol succinate  50 mg Oral Daily    potassium chloride  10 mEq Intravenous Q1H    sodium chloride 0.9%  500 mL Intravenous Once    tamsulosin  0.4 mg Oral Daily     PRN Meds:acetaminophen, dextrose 10%, dextrose 10%, heparin (PORCINE), heparin (PORCINE), hydrALAZINE, labetalol, ondansetron     Review of Systems  Objective:     Weight: 114.8 kg (253 lb)  Body mass index is 34.31 kg/m².  Vital Signs (Most Recent):  Temp: 97.9 °F (36.6 °C)  "(11/14/23 1235)  Pulse: (!) 112 (11/14/23 1235)  Resp: 18 (11/14/23 1235)  BP: 126/78 (11/14/23 1235)  SpO2: 99 % (11/14/23 1235) Vital Signs (24h Range):  Temp:  [97.9 °F (36.6 °C)-98.7 °F (37.1 °C)] 97.9 °F (36.6 °C)  Pulse:  [] 112  Resp:  [16-22] 18  SpO2:  [96 %-100 %] 99 %  BP: (111-142)/(67-90) 126/78                                 Physical Exam         Neurosurgery Physical Exam    General: well developed, well nourished, no distress.   Head: normocephalic, atraumatic  Neurologic: Alert and oriented. Thought content appropriate.  GCS: Motor: 6/Verbal: 5/Eyes: 4 GCS Total: 15  Mental Status: Awake, Alert, Oriented x 4  Language: No aphasia  Speech: No dysarthria  Cranial nerves: face symmetric, tongue midline, CN II-XII grossly intact.   Eyes: pupils equal, round, reactive to light with accomodation, EOMI.   Pulmonary: normal respirations, no signs of respiratory distress  Abdomen: soft, non-distended, not tender to palpation  Sensory: intact to light touch throughout  Motor Strength: Moves all extremities spontaneously with good tone. Grossly full strength upper and lower extremities. No abnormal movements seen.   Pronator Drift: no drift noted  Finger-to-nose: Intact bilaterally  Skin: Skin is warm, intact. Diaphoretic.    Significant Labs:  Recent Labs   Lab 11/14/23  0902   *      K 3.2*      CO2 25   BUN 18   CREATININE 0.8   CALCIUM 8.8     Recent Labs   Lab 11/14/23  0902   WBC 6.16   HGB 14.1   HCT 43.8        No results for input(s): "LABPT", "INR", "APTT" in the last 48 hours.  Microbiology Results (last 7 days)       ** No results found for the last 168 hours. **          All pertinent labs from the last 24 hours have been reviewed.    Significant Diagnostics:  CTA Head and Neck 11/14/2023:    Impression:     Limited evaluation of the upper chest shows bilateral pulmonary thromboembolism.     CT head shows no acute major vascular territory infarct or new " intracranial hemorrhage.  Continued interval decrease in patient's known subarachnoid and intraventricular hemorrhage.     CTA head and neck shows no high-grade stenosis, large vessel occlusion or aneurysm.  No findings to indicate significant vasospasm.     Findings were discussed with the Ashley Brasher PA-C at 12:22 on 11/14/2013 by Dr. Qureshi.        Electronically signed by: Pool Qureshi MD  Date:                                            11/14/2023  Time:                                           12:45    I have reviewed and interpreted all pertinent imaging results/findings within the past 24 hours.  Assessment/Plan:     * SAH (subarachnoid hemorrhage)  78M h/o lymphoma in remission, CAD, nonrheumatic mitral regurg, CHF, first-degree AV block, aortic arthrosclerosis who was admitted to [facility] with spontaneous SAH. CTH with prepontine SAH; CTA negative for vessel lesions.     Imaging personally reviewed:  --CTA 11/4: negative for vascular pathology  --MRI/MRA Brain 11/5: negative for underlying lesion  --DSA 11/5: negative for vascular pathology  --CTA 11/11: no evidence of vasospasm  --CTA head/neck 11/14: Stable intracranial findings with interval decrease in SAH/IVH, no evidence of vasospasm. Bilateral pulmonary emboli visualized in the upper chest.     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    Neuro:  --Stepped down to nsgy service; q4 nc/vs  --HOB@30  --Keppra 500 BID x7d (completed)  --TCDs daily x14d (discontinued 11/10 per [facility] given low suspicion)  --Nimotop 60q4 x 21d (discontinued 11/9 per [facility] given low suspicion)  --Goal normotensive, SBP < 160  --Goal eunatremic  --Monitor I/O's, goal net even to +1L  --Regular diet as tolerated  --Continue to follow clinically and notify NSGY immediately if any neurostatus change    PPx:  --TEDs/SCDs/LVX    Dispo: PT/OT recs CM/SW consult for dispo planning. Plan for SNF placement once medically ready.    Plan d/w   Alea.    Electrolyte abnormality  - Monitor electrolytes, replete PRN    Acute pulmonary embolism  Episode of dizziness, tachycardia and diaphoresis on 11/14.  CTA head/neck revealed bilateral pulmonary emboli.    - Begin high intensity heparin gtt  - Plan to transition to Eliquis once therapeutic   - Echo to assess for R heart strain  - Supplemental O2 as needed    Hypercholesterolemia  Continue home dose statin     Primary hypertension  Continue home dose losartan, metoprolol    Benign prostatic hyperplasia with urinary frequency  Continue home dose tamsulosin         Ashley Brasher PA-C  Neurosurgery  [facility]mo Alfaro - Neurosurgery ([facility])

## 2023-11-14 NOTE — NURSING
Call received from radiologist reporting attempting to contact neurosurgery to notify of CT results (Patient has bilateral PE's).

## 2023-11-14 NOTE — CARE UPDATE
RAPID RESPONSE NURSE FOLLOW-UP NOTE       Followed up with patient for a rapid response.  Patient diagnosed with bilateral PE today, started on heparin gtt.   VSS. Reports feeling better. No acute issues at this time.   Reviewed plan of care with bedside RNNancie .   Team will sign off.   Please call Rapid Response RN, Lorraine Grayson RN with any questions or concerns at [phone].

## 2023-11-14 NOTE — PT/OT/SLP PROGRESS
Physical Therapy Treatment    Patient Name: Sam Gamino   MRN: [id number]    Therapy tech utilized for session to maximize physical performance and safety  Recommendations:     Discharge Recommendations: Moderate Intensity Therapy  Discharge Equipment Recommendations: bedside commode, bath bench, walker, rolling  Barriers to discharge: Increased level of assist and Decreased caregiver support    Assessment:     Sam Gamino is a 78 y.o. male admitted with a medical diagnosis of SAH (subarachnoid hemorrhage). He presents with the following impairments/functional limitations: weakness, impaired endurance, impaired self care skills, impaired functional mobility, gait instability, impaired balance, impaired cognition, impaired coordination, decreased lower extremity function, decreased safety awareness, impaired cardiopulmonary response to activity. Pt with poor tolerance to therapy session on this date. Pt demonstrating poor activity tolerance and poor postural stability while seated at EOB. Pt with c/o dizziness following initial STS. Pt returned to seated at EOB and verbalized cessation of lightheadedness. Pt requiring increased assist for ambulation 2/2 significant gait deficits, decreased endurance, and poor trunk control. Approximately 2 minutes into pt's seated rest break, pt became unresponsive with right upward gaze, large pupils, and mild twitching of BLE. Assist called immediately to hallway. Pt starting wheezing and became diaphoretic. Pt placed on oxygen and then dependently transferred back to supine. Rapid response called to bedside. Pt responsive upon PT leaving room and verbalizing with therapist and team. PT left room once pt stable under care with medical team. Pt would continue to benefit from skilled acute PT in order to address current deficits and progress functional mobility.     Rehab Prognosis: Good; patient continues to benefit from acute skilled PT services to address these deficits and  "reach maximum level of function.  Recent Surgery: * No surgery found *      Plan:     During this hospitalization, patient to be seen 4 x/week to address the identified rehab impairments via gait training, therapeutic activities, therapeutic exercises, neuromuscular re-education and progress toward the following goals:    Plan of Care Expires:  11/26/23    Subjective     Chief Complaint: None verbalized  Patient/Family Comments/Goals: "You are a sweet girl. I will get up and walk with you."  Pain/Comfort:  Pain Rating 1: 0/10    Objective:     Communicated with RN prior to session. Patient found HOB elevated with telemetry upon PT entry to room.     General Precautions: Standard, aspiration, fall  Orthopedic Precautions: N/A  Braces: N/A    Functional Mobility:  Bed Mobility:    Supine to Sit: contact guard assistance  Sit to Supine: dependence of 2 persons for LE management and trunk management  Transfers:    Sit to Stand: x2 reps from EOB; minimum assistance with rolling walker with cues for hand placement and foot placement  Verbal cues for upright posture, increased glute activation, and increased anterior weight shift  Bed to Chair: dependence of 2 persons with no AD using Stand Pivot  Gait: Patient ambulated 36' with rolling walker and moderate assistance.   Patient demonstrates unsteady gait, decreased step length, wide base of support, decreased weight shift, decreased foot clearance, ambulates outside ADRIAN of RW, flexed posture, decreased cash, inconsistent bilateral foot placement, and shuffle gait.   Patient required cues for upright posture, gluteal activation, sequencing, rolling walker management, safe rolling walker usage, to ambulate within ADRIAN of RW, increased step size, and increased foot clearance.  All lines remained intact throughout ambulation trial, gait belt utilized, chair follow for patient safety.  Balance:   Static Sitting: Fair, able to maintain for 5 minute(s) with minimum " assistance  Dynamic Sitting: Poor: Patient unable to accept challenge or move without loss of balance, moderate assistance  Static Standing: Fair, able to maintain for 4 minute(s) with minimum assistance  Dynamic Standing: Poor: Patient unable to accept challenge or move without loss of balance, moderate assistance    AM-PAC 6 CLICK MOBILITY  Turning over in bed (including adjusting bedclothes, sheets and blankets)?: 3  Sitting down on and standing up from a chair with arms (e.g., wheelchair, bedside commode, etc.): 3  Moving from lying on back to sitting on the side of the bed?: 3  Moving to and from a bed to a chair (including a wheelchair)?: 3  Need to walk in hospital room?: 2  Climbing 3-5 steps with a railing?: 1  Basic Mobility Total Score: 15     Therapeutic Activities and Exercises:  Patient educated on role of acute care PT and PT POC, safety while in hospital including calling nurse for mobility, and call light usage  Pt educated on the effects of bed rest and the importance of OOB activity. Pt encouraged to sit UIC majority of day as tolerated. Pt verbalized understanding.  Answered all questions within PT scope of practice and addressed functional mobility concerns.    Patient left supine with all lines intact, RN notified, and RN and rapid team present.    GOALS:   Multidisciplinary Problems       Physical Therapy Goals          Problem: Physical Therapy    Goal Priority Disciplines Outcome Goal Variances Interventions   Physical Therapy Goal     PT, PT/OT Ongoing, Progressing     Description: Goals to be met by: 23     Patient will increase functional independence with mobility by performin. Supine to sit with Tyler  2. Sit to supine with Tyler  3. Sit to stand transfer with Tyler  4. Bed to chair transfer with Modified Tyler using Rolling Walker  5. Gait  x 150 feet with Modified Tyler using Rolling Walker.                          Time Tracking:     PT  Received On: 11/14/23  PT Start Time: 0925     PT Stop Time: 0940  PT Total Time (min): 15 min     Billable Minutes: Therapeutic Activity 15      Treatment Type: Treatment  PT/PTA: PT     Number of PTA visits since last PT visit: 0     11/14/2023

## 2023-11-14 NOTE — NURSING
"Recently called to room.  Responding now answering name and squeezing hands.    Was working with therapy; ambulating and became weak, sat down; unresponsive; right gaze reported; pupils large.  O2 NC in use.    09:42 speaking with rapid team and reports became light headed.  Team assessing and pt reports"  I feel a little bit better".  Team ordering EKG.      09:44 Following commands and GUAMAN.  Will notify primary team.  "

## 2023-11-14 NOTE — NURSING
Neurosurgery MD to BS; patient reports still feeling lightheaded; patient alert now and answering questions.

## 2023-11-14 NOTE — NURSING
Dr. Brasher at  bedside informing pt has blood clots in the veins to the lungs and will start on some medicine, blood thinners.  Also ordered an Echo of heart and will be here a little bit longer and on blood thinner and when gets out of here will remain on blood thinner for  about 5 to 6 months.  Instructed to notify staff if has SOB, palpitations or chest pain.

## 2023-11-14 NOTE — NURSING
Report from rapid team nurse:  labs obtained (BNP and Troponin) and MD might be entering order for fluids.

## 2023-11-14 NOTE — ASSESSMENT & PLAN NOTE
Episode of dizziness, tachycardia and diaphoresis on 11/14.  CTA head/neck revealed bilateral pulmonary emboli.    - Begin high intensity heparin gtt  - Plan to transition to Eliquis once therapeutic   - Echo to assess for R heart strain  - Supplemental O2 as needed

## 2023-11-14 NOTE — PT/OT/SLP PROGRESS
"Occupational Therapy   Treatment    Name: Sam Gamino  MRN: [id number]  Admitting Diagnosis:  SAH (subarachnoid hemorrhage)       Recommendations:     Discharge Recommendations: Moderate Intensity Therapy  Discharge Equipment Recommendations:  bath bench, bedside commode  Barriers to discharge:   (fall risk)    Assessment:     Sam Gamino is a 78 y.o. male with a medical diagnosis of SAH (subarachnoid hemorrhage).  He presents with performance deficits affecting function are weakness, impaired self care skills, impaired functional mobility, impaired cognition, impaired balance, decreased lower extremity function, decreased safety awareness, decreased upper extremity function.     Rehab Prognosis:  Good; patient would benefit from acute skilled OT services to address these deficits and reach maximum level of function.       Plan:     Patient to be seen 4 x/week to address the above listed problems via self-care/home management, cognitive retraining, therapeutic activities, therapeutic exercises, neuromuscular re-education  Plan of Care Expires: 12/03/23  Plan of Care Reviewed with: patient    Subjective     Patient:  "We are starting off hard already."--in reference to rolling.  "Maybe rehab will be good."  "I appreciate you."   Pain/Comfort:  Pain Rating 1: 0/10  Pain Rating Post-Intervention 1: 0/10    Objective:     Communicated with: Nurse prior to session.  Patient found supine with telemetry, bed alarm upon OT entry to room.    General Precautions: Standard, aspiration, fall    Orthopedic Precautions:N/A  Braces: N/A  Respiratory Status: Room air     Occupational Performance:     Bed Mobility:    Patient completed Rolling/Turning to Left with  minimum assistance  Patient completed Rolling/Turning to Right with minimum assistance  Patient completed Supine to Sit with moderate assistance  Patient completed Sit to Supine with moderate assistance     Functional Mobility/Transfers:  Patient completed Sit <> " Stand Transfer with moderate assistance  with  no assistive device   Patient completed Bed <> Chair Transfer using Stand Pivot technique with moderate assistance with no assistive device    Activities of Daily Living:  Grooming: moderate assistance while seated EOB  Upper Body Dressing: moderate assistance while seated EOB  Lower Body Dressing: maximal assistance while seated EOB    AMPAC 6 Click ADL: 12    Treatment & Education:  Patient alert and oriented x 3; able to follow 4/4 one step commands.  Patient attentive and interactive throughout the session.  Moderate assist with postural control while seated EOB during ADLs; posterior lean.    Patient left supine with all lines intact, call button in reach, and bed alarm on    GOALS:   Multidisciplinary Problems       Occupational Therapy Goals          Problem: Occupational Therapy    Goal Priority Disciplines Outcome Interventions   Occupational Therapy Goal     OT, PT/OT Ongoing, Progressing    Description: Goals set 11/11 to be addressed for 14 days with expiration date, 11/25:  Patient will increase functional independence with ADLs by performing:    Patient will demonstrate rolling to the right with modified independence.  Not met   Patient will demonstrate rolling to the left with modified independence.   Not met  Patient will demonstrate supine -sit with modified independence.   Not met  Patient will demonstrate stand pivot transfers with modified independence.   Not met  Patient will demonstrate grooming while standing with modified independence.   Not met  Patient will demonstrate upper body dressing with modified independence while seated EOB.   Not met  Patient will demonstrate lower body dressing with modified independence while seated EOB.   Not met  Patient will demonstrate toileting with modified independence.   Not met  Patient will demonstrate bathing while seated EOB with modified independence.   Not met  Patient's family / caregiver will  demonstrate independence and safety with assisting patient with self-care skills and functional mobility.     Not met  Patient and/or patient's family will verbalize understanding of stroke prevention guidelines, personal risk factors and stroke warning signs via teachback method.  Not met                                  Time Tracking:     OT Date of Treatment: 11/14/23  OT Start Time: 0530  OT Stop Time: 0555  OT Total Time (min): 25 min    Billable Minutes:Self Care/Home Management 15  Neuromuscular Re-education 10    OT/RICHY: OT          11/14/2023

## 2023-11-14 NOTE — RESPIRATORY THERAPY
"RAPID RESPONSE RESPIRATORY THERAPY NOTE             Code Status: Full Code   : 1945  Bed: 910/910 A:   MRN: [id number]  Time page Received: 0937  Time Rapid Response RT at Bedside: 0939  Time Rapid Response RT left Bedside: 1000    SITUATION    Evaluated patient for: Rapid response due to syncope episode with loss of consciousness.    BACKGROUND    Why is the patient in the hospital?: SAH (subarachnoid hemorrhage)    Patient has a past medical history of Abnormal echocardiogram, CAD (coronary artery disease), Hypercholesterolemia, Lymphoma in remission, Normal cardiac stress test, Pernicious anemia, and Thyroid nodule.    24 Hours Vitals Range:  Temp:  [98.1 °F (36.7 °C)-98.7 °F (37.1 °C)]   Pulse:  []   Resp:  [16-22]   BP: (111-142)/(67-90)   SpO2:  [96 %-100 %]     Labs:    Recent Labs     23  0902      K 3.2*      CO2 25   BUN 18   CREATININE 0.8   *        No results for input(s): "PH", "PCO2", "PO2", "HCO3", "POCSATURATED", "BE" in the last 72 hours.    ASSESSMENT/INTERVENTIONS  Placed patient on 2L nasal cannula. No ABGs drawn. EKG was done and reviewed with care team. Patient did not have any increased work of breathing during the rapid or post. Patient checked for comfort and left without distress. No respiratory concerns at this time.    Last Vitals: Temp: 98.6 °F (37 °C) ( 0439)  Pulse: 113 ( 1019)  Resp: 20 ( 1019)  BP: 115/73 ( 1019)  SpO2: 97 % ( 1019)  Level of Consciousness: Level of Consciousness (AVPU): alert  Respiratory Effort: Respiratory Effort: Unlabored  Expansion/Accessory Muscle Usage: Expansion/Accessory Muscles/Retractions: expansion symmetric, no retractions, no use of accessory muscles  All Lung Field Breath Sounds: All Lung Fields Breath Sounds: unable to assess  O2 Device/Concentration: Nasal cannula 2L  NIPPV: No Surgical airway: No Vent: No  ETCO2 monitored: ETCO2 (mmHg): 33 mmHg  Ambu at bedside:      Active Orders "   Respiratory Care    Oxygen Continuous     Frequency: Continuous     Number of Occurrences: Until Specified     Order Questions:      Device type: Low flow      Device: Nasal Cannula (1- 5 Liters)      LPM: 1-5      Titrate O2 per Oxygen Titration Protocol: Yes      To maintain SpO2 goal of: >= 92%      Notify MD of: Inability to achieve desired SpO2; Sudden change in patient status and requires 20% increase in FiO2; Patient requires >60% FiO2       RECOMMENDATIONS  ?  We recommend: RRT Recs: Continue POC per primary team.      FOLLOW-UP    Disposition: Remain in room 910.    Please call back the Rapid Response RT, FABIENNE DANGELO, RRT at x [phone] for any questions or concerns.

## 2023-11-14 NOTE — PLAN OF CARE
PASSR and LOCET is done.      Collin Irving [facility]  Case Management  [phone]

## 2023-11-15 LAB
ANION GAP SERPL CALC-SCNC: 6 MMOL/L (ref 8–16)
APTT PPP: 63.5 SEC (ref 21–32)
APTT PPP: 66.6 SEC (ref 21–32)
APTT PPP: >150 SEC (ref 21–32)
APTT PPP: >150 SEC (ref 21–32)
ASCENDING AORTA: 3.04 CM
BASOPHILS # BLD AUTO: 0.05 K/UL (ref 0–0.2)
BASOPHILS NFR BLD: 0.7 % (ref 0–1.9)
BSA FOR ECHO PROCEDURE: 2.43 M2
BUN SERPL-MCNC: 20 MG/DL (ref 8–23)
CALCIUM SERPL-MCNC: 8.8 MG/DL (ref 8.7–10.5)
CHLORIDE SERPL-SCNC: 104 MMOL/L (ref 95–110)
CO2 SERPL-SCNC: 28 MMOL/L (ref 23–29)
CREAT SERPL-MCNC: 0.8 MG/DL (ref 0.5–1.4)
CV ECHO LV RWT: 0.37 CM
DIFFERENTIAL METHOD: ABNORMAL
DOP CALC LVOT AREA: 3.1 CM2
DOP CALC LVOT DIAMETER: 1.99 CM
DOP CALC LVOT PEAK VEL: 0.74 M/S
DOP CALC LVOT STROKE VOLUME: 32.8 CM3
DOP CALCLVOT PEAK VEL VTI: 10.55 CM
ECHO LV POSTERIOR WALL: 0.81 CM (ref 0.6–1.1)
EJECTION FRACTION: 55 %
EOSINOPHIL # BLD AUTO: 0.1 K/UL (ref 0–0.5)
EOSINOPHIL NFR BLD: 1.3 % (ref 0–8)
ERYTHROCYTE [DISTWIDTH] IN BLOOD BY AUTOMATED COUNT: 11.8 % (ref 11.5–14.5)
EST. GFR  (NO RACE VARIABLE): >60 ML/MIN/1.73 M^2
FRACTIONAL SHORTENING: 29 % (ref 28–44)
GLUCOSE SERPL-MCNC: 102 MG/DL (ref 70–110)
HCT VFR BLD AUTO: 41.2 % (ref 40–54)
HGB BLD-MCNC: 13.4 G/DL (ref 14–18)
IMM GRANULOCYTES # BLD AUTO: 0.02 K/UL (ref 0–0.04)
IMM GRANULOCYTES NFR BLD AUTO: 0.3 % (ref 0–0.5)
INTERVENTRICULAR SEPTUM: 0.83 CM (ref 0.6–1.1)
LA MAJOR: 4.56 CM
LA MINOR: 4.9 CM
LA WIDTH: 2.97 CM
LEFT ATRIUM SIZE: 3.79 CM
LEFT ATRIUM VOLUME INDEX: 19 ML/M2
LEFT ATRIUM VOLUME: 45.2 CM3
LEFT INTERNAL DIMENSION IN SYSTOLE: 3.09 CM (ref 2.1–4)
LEFT VENTRICLE DIASTOLIC VOLUME INDEX: 36.06 ML/M2
LEFT VENTRICLE DIASTOLIC VOLUME: 85.82 ML
LEFT VENTRICLE MASS INDEX: 47 G/M2
LEFT VENTRICLE SYSTOLIC VOLUME INDEX: 15.8 ML/M2
LEFT VENTRICLE SYSTOLIC VOLUME: 37.71 ML
LEFT VENTRICULAR INTERNAL DIMENSION IN DIASTOLE: 4.36 CM (ref 3.5–6)
LEFT VENTRICULAR MASS: 111.35 G
LYMPHOCYTES # BLD AUTO: 1.4 K/UL (ref 1–4.8)
LYMPHOCYTES NFR BLD: 19.6 % (ref 18–48)
MCH RBC QN AUTO: 33.1 PG (ref 27–31)
MCHC RBC AUTO-ENTMCNC: 32.5 G/DL (ref 32–36)
MCV RBC AUTO: 102 FL (ref 82–98)
MONOCYTES # BLD AUTO: 0.6 K/UL (ref 0.3–1)
MONOCYTES NFR BLD: 8.8 % (ref 4–15)
NEUTROPHILS # BLD AUTO: 5 K/UL (ref 1.8–7.7)
NEUTROPHILS NFR BLD: 69.3 % (ref 38–73)
NRBC BLD-RTO: 0 /100 WBC
PLATELET # BLD AUTO: 149 K/UL (ref 150–450)
PMV BLD AUTO: 10.6 FL (ref 9.2–12.9)
POTASSIUM SERPL-SCNC: 3.5 MMOL/L (ref 3.5–5.1)
RA MAJOR: 4.13 CM
RA WIDTH: 2.75 CM
RBC # BLD AUTO: 4.05 M/UL (ref 4.6–6.2)
RIGHT VENTRICULAR END-DIASTOLIC DIMENSION: 2.52 CM
SINUS: 3.25 CM
SODIUM SERPL-SCNC: 138 MMOL/L (ref 136–145)
STJ: 3.24 CM
TDI LATERAL: 0.24 M/S
TDI SEPTAL: 0.15 M/S
TDI: 0.2 M/S
TRICUSPID ANNULAR PLANE SYSTOLIC EXCURSION: 2.02 CM
WBC # BLD AUTO: 7.16 K/UL (ref 3.9–12.7)
Z-SCORE OF LEFT VENTRICULAR DIMENSION IN END DIASTOLE: -8.76
Z-SCORE OF LEFT VENTRICULAR DIMENSION IN END SYSTOLE: -5.6

## 2023-11-15 PROCEDURE — 36415 COLL VENOUS BLD VENIPUNCTURE: CPT | Performed by: PHYSICIAN ASSISTANT

## 2023-11-15 PROCEDURE — 36415 COLL VENOUS BLD VENIPUNCTURE: CPT | Performed by: NEUROLOGICAL SURGERY

## 2023-11-15 PROCEDURE — 25000003 PHARM REV CODE 250: Performed by: NURSE PRACTITIONER

## 2023-11-15 PROCEDURE — 25000003 PHARM REV CODE 250

## 2023-11-15 PROCEDURE — 99232 PR SUBSEQUENT HOSPITAL CARE,LEVL II: ICD-10-PCS | Mod: ,,, | Performed by: PHYSICIAN ASSISTANT

## 2023-11-15 PROCEDURE — 25000003 PHARM REV CODE 250: Performed by: PHYSICIAN ASSISTANT

## 2023-11-15 PROCEDURE — 80048 BASIC METABOLIC PNL TOTAL CA: CPT | Performed by: PHYSICIAN ASSISTANT

## 2023-11-15 PROCEDURE — 85025 COMPLETE CBC W/AUTO DIFF WBC: CPT | Performed by: PHYSICIAN ASSISTANT

## 2023-11-15 PROCEDURE — 63600175 PHARM REV CODE 636 W HCPCS: Performed by: PHYSICIAN ASSISTANT

## 2023-11-15 PROCEDURE — 85730 THROMBOPLASTIN TIME PARTIAL: CPT | Mod: 91 | Performed by: NEUROLOGICAL SURGERY

## 2023-11-15 PROCEDURE — 25500020 PHARM REV CODE 255: Performed by: NEUROLOGICAL SURGERY

## 2023-11-15 PROCEDURE — 25000003 PHARM REV CODE 250: Performed by: PSYCHIATRY & NEUROLOGY

## 2023-11-15 PROCEDURE — 85730 THROMBOPLASTIN TIME PARTIAL: CPT | Mod: 91 | Performed by: PHYSICIAN ASSISTANT

## 2023-11-15 PROCEDURE — 11000001 HC ACUTE MED/SURG PRIVATE ROOM

## 2023-11-15 PROCEDURE — 99232 SBSQ HOSP IP/OBS MODERATE 35: CPT | Mod: ,,, | Performed by: PHYSICIAN ASSISTANT

## 2023-11-15 RX ORDER — AMOXICILLIN 250 MG
1 CAPSULE ORAL 2 TIMES DAILY
Status: DISCONTINUED | OUTPATIENT
Start: 2023-11-15 | End: 2023-11-17

## 2023-11-15 RX ADMIN — ATORVASTATIN CALCIUM 80 MG: 40 TABLET, FILM COATED ORAL at 09:11

## 2023-11-15 RX ADMIN — METOPROLOL SUCCINATE 50 MG: 50 TABLET, EXTENDED RELEASE ORAL at 09:11

## 2023-11-15 RX ADMIN — HEPARIN SODIUM AND DEXTROSE 14 UNITS/KG/HR: 10000; 5 INJECTION INTRAVENOUS at 05:11

## 2023-11-15 RX ADMIN — SENNOSIDES AND DOCUSATE SODIUM 1 TABLET: 8.6; 5 TABLET ORAL at 09:11

## 2023-11-15 RX ADMIN — FAMOTIDINE 20 MG: 20 TABLET ORAL at 09:11

## 2023-11-15 RX ADMIN — Medication 6 MG: at 09:11

## 2023-11-15 RX ADMIN — LOSARTAN POTASSIUM 100 MG: 50 TABLET, FILM COATED ORAL at 09:11

## 2023-11-15 RX ADMIN — TAMSULOSIN HYDROCHLORIDE 0.4 MG: 0.4 CAPSULE ORAL at 09:11

## 2023-11-15 RX ADMIN — SENNOSIDES AND DOCUSATE SODIUM 1 TABLET: 8.6; 5 TABLET ORAL at 03:11

## 2023-11-15 RX ADMIN — APIXABAN 10 MG: 5 TABLET, FILM COATED ORAL at 09:11

## 2023-11-15 RX ADMIN — HUMAN ALBUMIN MICROSPHERES AND PERFLUTREN 0.66 MG: 10; .22 INJECTION, SOLUTION INTRAVENOUS at 09:11

## 2023-11-15 NOTE — ASSESSMENT & PLAN NOTE
Followed by cardiology as OP   - Echo from 11/5: estimated ejection fraction of 55 - 60%. There is normal diastolic function.  - Continue home BB and ARB, hold diuretics in setting of SAH and goal euvolemia during this time    - Repeat Echo completed 11/15 in setting on new dx of PE:      Left Ventricle: The left ventricle is normal in size. Normal wall thickness. regional wall motion abnormalities present. See diagram for wall motion findings. There is low normal systolic function with a visually estimated ejection fraction of 50 - 55%. Ejection fraction by visual approximation is 55%.    Right Ventricle: Normal right ventricular cavity size. Wall thickness is normal. Right ventricle wall motion  is normal. Systolic function is normal.    Aortic Valve: There is mild aortic valve sclerosis. There is mild aortic regurgitation.    Pericardium: There is a small posterior effusion mostly base and base post laterally.

## 2023-11-15 NOTE — ASSESSMENT & PLAN NOTE
78M h/o lymphoma in remission, CAD, nonrheumatic mitral regurg, CHF, first-degree AV block, aortic arthrosclerosis who was admitted to [facility] with spontaneous SAH. CTH with prepontine SAH; CTA negative for vessel lesions.     Imaging personally reviewed:  --CTA 11/4: negative for vascular pathology  --MRI/MRA Brain 11/5: negative for underlying lesion  --DSA 11/5: negative for vascular pathology  --CTA 11/11: no evidence of vasospasm  --CTA head/neck 11/14: Stable intracranial findings with interval decrease in SAH/IVH, no evidence of vasospasm. Bilateral pulmonary emboli visualized in the upper chest.     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    Neuro:  --Stepped down to nsgy service; q4 nc/vs  --HOB@30  --Keppra 500 BID x7d (completed)  --TCDs daily x14d (discontinued 11/10 per [facility] given low suspicion)  --Nimotop 60q4 x 21d (discontinued 11/9 per [facility] given low suspicion)  --Goal normotensive, SBP < 160  --Goal eunatremic  --Monitor I/O's, goal net even to +1L  --Regular diet as tolerated  --Continue to follow clinically and notify NSGY immediately if any neurostatus change    PPx:  --DVT Ppx:TEDs/anti-coagulation; no SCD's in setting of acute VTE  --Bowel Regimen: senna BID    Dispo: PT/OT recs CM/SW consult for dispo planning. Plan for SNF placement, anticipate likely medically ready 11/16    Plan d/w Dr. Cosby.

## 2023-11-15 NOTE — CARE UPDATE
RAPID RESPONSE NURSE ROUND       Rounding completed with charge RNTierra for newly diagnosed PE reports patient VSS, heparin gtt infusing as ordered. No additional concerns verbalized at this time. Instructed to call [phone] for further concerns or assistance.

## 2023-11-15 NOTE — PLAN OF CARE
Problem: Adult Inpatient Plan of Care  Goal: Plan of Care Review  Outcome: Ongoing, Progressing  Goal: Patient-Specific Goal (Individualized)  Outcome: Ongoing, Progressing  Goal: Absence of Hospital-Acquired Illness or Injury  Outcome: Ongoing, Progressing  Goal: Optimal Comfort and Wellbeing  Outcome: Ongoing, Progressing  Goal: Readiness for Transition of Care  Outcome: Ongoing, Progressing     Problem: Skin Injury Risk Increased  Goal: Skin Health and Integrity  Outcome: Ongoing, Progressing     Problem: Fall Injury Risk  Goal: Absence of Fall and Fall-Related Injury  Outcome: Ongoing, Progressing   Review POC; agrees with plan.  Heparin drip therapeutic with next Aptt 19:30.  Informed O SNF does not have a bed available and CM has reached out to other facilities.  CT Head this shift - stable

## 2023-11-15 NOTE — ASSESSMENT & PLAN NOTE
Episode of dizziness, tachycardia and diaphoresis on 11/14, RR called.  CTA head/neck revealed bilateral pulmonary emboli.    - High intensity heparin gtt started 11/14, pharmacy following  - Echo 11/15 negative for R heart strain  - CTH today  - Plan to transition to Eliquis pending stable CTH  - Supplemental O2 as needed, wean as tolerated  - Will need outpatient follow up with established Cardiologist vs Pulmonary

## 2023-11-15 NOTE — NURSING
Echo tech at bedside starting to perform ordered Echo; transporter here to bring pt to CT and will return after Echo completed.

## 2023-11-15 NOTE — PLAN OF CARE
Recommendations     1. Please modify diet to cardiac.   2. If PO intake <50%, add Boost TID.   - Please note that pt may recieve Boost VHC or Boost Glucose as substitute 2/2 issues with stock.   3. RD following.     Goals: Continue meeting % EEN/EPN by next RD f/u.  Nutrition Goal Status: new  Communication of RD Recs:  (POC)    Jaimie Aguirre RDN,LDN

## 2023-11-15 NOTE — PROGRESS NOTES
"[facility] - Neurosurgery ([facility])  Adult Nutrition  Progress Note    SUMMARY       Recommendations    1. Please modify diet to cardiac.   2. If PO intake <50%, add Boost TID.   - Please note that pt may recieve Boost VHC or Boost Glucose as substitute 2/2 issues with stock.   3. RD following.    Goals: Continue meeting % EEN/EPN by next RD f/u.  Nutrition Goal Status: new  Communication of RD Recs:  (POC)    Assessment and Plan    No nutrition dxn at this time- RD to monitor PO intake and wt trends.    Reason for Assessment    Reason For Assessment: length of stay  Diagnosis: hemorrhage  Relevant Medical History: CAD, HTN, HF  Interdisciplinary Rounds: did not attend  General Information Comments: RD visit for LOS x 11 days. Current PO intake 100% per RN documentation. No issues with n/v/d/c/chewing/swallowing. -260#; #. No s/s of malnutrition at this time. LBM 11/13.  Nutrition Discharge Planning: Cardiac diet    Nutrition Risk Screen    Nutrition Risk Screen: no indicators present    Nutrition/Diet History    Spiritual, Cultural Beliefs, Shinto Practices, Values that Affect Care: no  Food Allergies: NKFA  Factors Affecting Nutritional Intake: None identified at this time    Anthropometrics    Temp: 98.5 °F (36.9 °C)  Height: 6' 1" (185.4 cm)  Height (inches): 73 in  Weight Method: Bed Scale  Weight: 114.8 kg (253 lb 1.4 oz)  Weight (lb): 253.09 lb  Ideal Body Weight (IBW), Male: 184 lb  % Ideal Body Weight, Male (lb): 137.55 %  BMI (Calculated): 33.4  BMI Grade: 30 - 34.9- obesity - grade I    Lab/Procedures/Meds    Pertinent Labs Reviewed: reviewed  Pertinent Labs Comments: -  Pertinent Medications Reviewed: reviewed  Pertinent Medications Comments: Atorvastatin, famotidine, metoprolol succinate, senna-docusate    Estimated/Assessed Needs    Weight Used For Calorie Calculations: 114.8 kg (253 lb 1.4 oz)  Energy Calorie Requirements (kcal): 1932 kcals  Energy Need Method: [place] " (MSJ x 1.0 PAL)  Protein Requirements: 81 g (0.7 g/kg)  Weight Used For Protein Calculations: 114.8 kg (253 lb 1.4 oz)  Fluid Requirements (mL): 1 mL/kcal or fluid per MD  Estimated Fluid Requirement Method: RDA Method  RDA Method (mL): 1932    Nutrition Prescription Ordered    Current Diet Order: Regular    Evaluation of Received Nutrient/Fluid Intake    I/O: -4.5 L since admit  Energy Calories Required: meeting needs  Protein Required: meeting needs  Tolerance: tolerating  % Intake of Estimated Energy Needs: 75 - 100 %  % Meal Intake: 75 - 100 %    Nutrition Risk    Level of Risk/Frequency of Follow-up:  (1 time/week)     Monitor and Evaluation    Food and Nutrient Intake: energy intake, food and beverage intake  Food and Nutrient Adminstration: diet order  Knowledge/Beliefs/Attitudes: food and nutrition knowledge/skill, beliefs and attitudes  Physical Activity and Function: nutrition-related ADLs and IADLs  Anthropometric Measurements: height/length, weight, weight change, body mass index  Biochemical Data, Medical Tests and Procedures: gastrointestinal profile, electrolyte and renal panel, glucose/endocrine profile, inflammatory profile, lipid profile  Nutrition-Focused Physical Findings: overall appearance     Nutrition Follow-Up    RD Follow-up?: Yes    Jaimie Colon RDN,LDN

## 2023-11-15 NOTE — PLAN OF CARE
[facility]        Collin Irving [facility]  Case Management  [phone]

## 2023-11-15 NOTE — SUBJECTIVE & OBJECTIVE
Interval History: NAEON. AFVSS. Pt reports feeling much improved today. Denies any SOB/CP/palpitations, denies HA. Neurologically stable. Sats 100% on 2L. Supratherapeutic on heparin gtt. CTH today. TTE negative for R heart strain.     Medications:  Continuous Infusions:   heparin (porcine) in D5W 14 Units/kg/hr (11/15/23 0536)     Scheduled Meds:   atorvastatin  80 mg Oral Daily    famotidine  20 mg Oral BID    losartan  100 mg Oral Daily    melatonin  6 mg Oral Nightly    metoprolol succinate  50 mg Oral Daily    tamsulosin  0.4 mg Oral Daily     PRN Meds:acetaminophen, dextrose 10%, dextrose 10%, heparin (PORCINE), heparin (PORCINE), hydrALAZINE, labetalol, ondansetron     Review of Systems  Objective:     Weight: 114.8 kg (253 lb)  Body mass index is 33.38 kg/m².  Vital Signs (Most Recent):  Temp: 98.5 °F (36.9 °C) (11/15/23 0801)  Pulse: 97 (11/15/23 0801)  Resp: 17 (11/15/23 0801)  BP: (!) 110/59 (11/15/23 0801)  SpO2: 100 % (11/15/23 0801) Vital Signs (24h Range):  Temp:  [97.9 °F (36.6 °C)-99.1 °F (37.3 °C)] 98.5 °F (36.9 °C)  Pulse:  [] 97  Resp:  [16-18] 17  SpO2:  [99 %-100 %] 100 %  BP: (107-126)/(57-78) 110/59     Date 11/15/23 0700 - 11/16/23 0659   Shift 8189-7674 2686-4664 2580-8532 24 Hour Total   INTAKE   P.O. 240   240   Shift Total(mL/kg) 240(2.1)   240(2.1)   OUTPUT   Shift Total(mL/kg)       Weight (kg) 114.8 114.8 114.8 114.8                               Physical Exam         Neurosurgery Physical Exam    General: well developed, well nourished, no distress.   Head: normocephalic, atraumatic  Neurologic: Alert and oriented. Thought content appropriate.  GCS: Motor: 6/Verbal: 5/Eyes: 4 GCS Total: 15  Mental Status: Awake, Alert, Oriented x 4  Language: No aphasia  Speech: No dysarthria  Cranial nerves: face symmetric, tongue midline, CN II-XII grossly intact.   Eyes: pupils equal, round, reactive to light with accomodation, EOMI.   Pulmonary: normal respirations, no signs of respiratory  distress  Abdomen: soft, non-distended, not tender to palpation  Sensory: intact to light touch throughout  Motor Strength: Moves all extremities spontaneously with good tone. Grossly full strength upper and lower extremities. No abnormal movements seen.   Pronator Drift: no drift noted  Finger-to-nose: Intact bilaterally  Skin: Skin is warm, dry, intact.     Significant Labs:  Recent Labs   Lab 11/14/23  0902 11/15/23  0810   * 102    138   K 3.2* 3.5    104   CO2 25 28   BUN 18 20   CREATININE 0.8 0.8   CALCIUM 8.8 8.8     Recent Labs   Lab 11/14/23  0902 11/15/23  0316   WBC 6.16 7.16   HGB 14.1 13.4*   HCT 43.8 41.2    149*     Recent Labs   Lab 11/14/23  1404 11/15/23  0212 11/15/23  0316   INR 1.0  --   --    APTT 24.0 >150.0* >150.0*     Microbiology Results (last 7 days)       ** No results found for the last 168 hours. **          All pertinent labs from the last 24 hours have been reviewed.    Significant Diagnostics:  I have reviewed and interpreted all pertinent imaging results/findings within the past 24 hours.

## 2023-11-16 LAB
APTT PPP: 26.5 SEC (ref 21–32)
BASOPHILS # BLD AUTO: 0.02 K/UL (ref 0–0.2)
BASOPHILS NFR BLD: 0.3 % (ref 0–1.9)
DIFFERENTIAL METHOD: ABNORMAL
EOSINOPHIL # BLD AUTO: 0.1 K/UL (ref 0–0.5)
EOSINOPHIL NFR BLD: 1.5 % (ref 0–8)
ERYTHROCYTE [DISTWIDTH] IN BLOOD BY AUTOMATED COUNT: 11.9 % (ref 11.5–14.5)
HCT VFR BLD AUTO: 39.3 % (ref 40–54)
HGB BLD-MCNC: 13.1 G/DL (ref 14–18)
IMM GRANULOCYTES # BLD AUTO: 0.03 K/UL (ref 0–0.04)
IMM GRANULOCYTES NFR BLD AUTO: 0.4 % (ref 0–0.5)
LYMPHOCYTES # BLD AUTO: 1.4 K/UL (ref 1–4.8)
LYMPHOCYTES NFR BLD: 19.4 % (ref 18–48)
MCH RBC QN AUTO: 33.7 PG (ref 27–31)
MCHC RBC AUTO-ENTMCNC: 33.3 G/DL (ref 32–36)
MCV RBC AUTO: 101 FL (ref 82–98)
MONOCYTES # BLD AUTO: 0.7 K/UL (ref 0.3–1)
MONOCYTES NFR BLD: 9.2 % (ref 4–15)
NEUTROPHILS # BLD AUTO: 5 K/UL (ref 1.8–7.7)
NEUTROPHILS NFR BLD: 69.2 % (ref 38–73)
NRBC BLD-RTO: 0 /100 WBC
PLATELET # BLD AUTO: 144 K/UL (ref 150–450)
PMV BLD AUTO: 10.1 FL (ref 9.2–12.9)
RBC # BLD AUTO: 3.89 M/UL (ref 4.6–6.2)
SARS-COV-2 RNA RESP QL NAA+PROBE: NOT DETECTED
WBC # BLD AUTO: 7.2 K/UL (ref 3.9–12.7)

## 2023-11-16 PROCEDURE — 25000003 PHARM REV CODE 250: Performed by: PHYSICIAN ASSISTANT

## 2023-11-16 PROCEDURE — 25000003 PHARM REV CODE 250: Performed by: PSYCHIATRY & NEUROLOGY

## 2023-11-16 PROCEDURE — 27000221 HC OXYGEN, UP TO 24 HOURS

## 2023-11-16 PROCEDURE — 94761 N-INVAS EAR/PLS OXIMETRY MLT: CPT

## 2023-11-16 PROCEDURE — 36415 COLL VENOUS BLD VENIPUNCTURE: CPT | Performed by: PHYSICIAN ASSISTANT

## 2023-11-16 PROCEDURE — 97116 GAIT TRAINING THERAPY: CPT | Mod: CQ

## 2023-11-16 PROCEDURE — 11000001 HC ACUTE MED/SURG PRIVATE ROOM

## 2023-11-16 PROCEDURE — 85025 COMPLETE CBC W/AUTO DIFF WBC: CPT | Performed by: PHYSICIAN ASSISTANT

## 2023-11-16 PROCEDURE — 25000003 PHARM REV CODE 250: Performed by: NURSE PRACTITIONER

## 2023-11-16 PROCEDURE — 97530 THERAPEUTIC ACTIVITIES: CPT | Mod: CQ

## 2023-11-16 PROCEDURE — 25000003 PHARM REV CODE 250

## 2023-11-16 PROCEDURE — 87635 SARS-COV-2 COVID-19 AMP PRB: CPT | Performed by: NEUROLOGICAL SURGERY

## 2023-11-16 PROCEDURE — 99233 PR SUBSEQUENT HOSPITAL CARE,LEVL III: ICD-10-PCS | Mod: ,,, | Performed by: PHYSICIAN ASSISTANT

## 2023-11-16 PROCEDURE — 99233 SBSQ HOSP IP/OBS HIGH 50: CPT | Mod: ,,, | Performed by: PHYSICIAN ASSISTANT

## 2023-11-16 PROCEDURE — 85730 THROMBOPLASTIN TIME PARTIAL: CPT | Performed by: PHYSICIAN ASSISTANT

## 2023-11-16 RX ORDER — ACETAMINOPHEN 325 MG/1
650 TABLET ORAL EVERY 6 HOURS PRN
Status: CANCELLED | OUTPATIENT
Start: 2023-11-16

## 2023-11-16 RX ADMIN — APIXABAN 10 MG: 5 TABLET, FILM COATED ORAL at 08:11

## 2023-11-16 RX ADMIN — TAMSULOSIN HYDROCHLORIDE 0.4 MG: 0.4 CAPSULE ORAL at 08:11

## 2023-11-16 RX ADMIN — Medication 6 MG: at 09:11

## 2023-11-16 RX ADMIN — ACETAMINOPHEN 650 MG: 325 TABLET ORAL at 09:11

## 2023-11-16 RX ADMIN — ATORVASTATIN CALCIUM 80 MG: 40 TABLET, FILM COATED ORAL at 08:11

## 2023-11-16 RX ADMIN — LOSARTAN POTASSIUM 100 MG: 50 TABLET, FILM COATED ORAL at 08:11

## 2023-11-16 RX ADMIN — SENNOSIDES AND DOCUSATE SODIUM 1 TABLET: 8.6; 5 TABLET ORAL at 08:11

## 2023-11-16 RX ADMIN — FAMOTIDINE 20 MG: 20 TABLET ORAL at 09:11

## 2023-11-16 RX ADMIN — SENNOSIDES AND DOCUSATE SODIUM 1 TABLET: 8.6; 5 TABLET ORAL at 09:11

## 2023-11-16 RX ADMIN — APIXABAN 10 MG: 5 TABLET, FILM COATED ORAL at 09:11

## 2023-11-16 RX ADMIN — FAMOTIDINE 20 MG: 20 TABLET ORAL at 08:11

## 2023-11-16 RX ADMIN — METOPROLOL SUCCINATE 50 MG: 50 TABLET, EXTENDED RELEASE ORAL at 08:11

## 2023-11-16 NOTE — SUBJECTIVE & OBJECTIVE
Interval History: NAEON. VSS. Pt without new complaints today. Continues to deny SOB ro CP. Eliquis started yesterday. CTH remained stable. + BM. SNF pending     Medications:  Continuous Infusions:      Scheduled Meds:   apixaban  10 mg Oral BID    atorvastatin  80 mg Oral Daily    famotidine  20 mg Oral BID    losartan  100 mg Oral Daily    melatonin  6 mg Oral Nightly    metoprolol succinate  50 mg Oral Daily    senna-docusate 8.6-50 mg  1 tablet Oral BID    tamsulosin  0.4 mg Oral Daily     PRN Meds:acetaminophen, dextrose 10%, dextrose 10%, hydrALAZINE, labetalol, ondansetron     Review of Systems  Objective:     Weight: 114.8 kg (253 lb 1.4 oz)  Body mass index is 33.39 kg/m².  Vital Signs (Most Recent):  Temp: 98.5 °F (36.9 °C) (11/16/23 1122)  Pulse: 96 (11/16/23 1122)  Resp: 18 (11/16/23 1122)  BP: 119/71 (11/16/23 1122)  SpO2: (!) 94 % (11/16/23 1122) Vital Signs (24h Range):  Temp:  [98 °F (36.7 °C)-98.7 °F (37.1 °C)] 98.5 °F (36.9 °C)  Pulse:  [] 96  Resp:  [18] 18  SpO2:  [94 %-99 %] 94 %  BP: (116-132)/(56-72) 119/71         Neurosurgery Physical Exam    General: well developed, well nourished, no distress.   Head: normocephalic, atraumatic  Neurologic: Alert and oriented. Thought content appropriate.  GCS: Motor: 6/Verbal: 5/Eyes: 4 GCS Total: 15  Mental Status: Awake, Alert, Oriented x 4  Language: No aphasia  Speech: No dysarthria  Cranial nerves: face symmetric, tongue midline, CN II-XII grossly intact.   Eyes: pupils equal, round, reactive to light with accomodation, EOMI.   Pulmonary: normal respirations, no signs of respiratory distress  Abdomen: soft, non-distended, not tender to palpation  Sensory: intact to light touch throughout  Motor Strength: Moves all extremities spontaneously with good tone. Grossly full strength upper and lower extremities. No abnormal movements seen.   Pronator Drift: no drift noted  Finger-to-nose: Intact bilaterally  Skin: Skin is warm, dry, intact.      Significant Labs:  Recent Labs   Lab 11/15/23  0810         K 3.5      CO2 28   BUN 20   CREATININE 0.8   CALCIUM 8.8       Recent Labs   Lab 11/15/23  0316 11/16/23  0449   WBC 7.16 7.20   HGB 13.4* 13.1*   HCT 41.2 39.3*   * 144*       Recent Labs   Lab 11/14/23  1404 11/15/23  0212 11/15/23  1330 11/15/23  1915 11/16/23  0449   INR 1.0  --   --   --   --    APTT 24.0   < > 63.5* 66.6* 26.5    < > = values in this interval not displayed.       Microbiology Results (last 7 days)       ** No results found for the last 168 hours. **          All pertinent labs from the last 24 hours have been reviewed.    Significant Diagnostics:  I have reviewed and interpreted all pertinent imaging results/findings within the past 24 hours.

## 2023-11-16 NOTE — PT/OT/SLP PROGRESS
Physical Therapy Treatment    Patient Name:  Sam Gaimno   MRN:  [id number]    Recommendations:     Discharge Recommendations: Moderate Intensity Therapy  Discharge Equipment Recommendations: bedside commode, bath bench, walker, rolling  Barriers to discharge: None    Assessment:     Sam Gamino is a 78 y.o. male admitted with a medical diagnosis of SAH (subarachnoid hemorrhage).  He presents with the following impairments/functional limitations: weakness, impaired endurance, impaired self care skills, impaired functional mobility, gait instability, impaired balance, decreased lower extremity function, decreased safety awareness, impaired cardiopulmonary response to activity, decreased coordination, impaired coordination.    Pt tolerates session poorly with focus on bed mobility, transfers, and gait training. Pt regressing from prior sessions with all functional mobility. Pt demonstrates significant instability with shuffled gait and B knee flexion in all phases and with L foot drag and leftward listing. Slowed cash and poor safety awareness/insight into deficits as pt ambulates into obstacles with no attempts to avoid including once in contact with obstacles. RN and attending team MD notified (via secure chat). Discussed with staff PT and plan for PT reassessment on next therapy visit.  Pt not progressing towards goals. Pt will continue to benefit from therapy services to address impairments listed above.     Rehab Prognosis: Fair; patient would benefit from acute skilled PT services to address these deficits and reach maximum level of function.    Recent Surgery: * No surgery found *      Plan:     During this hospitalization, patient to be seen 4 x/week to address the identified rehab impairments via gait training, therapeutic activities, therapeutic exercises, neuromuscular re-education and progress toward the following goals:    Plan of Care Expires:  11/26/23    Subjective     Chief Complaint: no  c/o  Pain/Comfort:  Pain Rating 1: 0/10  Pain Rating Post-Intervention 1: 0/10      Objective:     Communicated with RN prior to session.  Patient found HOB elevated with telemetry upon PTA entry to room.     General Precautions: Standard, aspiration, fall  Orthopedic Precautions: N/A  Braces: N/A  Respiratory Status: Room air     Functional Mobility:  Bed Mobility:     Supine to Sit: minimum assistance  Transfers:     Sit to Stand:  contact guard assistance with rolling walker  Bed to Chair: minimum assistance with  rolling walker  using  Step Transfer  Gait: Pt ambulates ~14 ft and 60 ft  with RW and Moderate Assistance. Consistent L foot drag and poor awareness of his balance, obstacles in path, and general safety. RW extended away from body not allowing for safe/proper support. Slowed cash. Pt unsteady with flexed posture, shuffled gait and B knee flexion in all phases. Little to no adjustments when cued on first trial. Better response to cues on second trial. Reliant on therapist to manage RW, assist with balance, and gait sequencing. Unstable without overt LOB. Close chair follow.      AM-PAC 6 CLICK MOBILITY  Turning over in bed (including adjusting bedclothes, sheets and blankets)?: 3  Sitting down on and standing up from a chair with arms (e.g., wheelchair, bedside commode, etc.): 3  Moving from lying on back to sitting on the side of the bed?: 3  Moving to and from a bed to a chair (including a wheelchair)?: 3  Need to walk in hospital room?: 2  Climbing 3-5 steps with a railing?: 1  Basic Mobility Total Score: 15       Patient left up in chair with all lines intact, call button in reach, chair alarm on, and RN and PCT notified.    GOALS:   Multidisciplinary Problems       Physical Therapy Goals          Problem: Physical Therapy    Goal Priority Disciplines Outcome Goal Variances Interventions   Physical Therapy Goal     PT, PT/OT Ongoing, Progressing     Description: Goals to be met by: 11/26/23      Patient will increase functional independence with mobility by performin. Supine to sit with [place]  2. Sit to supine with [place]  3. Sit to stand transfer with [place]  4. Bed to chair transfer with Modified [place] using Rolling Walker  5. Gait  x 150 feet with Modified [place] using Rolling Walker.                          Time Tracking:     PT Received On: 23  PT Start Time: 858     PT Stop Time: 926  PT Total Time (min): 28 min     Billable Minutes: Gait Training 15 and Therapeutic Activity 13    Treatment Type: Treatment  PT/PTA: PTA     Number of PTA visits since last PT visit: 1     2023

## 2023-11-16 NOTE — NURSING
Nurses Note -- 4 Eyes      11/16/2023   5:11 PM      Skin assessed during: Q Shift Change      [x] No Altered Skin Integrity Present    [x]Prevention Measures Documented      [] Yes- Altered Skin Integrity Present or Discovered   [] LDA Added if Not in Epic (Describe Wound)   [] New Altered Skin Integrity was Present on Admit and Documented in LDA   [] Wound Image Taken    Wound Care Consulted? No    Attending Nurse:  Penny Subramanian RN/Staff Member:  Neetu CHADWICK RN

## 2023-11-16 NOTE — ASSESSMENT & PLAN NOTE
Episode of dizziness, tachycardia and diaphoresis on 11/14, RR called.  CTA head/neck revealed bilateral pulmonary emboli.    - High intensity heparin gtt started 11/14, pharmacy following  - Echo 11/15 negative for R heart strain  - CTH 11/15 stable  - Eliquis 10mg BID started for 7 days followed by 5mg BID x 3 months   - Supplemental O2 as needed, wean as tolerated  - Will need outpatient follow up with established Cardiologist vs Pulmonary

## 2023-11-16 NOTE — ASSESSMENT & PLAN NOTE
Followed by cardiology as OP   - Echo from 11/5: estimated ejection fraction of 55 - 60%. There is normal diastolic function.  - Continue home BB and ARB, hold diuretics in setting of SAH and goal euvolemia during this time    - Repeat Echo completed 11/15 in setting on new dx of PE:      Left Ventricle: The left ventricle is normal in size. Normal wall thickness. regional wall motion abnormalities present. See diagram for wall motion findings. There is low normal systolic function with a visually estimated ejection fraction of 50 - 55%. Ejection fraction by visual approximation is 55%.    Right Ventricle: Normal right ventricular cavity size. Wall thickness is normal. Right ventricle wall motion  is normal. Systolic function is normal.    Aortic Valve: There is mild aortic valve sclerosis. There is mild aortic regurgitation.    Pericardium: There is a small posterior effusion mostly base and base post laterally.

## 2023-11-16 NOTE — PLAN OF CARE
[facility]    FACILITY TRANSFER ORDERS      Patient Name: Sam Gamino  YOB: 1945    PCP: JAYLYN Zamora MD   PCP Address: [address] [place] [address]  PCP Phone Number: [phone]  PCP Fax: [phone]    Encounter Date: 11/17/2023    Admit to: [facility]    Vital Signs:  Routine    Diagnoses:   Active Hospital Problems    Diagnosis  POA    *SAH (subarachnoid hemorrhage) [I60.9]  Yes    Acute pulmonary embolism [I26.99]  No    Electrolyte abnormality [E87.8]  Yes    Atrioventricular block, first degree [I44.0]  Yes     11/1/2023: ECG: CT interval 320 ms.      Hypercholesterolemia [E78.00]  Yes     2002: Statin was begun.      Heart failure, diastolic, chronic [I50.32]  Yes     5/16/2022: Echo: Normal left ventricular size and systolic function. EF 55%. Moderate MR. Mild to moderate TR.   6/6/2023: Echo: Normal left ventricular size and systolic function. EF 60%. LVGLS -15%. Moderate diastolic dysfunction. Mild to moderate AR. Mild to moderate MR. Mildly enlarged ascending aorta. Small pericardial effusion.          Primary hypertension [I10]  Yes     2002: Diagnosed.         Benign prostatic hyperplasia with urinary frequency [N40.1, R35.0]  Yes    Coronary artery disease [I25.10]  Yes     Angio 3/07  Dr. Lin        Resolved Hospital Problems   No resolved problems to display.       Allergies:  Review of patient's allergies indicates:   Allergen Reactions    Lotensin [benazepril]      cough       Diet: cardiac diet    Activities: Activity as tolerated    Goals of Care Treatment Preferences:  Code Status: Full Code      Nursing: per facility protocol  Delirium precautions  Neuro checks Q shift  Skin assessment qshift      Labs: CBC and BMP Once then per facility provider    CONSULTS:    Physical Therapy to evaluate and treat. , Occupational Therapy to evaluate and treat., Speech Therapy to evaluate and treat for Language and Cognition., and   to evaluate for community resources/long-range planning.    MISCELLANEOUS CARE:  N/A      WOUND CARE ORDERS  None    Medications: Review discharge medications with patient and family and provide education.      Okay to HOLD cyanocobalamin injections, Linzess, and Vesicare while patient admitted to SNF. May resume upon discharge.    Current Discharge Medication List        START taking these medications    Details   apixaban (ELIQUIS) 5 mg Tab Take 2 tablets (10 mg total) by mouth 2 (two) times daily for 5 days, THEN 1 tablet (5 mg total) 2 (two) times daily.  Qty: 200 tablet, Refills: 0           CONTINUE these medications which have NOT CHANGED    Details   aspirin 81 MG Chew Take 81 mg by mouth once daily.      atorvastatin (LIPITOR) 80 MG tablet TAKE ONE-HALF TABLET BY MOUTH ONCE DAILY  Qty: 90 tablet, Refills: 3    Associated Diagnoses: Hypercholesterolemia      cetirizine (ZYRTEC) 10 MG tablet Take 10 mg by mouth daily as needed.      cholecalciferol, vitamin D3, (VITAMIN D3) 50 mcg (2,000 unit) Cap Take 1 capsule by mouth once daily.      cyanocobalamin 1,000 mcg/mL injection INJECT 1 ML IN THE MUSCLE EVERY 30 DAYS  Qty: 10 mL, Refills: 1    Associated Diagnoses: B12 deficiency      fluticasone propionate (FLONASE) 50 mcg/actuation nasal spray SHAKE LIQUID AND USE 2 SPRAYS(100 MCG) IN EACH NOSTRIL EVERY DAY  Qty: 16 g, Refills: 3    Associated Diagnoses: Rhinitis, unspecified type      furosemide (LASIX) 40 MG tablet Take 1 tablet (40 mg total) by mouth once daily.  Qty: 120 tablet, Refills: 3    Associated Diagnoses: Heart failure, diastolic, chronic      ibuprofen (ADVIL,MOTRIN) 800 MG tablet Take 1 tablet (800 mg total) by mouth 2 (two) times daily as needed for Pain.  Qty: 60 tablet, Refills: 2      JARDIANCE 10 mg tablet TAKE 1 TABLET DAILY  Qty: 90 tablet, Refills: 3    Associated Diagnoses: Heart failure, systolic and diastolic, chronic      linaCLOtide (LINZESS) 290 mcg Cap capsule Take 1 capsule (290  "mcg total) by mouth before breakfast.  Qty: 90 capsule, Refills: 3    Associated Diagnoses: Slow transit constipation      losartan (COZAAR) 100 MG tablet Take 1 tablet (100 mg total) by mouth once daily.  Qty: 90 tablet, Refills: 3    Associated Diagnoses: Heart failure, diastolic, chronic      metoprolol succinate (TOPROL-XL) 50 MG 24 hr tablet Take 50 mg by mouth once daily.      omeprazole 20 mg TbEC Take 1 tablet by mouth once daily.      solifenacin (VESICARE) 10 MG tablet Take 1 tablet (10 mg total) by mouth once daily.  Qty: 30 tablet, Refills: 11    Associated Diagnoses: Urinary frequency      spironolactone (ALDACTONE) 25 MG tablet Take 1 tablet (25 mg total) by mouth once daily.  Qty: 90 tablet, Refills: 3    Associated Diagnoses: Heart failure, diastolic, chronic      syringe with needle 3 mL 23 x 1" Syrg 1 Syringe by Misc.(Non-Drug; Combo Route) route every 30 days.  Qty: 50 Syringe, Refills: 11    Associated Diagnoses: B12 deficiency      tamsulosin (FLOMAX) 0.4 mg Cap Take 1 capsule (0.4 mg total) by mouth once daily.  Qty: 90 capsule, Refills: 1                Immunizations Administered as of 11/17/2023       Name Date Dose VIS Date Route Exp Date    COVID-19, MRNA, LN-S, PF (Pfizer) (Purple Cap) 1/28/2022 0.3 mL 5/10/2021 Intramuscular --    Site: Left arm     : [employer]     Lot: [id number]     Comment: Adminis     COVID-19, MRNA, LN-S, PF (Pfizer) (Purple Cap) 4/26/2021 -- -- -- --    : [employer]     Lot: [id number]     COVID-19, MRNA, LN-S, PF (Pfizer) (Purple Cap) 4/5/2021 -- -- -- --    : [employer]     Lot: [id number]                 _________________________________  Ashley Brasher PA-C  11/17/2023         "

## 2023-11-16 NOTE — ASSESSMENT & PLAN NOTE
78M h/o lymphoma in remission, CAD, nonrheumatic mitral regurg, CHF, first-degree AV block, aortic arthrosclerosis who was admitted to [facility] with spontaneous SAH. CTH with prepontine SAH; CTA negative for vessel lesions.     Imaging personally reviewed:  --CTA 11/4: negative for vascular pathology  --MRI/MRA Brain 11/5: negative for underlying lesion  --DSA 11/5: negative for vascular pathology  --CTA 11/11: no evidence of vasospasm  --CTA head/neck 11/14: Stable intracranial findings with interval decrease in SAH/IVH, no evidence of vasospasm. Bilateral pulmonary emboli visualized in the upper chest.     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    Neuro:  --Stepped down to NSGY service; q4 nc/vs  --HOB@30  --Keppra 500 BID x7d (completed)  --TCDs daily x14d (discontinued 11/10 per [facility] given low suspicion)  --Nimotop 60q4 x 21d (discontinued 11/9 per [facility] given low suspicion)  --Goal normotensive, SBP < 160  --Goal eunatremic  --Euvolemic   --Cardiac diet as tolerated  --Continue to follow clinically and notify NSGY immediately if any neurostatus change    PPx:  --DVT Ppx:TEDs/anti-coagulation; no SCD's in setting of acute VTE  --Bowel Regimen: senna BID    Dispo: PT/OT recs CM/SW consult for dispo planning. Plan for SNF placement, pt is medically ready for DC

## 2023-11-16 NOTE — PROGRESS NOTES
[facility] - Neurosurgery ([facility])  Neurosurgery  Progress Note    Subjective:     History of Present Illness: 78M h/o lymphoma in remission, CAD, nonrheumatic mitral regurg, CHF, first-degree AV block, aortic arthrosclerosis admitted to [facility] with SAH. Initially presented initially to outside hospital ED with complaints of bilateral neck pain that happened approximately 2 hours prior to arrival.  Reports that the pain started immediately after having a bowel movement.  He does have associated headache that radiates up to his temples. Patient is on baby ASA. Also reports some associated weakness with walking. States he woke up this morning feeling normal without any pain or weakness. Denies fever/chills, vision/hearing changes, dysphagia, dysarthria, vomiting, new-onset weakness or sensory change, bowel/bladder changes. CTH with prepontine SAH; CTA negative for vessel lesions.       Post-Op Info:  * No surgery found *       Interval History: NAEON. VSS. Pt without new complaints today. Continues to deny SOB ro CP. Eliquis started yesterday. CTH remained stable. + BM. SNF pending     Medications:  Continuous Infusions:      Scheduled Meds:   apixaban  10 mg Oral BID    atorvastatin  80 mg Oral Daily    famotidine  20 mg Oral BID    losartan  100 mg Oral Daily    melatonin  6 mg Oral Nightly    metoprolol succinate  50 mg Oral Daily    senna-docusate 8.6-50 mg  1 tablet Oral BID    tamsulosin  0.4 mg Oral Daily     PRN Meds:acetaminophen, dextrose 10%, dextrose 10%, hydrALAZINE, labetalol, ondansetron     Review of Systems  Objective:     Weight: 114.8 kg (253 lb 1.4 oz)  Body mass index is 33.39 kg/m².  Vital Signs (Most Recent):  Temp: 98.5 °F (36.9 °C) (11/16/23 1122)  Pulse: 96 (11/16/23 1122)  Resp: 18 (11/16/23 1122)  BP: 119/71 (11/16/23 1122)  SpO2: (!) 94 % (11/16/23 1122) Vital Signs (24h Range):  Temp:  [98 °F (36.7 °C)-98.7 °F (37.1 °C)] 98.5 °F (36.9 °C)  Pulse:  [] 96  Resp:  [18] 18  SpO2:  [94 %-99  %] 94 %  BP: (116-132)/(56-72) 119/71         Neurosurgery Physical Exam    General: well developed, well nourished, no distress.   Head: normocephalic, atraumatic  Neurologic: Alert and oriented. Thought content appropriate.  GCS: Motor: 6/Verbal: 5/Eyes: 4 GCS Total: 15  Mental Status: Awake, Alert, Oriented x 4  Language: No aphasia  Speech: No dysarthria  Cranial nerves: face symmetric, tongue midline, CN II-XII grossly intact.   Eyes: pupils equal, round, reactive to light with accomodation, EOMI.   Pulmonary: normal respirations, no signs of respiratory distress  Abdomen: soft, non-distended, not tender to palpation  Sensory: intact to light touch throughout  Motor Strength: Moves all extremities spontaneously with good tone. Grossly full strength upper and lower extremities. No abnormal movements seen.   Pronator Drift: no drift noted  Finger-to-nose: Intact bilaterally  Skin: Skin is warm, dry, intact.     Significant Labs:  Recent Labs   Lab 11/15/23  0810         K 3.5      CO2 28   BUN 20   CREATININE 0.8   CALCIUM 8.8       Recent Labs   Lab 11/15/23  0316 11/16/23  0449   WBC 7.16 7.20   HGB 13.4* 13.1*   HCT 41.2 39.3*   * 144*       Recent Labs   Lab 11/14/23  1404 11/15/23  0212 11/15/23  1330 11/15/23  1915 11/16/23  0449   INR 1.0  --   --   --   --    APTT 24.0   < > 63.5* 66.6* 26.5    < > = values in this interval not displayed.       Microbiology Results (last 7 days)       ** No results found for the last 168 hours. **          All pertinent labs from the last 24 hours have been reviewed.    Significant Diagnostics:  I have reviewed and interpreted all pertinent imaging results/findings within the past 24 hours.  Assessment/Plan:     * SAH (subarachnoid hemorrhage)  78M h/o lymphoma in remission, CAD, nonrheumatic mitral regurg, CHF, first-degree AV block, aortic arthrosclerosis who was admitted to [facility] with spontaneous SAH. CTH with prepontine SAH; CTA negative for  vessel lesions.     Imaging personally reviewed:  --CTA 11/4: negative for vascular pathology  --MRI/MRA Brain 11/5: negative for underlying lesion  --DSA 11/5: negative for vascular pathology  --CTA 11/11: no evidence of vasospasm  --CTA head/neck 11/14: Stable intracranial findings with interval decrease in SAH/IVH, no evidence of vasospasm. Bilateral pulmonary emboli visualized in the upper chest.     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    Neuro:  --Stepped down to NSGY service; q4 nc/vs  --HOB@30  --Keppra 500 BID x7d (completed)  --TCDs daily x14d (discontinued 11/10 per NCC given low suspicion)  --Nimotop 60q4 x 21d (discontinued 11/9 per NCC given low suspicion)  --Goal normotensive, SBP < 160  --Goal eunatremic  --Euvolemic   --Cardiac diet as tolerated  --Continue to follow clinically and notify NSGY immediately if any neurostatus change    PPx:  --DVT Ppx:TEDs/anti-coagulation; no SCD's in setting of acute VTE  --Bowel Regimen: senna BID    Dispo: PT/OT recs CM/SW consult for dispo planning. Plan for SNF placement, pt is medically ready for DC        Electrolyte abnormality  - Monitor electrolytes, replete PRN    Acute pulmonary embolism  Episode of dizziness, tachycardia and diaphoresis on 11/14, RR called.  CTA head/neck revealed bilateral pulmonary emboli.    - High intensity heparin gtt started 11/14, pharmacy following  - Echo 11/15 negative for R heart strain  - CTH 11/15 stable  - Eliquis 10mg BID started for 7 days followed by 5mg BID x 3 months   - Supplemental O2 as needed, wean as tolerated  - Will need outpatient follow up with established Cardiologist vs Pulmonary    Hypercholesterolemia  Continue home dose statin     Heart failure, diastolic, chronic  Followed by cardiology as OP   - Echo from 11/5: estimated ejection fraction of 55 - 60%. There is normal diastolic function.  - Continue home BB and ARB, hold diuretics in setting of SAH and goal  euvolemia during this time    - Repeat Echo completed 11/15 in setting on new dx of PE:      Left Ventricle: The left ventricle is normal in size. Normal wall thickness. regional wall motion abnormalities present. See diagram for wall motion findings. There is low normal systolic function with a visually estimated ejection fraction of 50 - 55%. Ejection fraction by visual approximation is 55%.    Right Ventricle: Normal right ventricular cavity size. Wall thickness is normal. Right ventricle wall motion  is normal. Systolic function is normal.    Aortic Valve: There is mild aortic valve sclerosis. There is mild aortic regurgitation.    Pericardium: There is a small posterior effusion mostly base and base post laterally.    Primary hypertension  Continue home dose losartan, metoprolol    Benign prostatic hyperplasia with urinary frequency  Continue home dose tamsulosin     Coronary artery disease  At home on ASA  - hold at this time in setting of acute bleed   - continue home BB and ARB        Audra Najera PA-C  Neurosurgery  [facility] - Neurosurgery ([facility])

## 2023-11-16 NOTE — PLAN OF CARE
Problem: Adult Inpatient Plan of Care  Goal: Plan of Care Review  Outcome: Ongoing, Progressing  Goal: Patient-Specific Goal (Individualized)  Outcome: Ongoing, Progressing  Goal: Absence of Hospital-Acquired Illness or Injury  Outcome: Ongoing, Progressing  Intervention: Identify and Manage Fall Risk  Flowsheets (Taken 11/16/2023 1147)  Safety Promotion/Fall Prevention:   assistive device/personal item within reach   bed alarm set   commode/urinal/bedpan at bedside   Fall Risk signage in place   Fall Risk reviewed with patient/family   lighting adjusted   medications reviewed   instructed to call staff for mobility  Intervention: Prevent Skin Injury  Flowsheets (Taken 11/16/2023 1147)  Body Position: weight shifting  Skin Protection:   adhesive use limited   incontinence pads utilized  Intervention: Prevent and Manage VTE (Venous Thromboembolism) Risk  Flowsheets (Taken 11/16/2023 1147)  Activity Management:   Rolling - L1   Up in chair - L3   Up to bedside commode - L3   Straight leg raise - L1  VTE Prevention/Management:   remove, assess skin, and reapply sequential compression device   bleeding risk assessed  Range of Motion: active ROM (range of motion) encouraged  Intervention: Prevent Infection  Flowsheets (Taken 11/16/2023 1147)  Infection Prevention:   hand hygiene promoted   single patient room provided   personal protective equipment utilized  Goal: Optimal Comfort and Wellbeing  Outcome: Ongoing, Progressing  Intervention: Monitor Pain and Promote Comfort  Flowsheets (Taken 11/16/2023 1147)  Pain Management Interventions:   care clustered   pillow support provided   position adjusted  Intervention: Provide Person-Centered Care  Flowsheets (Taken 11/16/2023 1147)  Trust Relationship/Rapport:   choices provided   questions answered  Goal: Readiness for Transition of Care  Outcome: Ongoing, Progressing     Problem: Skin Injury Risk Increased  Goal: Skin Health and Integrity  Outcome: Ongoing,  Progressing  Intervention: Optimize Skin Protection  Flowsheets (Taken 11/16/2023 1147)  Pressure Reduction Techniques:   frequent weight shift encouraged   weight shift assistance provided  Pressure Reduction Devices:   positioning supports utilized   pressure-redistributing mattress utilized  Skin Protection:   adhesive use limited   incontinence pads utilized  Head of Bed (HOB) Positioning: HOB at 30 degrees     Problem: Fall Injury Risk  Goal: Absence of Fall and Fall-Related Injury  Outcome: Ongoing, Progressing  Intervention: Identify and Manage Contributors  Flowsheets (Taken 11/16/2023 1147)  Medication Review/Management: medications reviewed  Intervention: Promote Injury-Free Environment  Flowsheets (Taken 11/16/2023 1147)  Safety Promotion/Fall Prevention:   assistive device/personal item within reach   bed alarm set   commode/urinal/bedpan at bedside   Fall Risk signage in place   Fall Risk reviewed with patient/family   lighting adjusted   medications reviewed   instructed to call staff for mobility     Patients VSS. Patient worked with PT, OOB to chair. Patient had a large BM today. Plan to dc tomorrow to rehab.

## 2023-11-16 NOTE — PLAN OF CARE
[facility] accepted by [facility] for [facility] on Friday 11/17/2023.  Covid ordered.    Discharge Plan A and Plan B have been determined by review of patient's clinical status, future medical and therapeutic needs, and coverage/benefits for post-acute care in coordination with multidisciplinary team members.     Met with spouse and patient to review discharge recommendation of SNF and is agreeable to plan    Patient/family provided list of facilities in-network with patient's payor plan. Providers that are owned, operated, or affiliated with [facility] are included on the list.     Notified that referral sent to below listed facilities from in-network list based on proximity to home/family support:   1.[facility]  2.[facility]  3.[facility]  4.[facility]    Patient/family instructed to identify preference.    Preferred Facility: (if more than 1, listed in order of descending preference)  [facility]    If an additional preferred facility not listed above is identified, additional referral to be sent. If above facilities unable to accept, will send additional referrals to in-network providers.

## 2023-11-17 ENCOUNTER — TELEPHONE (OUTPATIENT)
Dept: NEUROSURGERY | Facility: CLINIC | Age: 78
End: 2023-11-17
Payer: MEDICARE

## 2023-11-17 ENCOUNTER — HOSPITAL ENCOUNTER (INPATIENT)
Facility: HOSPITAL | Age: 78
LOS: 14 days | Discharge: HOME HEALTH | DRG: 064 | End: 2023-12-01
Attending: HOSPITALIST | Admitting: HOSPITALIST
Payer: MEDICARE

## 2023-11-17 VITALS
SYSTOLIC BLOOD PRESSURE: 117 MMHG | HEIGHT: 73 IN | OXYGEN SATURATION: 98 % | RESPIRATION RATE: 18 BRPM | BODY MASS INDEX: 33.54 KG/M2 | DIASTOLIC BLOOD PRESSURE: 62 MMHG | TEMPERATURE: 98 F | HEART RATE: 97 BPM | WEIGHT: 253.06 LBS

## 2023-11-17 DIAGNOSIS — I60.9 SUBARACHNOID HEMORRHAGE: ICD-10-CM

## 2023-11-17 DIAGNOSIS — I50.32 HEART FAILURE, DIASTOLIC, CHRONIC: ICD-10-CM

## 2023-11-17 LAB
ANION GAP SERPL CALC-SCNC: 8 MMOL/L (ref 8–16)
BASOPHILS # BLD AUTO: 0.04 K/UL (ref 0–0.2)
BASOPHILS NFR BLD: 0.6 % (ref 0–1.9)
BUN SERPL-MCNC: 19 MG/DL (ref 8–23)
CALCIUM SERPL-MCNC: 9.1 MG/DL (ref 8.7–10.5)
CHLORIDE SERPL-SCNC: 105 MMOL/L (ref 95–110)
CO2 SERPL-SCNC: 25 MMOL/L (ref 23–29)
CREAT SERPL-MCNC: 0.8 MG/DL (ref 0.5–1.4)
DIFFERENTIAL METHOD: ABNORMAL
EOSINOPHIL # BLD AUTO: 0.1 K/UL (ref 0–0.5)
EOSINOPHIL NFR BLD: 1.8 % (ref 0–8)
ERYTHROCYTE [DISTWIDTH] IN BLOOD BY AUTOMATED COUNT: 11.9 % (ref 11.5–14.5)
EST. GFR  (NO RACE VARIABLE): >60 ML/MIN/1.73 M^2
GLUCOSE SERPL-MCNC: 96 MG/DL (ref 70–110)
HCT VFR BLD AUTO: 41 % (ref 40–54)
HGB BLD-MCNC: 13.3 G/DL (ref 14–18)
IMM GRANULOCYTES # BLD AUTO: 0.02 K/UL (ref 0–0.04)
IMM GRANULOCYTES NFR BLD AUTO: 0.3 % (ref 0–0.5)
LYMPHOCYTES # BLD AUTO: 1.1 K/UL (ref 1–4.8)
LYMPHOCYTES NFR BLD: 18 % (ref 18–48)
MCH RBC QN AUTO: 33.5 PG (ref 27–31)
MCHC RBC AUTO-ENTMCNC: 32.4 G/DL (ref 32–36)
MCV RBC AUTO: 103 FL (ref 82–98)
MONOCYTES # BLD AUTO: 0.6 K/UL (ref 0.3–1)
MONOCYTES NFR BLD: 9.7 % (ref 4–15)
NEUTROPHILS # BLD AUTO: 4.4 K/UL (ref 1.8–7.7)
NEUTROPHILS NFR BLD: 69.6 % (ref 38–73)
NRBC BLD-RTO: 0 /100 WBC
PLATELET # BLD AUTO: 138 K/UL (ref 150–450)
PMV BLD AUTO: 11 FL (ref 9.2–12.9)
POTASSIUM SERPL-SCNC: 3.6 MMOL/L (ref 3.5–5.1)
RBC # BLD AUTO: 3.97 M/UL (ref 4.6–6.2)
SODIUM SERPL-SCNC: 138 MMOL/L (ref 136–145)
WBC # BLD AUTO: 6.28 K/UL (ref 3.9–12.7)

## 2023-11-17 PROCEDURE — 80048 BASIC METABOLIC PNL TOTAL CA: CPT | Performed by: PHYSICIAN ASSISTANT

## 2023-11-17 PROCEDURE — 99239 PR HOSPITAL DISCHARGE DAY,>30 MIN: ICD-10-PCS | Mod: ,,, | Performed by: PHYSICIAN ASSISTANT

## 2023-11-17 PROCEDURE — 85025 COMPLETE CBC W/AUTO DIFF WBC: CPT | Performed by: PHYSICIAN ASSISTANT

## 2023-11-17 PROCEDURE — 97535 SELF CARE MNGMENT TRAINING: CPT

## 2023-11-17 PROCEDURE — 97112 NEUROMUSCULAR REEDUCATION: CPT

## 2023-11-17 PROCEDURE — 25000003 PHARM REV CODE 250: Performed by: HOSPITALIST

## 2023-11-17 PROCEDURE — 25000003 PHARM REV CODE 250

## 2023-11-17 PROCEDURE — 25000003 PHARM REV CODE 250: Performed by: PHYSICIAN ASSISTANT

## 2023-11-17 PROCEDURE — 25000003 PHARM REV CODE 250: Performed by: NURSE PRACTITIONER

## 2023-11-17 PROCEDURE — 36415 COLL VENOUS BLD VENIPUNCTURE: CPT | Performed by: PHYSICIAN ASSISTANT

## 2023-11-17 PROCEDURE — 99239 HOSP IP/OBS DSCHRG MGMT >30: CPT | Mod: ,,, | Performed by: PHYSICIAN ASSISTANT

## 2023-11-17 PROCEDURE — 11000004 HC SNF PRIVATE

## 2023-11-17 RX ORDER — LOSARTAN POTASSIUM 50 MG/1
100 TABLET ORAL DAILY
Status: DISCONTINUED | OUTPATIENT
Start: 2023-11-18 | End: 2023-11-21

## 2023-11-17 RX ORDER — AMOXICILLIN 250 MG
1 CAPSULE ORAL DAILY PRN
Status: DISCONTINUED | OUTPATIENT
Start: 2023-11-17 | End: 2023-11-17 | Stop reason: HOSPADM

## 2023-11-17 RX ORDER — FLUTICASONE PROPIONATE 50 MCG
2 SPRAY, SUSPENSION (ML) NASAL DAILY
Status: DISCONTINUED | OUTPATIENT
Start: 2023-11-18 | End: 2023-12-01 | Stop reason: HOSPADM

## 2023-11-17 RX ORDER — ACETAMINOPHEN 325 MG/1
650 TABLET ORAL EVERY 6 HOURS PRN
Status: DISCONTINUED | OUTPATIENT
Start: 2023-11-17 | End: 2023-11-20

## 2023-11-17 RX ORDER — NAPROXEN SODIUM 220 MG/1
81 TABLET, FILM COATED ORAL DAILY
Status: DISCONTINUED | OUTPATIENT
Start: 2023-11-18 | End: 2023-12-01 | Stop reason: HOSPADM

## 2023-11-17 RX ORDER — TALC
6 POWDER (GRAM) TOPICAL NIGHTLY PRN
Status: DISCONTINUED | OUTPATIENT
Start: 2023-11-17 | End: 2023-12-01 | Stop reason: HOSPADM

## 2023-11-17 RX ORDER — AMOXICILLIN 250 MG
1 CAPSULE ORAL 2 TIMES DAILY
Status: DISCONTINUED | OUTPATIENT
Start: 2023-11-17 | End: 2023-12-01 | Stop reason: HOSPADM

## 2023-11-17 RX ORDER — METOPROLOL SUCCINATE 50 MG/1
50 TABLET, EXTENDED RELEASE ORAL DAILY
Status: DISCONTINUED | OUTPATIENT
Start: 2023-11-18 | End: 2023-12-01 | Stop reason: HOSPADM

## 2023-11-17 RX ORDER — ATORVASTATIN CALCIUM 40 MG/1
40 TABLET, FILM COATED ORAL DAILY
Status: DISCONTINUED | OUTPATIENT
Start: 2023-11-18 | End: 2023-12-01 | Stop reason: HOSPADM

## 2023-11-17 RX ORDER — FUROSEMIDE 40 MG/1
40 TABLET ORAL DAILY
Status: DISCONTINUED | OUTPATIENT
Start: 2023-11-18 | End: 2023-11-28

## 2023-11-17 RX ORDER — PANTOPRAZOLE SODIUM 40 MG/1
40 TABLET, DELAYED RELEASE ORAL DAILY
Status: DISCONTINUED | OUTPATIENT
Start: 2023-11-18 | End: 2023-12-01 | Stop reason: HOSPADM

## 2023-11-17 RX ORDER — CALCIUM CARBONATE 200(500)MG
500 TABLET,CHEWABLE ORAL 2 TIMES DAILY PRN
Status: DISCONTINUED | OUTPATIENT
Start: 2023-11-17 | End: 2023-12-01 | Stop reason: HOSPADM

## 2023-11-17 RX ORDER — CETIRIZINE HYDROCHLORIDE 5 MG/1
10 TABLET ORAL DAILY PRN
Status: DISCONTINUED | OUTPATIENT
Start: 2023-11-17 | End: 2023-12-01 | Stop reason: HOSPADM

## 2023-11-17 RX ORDER — TAMSULOSIN HYDROCHLORIDE 0.4 MG/1
0.4 CAPSULE ORAL DAILY
Status: DISCONTINUED | OUTPATIENT
Start: 2023-11-18 | End: 2023-12-01 | Stop reason: HOSPADM

## 2023-11-17 RX ORDER — CHOLECALCIFEROL (VITAMIN D3) 25 MCG
2000 TABLET ORAL DAILY
Status: DISCONTINUED | OUTPATIENT
Start: 2023-11-18 | End: 2023-12-01 | Stop reason: HOSPADM

## 2023-11-17 RX ORDER — SPIRONOLACTONE 25 MG/1
25 TABLET ORAL DAILY
Status: DISCONTINUED | OUTPATIENT
Start: 2023-11-18 | End: 2023-11-21

## 2023-11-17 RX ORDER — IBUPROFEN 200 MG
800 TABLET ORAL 2 TIMES DAILY PRN
Status: DISCONTINUED | OUTPATIENT
Start: 2023-11-17 | End: 2023-11-20

## 2023-11-17 RX ADMIN — TAMSULOSIN HYDROCHLORIDE 0.4 MG: 0.4 CAPSULE ORAL at 08:11

## 2023-11-17 RX ADMIN — ATORVASTATIN CALCIUM 80 MG: 40 TABLET, FILM COATED ORAL at 08:11

## 2023-11-17 RX ADMIN — METOPROLOL SUCCINATE 50 MG: 50 TABLET, EXTENDED RELEASE ORAL at 08:11

## 2023-11-17 RX ADMIN — SENNOSIDES AND DOCUSATE SODIUM 1 TABLET: 8.6; 5 TABLET ORAL at 09:11

## 2023-11-17 RX ADMIN — FAMOTIDINE 20 MG: 20 TABLET ORAL at 08:11

## 2023-11-17 RX ADMIN — ACETAMINOPHEN 650 MG: 325 TABLET ORAL at 08:11

## 2023-11-17 RX ADMIN — LOSARTAN POTASSIUM 100 MG: 50 TABLET, FILM COATED ORAL at 08:11

## 2023-11-17 RX ADMIN — APIXABAN 10 MG: 5 TABLET, FILM COATED ORAL at 08:11

## 2023-11-17 RX ADMIN — APIXABAN 10 MG: 2.5 TABLET, FILM COATED ORAL at 09:11

## 2023-11-17 NOTE — PLAN OF CARE
[facility] - Neurosurgery (Hospital)  Discharge Final Note    Primary Care Provider: JAYLYN Zamora MD    Expected Discharge Date: 11/17/2023    Patient to be discharged to  SNF.  [facility] to provide wheelchair transportation. Neurosurgery clinic to schedule follow up appointment.    Future Appointments   Date Time Provider Department Center   12/7/2023 10:30 AM Ema Montemayor MD [id number]   12/14/2023  1:15 PM [facility] OIC-CT2 500 LB LIMIT [facility]   12/14/2023  2:30 PM [facility] OIC-CT1 500 LB LIMIT [facility]   12/14/2023  4:00 PM Koki Espinal, KRISTOPHER [facility] [id number] [facility]        Final Discharge Note (most recent)       Final Note - 11/17/23 1326          Final Note    Assessment Type Final Discharge Note     Anticipated Discharge Disposition Skilled Nursing Facility        Post-Acute Status    Post-Acute Authorization Placement     Post-Acute Placement Status Set-up Complete/Auth obtained     Discharge Delays None known at this time                     Important Message from Medicare  Important Message from Medicare regarding Discharge Appeal Rights: Given to patient/caregiver, Explained to patient/caregiver, Signed/date by patient/caregiver     Date IMM was signed: 11/17/23  Time IMM was signed: 1000    Contact Info       Guille Cobsy MD   Specialty: Neurosurgery    [address]  [facility] [address]   Phone: [phone]       Next Steps: Follow up in 2 week(s)

## 2023-11-17 NOTE — PT/OT/SLP PROGRESS
"Occupational Therapy   Treatment    Name: Sam Gamino  MRN: [id number]  Admitting Diagnosis:  SAH (subarachnoid hemorrhage)       Recommendations:     Discharge Recommendations: Moderate Intensity Therapy  Discharge Equipment Recommendations:  bath bench, bedside commode  Barriers to discharge:  None    Assessment:     Sam Gamino is a 78 y.o. male with a medical diagnosis of SAH (subarachnoid hemorrhage).  He presents with performance deficits affecting function are weakness, decreased upper extremity function, decreased lower extremity function, impaired balance, impaired endurance, impaired sensation, impaired self care skills, impaired functional mobility, decreased coordination, impaired cognition, decreased safety awareness.     Rehab Prognosis:  Good; patient would benefit from acute skilled OT services to address these deficits and reach maximum level of function.       Plan:     Patient to be seen 4 x/week to address the above listed problems via self-care/home management, therapeutic activities, therapeutic exercises, neuromuscular re-education, cognitive retraining  Plan of Care Expires: 12/03/23  Plan of Care Reviewed with: patient    Subjective   Patient:  "I am doing alright.  I am suppose to go to another location for therapy today.  I think I am getting better."  Pain/Comfort:  Pain Rating 1: 0/10  Pain Rating Post-Intervention 1: 0/10    Objective:     Communicated with: Nurse prior to session.  Patient found supine with telemetry upon OT entry to room.    General Precautions: Standard, aspiration, fall    Orthopedic Precautions:N/A  Braces: N/A  Respiratory Status: Room air     Occupational Performance:     Bed Mobility:    Patient completed Rolling/Turning to Left with  stand by assistance  Patient completed Rolling/Turning to Right with stand by assistance  Patient completed Supine to Sit with minimum assistance  Patient completed Sit to Supine with minimum assistance     Functional " Mobility/Transfers:  Patient completed Sit <> Stand Transfer with minimum assistance  with  no assistive device   Patient completed Bed <> Chair Transfer using Stand Pivot technique with moderate assistance with no assistive device    Activities of Daily Living:  Grooming: minimum assistance while standing  Upper Body Dressing: minimum assistance while seated EOB  Lower Body Dressing: maximal assistance while seated EOB    AMPA 6 Click ADL: 15    Treatment & Education:  Patient alert and oriented x 3; able to follow 4/4 one step commands.  Patient attentive and interactive throughout the session.      Patient left supine with all lines intact, call button in reach, and bed alarm on    GOALS:   Multidisciplinary Problems       Occupational Therapy Goals          Problem: Occupational Therapy    Goal Priority Disciplines Outcome Interventions   Occupational Therapy Goal     OT, PT/OT Ongoing, Progressing    Description: Goals set 11/11 to be addressed for 14 days with expiration date, 11/25:  Patient will increase functional independence with ADLs by performing:    Patient will demonstrate rolling to the right with modified independence.  Not met   Patient will demonstrate rolling to the left with modified independence.   Not met  Patient will demonstrate supine -sit with modified independence.   Not met  Patient will demonstrate stand pivot transfers with modified independence.   Not met  Patient will demonstrate grooming while standing with modified independence.   Not met  Patient will demonstrate upper body dressing with modified independence while seated EOB.   Not met  Patient will demonstrate lower body dressing with modified independence while seated EOB.   Not met  Patient will demonstrate toileting with modified independence.   Not met  Patient will demonstrate bathing while seated EOB with modified independence.   Not met  Patient's family / caregiver will demonstrate independence and safety with assisting  patient with self-care skills and functional mobility.     Not met  Patient and/or patient's family will verbalize understanding of stroke prevention guidelines, personal risk factors and stroke warning signs via teachback method.  Not met                                  Time Tracking:     OT Date of Treatment: 11/17/23  OT Start Time: 0613  OT Stop Time: 0637  OT Total Time (min): 24 min    Billable Minutes:Self Care/Home Management 12  Neuromuscular Re-education 12    OT/RICHY: OT          11/17/2023

## 2023-11-17 NOTE — PLAN OF CARE
Attempted to call rehab with provided number, no answer x2. Discharge with all belongings.      Problem: Adult Inpatient Plan of Care  Goal: Plan of Care Review  Outcome: Adequate for Care Transition  Goal: Patient-Specific Goal (Individualized)  Outcome: Adequate for Care Transition  Goal: Absence of Hospital-Acquired Illness or Injury  Outcome: Adequate for Care Transition  Goal: Optimal Comfort and Wellbeing  Outcome: Adequate for Care Transition  Goal: Readiness for Transition of Care  Outcome: Adequate for Care Transition     Problem: Skin Injury Risk Increased  Goal: Skin Health and Integrity  Outcome: Adequate for Care Transition     Problem: Fall Injury Risk  Goal: Absence of Fall and Fall-Related Injury  Outcome: Adequate for Care Transition

## 2023-11-17 NOTE — PLAN OF CARE
CHW met with patient/family at bedside. Patient experience rounding completed and reviewed the following.     Do you know your discharge plan?   Yes SNF    Have you discussed your needs and preferences with your SW/CM?  Yes    If you are discharging home, do you have help at home?  Yes    Do you think you will need help additional at home at discharge?   No     Do you currently have difficulty keeping up with bills, affording medicine or buying food?  No    Assigned SW/CM notified of any patient/family needs or concerns. Appropriate resources provided to address patient's needs.  Robyn YADAV  Case Management  [phone]

## 2023-11-17 NOTE — PLAN OF CARE
Problem: Adult Inpatient Plan of Care  Goal: Plan of Care Review  Outcome: Ongoing, Progressing  Goal: Patient-Specific Goal (Individualized)  Outcome: Ongoing, Progressing  Goal: Absence of Hospital-Acquired Illness or Injury  Outcome: Ongoing, Progressing  Goal: Optimal Comfort and Wellbeing  Outcome: Ongoing, Progressing  Goal: Readiness for Transition of Care  Outcome: Ongoing, Progressing     Problem: Skin Injury Risk Increased  Goal: Skin Health and Integrity  Outcome: Ongoing, Progressing     Problem: Fall Injury Risk  Goal: Absence of Fall and Fall-Related Injury  Outcome: Ongoing, Progressing       POC updated and reviewed with the patient at bedside. Questions regarding POC were encouraged and addressed. VSS, see flowsheets. Patient is AO x 4 at this time. Tele maintained per order. Pain/Nausea management using PRNs, effective. See MAR for medication administration details. Fall/safety precautions maintained. No signs of injury noted over night. Patient was repositioned for comfort with bed locked in low position, side rails up x 3, bed alarm activated, and call light within reach. Instructed patient to call staff for mobility, verbalized understanding. No acute signs of distress noted at this time.

## 2023-11-17 NOTE — TELEPHONE ENCOUNTER
----- Message from Ashley Brasher PA-C sent at 11/17/2023 12:44 PM CST -----  Pt will need f/u on non-traumatic SAH in about 4-6 weeks with CTA (ordered), if you could please schedule. You can schedule with [facility] or in other PA clinic.     Thanks!

## 2023-11-17 NOTE — DISCHARGE SUMMARY
[facility] - Neurosurgery ([facility])  Neurosurgery  Discharge Summary      Patient Name: Sam Gamino  MRN: [id number]  Admission Date: 11/4/2023  Hospital Length of Stay: 13 days  Discharge Date and Time:  11/17/2023 1:01 PM  Attending Physician: Guille Cosby MD   Discharging Provider: Ashley Brasher PA-C  Primary Care Provider: JAYLYN Zamora MD    HPI:   78M h/o lymphoma in remission, CAD, nonrheumatic mitral regurg, CHF, first-degree AV block, aortic arthrosclerosis admitted to [facility] with SAH. Initially presented initially to outside hospital ED with complaints of bilateral neck pain that happened approximately 2 hours prior to arrival.  Reports that the pain started immediately after having a bowel movement.  He does have associated headache that radiates up to his temples. Patient is on baby ASA. Also reports some associated weakness with walking. States he woke up this morning feeling normal without any pain or weakness. Denies fever/chills, vision/hearing changes, dysphagia, dysarthria, vomiting, new-onset weakness or sensory change, bowel/bladder changes. CTH with prepontine SAH; CTA negative for vessel lesions.       * No surgery found *     Hospital Course: 11/05/2023: PBD 1. NAEON. AFVSS. Exam stable, intact. MRI/MRA completed. On strict SAH protocol.  11/6: PBD 2. NAEON. AFVSS. -1L / 24 hrs. MRI/MRA negative. DSA yesterday negative. Plan for CTA PBD 7  11/07/2023: PBD 3. NAEON. AFVSS. Exam stable. I/O negative. HA improved. Denies new weakness/numbness/tingling.  11/08/2023: PBD 4. NAEON. AFVSS. HA nearly resolved.  11/9: NAEON. AFVSS. TCDs WNL. Plan for CTA PBD 7. Will discuss possibility of stepping down before CTA. Exam stable  11/10: NAEON. AFVSS. TCDs yesterday WNL. Planning for CTA tomorrow. Exam stable. Hopefully discharge tomorrow if CTA stable  11/11: NAEON. AFVSS. TCDs wnl yesterday. CTA today, pending. Exam stable. D/c today if CTA stable.   11/12: SHANNON. MARANDAVSS. CTA yesterday w/o  evidence of vasospasm. Exam stable. DC recs changed to SNF, pending SNF  11/13: NAEON. AFVSS. No complaints. Neuro exam stable. Pending discharge to SNF.   11/14: Pt became acutely dizzy while working with PT this am, briefly unresponsive to staff then recovered. Became tachycardic/diaphoretic following episode. Neurologically intact on exam. CTA head/neck revealed b/l PE. Vitals stable. Starting on heparin gtt, Echo ordered.   11/15: NAEON. AFVSS. Pt reports feeling much improved today. Denies any SOB/CP/palpitations, denies HA. Neurologically stable. Sats 100% on 2L. Supratherapeutic on heparin gtt. CTH today. TTE negative for R heart strain.   11/16: NAEON. VSS. Pt without new complaints today. Continues to deny SOB ro CP. Eliquis started yesterday. CTH remained stable. + BM. SNF pending   11/17: NAEON. AFVSS. Pt doing well this morning, no acute complaints. Bowel regimen changed to PRN due to multiple BM's. Medically stable for discharge to SNF today. Will arrange follow up in NSGY clinic in about 6 weeks with CTA. Will need outpatient follow up with established cardiology, has appt scheduled for 12/7.     Goals of Care Treatment Preferences:  Code Status: Full Code    Neurosurgery Physical Exam     General: well developed, well nourished, no distress.   Head: normocephalic, atraumatic  Neurologic: Alert and oriented. Thought content appropriate.  GCS: Motor: 6/Verbal: 5/Eyes: 4 GCS Total: 15  Mental Status: Awake, Alert, Oriented x 4  Language: No aphasia  Speech: No dysarthria  Cranial nerves: face symmetric, tongue midline, CN II-XII grossly intact.   Eyes: pupils equal, round, reactive to light with accomodation, EOMI.   Pulmonary: normal respirations, no signs of respiratory distress  Abdomen: soft, non-distended, not tender to palpation  Sensory: intact to light touch throughout  Motor Strength: Moves all extremities spontaneously with good tone. Grossly full strength upper and lower extremities. No  abnormal movements seen.   Pronator Drift: no drift noted  Finger-to-nose: Intact bilaterally  Skin: Skin is warm, dry, intact.     Consults:   Consults (From admission, onward)          Status Ordering Provider     Inpatient consult to Interventional Radiology  Once        Provider:  (Not yet assigned)    Completed ANTOINETTE CHAVIRA     Inpatient consult to Neurosurgery  Once        Provider:  (Not yet assigned)    Completed ANTOINETTE CHAVIRA            Significant Diagnostic Studies: Labs: BMP:   Recent Labs   Lab 11/17/23  0818   GLU 96      K 3.6      CO2 25   BUN 19   CREATININE 0.8   CALCIUM 9.1   , CMP   Recent Labs   Lab 11/17/23  0818      K 3.6      CO2 25   GLU 96   BUN 19   CREATININE 0.8   CALCIUM 9.1   ANIONGAP 8   , CBC   Recent Labs   Lab 11/16/23  0449 11/17/23  0427   WBC 7.20 6.28   HGB 13.1* 13.3*   HCT 39.3* 41.0   * 138*   , INR   Lab Results   Component Value Date    INR 1.0 11/14/2023   , Troponin   Recent Labs   Lab 11/14/23  0954   TROPONINI 0.034*   , and All labs within the past 24 hours have been reviewed  Radiology:   Imaging Results              CTA Head and Neck (xpd) (Final result)  Result time 11/04/23 15:27:58      Final result by Lalito Suarez DO (11/04/23 15:27:58)                   Impression:      CTA head: Unremarkable CTA of the head specifically without evidence for proximal significant stenosis or large vessel occlusion or definite aneurysm.    CTA neck: No significant arterial stenosis throughout the neck..    Superimposed degenerative change of the cervical spine as detailed above.    CT head: Small volume scattered intraventricular hemorrhage.  Please note there is moderate distension lateral and 3rd ventricles which may be compensatory to volume loss however component of early hydrocephalus not excluded without priors for comparison.    In addition there is small volume subdural hemorrhage along the ventral and dorsal aspect of the skull base  and extending to the proximal cervical canal through the craniocervical junction allowing for CT technique.    Clinical correlation, neuro surgical consultation and further evaluation with MRI brain and cervical spine recommended if patient compatible.      Electronically signed by: Lalito Suarez DO  Date:    11/04/2023  Time:    15:27               Narrative:    EXAMINATION:  CTA HEAD AND NECK (XPD)    CLINICAL HISTORY:  sudden onset neck pain and headache after BM;    TECHNIQUE:  5 mm axial images of the head pre and post contrast with 0.625 mm axial CTA images of the head neck post-contrast.  Coronal and sagittal MPR and MIP imaging was performed 100 ml of Omnipaque 350 contrast was injected intravenously    COMPARISON:  None    FINDINGS:  CT head with and  without contrast: Generalized cerebral volume loss.  There is distortion of the examination by motion and beam hardening artifacts particularly along the skull base and posterior fossa.  In addition there is trace layering hemorrhage within the lateral ventricles bilaterally as well as within the dorsal aspect of the 4th ventricle extending along the root foramen Luschka bilaterally.  There is distension of the lateral and 3rd ventricles which may be in part related to volume loss though cannot exclude early developing hydrocephalus.    Allowing for CT technique there is no definite abnormal parenchymal enhancement    CTA head:    Anterior circulation: The bilateral distal cervical, petrous, cavernous, and supraclinoid segments of the ICAs are patent without significant focal stenosis or aneurysm.    The anterior and middle cerebral arteries are patent without focal stenosis or aneurysm.    Posterior circulation: The distal left vertebral artery is hypoplastic.  The basilar artery and posterior cerebral arteries are patent without significant focal stenosis.  There are bilateral posterior communicating arteries left greater than right.  No proximal high-grade  focal stenosis or definite aneurysm posterior circulation..    CTA neck: Common origin the right brachiocephalic and left common carotid artery, origin of the left vertebral artery and origin of the left subclavian arteries from the arch are within normal limits.    Origin of the right vertebral artery from the right subclavian arteries within normal limits.  The right vertebral artery is dominant with distortion by motion without significant focal stenosis.  The left vertebral artery is hypoplastic and distorted by motion artifact without definite focal proximal stenosis.    Right carotid: The right common carotid artery, carotid bifurcation and extracranial portions of the internal carotid artery are patent without significant focal stenosis.    Left carotid: The left common carotid artery, carotid bifurcation and extracranial portions of the internal carotid arteries are patent without significant focal stenosis.    There is less than 50% stenosis of the proximal ICAs by NASCET criteria.    Pharynx/larynx: Allowing for artifact from dental metal no definite focal lesion of the pharynx/larynx:.    Glands: No focal parotid, submandibular or thyroid lesion..    No evidence for adenopathy throughout the neck by size criteria.    Multilevel degenerative change cervical spine.  There is subtle hyperdensity along the periphery of the thecal sac at the C2 vertebral level concerning for extension of subdural hemorrhage into the cervical canal.    Evaluation of the central canal neural foraminal stenosis is distorted by artifact from motion however despite this degenerative change most prominent at the C3/C4 level with posterior disc osteophyte, uncovertebral joint hypertrophy and facet arthropathy with moderate central canal stenosis and moderate right greater than left bilateral bony neural foraminal stenosis.  No consolidation in the visualized lungs.    COMMUNICATION  This critical result was discovered/received on  11/04/2023 at 15 15 and received via epic secure chat to Odette Becerra PA-C on 11/04/2023 at 15:21                                      Cardiac Graphics:  Echocardiogram: 2D echo with color flow doppler: No results found for this or any previous visit. and Transthoracic echo (TTE) complete (Cupid Only):   Results for orders placed or performed during the hospital encounter of 11/04/23   Echo   Result Value Ref Range    RA Width 2.75 cm    Left Atrium Major Axis 4.56 cm    Left Atrium Minor Axis 4.90 cm    RA Major Axis 4.13 cm    LV Diastolic Volume 85.82 mL    LV Systolic Volume 37.71 mL    LVOT peak VTI 10.55 cm    LVOT peak petr 0.74 m/s    LVOT diameter 1.99 cm    TAPSE 2.02 cm    RVDD 2.52 cm    LA size 3.79 cm    Ascending aorta 3.04 cm    STJ 3.24 cm    Sinus 3.25 cm    LVIDs 3.09 2.1 - 4.0 cm    Posterior Wall 0.81 0.6 - 1.1 cm    IVS 0.83 0.6 - 1.1 cm    LVIDd 4.36 3.5 - 6.0 cm    TDI LATERAL 0.24 m/s    LA WIDTH 2.97 cm    TDI SEPTAL 0.15 m/s    FS 29 28 - 44 %    LA volume 45.20 cm3    LV mass 111.35 g    ZLVIDD -8.76     ZLVIDS -5.60     Left Ventricle Relative Wall Thickness 0.37 cm    Mean e' 0.20 m/s    LVOT area 3.1 cm2    LVOT stroke volume 32.80 cm3    LV Systolic Volume Index 15.8 mL/m2    LV Diastolic Volume Index 36.06 mL/m2    LA Volume Index 19.0 mL/m2    LV Mass Index 47 g/m2    BSA 2.43 m2    EF 55 %    Narrative      Left Ventricle: The left ventricle is normal in size. Normal wall   thickness. regional wall motion abnormalities present. See diagram for   wall motion findings. There is low normal systolic function with a   visually estimated ejection fraction of 50 - 55%. Ejection fraction by   visual approximation is 55%.    Right Ventricle: Normal right ventricular cavity size. Wall thickness   is normal. Right ventricle wall motion  is normal. Systolic function is   normal.    Aortic Valve: There is mild aortic valve sclerosis. There is mild   aortic regurgitation.    Pericardium:  There is a small posterior effusion mostly base and base   post laterally.         Pending Diagnostic Studies:       None          Final Active Diagnoses:    Diagnosis Date Noted POA    PRINCIPAL PROBLEM:  SAH (subarachnoid hemorrhage) [I60.9] 11/04/2023 Yes    Acute pulmonary embolism [I26.99] 11/14/2023 No    Electrolyte abnormality [E87.8] 11/14/2023 Yes    Atrioventricular block, first degree [I44.0] 11/01/2023 Yes    Hypercholesterolemia [E78.00] 06/27/2022 Yes    Heart failure, diastolic, chronic [I50.32] 05/16/2022 Yes    Primary hypertension [I10] 05/16/2022 Yes    Benign prostatic hyperplasia with urinary frequency [N40.1, R35.0] 12/06/2021 Yes    Coronary artery disease [I25.10] 02/08/2013 Yes      Problems Resolved During this Admission:      Discharged Condition: good     Disposition: Skilled Nursing Facility    Follow Up:   Follow-up Information       Guille Cosby MD Follow up in 2 week(s).    Specialty: Neurosurgery  Contact information:  [address] [place] [id number]  [phone]                           Patient Instructions:      CTA Head and Neck (xpd)   Standing Status: Future Standing Exp. Date: 11/17/24     Order Specific Question Answer Comments   Does this patient have impaired renal function? No    Diabetes? Yes    May the Radiologist modify the order per protocol to meet the clinical needs of the patient? Yes      Ambulatory referral/consult to Physical/Occupational Therapy   Standing Status: Future   Referral Priority: Routine Referral Type: Physical Medicine   Referral Reason: Specialty Services Required   Number of Visits Requested: 1     Notify your health care provider if you experience any of the following:  temperature >100.4     Notify your health care provider if you experience any of the following:  persistent nausea and vomiting or diarrhea     Notify your health care provider if you experience any of the following:  severe uncontrolled pain     Notify your  health care provider if you experience any of the following:  redness, tenderness, or signs of infection (pain, swelling, redness, odor or green/yellow discharge around incision site)     Notify your health care provider if you experience any of the following:  difficulty breathing or increased cough     Notify your health care provider if you experience any of the following:  severe persistent headache     Notify your health care provider if you experience any of the following:  worsening rash     Notify your health care provider if you experience any of the following:  persistent dizziness, light-headedness, or visual disturbances     Notify your health care provider if you experience any of the following:  increased confusion or weakness     Activity as tolerated     Medications:  Reconciled Home Medications:      Medication List        START taking these medications      apixaban 5 mg Tab  Commonly known as: ELIQUIS  Take 2 tablets (10 mg total) by mouth 2 (two) times daily for 5 days, THEN 1 tablet (5 mg total) 2 (two) times daily.  Start taking on: November 16, 2023            CONTINUE taking these medications      aspirin 81 MG Chew  Take 81 mg by mouth once daily.     atorvastatin 80 MG tablet  Commonly known as: LIPITOR  TAKE ONE-HALF TABLET BY MOUTH ONCE DAILY     cetirizine 10 MG tablet  Commonly known as: ZYRTEC  Take 10 mg by mouth daily as needed.     cholecalciferol (vitamin D3) 50 mcg (2,000 unit) Cap capsule  Commonly known as: VITAMIN D3  Take 1 capsule by mouth once daily.     cyanocobalamin 1,000 mcg/mL injection  INJECT 1 ML IN THE MUSCLE EVERY 30 DAYS     fluticasone propionate 50 mcg/actuation nasal spray  Commonly known as: FLONASE  SHAKE LIQUID AND USE 2 SPRAYS(100 MCG) IN EACH NOSTRIL EVERY DAY     furosemide 40 MG tablet  Commonly known as: LASIX  Take 1 tablet (40 mg total) by mouth once daily.     ibuprofen 800 MG tablet  Commonly known as: ADVIL,MOTRIN  Take 1 tablet (800 mg total) by  "mouth 2 (two) times daily as needed for Pain.     JARDIANCE 10 mg tablet  Generic drug: empagliflozin  TAKE 1 TABLET DAILY     linaCLOtide 290 mcg Cap capsule  Commonly known as: LINZESS  Take 1 capsule (290 mcg total) by mouth before breakfast.     losartan 100 MG tablet  Commonly known as: COZAAR  Take 1 tablet (100 mg total) by mouth once daily.     metoprolol succinate 50 MG 24 hr tablet  Commonly known as: TOPROL-XL  Take 50 mg by mouth once daily.     omeprazole 20 mg Tbec  Take 1 tablet by mouth once daily.     solifenacin 10 MG tablet  Commonly known as: VESICARE  Take 1 tablet (10 mg total) by mouth once daily.     spironolactone 25 MG tablet  Commonly known as: ALDACTONE  Take 1 tablet (25 mg total) by mouth once daily.     syringe with needle 3 mL 23 x 1" Syrg  1 Syringe by Misc.(Non-Drug; Combo Route) route every 30 days.     tamsulosin 0.4 mg Cap  Commonly known as: FLOMAX  Take 1 capsule (0.4 mg total) by mouth once daily.            Time spent on the discharge of the patient including review of hospital course with the patient, reviewing discharge medications and arranging follow-up care: 35 mins      Ashley Brasher PA-C  Neurosurgery  [facility] - Neurosurgery ([facility])  "

## 2023-11-18 PROCEDURE — 97110 THERAPEUTIC EXERCISES: CPT

## 2023-11-18 PROCEDURE — 97535 SELF CARE MNGMENT TRAINING: CPT

## 2023-11-18 PROCEDURE — 11000004 HC SNF PRIVATE

## 2023-11-18 PROCEDURE — 25000242 PHARM REV CODE 250 ALT 637 W/ HCPCS: Performed by: HOSPITALIST

## 2023-11-18 PROCEDURE — 97162 PT EVAL MOD COMPLEX 30 MIN: CPT

## 2023-11-18 PROCEDURE — 97530 THERAPEUTIC ACTIVITIES: CPT

## 2023-11-18 PROCEDURE — 25000003 PHARM REV CODE 250: Performed by: HOSPITALIST

## 2023-11-18 PROCEDURE — 97165 OT EVAL LOW COMPLEX 30 MIN: CPT

## 2023-11-18 PROCEDURE — 97116 GAIT TRAINING THERAPY: CPT

## 2023-11-18 RX ADMIN — LOSARTAN POTASSIUM 100 MG: 50 TABLET, FILM COATED ORAL at 09:11

## 2023-11-18 RX ADMIN — SPIRONOLACTONE 25 MG: 25 TABLET ORAL at 09:11

## 2023-11-18 RX ADMIN — FLUTICASONE PROPIONATE 100 MCG: 50 SPRAY, METERED NASAL at 10:11

## 2023-11-18 RX ADMIN — TAMSULOSIN HYDROCHLORIDE 0.4 MG: 0.4 CAPSULE ORAL at 09:11

## 2023-11-18 RX ADMIN — ASPIRIN 81 MG CHEWABLE TABLET 81 MG: 81 TABLET CHEWABLE at 09:11

## 2023-11-18 RX ADMIN — ATORVASTATIN CALCIUM 40 MG: 40 TABLET, FILM COATED ORAL at 09:11

## 2023-11-18 RX ADMIN — FUROSEMIDE 40 MG: 40 TABLET ORAL at 09:11

## 2023-11-18 RX ADMIN — METOPROLOL SUCCINATE 50 MG: 50 TABLET, EXTENDED RELEASE ORAL at 09:11

## 2023-11-18 RX ADMIN — SENNOSIDES AND DOCUSATE SODIUM 1 TABLET: 8.6; 5 TABLET ORAL at 08:11

## 2023-11-18 RX ADMIN — CHOLECALCIFEROL TAB 25 MCG (1000 UNIT) 2000 UNITS: 25 TAB at 09:11

## 2023-11-18 RX ADMIN — APIXABAN 10 MG: 2.5 TABLET, FILM COATED ORAL at 08:11

## 2023-11-18 RX ADMIN — APIXABAN 10 MG: 2.5 TABLET, FILM COATED ORAL at 09:11

## 2023-11-18 RX ADMIN — PANTOPRAZOLE SODIUM 40 MG: 40 TABLET, DELAYED RELEASE ORAL at 09:11

## 2023-11-18 RX ADMIN — SENNOSIDES AND DOCUSATE SODIUM 1 TABLET: 8.6; 5 TABLET ORAL at 09:11

## 2023-11-18 NOTE — PT/OT/SLP EVAL
Physical Therapy Evaluation/Treatment Note    Patient Name:  Sam Gamino   MRN:  [id number]  Admit Date: 11/17/2023  Admitting Diagnosis: nontraumatic subarachnoid hemorrhage  Recent Surgeries: N/A    General Precautions: Standard, aspiration, fall   Orthopedic Precautions: N/A   Braces: N/A    Recommendations:     Discharge Recommendations: Low Intensity Therapy   Level of Assistance Recommended: 24 hours significant assistance  Discharge Equipment Recommendations: wheelchair, walker, rolling, bath bench, bedside commode   Barriers to discharge: Other (Comment) (Increased assistance for safety during mobility)    Assessment:     Sam Gamino is a 78 y.o. male admitted with a medical diagnosis of nontraumatic subarachnoid hemorrhage. Performance deficits affecting function  weakness, impaired endurance, impaired self care skills, impaired functional mobility, gait instability, impaired balance, decreased lower extremity function, decreased safety awareness, decreased coordination, impaired cardiopulmonary response to activity. Patient agreeable to PT evaluation and treatment this AM. Patient reports being Independent with mobility and ADLs prior to recent hospitalization. Patient currently functioning below baseline level of mobility requiring ModA for sit to supine and stand step transfer, Rafael for sit to stand transfer, SBA for supine to sit and Rafael from PT for ambulation with rehab tech following close with W/C for safety. Patient with decreased BLE clearance during functional transfers and ambulation. Patient at high risk for falls. Patient will benefit from continued SNF rehabilitation services to address above mentioned deficits as well as progress mobility towards PLOF and increased functional independence for safe transition to home environment at time of discharge.    Rehab Potential is good     Activity Tolerance: Good    Plan:     Patient to be seen 5 x/week to address the above listed problems via  gait training, therapeutic activities, therapeutic exercises, neuromuscular re-education, wheelchair management/training     Plan of Care Expires: 12/18/23  Plan of Care Reviewed with: patient    Subjective     Chief Complaint: Patient without complaints; agreeable to therapy  Patient/Family Comments/goals: Return home at [facility]  Pain/Comfort:  Pain Rating 1: 0/10  Pain Rating Post-Intervention 1: 0/10    Living Environment:   Patient resides home with spouse in 3rd floor condo with elevator access. Patient reports having a tub/shower combo in bathroom and an elevated toilet seat.     Prior to admission, patients level of function was Independent for mobility and ADLs.  Equipment used at home: none .  DME owned (not currently used): none.  Upon discharge, patient will have assistance from spouse.    Patients cultural, spiritual, Baptism conflicts given the current situation: no    Objective:     Communicated with nursing staff prior to session.  Patient found up in chair with  (no active lines)  upon PT entry to room.    Exams:  Cognitive Exam:  Patient is oriented to Person, Place, Time, and Situation  RLE ROM: Grossly WFL  RLE Strength: Grossly 3+/5 to 4/5  LLE ROM: Grossly WFL  LLE Strength: Grossly 3+/5 to 4/5    Functional Mobility:   11/18/23 0845   Prior Functioning: Everyday Activities   Self Care Independent   Indoor Mobility (Ambulation) Independent   Stairs Not applicable   Functional Cognition Independent   Prior Device Use None of the given options   Functional Limitation in Range of Motion   Upper Extremity No impairment   Lower Extremity No impairment   Mobility Devices Walker;Wheelchair (manual or electric)   Roll Left and Right   Did they attempt the activity? Yes   Was the activity done independently? No   Assistance Needed Supervision  (SBA on level mat without railings)   Was adaptive equipment used? No   CARE Score - Roll Left and Right 4   Sit to Lying   Did they attempt the activity? Yes    Was the activity done independently? No   Assistance Needed Physical assistance  (ModA to elevate BLE onto level mat without railings; cues for sequencing placement of BUE/trunk)   Physical Assistance Level More than half   Was adaptive equipment used? No   CARE Score - Sit to Lying 2   Lying to Sitting on Side of Bed   Did they attempt the activity? Yes   Was the activity done independently? No   Assistance Needed Supervision  (SBA with verbal cues on level mat without railings)   Was adaptive equipment used? No   CARE Score - Lying to Sitting on Side of Bed 4   Sit to Stand   Did they attempt the activity? Yes   Was the activity done independently? No   Assistance Needed Physical assistance  (Rafael using RW with cues for hand placement)   Physical Assistance Level Less than half   Was adaptive equipment used? Yes   CARE Score - Sit to Stand 3   Sit to Stand Discharge Goal   Discharge Goal 4   Chair/Bed-to-Chair Transfer   Did they attempt the activity? Yes   Was the activity done independently? No   Assistance Needed Physical assistance  (ModA with verbal cues for stepping pattern using RW; decreased balance when stepping back to chair)   Physical Assistance Level More than half   Was adaptive equipment used? Yes   CARE Score - Chair/Bed-to-Chair Transfer 2   Car Transfer   Did they attempt the activity? No   Reason if not Attempted Environmental limitations   CARE Score - Car Transfer 10   Walk 10 Feet   Did they attempt the activity? Yes   Was the activity done independently? No   Assistance Needed Physical assistance  (Patient ambulated 35 feet and 47 feet using RW with Rafael and rehab tech following close with W/C for safety.  Forefoot contact noted throughout gait trials; cues to increase step height and increase heel contact. Cues to stand erect within ADRIAN of RW.)   Physical Assistance Level 2 or more helpers  (Decreased balance and safety)   Was adaptive equipment used? Yes   CARE Score - Walk 10 Feet 1    Walk 50 Feet with Two Turns   Did they attempt the activity? No   Reason if not Attempted Safety concerns  (Fatigue and decreased safety limiting increased distance traveled.)   CARE Score - Walk 50 Feet with Two Turns 88   Walk 150 Feet   Did they attempt the activity? No   Reason if not Attempted Safety concerns   CARE Score - Walk 150 Feet 88   Walking 10 Feet on Uneven Surfaces   Did they attempt the activity? No   Reason if not Attempted Safety concerns   CARE Score - Walking 10 Feet on Uneven Surfaces 88   1 Step (Curb)   Did they attempt the activity? No   Reason if not Attempted Safety concerns   CARE Score - 1 Step (Curb) 88   4 Steps   Did they attempt the activity? No   Reason if not Attempted Safety concerns   CARE Score - 4 Steps 88   12 Steps   Did they attempt the activity? No   Reason if not Attempted Safety concerns   CARE Score - 12 Steps 88   Picking Up Object   Did they attempt the activity? Yes   Was the activity done independently? No   Assistance Needed Physical assistance  (Rafael using RW and reacher)   Physical Assistance Level Less than half   Was adaptive equipment used? Yes   CARE Score - Picking Up Object 3   OTHER   Uses a Wheelchair/Scooter? Yes   Wheel 50 Feet with Two Turns   Did they attempt the activity? Yes   Was the activity done independently? No   Assistance Needed Supervision;Verbal cues  (Patient propelled W/C 97 feet using BUE with SBA and increased cues for hand placement and steering of chair.)   Type of Wheelchair/Scooter Manual   CARE Score - Wheel 50 Feet with Two Turns 4   Wheel 150 Feet   Did they attempt the activity? No   Reason if not Attempted Safety concerns  (Fatigue after 97 feet of propulsion)   CARE Score - Wheel 150 Feet 88     Education:  Patient provided with daily orientation and goals of this PT session.  Patient educated to transfer to bedside chair/bedside commode/bathroom with RN/PCT present.   Patient educated on assistive device use, bed mobility  training, Fall risk, gait training, home safety, plan of care, and transfer training by explanation and demonstration.   Patient Verbalized Understanding.  Time provided for therapeutic counseling and discussion of current health disposition. All questions answered to satisfaction, within scope of PT practice; voiced no other concerns. White board updated in patient's room, nursing staff notified of session.    Therapeutic Activities and Exercises:   LE ergometer x 10 minutes for LE strengthening, ROM, and endurance; cues to restart revolutions throughout trial.    Repeated functional transfers and additional gait training performed during session. See above chart for details.     AM-PAC 6 CLICK MOBILITY  Total Score:14     Patient left  seated in bedside chair  with call button in reach and nursing staff notified.    GOALS:   Multidisciplinary Problems       Physical Therapy Goals          Problem: Physical Therapy    Goal Priority Disciplines Outcome Goal Variances Interventions   Physical Therapy Goal     PT, PT/OT Ongoing, Progressing     Description: Goals to be met by: 23     Patient will increase functional independence with mobility by performin. Supine to sit with Modified [place]  2. Sit to supine with Stand-by Assistance  3. Rolling to Left and Right with Modified [place]  4. Sit to stand transfer with Stand-by Assistance  5. Bed to chair transfer with Stand-by Assistance using Rolling Walker  6. Gait  x 150 feet with Stand-by Assistance using Rolling Walker  7. Wheelchair propulsion x 150 feet with Modified [place] using bilateral upper extremities                         History:     Past Medical History:   Diagnosis Date    Abnormal echocardiogram 2013    Mild MVR 3/07    CAD (coronary artery disease) 2013    Angio 3/07 Dr. Lin    Hypercholesterolemia 2022    Lymphoma in remission 2013    S/p chemo/XRT 1992 Relapse  BMT     Normal cardiac stress  test 2/8/2013    Nuclear stress 2/09    Pernicious anemia 2/8/2013    Thyroid nodule 2/8/2013    Right s/p Bx 6/08 Dr. Pelayo       Past Surgical History:   Procedure Laterality Date    EYE SURGERY Bilateral 2016    Cataracts       Time Tracking:     PT Received On: 11/18/23  PT Start Time: 0845     PT Stop Time: 0940  PT Total Time (min): 55 min     Billable Minutes: Evaluation 15, Gait Training 10, Therapeutic Activity 20, and Therapeutic Exercise 10      11/18/2023

## 2023-11-18 NOTE — PLAN OF CARE
Problem: Adult Inpatient Plan of Care  Goal: Plan of Care Review  Outcome: Ongoing, Progressing  Goal: Patient-Specific Goal (Individualized)  Outcome: Ongoing, Progressing  Goal: Absence of Hospital-Acquired Illness or Injury  Outcome: Ongoing, Progressing  Goal: Optimal Comfort and Wellbeing  Outcome: Ongoing, Progressing  Goal: Readiness for Transition of Care  Outcome: Ongoing, Progressing     Problem: Skin Injury Risk Increased  Goal: Skin Health and Integrity  Outcome: Ongoing, Progressing     Problem: Fall Injury Risk  Goal: Absence of Fall and Fall-Related Injury  Outcome: Ongoing, Progressing  Intervention: Identify and Manage Contributors  Flowsheets (Taken 11/18/2023 0322)  Self-Care Promotion:   BADL personal objects within reach   BADL personal routines maintained  Medication Review/Management: medications reviewed  Intervention: Promote Injury-Free Environment  Flowsheets (Taken 11/18/2023 0322)  Safety Promotion/Fall Prevention:   instructed to call staff for mobility   side rails raised x 2   medications reviewed   lighting adjusted

## 2023-11-18 NOTE — NURSING
Nurses Note -- 4 Eyes      11/17/2023   10:16 PM      Skin assessed during: Admit      [x] No Altered Skin Integrity Present    []Prevention Measures Documented      [] Yes- Altered Skin Integrity Present or Discovered   [] LDA Added if Not in Epic (Describe Wound)   [] New Altered Skin Integrity was Present on Admit and Documented in LDA   [] Wound Image Taken    Wound Care Consulted? No    Attending Nurse:  Raysa Subramanian RN/Staff Member:   Anastacia RIVERO

## 2023-11-18 NOTE — PT/OT/SLP EVAL
"Occupational Therapy   Evaluation    Name: Sam Gamino  MRN: [id number]  Admit Date: 11/17/2023  Recent Surgeries:      General Precautions: Standard, aspiration, fall  Orthopedic Precautions:N/A   Braces: N/A    Recommendations:     Discharge Recommendations: Low Intensity Therapy  Level of Assistance Recommended: 24 hours significant assistance and 24 hours light assistance  Discharge Equipment Recommendations:  wheelchair, walker, rolling, bath bench, bedside commode  Barriers to discharge:  Decreased caregiver support    Assessment:     Sam Gamino is a 78 y.o. male with a medical diagnosis of <principal problem not specified>.  He presents with the following performance deficits affecting function: weakness, impaired endurance, impaired self care skills, impaired functional mobility, gait instability, impaired balance, decreased coordination, decreased lower extremity function, decreased safety awareness, decreased ROM, impaired coordination, impaired skin, edema.  Pt was agreeable to and participated in OT evaluation.  Pt reports that he was mod I at PLOF without use of DME.  Pt currently requires set-up - max A with ADLs and CGA-min A with func mobility.  Goals established to assist pt with returning to OF regarding ADLs and func mobility.  Pt will benefit from skilled OT services in order to increase his level of safety and independence with ADLs and mobility.      Rehab Potential is good    Activity Tolerance: Fair    Plan:     Patient to be seen 5 x/week to address the above listed problems via self-care/home management, therapeutic activities, therapeutic exercises  Plan of Care Expires: 12/18/23  Plan of Care Reviewed with: patient    Subjective     Chief Complaint: N/A  Patient/Family Comments/goals: be able to walk outside and "do for himself"    Occupational Profile:  Living Environment: pt lives with his wife in a 3rd floor condo - elevator access - t/s combo  Previous level of function: mod I " with mobility and ADLS  Roles and Routines: ; [employer]  Equipment Used at Home: none  Assistance upon Discharge: assist from wife    Pain/Comfort:  Pain Rating 1: 0/10  Pain Rating Post-Intervention 1: 0/10    Patients cultural, spiritual, Ep[employer]alian conflicts given the current situation: no    Objective:     Communicated with: nurse prior to session.  Patient found HOB elevated with  (no active lines) upon OT entry to room.    Occupational Performance:        11/18/23 1356   Functional Limitation in Range of Motion   Upper Extremity No impairment - BUEs = WFL;  Strength = WFL;  R handed   Eating   Did they attempt the activity? Yes   Was the activity done independently? Yes   CARE Score - Eating 6   Oral Hygiene   Did they attempt the activity? Yes   Was the activity done independently? No   Assistance Needed Setup / clean-up   Was adaptive equipment used? No   CARE Score - Oral Hygiene 5   Toileting Hygiene   Did they attempt the activity? Yes   Was the activity done independently? No   Assistance Needed Physical assistance   Physical Assistance Level More than half  (Max A - needed A with hygiene and clothing management after)   Was adaptive equipment used? Yes  (grab bar)   CARE Score - Toileting Hygiene 2   Toileting Hygiene Discharge Goal   Discharge Goal 4   Shower/Bathe Self   Did they attempt the activity? Yes   Was the activity done independently? No   Assistance Needed Physical assistance   Physical Assistance Level Less than half  (Mod A - sponge bath in chair - Assist needed to wash B feet and buttocks)   CARE Score - Shower/Bathe Self 3   Upper Body Dressing   Did they attempt the activity? Yes   Was the activity done independently? No   Assistance Needed Setup / clean-up   Was adaptive equipment used? No   CARE Score - Upper Body Dressing 5   Lower Body Dressing   Did they attempt the activity? Yes   Was the activity done independently? No   Assistance Needed Physical assistance   Physical  Assistance Level More than half  (Max A - A needed to insert B feet into pants - w/c level)   Was adaptive equipment used? No   CARE Score - Lower Body Dressing 2   Putting On/Taking Off Footwear   Did they attempt the activity? Yes   Was the activity done independently? No   Assistance Needed Physical assistance   Physical Assistance Level More than half  (Max A to nena socks)   CARE Score - Putting On/Taking Off Footwear 2   Personal Hygiene   Did they attempt the activity? Yes   Was the activity done independently? No   Assistance Needed Setup / clean-up  (w/c level at sink)   Was adaptive equipment used? No   CARE Score - Personal Hygiene 5   Roll Left and Right   Did they attempt the activity? Yes   Was the activity done independently? No   Assistance Needed Supervision   Was adaptive equipment used? No   CARE Score - Roll Left and Right 4   Lying to Sitting on Side of Bed   Did they attempt the activity? Yes   Was the activity done independently? No   Assistance Needed Physical assistance   Physical Assistance Level Less than half  (Min A to lift trunk all the way)   Was adaptive equipment used? Yes  (bed rail)   CARE Score - Lying to Sitting on Side of Bed 3   Sit to Stand   Did they attempt the activity? Yes   Was the activity done independently? No   Assistance Needed Physical assistance   Physical Assistance Level Less than half  (Min A)   Was adaptive equipment used? Yes  (RW)   CARE Score - Sit to Stand 3   Toilet Transfer   Did they attempt the activity? Yes   Was the activity done independently? No   Assistance Needed Physical assistance   Physical Assistance Level Less than half  (Min A)   Was adaptive equipment used? Yes  (GB and RW - removed BSC from over toilet because pt stated it was a tight fit on his hips)   CARE Score - Toilet Transfer 3     Cognitive/Visual Perceptual:  Cognitive/Psychosocial Skills:     -       Oriented to: Person, Place, Time, and Situation   -       Follows  Commands/attention:Easily distracted and Follows one-step commands  -       Communication: clear/fluent  -       Memory: Impaired short term reflected on BIMS  -       Safety awareness/insight to disability: impaired   -       Mood/Affect/Coping skills/emotional control: Appropriate to situation    Physical Exam:  Balance: -       Sit = good;  Stand = CGA-Min A with RW  Postural examination/scapula alignment:    -       Rounded shoulders  -       Forward head  -       Posterior pelvic tilt  -       Flexed hips and knees  Skin integrity: Visible skin intact  Edema:  Severe - B feet/LE  Sensation: -       Intact    AMPAC 6 Click ADL:  AMPAC Total Score: 16    Treatment & Education:  Pt completed ADLs and func mobility activities for tx session as noted above  Whiteboard updated  Pt educated on role of OT and POC      Patient left up in chair with call button in reach    GOALS:   Multidisciplinary Problems       Occupational Therapy Goals          Problem: Occupational Therapy    Goal Priority Disciplines Outcome Interventions   Occupational Therapy Goal     OT, PT/OT Ongoing, Progressing    Description: Goals to be met by: 12/18/23     Patient will increase functional independence with ADLs by performing:    UE Dressing with Modified [place].  LE Dressing with Modified [place] and Assistive Devices as needed.  Grooming while standing at sink with Stand-by Assistance.  Toileting from toilet with Supervision for hygiene and clothing management.   Bathing from shower chair with Minimal Assistance.  Toilet transfer to toilet with Stand-by Assistance.  Upper extremity exercise program 3 x15 reps per handout, with supervision to improve UE strength/coordination for improved func mobility skills.                         History:     Past Medical History:   Diagnosis Date    Abnormal echocardiogram 2/8/2013    Mild MVR 3/07    CAD (coronary artery disease) 2/8/2013    Angio 3/07 Dr. Lin    Hypercholesterolemia  6/27/2022    Lymphoma in remission 2/8/2013    S/p chemo/XRT 1992 Relapse 1993 BMT 1993    Normal cardiac stress test 2/8/2013    Nuclear stress 2/09    Pernicious anemia 2/8/2013    Thyroid nodule 2/8/2013    Right s/p Bx 6/08 Dr. Pelayo         Past Surgical History:   Procedure Laterality Date    EYE SURGERY Bilateral 2016    Cataracts       Time Tracking:     OT Date of Treatment: 11/18/23  OT Start Time: 0740  OT Stop Time: 0834  OT Total Time (min): 54 min    Billable Minutes:Evaluation 15  Self Care/Home Management 39    11/18/2023

## 2023-11-18 NOTE — NURSING
POC reviewed with pt, pt verbalized understanding. Safety maintained throughout shift, bed locked and in lowest position, call light in reach, Side rails up X 2. Non skid sock on when OOB. Pt remained free of fall/trauma. Pt declined any pain throughout shift.  VS stable.  no reports of nausea and vomiting. Pt is currently up watching tv in bed. No signs of distress, rise and fall of chest noted, respirations even and unlabored.  Plan of care on going. No future concerns as of present.

## 2023-11-18 NOTE — NURSING
The patient arrived by transportation van via wheelchair and is alone. Pt is aaox4 and able to make need known.  No signs of distress, rise and fall of chest noted, respirations  even and unlabored. Call light in reach. Wife notified of pt arrival. Also instructed pt on how to use call light when needed.Pt verbalized understanding.    No future concerns as of present.

## 2023-11-18 NOTE — PLAN OF CARE
Problem: Occupational Therapy  Goal: Occupational Therapy Goal  Description: Goals to be met by: 12/18/23     Patient will increase functional independence with ADLs by performing:    UE Dressing with Modified [place].  LE Dressing with Modified [place] and Assistive Devices as needed.  Grooming while standing at sink with Stand-by Assistance.  Toileting from toilet with Supervision for hygiene and clothing management.   Bathing from shower chair with Minimal Assistance.  Toilet transfer to toilet with Stand-by Assistance.  Upper extremity exercise program 3 x15 reps per handout, with supervision to improve UE strength/coordination for improved func mobility skills.    Outcome: Ongoing, Progressing     Pt was agreeable to and participated in OT evaluation.  Pt reports that he was mod I at PLOF without use of DME.  Pt currently requires set-up - max A with ADLs and CGA-min A with func mobility.  Goals established to assist pt with returning to PLOF regarding ADLs and func mobility.  Pt will benefit from skilled OT services in order to increase his level of safety and independence with ADLs and mobility.      Casie Miles, OT  11/18/2023

## 2023-11-18 NOTE — PLAN OF CARE
Evaluation completed. POC established.     Problem: Physical Therapy  Goal: Physical Therapy Goal  Description: Goals to be met by: 23     Patient will increase functional independence with mobility by performin. Supine to sit with Modified [place]  2. Sit to supine with Stand-by Assistance  3. Rolling to Left and Right with Modified [place]  4. Sit to stand transfer with Stand-by Assistance  5. Bed to chair transfer with Stand-by Assistance using Rolling Walker  6. Gait  x 150 feet with Stand-by Assistance using Rolling Walker  7. Wheelchair propulsion x 150 feet with Modified [place] using bilateral upper extremities    Outcome: Ongoing, Progressing   2023

## 2023-11-19 PROCEDURE — 25000242 PHARM REV CODE 250 ALT 637 W/ HCPCS: Performed by: HOSPITALIST

## 2023-11-19 PROCEDURE — 11000004 HC SNF PRIVATE

## 2023-11-19 PROCEDURE — 25000003 PHARM REV CODE 250: Performed by: HOSPITALIST

## 2023-11-19 RX ADMIN — PANTOPRAZOLE SODIUM 40 MG: 40 TABLET, DELAYED RELEASE ORAL at 08:11

## 2023-11-19 RX ADMIN — METOPROLOL SUCCINATE 50 MG: 50 TABLET, EXTENDED RELEASE ORAL at 08:11

## 2023-11-19 RX ADMIN — LOSARTAN POTASSIUM 100 MG: 50 TABLET, FILM COATED ORAL at 08:11

## 2023-11-19 RX ADMIN — CHOLECALCIFEROL TAB 25 MCG (1000 UNIT) 2000 UNITS: 25 TAB at 08:11

## 2023-11-19 RX ADMIN — SPIRONOLACTONE 25 MG: 25 TABLET ORAL at 08:11

## 2023-11-19 RX ADMIN — TAMSULOSIN HYDROCHLORIDE 0.4 MG: 0.4 CAPSULE ORAL at 08:11

## 2023-11-19 RX ADMIN — APIXABAN 10 MG: 2.5 TABLET, FILM COATED ORAL at 08:11

## 2023-11-19 RX ADMIN — ASPIRIN 81 MG CHEWABLE TABLET 81 MG: 81 TABLET CHEWABLE at 08:11

## 2023-11-19 RX ADMIN — FUROSEMIDE 40 MG: 40 TABLET ORAL at 08:11

## 2023-11-19 RX ADMIN — SENNOSIDES AND DOCUSATE SODIUM 1 TABLET: 8.6; 5 TABLET ORAL at 08:11

## 2023-11-19 RX ADMIN — FLUTICASONE PROPIONATE 100 MCG: 50 SPRAY, METERED NASAL at 08:11

## 2023-11-19 RX ADMIN — ATORVASTATIN CALCIUM 40 MG: 40 TABLET, FILM COATED ORAL at 08:11

## 2023-11-19 NOTE — PLAN OF CARE
Patient continue with assist to transfer A& O x4  Problem: Adult Inpatient Plan of Care  Goal: Plan of Care Review  Outcome: Ongoing, Progressing  Flowsheets (Taken 11/19/2023 1750)  Plan of Care Reviewed With: patient  Goal: Patient-Specific Goal (Individualized)  Outcome: Ongoing, Progressing  Flowsheets (Taken 11/19/2023 1750)  Anxieties, Fears or Concerns: none  Individualized Care Needs: get better go home  Goal: Absence of Hospital-Acquired Illness or Injury  Outcome: Ongoing, Progressing  Intervention: Identify and Manage Fall Risk  Flowsheets (Taken 11/19/2023 1750)  Safety Promotion/Fall Prevention:   assistive device/personal item within reach   instructed to call staff for mobility  Intervention: Prevent Skin Injury  Flowsheets (Taken 11/19/2023 1750)  Body Position: position changed independently  Skin Protection:   adhesive use limited   tubing/devices free from skin contact  Intervention: Prevent and Manage VTE (Venous Thromboembolism) Risk  Flowsheets (Taken 11/19/2023 1750)  Activity Management:   Ambulated -L4   Ambulated to bathroom - L4   Ambulated in room - L4   Walk with assistive devise and /or staff member - L3  Range of Motion: active ROM (range of motion) encouraged     Problem: Skin Injury Risk Increased  Goal: Skin Health and Integrity  Outcome: Ongoing, Progressing     Problem: Fall Injury Risk  Goal: Absence of Fall and Fall-Related Injury  Outcome: Ongoing, Progressing

## 2023-11-20 LAB
ANION GAP SERPL CALC-SCNC: 9 MMOL/L (ref 8–16)
BASOPHILS # BLD AUTO: 0.04 K/UL (ref 0–0.2)
BASOPHILS NFR BLD: 0.7 % (ref 0–1.9)
BUN SERPL-MCNC: 14 MG/DL (ref 8–23)
CALCIUM SERPL-MCNC: 9.1 MG/DL (ref 8.7–10.5)
CHLORIDE SERPL-SCNC: 107 MMOL/L (ref 95–110)
CO2 SERPL-SCNC: 24 MMOL/L (ref 23–29)
CREAT SERPL-MCNC: 0.8 MG/DL (ref 0.5–1.4)
DIFFERENTIAL METHOD: ABNORMAL
EOSINOPHIL # BLD AUTO: 0.1 K/UL (ref 0–0.5)
EOSINOPHIL NFR BLD: 2.1 % (ref 0–8)
ERYTHROCYTE [DISTWIDTH] IN BLOOD BY AUTOMATED COUNT: 12 % (ref 11.5–14.5)
EST. GFR  (NO RACE VARIABLE): >60 ML/MIN/1.73 M^2
GLUCOSE SERPL-MCNC: 97 MG/DL (ref 70–110)
HCT VFR BLD AUTO: 38.7 % (ref 40–54)
HGB BLD-MCNC: 12.5 G/DL (ref 14–18)
IMM GRANULOCYTES # BLD AUTO: 0.02 K/UL (ref 0–0.04)
IMM GRANULOCYTES NFR BLD AUTO: 0.4 % (ref 0–0.5)
LYMPHOCYTES # BLD AUTO: 1.3 K/UL (ref 1–4.8)
LYMPHOCYTES NFR BLD: 24.1 % (ref 18–48)
MAGNESIUM SERPL-MCNC: 1.7 MG/DL (ref 1.6–2.6)
MCH RBC QN AUTO: 33.8 PG (ref 27–31)
MCHC RBC AUTO-ENTMCNC: 32.3 G/DL (ref 32–36)
MCV RBC AUTO: 105 FL (ref 82–98)
MONOCYTES # BLD AUTO: 0.4 K/UL (ref 0.3–1)
MONOCYTES NFR BLD: 8.2 % (ref 4–15)
NEUTROPHILS # BLD AUTO: 3.5 K/UL (ref 1.8–7.7)
NEUTROPHILS NFR BLD: 64.5 % (ref 38–73)
NRBC BLD-RTO: 0 /100 WBC
PHOSPHATE SERPL-MCNC: 3.3 MG/DL (ref 2.7–4.5)
PLATELET # BLD AUTO: 151 K/UL (ref 150–450)
PMV BLD AUTO: 11.3 FL (ref 9.2–12.9)
POTASSIUM SERPL-SCNC: 3.7 MMOL/L (ref 3.5–5.1)
RBC # BLD AUTO: 3.7 M/UL (ref 4.6–6.2)
SODIUM SERPL-SCNC: 140 MMOL/L (ref 136–145)
WBC # BLD AUTO: 5.35 K/UL (ref 3.9–12.7)

## 2023-11-20 PROCEDURE — 97530 THERAPEUTIC ACTIVITIES: CPT | Mod: CQ

## 2023-11-20 PROCEDURE — 83735 ASSAY OF MAGNESIUM: CPT | Performed by: HOSPITALIST

## 2023-11-20 PROCEDURE — 97110 THERAPEUTIC EXERCISES: CPT | Mod: CQ

## 2023-11-20 PROCEDURE — 85025 COMPLETE CBC W/AUTO DIFF WBC: CPT | Performed by: HOSPITALIST

## 2023-11-20 PROCEDURE — 36415 COLL VENOUS BLD VENIPUNCTURE: CPT | Performed by: HOSPITALIST

## 2023-11-20 PROCEDURE — 25000003 PHARM REV CODE 250: Performed by: HOSPITALIST

## 2023-11-20 PROCEDURE — 80048 BASIC METABOLIC PNL TOTAL CA: CPT | Performed by: HOSPITALIST

## 2023-11-20 PROCEDURE — 25000242 PHARM REV CODE 250 ALT 637 W/ HCPCS: Performed by: NURSE PRACTITIONER

## 2023-11-20 PROCEDURE — 84100 ASSAY OF PHOSPHORUS: CPT | Performed by: HOSPITALIST

## 2023-11-20 PROCEDURE — 97116 GAIT TRAINING THERAPY: CPT | Mod: CQ

## 2023-11-20 PROCEDURE — 11000004 HC SNF PRIVATE

## 2023-11-20 PROCEDURE — 97535 SELF CARE MNGMENT TRAINING: CPT | Mod: CO

## 2023-11-20 RX ORDER — ACETAMINOPHEN 325 MG/1
650 TABLET ORAL EVERY 6 HOURS PRN
Status: DISCONTINUED | OUTPATIENT
Start: 2023-11-20 | End: 2023-12-01 | Stop reason: HOSPADM

## 2023-11-20 RX ORDER — METOPROLOL SUCCINATE 50 MG/1
50 TABLET, EXTENDED RELEASE ORAL
Qty: 90 TABLET | Refills: 3 | Status: SHIPPED | OUTPATIENT
Start: 2023-11-20 | End: 2023-12-07

## 2023-11-20 RX ADMIN — APIXABAN 10 MG: 2.5 TABLET, FILM COATED ORAL at 09:11

## 2023-11-20 RX ADMIN — ATORVASTATIN CALCIUM 40 MG: 40 TABLET, FILM COATED ORAL at 09:11

## 2023-11-20 RX ADMIN — SENNOSIDES AND DOCUSATE SODIUM 1 TABLET: 8.6; 5 TABLET ORAL at 09:11

## 2023-11-20 RX ADMIN — TAMSULOSIN HYDROCHLORIDE 0.4 MG: 0.4 CAPSULE ORAL at 09:11

## 2023-11-20 RX ADMIN — METOPROLOL SUCCINATE 50 MG: 50 TABLET, EXTENDED RELEASE ORAL at 09:11

## 2023-11-20 RX ADMIN — SPIRONOLACTONE 25 MG: 25 TABLET ORAL at 09:11

## 2023-11-20 RX ADMIN — FLUTICASONE PROPIONATE 100 MCG: 50 SPRAY, METERED NASAL at 09:11

## 2023-11-20 RX ADMIN — LOSARTAN POTASSIUM 100 MG: 50 TABLET, FILM COATED ORAL at 09:11

## 2023-11-20 RX ADMIN — EMPAGLIFLOZIN 10 MG: 10 TABLET, FILM COATED ORAL at 12:11

## 2023-11-20 RX ADMIN — CHOLECALCIFEROL TAB 25 MCG (1000 UNIT) 2000 UNITS: 25 TAB at 09:11

## 2023-11-20 RX ADMIN — PANTOPRAZOLE SODIUM 40 MG: 40 TABLET, DELAYED RELEASE ORAL at 09:11

## 2023-11-20 RX ADMIN — ASPIRIN 81 MG CHEWABLE TABLET 81 MG: 81 TABLET CHEWABLE at 09:11

## 2023-11-20 RX ADMIN — FUROSEMIDE 40 MG: 40 TABLET ORAL at 09:11

## 2023-11-20 NOTE — CONSULTS
"  [facility] - Skilled Nursing  Adult Nutrition  Consult Note    SUMMARY   Recommendations  Recommend low sodium diet,  review HF education prior to dc, RD following  Goals: PO to meet 85% of EEN/EPN by next RD follow up  Nutrition Goal Status: new  Communication of RD Recs: other (comment) (POC)    Assessment and Plan  No nutrition diagnosis at this time    Malnutrition Assessment  11/20     Nails (Micronutrient): pallor  Teeth (Micronutrient): broken dentition  Neck/Chest (Micronutrient): subcutaneous fat loss, muscle wasting           Orbital Region (Subcutaneous Fat Loss): moderate depletion  Upper Arm Region (Subcutaneous Fat Loss): well nourished  Thoracic and Lumbar Region: well nourished   Restorationist Region (Muscle Loss): mild depletion  Clavicle and Acromion Bone Region (Muscle Loss): mild depletion                 Reason for Assessment    Reason For Assessment: consult  Diagnosis:  (SAH, PE)  Relevant Medical History: Lymphoma in remission, pernicious  anemia, CAD, HLD, HTN, HF, constipation, kindey transplant  Interdisciplinary Rounds: attended  General Information Comments: PO intake is good, no problems chewing/swallowing despite missing eight teeth. Previously on cardiac diet in acute care. patient reports that his edema is resolved. % NFPE WNL .Chart review shows nutrition education on HF 5/18/22  Nutrition Discharge Planning: low sodium diet  Nutrition/Diet History    Patient Reported Diet/Restrictions/Preferences: general  Typical Food/Fluid Intake: regular meals  Food Preferences: none, eats milk, fish, beans  Spiritual, Cultural Beliefs, Rastafarian Practices, Values that Affect Care: no  Food Allergies: NKFA  Factors Affecting Nutritional Intake: None identified at this time    Anthropometrics    Temp: 98.3 °F (36.8 °C)  Height: 6' 1" (185.4 cm)  Height (inches): 73 in  Weight: 107.7 kg (237 lb 7 oz)  Weight (lb): 237.44 lb  Ideal Body Weight (IBW), Male: 184 lb  % Ideal Body Weight, Male (lb): " 129.04 %  BMI (Calculated): 31.3  BMI Grade: 30 - 34.9- obesity - grade I  Usual Body Weight (UBW), k.5 kg  Weight Change Amount:  (8% loss in one month, thought to be fluid weight as he has been eating well.)  % Usual Body Weight: 91.85  % Weight Change From Usual Weight: -8.34 %       Lab/Procedures/Meds    Pertinent Labs Reviewed: reviewed  Pertinent Labs Comments: Hg 12.5, Hct 38.7, Bili 1.6, HgA1c 5.1,  Pertinent Medications Reviewed: reviewed  Pertinent Medications Comments: ASA, Apixaban, Jardiance(HF), senna-docusate, Vit D, spironolactone, lasix, pantoprazole, statin,  Estimated/Assessed Needs    Weight Used For Calorie Calculations: 107.7 kg (237 lb 7 oz)  Energy Calorie Requirements (kcal): 1860  Energy Need Method: [place]-St Jeor (x 1.0(PAL))  Protein Requirements: 100  Weight Used For Protein Calculations: 83.6 kg (184 lb 4.9 oz) (IBW 2/2 obesity x 1.2g/kg)  Fluid Requirements (mL): 1860 or per MD  Estimated Fluid Requirement Method: RDA Method  RDA Method (mL): 1860         Nutrition Prescription Ordered    Current Diet Order: regular  Nutrition Order Comments: %    Evaluation of Received Nutrient/Fluid Intake  I/O: no data  Energy Calories Required: meeting needs  Fluid Required: meeting needs    Comments: LBM , patient has been saving his fruit for later, has three servings in room, reviewed the always available menu with patient, he has no food preferences. Patient reports weight loss could also be decreased PO intake in acute care.  Tolerance: tolerating  % Intake of Estimated Energy Needs: 75 - 100 %  % Meal Intake: 75 - 100 %    Nutrition Risk    Level of Risk/Frequency of Follow-up: low (One time per week)       Monitor and Evaluation    Food and Nutrient Intake: food and beverage intake  Food and Nutrient Adminstration: diet order  Anthropometric Measurements: weight change  Biochemical Data, Medical Tests and Procedures: gastrointestinal profile, electrolyte and renal  panel  Nutrition-Focused Physical Findings: overall appearance       Nutrition Follow-Up    RD Follow-up?: Yes

## 2023-11-20 NOTE — PROGRESS NOTES
[facility]                                  Skilled Nursing Facility                   Progress Note     Admit Date: 11/17/2023  ROLANDO TBD  [id number] A  Principal Problem:  SAH (subarachnoid hemorrhage)   HPI obtained from patient interview and chart review     Chief Complaint: Establish Care/ Initial Visit     HPI:   Mr. Gamino is a 78 year old male PMHx of lymphoma in remission, CAD, nonrheumatic mitral regurg, CHF, first-degree AV block, aortic arthrosclerosis who presents to SNF following hospitalization for SAH.  Admission to SNF for secondary weakness and debility.    Patient originally presented to [facility] ED on 11/4 with complaints of bilateral neck pain that happened approximately 2 hours prior to arrival.  Per hospital notes,   Reports that the pain started immediately after having a bowel movement.  He does have associated headache that radiates up to his temples. Patient is on baby ASA. Also reports some associated weakness with walking. States he woke up this morning feeling normal without any pain or weakness. Denies fever/chills, vision/hearing changes, dysphagia, dysarthria, vomiting, new-onset weakness or sensory change, bowel/bladder changes. CTH with prepontine SAH; CTA negative for vessel lesions.    11/05/2023: PBD 1. NAEON. AFVSS. Exam stable, intact. MRI/MRA completed. On strict SAH protocol.  11/6: PBD 2. NAEON. AFVSS. -1L / 24 hrs. MRI/MRA negative. DSA yesterday negative. Plan for CTA PBD 7  11/07/2023: PBD 3. NAEON. AFVSS. Exam stable. I/O negative. HA improved. Denies new weakness/numbness/tingling.  11/08/2023: PBD 4. NAEON. AFVSS. HA nearly resolved.  11/9: NAEON. AFVSS. TCDs WNL. Plan for CTA PBD 7. Will discuss possibility of stepping down before CTA. Exam stable  11/10: NAEON. AFVSS. TCDs yesterday WNL. Planning for CTA tomorrow. Exam stable. Hopefully discharge tomorrow if CTA stable  11/11: NAEON.  "AFVSS. TCDs wnl yesterday. CTA today, pending. Exam stable. D/c today if CTA stable.   11/12: NAEON. AFVSS. CTA yesterday w/o evidence of vasospasm. Exam stable. DC recs changed to SNF, pending SNF  11/13: NAEON. AFVSS. No complaints. Neuro exam stable. Pending discharge to SNF.   11/14: Pt became acutely dizzy while working with PT this am, briefly unresponsive to staff then recovered. Became tachycardic/diaphoretic following episode. Neurologically intact on exam. CTA head/neck revealed b/l PE. Vitals stable. Starting on heparin gtt, Echo ordered.   11/15: NAEON. AFVSS. Pt reports feeling much improved today. Denies any SOB/CP/palpitations, denies HA. Neurologically stable. Sats 100% on 2L. Supratherapeutic on heparin gtt. CTH today. TTE negative for R heart strain.   11/16: NAEON. VSS. Pt without new complaints today. Continues to deny SOB ro CP. Eliquis started yesterday. CTH remained stable. + BM. SNF pending   11/17: NAEON. AFVSS. Pt doing well this morning, no acute complaints. Bowel regimen changed to PRN due to multiple BM's. Medically stable for discharge to [facility] today. Will arrange follow up in NSGY clinic in about 6 weeks with CTA. Will need outpatient follow up with established cardiology, has appt scheduled for 12/7."    Today, patient reports continued headaches that are improving overall.  He states he has a good appetite and denies pain anywhere else.    Patient will be treated at [facility] with PT and OT to improve functional status and ability to perform ADLs.     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    Past Surgical History: Patient has a past surgical history that includes Eye surgery (Bilateral, 2016).    Social History: Patient " reports that he has never smoked. He has never used smokeless tobacco. He reports current alcohol use of about 1.0 standard drink of alcohol per week. He reports that he does not use drugs.    Family History: family history includes Cancer in his mother; Hypertension in his brother; No Known Problems in his brother, brother, brother, daughter, father, sister, and sister; Thyroid disease in his sister.    Allergies: Patient is allergic to lotensin [benazepril].    ROS  Constitutional: Negative for fever   Eyes: Negative for blurred vision, double vision   Respiratory: Negative for cough, shortness of breath   Cardiovascular: Negative for chest pain, palpitations, and leg swelling.   Gastrointestinal: Negative for abdominal pain, constipation, diarrhea, nausea, vomiting.   Genitourinary: Negative for dysuria, frequency   Musculoskeletal:  + generalized weakness. Negative for back pain and myalgias.   Skin: Negative for itching and rash.   Neurological: Negative for dizziness. +  headaches.   Psychiatric/Behavioral: Negative for depression. The patient is not nervous/anxious.      24 hour Vital Sign Range   Temp:  [98.3 °F (36.8 °C)-98.6 °F (37 °C)]   Pulse:  [100-103]   Resp:  [18]   BP: (118-120)/(60)   SpO2:  [98 %]     Current BMI: Body mass index is 31.33 kg/m².    PEx  Constitutional: Patient appears debilitated.  No distress noted  HENT:   Head: Normocephalic and atraumatic.   Eyes: Pupils are equal, round  Neck: Normal range of motion. Neck supple.   Cardiovascular: Normal rate, regular rhythm and normal heart sounds.    Pulmonary/Chest: Effort normal and breath sounds are clear  Abdominal: Soft. Bowel sounds are normal.   Musculoskeletal: Normal range of motion.   Neurological: Alert and oriented to person, place, and time.   Psychiatric: Normal mood and affect. Behavior is normal.   Skin: Skin is warm and dry.     Recent Labs   Lab 11/20/23  0420      K 3.7      CO2 24   BUN 14   CREATININE 0.8    CALCIUM 9.1   MG 1.7       Recent Labs   Lab 11/20/23  0420   WBC 5.35   RBC 3.70*   HGB 12.5*   HCT 38.7*      *   MCH 33.8*   MCHC 32.3       Recent Labs   Lab 11/14/23  0939   POCTGLUCOSE 106        Assessment and Plan:    SAH (subarachnoid hemorrhage)  - CTH with prepontine SAH; CTA negative for vessel lesions.   - CTA 11/4: negative for vascular pathology  - MRI/MRA Brain 11/5: negative for underlying lesion  - DSA 11/5: negative for vascular pathology  - CTA 11/11: no evidence of vasospasm  - CTA head/neck 11/14: Stable intracranial findings with interval decrease in SAH/IVH, no evidence of vasospasm. Bilateral pulmonary emboli visualized in the upper chest.   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  - patient completed 7 day course of Keppra 500 BID  - Goal normotensive, SBP < 160  - follow-up with neurosurgery on 12/14 with CT scan    Acute pulmonary embolism  - Episode of dizziness, tachycardia and diaphoresis on 11/14  - CTA head/neck revealed bilateral pulmonary emboli.  - Echo 11/15 negative for R heart strain  - Eliquis 10mg BID started for 7 days followed by 5mg BID x 3 months   - Will need outpatient follow up with established Cardiologist, set for 12/7     Hypercholesterolemia  - Continue home atorvastatin 40 mg daily     Heart failure, diastolic, chronic  - Followed by cardiology as OP - next appointment is for 12/7  - Echo from 11/5 reviewed: estimated ejection fraction of 55 - 60%. There is normal diastolic function.  - continue Lasix 40 mg daily, losartan 100 mg daily, metoprolol XL 50 mg daily, Jardiance 10 mg daily, spironolactone 25 mg daily     Primary hypertension  - Continue home losartan 100 mg daily, metoprolol XL 50 mg daily     Benign prostatic hyperplasia with urinary frequency  - Continue home dose tamsulosin      Coronary artery disease  - continue ASA 81 mg daily, atorvastatin    GERD  - continue Protonix 40 mg  daily    BPH  - continue tamsulosin 0.4 mg daily    Debility   - Continue with PT/OT for gait training and strengthening and restoration of ADL's   - Encourage mobility, OOB in chair, and early ambulation as appropriate  - Fall precautions   - Monitor for bowel and bladder dysfunction  - Monitor for and prevent skin breakdown and pressure ulcers  - Continue DVT prophylaxis with apixaban      Anticipate disposition:  Home with home health    IP OHS RISK OF UNPLANNED READMISSION Model: HIGH MODERATE LOW    Follow-up needed during SNF stay-    Follow-up needed after discharge from SNF: PCP     Future Appointments   Date Time Provider Department Center   12/7/2023 10:30 AM Ema Montemayor MD [facility] [place] [id number]   12/14/2023  1:15 PM [facility] OI-CT2 500 LB LIMIT [facility]   12/14/2023  2:30 PM [facility]-CT1 500 LB LIMIT [facility]   12/14/2023  4:00 PM Koki Espinal PA-C [facility] [id number] [facility]     I certify that SNF services are required to be given on an inpatient basis because Sam Gamino needs for skilled nursing care and/or skilled rehabilitation are required on a daily basis and such services can only practically be provided in a skilled nursing facility setting and are for an ongoing condition for which she received inpatient care in the hospital.     Total time of the visit 149 minutes   Description of non physical exam/non charting time: counseling patient on clinical conditions and therapies provided.  Extensive chart review completed including all consultation notes.  All pertinent laboratory and radiographical images reviewed.        Helena Wetzel NP  Department of Hospital Medicine   [facility]     DOS: 11/20/2023       Patient note was created using odal Dictation.  Any errors in syntax or even information may not have been identified and edited on initial review prior to signing this note.

## 2023-11-20 NOTE — PT/OT/SLP PROGRESS
Occupational Therapy   Treatment    Name: Sam Gamino  MRN: [id number]  Admit Date: 11/17/2023  Admitting Diagnosis:  SAH (subarachnoid hemorrhage)    General Precautions: Standard, aspiration, fall   Orthopedic Precautions: N/A   Braces: N/A    Recommendations:     Discharge Recommendations:  Low Intensity Therapy  Level of Assistance Recommended at Discharge: 24 hours physical assistance for all ADL's and home management tasks and 24 hours light assistance for ADL's and homemaking tasks  Discharge Equipment Recommendations: wheelchair, walker, rolling, bath bench, bedside commode  Barriers to discharge:  Decreased caregiver support    Assessment:     Sam Gamino is a 78 y.o. male with a medical diagnosis of SAH (subarachnoid hemorrhage).  He presents with  Performance deficits affecting function are weakness, impaired endurance, impaired self care skills, impaired functional mobility, gait instability, impaired balance, decreased coordination, decreased lower extremity function, decreased safety awareness, decreased ROM, impaired coordination, impaired skin, edema.     Pt. Was cooperative and participated well with session on this day. Pt  continues to demonstrate levels of physical deficits with  functional indep with daily management activities tasks, selfcare skills with balance,  functional mobility, UB strength and endurance. Pt. Will continue to benefit from continued OT to progress towards goals      Rehab Potential is fair      Activity tolerance:  Fair    Plan:     Patient to be seen 5 x/week to address the above listed problems via self-care/home management, therapeutic activities, therapeutic exercises    Plan of Care Expires: 12/18/23  Plan of Care Reviewed with: patient    Subjective     Communicated with: Nsg and Pt.  prior to session. I am doing well today .    Pain/Comfort:  Pain Rating 1: 0/10  Pain Rating Post-Intervention 1: 0/10    Patient's cultural, spiritual, [employer]aritan conflicts given  the current situation:  no    Objective:     Patient found up in chair with PCT present   upon OT entry to room.    Functional Mobility/Transfers:  Patient completed Sit <> Stand Transfer with contact guard assistance  with  rolling walker     Activities of Daily Living:  Grooming: supervision at sink level with shaving aspects and grooming needs  Bathing: minimum assistance in for BLE feet management Pt. Able to wash all other areas  Upper Body Dressing: stand by assistance to nena pull over shirt  Lower Body Dressing: moderate assistance to uw/pants seated and to manage over hips instance with RW for bal and (A) to nena BLE socks.    [facility] 6 Click ADL: 16      Treatment & Education:  Pt edu on role of OT, POC, safety when performing self care tasks , benefit of performing OOB activity, and safety when performing functional transfers and mobility management for preparation with goals to progress towards next level of care     Patient left up in chair with all lines intact and call button in reach    GOALS:   Multidisciplinary Problems       Occupational Therapy Goals          Problem: Occupational Therapy    Goal Priority Disciplines Outcome Interventions   Occupational Therapy Goal     OT, PT/OT Ongoing, Progressing    Description: Goals to be met by: 12/18/23     Patient will increase functional independence with ADLs by performing:    UE Dressing with Modified [place].  LE Dressing with Modified [place] and Assistive Devices as needed.  Grooming while standing at sink with Stand-by Assistance.  Toileting from toilet with Supervision for hygiene and clothing management.   Bathing from shower chair with Minimal Assistance.  Toilet transfer to toilet with Stand-by Assistance.  Upper extremity exercise program 3 x15 reps per handout, with supervision to improve UE strength/coordination for improved func mobility skills.                         Time Tracking:     OT Date of Treatment: 11/20/23  OT Start  Time: 0810    OT Stop Time: 0850  OT Total Time (min): 40 min    Billable Minutes:Self Care/Home Management 40    11/20/2023  A client care conference was performed between the LOTR and RODAS, prior to treatment to discuss the patient's status, treatment plan and established goals.

## 2023-11-20 NOTE — PT/OT/SLP PROGRESS
"Physical Therapy Treatment    Patient Name:  Sam Gamino   MRN:  [id number]  Admit Date: 11/17/2023  Admitting Diagnosis: SAH (subarachnoid hemorrhage)  Recent Surgeries:     General Precautions: Standard, aspiration, fall  Orthopedic Precautions: N/A  Braces: N/A    Recommendations:     Discharge Recommendations: Low Intensity Therapy  Level of Assistance Recommended at Discharge: 24 hours significant assistance  Discharge Equipment Recommendations: wheelchair, walker, rolling, bath bench, bedside commode  Barriers to discharge: Other (Comment) (Increased assistance for safety during mobility)    Assessment:     Sam Gamino is a 78 y.o. male admitted with a medical diagnosis of SAH (subarachnoid hemorrhage) . Pt tolerated well, pt is very pleasant, demo good effort, pt would continue to benefit from skilled PT services to improve overall functional mobility, strength and endurance.  .      Performance deficits affecting function: weakness, impaired endurance, impaired self care skills, impaired functional mobility, gait instability, impaired balance, decreased lower extremity function, decreased safety awareness, decreased coordination, impaired cardiopulmonary response to activity.    Rehab Potential is good    Activity Tolerance: Fair    Plan:     Patient to be seen 5 x/week to address the above listed problems via gait training, therapeutic activities, therapeutic exercises, neuromuscular re-education, wheelchair management/training    Plan of Care Expires: 12/18/23  Plan of Care Reviewed with: patient    Subjective     "Been busy this morning" .     Pain/Comfort: "sometimes my head hurts comes and goes"  Pain Rating 1: 0/10  Pain Rating Post-Intervention 1: 0/10    Patient's cultural, spiritual, Scientology conflicts given the current situation:  no    Objective:       Patient found with  (in BSC) upon PT entry to room.     Therapeutic Activities and Exercises: mini elliptical x 12 minutes    Functional " "Mobility:  Transfers:     Sit to Stand:  contact guard assistance and minimum assistance with rolling walker and vcs for handplacement  Bed to Chair: contact guard assistance and minimum assistance with  rolling walker  using  Stand Pivot and ~ 5 ft WC<>BSC  Gait: amb with RW min/CGA ~ 35 ft and 96 ft seated rest break occ vcs for inc B step height/clearance, initiates but does not maintain "My wife is always telling me to  my feet"  Wheelchair Propulsion:  ~ 30 ft with BUE/BLE SBA vcs     AM-PAC 6 CLICK MOBILITY  16    Patient left up in chair with call button in reach and belongings in reach .    GOALS:   Multidisciplinary Problems       Physical Therapy Goals          Problem: Physical Therapy    Goal Priority Disciplines Outcome Goal Variances Interventions   Physical Therapy Goal     PT, PT/OT Ongoing, Progressing     Description: Goals to be met by: 23     Patient will increase functional independence with mobility by performin. Supine to sit with Modified [place]  2. Sit to supine with Stand-by Assistance  3. Rolling to Left and Right with Modified [place]  4. Sit to stand transfer with Stand-by Assistance  5. Bed to chair transfer with Stand-by Assistance using Rolling Walker  6. Gait  x 150 feet with Stand-by Assistance using Rolling Walker  7. Wheelchair propulsion x 150 feet with Modified [place] using bilateral upper extremities                         Time Tracking:     PT Received On: 23  PT Start Time: 1055  PT Stop Time: 1136  PT Total Time (min): 41 min    Billable Minutes: Gait Training 17, Therapeutic Activity 12, and Therapeutic Exercise 12    Treatment Type: Treatment  PT/PTA: PTA     Number of PTA visits since last PT visit: 2     2023  "

## 2023-11-20 NOTE — CLINICAL REVIEW
Clinical Pharmacy Chart Review Note      Admit Date: 11/17/2023   LOS: 3 days       Sam Gamino is a 78 y.o. male admitted to SNF for PT/OT after hospitalization for SAH (subarachnoid hemorrhage).    Active Hospital Problems    Diagnosis  POA    *SAH (subarachnoid hemorrhage) [I60.9]  Yes    Acute pulmonary embolism [I26.99]  Yes    Atherosclerosis of aorta [I70.0]  Yes    Atrioventricular block, first degree [I44.0]  Yes    Hypercholesterolemia [E78.00]  Yes    Heart failure, diastolic, chronic [I50.32]  Yes    Primary hypertension [I10]  Yes    Lower extremity edema [R60.0]  Yes    Tricuspid regurgitation [I07.1]  Yes    Mitral regurgitation [I34.0]  Yes    Benign prostatic hyperplasia with urinary frequency [N40.1, R35.0]  Yes    Chronic bilateral low back pain with bilateral sciatica [M54.42, M54.41, G89.29]  Yes    Slow transit constipation [K59.01]  Yes    Coronary artery disease [I25.10]  Yes      Resolved Hospital Problems   No resolved problems to display.     Review of patient's allergies indicates:   Allergen Reactions    Lotensin [benazepril]      cough     Patient Active Problem List    Diagnosis Date Noted    Acute pulmonary embolism 11/14/2023    Electrolyte abnormality 11/14/2023    SAH (subarachnoid hemorrhage) 11/04/2023    Atrioventricular block, first degree 11/01/2023    Aortic atherosclerosis 11/01/2023    Atherosclerosis of aorta 11/01/2023    Urinary frequency 06/22/2023    Hypercholesterolemia 06/27/2022    Lower extremity edema 05/16/2022    Primary hypertension 05/16/2022    Heart failure, diastolic, chronic 05/16/2022    Mitral regurgitation 04/27/2022    Tricuspid regurgitation 04/27/2022    Benign prostatic hyperplasia with urinary frequency 12/06/2021    Chronic bilateral low back pain with bilateral sciatica 06/17/2019    Slow transit constipation 08/08/2017    Severe obesity 05/17/2017    Known medical problems 11/05/2015    Lymphoma in remission 02/08/2013    Pernicious anemia  02/08/2013    Coronary artery disease 02/08/2013    Thyroid nodule 02/08/2013    Normal cardiac stress test 02/08/2013       Scheduled Meds:    apixaban  10 mg Oral BID    [START ON 11/22/2023] apixaban  5 mg Oral BID    aspirin  81 mg Oral Daily    atorvastatin  40 mg Oral Daily    empagliflozin  10 mg Oral Daily    fluticasone propionate  2 spray Each Nostril Daily    furosemide  40 mg Oral Daily    losartan  100 mg Oral Daily    metoprolol succinate  50 mg Oral Daily    pantoprazole  40 mg Oral Daily    senna-docusate 8.6-50 mg  1 tablet Oral BID    spironolactone  25 mg Oral Daily    tamsulosin  0.4 mg Oral Daily    vitamin D  2,000 Units Oral Daily     Continuous Infusions:   PRN Meds: acetaminophen, calcium carbonate, cetirizine, melatonin    OBJECTIVE:     Vital Signs (Last 24H)  Temp:  [98.3 °F (36.8 °C)-98.6 °F (37 °C)]   Pulse:  [100-103]   Resp:  [18]   BP: (118-120)/(60)   SpO2:  [98 %]     Laboratory:  CBC:   Recent Labs   Lab 11/16/23 0449 11/17/23 0427 11/20/23 0420   WBC 7.20 6.28 5.35   RBC 3.89* 3.97* 3.70*   HGB 13.1* 13.3* 12.5*   HCT 39.3* 41.0 38.7*   * 138* 151   * 103* 105*   MCH 33.7* 33.5* 33.8*   MCHC 33.3 32.4 32.3     BMP:   Recent Labs   Lab 11/15/23  0810 11/17/23  0818 11/20/23  0420    96 97    138 140   K 3.5 3.6 3.7    105 107   CO2 28 25 24   BUN 20 19 14   CREATININE 0.8 0.8 0.8   CALCIUM 8.8 9.1 9.1   MG  --   --  1.7     CMP:   Recent Labs   Lab 11/15/23  0810 11/17/23  0818 11/20/23  0420    96 97   CALCIUM 8.8 9.1 9.1    138 140   K 3.5 3.6 3.7   CO2 28 25 24    105 107   BUN 20 19 14   CREATININE 0.8 0.8 0.8     Lab Results   Component Value Date    ALT 17 11/10/2023    AST 21 11/10/2023    ALKPHOS 70 11/10/2023    BILITOT 1.6 (H) 11/10/2023     Lab Results   Component Value Date    LABA1C 4.8 05/16/2017    HGBA1C 5.1 11/05/2023           ASSESSMENT/PLAN:     I have reviewed the medications in compliance with CMS  Regulation F756 of the [place]DY. No irregularities were noted at this time.    **According to PMH and home medication list, patient takes the following medications at home. These medications are not currently ordered at [facility]:  Linaclotide 290 mcg daily before breakfast  Omeprazole 20 mg daily (non-formulary, currently taking pantoprazole)  Solifenacin 10 mg daily (non-formulary)    Medications reviewed by PharmD, please re-consult if needed.      Kristin Dominguez, Pharm. D.  Clinical Pharmacist  [facility]

## 2023-11-21 PROCEDURE — 25000003 PHARM REV CODE 250: Performed by: HOSPITALIST

## 2023-11-21 PROCEDURE — 11000004 HC SNF PRIVATE

## 2023-11-21 PROCEDURE — 94761 N-INVAS EAR/PLS OXIMETRY MLT: CPT

## 2023-11-21 PROCEDURE — 25000242 PHARM REV CODE 250 ALT 637 W/ HCPCS: Performed by: NURSE PRACTITIONER

## 2023-11-21 PROCEDURE — 97530 THERAPEUTIC ACTIVITIES: CPT | Mod: CQ

## 2023-11-21 PROCEDURE — 97116 GAIT TRAINING THERAPY: CPT | Mod: CQ

## 2023-11-21 PROCEDURE — 97530 THERAPEUTIC ACTIVITIES: CPT | Mod: CO

## 2023-11-21 PROCEDURE — 97110 THERAPEUTIC EXERCISES: CPT | Mod: CQ

## 2023-11-21 RX ORDER — LOSARTAN POTASSIUM 50 MG/1
50 TABLET ORAL DAILY
Status: DISCONTINUED | OUTPATIENT
Start: 2023-11-22 | End: 2023-11-26

## 2023-11-21 RX ADMIN — ASPIRIN 81 MG CHEWABLE TABLET 81 MG: 81 TABLET CHEWABLE at 08:11

## 2023-11-21 RX ADMIN — CHOLECALCIFEROL TAB 25 MCG (1000 UNIT) 2000 UNITS: 25 TAB at 08:11

## 2023-11-21 RX ADMIN — FUROSEMIDE 40 MG: 40 TABLET ORAL at 08:11

## 2023-11-21 RX ADMIN — ATORVASTATIN CALCIUM 40 MG: 40 TABLET, FILM COATED ORAL at 08:11

## 2023-11-21 RX ADMIN — FLUTICASONE PROPIONATE 100 MCG: 50 SPRAY, METERED NASAL at 09:11

## 2023-11-21 RX ADMIN — SPIRONOLACTONE 25 MG: 25 TABLET ORAL at 08:11

## 2023-11-21 RX ADMIN — PANTOPRAZOLE SODIUM 40 MG: 40 TABLET, DELAYED RELEASE ORAL at 08:11

## 2023-11-21 RX ADMIN — APIXABAN 10 MG: 2.5 TABLET, FILM COATED ORAL at 08:11

## 2023-11-21 RX ADMIN — LOSARTAN POTASSIUM 100 MG: 50 TABLET, FILM COATED ORAL at 08:11

## 2023-11-21 RX ADMIN — METOPROLOL SUCCINATE 50 MG: 50 TABLET, EXTENDED RELEASE ORAL at 08:11

## 2023-11-21 RX ADMIN — SENNOSIDES AND DOCUSATE SODIUM 1 TABLET: 8.6; 5 TABLET ORAL at 09:11

## 2023-11-21 RX ADMIN — APIXABAN 10 MG: 2.5 TABLET, FILM COATED ORAL at 09:11

## 2023-11-21 RX ADMIN — SENNOSIDES AND DOCUSATE SODIUM 1 TABLET: 8.6; 5 TABLET ORAL at 08:11

## 2023-11-21 RX ADMIN — EMPAGLIFLOZIN 10 MG: 10 TABLET, FILM COATED ORAL at 08:11

## 2023-11-21 RX ADMIN — TAMSULOSIN HYDROCHLORIDE 0.4 MG: 0.4 CAPSULE ORAL at 08:11

## 2023-11-21 NOTE — PT/OT/SLP PROGRESS
Occupational Therapy   Treatment    Name: Sam Gamino  MRN: [id number]  Admit Date: 11/17/2023  Admitting Diagnosis:  SAH (subarachnoid hemorrhage)    General Precautions: Standard, aspiration, fall   Orthopedic Precautions: N/A   Braces: N/A    Recommendations:     Discharge Recommendations:  Low Intensity Therapy  Level of Assistance Recommended at Discharge: 24 hours physical assistance for all ADL's and home management tasks and 24 hours light assistance for ADL's and homemaking tasks  Discharge Equipment Recommendations: wheelchair, walker, rolling, bath bench, bedside commode  Barriers to discharge:  Decreased caregiver support    Assessment:     Sam Gamino is a 78 y.o. male with a medical diagnosis of SAH (subarachnoid hemorrhage).  He presents with  Performance deficits affecting function are weakness, impaired endurance, impaired self care skills, impaired functional mobility, gait instability, impaired balance, decreased coordination, decreased lower extremity function, decreased safety awareness, decreased ROM, impaired coordination, impaired skin, edema.     Pt. Was cooperative and participated well with session on this day. Pt  continues to demonstrate levels of physical deficits with  functional indep with daily management activities tasks, selfcare skills with balance,  functional mobility, UB strength and endurance. Pt. Will continue to benefit from continued OT to progress towards goals      Rehab Potential is fair      Activity tolerance:  Fair    Plan:     Patient to be seen 5 x/week to address the above listed problems via self-care/home management, therapeutic activities, therapeutic exercises    Plan of Care Expires: 12/18/23  Plan of Care Reviewed with: patient    Subjective     Communicated with: Nsg and Pt.  prior to session. I am doing well today .    Pain/Comfort:  Pain Rating 1: 0/10  Pain Rating Post-Intervention 1: 0/10    Patient's cultural, spiritual, Church conflicts given  the current situation:  no    Objective:     Patient found up in chair   upon OT entry to room.    Functional Mobility/Transfers:  Patient completed Sit <> Stand Transfer with contact guard assistance  with  rolling walker   Patient completed Toilet Transfer Stand Pivot technique with contact guard assistance with  grab bars    Activities of Daily Living:  Grooming: supervision at sink level with  grooming needs  Toileting: minimum assistance with clothing management Pt. Able to clean perineal area    AMPAC 6 Click ADL: 16      OT Exercises: UE Ergometer 10 min     Treatment & Education:   Pt. With 2# dowel activity with 2x10 reps with  shd flex, bicep curls  and forward flex motion to increase BUE ROM and strength.  Pt. With therex performed to increase ROM, endurance selfcare task and fxl mobility for independence     Pt edu on role of OT, POC, safety when performing self care tasks , benefit of performing OOB activity, and safety when performing functional transfers and mobility management for preparation with goals to progress towards next level of care     Patient left up in chair with all lines intact and call button in reach    GOALS:   Multidisciplinary Problems       Occupational Therapy Goals          Problem: Occupational Therapy    Goal Priority Disciplines Outcome Interventions   Occupational Therapy Goal     OT, PT/OT Ongoing, Progressing    Description: Goals to be met by: 12/18/23     Patient will increase functional independence with ADLs by performing:    UE Dressing with Modified [place].  LE Dressing with Modified [place] and Assistive Devices as needed.  Grooming while standing at sink with Stand-by Assistance.  Toileting from toilet with Supervision for hygiene and clothing management.   Bathing from shower chair with Minimal Assistance.  Toilet transfer to toilet with Stand-by Assistance.  Upper extremity exercise program 3 x15 reps per handout, with supervision to improve UE  strength/coordination for improved func mobility skills.                         Time Tracking:     OT Date of Treatment: 11/21/23  OT Start Time: 1003    OT Stop Time: 1041  OT Total Time (min): 38 min    Billable Minutes:Therapeutic Activity 38    11/21/2023

## 2023-11-21 NOTE — PROGRESS NOTES
[facility]                                  Skilled Nursing Facility                   Progress Note     Admit Date: 11/17/2023  ROLANDO TBD  [id number] A  Principal Problem:  SAH (subarachnoid hemorrhage)   HPI obtained from patient interview and chart review     Chief Complaint: Re-evaluation of medical treatment and therapy status:  Hypotension    HPI:   Mr. Gamino is a 78 year old male PMHx of lymphoma in remission, CAD, nonrheumatic mitral regurg, CHF, first-degree AV block, aortic arthrosclerosis who presents to SNF following hospitalization for SAH.  Admission to SNF for secondary weakness and debility.      Interval history:  24 hr vital sign ranges reviewed and listed below.  24 hour blood pressure range is 106/60 to 114/72.  Patient denies shortness of breath, abdominal discomfort, nausea, or vomiting.  Patient reports an adequate appetite.  Patient denies dysuria.  Patient reports having regular bowel movements.  Patient progessing with PT/OT. Continuing to follow and treat all acute and chronic conditions.      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    Past Surgical History: Patient has a past surgical history that includes Eye surgery (Bilateral, 2016).    Social History: Patient reports that he has never smoked. He has never used smokeless tobacco. He reports current alcohol use of about 1.0 standard drink of alcohol per week. He reports that he does not use drugs.    Family History: family history includes Cancer in his mother; Hypertension in his brother; No Known Problems in his brother, brother, brother, daughter, father, sister, and sister; Thyroid disease in  "his sister.    Allergies: Patient is allergic to lotensin [benazepril].    ROS  Constitutional: Negative for fever   Eyes: Negative for blurred vision, double vision   Respiratory: Negative for cough, shortness of breath   Cardiovascular: Negative for chest pain, palpitations, and leg swelling.   Gastrointestinal: Negative for abdominal pain, constipation, diarrhea, nausea, vomiting.   Genitourinary: Negative for dysuria, frequency   Musculoskeletal:  + generalized weakness. Negative for back pain and myalgias.   Skin: Negative for itching and rash.   Neurological: Negative for dizziness. +  headaches.   Psychiatric/Behavioral: Negative for depression. The patient is not nervous/anxious.      24 hour Vital Sign Range   Temp:  [98.1 °F (36.7 °C)-98.4 °F (36.9 °C)]   Pulse:  [101]   Resp:  [16-18]   BP: (106-114)/(60-72)   SpO2:  [96 %-97 %]     Current BMI: Body mass index is 31.62 kg/m².    PEx  Constitutional: Patient appears debilitated.  No distress noted  HENT:   Head: Normocephalic and atraumatic.   Eyes: Pupils are equal, round  Neck: Normal range of motion. Neck supple.   Cardiovascular: Normal rate, regular rhythm and normal heart sounds.    Pulmonary/Chest: Effort normal and breath sounds are clear  Abdominal: Soft. Bowel sounds are normal.   Musculoskeletal: Normal range of motion.   Neurological: Alert and oriented to person, place, and time.   Psychiatric: Normal mood and affect. Behavior is normal.   Skin: Skin is warm and dry.     No results for input(s): "GLUCOSE", "NA", "K", "CL", "CO2", "BUN", "CREATININE", "CALCIUM", "MG" in the last 24 hours.      No results for input(s): "WBC", "RBC", "HGB", "HCT", "PLT", "MCV", "MCH", "MCHC" in the last 24 hours.      No results for input(s): "POCTGLUCOSE" in the last 168 hours.       Assessment and Plan:    SAH (subarachnoid hemorrhage)  - CTH with prepontine SAH; CTA negative for vessel lesions.   - CTA 11/4: negative for vascular pathology  - MRI/MRA Brain " 11/5: negative for underlying lesion  - DSA 11/5: negative for vascular pathology  - CTA 11/11: no evidence of vasospasm  - CTA head/neck 11/14: Stable intracranial findings with interval decrease in SAH/IVH, no evidence of vasospasm. Bilateral pulmonary emboli visualized in the upper chest.   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  - patient completed 7 day course of Keppra 500 BID  - Goal normotensive, SBP < 160  - follow-up with neurosurgery on 12/14 with CT scan    Hypotension  Hx of Primary hypertension  - home regimen with losartan 100 mg daily, metoprolol XL 50 mg daily  - 11/21 reduce losartan 50 mg daily with holding parameter, continue metoprolol XL 50 mg daily, discontinue spironolactone    Acute pulmonary embolism  - Episode of dizziness, tachycardia and diaphoresis on 11/14  - CTA head/neck revealed bilateral pulmonary emboli.  - Echo 11/15 negative for R heart strain  - Eliquis 10mg BID started for 7 days followed by 5mg BID x 3 months   - outpatient follow up with established Cardiologist, set for 12/7     Hypercholesterolemia  - Continue home atorvastatin 40 mg daily     Heart failure, diastolic, chronic  - Followed by cardiology as OP - next appointment is for 12/7  - Echo from 11/5 reviewed: estimated ejection fraction of 55 - 60%. There is normal diastolic function.  - continue Lasix 40 mg daily, losartan 100 mg daily, metoprolol XL 50 mg daily, Jardiance 10 mg daily, hold spironolactone 25 mg daily       Benign prostatic hyperplasia with urinary frequency  - Continue home dose tamsulosin      Coronary artery disease  - continue ASA 81 mg daily, atorvastatin    GERD  - continue Protonix 40 mg daily    BPH  - continue tamsulosin 0.4 mg daily    Debility   - Continue with PT/OT for gait training and strengthening and restoration of ADL's   - Encourage mobility, OOB in chair, and early ambulation as appropriate  - Fall precautions   - Monitor for bowel and  bladder dysfunction  - Monitor for and prevent skin breakdown and pressure ulcers  - Continue DVT prophylaxis with apixaban      Anticipate disposition:  Home with home health    IP OHS RISK OF UNPLANNED READMISSION Model: HIGH MODERATE LOW    Follow-up needed during SNF stay-    Follow-up needed after discharge from SNF: PCP     Future Appointments   Date Time Provider Department Center   12/7/2023 10:30 AM Ema Montemayor MD [facility] [id number]   12/14/2023  1:15 PM [facility] OI-CT2 500 LB LIMIT [facility]   12/14/2023  2:30 PM [facility]-CT[facility] 500 LB LIMIT [facility]   12/14/2023  4:00 PM Koki Espinal PA-C [id number]  Department of Hospital Medicine   [facility]     DOS: 11/21/2023       Patient note was created using MModal Dictation.  Any errors in syntax or even information may not have been identified and edited on initial review prior to signing this note.

## 2023-11-21 NOTE — PLAN OF CARE
Problem: Adult Inpatient Plan of Care  Goal: Plan of Care Review  Outcome: Ongoing, Progressing  Flowsheets (Taken 11/20/2023 2106)  Plan of Care Reviewed With: patient  Goal: Patient-Specific Goal (Individualized)  Outcome: Ongoing, Progressing  Goal: Absence of Hospital-Acquired Illness or Injury  Outcome: Ongoing, Progressing  Goal: Optimal Comfort and Wellbeing  Outcome: Ongoing, Progressing  Goal: Readiness for Transition of Care  Outcome: Ongoing, Progressing     Problem: Skin Injury Risk Increased  Goal: Skin Health and Integrity  Outcome: Ongoing, Progressing  Intervention: Optimize Skin Protection  Flowsheets (Taken 11/20/2023 2106)  Pressure Reduction Techniques: frequent weight shift encouraged  Skin Protection: protective footwear used  Head of Bed (HOB) Positioning: HOB at 20-30 degrees     Problem: Fall Injury Risk  Goal: Absence of Fall and Fall-Related Injury  Outcome: Ongoing, Progressing  Intervention: Promote Injury-Free Environment  Flowsheets (Taken 11/20/2023 2106)  Safety Promotion/Fall Prevention:   assistive device/personal item within reach   Fall Risk reviewed with patient/family   nonskid shoes/socks when out of bed   instructed to call staff for mobility   Supervised toileting - stay within arms reach   medications reviewed

## 2023-11-21 NOTE — PT/OT/SLP PROGRESS
Physical Therapy Treatment    Patient Name:  Sam Gamino   MRN:  [id number]  Admit Date: 11/17/2023  Admitting Diagnosis: SAH (subarachnoid hemorrhage)  Recent Surgeries: N/A    General Precautions: Standard, aspiration, fall  Orthopedic Precautions: N/A  Braces: N/A    Recommendations:     Discharge Recommendations: Low Intensity Therapy  Level of Assistance Recommended at Discharge: 24 hours significant assistance  Discharge Equipment Recommendations: wheelchair, walker, rolling, bath bench, bedside commode  Barriers to discharge: Other (Comment) (Increased assistance for safety during mobility)    Assessment:     Sam Gamino is a 78 y.o. male admitted with a medical diagnosis of SAH (subarachnoid hemorrhage) . Pt tolerated well, pt able to nena shirt and shoes EOB at beginning of session. Pt amb with CGA with increased distance. Pt completed TE and LBE seated in w/c to tolerance. Pt pleasant and would continue to benefit from skilled PT services to improve overall functional mobility, strength and endurance.      Performance deficits affecting function: weakness, impaired endurance, impaired self care skills, impaired functional mobility, gait instability, impaired balance, decreased lower extremity function, decreased safety awareness, decreased coordination, impaired cardiopulmonary response to activity.    Rehab Potential is good    Activity Tolerance: Good    Plan:     Patient to be seen 5 x/week to address the above listed problems via gait training, therapeutic activities, therapeutic exercises, neuromuscular re-education, wheelchair management/training    Plan of Care Expires: 12/18/23  Plan of Care Reviewed with: patient    Subjective     Pt agreeable for PT.     Pain/Comfort:  Pain Rating 1: 0/10  Pain Rating Post-Intervention 1: 0/10    Patient's cultural, spiritual, Pentecostalism conflicts given the current situation:  no    Objective:       Patient found HOB elevated with  (no lines) upon PT entry to  room.     Therapeutic Activities and Exercises: Seated TE: hip flex, hip abd/add, LAQ, AP x 20 reps for BLE strengthening    LBE X 10 min For BLE strengthening, endurance and ROM, light res    Patient educated on role of therapy, goals of session, and benefits of out of bed mobility.   Instructed on use of call button and importance of calling nursing staff for assistance with mobility   Questions/concerns addressed within PTA scope of practice  Pt verbalized understanding    Functional Mobility:  Bed Mobility:     Supine to Sit: stand by assistance, HOB elevated with rail  Transfers:     Sit to Stand:  stand by assistance with rolling walker  Bed to Chair: contact guard assistance with  rolling walker  using  Stand Pivot  Gait: pt amb ~120 ft + ~100 ft CGA with RW. Seated rest break in between trials. Pt shuffling feet when ambulating, vc to increase hip flexion, vc for gaze direction and upright posture  Wheelchair Propulsion:  Pt propelled lightweight wheelchair x ~90 feet on Level tile with  Bilateral upper extremity with Stand-by Assistance. Vc for technique    AM-PAC 6 CLICK MOBILITY  16    Patient left up in chair with  PT tech .    GOALS:   Multidisciplinary Problems       Physical Therapy Goals          Problem: Physical Therapy    Goal Priority Disciplines Outcome Goal Variances Interventions   Physical Therapy Goal     PT, PT/OT Ongoing, Progressing     Description: Goals to be met by: 23     Patient will increase functional independence with mobility by performin. Supine to sit with Modified [place]  2. Sit to supine with Stand-by Assistance  3. Rolling to Left and Right with Modified [place]  4. Sit to stand transfer with Stand-by Assistance  5. Bed to chair transfer with Stand-by Assistance using Rolling Walker  6. Gait  x 150 feet with Stand-by Assistance using Rolling Walker  7. Wheelchair propulsion x 150 feet with Modified [place] using bilateral upper extremities                          Time Tracking:     PT Received On: 11/21/23  PT Start Time: 0836  PT Stop Time: 0917  PT Total Time (min): 41 min    Billable Minutes: Gait Training 16, Therapeutic Activity 10, and Therapeutic Exercise 15    Treatment Type: Treatment  PT/PTA: PTA     Number of PTA visits since last PT visit: 3     11/21/2023

## 2023-11-22 PROCEDURE — 11000004 HC SNF PRIVATE

## 2023-11-22 PROCEDURE — 25000003 PHARM REV CODE 250: Performed by: NURSE PRACTITIONER

## 2023-11-22 PROCEDURE — 99306 1ST NF CARE HIGH MDM 50: CPT | Mod: AI,,, | Performed by: HOSPITALIST

## 2023-11-22 PROCEDURE — 25000242 PHARM REV CODE 250 ALT 637 W/ HCPCS: Performed by: NURSE PRACTITIONER

## 2023-11-22 PROCEDURE — 97110 THERAPEUTIC EXERCISES: CPT | Mod: CQ

## 2023-11-22 PROCEDURE — 99306 PR NURSING FACILITY CARE, INIT, HIGH SEVERITY: ICD-10-PCS | Mod: AI,,, | Performed by: HOSPITALIST

## 2023-11-22 PROCEDURE — 97116 GAIT TRAINING THERAPY: CPT | Mod: CQ

## 2023-11-22 PROCEDURE — 25000003 PHARM REV CODE 250: Performed by: HOSPITALIST

## 2023-11-22 PROCEDURE — 97530 THERAPEUTIC ACTIVITIES: CPT | Mod: CO

## 2023-11-22 RX ORDER — SIMETHICONE 80 MG
2 TABLET,CHEWABLE ORAL 4 TIMES DAILY PRN
Status: DISCONTINUED | OUTPATIENT
Start: 2023-11-22 | End: 2023-12-01 | Stop reason: HOSPADM

## 2023-11-22 RX ADMIN — EMPAGLIFLOZIN 10 MG: 10 TABLET, FILM COATED ORAL at 08:11

## 2023-11-22 RX ADMIN — TAMSULOSIN HYDROCHLORIDE 0.4 MG: 0.4 CAPSULE ORAL at 08:11

## 2023-11-22 RX ADMIN — SENNOSIDES AND DOCUSATE SODIUM 1 TABLET: 8.6; 5 TABLET ORAL at 08:11

## 2023-11-22 RX ADMIN — FUROSEMIDE 40 MG: 40 TABLET ORAL at 08:11

## 2023-11-22 RX ADMIN — PANTOPRAZOLE SODIUM 40 MG: 40 TABLET, DELAYED RELEASE ORAL at 08:11

## 2023-11-22 RX ADMIN — METOPROLOL SUCCINATE 50 MG: 50 TABLET, EXTENDED RELEASE ORAL at 08:11

## 2023-11-22 RX ADMIN — Medication 6 MG: at 08:11

## 2023-11-22 RX ADMIN — ASPIRIN 81 MG CHEWABLE TABLET 81 MG: 81 TABLET CHEWABLE at 08:11

## 2023-11-22 RX ADMIN — FLUTICASONE PROPIONATE 100 MCG: 50 SPRAY, METERED NASAL at 08:11

## 2023-11-22 RX ADMIN — APIXABAN 5 MG: 2.5 TABLET, FILM COATED ORAL at 08:11

## 2023-11-22 RX ADMIN — APIXABAN 10 MG: 2.5 TABLET, FILM COATED ORAL at 08:11

## 2023-11-22 RX ADMIN — CHOLECALCIFEROL TAB 25 MCG (1000 UNIT) 2000 UNITS: 25 TAB at 08:11

## 2023-11-22 RX ADMIN — ATORVASTATIN CALCIUM 40 MG: 40 TABLET, FILM COATED ORAL at 08:11

## 2023-11-22 RX ADMIN — LOSARTAN POTASSIUM 50 MG: 50 TABLET, FILM COATED ORAL at 08:11

## 2023-11-22 NOTE — PT/OT/SLP PROGRESS
Occupational Therapy   Treatment    Name: Sam Gamino  MRN: [id number]  Admit Date: 11/17/2023  Admitting Diagnosis:  SAH (subarachnoid hemorrhage)    General Precautions: Standard, aspiration, fall   Orthopedic Precautions: N/A   Braces: N/A    Recommendations:     Discharge Recommendations:  Low Intensity Therapy  Level of Assistance Recommended at Discharge: 24 hours light assistance for ADL's and homemaking tasks  Discharge Equipment Recommendations: wheelchair, walker, rolling, bath bench, bedside commode  Barriers to discharge:  Decreased caregiver support    Assessment:     Sam Gamino is a 78 y.o. male with a medical diagnosis of SAH (subarachnoid hemorrhage).  He presents with Performance deficits affecting function are weakness, impaired endurance, impaired self care skills, impaired functional mobility, gait instability, impaired balance, decreased coordination, decreased lower extremity function, decreased safety awareness, decreased ROM, impaired coordination, impaired skin, edema.     Pt. Was cooperative and participated well with session on this day. Pt  continues to demonstrate levels of physical deficits with  functional indep with daily management activities tasks, selfcare skills with balance,  functional mobility, UB strength and endurance. Pt. Will continue to benefit from continued OT to progress towards goals      Rehab Potential is fair    Activity tolerance:  Fair    Plan:     Patient to be seen 5 x/week to address the above listed problems via self-care/home management, therapeutic activities, therapeutic exercises    Plan of Care Expires: 12/18/23  Plan of Care Reviewed with: patient    Subjective     Communicated with: Nsg and Pt and  prior to session. I am doing well today    Pain/Comfort:  Pain Rating 1: 0/10  Pain Rating Post-Intervention 1: 0/10    Patient's cultural, spiritual, Yarsani conflicts given the current situation:  no    Objective:     Patient found up in chair     upon OT entry to room.    Functional Mobility/Transfers:  Patient completed Sit <> Stand Transfer with contact guard assistance  with  rolling walker   Patient completed Toilet Transfer Step Transfer technique with contact guard assistance with  rolling walker  Functional Mobility: Pt. With fxl mobility to and from inroom bath with RW and CGA for bal and cues for RW placement     Activities of Daily Living:  Grooming: supervision at sink level with hand hygiene  Lower Body Dressing: contact guard assistance to nena BLE tennis shoes while seated in chair  Toileting: minimum assistance with clothing management Pt. Able to self clean     AMPAC 6 Click ADL: 16    OT Exercises: UE Ergometer 10 min    Treatment & Education:  Pt. With standing act on this day with task. Pt. With CGA/SBA for balance aspects with task with  AD at raised counter Pt with visual perception task with discrimination of various shapes and sizes x 2:19 min/sec  with standing bal and min cues through out with weight shifting and use of BUE's incorporated and crossing mid line and facilitation with posture in prep for home management .     Pt. With therex performed to increase ROM, endurance selfcare task and fxl mobility for independence     Pt edu on role of OT, POC, safety when performing self care tasks , benefit of performing OOB activity, and safety when performing functional transfers and mobility management for preparation with goals to progress towards next level of care     Patient left up in chair with all lines intact, call button in reach, and PTA  present    GOALS:   Multidisciplinary Problems       Occupational Therapy Goals          Problem: Occupational Therapy    Goal Priority Disciplines Outcome Interventions   Occupational Therapy Goal     OT, PT/OT Ongoing, Progressing    Description: Goals to be met by: 12/18/23     Patient will increase functional independence with ADLs by performing:    UE Dressing with Modified  [place].  LE Dressing with Modified [place] and Assistive Devices as needed.  Grooming while standing at sink with Stand-by Assistance.  Toileting from toilet with Supervision for hygiene and clothing management.   Bathing from shower chair with Minimal Assistance.  Toilet transfer to toilet with Stand-by Assistance.  Upper extremity exercise program 3 x15 reps per handout, with supervision to improve UE strength/coordination for improved func mobility skills.                         Time Tracking:     OT Date of Treatment: 11/22/23  OT Start Time: 1300    OT Stop Time: 1336  OT Total Time (min): 36 min    Billable Minutes:Therapeutic Activity 36    11/22/2023

## 2023-11-22 NOTE — PLAN OF CARE
Problem: Adult Inpatient Plan of Care  Goal: Plan of Care Review  Outcome: Ongoing, Progressing  Flowsheets (Taken 11/21/2023 1920)  Plan of Care Reviewed With: patient  Goal: Patient-Specific Goal (Individualized)  Outcome: Ongoing, Progressing  Goal: Absence of Hospital-Acquired Illness or Injury  Outcome: Ongoing, Progressing  Goal: Optimal Comfort and Wellbeing  Outcome: Ongoing, Progressing  Goal: Readiness for Transition of Care  Outcome: Ongoing, Progressing     Problem: Skin Injury Risk Increased  Goal: Skin Health and Integrity  Outcome: Ongoing, Progressing  Intervention: Optimize Skin Protection  Flowsheets (Taken 11/21/2023 1920)  Pressure Reduction Techniques: frequent weight shift encouraged  Pressure Reduction Devices: positioning supports utilized  Skin Protection: protective footwear used  Head of Bed (HOB) Positioning: HOB at 30-45 degrees  Intervention: Promote and Optimize Oral Intake  Flowsheets (Taken 11/21/2023 1920)  Oral Nutrition Promotion:   medicated   safe use of adaptive equipment encouraged     Problem: Fall Injury Risk  Goal: Absence of Fall and Fall-Related Injury  Outcome: Ongoing, Progressing

## 2023-11-22 NOTE — PROGRESS NOTES
.11/22/2023Estimated Creatinine Clearance: 100.8 mL/min (based on SCr of 0.8 mg/dL). medications review. Not adjustment needed.   NNL.

## 2023-11-22 NOTE — H&P
"Hospital Medicine  Skilled Nursing Facility   History and Physical Exam      Date of Service: 11/22/2023      Patient Name: Sam Gamino  MRN: [id number]  Admission Date: 11/17/2023  Attending Physician: Dom Morrissey MD  Primary Care Provider: JAYLYN Zamora MD  Code Status: Full Code      Principal problem:  SAH (subarachnoid hemorrhage)      Chief Complaint:   Chief Complaint   Patient presents with    Headache     Admitted to OS for rehabilitation following hospital stay for acute, spontaneous subarachnoid hemorrhage.           HPI:   "Mr. Gamino is a 78 year old male PMHx of lymphoma in remission, CAD, nonrheumatic mitral regurg, CHF, first-degree AV block, aortic arthrosclerosis who presents to SNF following hospitalization for SAH.  Admission to SNF for secondary weakness and debility.     Patient originally presented to [facility] ED on 11/4 with complaints of bilateral neck pain that happened approximately 2 hours prior to arrival.  Per hospital notes,   Reports that the pain started immediately after having a bowel movement.  He does have associated headache that radiates up to his temples. Patient is on baby ASA. Also reports some associated weakness with walking. States he woke up this morning feeling normal without any pain or weakness. Denies fever/chills, vision/hearing changes, dysphagia, dysarthria, vomiting, new-onset weakness or sensory change, bowel/bladder changes. CTH with prepontine SAH; CTA negative for vessel lesions.    11/05/2023: PBD 1. NAEON. AFVSS. Exam stable, intact. MRI/MRA completed. On strict SAH protocol.  11/6: PBD 2. NAEON. AFVSS. -1L / 24 hrs. MRI/MRA negative. DSA yesterday negative. Plan for CTA PBD 7  11/07/2023: PBD 3. NAEON. AFVSS. Exam stable. I/O negative. HA improved. Denies new weakness/numbness/tingling.  11/08/2023: PBD 4. NAEON. AFVSS. HA nearly resolved.  11/9: NAEON. AFVSS. TCDs WNL. Plan for CTA PBD 7. Will discuss possibility of stepping down before CTA. " "Exam stable  11/10: NAEON. AFVSS. TCDs yesterday WNL. Planning for CTA tomorrow. Exam stable. Hopefully discharge tomorrow if CTA stable  11/11: NAEON. AFVSS. TCDs wnl yesterday. CTA today, pending. Exam stable. D/c today if CTA stable.   11/12: NAEON. AFVSS. CTA yesterday w/o evidence of vasospasm. Exam stable. DC recs changed to SNF, pending SNF  11/13: NAEON. AFVSS. No complaints. Neuro exam stable. Pending discharge to SNF.   11/14: Pt became acutely dizzy while working with PT this am, briefly unresponsive to staff then recovered. Became tachycardic/diaphoretic following episode. Neurologically intact on exam. CTA head/neck revealed b/l PE. Vitals stable. Starting on heparin gtt, Echo ordered.   11/15: NAEON. AFVSS. Pt reports feeling much improved today. Denies any SOB/CP/palpitations, denies HA. Neurologically stable. Sats 100% on 2L. Supratherapeutic on heparin gtt. CTH today. TTE negative for R heart strain.   11/16: NAEON. VSS. Pt without new complaints today. Continues to deny SOB ro CP. Eliquis started yesterday. CTH remained stable. + BM. SNF pending   11/17: NAEON. AFVSS. Pt doing well this morning, no acute complaints. Bowel regimen changed to PRN due to multiple BM's. Medically stable for discharge to SNF today. Will arrange follow up in NSGY clinic in about 6 weeks with CTA. Will need outpatient follow up with established cardiology, has appt scheduled for 12/7."     Today, patient reports continued headaches that are improving overall.  He states he has a good appetite and denies pain anywhere else." Per Helena Wetzel NP on 11/20/23.     He is doing well today. He has a good appetite and denies any nausea. He reports not having a bowel movement for a couple days, but that is about his norm. He reports a headache which is a little better than on admission to the hospital. He denies any vision changes. He is working well with therapy. He walked 120 feet and 100 feet with RW and CGA with PT yesterday. " He is looking forward to therapy today.     Patient admitted with skilled services with PT and OT to improve functional status and ability to perform ADLs.       Past Medical History:   Past Medical History:   Diagnosis Date    Abnormal echocardiogram 2/8/2013    Mild MVR 3/07    CAD (coronary artery disease) 2/8/2013    Angio 3/07 Dr. Lin    Hypercholesterolemia 6/27/2022    Lymphoma in remission 2/8/2013    S/p chemo/XRT 1992 Relapse 1993 BMT 1993    Normal cardiac stress test 2/8/2013    Nuclear stress 2/09    Pernicious anemia 2/8/2013    Thyroid nodule 2/8/2013    Right s/p Bx 6/08 Dr. Pelayo       Past Surgical History:   Past Surgical History:   Procedure Laterality Date    EYE SURGERY Bilateral 2016    Cataracts       Social History:   Tobacco Use    Smoking status: Never    Smokeless tobacco: Never   Substance and Sexual Activity    Alcohol use: Yes     Alcohol/week: 1.0 standard drink of alcohol     Types: 1 Standard drinks or equivalent per week     Comment: ocassionally    Drug use: No    Sexual activity: Not Currently     Partners: Female       Family History:   Family History       Problem Relation (Age of Onset)    Cancer Mother    Hypertension Brother    No Known Problems Father, Daughter, Sister, Sister, Brother, Brother, Brother    Thyroid disease Sister            No current facility-administered medications on file prior to encounter.     Current Outpatient Medications on File Prior to Encounter   Medication Sig    apixaban (ELIQUIS) 5 mg Tab Take 2 tablets (10 mg total) by mouth 2 (two) times daily for 5 days, THEN 1 tablet (5 mg total) 2 (two) times daily.    aspirin 81 MG Chew Take 81 mg by mouth once daily.    atorvastatin (LIPITOR) 80 MG tablet TAKE ONE-HALF TABLET BY MOUTH ONCE DAILY    cetirizine (ZYRTEC) 10 MG tablet Take 10 mg by mouth daily as needed.    cholecalciferol, vitamin D3, (VITAMIN D3) 50 mcg (2,000 unit) Cap Take 1 capsule by mouth once daily.    fluticasone propionate  "(FLONASE) 50 mcg/actuation nasal spray SHAKE LIQUID AND USE 2 SPRAYS(100 MCG) IN EACH NOSTRIL EVERY DAY    furosemide (LASIX) 40 MG tablet Take 1 tablet (40 mg total) by mouth once daily.    ibuprofen (ADVIL,MOTRIN) 800 MG tablet Take 1 tablet (800 mg total) by mouth 2 (two) times daily as needed for Pain.    JARDIANCE 10 mg tablet TAKE 1 TABLET DAILY    linaCLOtide (LINZESS) 290 mcg Cap capsule Take 1 capsule (290 mcg total) by mouth before breakfast.    losartan (COZAAR) 100 MG tablet Take 1 tablet (100 mg total) by mouth once daily.    omeprazole 20 mg TbEC Take 1 tablet by mouth once daily.    solifenacin (VESICARE) 10 MG tablet Take 1 tablet (10 mg total) by mouth once daily.    spironolactone (ALDACTONE) 25 MG tablet Take 1 tablet (25 mg total) by mouth once daily.    syringe with needle 3 mL 23 x 1" Syrg 1 Syringe by Misc.(Non-Drug; Combo Route) route every 30 days.    tamsulosin (FLOMAX) 0.4 mg Cap Take 1 capsule (0.4 mg total) by mouth once daily.    cyanocobalamin 1,000 mcg/mL injection INJECT 1 ML IN THE MUSCLE EVERY 30 DAYS       Allergies:   Review of patient's allergies indicates:   Allergen Reactions    Lotensin [benazepril]      cough       ROS:  Review of Systems   Constitutional:  Negative for appetite change and fever.   Respiratory:  Negative for cough and shortness of breath.    Cardiovascular:  Negative for chest pain and palpitations.   Gastrointestinal:  Positive for constipation. Negative for abdominal pain, nausea and vomiting.   Genitourinary:  Negative for dysuria.   Musculoskeletal:  Negative for arthralgias and back pain.   Neurological:  Positive for weakness and headaches. Negative for dizziness and light-headedness.   Psychiatric/Behavioral:  Negative for sleep disturbance. The patient is not nervous/anxious.      Objective:  Temp:  [98.4 °F (36.9 °C)-99 °F (37.2 °C)]   Pulse:  [95-98]   Resp:  [17-18]   BP: (101-137)/(56-62)   SpO2:  [96 %-98 %]     Body mass index is 31.12 " kg/m².    Physical Exam  Vitals and nursing note reviewed.   Constitutional:       General: He is not in acute distress.     Appearance: He is well-developed. He is not diaphoretic.   Cardiovascular:      Rate and Rhythm: Normal rate and regular rhythm.      Heart sounds: S1 normal and S2 normal. Murmur heard.      Systolic murmur is present.   Pulmonary:      Effort: Pulmonary effort is normal. No respiratory distress.      Breath sounds: Normal breath sounds. No wheezing or rales.   Abdominal:      General: Bowel sounds are normal. There is no distension.      Palpations: Abdomen is soft.      Tenderness: There is no abdominal tenderness.   Musculoskeletal:      Right lower leg: Pitting Edema present.      Left lower leg: Pitting Edema present.   Skin:     General: Skin is warm and dry.   Neurological:      Mental Status: He is alert and oriented to person, place, and time.   Psychiatric:         Mood and Affect: Mood normal.         Behavior: Behavior normal. Behavior is cooperative.         Thought Content: Thought content normal.         Significant Labs:   A1C:   Recent Labs   Lab 11/05/23  0148   HGBA1C 5.1     TSH:   Recent Labs   Lab 11/05/23 0148   TSH 1.291     CBC:  Recent Labs   Lab 11/20/23  0420   WBC 5.35   HGB 12.5*   HCT 38.7*      *     BMP:  Recent Labs   Lab 11/20/23  0420   GLU 97      K 3.7      CO2 24   BUN 14   CREATININE 0.8   CALCIUM 9.1   MG 1.7   PHOS 3.3       Significant Imaging: I have reviewed all pertinent imaging results/findings completed during prior hospitalization.      Assessment and Plan:  Active Diagnoses:    Diagnosis Date Noted POA    PRINCIPAL PROBLEM:  SAH (subarachnoid hemorrhage) [I60.9] 11/04/2023 Yes    Acute pulmonary embolism [I26.99] 11/14/2023 Yes    Atherosclerosis of aorta [I70.0] 11/01/2023 Yes    Atrioventricular block, first degree [I44.0] 11/01/2023 Yes    Hypercholesterolemia [E78.00] 06/27/2022 Yes    Heart failure, diastolic,  chronic [I50.32] 05/16/2022 Yes    Primary hypertension [I10] 05/16/2022 Yes    Lower extremity edema [R60.0] 05/16/2022 Yes    Tricuspid regurgitation [I07.1] 04/27/2022 Yes    Mitral regurgitation [I34.0] 04/27/2022 Yes    Benign prostatic hyperplasia with urinary frequency [N40.1, R35.0] 12/06/2021 Yes    Chronic bilateral low back pain with bilateral sciatica [M54.42, M54.41, G89.29] 06/17/2019 Yes    Slow transit constipation [K59.01] 08/08/2017 Yes    Coronary artery disease [I25.10] 02/08/2013 Yes      Problems Resolved During this Admission:       SAH (subarachnoid hemorrhage)  - CTH with prepontine SAH.   - CTA 11/4: negative for vascular pathology  - MRI/MRA Brain 11/5: negative for underlying lesion  - DSA 11/5: negative for vascular pathology  - CTA 11/11: no evidence of vasospasm  - CTA head/neck 11/14: Stable intracranial findings with interval decrease in SAH/IVH, no evidence of vasospasm. Bilateral pulmonary emboli visualized in the upper chest.   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  - patient completed 7 day course of Keppra 500 BID  - Goal normotensive, SBP < 160  - follow-up with Neurosurgery on 12/14 with CT scan     Hypotension  Hx of Primary hypertension  - home regimen with losartan 100 mg daily, metoprolol succinate 50 mg daily  - Continue losartan 50 mg daily with holding parameter and metoprolol succinate 50 mg daily  - Holding spironolactone     Acute pulmonary embolism  - Episode of dizziness, tachycardia and diaphoresis on 11/14  - CTA head/neck revealed bilateral pulmonary emboli.  - Echo 11/15 negative for R heart strain  - Continue apixaban 10 mg BID to complete 7 days followed by 5mg BID for at least 3 months   - outpatient follow up with established Cardiologist, set for 12/7     Hypercholesterolemia  - Continue home atorvastatin 40 mg daily     Heart failure, diastolic, chronic  - Followed by cardiology as OP - next appointment is for  12/7  - Echo from 11/5 reviewed: estimated ejection fraction of 55 - 60%. There is normal diastolic function.  - Continue furosemide 40 mg daily, losartan 100 mg daily, metoprolol succinate 50 mg daily, empagliflozin 10 mg daily  - Hold spironolactone 25 mg daily     Benign prostatic hyperplasia with urinary frequency  - Continue home dose tamsulosin      Coronary artery disease  - continue ASA 81 mg daily, atorvastatin 40 mg daily     GERD  - continue pantoprazole 40 mg daily     BPH  - continue tamsulosin 0.4 mg daily     Debility   - Continue with PT/OT for gait training and strengthening and restoration of ADL's   - Encourage mobility, OOB in chair, and early ambulation as appropriate  - Fall precautions   - Monitor for bowel and bladder dysfunction  - Monitor for and prevent skin breakdown and pressure ulcers  - Continue DVT prophylaxis with apixaban      IP [facility] RISK OF UNPLANNED READMISSION Model: Moderate      Anticipated Disposition:  Home with home health      Future Appointments   Date Time Provider Department Center   12/7/2023 10:30 AM Ema Montemayor MD [id number]   12/14/2023  1:15 PM [facility] OI-CT2 500 LB LIMIT [facility]   12/14/2023  2:30 PM [facility] 500 LB LIMIT [facility]   12/14/2023  4:00 PM Koki Espinal, KRISTOPHER [facility] [id number] [facility]faro       I certify that SNF services are required to be given on an inpatient basis because Sam Gamino needs for skilled nursing care and/or skilled rehabilitation are required on a daily basis and such services can only practically be provided in a skilled nursing facility setting and are for an ongoing condition for which she received inpatient care in the hospital.       Dom Morrissey MD  Department of Hospital Medicine   [facility] - Skilled Nursing

## 2023-11-22 NOTE — PT/OT/SLP PROGRESS
Physical Therapy Treatment    Patient Name:  Sam Gamino   MRN:  [id number]  Admit Date: 11/17/2023  Admitting Diagnosis: SAH (subarachnoid hemorrhage)  Recent Surgeries: N/A    General Precautions: Standard, aspiration, fall  Orthopedic Precautions: N/A  Braces: N/A    Recommendations:     Discharge Recommendations: Low Intensity Therapy  Level of Assistance Recommended at Discharge: 24 hours significant assistance  Discharge Equipment Recommendations: wheelchair, walker, rolling, bath bench, bedside commode  Barriers to discharge: Other (Comment) (Increased assistance for safety during mobility)    Assessment:     Sam Gamino is a 78 y.o. male admitted with a medical diagnosis of SAH (subarachnoid hemorrhage) .   Pt was agreeable and tolerated session well. Pt completed 2 gait trials with no LOB today. Pt completed seated therx with red theraband with no issues. Pt is progressing well and continues to benefit from therapy to achieve highest level of independence.     Performance deficits affecting function: weakness, impaired endurance, impaired self care skills, impaired functional mobility, gait instability, impaired balance, decreased lower extremity function, decreased safety awareness, decreased coordination, impaired cardiopulmonary response to activity.    Rehab Potential is good    Activity Tolerance: Good    Plan:     Patient to be seen 5 x/week to address the above listed problems via gait training, therapeutic activities, therapeutic exercises, neuromuscular re-education, wheelchair management/training    Plan of Care Expires: 12/18/23  Plan of Care Reviewed with: patient    Subjective     Pt agreeable to PT session following OT session. .     Pain/Comfort:  Pain Rating 1: 0/10  Pain Rating Post-Intervention 1: 0/10    Patient's cultural, spiritual, Protestant conflicts given the current situation:  no    Objective:     Patient found  up in wheelchair  with  (no lines) upon PT entry.     Therapeutic  Activities and Exercises:   Seated BLE therex x15 reps: ankle pumps, long arc quads, marches, hip abd with RTB, and hamstring curl with RTB  Patient educated on role of therapy, goals of session, and benefits of out of bed mobility.   Instructed on use of call button and importance of calling nursing staff for assistance with mobility   Questions/concerns addressed within PTA scope of practice  Pt verbalized understanding.    Functional Mobility:  Transfers:   Sit <> Stand Transfer: stand by assistance with rolling walker   Wheelchair> Chair Transfer: contact guard assistance with no assistive device using Stand Pivot technique   Gait:  Pt ambulated ~95+115 ft with contact guard assistance and rolling walker.  1 saeted rest breaks & no LOB  Gait Deviation(s):  mostly steady gait with decrease cash, slight flexed posture, and occasional downward gaze  Verbal/tactile cues for keep RW close   Wheelchair Propulsion:    Pt propelled lightweight wheelchair x 50 feet on Level tile with  Bilateral upper extremity with Stand-by Assistance.   Cues for turning and steer straight, tends to veer to the left     AM-PAC 6 CLICK MOBILITY  17    Patient left up in chair with call button in reach.    GOALS:   Multidisciplinary Problems       Physical Therapy Goals          Problem: Physical Therapy    Goal Priority Disciplines Outcome Goal Variances Interventions   Physical Therapy Goal     PT, PT/OT Ongoing, Progressing     Description: Goals to be met by: 23     Patient will increase functional independence with mobility by performin. Supine to sit with Modified [place]  2. Sit to supine with Stand-by Assistance  3. Rolling to Left and Right with Modified [place]  4. Sit to stand transfer with Stand-by Assistance  5. Bed to chair transfer with Stand-by Assistance using Rolling Walker  6. Gait  x 150 feet with Stand-by Assistance using Rolling Walker  7. Wheelchair propulsion x 150 feet with Modified  [place] using bilateral upper extremities                         Time Tracking:     PT Received On: 11/22/23  PT Start Time: 1337  PT Stop Time: 1402  PT Total Time (min): 25 min    Billable Minutes: Gait Training 13 and Therapeutic Exercise 12    Treatment Type: Treatment  PT/PTA: PTA     Number of PTA visits since last PT visit: 3     11/22/2023

## 2023-11-23 PROCEDURE — 25000242 PHARM REV CODE 250 ALT 637 W/ HCPCS: Performed by: NURSE PRACTITIONER

## 2023-11-23 PROCEDURE — 11000004 HC SNF PRIVATE

## 2023-11-23 PROCEDURE — 25000003 PHARM REV CODE 250: Performed by: NURSE PRACTITIONER

## 2023-11-23 PROCEDURE — 25000003 PHARM REV CODE 250: Performed by: HOSPITALIST

## 2023-11-23 RX ADMIN — SENNOSIDES AND DOCUSATE SODIUM 1 TABLET: 8.6; 5 TABLET ORAL at 08:11

## 2023-11-23 RX ADMIN — PANTOPRAZOLE SODIUM 40 MG: 40 TABLET, DELAYED RELEASE ORAL at 09:11

## 2023-11-23 RX ADMIN — APIXABAN 5 MG: 2.5 TABLET, FILM COATED ORAL at 08:11

## 2023-11-23 RX ADMIN — SENNOSIDES AND DOCUSATE SODIUM 1 TABLET: 8.6; 5 TABLET ORAL at 09:11

## 2023-11-23 RX ADMIN — FLUTICASONE PROPIONATE 100 MCG: 50 SPRAY, METERED NASAL at 09:11

## 2023-11-23 RX ADMIN — TAMSULOSIN HYDROCHLORIDE 0.4 MG: 0.4 CAPSULE ORAL at 09:11

## 2023-11-23 RX ADMIN — FUROSEMIDE 40 MG: 40 TABLET ORAL at 09:11

## 2023-11-23 RX ADMIN — ATORVASTATIN CALCIUM 40 MG: 40 TABLET, FILM COATED ORAL at 09:11

## 2023-11-23 RX ADMIN — ASPIRIN 81 MG CHEWABLE TABLET 81 MG: 81 TABLET CHEWABLE at 09:11

## 2023-11-23 RX ADMIN — CHOLECALCIFEROL TAB 25 MCG (1000 UNIT) 2000 UNITS: 25 TAB at 09:11

## 2023-11-23 RX ADMIN — APIXABAN 5 MG: 2.5 TABLET, FILM COATED ORAL at 09:11

## 2023-11-23 RX ADMIN — METOPROLOL SUCCINATE 50 MG: 50 TABLET, EXTENDED RELEASE ORAL at 09:11

## 2023-11-23 RX ADMIN — EMPAGLIFLOZIN 10 MG: 10 TABLET, FILM COATED ORAL at 09:11

## 2023-11-23 NOTE — PLAN OF CARE
Problem: Adult Inpatient Plan of Care  Goal: Plan of Care Review  Outcome: Ongoing, Progressing  Flowsheets (Taken 11/23/2023 1209)  Plan of Care Reviewed With: patient     Problem: Skin Injury Risk Increased  Goal: Skin Health and Integrity  Outcome: Ongoing, Progressing     Problem: Fall Injury Risk  Goal: Absence of Fall and Fall-Related Injury  Outcome: Ongoing, Progressing     Mr Gamino is lying bed at this time. Pt assisted with sitting on the edge of bed to eat his meal. He received set up assistance with lunch. Pt received verbal teaching on not lying flat in bed to eat meal to prevent aspiration or indigestion. Mr Gamino verbalized understanding of information. Safety measures maintained. Call light is within reach.

## 2023-11-23 NOTE — NURSING
Mr Gamino is lying bed with HOB elevated. He is talking on his cell phone. Pt denies pain or discomfort. He stated that he did not need anything at this time. Pt instructed to use the call button for assistance. Mr Gamino verbalized understanding of information. Call light is within reach. Will continue to monitor.

## 2023-11-23 NOTE — NURSING
POC reviewed with pt, pt verbalized understanding. Safety maintained throughout shift,Pt up in chair with call light in reach.   Non skid sock on when OOB. Pt remained free of fall/trauma. Pt declined any pain throughout shift.  VS stable.  no reports of nausea and vomiting.   No signs of distress, rise and fall of chest noted, respirations  even and unlabored.    Plan of care on going. No future concerns as of present.

## 2023-11-23 NOTE — PLAN OF CARE
Problem: Adult Inpatient Plan of Care  Goal: Plan of Care Review  Outcome: Ongoing, Progressing  Goal: Patient-Specific Goal (Individualized)  Outcome: Ongoing, Progressing  Goal: Absence of Hospital-Acquired Illness or Injury  Outcome: Ongoing, Progressing  Goal: Optimal Comfort and Wellbeing  Outcome: Ongoing, Progressing  Goal: Readiness for Transition of Care  Outcome: Ongoing, Progressing     Problem: Skin Injury Risk Increased  Goal: Skin Health and Integrity  Outcome: Ongoing, Progressing     Problem: Fall Injury Risk  Goal: Absence of Fall and Fall-Related Injury  Outcome: Ongoing, Progressing  Intervention: Identify and Manage Contributors  Flowsheets (Taken 11/23/2023 0025)  Self-Care Promotion:   BADL personal objects within reach   BADL personal routines maintained  Medication Review/Management: medications reviewed  Intervention: Promote Injury-Free Environment  Flowsheets (Taken 11/23/2023 0025)  Safety Promotion/Fall Prevention:   assistive device/personal item within reach   Fall Risk signage in place   commode/urinal/bedpan at bedside   side rails raised x 2   instructed to call staff for mobility

## 2023-11-24 LAB
ANION GAP SERPL CALC-SCNC: 10 MMOL/L (ref 8–16)
BASOPHILS # BLD AUTO: 0.03 K/UL (ref 0–0.2)
BASOPHILS NFR BLD: 0.6 % (ref 0–1.9)
BUN SERPL-MCNC: 17 MG/DL (ref 8–23)
CALCIUM SERPL-MCNC: 9.1 MG/DL (ref 8.7–10.5)
CHLORIDE SERPL-SCNC: 105 MMOL/L (ref 95–110)
CO2 SERPL-SCNC: 24 MMOL/L (ref 23–29)
CREAT SERPL-MCNC: 0.9 MG/DL (ref 0.5–1.4)
DIFFERENTIAL METHOD: ABNORMAL
EOSINOPHIL # BLD AUTO: 0.1 K/UL (ref 0–0.5)
EOSINOPHIL NFR BLD: 2.1 % (ref 0–8)
ERYTHROCYTE [DISTWIDTH] IN BLOOD BY AUTOMATED COUNT: 12 % (ref 11.5–14.5)
EST. GFR  (NO RACE VARIABLE): >60 ML/MIN/1.73 M^2
GLUCOSE SERPL-MCNC: 90 MG/DL (ref 70–110)
HCT VFR BLD AUTO: 39.8 % (ref 40–54)
HGB BLD-MCNC: 12.6 G/DL (ref 14–18)
IMM GRANULOCYTES # BLD AUTO: 0.01 K/UL (ref 0–0.04)
IMM GRANULOCYTES NFR BLD AUTO: 0.2 % (ref 0–0.5)
LYMPHOCYTES # BLD AUTO: 1.5 K/UL (ref 1–4.8)
LYMPHOCYTES NFR BLD: 28.7 % (ref 18–48)
MAGNESIUM SERPL-MCNC: 1.8 MG/DL (ref 1.6–2.6)
MCH RBC QN AUTO: 33.2 PG (ref 27–31)
MCHC RBC AUTO-ENTMCNC: 31.7 G/DL (ref 32–36)
MCV RBC AUTO: 105 FL (ref 82–98)
MONOCYTES # BLD AUTO: 0.5 K/UL (ref 0.3–1)
MONOCYTES NFR BLD: 9.4 % (ref 4–15)
NEUTROPHILS # BLD AUTO: 3 K/UL (ref 1.8–7.7)
NEUTROPHILS NFR BLD: 59 % (ref 38–73)
NRBC BLD-RTO: 0 /100 WBC
PHOSPHATE SERPL-MCNC: 3 MG/DL (ref 2.7–4.5)
PLATELET # BLD AUTO: 206 K/UL (ref 150–450)
PMV BLD AUTO: 11 FL (ref 9.2–12.9)
POTASSIUM SERPL-SCNC: 3.6 MMOL/L (ref 3.5–5.1)
RBC # BLD AUTO: 3.79 M/UL (ref 4.6–6.2)
SODIUM SERPL-SCNC: 139 MMOL/L (ref 136–145)
WBC # BLD AUTO: 5.12 K/UL (ref 3.9–12.7)

## 2023-11-24 PROCEDURE — 97110 THERAPEUTIC EXERCISES: CPT

## 2023-11-24 PROCEDURE — 97530 THERAPEUTIC ACTIVITIES: CPT

## 2023-11-24 PROCEDURE — 36415 COLL VENOUS BLD VENIPUNCTURE: CPT | Performed by: HOSPITALIST

## 2023-11-24 PROCEDURE — 97116 GAIT TRAINING THERAPY: CPT | Mod: CQ

## 2023-11-24 PROCEDURE — 11000004 HC SNF PRIVATE

## 2023-11-24 PROCEDURE — 25000003 PHARM REV CODE 250: Performed by: HOSPITALIST

## 2023-11-24 PROCEDURE — 83735 ASSAY OF MAGNESIUM: CPT | Performed by: HOSPITALIST

## 2023-11-24 PROCEDURE — 85025 COMPLETE CBC W/AUTO DIFF WBC: CPT | Performed by: HOSPITALIST

## 2023-11-24 PROCEDURE — 97110 THERAPEUTIC EXERCISES: CPT | Mod: CQ

## 2023-11-24 PROCEDURE — 97530 THERAPEUTIC ACTIVITIES: CPT | Mod: CQ

## 2023-11-24 PROCEDURE — 25000242 PHARM REV CODE 250 ALT 637 W/ HCPCS: Performed by: NURSE PRACTITIONER

## 2023-11-24 PROCEDURE — 84100 ASSAY OF PHOSPHORUS: CPT | Performed by: HOSPITALIST

## 2023-11-24 PROCEDURE — 25000242 PHARM REV CODE 250 ALT 637 W/ HCPCS: Performed by: HOSPITALIST

## 2023-11-24 PROCEDURE — 25000003 PHARM REV CODE 250: Performed by: NURSE PRACTITIONER

## 2023-11-24 PROCEDURE — 80048 BASIC METABOLIC PNL TOTAL CA: CPT | Performed by: HOSPITALIST

## 2023-11-24 RX ADMIN — METOPROLOL SUCCINATE 50 MG: 50 TABLET, EXTENDED RELEASE ORAL at 08:11

## 2023-11-24 RX ADMIN — ATORVASTATIN CALCIUM 40 MG: 40 TABLET, FILM COATED ORAL at 08:11

## 2023-11-24 RX ADMIN — SENNOSIDES AND DOCUSATE SODIUM 1 TABLET: 8.6; 5 TABLET ORAL at 08:11

## 2023-11-24 RX ADMIN — APIXABAN 5 MG: 2.5 TABLET, FILM COATED ORAL at 08:11

## 2023-11-24 RX ADMIN — TAMSULOSIN HYDROCHLORIDE 0.4 MG: 0.4 CAPSULE ORAL at 08:11

## 2023-11-24 RX ADMIN — FUROSEMIDE 40 MG: 40 TABLET ORAL at 08:11

## 2023-11-24 RX ADMIN — PANTOPRAZOLE SODIUM 40 MG: 40 TABLET, DELAYED RELEASE ORAL at 08:11

## 2023-11-24 RX ADMIN — CHOLECALCIFEROL TAB 25 MCG (1000 UNIT) 2000 UNITS: 25 TAB at 08:11

## 2023-11-24 RX ADMIN — FLUTICASONE PROPIONATE 100 MCG: 50 SPRAY, METERED NASAL at 09:11

## 2023-11-24 RX ADMIN — ASPIRIN 81 MG CHEWABLE TABLET 81 MG: 81 TABLET CHEWABLE at 08:11

## 2023-11-24 RX ADMIN — EMPAGLIFLOZIN 10 MG: 10 TABLET, FILM COATED ORAL at 08:11

## 2023-11-24 NOTE — CARE UPDATE
[facility] - Skilled Nursing  Initial Discharge Assessment     SW met with patient in room for Discharge Planning Assessment.     Preferred Name:  Sam Gamino  Primary Care Provider:  JAYLYN Zamora  Admission Diagnosis:  non traumatic subarachoid hemorrhage  OTHER/HX: LYMPHOMA IN REMISSION, CAD, HF, BPH, PE, ATRIOVENTRICULAR BLOCK (NOT AN ALL INCLUSIVE DX LIST)  Admission Date: 11/17/2023  Expected Discharge Date: undetermined-TENTATIVE/ANTICIPATED: undetermined      Discharge Barriers Identified: none at this time     Payor: Type: medicare A and B,  for life secondary     Extended Emergency Contact Information:   Primary Emergency Contact: Eileen Gamino  Mobile Phone: [phone]  Relation: wife  Preferred language: English   needed? no     Discharge Plan A: home  Discharge Plan B: had not thought about it until now     Preferred Pharmacy:        Initial Discharge Assessment        Assessment Type Discharge Planning Assessment       Source of Information Patient      Communicated ROLANDO with patient/caregiver No      Lives With wife      Do you expect to return to your current living situation?  yes      Do you have help at home or someone to help you manage your care at home?  wife      Prior to hospitalization cognitive status: AAOx4 STROKE     Current cognitive status: AAOX4      Equipment Currently Used at Home NONE      Readmission within 30 days? NO,THIS EPISODE BEGAN ON 11/4/2023      Patient currently being followed by outpatient case management? NO      Do you currently have service(s) that help you manage your care at home? NO NEVER NEEDED BEFORE      Do you take prescription medications?  YES      Do you have prescription coverage?  YES      Do you have any problems affording any of your prescribed medications? NO      Who is going to help you get home at discharge? WIFE      How do you get to doctors appointments? WIFE      Are you on dialysis? NO      Do you take coumadin?   ANGELA                   DME Needed Upon Discharge  ALL EQUIPMENT      Discharge Plan discussed with:  WIFE      Discharge Barriers Identified EQUIPMENT NEEDS       Pt is 77 yo  male who lives at home with his wife. Unknown if there are others present.  There is no DME at home currently.  Pt will need all equipment recommended, pt has never needed any up until now.  He has been independent. Pt is friendly, wife visits him.    Stated at IDT meeting that pt needs a rollator. ($70). Pt has his own TB, raised toilet seat per his wife.

## 2023-11-24 NOTE — NURSING
POC reviewed with pt, pt verbalized understanding. Safety maintained throughout shift, bed locked and in lowest position, call light in reach, Side rails up X 2. Non skid sock on when OOB. Pt remained free of fall/trauma. Pt self reports of pain treated with PO pain medication as ordered by MD. VS stable.  no reports of nausea and vomiting.  No signs of distress, rise and fall of chest noted, respirations  even and unlabored at rest.  Plan of care on going. No future concerns as of present.

## 2023-11-24 NOTE — PLAN OF CARE
Problem: Adult Inpatient Plan of Care  Goal: Plan of Care Review  Outcome: Ongoing, Progressing  Goal: Patient-Specific Goal (Individualized)  Outcome: Ongoing, Progressing  Goal: Absence of Hospital-Acquired Illness or Injury  Outcome: Ongoing, Progressing  Goal: Optimal Comfort and Wellbeing  Outcome: Ongoing, Progressing  Goal: Readiness for Transition of Care  Outcome: Ongoing, Progressing     Problem: Skin Injury Risk Increased  Goal: Skin Health and Integrity  Outcome: Ongoing, Progressing     Problem: Fall Injury Risk  Goal: Absence of Fall and Fall-Related Injury  Outcome: Ongoing, Progressing  Intervention: Identify and Manage Contributors  Flowsheets (Taken 11/24/2023 0241)  Self-Care Promotion:   BADL personal routines maintained   BADL personal objects within reach  Medication Review/Management:   medications reviewed   high-risk medications identified  Intervention: Promote Injury-Free Environment  Flowsheets (Taken 11/24/2023 0241)  Safety Promotion/Fall Prevention:   assistive device/personal item within reach   supervised activity   side rails raised x 2   instructed to call staff for mobility   lighting adjusted

## 2023-11-24 NOTE — PT/OT/SLP PROGRESS
"Physical Therapy Treatment    Patient Name:  Sam Gamino   MRN:  [id number]  Admit Date: 11/17/2023  Admitting Diagnosis: SAH (subarachnoid hemorrhage)  Recent Surgeries:     General Precautions: Standard, aspiration, fall  Orthopedic Precautions: N/A  Braces: N/A    Recommendations:     Discharge Recommendations: Low Intensity Therapy  Level of Assistance Recommended at Discharge: 24 hours significant assistance  Discharge Equipment Recommendations: wheelchair, walker, rolling, bath bench, bedside commode  Barriers to discharge: Other (Comment) (Increased assistance for safety during mobility)    Assessment:     Sam Gamino is a 78 y.o. male admitted with a medical diagnosis of SAH (subarachnoid hemorrhage) . Pt tolerated well, pt would continue to benefit from skilled PT services to improve overall functional mobility, strength and endurance.  .      Performance deficits affecting function: weakness, impaired endurance, impaired self care skills, impaired functional mobility, gait instability, impaired balance, decreased lower extremity function, decreased safety awareness, decreased coordination, impaired cardiopulmonary response to activity.    Rehab Potential is good    Activity Tolerance: Fair    Plan:     Patient to be seen 5 x/week to address the above listed problems via gait training, therapeutic activities, therapeutic exercises, neuromuscular re-education, wheelchair management/training    Plan of Care Expires: 12/18/23  Plan of Care Reviewed with: patient    Subjective     "Think I'm doing good" pt agreeable to therapy.     Pain/Comfort:  Pain Rating 1: 0/10  Pain Rating Post-Intervention 1: 0/10    Patient's cultural, spiritual, Gnosticist conflicts given the current situation:  no    Objective:      Patient found  with  (in WC) upon PT entry to room.     Therapeutic Activities and Exercises: recumbent cross treainer x 10 min L-3    Functional Mobility:  Transfers:     Sit to Stand:  stand by " assistance with rolling walker  Gait: amb with RW  ft x 2 trials seated rest break no LOB occ vcs to center self with RW occ veers to left side occ kicking RW with LLE and vcs for erect posture pt apperato flex at the hips and knees making it hard for heel strike  Wheelchair Propulsion: ~ 55 ft with BLE SBA    AM-PAC 6 CLICK MOBILITY  16    Patient left up in chair with call button in reach and belonging sin reach .    GOALS:   Multidisciplinary Problems       Physical Therapy Goals          Problem: Physical Therapy    Goal Priority Disciplines Outcome Goal Variances Interventions   Physical Therapy Goal     PT, PT/OT Ongoing, Progressing     Description: Goals to be met by: 23     Patient will increase functional independence with mobility by performin. Supine to sit with Modified [place]  2. Sit to supine with Stand-by Assistance  3. Rolling to Left and Right with Modified [place]  4. Sit to stand transfer with Stand-by Assistance met  5. Bed to chair transfer with Stand-by Assistance using Rolling Walker  6. Gait  x 150 feet with Stand-by Assistance using Rolling Walker  7. Wheelchair propulsion x 150 feet with Modified [place] using bilateral upper extremities                         Time Tracking:     PT Received On: 23  PT Start Time: 1023  PT Stop Time: 1103  PT Total Time (min): 40 min    Billable Minutes: Gait Training 15, Therapeutic Activity 10, and Therapeutic Exercise 15    Treatment Type: Treatment  PT/PTA: PTA     Number of PTA visits since last PT visit: 4     2023

## 2023-11-24 NOTE — PT/OT/SLP PROGRESS
"Occupational Therapy   Treatment    Name: Sam Gamino  MRN: [id number]  Admit Date: 11/17/2023  Admitting Diagnosis:  SAH (subarachnoid hemorrhage)    General Precautions: Standard, fall   Orthopedic Precautions: N/A   Braces: N/A    Recommendations:     Discharge Recommendations:  Low Intensity Therapy  Level of Assistance Recommended at Discharge: 24 hours light assistance for ADL's and homemaking tasks  Discharge Equipment Recommendations: wheelchair, walker, rolling, bedside commode, bath bench  Barriers to discharge:  Decreased caregiver support    Assessment:     Sam Gamino is a 78 y.o. male with a medical diagnosis of SAH (subarachnoid hemorrhage).   Pt tolerated session well and without incident, but he continues to require assistance to perform self-care tasks and mobility.  He would continue to benefit from skilled OT services at [facility] to maximize his gains in functional independence.  He presents with the following.  Performance deficits affecting function are weakness, impaired endurance, impaired self care skills, impaired functional mobility, gait instability, impaired balance, decreased lower extremity function, decreased ROM, decreased coordination.     Rehab Potential is good    Activity tolerance:  Good    Plan:     Patient to be seen 5 x/week to address the above listed problems via self-care/home management, therapeutic activities, therapeutic exercises    Plan of Care Expires: 12/18/23  Plan of Care Reviewed with: patient    Subjective   "I'm getting tired."  Communicated with: nurse prior to session.     Pain/Comfort:  Pain Rating 1: 0/10  Pain Rating Post-Intervention 1: 0/10    Patient's cultural, spiritual, Lutheran conflicts given the current situation:  no    Objective:     Patient found up in bedside chair with Other (comments) (no lines) upon OT entry to room.    Bed Mobility:    N/A due to pt sitting in bedside chair at beginning and end of session     Functional " Mobility/Transfers:  Patient completed Sit <> Stand Transfer from bedside chair x 1 trial and from w/c x 2 trials with contact guard assistance with  rolling walker   Patient completed Bedside Chair <> Wheelchair <> Bedside Chair Transfers using Step Transfer technique with contact guard assistance with rolling walker  Functional Mobility: Pt ambulated ~10 ft x 2 trials from bedside chair to w/c and back to bedside chair with CGA with RW.  He also propelled w/c ~100 ft with CGA-Min A to correct his veering to the L.     Activities of Daily Living:  Feeding:  setup assistance to drink cup of water while seated in w/c and setup of tray table to eat his lunch while seated in bedside chair    AMPA 6 Click ADL: 16    OT Exercises: UE exercises performed with 3 lb dowel to increase functional endurance and strength in order increase independence when performing self care tasks, functional ambulation, W/C propulsion, and functional standing activities.   He performed 2 sets of shoulder flex/ext x 15 reps for 1st set and x 13 reps for 2nd set before fatiguing, 2 sets of 15 reps of rows, and 2 sets of 20 reps of biceps curls while seated in w/c.     Treatment & Education:  Pt performed visual perception activity with fine motor component in standing to address his standing endurance that's required for daily activities, such as ADLs and functional transfers.  He stood at the rehab gym adjustable counter for 3 min and 57 sec with CGA with RW before needing to sit down due to fatigue.     Pt edu on role of OT, POC, safety when performing self care tasks, benefit of performing OOB activity, and safety when performing functional transfers and mobility.    - Self care tasks completed-- as noted above       Patient left up in bedside chair with call button in reach    GOALS:   Multidisciplinary Problems       Occupational Therapy Goals          Problem: Occupational Therapy    Goal Priority Disciplines Outcome Interventions    Occupational Therapy Goal     OT, PT/OT Ongoing, Progressing    Description: Goals to be met by: 12/18/23     Patient will increase functional independence with ADLs by performing:    UE Dressing with Modified [place].  LE Dressing with Modified [place] and Assistive Devices as needed.  Grooming while standing at sink with Stand-by Assistance.  Toileting from toilet with Supervision for hygiene and clothing management.   Bathing from shower chair with Minimal Assistance.  Toilet transfer to toilet with Stand-by Assistance.  Upper extremity exercise program 3 x15 reps per handout, with supervision to improve UE strength/coordination for improved func mobility skills.                         Time Tracking:     OT Date of Treatment: 11/24/23  OT Start Time: 1132    OT Stop Time: 1202  OT Total Time (min): 30 min    Billable Minutes:Therapeutic Activity 15 min  Therapeutic Exercise 15 min    11/24/2023

## 2023-11-25 PROCEDURE — 25000003 PHARM REV CODE 250: Performed by: HOSPITALIST

## 2023-11-25 PROCEDURE — 11000004 HC SNF PRIVATE

## 2023-11-25 PROCEDURE — 97116 GAIT TRAINING THERAPY: CPT | Mod: CQ

## 2023-11-25 PROCEDURE — 97110 THERAPEUTIC EXERCISES: CPT | Mod: CQ

## 2023-11-25 PROCEDURE — 97530 THERAPEUTIC ACTIVITIES: CPT | Mod: CQ

## 2023-11-25 PROCEDURE — 25000003 PHARM REV CODE 250: Performed by: NURSE PRACTITIONER

## 2023-11-25 PROCEDURE — 97530 THERAPEUTIC ACTIVITIES: CPT | Mod: CO

## 2023-11-25 PROCEDURE — 25000242 PHARM REV CODE 250 ALT 637 W/ HCPCS: Performed by: NURSE PRACTITIONER

## 2023-11-25 RX ADMIN — APIXABAN 5 MG: 2.5 TABLET, FILM COATED ORAL at 09:11

## 2023-11-25 RX ADMIN — APIXABAN 5 MG: 2.5 TABLET, FILM COATED ORAL at 08:11

## 2023-11-25 RX ADMIN — FLUTICASONE PROPIONATE 100 MCG: 50 SPRAY, METERED NASAL at 09:11

## 2023-11-25 RX ADMIN — CHOLECALCIFEROL TAB 25 MCG (1000 UNIT) 2000 UNITS: 25 TAB at 09:11

## 2023-11-25 RX ADMIN — Medication 6 MG: at 08:11

## 2023-11-25 RX ADMIN — ASPIRIN 81 MG CHEWABLE TABLET 81 MG: 81 TABLET CHEWABLE at 09:11

## 2023-11-25 RX ADMIN — ACETAMINOPHEN 650 MG: 325 TABLET ORAL at 08:11

## 2023-11-25 RX ADMIN — ATORVASTATIN CALCIUM 40 MG: 40 TABLET, FILM COATED ORAL at 09:11

## 2023-11-25 RX ADMIN — PANTOPRAZOLE SODIUM 40 MG: 40 TABLET, DELAYED RELEASE ORAL at 09:11

## 2023-11-25 RX ADMIN — METOPROLOL SUCCINATE 50 MG: 50 TABLET, EXTENDED RELEASE ORAL at 09:11

## 2023-11-25 RX ADMIN — SENNOSIDES AND DOCUSATE SODIUM 1 TABLET: 8.6; 5 TABLET ORAL at 08:11

## 2023-11-25 RX ADMIN — SENNOSIDES AND DOCUSATE SODIUM 1 TABLET: 8.6; 5 TABLET ORAL at 09:11

## 2023-11-25 RX ADMIN — TAMSULOSIN HYDROCHLORIDE 0.4 MG: 0.4 CAPSULE ORAL at 09:11

## 2023-11-25 RX ADMIN — EMPAGLIFLOZIN 10 MG: 10 TABLET, FILM COATED ORAL at 09:11

## 2023-11-25 NOTE — NURSING
Secure chat message received from Dr Rock, and Lucero Uriarte NP to hold this morning's scheduled dose of 40 mg of Lasix. Will continue to monitor.

## 2023-11-25 NOTE — PLAN OF CARE
Problem: Skin Injury Risk Increased  Goal: Skin Health and Integrity  Outcome: Ongoing, Progressing     Problem: Fall Injury Risk  Goal: Absence of Fall and Fall-Related Injury  Outcome: Ongoing, Progressing     Problem: Adult Inpatient Plan of Care  Goal: Plan of Care Review  Outcome: Ongoing, Progressing  Flowsheets (Taken 11/25/2023 1123)  Plan of Care Reviewed With: patient

## 2023-11-25 NOTE — NURSING
Secure chat message sent to dr venegas in regards to mr patricia's current gr=125/60 and hr=91. pt is d/t receive 50 mg of toprol and 40 mg of lasix this am. please advise.

## 2023-11-25 NOTE — NURSING
Mr Gamino is lying in bed, and he is watching TV. Pt stated that he did not need anything at this time. He received verbal teaching on using the call button to signal staff for assistance. Mr Gamino verbalized understanding of information. Call light is within reach.

## 2023-11-25 NOTE — PT/OT/SLP PROGRESS
Physical Therapy Treatment    Patient Name:  Sam Gamino   MRN:  [id number]  Admit Date: 11/17/2023  Admitting Diagnosis: SAH (subarachnoid hemorrhage)  Recent Surgeries: N/A    General Precautions: Standard, fall  Orthopedic Precautions: N/A  Braces: N/A    Recommendations:     Discharge Recommendations: Low Intensity Therapy  Level of Assistance Recommended at Discharge: 24 hours significant assistance  Discharge Equipment Recommendations: wheelchair, walker, rolling, bath bench, bedside commode  Barriers to discharge: Other (Comment) (Increased assistance for safety during mobility)    Assessment:     Sam Gamino is a 78 y.o. male admitted with a medical diagnosis of SAH (subarachnoid hemorrhage) . Pt tolerated well, pt amb x 2 trials with SBA, vc to stay with ADRIAN of RW when turning. Pt completed nustep and seated TE to tolerance. Pt would continue to benefit from skilled PT services to improve overall functional mobility, strength and endurance.      Performance deficits affecting function: weakness, impaired endurance, impaired self care skills, impaired functional mobility, gait instability, impaired balance, decreased lower extremity function, decreased safety awareness, decreased coordination, impaired cardiopulmonary response to activity.    Rehab Potential is good    Activity Tolerance: Good    Plan:     Patient to be seen 5 x/week to address the above listed problems via gait training, therapeutic activities, therapeutic exercises, neuromuscular re-education, wheelchair management/training    Plan of Care Expires: 12/18/23  Plan of Care Reviewed with: patient    Subjective     Pt agreeable for PT.     Pain/Comfort:  Pain Rating 1: 0/10  Pain Rating Post-Intervention 1: 0/10    Patient's cultural, spiritual, Judaism conflicts given the current situation:  no    Objective:      Patient found HOB elevated with  (no lines) upon PT entry to room.     Therapeutic Activities and Exercises: Seated TE 2#:  hip flex, hip abd/add, LAQ, AP X 30 reps For BLE strengthening, endurance and ROM    Nustep x 8 min For BLE strengthening, endurance and ROM    Patient educated on role of therapy, goals of session, and benefits of out of bed mobility.   Instructed on use of call button and importance of calling nursing staff for assistance with mobility   Questions/concerns addressed within PTA scope of practice  Pt verbalized understanding.      Functional Mobility:  Bed Mobility:     Scooting: supervision toward EOB  Supine to Sit: supervision, HOB elevated slightly no rail  Sit to Supine: supervision, HOB elevated slightly no rail  Transfers:     Sit to Stand:  stand by assistance with rolling walker  Bed to Chair: stand by assistance with  rolling walker  using  Step Transfer  Gait: pt amb 186 ft x 2 trials SBA with RW. Seated rest break in between trials, vc to stay within [place] of RW when turning  Wheelchair Propulsion:  Pt propelled lightweight wheelchair x ~80 feet on Level tile with  Bilateral upper extremity with Supervision or Set-up Assistance. From room to gym    AM-PAC 6 CLICK MOBILITY  16    Patient left HOB elevated with call button in reach and wife present.    GOALS:   Multidisciplinary Problems       Physical Therapy Goals          Problem: Physical Therapy    Goal Priority Disciplines Outcome Goal Variances Interventions   Physical Therapy Goal     PT, PT/OT Ongoing, Progressing     Description: Goals to be met by: 23     Patient will increase functional independence with mobility by performin. Supine to sit with Modified [place]  2. Sit to supine with Stand-by Assistance  3. Rolling to Left and Right with Modified [place]  4. Sit to stand transfer with Stand-by Assistance met  5. Bed to chair transfer with Stand-by Assistance using Rolling Walker  6. Gait  x 150 feet with Stand-by Assistance using Rolling Walker  7. Wheelchair propulsion x 150 feet with Modified [place] using bilateral  upper extremities                         Time Tracking:     PT Received On: 11/25/23  PT Start Time: 1402  PT Stop Time: 1450  PT Total Time (min): 48 min    Billable Minutes: Gait Training 16, Therapeutic Activity 12, and Therapeutic Exercise 20    Treatment Type: Treatment  PT/PTA: PTA     Number of PTA visits since last PT visit: 5     11/25/2023

## 2023-11-25 NOTE — PROGRESS NOTES
[facility]                                  Skilled Nursing Facility                   Progress Note     Admit Date: 11/17/2023  ROLANDO 12/1/2023TBD  [id number] A  Principal Problem:  SAH (subarachnoid hemorrhage)   HPI obtained from patient interview and chart review     Chief Complaint: Re-evaluation of medical treatment and therapy status, hypotension and medication adjustment      HPI:   Mr. Gamino is a 78 year old male PMHx of lymphoma in remission, CAD, nonrheumatic mitral regurg, CHF, first-degree AV block, aortic arthrosclerosis who presents to SNF following hospitalization for prepontine SAH. CTA negative for vessel lesions. Acute PE: dizzyness and unresponsiveness with PT on 11/14; tachycardic/diaphoretic post episode. Hep gtt started. 11/15 TTE negative for R heart strain; transitioned off heparin gtt to eliquis. Headaches improving. CTH remained stable. Outpatient ollow-up scheduled with NSGY and Cardiology. Admission to SNF for secondary weakness and debility.  Patient will be treated at [facility] with PT and OT to improve functional status and ability to perform ADLs.     Interval History  24 hour chart review completed. Pertinent lab, micro, and/or radiology results addressed below. NAEON. NAD.   Vitals: Afebrile. HR, RR stable. Hypotension over past 24 hours. Trends reviewed. Daily weight stable. No increased edema or shortness of breath. Decision to hold lasix dose and give scheduled Toprol. Losartan has been held past several days per hold parameters. Will monitor.  Skilled Therapy: Patient progressing with therapy as tolerated and would continue to benefit from skilled PT and OT services to improve overall functional mobility and independence, strength, endurance, and safety.    Today's plan of care discussed with patient, family, and [facility] Attending Physician.    Past Medical History: Patient has a past medical  history of Abnormal echocardiogram (2/8/2013), CAD (coronary artery disease) (2/8/2013), Hypercholesterolemia (6/27/2022), Lymphoma in remission (2/8/2013), Normal cardiac stress test (2/8/2013), Pernicious anemia (2/8/2013), and Thyroid nodule (2/8/2013).    Past Surgical History: Patient has a past surgical history that includes Eye surgery (Bilateral, 2016).    Social History: Patient reports that he has never smoked. He has never used smokeless tobacco. He reports current alcohol use of about 1.0 standard drink of alcohol per week. He reports that he does not use drugs.    Family History: family history includes Cancer in his mother; Hypertension in his brother; No Known Problems in his brother, brother, brother, daughter, father, sister, and sister; Thyroid disease in his sister.    Allergies: Patient is allergic to lotensin [benazepril].    ROS  Constitutional: Negative for fever   Eyes: Negative for blurred vision, double vision   Respiratory: Negative for cough, shortness of breath   Cardiovascular: Negative for chest pain, palpitations, and leg swelling.   Gastrointestinal: Negative for abdominal pain, constipation, diarrhea, nausea, vomiting.   Genitourinary: Negative for dysuria, frequency   Musculoskeletal:  + generalized weakness. Negative for back pain and myalgias.   Skin: Negative for itching and rash.   Neurological: Negative for dizziness. +  headaches.   Psychiatric/Behavioral: Negative for depression. The patient is not nervous/anxious.      24 hour Vital Sign Range   Temp:  [98.2 °F (36.8 °C)-98.3 °F (36.8 °C)]   Pulse:  [89-91]   Resp:  [16-18]   BP: (107-113)/(59-60)   SpO2:  [97 %-98 %]     Current BMI: Body mass index is 30.92 kg/m².    PEx  Constitutional: Patient appears debilitated.  No distress noted  HENT:   Head: Normocephalic and atraumatic.   Eyes: Pupils are equal, round  Neck: Normal range of motion. Neck supple.   Cardiovascular: Normal rate, regular rhythm and normal heart  "sounds.    Pulmonary/Chest: Effort normal and breath sounds are clear  Abdominal: Soft. Bowel sounds are normal.   Musculoskeletal: Normal range of motion.   Neurological: Alert and oriented to person, place, and time.   Psychiatric: Normal mood and affect. Behavior is normal.   Skin: Skin is warm and dry.         No results for input(s): "GLUCOSE", "NA", "K", "CL", "CO2", "BUN", "CREATININE", "CALCIUM", "MG" in the last 24 hours.      No results for input(s): "WBC", "RBC", "HGB", "HCT", "PLT", "MCV", "MCH", "MCHC" in the last 24 hours.      No results for input(s): "POCTGLUCOSE" in the last 168 hours.       Assessment and Plan:    SAH (subarachnoid hemorrhage)  - CTH with prepontine SAH; CTA negative for vessel lesions.   - CTA 11/4: negative for vascular pathology  - MRI/MRA Brain 11/5: negative for underlying lesion  - DSA 11/5: negative for vascular pathology  - CTA 11/11: no evidence of vasospasm  - CTA head/neck 11/14: Stable intracranial findings with interval decrease in SAH/IVH, no evidence of vasospasm.   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  - patient completed 7 day course of Keppra 500 BID  - Goal normotensive, SBP < 160  - Follow-up with neurosurgery on 12/14 with CTA     Acute pulmonary embolism  - Episode of dizziness, tachycardia and diaphoresis on 11/14  - CTA head/neck on 11/14 revealed bilateral pulmonary emboli.  - Echo 11/15 negative for R heart strain  - Eliquis 10mg BID started 11/15 for 7 days completed in hospital  - Continue Eliquis 5mg BID x 3 months (EOC 2/22/24)  - Cardiology follow up on 12/7     Hypotension  New 11/25, unstable  - Hypotension over past 24 hours.   - Trends reviewed. Daily weight stable.   - No increased edema or shortness of breath.   - Losartan has been held past several days per hold parameters (hold for SBP < 120)  - Losartan dose decreased from 100 to 50 mg daily; maintain hold for SBP < 120  - Hold lasix dose and " give scheduled Toprol.     Heart failure, diastolic, chronic  - Stable per Echo on 11/5: estimated ejection fraction of 55 - 60%. Normal diastolic function.  - Home meds: Lasix 40 mg daily, losartan 100 mg daily, metoprolol XL 50 mg daily, Jardiance 10 mg daily, spironolactone 25 mg daily  - Hold home Jardiance and Spironolactone for now (hypotensive)  - Continue Lasix 40 mg daily  - Decrease Losartan 100 to 50 mg daily, hold for SBP < 120  - Continue metoprolol XL 50 mg daily (consider changing admin to nightly if hypotension persists)   - Cardiology follow up on 12/7     Primary hypertension  Home meds: losartan 100 mg daily, metoprolol XL 50 mg daily, lasix 40 mg daily  - Losartan has been held past several days per hold parameters (hold for SBP < 120)  - Decrease Losartan 100 to 50 mg daily    Coronary artery disease  - continue ASA 81 mg daily, atorvastatin    Hypercholesterolemia  - Continue home atorvastatin 40 mg daily     Physical Debility  - Continue with PT/OT for gait training and strengthening and restoration of ADL's   - Encourage mobility, OOB in chair, and early ambulation as appropriate  - Fall precautions   - Monitor for bowel and bladder dysfunction  - Monitor for and prevent skin breakdown and pressure ulcers  - Continue DVT prophylaxis with apixaban      Anticipate disposition:  Home with home health      Follow-up needed during SNF stay-Cards and NSGY as detailed below    Follow-up needed after discharge from SNF: PCP     Future Appointments   Date Time Provider Department Center   12/7/2023 10:30 AM Ema Montemayor MD [facility] [id number] [facility]   12/14/2023  1:15 PM [facility] OI-CT2 500 LB LIMIT [facility]   12/14/2023  2:30 PM [facility] OI-CT1 500 LB LIMIT [facility]   12/14/2023  4:00 PM Koki Espinal PA-C [facility] NEUROS8 [facility]     I certify that SNF services are required to be given on an inpatient basis because Sam Gamino needs for skilled nursing care  and/or skilled rehabilitation are required on a daily basis and such services can only practically be provided in a skilled nursing facility setting and are for an ongoing condition for which she received inpatient care in the hospital.     Extended Visit  Total time spent: 46 minutes  Description of Time: counseling patient on clinical condition, therapies provided, plan of care, emotional support, coordinating patient care with other care team members, reviewing and interpreting labs and imaging, collaboration with physician, initiating new orders, chart review, and documentation. See interval history.        Lucero Uriarte NP  Department of Hospital Medicine   [facility]     DOS: 11/25/2023       Patient note was created using MModal Dictation.  Any errors in syntax or even information may not have been identified and edited on initial review prior to signing this note.

## 2023-11-25 NOTE — PLAN OF CARE
Problem: Adult Inpatient Plan of Care  Goal: Plan of Care Review  Outcome: Ongoing, Progressing  Flowsheets (Taken 11/24/2023 2053)  Plan of Care Reviewed With: patient  Goal: Patient-Specific Goal (Individualized)  Outcome: Ongoing, Progressing  Goal: Absence of Hospital-Acquired Illness or Injury  Outcome: Ongoing, Progressing  Goal: Optimal Comfort and Wellbeing  Outcome: Ongoing, Progressing  Goal: Readiness for Transition of Care  Outcome: Ongoing, Progressing     Problem: Skin Injury Risk Increased  Goal: Skin Health and Integrity  Outcome: Ongoing, Progressing  Intervention: Optimize Skin Protection  Flowsheets (Taken 11/24/2023 2053)  Skin Protection: protective footwear used  Head of Bed (HOB) Positioning: HOB at 30-45 degrees  Intervention: Promote and Optimize Oral Intake  Flowsheets (Taken 11/24/2023 2053)  Oral Nutrition Promotion:   safe use of adaptive equipment encouraged   rest periods promoted     Problem: Fall Injury Risk  Goal: Absence of Fall and Fall-Related Injury  Outcome: Ongoing, Progressing  Intervention: Identify and Manage Contributors  Flowsheets (Taken 11/24/2023 2053)  Self-Care Promotion:   BADL personal routines maintained   BADL personal objects within reach   safe use of adaptive equipment encouraged  Medication Review/Management: medications reviewed  Intervention: Promote Injury-Free Environment  Flowsheets (Taken 11/24/2023 2053)  Safety Promotion/Fall Prevention:   assistive device/personal item within reach   commode/urinal/bedpan at bedside   medications reviewed   instructed to call staff for mobility   nonskid shoes/socks when out of bed   Fall Risk reviewed with patient/family   side rails raised x 2

## 2023-11-26 PROCEDURE — 94761 N-INVAS EAR/PLS OXIMETRY MLT: CPT

## 2023-11-26 PROCEDURE — 25000242 PHARM REV CODE 250 ALT 637 W/ HCPCS: Performed by: NURSE PRACTITIONER

## 2023-11-26 PROCEDURE — 25000003 PHARM REV CODE 250: Performed by: NURSE PRACTITIONER

## 2023-11-26 PROCEDURE — 25000003 PHARM REV CODE 250: Performed by: HOSPITALIST

## 2023-11-26 PROCEDURE — 11000004 HC SNF PRIVATE

## 2023-11-26 RX ADMIN — ASPIRIN 81 MG CHEWABLE TABLET 81 MG: 81 TABLET CHEWABLE at 08:11

## 2023-11-26 RX ADMIN — EMPAGLIFLOZIN 10 MG: 10 TABLET, FILM COATED ORAL at 08:11

## 2023-11-26 RX ADMIN — TAMSULOSIN HYDROCHLORIDE 0.4 MG: 0.4 CAPSULE ORAL at 08:11

## 2023-11-26 RX ADMIN — LOSARTAN POTASSIUM 50 MG: 50 TABLET, FILM COATED ORAL at 08:11

## 2023-11-26 RX ADMIN — METOPROLOL SUCCINATE 50 MG: 50 TABLET, EXTENDED RELEASE ORAL at 08:11

## 2023-11-26 RX ADMIN — FLUTICASONE PROPIONATE 100 MCG: 50 SPRAY, METERED NASAL at 08:11

## 2023-11-26 RX ADMIN — PANTOPRAZOLE SODIUM 40 MG: 40 TABLET, DELAYED RELEASE ORAL at 08:11

## 2023-11-26 RX ADMIN — SENNOSIDES AND DOCUSATE SODIUM 1 TABLET: 8.6; 5 TABLET ORAL at 08:11

## 2023-11-26 RX ADMIN — CHOLECALCIFEROL TAB 25 MCG (1000 UNIT) 2000 UNITS: 25 TAB at 08:11

## 2023-11-26 RX ADMIN — APIXABAN 5 MG: 2.5 TABLET, FILM COATED ORAL at 08:11

## 2023-11-26 RX ADMIN — FUROSEMIDE 40 MG: 40 TABLET ORAL at 08:11

## 2023-11-26 RX ADMIN — ATORVASTATIN CALCIUM 40 MG: 40 TABLET, FILM COATED ORAL at 08:11

## 2023-11-26 NOTE — PLAN OF CARE
Problem: Adult Inpatient Plan of Care  Goal: Plan of Care Review  Outcome: Ongoing, Progressing  Flowsheets (Taken 11/26/2023 1254)  Plan of Care Reviewed With: patient     Problem: Skin Injury Risk Increased  Goal: Skin Health and Integrity  Outcome: Ongoing, Progressing     Problem: Fall Injury Risk  Goal: Absence of Fall and Fall-Related Injury  Outcome: Ongoing, Progressing

## 2023-11-27 LAB
ANION GAP SERPL CALC-SCNC: 7 MMOL/L (ref 8–16)
BASOPHILS # BLD AUTO: 0.05 K/UL (ref 0–0.2)
BASOPHILS NFR BLD: 0.9 % (ref 0–1.9)
BUN SERPL-MCNC: 19 MG/DL (ref 8–23)
CALCIUM SERPL-MCNC: 9 MG/DL (ref 8.7–10.5)
CHLORIDE SERPL-SCNC: 109 MMOL/L (ref 95–110)
CO2 SERPL-SCNC: 25 MMOL/L (ref 23–29)
CREAT SERPL-MCNC: 0.9 MG/DL (ref 0.5–1.4)
DIFFERENTIAL METHOD: ABNORMAL
EOSINOPHIL # BLD AUTO: 0.1 K/UL (ref 0–0.5)
EOSINOPHIL NFR BLD: 1.5 % (ref 0–8)
ERYTHROCYTE [DISTWIDTH] IN BLOOD BY AUTOMATED COUNT: 12.1 % (ref 11.5–14.5)
EST. GFR  (NO RACE VARIABLE): >60 ML/MIN/1.73 M^2
GLUCOSE SERPL-MCNC: 97 MG/DL (ref 70–110)
HCT VFR BLD AUTO: 38.4 % (ref 40–54)
HGB BLD-MCNC: 12.5 G/DL (ref 14–18)
IMM GRANULOCYTES # BLD AUTO: 0.01 K/UL (ref 0–0.04)
IMM GRANULOCYTES NFR BLD AUTO: 0.2 % (ref 0–0.5)
LYMPHOCYTES # BLD AUTO: 1.9 K/UL (ref 1–4.8)
LYMPHOCYTES NFR BLD: 34.8 % (ref 18–48)
MAGNESIUM SERPL-MCNC: 1.9 MG/DL (ref 1.6–2.6)
MCH RBC QN AUTO: 33.6 PG (ref 27–31)
MCHC RBC AUTO-ENTMCNC: 32.6 G/DL (ref 32–36)
MCV RBC AUTO: 103 FL (ref 82–98)
MONOCYTES # BLD AUTO: 0.5 K/UL (ref 0.3–1)
MONOCYTES NFR BLD: 8.7 % (ref 4–15)
NEUTROPHILS # BLD AUTO: 2.9 K/UL (ref 1.8–7.7)
NEUTROPHILS NFR BLD: 53.9 % (ref 38–73)
NRBC BLD-RTO: 0 /100 WBC
PHOSPHATE SERPL-MCNC: 3.2 MG/DL (ref 2.7–4.5)
PLATELET # BLD AUTO: 235 K/UL (ref 150–450)
PMV BLD AUTO: 11.5 FL (ref 9.2–12.9)
POTASSIUM SERPL-SCNC: 3.9 MMOL/L (ref 3.5–5.1)
RBC # BLD AUTO: 3.72 M/UL (ref 4.6–6.2)
SODIUM SERPL-SCNC: 141 MMOL/L (ref 136–145)
WBC # BLD AUTO: 5.4 K/UL (ref 3.9–12.7)

## 2023-11-27 PROCEDURE — 25000242 PHARM REV CODE 250 ALT 637 W/ HCPCS: Performed by: NURSE PRACTITIONER

## 2023-11-27 PROCEDURE — 25000003 PHARM REV CODE 250: Performed by: FAMILY MEDICINE

## 2023-11-27 PROCEDURE — 97116 GAIT TRAINING THERAPY: CPT

## 2023-11-27 PROCEDURE — 25000003 PHARM REV CODE 250: Performed by: NURSE PRACTITIONER

## 2023-11-27 PROCEDURE — 97530 THERAPEUTIC ACTIVITIES: CPT

## 2023-11-27 PROCEDURE — 11000004 HC SNF PRIVATE

## 2023-11-27 PROCEDURE — 25000003 PHARM REV CODE 250: Performed by: HOSPITALIST

## 2023-11-27 PROCEDURE — 97110 THERAPEUTIC EXERCISES: CPT

## 2023-11-27 PROCEDURE — 84100 ASSAY OF PHOSPHORUS: CPT | Performed by: NURSE PRACTITIONER

## 2023-11-27 PROCEDURE — 80048 BASIC METABOLIC PNL TOTAL CA: CPT | Performed by: NURSE PRACTITIONER

## 2023-11-27 PROCEDURE — 85025 COMPLETE CBC W/AUTO DIFF WBC: CPT | Performed by: NURSE PRACTITIONER

## 2023-11-27 PROCEDURE — 36415 COLL VENOUS BLD VENIPUNCTURE: CPT | Performed by: NURSE PRACTITIONER

## 2023-11-27 PROCEDURE — 83735 ASSAY OF MAGNESIUM: CPT | Performed by: NURSE PRACTITIONER

## 2023-11-27 RX ADMIN — CHOLECALCIFEROL TAB 25 MCG (1000 UNIT) 2000 UNITS: 25 TAB at 08:11

## 2023-11-27 RX ADMIN — ATORVASTATIN CALCIUM 40 MG: 40 TABLET, FILM COATED ORAL at 08:11

## 2023-11-27 RX ADMIN — APIXABAN 5 MG: 2.5 TABLET, FILM COATED ORAL at 08:11

## 2023-11-27 RX ADMIN — EMPAGLIFLOZIN 10 MG: 10 TABLET, FILM COATED ORAL at 08:11

## 2023-11-27 RX ADMIN — SENNOSIDES AND DOCUSATE SODIUM 1 TABLET: 8.6; 5 TABLET ORAL at 09:11

## 2023-11-27 RX ADMIN — TAMSULOSIN HYDROCHLORIDE 0.4 MG: 0.4 CAPSULE ORAL at 08:11

## 2023-11-27 RX ADMIN — ACETAMINOPHEN 650 MG: 325 TABLET ORAL at 09:11

## 2023-11-27 RX ADMIN — FLUTICASONE PROPIONATE 100 MCG: 50 SPRAY, METERED NASAL at 09:11

## 2023-11-27 RX ADMIN — PANTOPRAZOLE SODIUM 40 MG: 40 TABLET, DELAYED RELEASE ORAL at 08:11

## 2023-11-27 RX ADMIN — METOPROLOL SUCCINATE 50 MG: 50 TABLET, EXTENDED RELEASE ORAL at 08:11

## 2023-11-27 RX ADMIN — APIXABAN 5 MG: 2.5 TABLET, FILM COATED ORAL at 09:11

## 2023-11-27 RX ADMIN — Medication 6 MG: at 09:11

## 2023-11-27 RX ADMIN — FUROSEMIDE 40 MG: 40 TABLET ORAL at 08:11

## 2023-11-27 RX ADMIN — SENNOSIDES AND DOCUSATE SODIUM 1 TABLET: 8.6; 5 TABLET ORAL at 08:11

## 2023-11-27 RX ADMIN — ASPIRIN 81 MG CHEWABLE TABLET 81 MG: 81 TABLET CHEWABLE at 08:11

## 2023-11-27 NOTE — PROGRESS NOTES
[facility]                                  Skilled Nursing Facility                   Progress Note     Admit Date: 11/17/2023  ROLANDO 12/1/2023TBD  [id number] A  Principal Problem:  SAH (subarachnoid hemorrhage)   HPI obtained from patient interview and chart review     Chief Complaint: Re-evaluation of medical treatment and therapy status, hypotension and lab review    HPI:   Mr. Gamino is a 78 year old male PMHx of lymphoma in remission, CAD, nonrheumatic mitral regurg, CHF, first-degree AV block, aortic arthrosclerosis who presents to SNF following hospitalization for prepontine SAH. CTA negative for vessel lesions. Acute PE: dizzyness and unresponsiveness with PT on 11/14; tachycardic/diaphoretic post episode. Hep gtt started. 11/15 TTE negative for R heart strain; transitioned off heparin gtt to eliquis. Headaches improving. CTH remained stable. Outpatient ollow-up scheduled with NSGY and Cardiology. Admission to SNF for secondary weakness and debility.  Patient will be treated at [facility] with PT and OT to improve functional status and ability to perform ADLs.     Interval History  Hypotensive over the weekend, Losartan discontinued  Denies sob, chest pain, abdominal pain.   /64 today, stable. Continue Toprol and Lasix  Labs reviewed no critical results  Pt. amb 186 ft x 2 trials SBA with RW. Seated rest break in between trials       Past Medical History: Patient has a past medical history of Abnormal echocardiogram (2/8/2013), CAD (coronary artery disease) (2/8/2013), Hypercholesterolemia (6/27/2022), Lymphoma in remission (2/8/2013), Normal cardiac stress test (2/8/2013), Pernicious anemia (2/8/2013), and Thyroid nodule (2/8/2013).    Past Surgical History: Patient has a past surgical history that includes Eye surgery (Bilateral, 2016).    Social History: Patient reports that he has never smoked. He has never used  smokeless tobacco. He reports current alcohol use of about 1.0 standard drink of alcohol per week. He reports that he does not use drugs.    Family History: family history includes Cancer in his mother; Hypertension in his brother; No Known Problems in his brother, brother, brother, daughter, father, sister, and sister; Thyroid disease in his sister.    Allergies: Patient is allergic to lotensin [benazepril].    ROS  Constitutional: Negative for fever   Eyes: Negative for blurred vision, double vision   Respiratory: Negative for cough, shortness of breath   Cardiovascular: Negative for chest pain, palpitations, and leg swelling.   Gastrointestinal: Negative for abdominal pain, constipation, diarrhea, nausea, vomiting.   Genitourinary: Negative for dysuria, frequency   Musculoskeletal:  + generalized weakness. Negative for back pain and myalgias.   Skin: Negative for itching and rash.   Neurological: Negative for dizziness. +  headaches.   Psychiatric/Behavioral: Negative for depression. The patient is not nervous/anxious.      24 hour Vital Sign Range   Temp:  [98.2 °F (36.8 °C)-98.3 °F (36.8 °C)]   Pulse:  []   Resp:  [16]   BP: (123-127)/(64-75)   SpO2:  [98 %-99 %]     Current BMI: Body mass index is 30.8 kg/m².    PEx  Constitutional: Patient appears debilitated.  No distress noted  HENT:   Head: Normocephalic and atraumatic.   Eyes: Pupils are equal, round  Neck: Normal range of motion. Neck supple.   Cardiovascular: Normal rate, regular rhythm and normal heart sounds.    Pulmonary/Chest: Effort normal and breath sounds are clear  Abdominal: Soft. Bowel sounds are normal.   Musculoskeletal: Normal range of motion.   Neurological: Alert and oriented to person, place, and time.   Psychiatric: Normal mood and affect. Behavior is normal.   Skin: Skin is warm and dry.         Recent Labs   Lab 11/27/23  0510      K 3.9      CO2 25   BUN 19   CREATININE 0.9   CALCIUM 9.0   MG 1.9         Recent Labs  "  Lab 11/27/23  0510   WBC 5.40   RBC 3.72*   HGB 12.5*   HCT 38.4*      *   MCH 33.6*   MCHC 32.6         No results for input(s): "POCTGLUCOSE" in the last 168 hours.       Assessment and Plan:    SAH (subarachnoid hemorrhage)  - CTH with prepontine SAH; CTA negative for vessel lesions.   - CTA 11/4: negative for vascular pathology  - MRI/MRA Brain 11/5: negative for underlying lesion  - DSA 11/5: negative for vascular pathology  - CTA 11/11: no evidence of vasospasm  - CTA head/neck 11/14: Stable intracranial findings with interval decrease in SAH/IVH, no evidence of vasospasm.   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  - patient completed 7 day course of Keppra 500 BID  - Goal normotensive, SBP < 160  - Follow-up with neurosurgery on 12/14 with CTA     Acute pulmonary embolism  - Episode of dizziness, tachycardia and diaphoresis on 11/14  - CTA head/neck on 11/14 revealed bilateral pulmonary emboli.  - Echo 11/15 negative for R heart strain  - Eliquis 10mg BID started 11/15 for 7 days completed in hospital  - Continue Eliquis 5mg BID x 3 months (EOC 2/22/24)  - Cardiology follow up on 12/7     Hypotension  New 11/25, unstable  - Hypotension over past 48 hours.   - Trends reviewed. Daily weight stable.   - No increased edema or shortness of breath.   - Hypotensive despite holding - Losartan past several days per hold parameters (hold for SBP < 120)   - Discontinue Losartan for now.  - Toprol XL 50 mg daily - hold and notify provider for HR < 60. Consider q HS admin if BP intolerant.  - Lasix 40 mg daily - hold and notify provider for SBP < 110    Heart failure, diastolic, chronic  - Stable per Echo on 11/5: estimated ejection fraction of 55 - 60%. Normal diastolic function.  - Home meds: Lasix 40 mg daily, losartan 100 mg daily, metoprolol XL 50 mg daily, Jardiance 10 mg daily, spironolactone 25 mg daily  - Hold home Jardiance and Spironolactone for now " (hypotensive)  - Discontinue Losartan for now (hypotensive)  - Continue Lasix 40 mg daily w/ hold parameters  - Continue metoprolol XL 50 mg daily w/hold parameters (consider changing admin to nightly if hypotension persists)   - Cardiology follow up on 12/7     Primary hypertension  Home meds: losartan 100 mg daily, metoprolol XL 50 mg daily, lasix 40 mg daily  - Losartan has been held past several days per hold parameters (hold for SBP < 120)  - Discontinue Losartan for now (hypotensive)    Coronary artery disease  - continue ASA 81 mg daily, atorvastatin    Hypercholesterolemia  - Continue home atorvastatin 40 mg daily     Physical Debility  - Continue with PT/OT for gait training and strengthening and restoration of ADL's   - Encourage mobility, OOB in chair, and early ambulation as appropriate  - Fall precautions   - Monitor for bowel and bladder dysfunction  - Monitor for and prevent skin breakdown and pressure ulcers  - Continue DVT prophylaxis with apixaban      Anticipate disposition:  Home with home health      Follow-up needed during SNF stay-Cards and NSGY as detailed below    Follow-up needed after discharge from SNF: PCP     Future Appointments   Date Time Provider Department Center   12/7/2023 10:30 AM Ema Montemayor MD [facility] [id number] [facility]   12/14/2023  1:15 PM [facility] OIC-CT2 500 LB LIMIT [facility] Imaging Ctr   12/14/2023  2:30 PM [facility] OI-CT1 500 LB LIMIT [facility]   12/14/2023  4:00 PM Koki Espinal PA-C [facility] NEUROS8 El[facility] Alfaro     I certify that SNF services are required to be given on an inpatient basis because Sam Gamino needs for skilled nursing care and/or skilled rehabilitation are required on a daily basis and such services can only practically be provided in a skilled nursing facility setting and are for an ongoing condition for which she received inpatient care in the hospital.     Extended Visit  Total time spent: 32 minutes  Description of Time:  counseling patient on clinical condition, therapies provided, plan of care, emotional support, coordinating patient care with other care team members, reviewing and interpreting labs and imaging, collaboration with physician, initiating new orders, chart review, and documentation. See interval history.        Penny Montes De Oca NP  Department of Hospital Medicine   [facility]     DOS: 11/27/2023       Patient note was created using MModal Dictation.  Any errors in syntax or even information may not have been identified and edited on initial review prior to signing this note.

## 2023-11-27 NOTE — PT/OT/SLP PROGRESS
Physical Therapy Treatment/6th Visit    Patient Name:  Sam Gamino   MRN:  [id number]  Admit Date: 11/17/2023  Admitting Diagnosis: SAH (subarachnoid hemorrhage)  Recent Surgeries: N/A    General Precautions: Standard, fall  Orthopedic Precautions: N/A  Braces: N/A    PT had a face-to-face encounter with regards to the patient's plan of care with the PTA who has been seeing this patient regularly.    Recommendations:     Discharge Recommendations: Low Intensity Therapy  Level of Assistance Recommended at Discharge: 24 hours light assistance  Discharge Equipment Recommendations: walker, rolling, wheelchair, bedside commode, bath bench  Barriers to discharge: Other (Comment) (Increased assistance for safety during mobility)    Assessment:     Sam Gamino is a 78 y.o. male admitted with a medical diagnosis of SAH (subarachnoid hemorrhage). Patient agreeable to PT treatment this AM. Patient with overall maintenance of gait distance traveled this session. Patient requiring cues to clear BLE during ambulation and stand erect within ADRIAN of RW; gradual improvement noted since initial evaluation. Patient pleasant and motivated to engage in therapy. Patient will benefit from continued SNF rehabilitation services to address deficits as well as progress mobility towards PLOF and increased functional independence for safe transition to home environment at time of discharge.       Performance deficits affecting function: weakness, impaired endurance, impaired self care skills, impaired functional mobility, gait instability, impaired balance, decreased coordination, decreased lower extremity function, decreased safety awareness.    Rehab Potential is good    Activity Tolerance: Good    Plan:     Patient to be seen 5 x/week to address the above listed problems via gait training, therapeutic activities, therapeutic exercises, neuromuscular re-education, wheelchair management/training    Plan of Care Expires: 12/18/23  Plan of  "Care Reviewed with: patient    Subjective     "I am doing okay."     Pain/Comfort:  Pain Rating 1: 0/10  Pain Rating Post-Intervention 1: 0/10    Patient's cultural, spiritual, Church conflicts given the current situation:  no    Objective:     Communicated with nursing staff prior to session.  Patient found up in chair with  (no active lines) upon PT entry to room.     Therapeutic Activities and Exercises:   LE ergometer x 15 minutes at moderate resistance for LE strengthening, ROM, and endurance; no rest break required.     Functional Mobility:  Transfers:     Sit to Stand:  stand by assistance with rolling walker  Gait: Patient ambulated 200 feet and 167 feet using RW with CGA. Seated rest break between trials. Patient with decreased BLE clearance and increased forefoot contact. Cues to increase heel strike at initial contact. Patient pushing walker far forward on occasion, especially during turns. Cues to stand erect and within ADRIAN of RW. W/C in tow. No LOB.   Wheelchair Propulsion:  Pt propelled lightweight wheelchair x 270 feet on Level tile with  Bilateral upper extremity with Supervision or Set-up Assistance.     AM-PAC 6 CLICK MOBILITY  16    Patient left up in chair with call button in reach and nursing staff notified.    GOALS:   Multidisciplinary Problems       Physical Therapy Goals          Problem: Physical Therapy    Goal Priority Disciplines Outcome Goal Variances Interventions   Physical Therapy Goal     PT, PT/OT Ongoing, Progressing     Description: Goals to be met by: 23     Patient will increase functional independence with mobility by performin. Supine to sit with Modified [place] - In progress  2. Sit to supine with Stand-by Assistance - In progress  3. Rolling to Left and Right with Modified [place] - In progress  4. Sit to stand transfer with Stand-by Assistance - MET  Updated Goal 23: Sit to stand transfer with Supervision    5. Bed to chair transfer with " Stand-by Assistance using Rolling Walker - MET  Updated Goal 11/27/23: Bed to chair transfer with Supervision using RW    6. Gait  x 150 feet with Stand-by Assistance using Rolling Walker - In progress  7. Wheelchair propulsion x 150 feet with Modified [place] using bilateral upper extremities - In progress                         Time Tracking:     PT Received On: 11/27/23  PT Start Time: 1045  PT Stop Time: 1127  PT Total Time (min): 42 min    Billable Minutes: Gait Training 15, Therapeutic Activity 12, and Therapeutic Exercise 15    Treatment Type: Treatment, 6th Visit  PT/PTA: PT     Number of PTA visits since last PT visit: 0     11/27/2023

## 2023-11-27 NOTE — PLAN OF CARE
Patient with gradual progression toward mobility goals. Goals updated accordingly. Continue with POC.     Problem: Physical Therapy  Goal: Physical Therapy Goal  Description: Goals to be met by: 23     Patient will increase functional independence with mobility by performin. Supine to sit with Modified [place] - In progress  2. Sit to supine with Stand-by Assistance - In progress  3. Rolling to Left and Right with Modified Forrest - In progress  4. Sit to stand transfer with Stand-by Assistance - MET  Updated Goal 23: Sit to stand transfer with Supervision    5. Bed to chair transfer with Stand-by Assistance using Rolling Walker - MET  Updated Goal 23: Bed to chair transfer with Supervision using RW    6. Gait  x 150 feet with Stand-by Assistance using Rolling Walker - In progress  7. Wheelchair propulsion x 150 feet with Modified Forrest using bilateral upper extremities - In progress    Outcome: Ongoing, Progressing   2023

## 2023-11-27 NOTE — PLAN OF CARE
Family Training     Patient Name:  Sam Gamino   MRN:  [id number]  Admit Date: 11/17/2023           called to schedule family training this date. Pt's family/caregiver agreeable to attend training on Wed. 11/29 @ 10am.     11/27/2023

## 2023-11-27 NOTE — PROGRESS NOTES
[facility] - Skilled Nursing  Adult Nutrition  Progress Note    SUMMARY   Recommendations  Recommend low sodium diet, RD following  Goals: PO to meet 85% of EEN/EPN by next RD follow up  Nutrition Goal Status: goal met  Communication of RD Recs: other (comment) (POC)    Assessment and Plan  Knowledge deficit regarding heart failure monitoring and diet as evidenced by no previous education on the topic    New    Plan  Nutrition education- Heart failure, low sodium diet, weight monitoring 11/27  Mineral restricted diet- low sodium recommended  Collaboration with other providers    Malnutrition Assessment  11/20     Nails (Micronutrient): pallor  Teeth (Micronutrient): broken dentition  Neck/Chest (Micronutrient): subcutaneous fat loss, muscle wasting           Orbital Region (Subcutaneous Fat Loss): moderate depletion  Upper Arm Region (Subcutaneous Fat Loss): well nourished  Thoracic and Lumbar Region: well nourished   Confucianist Region (Muscle Loss): mild depletion  Clavicle and Acromion Bone Region (Muscle Loss): mild depletion                 Reason for Assessment    Reason For Assessment: RD follow-up  Diagnosis:  (SAH, PE)  Relevant Medical History: Lymphoma in remission, pernicious  anemia, CAD, HLD, HTN, HF, constipation, kindey transplant  Interdisciplinary Rounds: attended  General Information Comments: Recommended low sodium diet, wife present for IDT today, PO %, weight stable. Reviewed low sodium diet principles for HF and weight monitoring with patient , spouse not present, written materials left in room. Patient does not currently have bath scale at home.  Nutrition Discharge Planning: low sodium diet    Nutrition/Diet History    Patient Reported Diet/Restrictions/Preferences: general  Typical Food/Fluid Intake: regular meals  Food Preferences: none, eats milk, fish, beans  Spiritual, Cultural Beliefs, Confucianist Practices, Values that Affect Care: no  Food Allergies: NKFA  Factors Affecting  "Nutritional Intake: None identified at this time    Anthropometrics    Temp: 98.3 °F (36.8 °C)  Height: 6' 1" (185.4 cm)  Height (inches): 73 in  Weight Method: Standard Scale  Weight: 105.9 kg (233 lb 7.5 oz)  Weight (lb): 233.47 lb  Ideal Body Weight (IBW), Male: 184 lb  % Ideal Body Weight, Male (lb): 129.04 %  BMI (Calculated): 30.8  BMI Grade: 30 - 34.9- obesity - grade I  Usual Body Weight (UBW), k.5 kg  Weight Change Amount:  (8% loss in one month, thought to be fluid weight as he has been eating well.)  % Usual Body Weight: 91.85  % Weight Change From Usual Weight: -8.34 %       Lab/Procedures/Meds    Pertinent Labs Reviewed: reviewed  Pertinent Labs Comments: Hg 12.5, Hct 38.4,  Pertinent Medications Reviewed: reviewed  Pertinent Medications Comments: ASA, Apixaban, Jardiance(HF), senna-docusate, Vit D, spironolactone, lasix, pantoprazole, statin,    Estimated/Assessed Needs  Weight Used For Calorie Calculations: 107.7 kg (237 lb 7 oz)  Energy Calorie Requirements (kcal): 1860  Energy Need Method: [place]-St Jeor (x 1.0(PAL))  Protein Requirements: 100  Weight Used For Protein Calculations: 83.6 kg (184 lb 4.9 oz) (IBW 2/2 obesity x 1.2g/kg)  Fluid Requirements (mL): 1860 or per MD  Estimated Fluid Requirement Method: RDA Method  RDA Method (mL): 1860     Nutrition Prescription Ordered  Current Diet Order: regular  Nutrition Order Comments:     Evaluation of Received Nutrient/Fluid Intake  I/O: no data  Energy Calories Required: meeting needs  Fluid Required: meeting needs  Comments: LBM   Tolerance: tolerating  % Intake of Estimated Energy Needs: 75 - 100 %  % Meal Intake: 75 - 100 %    Nutrition Risk    Level of Risk/Frequency of Follow-up: low (One time per week)     Monitor and Evaluation    Food and Nutrient Intake: food and beverage intake  Food and Nutrient Adminstration: diet order  Anthropometric Measurements: weight change  Biochemical Data, Medical Tests and Procedures: " gastrointestinal profile, electrolyte and renal panel  Nutrition-Focused Physical Findings: overall appearance     Nutrition Follow-Up    RD Follow-up?: Yes

## 2023-11-27 NOTE — TREATMENT PLAN
"Rehab Services' DME recommendations    Sam Gamino  MRN: [id number]    [x] Walker Adult (5'1"-6"6")    Accessories N/A    Wheels Yes     Justification for RW: The patient's mobility limitation cannot be sufficiently managed with use of a cane. Patient requires use of BUE support on RW for safety and balance during mobility for prevention of falls and appropriate gait mechanics. Use of RW significantly improves patient's ability to actively participate in ADLs and will be used regularly within the home.     [x] Raised toilet seat with armrests    [x] Tub bench Standard (unpadded)     [x] Home health PT, OT, and Aide    Vandana Galicia, PT 11/27/2023      "

## 2023-11-27 NOTE — PROGRESS NOTES
[facility]                                  Skilled Nursing Facility                   Progress Note     Admit Date: 11/17/2023  ROLANDO 12/1/2023TBD  [id number] A  Principal Problem:  SAH (subarachnoid hemorrhage)   HPI obtained from patient interview and chart review     Chief Complaint: Re-evaluation of medical treatment and therapy status, hypotension and medication adjustment      HPI:   Mr. Gamino is a 78 year old male PMHx of lymphoma in remission, CAD, nonrheumatic mitral regurg, CHF, first-degree AV block, aortic arthrosclerosis who presents to SNF following hospitalization for prepontine SAH. CTA negative for vessel lesions. Acute PE: dizzyness and unresponsiveness with PT on 11/14; tachycardic/diaphoretic post episode. Hep gtt started. 11/15 TTE negative for R heart strain; transitioned off heparin gtt to eliquis. Headaches improving. CTH remained stable. Outpatient ollow-up scheduled with NSGY and Cardiology. Admission to SNF for secondary weakness and debility.  Patient will be treated at [facility] with PT and OT to improve functional status and ability to perform ADLs.     Interval History  24 hour chart review completed. Pertinent lab, micro, and/or radiology results addressed below. NAEON. NAD.   Patient seen while resting in bed, awake and alert. Engaged in medical care. All questions answered.  Vitals: Afebrile. HR, RR stable. Hypotensive despite holding Losartan 50 mg daily - discontinue for now.  Tolerating Toprol and Lasix, hold parameters added as follows:   - Toprol XL 50 mg daily - hold and notify provider for HR < 60. Consider q HS admin if BP intolerant.  - Lasix 40 mg daily - hold and notify provider for SBP < 110  Skilled Therapy: Patient progressing with therapy as tolerated and would continue to benefit from skilled PT and OT services to improve overall functional mobility and independence, strength,  endurance, and safety.      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    Past Surgical History: Patient has a past surgical history that includes Eye surgery (Bilateral, 2016).    Social History: Patient reports that he has never smoked. He has never used smokeless tobacco. He reports current alcohol use of about 1.0 standard drink of alcohol per week. He reports that he does not use drugs.    Family History: family history includes Cancer in his mother; Hypertension in his brother; No Known Problems in his brother, brother, brother, daughter, father, sister, and sister; Thyroid disease in his sister.    Allergies: Patient is allergic to lotensin [benazepril].    ROS  Constitutional: Negative for fever   Eyes: Negative for blurred vision, double vision   Respiratory: Negative for cough, shortness of breath   Cardiovascular: Negative for chest pain, palpitations, and leg swelling.   Gastrointestinal: Negative for abdominal pain, constipation, diarrhea, nausea, vomiting.   Genitourinary: Negative for dysuria, frequency   Musculoskeletal:  + generalized weakness. Negative for back pain and myalgias.   Skin: Negative for itching and rash.   Neurological: Negative for dizziness. +  headaches.   Psychiatric/Behavioral: Negative for depression. The patient is not nervous/anxious.      24 hour Vital Sign Range   Temp:  [98.2 °F (36.8 °C)]   Pulse:  [67-90]   Resp:  [16]   BP: (120-127)/(52-75)   SpO2:  [97 %-98 %]     Current BMI: Body mass index is 30.8 kg/m².    PEx  Constitutional: Patient appears debilitated.  No distress noted  HENT:   Head: Normocephalic and atraumatic.   Eyes: Pupils are equal, round  Neck: Normal range of motion. Neck supple.  "  Cardiovascular: Normal rate, regular rhythm and normal heart sounds.    Pulmonary/Chest: Effort normal and breath sounds are clear  Abdominal: Soft. Bowel sounds are normal.   Musculoskeletal: Normal range of motion.   Neurological: Alert and oriented to person, place, and time.   Psychiatric: Normal mood and affect. Behavior is normal.   Skin: Skin is warm and dry.         No results for input(s): "GLUCOSE", "NA", "K", "CL", "CO2", "BUN", "CREATININE", "CALCIUM", "MG" in the last 24 hours.      No results for input(s): "WBC", "RBC", "HGB", "HCT", "PLT", "MCV", "MCH", "MCHC" in the last 24 hours.      No results for input(s): "POCTGLUCOSE" in the last 168 hours.       Assessment and Plan:    SAH (subarachnoid hemorrhage)  - CTH with prepontine SAH; CTA negative for vessel lesions.   - CTA 11/4: negative for vascular pathology  - MRI/MRA Brain 11/5: negative for underlying lesion  - DSA 11/5: negative for vascular pathology  - CTA 11/11: no evidence of vasospasm  - CTA head/neck 11/14: Stable intracranial findings with interval decrease in SAH/IVH, no evidence of vasospasm.   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  - patient completed 7 day course of Keppra 500 BID  - Goal normotensive, SBP < 160  - Follow-up with neurosurgery on 12/14 with CTA     Acute pulmonary embolism  - Episode of dizziness, tachycardia and diaphoresis on 11/14  - CTA head/neck on 11/14 revealed bilateral pulmonary emboli.  - Echo 11/15 negative for R heart strain  - Eliquis 10mg BID started 11/15 for 7 days completed in hospital  - Continue Eliquis 5mg BID x 3 months (EOC 2/22/24)  - Cardiology follow up on 12/7     Hypotension  New 11/25, unstable  - Hypotension over past 48 hours.   - Trends reviewed. Daily weight stable.   - No increased edema or shortness of breath.   - Hypotensive despite holding - Losartan past several days per hold parameters (hold for SBP < 120)   - Discontinue Losartan " for now.  - Toprol XL 50 mg daily - hold and notify provider for HR < 60. Consider q HS admin if BP intolerant.  - Lasix 40 mg daily - hold and notify provider for SBP < 110    Heart failure, diastolic, chronic  - Stable per Echo on 11/5: estimated ejection fraction of 55 - 60%. Normal diastolic function.  - Home meds: Lasix 40 mg daily, losartan 100 mg daily, metoprolol XL 50 mg daily, Jardiance 10 mg daily, spironolactone 25 mg daily  - Hold home Jardiance and Spironolactone for now (hypotensive)  - Discontinue Losartan for now (hypotensive)  - Continue Lasix 40 mg daily w/ hold parameters  - Continue metoprolol XL 50 mg daily w/hold parameters (consider changing admin to nightly if hypotension persists)   - Cardiology follow up on 12/7     Primary hypertension  Home meds: losartan 100 mg daily, metoprolol XL 50 mg daily, lasix 40 mg daily  - Losartan has been held past several days per hold parameters (hold for SBP < 120)  - Discontinue Losartan for now (hypotensive)    Coronary artery disease  - continue ASA 81 mg daily, atorvastatin    Hypercholesterolemia  - Continue home atorvastatin 40 mg daily     Physical Debility  - Continue with PT/OT for gait training and strengthening and restoration of ADL's   - Encourage mobility, OOB in chair, and early ambulation as appropriate  - Fall precautions   - Monitor for bowel and bladder dysfunction  - Monitor for and prevent skin breakdown and pressure ulcers  - Continue DVT prophylaxis with apixaban      Anticipate disposition:  Home with home health      Follow-up needed during SNF stay-Cards and NSGY as detailed below    Follow-up needed after discharge from SNF: PCP     Future Appointments   Date Time Provider Department Center   12/7/2023 10:30 AM Ema Montemayor MD [id number]   12/14/2023  1:15 PM [facility] OIC-CT2 500 LB LIMIT [facility]   12/14/2023  2:30 PM [facility] OI-CT1 500 LB LIMIT [facility]   12/14/2023  4:00 PM Teddy  Koki BALLESTEROS PA-C [facility] [id number] [facility]     I certify that SNF services are required to be given on an inpatient basis because Sam Gamino needs for skilled nursing care and/or skilled rehabilitation are required on a daily basis and such services can only practically be provided in a skilled nursing facility setting and are for an ongoing condition for which she received inpatient care in the hospital.     Extended Visit  Total time spent: 32 minutes  Description of Time: counseling patient on clinical condition, therapies provided, plan of care, emotional support, coordinating patient care with other care team members, reviewing and interpreting labs and imaging, collaboration with physician, initiating new orders, chart review, and documentation. See interval history.        Lucero Uriarte NP  Department of Hospital Medicine   [facility]- Skilled Nursing [facility]     DOS: 11/26/2023       Patient note was created using MModal Dictation.  Any errors in syntax or even information may not have been identified and edited on initial review prior to signing this note.

## 2023-11-27 NOTE — PT/OT/SLP PROGRESS
"Occupational Therapy   Treatment    Name: Sam Gamino  MRN: [id number]  Admit Date: 11/17/2023  Admitting Diagnosis:  SAH (subarachnoid hemorrhage)    General Precautions: Standard, fall   Orthopedic Precautions: N/A   Braces: N/A    Recommendations:     Discharge Recommendations:  Low Intensity Therapy  Level of Assistance Recommended at Discharge: Intermittent assistance for ADL's and homemaking tasks  Discharge Equipment Recommendations: walker, rolling, wheelchair, bedside commode, bath bench  Barriers to discharge:  Decreased caregiver support    Assessment:     Sam Gamino is a 78 y.o. male with a medical diagnosis of SAH (subarachnoid hemorrhage) .  He presents with the following performance deficits affecting function are weakness, impaired endurance, impaired self care skills, impaired functional mobility, gait instability, impaired balance, decreased coordination.     Rehab Potential is good    Activity tolerance:  Good    Plan:     Patient to be seen 5 x/week to address the above listed problems via self-care/home management, therapeutic activities, therapeutic exercises    Plan of Care Expires: 12/18/23  Plan of Care Reviewed with: patient    Subjective     Communicated with: Nurse prior to session. Pt stated, " I lived in [place] for 7 years while I was in the [employer].".    Pain/Comfort:  Pain Rating 1: 0/10  Pain Rating Post-Intervention 1: 0/10    Patient's cultural, spiritual, Adventist conflicts given the current situation:  no    Objective:     Patient found sitting edge of bed with  (no lines) upon OT entry to room.    Bed Mobility:        Functional Mobility/Transfers:  Patient completed Sit <> Stand Transfer with stand by assistance  with  rolling walker   Patient completed Bed <> Chair Transfer using Step Transfer technique with stand by assistance with rolling walker  Functional Mobility: Pt sat edge of bed for about 10 min with supervision to improve endurance for ADLs.        [facility] 6 Click " · Likely due to cirrhosis   · Serum sodium 134 today  · Continue management as above  · BMP a m  ADL: 16    OT Exercises: UE Ergometer 10 min set on moderate resistance to improve strength and endurance for self care, mobility, and IADLs.    Pt completed functional standing task with visual motor, fine motor and reaching components (placing washers over dowels) at counter top with RW and supervision for 4 1/2 min. Pt then required seated rest break    Pt completed bilateral fine motor exercise, following OT demonstration, a series of hand grasp patterns (Das's Hand Grasp) pt completed well, needs extra time for coordination L > R and vc's occasionally for motor planning.  Treatment & Education:  -Education on energy conservation and task modification to maximize safety and (I) during ADLs and mobility  -Education on importance of OOB activity to improve overall activity tolerance and promote recovery  -Pt educated to call for assistance and to transfer with hospital staff only  -Provided education regarding role of OT, POC, & discharge recommendations with pt  verbalizing understanding.  Pt had no further questions & when asked whether there were any concerns pt reported none.    Patient left up in chair with call button in reach    GOALS:   Multidisciplinary Problems       Occupational Therapy Goals          Problem: Occupational Therapy    Goal Priority Disciplines Outcome Interventions   Occupational Therapy Goal     OT, PT/OT Ongoing, Progressing    Description: Goals to be met by: 12/18/23     Patient will increase functional independence with ADLs by performing:    UE Dressing with Modified Rock.  LE Dressing with Modified [place] and Assistive Devices as needed.  Grooming while standing at sink with Stand-by Assistance.  Toileting from toilet with Supervision for hygiene and clothing management.   Bathing from shower chair with Minimal Assistance.  Toilet transfer to toilet with Stand-by Assistance.  Upper extremity exercise program 3 x15 reps per handout, with supervision to improve UE  strength/coordination for improved func mobility skills.                         Time Tracking:     OT Date of Treatment: 11/27/23  OT Start Time: 0850    OT Stop Time: 0949  OT Total Time (min): 59 min    Billable Minutes:Therapeutic Activity 44  Therapeutic Exercise 15    11/27/2023

## 2023-11-27 NOTE — PLAN OF CARE
Recommend low sodium diet, RD following  Goals: PO to meet 85% of EEN/EPN by next RD follow up  Nutrition Goal Status: goal met  Communication of RD Recs: other (comment) (POC)     Assessment and Plan  Knowledge deficit regarding heart failure monitoring and diet as evidenced by no previous education on the topic     New     Plan  Nutrition education 11/27  Mineral restricted diet- low sodium recommended  Collaboration with other providers

## 2023-11-28 PROCEDURE — 25000003 PHARM REV CODE 250: Performed by: NURSE PRACTITIONER

## 2023-11-28 PROCEDURE — 97535 SELF CARE MNGMENT TRAINING: CPT | Mod: CO

## 2023-11-28 PROCEDURE — 25000003 PHARM REV CODE 250: Performed by: HOSPITALIST

## 2023-11-28 PROCEDURE — 97110 THERAPEUTIC EXERCISES: CPT | Mod: CQ

## 2023-11-28 PROCEDURE — 11000004 HC SNF PRIVATE

## 2023-11-28 PROCEDURE — 25000242 PHARM REV CODE 250 ALT 637 W/ HCPCS: Performed by: NURSE PRACTITIONER

## 2023-11-28 PROCEDURE — 97110 THERAPEUTIC EXERCISES: CPT | Mod: CO

## 2023-11-28 PROCEDURE — 25000003 PHARM REV CODE 250: Performed by: FAMILY MEDICINE

## 2023-11-28 PROCEDURE — 97116 GAIT TRAINING THERAPY: CPT | Mod: CQ

## 2023-11-28 RX ADMIN — PANTOPRAZOLE SODIUM 40 MG: 40 TABLET, DELAYED RELEASE ORAL at 09:11

## 2023-11-28 RX ADMIN — SENNOSIDES AND DOCUSATE SODIUM 1 TABLET: 8.6; 5 TABLET ORAL at 09:11

## 2023-11-28 RX ADMIN — ACETAMINOPHEN 650 MG: 325 TABLET ORAL at 09:11

## 2023-11-28 RX ADMIN — Medication 6 MG: at 09:11

## 2023-11-28 RX ADMIN — FLUTICASONE PROPIONATE 100 MCG: 50 SPRAY, METERED NASAL at 09:11

## 2023-11-28 RX ADMIN — ATORVASTATIN CALCIUM 40 MG: 40 TABLET, FILM COATED ORAL at 09:11

## 2023-11-28 RX ADMIN — APIXABAN 5 MG: 2.5 TABLET, FILM COATED ORAL at 09:11

## 2023-11-28 RX ADMIN — ASPIRIN 81 MG CHEWABLE TABLET 81 MG: 81 TABLET CHEWABLE at 09:11

## 2023-11-28 RX ADMIN — TAMSULOSIN HYDROCHLORIDE 0.4 MG: 0.4 CAPSULE ORAL at 09:11

## 2023-11-28 RX ADMIN — METOPROLOL SUCCINATE 50 MG: 50 TABLET, EXTENDED RELEASE ORAL at 09:11

## 2023-11-28 RX ADMIN — FUROSEMIDE 40 MG: 40 TABLET ORAL at 09:11

## 2023-11-28 RX ADMIN — EMPAGLIFLOZIN 10 MG: 10 TABLET, FILM COATED ORAL at 09:11

## 2023-11-28 RX ADMIN — CHOLECALCIFEROL TAB 25 MCG (1000 UNIT) 2000 UNITS: 25 TAB at 09:11

## 2023-11-28 NOTE — PROGRESS NOTES
[facility]                                  Skilled Nursing Facility                   Progress Note     Admit Date: 11/17/2023  ROLANDO 12/1/2023  [id number]  Principal Problem:  SAH (subarachnoid hemorrhage)   HPI obtained from patient interview and chart review     Chief Complaint: Re-evaluation of medical treatment and therapy status:  Hypotension    HPI:   Mr. Gamino is a 78 year old male PMHx of lymphoma in remission, CAD, nonrheumatic mitral regurg, CHF, first-degree AV block, aortic arthrosclerosis who presents to SNF following hospitalization for SAH.  Admission to SNF for secondary weakness and debility.      Interval history:  Yesterday's labs reviewed.  24 hr vital sign ranges reviewed and listed below.  BP's remain low to normal with 24 hour range of 103/55 to 120/60.  Patient denies shortness of breath, abdominal discomfort, nausea, or vomiting.  Patient reports an adequate appetite.  Patient denies dysuria.  Patient reports having regular bowel movements.  Patient progessing with PT/OT- Gait: Patient ambulated 200 feet and 167 feet using RW with CGA. Seated rest break between trials. Patient with decreased BLE clearance and increased forefoot contact. Cues to increase heel strike at initial contact. Patient pushing walker far forward on occasion, especially during turns. Cues to stand erect and within ADRIAN of RW. W/C in tow. No LOB. Continuing to follow and treat all acute and chronic conditions.    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    Past Surgical History: Patient has a past surgical history that includes Eye surgery (Bilateral, 2016).    Social History:  "Patient reports that he has never smoked. He has never used smokeless tobacco. He reports current alcohol use of about 1.0 standard drink of alcohol per week. He reports that he does not use drugs.    Family History: family history includes Cancer in his mother; Hypertension in his brother; No Known Problems in his brother, brother, brother, daughter, father, sister, and sister; Thyroid disease in his sister.    Allergies: Patient is allergic to lotensin [benazepril].    ROS  Constitutional: Negative for fever   Eyes: Negative for blurred vision, double vision   Respiratory: Negative for cough, shortness of breath   Cardiovascular: Negative for chest pain, palpitations, and leg swelling.   Gastrointestinal: Negative for abdominal pain, constipation, diarrhea, nausea, vomiting.   Genitourinary: Negative for dysuria, frequency   Musculoskeletal:  + generalized weakness. Negative for back pain and myalgias.   Skin: Negative for itching and rash.   Neurological: Negative for dizziness. +  headaches.   Psychiatric/Behavioral: Negative for depression. The patient is not nervous/anxious.      24 hour Vital Sign Range   Temp:  [98.5 °F (36.9 °C)-98.7 °F (37.1 °C)]   Pulse:  []   Resp:  [16-18]   BP: (103-120)/(55-60)   SpO2:  [95 %-97 %]     Current BMI: Body mass index is 31.01 kg/m².    PEx  Constitutional: Patient appears debilitated.  No distress noted  HENT:   Head: Normocephalic and atraumatic.   Eyes: Pupils are equal, round  Neck: Normal range of motion. Neck supple.   Cardiovascular: Normal rate, regular rhythm and normal heart sounds.    Pulmonary/Chest: Effort normal and breath sounds are clear  Abdominal: Soft. Bowel sounds are normal.   Musculoskeletal: Normal range of motion.   Neurological: Alert and oriented to person, place, and time.   Psychiatric: Normal mood and affect. Behavior is normal.   Skin: Skin is warm and dry.     No results for input(s): "GLUCOSE", "NA", "K", "CL", "CO2", "BUN", " ""CREATININE", "CALCIUM", "MG" in the last 24 hours.      No results for input(s): "WBC", "RBC", "HGB", "HCT", "PLT", "MCV", "MCH", "MCHC" in the last 24 hours.      No results for input(s): "POCTGLUCOSE" in the last 168 hours.       Assessment and Plan:    SAH (subarachnoid hemorrhage)  - CTH with prepontine SAH; CTA negative for vessel lesions.   - CTA 11/4: negative for vascular pathology  - MRI/MRA Brain 11/5: negative for underlying lesion  - DSA 11/5: negative for vascular pathology  - CTA 11/11: no evidence of vasospasm  - CTA head/neck 11/14: Stable intracranial findings with interval decrease in SAH/IVH, no evidence of vasospasm. Bilateral pulmonary emboli visualized in the upper chest.   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  - patient completed 7 day course of Keppra 500 BID  - Goal normotensive, SBP < 160  - follow-up with neurosurgery on 12/14 with CT scan    Hypotension  Hx of Primary hypertension  - home regimen with losartan 100 mg daily, metoprolol XL 50 mg daily  - persisting, discontinue Lasix for now, continue to hold losartan.  continue metoprolol XL 50 mg daily, discontinue spironolactone    Acute pulmonary embolism  - Episode of dizziness, tachycardia and diaphoresis on 11/14  - CTA head/neck revealed bilateral pulmonary emboli.  - Echo 11/15 negative for R heart strain  - Eliquis 10mg BID started for 7 days followed by 5mg BID x 3 months   - outpatient follow up with established Cardiologist, set for 12/7     Hypercholesterolemia  - Continue home atorvastatin 40 mg daily     Heart failure, diastolic, chronic  - Followed by cardiology as OP - next appointment is for 12/7  - Echo from 11/5 reviewed: estimated ejection fraction of 55 - 60%. There is normal diastolic function.  - hold Lasix 40 mg daily, continue metoprolol XL 50 mg daily, Jardiance 10 mg daily, hold spironolactone 25 mg daily and losartan    Benign prostatic hyperplasia with urinary " frequency  - stable, Continue home dose tamsulosin      Coronary artery disease  - stable,continue ASA 81 mg daily, atorvastatin    GERD  - stable,continue Protonix 40 mg daily    BPH  - stable,continue tamsulosin 0.4 mg daily    Debility   - Continue with PT/OT for gait training and strengthening and restoration of ADL's   - Encourage mobility, OOB in chair, and early ambulation as appropriate  - Fall precautions   - Monitor for bowel and bladder dysfunction  - Monitor for and prevent skin breakdown and pressure ulcers  - Continue DVT prophylaxis with apixaban      Anticipate disposition:  Home with home health    IP OHS RISK OF UNPLANNED READMISSION Model: HIGH MODERATE LOW    Follow-up needed during SNF stay-    Follow-up needed after discharge from SNF: PCP     Future Appointments   Date Time Provider Department Center   12/7/2023 10:30 AM Ema Montemayor MD [facility] [id number] [facility]   12/14/2023  1:15 PM [facility] OIC-CT2 500 LB LIMIT [facility]   12/14/2023  2:30 PM [facility] OI-CT1 500 LB LIMIT [facility] IC Imaging [facility]tr   12/14/2023  4:00 PM Koki Espinal PA-C [facility] [id number] Elmo Wetzel NP  Department of Hospital Medicine   [facility] Skilled Nursing Facility     DOS: 11/28/2023       Patient note was created using MModal Dictation.  Any errors in syntax or even information may not have been identified and edited on initial review prior to signing this note.

## 2023-11-28 NOTE — PT/OT/SLP PROGRESS
Occupational Therapy   Treatment    Name: Sam Gamino  MRN: [id number]  Admit Date: 11/17/2023  Admitting Diagnosis:  SAH (subarachnoid hemorrhage)    General Precautions: Standard, fall   Orthopedic Precautions: N/A   Braces: N/A    Recommendations:     Discharge Recommendations:  Low Intensity Therapy  Level of Assistance Recommended at Discharge: 24 hours light assistance for ADL's and homemaking tasks  Discharge Equipment Recommendations: walker, rolling, wheelchair, bedside commode, bath bench  Barriers to discharge:  Decreased caregiver support    Assessment:     Sam Gamino is a 78 y.o. male with a medical diagnosis of SAH (subarachnoid hemorrhage)  He presents with Performance deficits affecting function are weakness, impaired endurance, impaired self care skills, impaired functional mobility, gait instability, impaired balance, decreased coordination.     Rehab Potential is fair    Activity tolerance:  Fair    Plan:     Patient to be seen 5 x/week to address the above listed problems via self-care/home management, therapeutic activities, therapeutic exercises    Plan of Care Expires: 12/18/23  Plan of Care Reviewed with: patient    Subjective     Communicated with: NSg and Pt  prior to session. I am doing well today    Pain/Comfort:  Pain Rating 1: 2/10  Location - Side 1: Right  Location 1: leg  Pain Rating Post-Intervention 1: 2/10    Patient's cultural, spiritual, Christian conflicts given the current situation:  no    Objective:     Patient found  seated in bathroom    upon OT entry to room.    Functional Mobility/Transfers:  Patient completed Sit <> Stand Transfer with contact guard assistance  with  rolling walker   Patient completed Toilet Transfer Step Transfer technique with contact guard assistance with  rolling walker  Functional Mobility: Pt. With fxl mobility from inroom bath with RW an cues for safety and hand placement     Activities of Daily Living:  Grooming: stand by assistance at  sink level standing with grooming needs  Upper Body Dressing: supervision to doff/nena pull over shirt  Lower Body Dressing: contact guard assistance to doff/nena pants seated and to mange over hips instance with RW for bal and SBA to nena BLE tennis shoes  Toileting: stand by assistance with cleaning management and use of G bars to stand     AMPAC 6 Click ADL: 16    OT Exercises: UE Ergometer 10 min    Treatment & Education:  Pt. With therex performed to increase ROM, endurance selfcare task and fxl mobility for independence     Pt edu on role of OT, POC, safety when performing self care tasks , benefit of performing OOB activity, and safety when performing functional transfers and mobility management for preparation with goals to progress towards next level of care     Patient left up in chair with all lines intact and call button in reach    GOALS:   Multidisciplinary Problems       Occupational Therapy Goals          Problem: Occupational Therapy    Goal Priority Disciplines Outcome Interventions   Occupational Therapy Goal     OT, PT/OT Ongoing, Progressing    Description: Goals to be met by: 12/18/23     Patient will increase functional independence with ADLs by performing:    UE Dressing with Modified [place].  LE Dressing with Modified [place] and Assistive Devices as needed.  Grooming while standing at sink with Stand-by Assistance.  Toileting from toilet with Supervision for hygiene and clothing management.   Bathing from shower chair with Minimal Assistance.  Toilet transfer to toilet with Stand-by Assistance.  Upper extremity exercise program 3 x15 reps per handout, with supervision to improve UE strength/coordination for improved func mobility skills.                         Time Tracking:     OT Date of Treatment: 11/28/23  OT Start Time: 0902    OT Stop Time: 0946  OT Total Time (min): 44 min    Billable Minutes:Self Care/Home Management 34  Therapeutic Exercise 10    11/28/2023

## 2023-11-28 NOTE — PT/OT/SLP PROGRESS
Physical Therapy Treatment    Patient Name:  Sam Gamino   MRN:  [id number]  Admit Date: 11/17/2023  Admitting Diagnosis: SAH (subarachnoid hemorrhage)  Recent Surgeries:     General Precautions: Standard, fall  Orthopedic Precautions: N/A  Braces: N/A    Recommendations:     Discharge Recommendations: Low Intensity Therapy  Level of Assistance Recommended at Discharge: 24 hours light assistance  Discharge Equipment Recommendations: bedside commode, bath bench, walker, rolling, wheelchair  Barriers to discharge: Decreased caregiver support    Assessment:     Sam Gamino is a 78 y.o. male admitted with a medical diagnosis of SAH (subarachnoid hemorrhage) . Patient ambulates with decreased step clearance and needs cueing correcting posture.      Performance deficits affecting function: weakness, impaired endurance, impaired self care skills, impaired functional mobility, gait instability, impaired balance, decreased coordination, decreased safety awareness.    Rehab Potential is good    Activity Tolerance: Good    Plan:     Patient to be seen 5 x/week to address the above listed problems via gait training, therapeutic activities, therapeutic exercises, neuromuscular re-education, wheelchair management/training    Plan of Care Expires: 12/18/23  Plan of Care Reviewed with: patient    Subjective     Patient without any c/o.     Pain/Comfort:  Pain Rating 1: 0/10  Pain Rating Post-Intervention 1: 0/10    Patient's cultural, spiritual, Gnosticist conflicts given the current situation:  no    Objective:     Communicated with NSG prior to session.  Patient found HOB elevated with   upon PT entry to room.     Therapeutic Activities and Exercises: LE Ergometer x 15 minutes. Patient transferred from bed to wheelchair with CGA    Functional Mobility:  Bed Mobility:     Rolling Right: modified independence  Scooting: modified independence  Supine to Sit: modified independence  Transfers:     Sit to Stand:  contact guard  assistance with rolling walker  Bed to Chair: contact guard assistance with  rolling walker  using  Stand Pivot  Gait: 230 ft with RW and CGA, with cues to correct posture  Wheelchair Propulsion:  Pt propelled Standard wheelchair x 38 and 100 feet on Level tile with  Right upper extremity and Left upper extremity with Stand-by Assistance.     AM-PAC 6 CLICK MOBILITY  18    Patient left up in chair with call button in reach.    GOALS:   Multidisciplinary Problems       Physical Therapy Goals          Problem: Physical Therapy    Goal Priority Disciplines Outcome Goal Variances Interventions   Physical Therapy Goal     PT, PT/OT Ongoing, Progressing     Description: Goals to be met by: 23     Patient will increase functional independence with mobility by performin. Supine to sit with Modified [place] - In progress  2. Sit to supine with Stand-by Assistance - In progress  3. Rolling to Left and Right with Modified [place] - In progress  4. Sit to stand transfer with Stand-by Assistance - MET  Updated Goal 23: Sit to stand transfer with Supervision    5. Bed to chair transfer with Stand-by Assistance using Rolling Walker - MET  Updated Goal 23: Bed to chair transfer with Supervision using RW    6. Gait  x 150 feet with Stand-by Assistance using Rolling Walker - In progress  7. Wheelchair propulsion x 150 feet with Modified [place] using bilateral upper extremities - In progress                         Time Tracking:     PT Received On: 23  PT Start Time: 1432  PT Stop Time: 1510  PT Total Time (min): 38 min    Billable Minutes: Gait Training 15 and Therapeutic Exercise 23    Treatment Type: Treatment  PT/PTA: PTA     Number of PTA visits since last PT visit: 2023

## 2023-11-29 PROCEDURE — 97530 THERAPEUTIC ACTIVITIES: CPT | Mod: CQ

## 2023-11-29 PROCEDURE — 11000004 HC SNF PRIVATE

## 2023-11-29 PROCEDURE — 97110 THERAPEUTIC EXERCISES: CPT | Mod: CQ

## 2023-11-29 PROCEDURE — 97116 GAIT TRAINING THERAPY: CPT | Mod: CQ

## 2023-11-29 PROCEDURE — 97535 SELF CARE MNGMENT TRAINING: CPT | Mod: CO

## 2023-11-29 PROCEDURE — 25000003 PHARM REV CODE 250: Performed by: HOSPITALIST

## 2023-11-29 PROCEDURE — 25000242 PHARM REV CODE 250 ALT 637 W/ HCPCS: Performed by: NURSE PRACTITIONER

## 2023-11-29 PROCEDURE — 25000003 PHARM REV CODE 250: Performed by: FAMILY MEDICINE

## 2023-11-29 PROCEDURE — 25000003 PHARM REV CODE 250: Performed by: NURSE PRACTITIONER

## 2023-11-29 RX ADMIN — APIXABAN 5 MG: 2.5 TABLET, FILM COATED ORAL at 08:11

## 2023-11-29 RX ADMIN — ATORVASTATIN CALCIUM 40 MG: 40 TABLET, FILM COATED ORAL at 08:11

## 2023-11-29 RX ADMIN — APIXABAN 5 MG: 2.5 TABLET, FILM COATED ORAL at 09:11

## 2023-11-29 RX ADMIN — PANTOPRAZOLE SODIUM 40 MG: 40 TABLET, DELAYED RELEASE ORAL at 08:11

## 2023-11-29 RX ADMIN — CHOLECALCIFEROL TAB 25 MCG (1000 UNIT) 2000 UNITS: 25 TAB at 08:11

## 2023-11-29 RX ADMIN — ASPIRIN 81 MG CHEWABLE TABLET 81 MG: 81 TABLET CHEWABLE at 08:11

## 2023-11-29 RX ADMIN — Medication 6 MG: at 09:11

## 2023-11-29 RX ADMIN — ACETAMINOPHEN 650 MG: 325 TABLET ORAL at 09:11

## 2023-11-29 RX ADMIN — METOPROLOL SUCCINATE 50 MG: 50 TABLET, EXTENDED RELEASE ORAL at 08:11

## 2023-11-29 RX ADMIN — SENNOSIDES AND DOCUSATE SODIUM 1 TABLET: 8.6; 5 TABLET ORAL at 08:11

## 2023-11-29 RX ADMIN — SENNOSIDES AND DOCUSATE SODIUM 1 TABLET: 8.6; 5 TABLET ORAL at 09:11

## 2023-11-29 RX ADMIN — TAMSULOSIN HYDROCHLORIDE 0.4 MG: 0.4 CAPSULE ORAL at 08:11

## 2023-11-29 RX ADMIN — EMPAGLIFLOZIN 10 MG: 10 TABLET, FILM COATED ORAL at 08:11

## 2023-11-29 RX ADMIN — FLUTICASONE PROPIONATE 100 MCG: 50 SPRAY, METERED NASAL at 09:11

## 2023-11-29 NOTE — PLAN OF CARE
[facility] - Skilled Nursing      HOME HEALTH ORDERS  FACE TO FACE ENCOUNTER    Patient Name: Sam Gamino  YOB: 1945    PCP: JAYLYN Zamora MD   PCP Address: [address] [place] [address]  PCP Phone Number: [phone]  PCP Fax: [phone]    Encounter Date: 11/16/23    Admit to Home Health    Diagnoses:  Active Hospital Problems    Diagnosis  POA    *SAH (subarachnoid hemorrhage) [I60.9]  Yes    Acute pulmonary embolism [I26.99]  Yes    Atherosclerosis of aorta [I70.0]  Yes     2023: Mentioned in chart.      Atrioventricular block, first degree [I44.0]  Yes     11/1/2023: ECG: NJ interval 320 ms.      Hypercholesterolemia [E78.00]  Yes     2002: Statin was begun.      Heart failure, diastolic, chronic [I50.32]  Yes     5/16/2022: Echo: Normal left ventricular size and systolic function. EF 55%. Moderate MR. Mild to moderate TR.   6/6/2023: Echo: Normal left ventricular size and systolic function. EF 60%. LVGLS -15%. Moderate diastolic dysfunction. Mild to moderate AR. Mild to moderate MR. Mildly enlarged ascending aorta. Small pericardial effusion.          Primary hypertension [I10]  Yes     2002: Diagnosed.         Lower extremity edema [R60.0]  Yes    Tricuspid regurgitation [I07.1]  Yes     Echo 4/22 - PAP=93      Mitral regurgitation [I34.0]  Yes     2007 - Mild  4/22 - Mod/Severe      Benign prostatic hyperplasia with urinary frequency [N40.1, R35.0]  Yes    Chronic bilateral low back pain with bilateral sciatica [M54.42, M54.41, G89.29]  Yes    Slow transit constipation [K59.01]  Yes    Coronary artery disease [I25.10]  Yes     Angio 3/07  Dr. Lin        Resolved Hospital Problems   No resolved problems to display.       Follow Up Appointments:  Future Appointments   Date Time Provider Department Center   12/7/2023 10:30 AM Ema Montemayor MD [facility] [id number] [facility]   12/14/2023  1:15 PM [facility] OIC-CT2 500 LB LIMIT [facility] IC Imaging Ctr   12/14/2023  2:30  PM [facility] OIC-CT1 500 LB LIMIT [facility] IC Imaging Ctr   12/14/2023  4:00 PM Koki Espinal PA-C [facility] NEUROS8 Elmo Alfaro       Allergies:  Review of patient's allergies indicates:   Allergen Reactions    Lotensin [benazepril]      cough       Medications: Review discharge medications with patient and family and provide education.      I have seen and examined this patient within the last 30 days. My clinical findings that support the need for the home health skilled services and home bound status are the following:no   Weakness/numbness causing balance and gait disturbance due to Stroke making it taxing to leave home.     Referrals/ Consults  Physical Therapy to evaluate and treat. Evaluate for home safety and equipment needs; Establish/upgrade home exercise program. Perform / instruct on therapeutic exercises, gait training, transfer training, and Range of Motion.  Occupational Therapy to evaluate and treat. Evaluate home environment for safety and equipment needs. Perform/Instruct on transfers, ADL training, ROM, and therapeutic exercises.  Aide to provide assistance with personal care, ADLs, and vital signs.    Activities:   activity as tolerated    Nursing:   Agency to admit patient within 24 hours of hospital discharge unless specified on physician order or at patient request    SN to complete comprehensive assessment including routine vital signs. Instruct on disease process and s/s of complications to report to MD. Review/verify medication list sent home with the patient at time of discharge  and instruct patient/caregiver as needed. Frequency may be adjusted depending on start of care date.     Skilled nurse to perform up to 3 visits PRN for symptoms related to diagnosis    Notify MD if SBP > 160 or < 90; DBP > 90 or < 50; HR > 120 or < 50; Temp > 101; O2 < 88%    Ok to schedule additional visits based on staff availability and patient request on consecutive days within the [facility]  episode.    Miscellaneous   Heart Failure:      SN to instruct on the following:    Instruct on the definition of CHF.   Instruct on the signs/sympoms of CHF to be reported.   Instruct on and monitor daily weights.   Instruct on factors that cause exacerbation.   Instruct on action, dose, schedule, and side effects of medications.   Instruct on diet as prescribed.   Instruct on activity allowed.   Instruct on life-style modifications for life long management of CHF   SN to assess compliance with daily weights, diet, medications, fluid retention,    safety precautions, activities permitted and life-style modifications.   Additional 1-2 SN visits per week as needed for signs and symptoms     of CHF exacerbation.      For Weight Gain > 2-3 lbs in 1 day or 4-6 lbs over 1 week notify PCP    Home Health Aide:  Nursing Three times weekly, Physical Therapy Three times weekly, Occupational Therapy Three times weekly, and Home Health Aide Three times weekly    Wound Care Orders  no    I certify that this patient is confined to his home and needs intermittent skilled nursing care, physical therapy, and occupational therapy.

## 2023-11-29 NOTE — PLAN OF CARE
Problem: Adult Inpatient Plan of Care  Goal: Plan of Care Review  Outcome: Ongoing, Progressing  Flowsheets (Taken 11/28/2023 5971)  Plan of Care Reviewed With: patient     Problem: Skin Injury Risk Increased  Goal: Skin Health and Integrity  Outcome: Ongoing, Progressing     Problem: Fall Injury Risk  Goal: Absence of Fall and Fall-Related Injury  Outcome: Ongoing, Progressing

## 2023-11-29 NOTE — PLAN OF CARE
Problem: Adult Inpatient Plan of Care  Goal: Plan of Care Review  Outcome: Ongoing, Progressing  Goal: Patient-Specific Goal (Individualized)  Outcome: Ongoing, Progressing  Goal: Absence of Hospital-Acquired Illness or Injury  Outcome: Ongoing, Progressing  Goal: Optimal Comfort and Wellbeing  Outcome: Ongoing, Progressing  Intervention: Provide Person-Centered Care  Flowsheets (Taken 11/28/2023 2143)  Trust Relationship/Rapport:   care explained   choices provided   questions encouraged   questions answered

## 2023-11-29 NOTE — PROGRESS NOTES
[facility]                                  Skilled Nursing Facility                   Progress Note     Admit Date: 11/17/2023  ROLANDO 12/1/2023  [id number]  Principal Problem:  SAH (subarachnoid hemorrhage)   HPI obtained from patient interview and chart review     Chief Complaint: Re-evaluation of medical treatment and therapy status:  Hypotension    HPI:   Mr. Gamino is a 78 year old male PMHx of lymphoma in remission, CAD, nonrheumatic mitral regurg, CHF, first-degree AV block, aortic arthrosclerosis who presents to SNF following hospitalization for SAH.  Admission to SNF for secondary weakness and debility.      Interval history:    24 hr vital sign ranges reviewed and listed below.  BP remains low to normal, 114/58 this morning.  Continue to hold Lasix and losartan.  Heart failure monitoring ordered with home health orders in preparation for patient's discharge on Friday.  Patient states headaches continue to improve.  Patient continues to ask questions regarding his subarachnoid hemorrhage, how this occurs and had a prevent it from happening again.  Continued education provided to patient; I also remind him that he has a follow-up with neurosurgery on 12/14 where he can have further questions answered.  Patient denies shortness of breath, abdominal discomfort, nausea, or vomiting.  Patient reports an adequate appetite.  Patient denies dysuria.  Patient reports having regular bowel movements.  Patient progressing with PT/OT-Gait: 230 ft with RW and CGA, with cues to correct posture . Continuing to follow and treat all acute and chronic conditions.    Past Medical History: Patient has a past medical history of Abnormal echocardiogram (2/8/2013), CAD (coronary artery disease) (2/8/2013), Hypercholesterolemia (6/27/2022), Lymphoma in remission (2/8/2013), Normal cardiac stress test (2/8/2013), Pernicious anemia (2/8/2013), and  Thyroid nodule (2/8/2013).    Past Surgical History: Patient has a past surgical history that includes Eye surgery (Bilateral, 2016).    Social History: Patient reports that he has never smoked. He has never used smokeless tobacco. He reports current alcohol use of about 1.0 standard drink of alcohol per week. He reports that he does not use drugs.    Family History: family history includes Cancer in his mother; Hypertension in his brother; No Known Problems in his brother, brother, brother, daughter, father, sister, and sister; Thyroid disease in his sister.    Allergies: Patient is allergic to lotensin [benazepril].    ROS  Constitutional: Negative for fever   Eyes: Negative for blurred vision, double vision   Respiratory: Negative for cough, shortness of breath   Cardiovascular: Negative for chest pain, palpitations, and leg swelling.   Gastrointestinal: Negative for abdominal pain, constipation, diarrhea, nausea, vomiting.   Genitourinary: Negative for dysuria, frequency   Musculoskeletal:  + generalized weakness. Negative for back pain and myalgias.   Skin: Negative for itching and rash.   Neurological: Negative for dizziness. +  headaches.   Psychiatric/Behavioral: Negative for depression. The patient is not nervous/anxious.      24 hour Vital Sign Range   Temp:  [98.1 °F (36.7 °C)-98.2 °F (36.8 °C)]   Pulse:  [92-96]   Resp:  [18]   BP: (114)/(58)   SpO2:  [95 %-100 %]     Current BMI: Body mass index is 31.12 kg/m².    PEx  Constitutional: Patient appears debilitated.  No distress noted  HENT:   Head: Normocephalic and atraumatic.   Eyes: Pupils are equal, round  Neck: Normal range of motion. Neck supple.   Cardiovascular: Normal rate, regular rhythm and normal heart sounds.    Pulmonary/Chest: Effort normal and breath sounds are clear  Abdominal: Soft. Bowel sounds are normal.   Musculoskeletal: Normal range of motion.   Neurological: Alert and oriented to person, place, and time.   Psychiatric: Normal mood  "and affect. Behavior is normal.   Skin: Skin is warm and dry.     No results for input(s): "GLUCOSE", "NA", "K", "CL", "CO2", "BUN", "CREATININE", "CALCIUM", "MG" in the last 24 hours.      No results for input(s): "WBC", "RBC", "HGB", "HCT", "PLT", "MCV", "MCH", "MCHC" in the last 24 hours.      No results for input(s): "POCTGLUCOSE" in the last 168 hours.       Assessment and Plan:    SAH (subarachnoid hemorrhage)  - CTH with prepontine SAH; CTA negative for vessel lesions.   - CTA 11/4: negative for vascular pathology  - MRI/MRA Brain 11/5: negative for underlying lesion  - DSA 11/5: negative for vascular pathology  - CTA 11/11: no evidence of vasospasm  - CTA head/neck 11/14: Stable intracranial findings with interval decrease in SAH/IVH, no evidence of vasospasm. Bilateral pulmonary emboli visualized in the upper chest.   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  - patient completed 7 day course of Keppra 500 BID  - Goal normotensive, SBP < 160  - follow-up with neurosurgery on 12/14 with CT scan    Hypotension  Hx of Primary hypertension  - home regimen with losartan 100 mg daily, metoprolol XL 50 mg daily  - persisting, discontinue Lasix for now, continue to hold losartan.  continue metoprolol XL 50 mg daily, discontinue spironolactone    Acute pulmonary embolism  - Episode of dizziness, tachycardia and diaphoresis on 11/14  - CTA head/neck revealed bilateral pulmonary emboli.  - Echo 11/15 negative for R heart strain  - Eliquis 10mg BID started for 7 days followed by 5mg BID x 3 months   - outpatient follow up with established Cardiologist, set for 12/7     Hypercholesterolemia  - Continue home atorvastatin 40 mg daily     Heart failure, diastolic, chronic  - Followed by cardiology as OP - next appointment is for 12/7  - Echo from 11/5 reviewed: estimated ejection fraction of 55 - 60%. There is normal diastolic function.  - hold Lasix 40 mg daily, continue " metoprolol XL 50 mg daily, Jardiance 10 mg daily, hold spironolactone 25 mg daily and losartan  - patient has scheduled cardiology follow-up on 12/07, I will likely have to continue holding his Lasix, losartan and spironolactone upon SNF discharge, he can be re-evaluated at this outpatient appointment for appropriateness to resume- will send message to Cardiology informing them of held medications.    Benign prostatic hyperplasia with urinary frequency  - stable, Continue home dose tamsulosin      Coronary artery disease  - stable,continue ASA 81 mg daily, atorvastatin    GERD  - stable,continue Protonix 40 mg daily    BPH  - stable,continue tamsulosin 0.4 mg daily    Debility   - Continue with PT/OT for gait training and strengthening and restoration of ADL's   - Encourage mobility, OOB in chair, and early ambulation as appropriate  - Fall precautions   - Monitor for bowel and bladder dysfunction  - Monitor for and prevent skin breakdown and pressure ulcers  - Continue DVT prophylaxis with apixaban      Anticipate disposition:  Home with home health    IP OHS RISK OF UNPLANNED READMISSION Model: HIGH MODERATE LOW    Follow-up needed during SNF stay-    Follow-up needed after discharge from SNF: PCPLuis 12/14    Future Appointments   Date Time Provider Department Center   12/7/2023 10:30 AM Ema Montemayor MD [facility] [id number] [facility]   12/14/2023  1:15 PM [facility] OI-CT2 500 LB LIMIT [facility]   12/14/2023  2:30 PM [facility] OI-CT1 500 LB LIMIT [facility]   12/14/2023  4:00 PM Koki Espinal, PAL[facility] [id number] [facility]     I spent 47 minutes reviewing patient records, examining, and counseling the patient/ patient's family with greater than 50% of the time spent in direct patient care and coordination.  Discussion held with patient and wife at bedside regarding discharge planning, follow-up appointments and continued education.    Helena Wetzel NP  Department of Hospital Medicine    [facility]     DOS: 11/29/2023       Patient note was created using MModal Dictation.  Any errors in syntax or even information may not have been identified and edited on initial review prior to signing this note.

## 2023-11-29 NOTE — PT/OT/SLP PROGRESS
Occupational Therapy   Treatment/Family Training     Name: Sam Gamino  MRN: [id number]  Admit Date: 11/17/2023  Admitting Diagnosis:  SAH (subarachnoid hemorrhage)    General Precautions: Standard, fall   Orthopedic Precautions: N/A   Braces: N/A    Recommendations:     Discharge Recommendations:  Low Intensity Therapy  Level of Assistance Recommended at Discharge: 24 hours light assistance for ADL's and homemaking tasks  Discharge Equipment Recommendations: walker, rolling, wheelchair, bedside commode, bath bench  Barriers to discharge:  Decreased caregiver support    Assessment:     Sam Gamino is a 78 y.o. male with a medical diagnosis of SAH (subarachnoid hemorrhage)  He presents with Performance deficits affecting function are weakness, impaired endurance, impaired self care skills, impaired functional mobility, gait instability, impaired balance, decreased coordination.     Rehab Potential is fair    Activity tolerance:  Fair    Plan:     Patient to be seen 5 x/week to address the above listed problems via self-care/home management, therapeutic activities, therapeutic exercises    Plan of Care Expires: 12/18/23  Plan of Care Reviewed with: patient, spouse    Subjective     Communicated with: NSg and Pt  prior to session. I am doing well today    Pain/Comfort:  Pain Rating 1: 0/10  Pain Rating Post-Intervention 1: 0/10    Patient's cultural, spiritual, Hindu conflicts given the current situation:  no    Objective:     Patient found up in chair with wife present    upon OT entry to room.    Functional Mobility/Transfers:  Patient completed Sit <> Stand Transfer with contact guard assistance  with  rolling walker   Patient completed Toilet Transfer Step Transfer technique with contact guard assistance with  rolling walker  Functional Mobility: Pt. With fxl mobility from chair to w/c with RW and cues for safety and hand placement and RW mangment with keeping close to RW    Activities of Daily  Living:  Upper Body Dressing: supervision to doff/nena pull over shirt  Lower Body Dressing: stand by assistance to doff/nena pants seated and to mange over hips instance with RW for bal and SBA to nena BLE tennis shoes      AMPAC 6 Click ADL: 16    Treatment & Education:  Pt. With family training held on this day with Pt wife  reviewed t/f's, selfcare skills and DME and level of (A) for home upon d/c.  Wife had no questions are concerns      Pt edu on role of OT, POC, safety when performing self care tasks , benefit of performing OOB activity, and safety when performing functional transfers and mobility management for preparation with goals to progress towards next level of care     Patient left up in chair with  PTA in gym  present    GOALS:   Multidisciplinary Problems       Occupational Therapy Goals          Problem: Occupational Therapy    Goal Priority Disciplines Outcome Interventions   Occupational Therapy Goal     OT, PT/OT Ongoing, Progressing    Description: Goals to be met by: 12/18/23     Patient will increase functional independence with ADLs by performing:    UE Dressing with Modified [place].  LE Dressing with Modified [place] and Assistive Devices as needed.  Grooming while standing at sink with Stand-by Assistance.  Toileting from toilet with Supervision for hygiene and clothing management.   Bathing from shower chair with Minimal Assistance.  Toilet transfer to toilet with Stand-by Assistance.  Upper extremity exercise program 3 x15 reps per handout, with supervision to improve UE strength/coordination for improved func mobility skills.                         Time Tracking:     OT Date of Treatment: 11/29/23  OT Start Time: 1000    OT Stop Time: 1041  OT Total Time (min): 41 min    Billable Minutes:Self Care/Home Management 41    11/29/2023

## 2023-11-29 NOTE — PT/OT/SLP PROGRESS
Physical Therapy   Family Training / Treatment    Patient Name:  Sam Gamino   MRN:  [id number]  Admit Date: 11/17/2023  Admitting Diagnosis: SAH (subarachnoid hemorrhage)    General Precautions: Standard, fall  Orthopedic Precautions: N/A  Braces: N/A    Recommendations:     Discharge Recommendations: Low Intensity Therapy  Level of Assistance Recommended at Discharge: 24 hours light assistance  Discharge Equipment Recommendations: bedside commode, bath bench, walker, rolling, wheelchair  Barriers to discharge: Decreased caregiver support    Assessment:     Sam Gamino is a 78 y.o. male admitted with a medical diagnosis of SAH (subarachnoid hemorrhage) .   Pt and pt's spouse present and agreeable to family training. Pt completed bed mobility, transfer, gait, and therex. Pt and pt's spouse educated on assistance level required for all activities. All concern address within PTA scope of practice.  Patient continues to demonstrate the need for low intensity therapy on a scheduled basis exhibited by decreased independence with functional mobility.    Performance deficits affecting function: weakness, impaired endurance, impaired self care skills, impaired functional mobility, gait instability, impaired balance, decreased coordination, decreased safety awareness.    Rehab Potential is good    Activity Tolerance: Good    Plan:     Patient to be seen 5 x/week to address the above listed problems via gait training, therapeutic activities, therapeutic exercises, neuromuscular re-education, wheelchair management/training    Plan of Care Expires: 12/18/23  Plan of Care Reviewed with: patient    Subjective     Pt and spouse present for family training.     Pain/Comfort:  Pain Rating 1: 0/10  Pain Rating Post-Intervention 1: 0/10    Patient's cultural, spiritual, Anglican conflicts given the current situation:  no    Objective:     Patient found  up in wheelchair  with  (no lines) upon PT entry to room.     Therapeutic  Activities and Exercises:   Educated and given pt's and family LE handout.   Recumbent cross  x15 mins (lvl 3)  To improve BLE endurance, strength, and ROM  Patient educated on role of therapy, goals of session, and benefits of out of bed mobility.   Instructed on use of call button and importance of calling nursing staff for assistance with mobility   Questions/concerns addressed within PTA scope of practice  Pt verbalized understanding.    Functional Mobility:  Bed Mobility:   Supine to Sit: supervision; HOB flat  Sit to Supine: supervision  Transfers:   Sit <> Stand Transfer: stand by assistance with rolling walker   Mat <> Chair Transfer: stand by assistance with rolling walker using Step Transfer technique  Completed transfer to mat to simulate car transfer.    Gait:  Pt ambulated ~150+115 ft with stand by assistance and rolling walker.  No LOB, pt's spouse present and hand-on as needed   Included ambulating over uneven mat to simulate carpet.  Gait Deviation(s):  mostly steady gait with decrease cash, occasional downward gaze, and decrease heel strike   Verbal/tactile cues for upright posture and keep RW close   Wheelchair Propulsion:    Pt propelled lightweight wheelchair x 100 feet on Level tile with  Bilateral upper extremity with Supervision or Set-up Assistance.     AM-PAC 6 CLICK MOBILITY  19    Patient left up in chair with call button in reach and spouse present.    GOALS:   Multidisciplinary Problems       Physical Therapy Goals          Problem: Physical Therapy    Goal Priority Disciplines Outcome Goal Variances Interventions   Physical Therapy Goal     PT, PT/OT Ongoing, Progressing     Description: Goals to be met by: 23     Patient will increase functional independence with mobility by performin. Supine to sit with Modified [place] - In progress  2. Sit to supine with Stand-by Assistance - In progress  3. Rolling to Left and Right with Modified [place] - In  progress  4. Sit to stand transfer with Stand-by Assistance - MET  Updated Goal 11/27/23: Sit to stand transfer with Supervision    5. Bed to chair transfer with Stand-by Assistance using Rolling Walker - MET  Updated Goal 11/27/23: Bed to chair transfer with Supervision using RW    6. Gait  x 150 feet with Stand-by Assistance using Rolling Walker - In progress  7. Wheelchair propulsion x 150 feet with Modified [place] using bilateral upper extremities - In progress                         Time Tracking:     PT Received On: 11/29/23  PT Start Time: 1042  PT Stop Time: 1125  PT Total Time (min): 43 min    Billable Minutes: Gait Training 10, Therapeutic Activity 17, and Therapeutic Exercise 16    Treatment Type: Family Training, Treatment  PT/PTA: PTA     Number of PTA visits since last PT visit: 2     11/29/2023

## 2023-11-30 LAB
ANION GAP SERPL CALC-SCNC: 8 MMOL/L (ref 8–16)
BASOPHILS # BLD AUTO: 0.03 K/UL (ref 0–0.2)
BASOPHILS NFR BLD: 0.6 % (ref 0–1.9)
BUN SERPL-MCNC: 20 MG/DL (ref 8–23)
CALCIUM SERPL-MCNC: 8.8 MG/DL (ref 8.7–10.5)
CHLORIDE SERPL-SCNC: 108 MMOL/L (ref 95–110)
CO2 SERPL-SCNC: 24 MMOL/L (ref 23–29)
CREAT SERPL-MCNC: 0.8 MG/DL (ref 0.5–1.4)
DIFFERENTIAL METHOD: ABNORMAL
EOSINOPHIL # BLD AUTO: 0.2 K/UL (ref 0–0.5)
EOSINOPHIL NFR BLD: 3.4 % (ref 0–8)
ERYTHROCYTE [DISTWIDTH] IN BLOOD BY AUTOMATED COUNT: 11.9 % (ref 11.5–14.5)
EST. GFR  (NO RACE VARIABLE): >60 ML/MIN/1.73 M^2
GLUCOSE SERPL-MCNC: 82 MG/DL (ref 70–110)
HCT VFR BLD AUTO: 35.1 % (ref 40–54)
HGB BLD-MCNC: 11.4 G/DL (ref 14–18)
IMM GRANULOCYTES # BLD AUTO: 0.01 K/UL (ref 0–0.04)
IMM GRANULOCYTES NFR BLD AUTO: 0.2 % (ref 0–0.5)
LYMPHOCYTES # BLD AUTO: 1.5 K/UL (ref 1–4.8)
LYMPHOCYTES NFR BLD: 31.8 % (ref 18–48)
MAGNESIUM SERPL-MCNC: 1.9 MG/DL (ref 1.6–2.6)
MCH RBC QN AUTO: 33.6 PG (ref 27–31)
MCHC RBC AUTO-ENTMCNC: 32.5 G/DL (ref 32–36)
MCV RBC AUTO: 104 FL (ref 82–98)
MONOCYTES # BLD AUTO: 0.5 K/UL (ref 0.3–1)
MONOCYTES NFR BLD: 10.6 % (ref 4–15)
NEUTROPHILS # BLD AUTO: 2.5 K/UL (ref 1.8–7.7)
NEUTROPHILS NFR BLD: 53.4 % (ref 38–73)
NRBC BLD-RTO: 0 /100 WBC
PHOSPHATE SERPL-MCNC: 2.8 MG/DL (ref 2.7–4.5)
PLATELET # BLD AUTO: 226 K/UL (ref 150–450)
PMV BLD AUTO: 11.2 FL (ref 9.2–12.9)
POTASSIUM SERPL-SCNC: 3.6 MMOL/L (ref 3.5–5.1)
RBC # BLD AUTO: 3.39 M/UL (ref 4.6–6.2)
SODIUM SERPL-SCNC: 140 MMOL/L (ref 136–145)
WBC # BLD AUTO: 4.71 K/UL (ref 3.9–12.7)

## 2023-11-30 PROCEDURE — 36415 COLL VENOUS BLD VENIPUNCTURE: CPT | Performed by: NURSE PRACTITIONER

## 2023-11-30 PROCEDURE — 97110 THERAPEUTIC EXERCISES: CPT

## 2023-11-30 PROCEDURE — 25000003 PHARM REV CODE 250: Performed by: HOSPITALIST

## 2023-11-30 PROCEDURE — 97116 GAIT TRAINING THERAPY: CPT

## 2023-11-30 PROCEDURE — 25000003 PHARM REV CODE 250: Performed by: FAMILY MEDICINE

## 2023-11-30 PROCEDURE — 25000242 PHARM REV CODE 250 ALT 637 W/ HCPCS: Performed by: NURSE PRACTITIONER

## 2023-11-30 PROCEDURE — 25000003 PHARM REV CODE 250: Performed by: NURSE PRACTITIONER

## 2023-11-30 PROCEDURE — 97530 THERAPEUTIC ACTIVITIES: CPT

## 2023-11-30 PROCEDURE — 80048 BASIC METABOLIC PNL TOTAL CA: CPT | Performed by: NURSE PRACTITIONER

## 2023-11-30 PROCEDURE — 84100 ASSAY OF PHOSPHORUS: CPT | Performed by: NURSE PRACTITIONER

## 2023-11-30 PROCEDURE — 83735 ASSAY OF MAGNESIUM: CPT | Performed by: NURSE PRACTITIONER

## 2023-11-30 PROCEDURE — 85025 COMPLETE CBC W/AUTO DIFF WBC: CPT | Performed by: NURSE PRACTITIONER

## 2023-11-30 PROCEDURE — 11000004 HC SNF PRIVATE

## 2023-11-30 PROCEDURE — 97535 SELF CARE MNGMENT TRAINING: CPT | Mod: CO

## 2023-11-30 RX ORDER — FUROSEMIDE 40 MG/1
40 TABLET ORAL DAILY
Qty: 120 TABLET | Refills: 3
Start: 2023-11-30 | End: 2023-12-28

## 2023-11-30 RX ORDER — LOSARTAN POTASSIUM 100 MG/1
100 TABLET ORAL DAILY
Qty: 90 TABLET | Refills: 3
Start: 2023-11-30

## 2023-11-30 RX ORDER — TALC
6 POWDER (GRAM) TOPICAL NIGHTLY PRN
Refills: 0 | COMMUNITY
Start: 2023-11-30 | End: 2023-12-28

## 2023-11-30 RX ADMIN — SENNOSIDES AND DOCUSATE SODIUM 1 TABLET: 8.6; 5 TABLET ORAL at 09:11

## 2023-11-30 RX ADMIN — ACETAMINOPHEN 650 MG: 325 TABLET ORAL at 03:11

## 2023-11-30 RX ADMIN — APIXABAN 5 MG: 2.5 TABLET, FILM COATED ORAL at 08:11

## 2023-11-30 RX ADMIN — ATORVASTATIN CALCIUM 40 MG: 40 TABLET, FILM COATED ORAL at 09:11

## 2023-11-30 RX ADMIN — TAMSULOSIN HYDROCHLORIDE 0.4 MG: 0.4 CAPSULE ORAL at 09:11

## 2023-11-30 RX ADMIN — METOPROLOL SUCCINATE 50 MG: 50 TABLET, EXTENDED RELEASE ORAL at 09:11

## 2023-11-30 RX ADMIN — PANTOPRAZOLE SODIUM 40 MG: 40 TABLET, DELAYED RELEASE ORAL at 09:11

## 2023-11-30 RX ADMIN — EMPAGLIFLOZIN 10 MG: 10 TABLET, FILM COATED ORAL at 09:11

## 2023-11-30 RX ADMIN — ASPIRIN 81 MG CHEWABLE TABLET 81 MG: 81 TABLET CHEWABLE at 09:11

## 2023-11-30 RX ADMIN — SENNOSIDES AND DOCUSATE SODIUM 1 TABLET: 8.6; 5 TABLET ORAL at 08:11

## 2023-11-30 RX ADMIN — APIXABAN 5 MG: 2.5 TABLET, FILM COATED ORAL at 09:11

## 2023-11-30 RX ADMIN — FLUTICASONE PROPIONATE 100 MCG: 50 SPRAY, METERED NASAL at 09:11

## 2023-11-30 RX ADMIN — CHOLECALCIFEROL TAB 25 MCG (1000 UNIT) 2000 UNITS: 25 TAB at 09:11

## 2023-11-30 NOTE — PLAN OF CARE
Problem: Adult Inpatient Plan of Care  Goal: Plan of Care Review  Outcome: Ongoing, Progressing  Goal: Patient-Specific Goal (Individualized)  Outcome: Ongoing, Progressing  Goal: Absence of Hospital-Acquired Illness or Injury  Outcome: Ongoing, Progressing  Intervention: Prevent Infection  Flowsheets (Taken 11/29/2023 2118)  Infection Prevention:   single patient room provided   rest/sleep promoted   hand hygiene promoted  Goal: Optimal Comfort and Wellbeing  Outcome: Ongoing, Progressing

## 2023-11-30 NOTE — PT/OT/SLP PROGRESS
"Physical Therapy Treatment/Discharge Note    Patient Name:  Sam Gamino   MRN:  [id number]  Admit Date: 11/17/2023  Admitting Diagnosis: SAH (subarachnoid hemorrhage)  Recent Surgeries: N/A    General Precautions: Standard, fall  Orthopedic Precautions: N/A  Braces: N/A    Recommendations:     Discharge Recommendations: Low Intensity Therapy  Level of Assistance Recommended at Discharge: 24 hours light assistance  Discharge Equipment Recommendations: walker, rolling, raised toilet, bath bench (raised toilet seat with armrests)  Barriers to discharge: Decreased caregiver support    Assessment:     Sam Gamino is a 78 y.o. male admitted with a medical diagnosis of SAH (subarachnoid hemorrhage). Patient agreeable to PT treatment this AM. D/C GG scoring continued and completed this session. Patient with gradual progression of mobility over course of therapy. Patient currently able to complete bed mobility with Mod I, sit to stand transfer with Supervision and stand step transfer, ambulation and W/C mobility with SBA. RW used for BUE support and overall balance during mobility for fall prevention. Patient scheduled for D/C on 12/1/23 with recommendation for Home Health PT/OT to continue with progression of mobility toward PLOF and increased functional independence.        Performance deficits affecting function: weakness, impaired endurance, impaired self care skills, impaired functional mobility, gait instability, impaired balance, decreased lower extremity function, decreased safety awareness.    Rehab Potential is good    Activity Tolerance: Good    Plan:     Patient to be seen 5 x/week to address the above listed problems via gait training, therapeutic activities, therapeutic exercises, neuromuscular re-education, wheelchair management/training    Plan of Care Expires: 12/18/23  Plan of Care Reviewed with: patient    Subjective     "I know that I am supposed to stand up straight and  my feet when I walk." "     Pain/Comfort:  Pain Rating 1: 0/10  Pain Rating Post-Intervention 1: 0/10    Patient's cultural, spiritual, Yazidism conflicts given the current situation:  no    Objective:     Communicated with nursing staff prior to session.  Patient found  seated in bedside chair  with  (no active lines) upon PT entry to room. Nurse present initially in room.     Therapeutic Activities and Exercises:   LE ergometer x 10 minutes at minimal/moderate resistance for LE strengthening, ROM, and endurance; no rest break required.     Functional Mobility:  Bed Mobility:     Rolling Left:  modified independence and HOB flat with use of bed rails  Rolling Right: modified independence and HOB flat with use of bed rails  Supine to Sit: modified independence and HOB flat with use of bed rails  Sit to Supine: modified independence and HOB flat with use of bed rails  Transfers:     Sit to Stand:  supervision with rolling walker  Bed to Chair: stand by assistance with  rolling walker  using  Step Transfer  W/C to bedside chair: SBA without an AD via stand pivot  Gait: Patient ambulated 266 feet using RW with SBA. Patient with self correction of posture and BLE foot clearance during trial. No LOB. Patient ambulated an additional 35 feet using RW with SBA while retrieving rings from floor with reacher.  Wheelchair Propulsion:  Pt propelled lightweight wheelchair x 200 feet on Level tile with  Bilateral upper extremity with Stand-by Assistance.     AM-PAC 6 CLICK MOBILITY  18    Patient left  seated in bedside chair  with call button in reach and nursing staff notified.    GOALS:   Multidisciplinary Problems       Physical Therapy Goals          Problem: Physical Therapy    Goal Priority Disciplines Outcome Goal Variances Interventions   Physical Therapy Goal     PT, PT/OT Adequate for Care Transition     Description: Goals to be met by: 23     Patient will increase functional independence with mobility by performin. Supine to  sit with Modified [place] - MET 11/30/23  2. Sit to supine with Stand-by Assistance - MET 11/30/23  3. Rolling to Left and Right with Modified [place] - MET 11/30/23  4. Sit to stand transfer with Stand-by Assistance - MET  Updated Goal 11/27/23: Sit to stand transfer with Supervision - MET 11/30/23    5. Bed to chair transfer with Stand-by Assistance using Rolling Walker - MET  Updated Goal 11/27/23: Bed to chair transfer with Supervision using RW - not met, SBA using RW    6. Gait  x 150 feet with Stand-by Assistance using Rolling Walker - MET 11/30/23  7. Wheelchair propulsion x 150 feet with Modified [place] using bilateral upper extremities - not met, SBA for safety                         Time Tracking:     PT Received On: 11/30/23  PT Start Time: 0905  PT Stop Time: 0950  PT Total Time (min): 45 min    Billable Minutes: Gait Training 15, Therapeutic Activity 20, and Therapeutic Exercise 10    Treatment Type: Treatment  PT/PTA: PT     Number of PTA visits since last PT visit: 0     11/30/2023

## 2023-11-30 NOTE — PLAN OF CARE
Problem: Adult Inpatient Plan of Care  Goal: Plan of Care Review  Outcome: Ongoing, Progressing  Goal: Patient-Specific Goal (Individualized)  Outcome: Ongoing, Progressing  Goal: Absence of Hospital-Acquired Illness or Injury  Outcome: Ongoing, Progressing  Goal: Optimal Comfort and Wellbeing  Outcome: Ongoing, Progressing  Goal: Readiness for Transition of Care  Outcome: Ongoing, Progressing     Problem: Skin Injury Risk Increased  Goal: Skin Health and Integrity  Outcome: Ongoing, Progressing     Problem: Fall Injury Risk  Goal: Absence of Fall and Fall-Related Injury  Outcome: Ongoing, Progressing   Pt admitted to Skilled Nursing for Nontraumatic subarachnoid hemorrha*. Patient is AA0X4. Receiving daily PT/OT for strengthening, balance, gait training and ambulation. Pt currently requiring  stand by assistance for transfers and ambulation. Patient also requiring 1 person assistance with dressing and set up for grooming and eating. Daily skilled nursing interventions include pain management, medication management, surgical wound management and ADL assistance. Currently pain is being managed by Acetaminophen 650mg  and rates pain 3/10. Pt is on a Regular diet, tolerating well. Care plan reviewed and updated. No complaints at this time, will continue to update care and monitor.

## 2023-11-30 NOTE — PROGRESS NOTES
[facility]                                  Skilled Nursing Facility                   Progress Note     Admit Date: 11/17/2023  ROLANDO 12/1/2023  [id number]  Principal Problem:  SAH (subarachnoid hemorrhage)   HPI obtained from patient interview and chart review     Chief Complaint: Re-evaluation of medical treatment and therapy status:  Hypotension    HPI:   Mr. Gamino is a 78 year old male PMHx of lymphoma in remission, CAD, nonrheumatic mitral regurg, CHF, first-degree AV block, aortic arthrosclerosis who presents to SNF following hospitalization for SAH.  Admission to SNF for secondary weakness and debility.      Interval history:  All of today's labs reviewed and are listed below.  24 hr vital sign ranges reviewed and listed below.  BP remains low to normal despite holding his antihypertensive medication regimen.  Patient denies shortness of breath, abdominal discomfort, nausea, or vomiting.  Patient reports an adequate appetite.  Patient denies dysuria.  Patient reports having regular bowel movements.  Patient progressing with PT/OT- Gait: Pt ambulated ~150+115 ft with stand by assistance and rolling walker. Continuing to follow and treat all acute and chronic conditions.      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    Past Surgical History: Patient has a past surgical history that includes Eye surgery (Bilateral, 2016).    Social History: Patient reports that he has never smoked. He has never used smokeless tobacco. He reports current alcohol use of about 1.0 standard drink of alcohol per week. He reports that he does not use drugs.    Family History: family history  "includes Cancer in his mother; Hypertension in his brother; No Known Problems in his brother, brother, brother, daughter, father, sister, and sister; Thyroid disease in his sister.    Allergies: Patient is allergic to lotensin [benazepril].    ROS  Constitutional: Negative for fever   Eyes: Negative for blurred vision, double vision   Respiratory: Negative for cough, shortness of breath   Cardiovascular: Negative for chest pain, palpitations, and leg swelling.   Gastrointestinal: Negative for abdominal pain, constipation, diarrhea, nausea, vomiting.   Genitourinary: Negative for dysuria, frequency   Musculoskeletal:  + generalized weakness. Negative for back pain and myalgias.   Skin: Negative for itching and rash.   Neurological: Negative for dizziness. +  headaches.   Psychiatric/Behavioral: Negative for depression. The patient is not nervous/anxious.      24 hour Vital Sign Range   Temp:  [98.3 °F (36.8 °C)-98.5 °F (36.9 °C)]   Pulse:  [86-87]   Resp:  [18]   BP: (102-109)/(59-61)   SpO2:  [98 %-99 %]     Current BMI: Body mass index is 31.3 kg/m².    PEx  Constitutional: Patient appears debilitated.  No distress noted  HENT:   Head: Normocephalic and atraumatic.   Eyes: Pupils are equal, round  Neck: Normal range of motion. Neck supple.   Cardiovascular: Normal rate, regular rhythm and normal heart sounds.    Pulmonary/Chest: Effort normal and breath sounds are clear  Abdominal: Soft. Bowel sounds are normal.   Musculoskeletal: Normal range of motion.   Neurological: Alert and oriented to person, place, and time.   Psychiatric: Normal mood and affect. Behavior is normal.   Skin: Skin is warm and dry.     Recent Labs   Lab 11/30/23  0320      K 3.6      CO2 24   BUN 20   CREATININE 0.8   CALCIUM 8.8   MG 1.9         Recent Labs   Lab 11/30/23  0320   WBC 4.71   RBC 3.39*   HGB 11.4*   HCT 35.1*      *   MCH 33.6*   MCHC 32.5         No results for input(s): "POCTGLUCOSE" in the last 168 " hours.       Assessment and Plan:    SAH (subarachnoid hemorrhage)  - CTH with prepontine SAH; CTA negative for vessel lesions.   - CTA 11/4: negative for vascular pathology  - MRI/MRA Brain 11/5: negative for underlying lesion  - DSA 11/5: negative for vascular pathology  - CTA 11/11: no evidence of vasospasm  - CTA head/neck 11/14: Stable intracranial findings with interval decrease in SAH/IVH, no evidence of vasospasm. Bilateral pulmonary emboli visualized in the upper chest.   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  - patient completed 7 day course of Keppra 500 BID  - Goal normotensive, SBP < 160  - follow-up with neurosurgery on 12/14 with CT scan    Hypotension  Hx of Primary hypertension  - home regimen with losartan 100 mg daily, metoprolol XL 50 mg daily  - persisting, discontinue Lasix for now, continue to hold losartan.  continue metoprolol XL 50 mg daily, discontinue spironolactone    Acute pulmonary embolism  - Episode of dizziness, tachycardia and diaphoresis on 11/14  - CTA head/neck revealed bilateral pulmonary emboli.  - Echo 11/15 negative for R heart strain  - Eliquis 10mg BID started for 7 days followed by 5mg BID x 3 months   - outpatient follow up with established Cardiologist, set for 12/7     Hypercholesterolemia  - Continue home atorvastatin 40 mg daily     Heart failure, diastolic, chronic  - Followed by cardiology as OP - next appointment is for 12/7  - Echo from 11/5 reviewed: estimated ejection fraction of 55 - 60%. There is normal diastolic function.  - hold Lasix 40 mg daily, continue metoprolol XL 50 mg daily, Jardiance 10 mg daily, hold spironolactone 25 mg daily and losartan  - patient has scheduled cardiology follow-up on 12/07, I will likely have to continue holding his Lasix, losartan and spironolactone upon SNF discharge, message sent to his cardiologist informing him of held medications and low blood pressure    Benign prostatic  hyperplasia with urinary frequency  - stable, Continue home dose tamsulosin      Coronary artery disease  - stable,continue ASA 81 mg daily, atorvastatin    GERD  - stable,continue Protonix 40 mg daily    BPH  - stable,continue tamsulosin 0.4 mg daily    Debility   - Continue with PT/OT for gait training and strengthening and restoration of ADL's   - Encourage mobility, OOB in chair, and early ambulation as appropriate  - Fall precautions   - Monitor for bowel and bladder dysfunction  - Monitor for and prevent skin breakdown and pressure ulcers  - Continue DVT prophylaxis with apixaban      Anticipate disposition:  Home with home health    IP [facility] RISK OF UNPLANNED READMISSION Model: HIGH MODERATE LOW    Follow-up needed during SNF stay-    Follow-up needed after discharge from SNF: PCP, Luis 12/14    Future Appointments   Date Time Provider Department Center   12/7/2023 10:30 AM Ema Montemayor MD [facility] [id number] [facility]   12/14/2023  1:15 PM [facility] OIC-CT2 500 LB LIMIT [facility]   12/14/2023  2:30 PM [facility] OIC-CT1 500 LB LIMIT [facility]   12/14/2023  4:00 PM Koki Espinal, KRISTOPHER [facility] [id number] Elmo Alfaro       I spent 48 minutes reviewing patient records, examining, and counseling the patient/ patient's family with greater than 50% of the time spent in direct patient care and coordination.      Helena Wetzel NP  Department of Hospital Medicine   [facility]     DOS: 11/30/2023       Patient note was created using MModal Dictation.  Any errors in syntax or even information may not have been identified and edited on initial review prior to signing this note.

## 2023-11-30 NOTE — CARE UPDATE
928 am wife stated she may want all equipment when he discharges, informed Sonia at [facility]        Contact called wife to discuss dme, left a message at home # to call me.  Explained to pt that we hope to get a rolling walker for him but we have to get insurance approval.  Contact called wife to see if she wants to purchase other equipment. [place] [facility] [place]

## 2023-11-30 NOTE — PLAN OF CARE
Patient with steady progression of mobility over course of therapy. D/C scheduled for 23.    Problem: Physical Therapy  Goal: Physical Therapy Goal  Description: Goals to be met by: 23     Patient will increase functional independence with mobility by performin. Supine to sit with Modified [place] - MET 23  2. Sit to supine with Stand-by Assistance - MET 23  3. Rolling to Left and Right with Modified [place] - MET 23  4. Sit to stand transfer with Stand-by Assistance - MET  Updated Goal 23: Sit to stand transfer with Supervision - MET 23    5. Bed to chair transfer with Stand-by Assistance using Rolling Walker - MET  Updated Goal 23: Bed to chair transfer with Supervision using RW - not met, SBA using RW    6. Gait  x 150 feet with Stand-by Assistance using Rolling Walker - MET 23  7. Wheelchair propulsion x 150 feet with Modified [place] using bilateral upper extremities - not met, Supervision for safety    Outcome: Adequate for Care Transition   2023

## 2023-11-30 NOTE — PT/OT/SLP PROGRESS
Occupational Therapy   Treatment/ Discharge Summary    Name: Sam Gamino  MRN: [id number]  Admit Date: 11/17/2023  Admitting Diagnosis:  SAH (subarachnoid hemorrhage)    General Precautions: Standard, fall   Orthopedic Precautions: N/A   Braces: N/A    Recommendations:     Discharge Recommendations:  Low Intensity Therapy  Level of Assistance Recommended at Discharge: 24 hours light assistance for ADL's and homemaking tasks  Discharge Equipment Recommendations: walker, rolling, wheelchair, bedside commode, bath bench  Barriers to discharge:  Decreased caregiver support    Assessment:     Sam Gamino is a 78 y.o. male with a medical diagnosis of SAH (subarachnoid hemorrhage).  He presents with Performance deficits affecting function are weakness, impaired endurance, impaired self care skills, impaired functional mobility, gait instability, impaired balance, decreased coordination.     Pt. Was cooperative and participated well with session on this day. Pt  continues to demonstrate levels of physical deficits with  functional indep with daily management activities tasks, selfcare skills with balance,  functional mobility, UB strength and endurance. Pt. Will continue to benefit from continued OT to progress towards goals      Rehab Potential is fair    Activity tolerance:  Fair    Plan:     Patient to be seen 5 x/week to address the above listed problems via self-care/home management, therapeutic activities, therapeutic exercises    Plan of Care Expires: 12/18/23  Plan of Care Reviewed with: patient    Subjective     Communicated with: Nsg and Pt. prior to session. I am doing well today    Pain/Comfort:  Pain Rating 1: 0/10  Pain Rating Post-Intervention 1: 0/10    Patient's cultural, spiritual, Hoahaoism conflicts given the current situation:  no    Objective:     Patient found  seated in bathroom      upon OT entry to room.    Functional Mobility/Transfers:  Patient completed Sit <> Stand Transfer with stand by  assistance  with  rolling walker   Patient completed Toilet Transfer Step Transfer technique with stand by assistance with  rolling walker  Functional Mobility: Pt. With fxl mobility to and from inroom bath with RW and cues for safety    Activities of Daily Living:  Grooming: modified independence at sink level]  Bathing: contact guard assistance standing at sink level  Upper Body Dressing: modified independence to doff/nena fresh pull over shirt  Lower Body Dressing: stand by assistance to nena pants seated and to manage over hips and SBA to nena BLE socks with fig 4 tech  Toileting: stand by assistance with cleaning and clothing management     [facility] 6 Click ADL: 16    Treatment & Education:  Pt edu on role of OT, POC, safety when performing self care tasks , benefit of performing OOB activity, and safety when performing functional transfers and mobility management for preparation with goals to progress towards next level of care     Patient left up in chair with all lines intact and call button in reach    GOALS:   Multidisciplinary Problems       Occupational Therapy Goals          Problem: Occupational Therapy    Goal Priority Disciplines Outcome Interventions   Occupational Therapy Goal     OT, PT/OT Ongoing, Progressing    Description: Goals to be met by: 12/18/23     Patient will increase functional independence with ADLs by performing:    UE Dressing with Modified [place]-Met.  LE Dressing with Modified [place] and Assistive Devices as needed.-Not MET  Grooming while standing at sink with Stand-by Assistance.MET  Toileting from toilet with Supervision for hygiene and clothing management. MET  Bathing from shower chair with Minimal Assistance.-MET  Toilet transfer to toilet with Stand-by Assistance.-MET  Upper extremity exercise program 3 x15 reps per handout, with supervision to improve UE strength/coordination for improved func mobility skills.-MET                         Time Tracking:     OT  Date of Treatment: 11/30/23  OT Start Time: 0804    OT Stop Time: 0843  OT Total Time (min): 39 min    Billable Minutes:Self Care/Home Management 39    11/30/2023  A client care conference was performed between the LOTR and RODAS, prior to treatment to discuss the patient's status, treatment plan and established goals.

## 2023-11-30 NOTE — CARE UPDATE
"Discharge Final note      Pt will be discharged on 12.1.2023, Friday, with wife in personal car at 11:00 am. DME: Rolling walker to be delivered, all other items pt's wife will shop around for.  See secure chat below.      Per KARLI Scott  "Spouse said, she will shop around for the other items. "  "

## 2023-12-01 VITALS
WEIGHT: 235.88 LBS | HEIGHT: 73 IN | OXYGEN SATURATION: 98 % | TEMPERATURE: 98 F | BODY MASS INDEX: 31.26 KG/M2 | SYSTOLIC BLOOD PRESSURE: 124 MMHG | RESPIRATION RATE: 18 BRPM | HEART RATE: 92 BPM | DIASTOLIC BLOOD PRESSURE: 56 MMHG

## 2023-12-01 PROCEDURE — 25000003 PHARM REV CODE 250: Performed by: HOSPITALIST

## 2023-12-01 PROCEDURE — 25000003 PHARM REV CODE 250: Performed by: FAMILY MEDICINE

## 2023-12-01 PROCEDURE — 25000242 PHARM REV CODE 250 ALT 637 W/ HCPCS: Performed by: NURSE PRACTITIONER

## 2023-12-01 PROCEDURE — 25000003 PHARM REV CODE 250: Performed by: NURSE PRACTITIONER

## 2023-12-01 RX ADMIN — EMPAGLIFLOZIN 10 MG: 10 TABLET, FILM COATED ORAL at 09:12

## 2023-12-01 RX ADMIN — FLUTICASONE PROPIONATE 100 MCG: 50 SPRAY, METERED NASAL at 09:12

## 2023-12-01 RX ADMIN — TAMSULOSIN HYDROCHLORIDE 0.4 MG: 0.4 CAPSULE ORAL at 09:12

## 2023-12-01 RX ADMIN — ASPIRIN 81 MG CHEWABLE TABLET 81 MG: 81 TABLET CHEWABLE at 09:12

## 2023-12-01 RX ADMIN — CHOLECALCIFEROL TAB 25 MCG (1000 UNIT) 2000 UNITS: 25 TAB at 09:12

## 2023-12-01 RX ADMIN — METOPROLOL SUCCINATE 50 MG: 50 TABLET, EXTENDED RELEASE ORAL at 09:12

## 2023-12-01 RX ADMIN — PANTOPRAZOLE SODIUM 40 MG: 40 TABLET, DELAYED RELEASE ORAL at 09:12

## 2023-12-01 RX ADMIN — ATORVASTATIN CALCIUM 40 MG: 40 TABLET, FILM COATED ORAL at 09:12

## 2023-12-01 RX ADMIN — APIXABAN 5 MG: 2.5 TABLET, FILM COATED ORAL at 09:12

## 2023-12-01 RX ADMIN — SENNOSIDES AND DOCUSATE SODIUM 1 TABLET: 8.6; 5 TABLET ORAL at 09:12

## 2023-12-01 NOTE — PLAN OF CARE
Problem: Adult Inpatient Plan of Care  Goal: Plan of Care Review  Outcome: Ongoing, Progressing  Goal: Patient-Specific Goal (Individualized)  Outcome: Ongoing, Progressing  Goal: Absence of Hospital-Acquired Illness or Injury  Outcome: Ongoing, Progressing  Goal: Optimal Comfort and Wellbeing  Outcome: Ongoing, Progressing  Goal: Readiness for Transition of Care  Outcome: Ongoing, Progressing     Problem: Skin Injury Risk Increased  Goal: Skin Health and Integrity  Outcome: Ongoing, Progressing     Problem: Fall Injury Risk  Goal: Absence of Fall and Fall-Related Injury  Outcome: Ongoing, Progressing  Intervention: Identify and Manage Contributors  Flowsheets (Taken 12/1/2023 0311)  Self-Care Promotion:   BADL personal objects within reach   BADL personal routines maintained  Medication Review/Management: medications reviewed  Intervention: Promote Injury-Free Environment  Flowsheets (Taken 12/1/2023 0311)  Safety Promotion/Fall Prevention:   assistive device/personal item within reach   instructed to call staff for mobility   side rails raised x 2   nonskid shoes/socks when out of bed

## 2023-12-01 NOTE — NURSING
Admission Diagnosis: Nontraumatic subarachnoid hemorrha*     POC reviewed with pt, pt verbalized understanding. Safety maintained throughout shift, bed locked and in lowest position, call light in reach. Pt remained free of fall/trauma. VSS, afebrile this shift.    Pt pain  Last Documentation = Comfort/Acceptable Pain Level: 2 (11/30/23 9005)    Last Documentation =      Last Documentation = Pain Rating (0-10): Rest: 0 (12/01/23 7613)    Skin assessment completed prior to discharge   Location: n/a   Description: n/a  Recommendation: follow up with PCP     AVS reviewed with patient prior to discharge. Discharge instruction, follow up appointments and medication instructions given to pt, pt verbalized understanding. Patient IV lines have been removed prior to discharge. Rx sent to patients pharmacy.       * No surgery found *      OUTCOME: Condition has improved and patient is now ready for discharge.    DISPOSITION: Home or Self Care    FOLLOWUP: With primary care provider

## 2023-12-01 NOTE — DISCHARGE SUMMARY
[facility] Medicine  Discharge Summary      Patient Name: Sam Gamino  MRN: [id number]  TABBY: [id number]  Patient Class: IP- SNF  Admission Date: 11/17/2023  Hospital Length of Stay: 14 days  Discharge Date and Time:  12/01/2023 9:48 AM  Attending Physician: Dom Morrissey MD   Discharging Provider: Helena Wetzel NP  Primary Care Provider: JAYLYN Zamora MD    Primary Care Team: LT[facility] 8 HELENA WETZEL     HPI:   Mr. Gamino is a 78 year old male PMHx of lymphoma in remission, CAD, nonrheumatic mitral regurg, CHF, first-degree AV block, aortic arthrosclerosis who presents to SNF following hospitalization for SAH.  Admission to SNF for secondary weakness and debility.     Patient originally presented to [facility] ED on 11/4 with complaints of bilateral neck pain that happened approximately 2 hours prior to arrival.  Per hospital notes,   Reports that the pain started immediately after having a bowel movement.  He does have associated headache that radiates up to his temples. Patient is on baby ASA. Also reports some associated weakness with walking. States he woke up this morning feeling normal without any pain or weakness. Denies fever/chills, vision/hearing changes, dysphagia, dysarthria, vomiting, new-onset weakness or sensory change, bowel/bladder changes. CTH with prepontine SAH; CTA negative for vessel lesions.    11/05/2023: PBD 1. NAEON. AFVSS. Exam stable, intact. MRI/MRA completed. On strict SAH protocol.  11/6: PBD 2. NAEON. AFVSS. -1L / 24 hrs. MRI/MRA negative. DSA yesterday negative. Plan for CTA PBD 7  11/07/2023: PBD 3. NAEON. AFVSS. Exam stable. I/O negative. HA improved. Denies new weakness/numbness/tingling.  11/08/2023: PBD 4. NAEON. AFVSS. HA nearly resolved.  11/9: NAEON. AFVSS. TCDs WNL. Plan for CTA PBD 7. Will discuss possibility of stepping down before CTA. Exam stable  11/10: NAEON. AFVSS. TCDs yesterday WNL. Planning for CTA tomorrow. Exam stable. Hopefully  "discharge tomorrow if CTA stable  11/11: NAEON. AFVSS. TCDs wnl yesterday. CTA today, pending. Exam stable. D/c today if CTA stable.   11/12: NAEON. AFVSS. CTA yesterday w/o evidence of vasospasm. Exam stable. DC recs changed to SNF, pending SNF  11/13: NAEON. AFVSS. No complaints. Neuro exam stable. Pending discharge to SNF.   11/14: Pt became acutely dizzy while working with PT this am, briefly unresponsive to staff then recovered. Became tachycardic/diaphoretic following episode. Neurologically intact on exam. CTA head/neck revealed b/l PE. Vitals stable. Starting on heparin gtt, Echo ordered.   11/15: NAEON. AFVSS. Pt reports feeling much improved today. Denies any SOB/CP/palpitations, denies HA. Neurologically stable. Sats 100% on 2L. Supratherapeutic on heparin gtt. CTH today. TTE negative for R heart strain.   11/16: NAEON. VSS. Pt without new complaints today. Continues to deny SOB ro CP. Eliquis started yesterday. CTH remained stable. + BM. SNF pending   11/17: NAEON. AFVSS. Pt doing well this morning, no acute complaints. Bowel regimen changed to PRN due to multiple BM's. Medically stable for discharge to SNF today. Will arrange follow up in NSGY clinic in about 6 weeks with CTA. Will need outpatient follow up with established cardiology, has appt scheduled for 12/7."     Today, patient reports continued headaches that are improving overall.  He states he has a good appetite and denies pain anywhere else.     Patient will be treated at [facility] with PT and OT to improve functional status and ability to perform ADLs.    Hospital Course:   Patient progressed well with PT and OT- last PT note states that patient ambulated 266 feet using RW with SBA. Patient with self correction of posture and BLE foot clearance during trial. No LOB. Patient ambulated an additional 35 feet using RW with SBA while retrieving rings from floor with reacher.. Patient had no significant events during their stay at SNF. Home " health was set up.  Patient's headache improved while at SNF.  Patient's blood pressures remained low to normal.  Mr. Caicedo antihypertensive medications were held during stay and at discharge.  Message sent to his cardiologist to inform of low blood pressures and held medications.  He has a scheduled follow-up with cardiologist on 12/07.  His follow-up with neurosurgery for SAH is on 12/14.  I discussed these follow-up appointments with patient and his wife and stressed the importance of attendance.  DME was ordered if needed.  Many discussions held with patient regarding his SAH diagnosis.  Patient has poor understanding of this diagnosis.  I encouraged him to write down his questions to have ready for his 12/14 appointment.  Follow up appointment to be made by patient within one week. All prescriptions and discharge instructions were ordered to be given to the patient prior to discharge.     PEx  Constitutional: Patient appears debilitated.  No distress noted  HENT:   Head: Normocephalic and atraumatic.   Eyes: Pupils are equal, round  Neck: Normal range of motion. Neck supple.   Cardiovascular: Normal rate, regular rhythm and normal heart sounds.    Pulmonary/Chest: Effort normal and breath sounds are clear  Abdominal: Soft. Bowel sounds are normal.   Musculoskeletal: Normal range of motion.   Neurological: Alert and oriented to person, place, and time.   Psychiatric: Normal mood and affect. Behavior is normal.   Skin: Skin is warm and dry     Goals of Care Treatment Preferences:  Code Status: Full Code      Consults:   Consults (From admission, onward)          Status Ordering Provider     Inpatient consult to Registered Dietitian/Nutritionist  Once        Provider:  (Not yet assigned)    ETHAN Monterroso            No new Assessment & Plan notes have been filed under this hospital service since the last note was generated.  Service: Hospital Medicine    Final Active Diagnoses:    Diagnosis Date  "Noted POA    PRINCIPAL PROBLEM:  SAH (subarachnoid hemorrhage) [I60.9] 11/04/2023 Yes    Acute pulmonary embolism [I26.99] 11/14/2023 Yes    Atherosclerosis of aorta [I70.0] 11/01/2023 Yes    Atrioventricular block, first degree [I44.0] 11/01/2023 Yes    Hypercholesterolemia [E78.00] 06/27/2022 Yes    Heart failure, diastolic, chronic [I50.32] 05/16/2022 Yes    Primary hypertension [I10] 05/16/2022 Yes    Lower extremity edema [R60.0] 05/16/2022 Yes    Tricuspid regurgitation [I07.1] 04/27/2022 Yes    Mitral regurgitation [I34.0] 04/27/2022 Yes    Benign prostatic hyperplasia with urinary frequency [N40.1, R35.0] 12/06/2021 Yes    Chronic bilateral low back pain with bilateral sciatica [M54.42, M54.41, G89.29] 06/17/2019 Yes    Slow transit constipation [K59.01] 08/08/2017 Yes    Coronary artery disease [I25.10] 02/08/2013 Yes      Problems Resolved During this Admission:       Discharged Condition: stable    Disposition: Home-Health Care [facility]    Follow Up:    Patient Instructions:      TRANSFER TUB BENCH FOR HOME USE     Order Specific Question Answer Comments   Type of Transfer Tub Bench: Unpadded    Height: 6' 1" (1.854 m)    Weight: 107 kg (235 lb 14.3 oz)    Does patient have medical equipment at home? none    Length of need (1-99 months): 99    Patient notified - Not covered by insurance considered a convenience item No    Discussed financial responsibility with responsible party No      WALKER FOR HOME USE     Order Specific Question Answer Comments   Type of Walker: Adult (5'4"-6'6")    With wheels? Yes    Height: 6' 1" (1.854 m)    Weight: 107 kg (235 lb 14.3 oz)    Length of need (1-99 months): 99    Does patient have medical equipment at home? none    Please check all that apply: Patient's condition impairs ambulation.    Please check all that apply: Patient is unable to safely ambulate without equipment.    Please check all that apply: Walker will be used for gait training.      No driving until:   Order " Comments: Cleared by PCP     Notify your health care provider if you experience any of the following:  temperature >100.4     Notify your health care provider if you experience any of the following:  persistent nausea and vomiting or diarrhea     Notify your health care provider if you experience any of the following:  severe uncontrolled pain     Notify your health care provider if you experience any of the following:  redness, tenderness, or signs of infection (pain, swelling, redness, odor or green/yellow discharge around incision site)     Notify your health care provider if you experience any of the following:  difficulty breathing or increased cough     Notify your health care provider if you experience any of the following:  severe persistent headache     Notify your health care provider if you experience any of the following:  worsening rash     Notify your health care provider if you experience any of the following:  persistent dizziness, light-headedness, or visual disturbances     Notify your health care provider if you experience any of the following:  increased confusion or weakness     Activity as tolerated       Significant Diagnostic Studies: N/A    Pending Diagnostic Studies:       None           Medications:  Reconciled Home Medications:      Medication List        START taking these medications      melatonin 3 mg tablet  Commonly known as: MELATIN  Take 2 tablets (6 mg total) by mouth nightly as needed for Insomnia.            CHANGE how you take these medications      apixaban 5 mg Tab  Commonly known as: ELIQUIS  Take 1 tablet (5 mg total) by mouth 2 (two) times daily.  What changed: See the new instructions.     furosemide 40 MG tablet  Commonly known as: LASIX  Take 1 tablet (40 mg total) by mouth once daily. HOLD UNTIL FOLLOW-UP WITH PRIMARY CARE PROVIDER  What changed: additional instructions     losartan 100 MG tablet  Commonly known as: COZAAR  Take 1 tablet (100 mg total) by mouth once  "daily. HOLD UNTIL FOLLOW-UP WITH PRIMARY CARE PROVIDER  What changed: additional instructions     metoprolol succinate 50 MG 24 hr tablet  Commonly known as: TOPROL-XL  TAKE 1 TABLET(50 MG) BY MOUTH EVERY DAY  What changed: when to take this            CONTINUE taking these medications      aspirin 81 MG Chew  Take 81 mg by mouth once daily.     atorvastatin 80 MG tablet  Commonly known as: LIPITOR  TAKE ONE-HALF TABLET BY MOUTH ONCE DAILY     cetirizine 10 MG tablet  Commonly known as: ZYRTEC  Take 10 mg by mouth daily as needed.     cholecalciferol (vitamin D3) 50 mcg (2,000 unit) Cap capsule  Commonly known as: VITAMIN D3  Take 1 capsule by mouth once daily.     cyanocobalamin 1,000 mcg/mL injection  INJECT 1 ML IN THE MUSCLE EVERY 30 DAYS     fluticasone propionate 50 mcg/actuation nasal spray  Commonly known as: FLONASE  SHAKE LIQUID AND USE 2 SPRAYS(100 MCG) IN EACH NOSTRIL EVERY DAY     JARDIANCE 10 mg tablet  Generic drug: empagliflozin  TAKE 1 TABLET DAILY     linaCLOtide 290 mcg Cap capsule  Commonly known as: LINZESS  Take 1 capsule (290 mcg total) by mouth before breakfast.     omeprazole 20 mg Tbec  Take 1 tablet by mouth once daily.     solifenacin 10 MG tablet  Commonly known as: VESICARE  Take 1 tablet (10 mg total) by mouth once daily.     syringe with needle 3 mL 23 x 1" Syrg  1 Syringe by Misc.(Non-Drug; Combo Route) route every 30 days.     tamsulosin 0.4 mg Cap  Commonly known as: FLOMAX  Take 1 capsule (0.4 mg total) by mouth once daily.            STOP taking these medications      ibuprofen 800 MG tablet  Commonly known as: ADVIL,MOTRIN     spironolactone 25 MG tablet  Commonly known as: ALDACTONE              Indwelling Lines/Drains at time of discharge:   Lines/Drains/Airways       None                   Time spent on the discharge of patient: 39 minutes         Helena Wetzel NP  Department of [facility] Medicine  [facility]  "

## 2023-12-01 NOTE — NURSING
POC reviewed with pt, pt verbalized understanding. Safety maintained throughout shift, bed locked and in lowest position, call light in reach, Side rails up X 2. Non skid sock on when OOB. Pt remained free of fall/trauma. Pt self reports of pain treated with PO pain medication as ordered by MD. VS stable.  no reports of nausea and vomiting. No signs of distress, rise and fall of chest noted, respirations  even and unlabored   Plan of care on going. No future concerns as of present.

## 2023-12-01 NOTE — HOSPITAL COURSE
Patient progressed well with PT and OT- last PT note states that patient ambulated***. Patient had no significant events during their stay at SNF. Home health was set up.  Patient's headache improved while at SNF.  Patient's blood pressures remained low to normal.  Mr. Caicedo antihypertensive medications were held during stay and at discharge.  Message sent to his cardiologist to inform of low blood pressures and held medications.  He has a scheduled follow-up with cardiologist on 12/07.  His follow-up with neurosurgery for SAH is on 12/14.  I discussed these follow-up appointments with patient and his wife and stressed the importance of attendance.  DME was ordered if needed. Follow up appointment to be made by patient within one week. All prescriptions and discharge instructions were ordered to be given to the patient prior to discharge.     PEx  Constitutional: Patient appears debilitated.  No distress noted  HENT:   Head: Normocephalic and atraumatic.   Eyes: Pupils are equal, round  Neck: Normal range of motion. Neck supple.   Cardiovascular: Normal rate, regular rhythm and normal heart sounds.    Pulmonary/Chest: Effort normal and breath sounds are clear  Abdominal: Soft. Bowel sounds are normal.   Musculoskeletal: Normal range of motion.   Neurological: Alert and oriented to person, place, and time.   Psychiatric: Normal mood and affect. Behavior is normal.   Skin: Skin is warm and dry  
admission

## 2023-12-06 ENCOUNTER — CLINICAL SUPPORT (OUTPATIENT)
Dept: REHABILITATION | Facility: HOSPITAL | Age: 78
End: 2023-12-06
Payer: MEDICARE

## 2023-12-06 DIAGNOSIS — Z74.09 IMPAIRED FUNCTIONAL MOBILITY, BALANCE, GAIT, AND ENDURANCE: Primary | ICD-10-CM

## 2023-12-06 DIAGNOSIS — I62.9 NON-TRAUMATIC INTRACRANIAL HEMORRHAGE: ICD-10-CM

## 2023-12-06 PROCEDURE — 97162 PT EVAL MOD COMPLEX 30 MIN: CPT | Mod: PN

## 2023-12-06 NOTE — PLAN OF CARE
"[facility]  Physical Therapy Neurological Rehabilitation Initial Evaluation     Name: Sam Mitchell"  Clinic Number: [id number]    Therapy Diagnosis:   Encounter Diagnoses   Name Primary?    Non-traumatic intracranial hemorrhage     Impaired functional mobility, balance, gait, and endurance Yes     Physician: Melo Garcia MD    Physician Orders: PT Eval and Treat   Medical Diagnosis from Referral:   Diagnosis   I62.9 (ICD-10-CM) - Non-traumatic intracranial hemorrhage     Evaluation Date: 12/6/2023  Authorization Period Expiration: 11/10/2024  Plan of Care Expiration: 2/2/2023  Visit # / Visits authorized: 1/ 1  FOTO: 1/ 3    Precautions: Standard    Time In: 11:18  Time Out: 12:00  Total Billable Time: 43 minutes (1 mod eval)    Subjective      Date of onset: 11/01/2023    History of current condition - Sam reports: he has severe head pain after going to the bathroom in his home.     Imaging, CT scan films: 11/15/2023  Impression:     Stable ventriculomegaly with small volume residual intraventricular hemorrhage.  Resolving subarachnoid hemorrhage.  No new hemorrhage or major vascular distribution infarct.     MRI: 11/5/2023  Impression:  MRI brain: Redemonstration small volume intraventricular hemorrhage posterior horns lateral ventricles and 4th ventricle     In addition there is evolving extra-axial hemorrhage along the skull and craniocervical junction extending to the proximal cervical canal partially visualized compatible with evolving subdural hemorrhage and probable small subarachnoid hemorrhage.     Allowing for artifact from motion there is no definite abnormal parenchymal enhancement     Stable prominence of the ventricles which may be compensatory to volume loss without increased sized from yesterday CT or transependymal resorption of CSF to suggest definite hydrocephalus     MRA head: Allowing for artifact from motion no definite proximal high-grade stenosis or " aneurysm.     No evidence for abnormal wall enhancement associated with the [id number] allowing for artifact from motion.     Prior Therapy: SNF, with discharge on 2023  Social History:  lives with their spouse  Falls: 0  DME: Walker    Home Environment: condo with a elevator and he is on the 3rd floor    Exercise Routine / History:  rare, 2x/week walking 1/2 mile   Family Present at time of Eval: wife drove patient but did not enter eval   Occupation: retired   Prior Level of Function: indep  Current Level of Function:supervision with rolling walker, supervision with basic ADLs, unable to perform higher level chores around the home    Pain:  Current 0/10, worst 0/10, best 0/10     Patient's goals: to get back your normal and graduate, get off the rolling walker.    Medical History:   Past Medical History:   Diagnosis Date    Abnormal echocardiogram 2013    Mild MVR 3/07    CAD (coronary artery disease) 2013    Angio 3/07 Dr. Lin    Hypercholesterolemia 2022    Lymphoma in remission 2013    S/p chemo/XRT  Relapse  BMT     Normal cardiac stress test 2013    Nuclear stress     Pernicious anemia 2013    Thyroid nodule 2013    Right s/p Bx  Dr. Pelayo     Surgical History:   Sam Gamino  has a past surgical history that includes Eye surgery (Bilateral, 2016).    Medications:   Sam has a current medication list which includes the following prescription(s): apixaban, aspirin, atorvastatin, cetirizine, cholecalciferol (vitamin d3), cyanocobalamin, fluticasone propionate, furosemide, jardiance, linaclotide, losartan, melatonin, omeprazole, solifenacin, syringe with needle, and tamsulosin.    Allergies:   Review of patient's allergies indicates:   Allergen Reactions    Lotensin [benazepril]      cough      Objective      - Command followin%   - Speech: no deficits     Mental status: alert, oriented to person, place, and time, normal mood,  behavior, speech, dress, motor activity, and thought processes  Behavior:  calm and cooperative  Attention Span and Concentration:  Normal    Dominant hand:  right     Posture Alignment :slouched posture    Sensation:  Light Touch: Intact    Tone: 0 - No increase in muscle tone    Visual/Auditory: denies changes     Coordination:   - fine motor:  - UE coordination: intact    - LE coordination:  intact    ROM:   UPPER EXTREMITY--AROM/PROM  (R) UE: WNLs  (L) UE: WNLs           RANGE OF MOTION--LOWER EXTREMITIES  (R) LE WFL    (L) LE: WFL    Upper Extremity Strength   RUE LUE   Shoulder Flexion: 4+/5 4-/5   Shoulder Abduction: 4+/5 4-/5   Shoulder Extension: 4+/5 4-/5   Elbow Flexion:     5/5 4+/5   Elbow Extension: 5/5 4+/5   Wrist Flexion: 5/5 4+/5   Wrist Extension: 5/5 4+/5   : 5/5 5/5     Lower Extremity Strength   RLE LLE   Hip Flexion: 4/5 4/5   Hip Extension:  4-/5 4-/5   Hip Abduction: 4-/5 4-/5   Hip Adduction: 4-/5 4-/5   Knee Extension: 4+/5 4+/5   Knee Flexion: 4+/5 4+/5   Ankle Dorsiflexion: 4+/5 4+/5   Ankle Plantarflexion: 4+/5 4+/5     Gait Assessment:   - AD used: rolling walker   - Assistance: supervision  - Distance: >200 feet  - Stairs: Min A    Gait Analysis:  Deviations noted: decreased hip flexion, decreased knee extension, decreased heel strike B but worse on the left.      Impairments contributing to deviations:  general weakness (weaker on the left)     Evaluation   30 second Chair Rise  (adults > 61 y/o) 7 completed with arms      Evaluation   Timed Up and Go 18 sec without RW  < 20 sec safe for independent transfers, < 30 sec safe for dependent transfers/assist required     Balance Assessment:    Sitting balance (static,dynamic):WFL  Standing balance (static, dynamic):see Gentile    GENTILE  BALANCE ASSESSMENT TOOL  1. Sitting to Standing   3 - able to stand independely using hands  2. Standing Unsupported   4 - able to stand safely 2 minutes without hold  3. Sitting Unsupported   4 - able to  sit safely and securely 2 minutes  4. Standing to Sitting   3 - controls descent by using hands  5. Pivot Transfer   3 - able to transfer safely with definite use of hands  6. Standing with Eyes Closed   3 - able to stand 10 seconds with supervision  7. Standing with Feet Together   4  8. Reaching Forward with Outstretched Arm   3 - can reach forward 12 cm/5 inches safely  9. Retrieving Object from Floor   3 - able to pick slipper but needs supervision  10. Turning to Look Behind   3 - looks behind one side only, other side less weight shift  11. Turning 360 Degrees   2 - able to turn 360 safely but slowly  12. Placing Alternate Foot on Step   2 - able to complete 4 steps without aid with supervision  13. Standing with One Foot in Front   2 - able to take small step indenpendently and hold 30 seconds  14. Standing on One Foot   1 - tries to lift leg and unable to hold 3 seconds but remains standing independently    TOTAL SCORE: 40  Maximum: 56  Score:   21-40 moderate fall risk     Fall risk cut-off scores:   Elderly and History of falls: <51/56     Functional Mobility (Bed mobility, transfers)  Bed mobility: Mod I  Supine to sit: Mod I  Sit to supine: Mod I  Rolling: Mod I  Transfers to bed: Mod I  Sit to stand:  Mod I  Stand pivot:  Mod I  Stairs: Min A    Intake Outcome Measure for FOTO brain injury Survey    Therapist reviewed FOTO scores for Sam Gamino on 12/6/2023.   FOTO report - see Media section or FOTO account episode details.    Intake Score: 44%       Treatment     Therapeutic exercise to be initiated at follow-up visit:  Moderate level balance with UE support  Culp items 3 or less  Stepper  4-6 minute walks  Standing HAMSTRING and gastroc stretches  Quad and hip extension strengthening.  Patient Education and Home Exercises     Education provided:   - continue performance of home health HEP    Written Home Exercises: to be provided by week 2, patient has HEP from home health that he is  dianna Vargas is a 78 y.o. male referred to outpatient Physical Therapy with a medical diagnosis of SAH. Patient presents with general weakness, impaired mobility, impaired balance, slow gait and decreased mobility.  He is at a moderate risk of falls and he is using an AD at all times for stability.  He would benefit from PT to return to highest level of functional mobility.    Patient prognosis is Good.   Patient will benefit from skilled outpatient Physical Therapy to address the deficits stated above and in the chart below, provide patient /family education, and to maximize patient's level of independence.     Plan of care discussed with patient: Yes  Patient's spiritual, cultural and educational needs considered and patient is agreeable to the plan of care and goals as stated below:     Anticipated Barriers for therapy: none noted    Medical Necessity is demonstrated by the following  History  Co-morbidities and personal factors that may impact the plan of care [] LOW: no personal factors / co-morbidities  [] MODERATE: 1-2 personal factors / co-morbidities  [x] HIGH: 3+ personal factors / co-morbidities    Moderate / High Support Documentation:   Co-morbidities affecting plan of care: see history listed above    Personal Factors:   Transportation, not driving     Examination  Body Structures and Functions, activity limitations and participation restrictions that may impact the plan of care [] LOW: addressing 1-2 elements  [x] MODERATE: 3+ elements  [] HIGH: 4+ elements (please support below)    Moderate / High Support Documentation: gait, balance, strength, motor planning, motor control     Clinical Presentation [] LOW: stable  [x] MODERATE: Evolving  [] HIGH: Unstable     Decision Making/ Complexity Score: moderate       Short Term Goals (4 Weeks):   1.  Independent with initial HEP.  2.  Pt will perform nu-step at level 4 x 8 minutes without rests breaks going at least 50 step/min or greater.  3. Pt  will be able to perform TUG in 14 secs without use of AD placingdemonstrating overall improved functional mobility.   4. Pt will performed sit to stand x 9 without UE support in 30 seconds without LOB backward or posterior LE hooking for functional and safe transfers.   5.  Pt will score greater than or equal to 44/56 on the EGNTILE test/assessment without use of AD demonstrating overall improved functional mobility and balance and reduce fall risk.   6.  Pt will walk < than or equal to 800 ft on the 6 minute walk test indoor with AD with 0 LOB for improved community ambulation.    Long Term Goals (8 Weeks):   1.  Pt will score greater than or equal to 47/56 on the Gentile test/assessment without use of AD demonstrating overall improved functional mobility and balance and reduce fall risk.   2. Pt will walk < than or equal to 950 ft on the 6 minute walk test indoor without AD with 0 LOB and minimal gait deviations for improved safety in home ambulation and safety.   3.  Pt will be able to safely perform and tolerate high level ADL's without LOB.   4. Pt will have 0 falls from start of PT sessions.  5. Independent with updated HEP.   6.  Pt will be able to walk in neighborhood ~1/2 to 1 mile at safe and coordinated speed for community walking program.  7. Pt will have MMT score of 4+/5 in all major ms groups in B LE.  8. Pt will ambulate on TM x 6 minutes with use of UE support with 0 LOB at 1.5 m/s.  9. Pt will perform floor to stand f/b stand to floor transfer B UE support with stand by assist and no cues for improved dynamic transfer, core stability and fall safety in home and community.  10. Pt will begin some form of community fitness to begin regular and consistent performance of exercise to continue maintenance of gains made in PT.    Plan     Plan of care Certification: 12/6/2023 to 2/2/2024.    Outpatient Physical Therapy 2 times weekly for 7 weeks to include the following interventions: Gait Training, Manual  Therapy, Moist Heat/ Ice, Neuromuscular Re-ed, Patient Education, Self Care, Therapeutic Activities, and Therapeutic Exercise.     Malissa Rawls, PT        Physician's Signature: _________________________________________ Date: ________________

## 2023-12-07 ENCOUNTER — OFFICE VISIT (OUTPATIENT)
Dept: CARDIOLOGY | Facility: CLINIC | Age: 78
End: 2023-12-07
Attending: INTERNAL MEDICINE
Payer: MEDICARE

## 2023-12-07 VITALS
HEART RATE: 96 BPM | OXYGEN SATURATION: 97 % | SYSTOLIC BLOOD PRESSURE: 126 MMHG | WEIGHT: 242.81 LBS | BODY MASS INDEX: 32.18 KG/M2 | DIASTOLIC BLOOD PRESSURE: 67 MMHG | HEIGHT: 73 IN

## 2023-12-07 DIAGNOSIS — Z86.79 HISTORY OF SUBDURAL HEMORRHAGE: ICD-10-CM

## 2023-12-07 DIAGNOSIS — I44.0 ATRIOVENTRICULAR BLOCK, FIRST DEGREE: ICD-10-CM

## 2023-12-07 DIAGNOSIS — C85.90 LYMPHOMA IN REMISSION: ICD-10-CM

## 2023-12-07 DIAGNOSIS — E78.00 HYPERCHOLESTEROLEMIA: ICD-10-CM

## 2023-12-07 DIAGNOSIS — I25.10 CORONARY ARTERY DISEASE INVOLVING NATIVE CORONARY ARTERY OF NATIVE HEART WITHOUT ANGINA PECTORIS: ICD-10-CM

## 2023-12-07 DIAGNOSIS — I10 PRIMARY HYPERTENSION: ICD-10-CM

## 2023-12-07 DIAGNOSIS — I34.0 NONRHEUMATIC MITRAL VALVE REGURGITATION: ICD-10-CM

## 2023-12-07 DIAGNOSIS — E66.01 SEVERE OBESITY: ICD-10-CM

## 2023-12-07 DIAGNOSIS — I50.32 HEART FAILURE, DIASTOLIC, CHRONIC: ICD-10-CM

## 2023-12-07 DIAGNOSIS — I70.0 ATHEROSCLEROSIS OF AORTA: ICD-10-CM

## 2023-12-07 DIAGNOSIS — I36.1 NONRHEUMATIC TRICUSPID VALVE REGURGITATION: ICD-10-CM

## 2023-12-07 PROBLEM — Z74.09 IMPAIRED FUNCTIONAL MOBILITY, BALANCE, GAIT, AND ENDURANCE: Status: ACTIVE | Noted: 2023-12-07

## 2023-12-07 PROCEDURE — 99215 PR OFFICE/OUTPT VISIT, EST, LEVL V, 40-54 MIN: ICD-10-PCS | Mod: S$PBB,,, | Performed by: INTERNAL MEDICINE

## 2023-12-07 PROCEDURE — 99999 PR PBB SHADOW E&M-EST. PATIENT-LVL IV: CPT | Mod: PBBFAC,,, | Performed by: INTERNAL MEDICINE

## 2023-12-07 PROCEDURE — 99215 OFFICE O/P EST HI 40 MIN: CPT | Mod: S$PBB,,, | Performed by: INTERNAL MEDICINE

## 2023-12-07 PROCEDURE — 99214 OFFICE O/P EST MOD 30 MIN: CPT | Mod: PBBFAC | Performed by: INTERNAL MEDICINE

## 2023-12-07 PROCEDURE — 99999 PR PBB SHADOW E&M-EST. PATIENT-LVL IV: ICD-10-PCS | Mod: PBBFAC,,, | Performed by: INTERNAL MEDICINE

## 2023-12-07 NOTE — PROGRESS NOTES
Subjective:     Sam Gamino is a 78 y.o. male with hypertension and hypercholesterolemia. He is severely obese. 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 In 5/2022 he was admitted with heart failure. He was noted to have moderate to severe mitral regurgitation and severe pulmonary hypertension. He was diuresed. On 5/16/2022 he had an echocardiogram that revealed normal left ventricular size and systolic function. The ejection fraction was 55%. There was moderate mitral regurgitation. There was mild to moderate tricuspid regurgitation. He denies any exertional chest pain. In 9/2022 he was able to walk about two blocks before he had to stop due to dyspnea. He had mild edema of his legs. He was given empagliflozin and he was breathing better and able to walk farther. The edema resolved. On 11/1/2023 he is noted to have a first degree atrioventricular block with a TX interval of 320 ms. On 11/5/2023 he had sudden onset of a severe headache. He found to have had a subarachnoid hemorrhage. He was treated conservatively. No chest pain or shortness of breath. No palpitations or weak spells. No clear issues with any of his prescribed medications. Feeling well overall.      Congestive Heart Failure  Presents for follow-up visit. Pertinent negatives include no abdominal pain, chest pain, chest pressure, claudication, edema, fatigue, muscle weakness, near-syncope, nocturia, orthopnea, palpitations, paroxysmal nocturnal dyspnea, shortness of breath or unexpected weight change. The symptoms have been improving. His past medical history is significant for CAD.   Coronary Artery Disease  Presents for follow-up visit. Pertinent negatives include no chest pain, chest pressure, chest tightness, dizziness, leg swelling, muscle weakness, palpitations, shortness of breath or weight gain. Risk factors include hyperlipidemia and obesity. His past medical history is  significant for CHF.   Hypertension  This is a chronic problem. The current episode started more than 1 year ago. The problem is unchanged. The problem is controlled (usually 110-120/70-80 mmHg at home). Pertinent negatives include no anxiety, blurred vision, chest pain, headaches, malaise/fatigue, neck pain, orthopnea, palpitations, peripheral edema, PND, shortness of breath or sweats. There is no history of chronic renal disease.   Hyperlipidemia  This is a chronic problem. The current episode started more than 1 year ago. The problem is controlled. Recent lipid tests were reviewed and are normal. Exacerbating diseases include obesity. He has no history of chronic renal disease, diabetes, hypothyroidism, liver disease or nephrotic syndrome. Pertinent negatives include no chest pain, focal sensory loss, focal weakness, leg pain, myalgias or shortness of breath.   Cerebrovascular Accident  Pertinent negatives include no abdominal pain, chest pain, chills, coughing, fatigue, fever, headaches, myalgias, nausea, neck pain, numbness, rash, vertigo, vomiting or weakness.       Review of Systems   Constitutional: Negative for chills, fatigue, fever, malaise/fatigue, unexpected weight change and weight gain.   HENT:  Negative for nosebleeds and tinnitus.    Eyes:  Negative for blurred vision, double vision, vision loss in left eye and vision loss in right eye.   Cardiovascular:  Negative for chest pain, claudication, dyspnea on exertion, irregular heartbeat, leg swelling, near-syncope, orthopnea, palpitations, paroxysmal nocturnal dyspnea and syncope.   Respiratory:  Negative for chest tightness, cough, hemoptysis, shortness of breath and wheezing.    Endocrine: Negative for cold intolerance and heat intolerance.   Hematologic/Lymphatic: Negative for bleeding problem. Does not bruise/bleed easily.   Skin:  Negative for color change and rash.   Musculoskeletal:  Negative for back pain, falls, muscle weakness, myalgias and  neck pain.   Gastrointestinal:  Negative for abdominal pain, diarrhea, dysphagia, heartburn, hematemesis, hematochezia, hemorrhoids, jaundice, melena, nausea and vomiting.   Genitourinary:  Negative for dysuria, hematuria and nocturia.   Neurological:  Negative for dizziness, focal weakness, headaches, light-headedness, loss of balance, numbness, tremors, vertigo and weakness.   Psychiatric/Behavioral:  Negative for altered mental status, depression and memory loss. The patient is not nervous/anxious.    Allergic/Immunologic: Negative for hives and persistent infections.       Current Outpatient Medications on File Prior to Visit   Medication Sig Dispense Refill    apixaban (ELIQUIS) 5 mg Tab Take 1 tablet (5 mg total) by mouth 2 (two) times daily. 60 tablet 2    aspirin 81 MG Chew Take 81 mg by mouth once daily.      atorvastatin (LIPITOR) 80 MG tablet TAKE ONE-HALF TABLET BY MOUTH ONCE DAILY 90 tablet 3    cetirizine (ZYRTEC) 10 MG tablet Take 10 mg by mouth daily as needed.      cholecalciferol, vitamin D3, (VITAMIN D3) 50 mcg (2,000 unit) Cap Take 1 capsule by mouth once daily.      cyanocobalamin 1,000 mcg/mL injection INJECT 1 ML IN THE MUSCLE EVERY 30 DAYS 10 mL 1    fluticasone propionate (FLONASE) 50 mcg/actuation nasal spray SHAKE LIQUID AND USE 2 SPRAYS(100 MCG) IN EACH NOSTRIL EVERY DAY 16 g 3    furosemide (LASIX) 40 MG tablet Take 1 tablet (40 mg total) by mouth once daily. HOLD UNTIL FOLLOW-UP WITH PRIMARY CARE PROVIDER 120 tablet 3    JARDIANCE 10 mg tablet TAKE 1 TABLET DAILY 90 tablet 3    linaCLOtide (LINZESS) 290 mcg Cap capsule Take 1 capsule (290 mcg total) by mouth before breakfast. 90 capsule 3    losartan (COZAAR) 100 MG tablet Take 1 tablet (100 mg total) by mouth once daily. HOLD UNTIL FOLLOW-UP WITH PRIMARY CARE PROVIDER 90 tablet 3    melatonin (MELATIN) 3 mg tablet Take 2 tablets (6 mg total) by mouth nightly as needed for Insomnia.  0    metoprolol succinate (TOPROL-XL) 50 MG 24 hr  "tablet TAKE 1 TABLET(50 MG) BY MOUTH EVERY DAY 90 tablet 3    omeprazole 20 mg TbEC Take 1 tablet by mouth once daily.      solifenacin (VESICARE) 10 MG tablet Take 1 tablet (10 mg total) by mouth once daily. 30 tablet 11    syringe with needle 3 mL 23 x 1" Syrg 1 Syringe by Misc.(Non-Drug; Combo Route) route every 30 days. 50 Syringe 11    tamsulosin (FLOMAX) 0.4 mg Cap Take 1 capsule (0.4 mg total) by mouth once daily. 90 capsule 1     No current facility-administered medications on file prior to visit.       /67   Pulse 96   Ht 6' 1" (1.854 m)   Wt 110.2 kg (242 lb 13.4 oz)   SpO2 97%   BMI 32.04 kg/m²     Objective:     Physical Exam  Constitutional:       General: He is not in acute distress.     Appearance: Normal appearance. He is well-developed. He is not toxic-appearing or diaphoretic.   HENT:      Head: Normocephalic and atraumatic.      Nose: Nose normal.   Eyes:      General:         Right eye: No discharge.         Left eye: No discharge.      Conjunctiva/sclera:      Right eye: Right conjunctiva is not injected.      Left eye: Left conjunctiva is not injected.      Pupils: Pupils are equal.      Right eye: Pupil is round.      Left eye: Pupil is round.   Neck:      Thyroid: No thyromegaly.      Vascular: No carotid bruit or JVD.   Cardiovascular:      Rate and Rhythm: Normal rate and regular rhythm. No extrasystoles are present.     Chest Wall: PMI is not displaced.      Pulses:           Radial pulses are 2+ on the right side and 2+ on the left side.        Femoral pulses are 2+ on the right side and 2+ on the left side.       Dorsalis pedis pulses are 2+ on the right side and 2+ on the left side.        Posterior tibial pulses are 2+ on the right side and 2+ on the left side.      Heart sounds: S1 normal and S2 normal. Murmur heard.      High-pitched blowing holosystolic murmur is present at the apex. P2 pronounced.      Gallop present. S3 and S4 sounds present.   Pulmonary:      Effort: " Pulmonary effort is normal.      Breath sounds: No rales.   Abdominal:      Palpations: Abdomen is soft.      Tenderness: There is no abdominal tenderness.   Musculoskeletal:      Cervical back: Neck supple.      Right lower leg: No swelling. No edema.      Left lower leg: No swelling. No edema.   Lymphadenopathy:      Head:      Right side of head: No submandibular adenopathy.      Left side of head: No submandibular adenopathy.      Cervical: No cervical adenopathy.   Skin:     General: Skin is warm and dry.      Findings: No rash.      Nails: There is no clubbing.   Neurological:      General: No focal deficit present.      Mental Status: He is alert and oriented to person, place, and time. He is not disoriented.      Cranial Nerves: No cranial nerve deficit.   Psychiatric:         Attention and Perception: Attention and perception normal.         Mood and Affect: Mood and affect normal.         Speech: Speech normal.         Behavior: Behavior normal.         Thought Content: Thought content normal.         Cognition and Memory: Cognition and memory normal.         Judgment: Judgment normal.         Assessment:     1. Heart failure, diastolic, chronic    2. Nonrheumatic mitral valve regurgitation    3. Nonrheumatic tricuspid valve regurgitation    4. Coronary artery disease involving native coronary artery of native heart without angina pectoris    5. Atrioventricular block, first degree    6. History of subdural hemorrhage    7. Atherosclerosis of aorta    8. Primary hypertension    9. Hypercholesterolemia    10. Severe obesity    11. Lymphoma in remission        Plan:     1. Heart Failure, Diastolic, Chronic   5/16/2022: Echo: Normal left ventricular size and systolic function. EF 55%. Moderate MR. Mild to moderate TR.   6/6/2023: Echo: Normal left ventricular size and systolic function. EF 60%. LVGLS -15%. Moderate diastolic dysfunction. Mild to moderate AR. Mild to moderate MR. Mildly enlarged ascending  aorta. Small pericardial effusion.   8/8/2022: Spironolactone 25 mg Q24 was begun and KCl 20 mEq Q24 was discontinued.   10/10/2022: Empagliflozin 10 mg Q24 was begun.   10/27/2022: BNP 69.   11/1/2023: Metoprolol 50 mg Q24 was discontinued as the NJ was 320 ms on an ECG.    On empagliflozin 10 mg Q24, losartan 100 mg Q24, spironolactone 25 mg Q24 and furosemide 40 mg Q24.   May take additional furosemide 40 mg Q24 PRN for edema which he has not needed.   Well compensated.   6/2024: Plan next Echo.      2. Mitral Regurgitation   5/16/2022: Echo: Moderate MR.   6/6/2023: Echo: Mild to moderate MR.     3. Tricuspid Regurgitation   5/16/2022: Echo: Mild to moderate TR.    4. Coronary Artery Disease   3/2007: Underwent coronary angiogram. Appears to have had mild disease.    5. Atrioventricular Block, First Degree   11/1/2023: ECG: NJ interval 320 ms.   11/1/2023: Metoprolol 50 mg Q24 was discontinued.   12/7/2023: Do Holter x 48 hours.     6. History of Subarachnoid Hemorrhage   115/2023: SAH.   Conservative care.    7. Atherosclerosis of Aorta   2023: Mentioned in chart.    8. Hypertension   2002: Diagnosed.   11/1/2023: Metoprolol 50 mg Q24 was discontinued.     On losartan 100 mg Q24, spironolactone 25 mg Q24 and furosemide 40 mg Q24.   Keeping log at home.   Well controlled.      9. Hypercholesterolemia   2002: Statin was begun.   On atorvastatin 40 mg Q24.   4/12/2022: Chol 132. HDL 41. LDL 71. .   On atorvastatin 40 mg Q24.   Very favorable lipid panel.    10. Mild Obesity   6/27/2022: Weight 117 kg. BMI 35.   6/28/2023: Weight 119 kg. BMI 35.   12/7/2023: Weight 110 kg. BMI 32.   Encouraged to lose more weight.    11. 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    12. Primary Care   Dr. Herber Zamora.    F/u 2 months.    Ema Montemayor M.D.      1/2/2024 8:38 PM, Addendum:    12/19/2023: Holter: Sinus rhythm 94 () bpm. Long NJ interval. Narrow QRS. No prolonged pauses. Frequent APC's - 15/hr. Rare  VPC's - 2/hr. 3 V couplets. 2 V triplets. No reported symptoms.    I discussed the above test result and the implications of the findings over the phone.    Ema Montemayor M.D.

## 2023-12-14 ENCOUNTER — HOSPITAL ENCOUNTER (OUTPATIENT)
Dept: RADIOLOGY | Facility: HOSPITAL | Age: 78
Discharge: HOME | End: 2023-12-14
Attending: NEUROLOGICAL SURGERY
Payer: MEDICARE

## 2023-12-14 ENCOUNTER — HOSPITAL ENCOUNTER (OUTPATIENT)
Dept: RADIOLOGY | Facility: HOSPITAL | Age: 78
Discharge: HOME | End: 2023-12-14
Payer: MEDICARE

## 2023-12-14 ENCOUNTER — OFFICE VISIT (OUTPATIENT)
Dept: NEUROSURGERY | Facility: CLINIC | Age: 78
End: 2023-12-14
Payer: MEDICARE

## 2023-12-14 VITALS — DIASTOLIC BLOOD PRESSURE: 77 MMHG | HEART RATE: 118 BPM | SYSTOLIC BLOOD PRESSURE: 131 MMHG

## 2023-12-14 DIAGNOSIS — I62.9 NON-TRAUMATIC INTRACRANIAL HEMORRHAGE: ICD-10-CM

## 2023-12-14 DIAGNOSIS — I60.9 SAH (SUBARACHNOID HEMORRHAGE): Primary | ICD-10-CM

## 2023-12-14 DIAGNOSIS — I60.9 SAH (SUBARACHNOID HEMORRHAGE): ICD-10-CM

## 2023-12-14 PROCEDURE — 99999 PR PBB SHADOW E&M-EST. PATIENT-LVL III: CPT | Mod: PBBFAC,,, | Performed by: PHYSICIAN ASSISTANT

## 2023-12-14 PROCEDURE — 25500020 PHARM REV CODE 255: Performed by: PHYSICIAN ASSISTANT

## 2023-12-14 PROCEDURE — 99214 OFFICE O/P EST MOD 30 MIN: CPT | Mod: S$PBB,,, | Performed by: PHYSICIAN ASSISTANT

## 2023-12-14 PROCEDURE — 70496 CT ANGIOGRAPHY HEAD: CPT | Mod: TC

## 2023-12-14 PROCEDURE — 99999 PR PBB SHADOW E&M-EST. PATIENT-LVL III: ICD-10-PCS | Mod: PBBFAC,,, | Performed by: PHYSICIAN ASSISTANT

## 2023-12-14 PROCEDURE — 70498 CT ANGIOGRAPHY NECK: CPT | Mod: 26,,, | Performed by: RADIOLOGY

## 2023-12-14 PROCEDURE — 70498 CTA HEAD AND NECK (XPD): ICD-10-PCS | Mod: 26,,, | Performed by: RADIOLOGY

## 2023-12-14 PROCEDURE — 70496 CTA HEAD AND NECK (XPD): ICD-10-PCS | Mod: 26,,, | Performed by: RADIOLOGY

## 2023-12-14 PROCEDURE — 99214 PR OFFICE/OUTPT VISIT, EST, LEVL IV, 30-39 MIN: ICD-10-PCS | Mod: S$PBB,,, | Performed by: PHYSICIAN ASSISTANT

## 2023-12-14 PROCEDURE — 70496 CT ANGIOGRAPHY HEAD: CPT | Mod: 26,,, | Performed by: RADIOLOGY

## 2023-12-14 PROCEDURE — 99213 OFFICE O/P EST LOW 20 MIN: CPT | Mod: PBBFAC,25 | Performed by: PHYSICIAN ASSISTANT

## 2023-12-14 RX ADMIN — IOHEXOL 100 ML: 350 INJECTION, SOLUTION INTRAVENOUS at 02:12

## 2023-12-18 NOTE — PROGRESS NOTES
Neurosurgery  Established Patient    SUBJECTIVE:     History of Present Illness:  Sam Gamino is a 78 y.o. male who presents for follow up of spontaneous IVH. He originally presented to the hospital on 11/5/23 with acute onset neck pain and headache. CTA head and neck showed a small amount of SAH and IVH. No aneurysm was identified on CTA. Further work up with MRA/MRI/angiogram also did not reveal vascular abnormality or aneurysm. Hemorrhage was thought most likely to be due to perimesencephalic bleed. His hospital course was complicated by development of pulmonary embolism for which he was started on anti coagulation. He was discharged to rehab.     He is here today in follow up. He denies headaches, vision changes, new or focal weakness. He is currently on Eliquis.       Review of patient's allergies indicates:   Allergen Reactions    Lotensin [benazepril]      cough       Current Outpatient Medications   Medication Sig Dispense Refill    apixaban (ELIQUIS) 5 mg Tab Take 1 tablet (5 mg total) by mouth 2 (two) times daily. 60 tablet 2    aspirin 81 MG Chew Take 81 mg by mouth once daily.      atorvastatin (LIPITOR) 80 MG tablet TAKE ONE-HALF TABLET BY MOUTH ONCE DAILY 90 tablet 3    cetirizine (ZYRTEC) 10 MG tablet Take 10 mg by mouth daily as needed.      cholecalciferol, vitamin D3, (VITAMIN D3) 50 mcg (2,000 unit) Cap Take 1 capsule by mouth once daily.      cyanocobalamin 1,000 mcg/mL injection INJECT 1 ML IN THE MUSCLE EVERY 30 DAYS 10 mL 1    fluticasone propionate (FLONASE) 50 mcg/actuation nasal spray SHAKE LIQUID AND USE 2 SPRAYS(100 MCG) IN EACH NOSTRIL EVERY DAY 16 g 3    furosemide (LASIX) 40 MG tablet Take 1 tablet (40 mg total) by mouth once daily. HOLD UNTIL FOLLOW-UP WITH PRIMARY CARE PROVIDER 120 tablet 3    JARDIANCE 10 mg tablet TAKE 1 TABLET DAILY 90 tablet 3    linaCLOtide (LINZESS) 290 mcg Cap capsule Take 1 capsule (290 mcg total) by mouth before breakfast. 90 capsule 3    losartan  "(COZAAR) 100 MG tablet Take 1 tablet (100 mg total) by mouth once daily. HOLD UNTIL FOLLOW-UP WITH PRIMARY CARE PROVIDER 90 tablet 3    melatonin (MELATIN) 3 mg tablet Take 2 tablets (6 mg total) by mouth nightly as needed for Insomnia.  0    omeprazole 20 mg TbEC Take 1 tablet by mouth once daily.      solifenacin (VESICARE) 10 MG tablet Take 1 tablet (10 mg total) by mouth once daily. 30 tablet 11    syringe with needle 3 mL 23 x 1" Syrg 1 Syringe by Misc.(Non-Drug; Combo Route) route every 30 days. 50 Syringe 11    tamsulosin (FLOMAX) 0.4 mg Cap Take 1 capsule (0.4 mg total) by mouth once daily. 90 capsule 1     No current facility-administered medications for this visit.       Past Medical History:   Diagnosis Date    Abnormal echocardiogram 2/8/2013    Mild MVR 3/07    CAD (coronary artery disease) 2/8/2013    Angio 3/07 Dr. Lin    Hypercholesterolemia 6/27/2022    Lymphoma in remission 2/8/2013    S/p chemo/XRT 1992 Relapse 1993 BMT 1993    Normal cardiac stress test 2/8/2013    Nuclear stress 2/09    Pernicious anemia 2/8/2013    Thyroid nodule 2/8/2013    Right s/p Bx 6/08 Dr. Pelayo     Past Surgical History:   Procedure Laterality Date    EYE SURGERY Bilateral 2016    Cataracts     Family History       Problem Relation (Age of Onset)    Cancer Mother    Hypertension Brother    No Known Problems Father, Daughter, Sister, Sister, Brother, Brother, Brother    Thyroid disease Sister          Social History     Socioeconomic History    Marital status:      Spouse name: Eileen    Number of children: 1+3   Occupational History    Occupation:    Tobacco Use    Smoking status: Never    Smokeless tobacco: Never   Substance and Sexual Activity    Alcohol use: Yes     Alcohol/week: 1.0 standard drink of alcohol     Types: 1 Standard drinks or equivalent per week     Comment: ocassionally    Drug use: No    Sexual activity: Not Currently     Partners: Female   Social History Narrative    No " exercise.    Wants to retire in 2019    Going to retire - 12/21     Social Determinants of Health     Financial Resource Strain: Low Risk  (11/6/2023)    Overall Financial Resource Strain (CARDIA)     Difficulty of Paying Living Expenses: Not hard at all   Food Insecurity: No Food Insecurity (11/6/2023)    Hunger Vital Sign     Worried About Running Out of Food in the Last Year: Never true     Ran Out of Food in the Last Year: Never true   Transportation Needs: No Transportation Needs (11/6/2023)    PRAPARE - Transportation     Lack of Transportation (Medical): No     Lack of Transportation (Non-Medical): No   Physical Activity: Unknown (11/6/2023)    Exercise Vital Sign     Minutes of Exercise per Session: 30 min   Stress: No Stress Concern Present (11/6/2023)    [employer] [place] [employer] - Occupational Stress Questionnaire     Feeling of Stress : Not at all   Social Connections: Unknown (11/6/2023)    Social Connection and Isolation Panel [NHANES]     Frequency of Social Gatherings with Friends and Family: Once a week     Attends Jain Services: Never     Active Member of Clubs or Organizations: No     Attends Club or Organization Meetings: Never     Marital Status:    Housing Stability: Low Risk  (11/6/2023)    Housing Stability Vital Sign     Unable to Pay for Housing in the Last Year: No     Number of Places Lived in the Last Year: 1     Unstable Housing in the Last Year: No       Review of Systems    OBJECTIVE:     Vital Signs  Pulse: (!) 118  BP: 131/77  Pain Score: 0-No pain  There is no height or weight on file to calculate BMI.    Neurosurgery Physical Exam  General: well developed, well nourished, no distress.   Head: normocephalic, atraumatic  Neurologic: Alert and oriented. Thought content appropriate.  GCS: Motor: 6/Verbal: 5/Eyes: 4 GCS Total: 15  Mental Status: Awake, Alert, Oriented x 4  Language: No aphasia  Speech: No dysarthria  Cranial nerves: face symmetric, tongue  midline, CN II-XII grossly intact.   Eyes: pupils equal, round, reactive to light with accomodation, EOMI.   Pulmonary: normal respirations, no signs of respiratory distress  Abdomen: soft, non-distended, not tender to palpation  Sensory: intact to light touch throughout  Motor Strength: Moves all extremities spontaneously with good tone. Grossly full strength upper and lower extremities. No abnormal movements seen.   Pronator Drift: no drift noted  Finger-to-nose: Intact bilaterally  Skin: Skin is warm, dry, intact.     Diagnostic Results:  CTA head and neck which I have personally reviewed from 12/14/23 shows complete resolution of previous SAH and IVH. No evidence of aneurysm or vascular malformation. Stable ventriculomegaly present.     ASSESSMENT/PLAN:     Sam Gamino is a 78 y.o. male with resolved SAH and IVH. Imaging without evidence of aneurysm or vascular malformation. His headaches and neck pain have resolved. He is on eliquis for a newly diagnosed PE. He should follow up with his PCP for this. He can follow up with Neurosurgery on an as needed basis. He knows he can call the clinic with any questions or concerns.

## 2023-12-19 ENCOUNTER — HOSPITAL ENCOUNTER (OUTPATIENT)
Dept: CARDIOLOGY | Facility: OTHER | Age: 78
Discharge: HOME | End: 2023-12-19
Attending: INTERNAL MEDICINE
Payer: MEDICARE

## 2023-12-19 DIAGNOSIS — I44.0 ATRIOVENTRICULAR BLOCK, FIRST DEGREE: ICD-10-CM

## 2023-12-19 PROCEDURE — 93227 XTRNL ECG REC<48 HR R&I: CPT | Mod: ,,, | Performed by: INTERNAL MEDICINE

## 2023-12-19 PROCEDURE — 93227 HOLTER MONITOR - 48 HOUR (CUPID ONLY): ICD-10-PCS | Mod: ,,, | Performed by: INTERNAL MEDICINE

## 2023-12-19 PROCEDURE — 93226 XTRNL ECG REC<48 HR SCAN A/R: CPT

## 2023-12-21 ENCOUNTER — TELEPHONE (OUTPATIENT)
Dept: REHABILITATION | Facility: HOSPITAL | Age: 78
End: 2023-12-21
Payer: MEDICARE

## 2023-12-28 ENCOUNTER — OFFICE VISIT (OUTPATIENT)
Dept: INTERNAL MEDICINE | Facility: CLINIC | Age: 78
End: 2023-12-28
Attending: INTERNAL MEDICINE
Payer: MEDICARE

## 2023-12-28 VITALS
DIASTOLIC BLOOD PRESSURE: 64 MMHG | WEIGHT: 249 LBS | BODY MASS INDEX: 33 KG/M2 | HEIGHT: 73 IN | HEART RATE: 92 BPM | OXYGEN SATURATION: 98 % | SYSTOLIC BLOOD PRESSURE: 114 MMHG

## 2023-12-28 DIAGNOSIS — I26.99 ACUTE PULMONARY EMBOLISM, UNSPECIFIED PULMONARY EMBOLISM TYPE, UNSPECIFIED WHETHER ACUTE COR PULMONALE PRESENT: ICD-10-CM

## 2023-12-28 DIAGNOSIS — D51.0 PERNICIOUS ANEMIA: ICD-10-CM

## 2023-12-28 DIAGNOSIS — I10 PRIMARY HYPERTENSION: ICD-10-CM

## 2023-12-28 DIAGNOSIS — I34.0 NONRHEUMATIC MITRAL VALVE REGURGITATION: ICD-10-CM

## 2023-12-28 DIAGNOSIS — N40.1 BENIGN PROSTATIC HYPERPLASIA WITH URINARY FREQUENCY: ICD-10-CM

## 2023-12-28 DIAGNOSIS — I50.32 HEART FAILURE, DIASTOLIC, CHRONIC: ICD-10-CM

## 2023-12-28 DIAGNOSIS — E78.00 HYPERCHOLESTEROLEMIA: Primary | ICD-10-CM

## 2023-12-28 DIAGNOSIS — Z86.79 HISTORY OF SUBDURAL HEMORRHAGE: ICD-10-CM

## 2023-12-28 DIAGNOSIS — R35.0 BENIGN PROSTATIC HYPERPLASIA WITH URINARY FREQUENCY: ICD-10-CM

## 2023-12-28 PROCEDURE — 99215 PR OFFICE/OUTPT VISIT, EST, LEVL V, 40-54 MIN: ICD-10-PCS | Mod: S$GLB,,, | Performed by: INTERNAL MEDICINE

## 2023-12-28 PROCEDURE — 99215 OFFICE O/P EST HI 40 MIN: CPT | Mod: S$GLB,,, | Performed by: INTERNAL MEDICINE

## 2023-12-28 PROCEDURE — 90694 FLU VACCINE - QUADRIVALENT - ADJUVANTED: ICD-10-PCS | Mod: S$GLB,,, | Performed by: INTERNAL MEDICINE

## 2023-12-28 PROCEDURE — G0008 ADMIN INFLUENZA VIRUS VAC: HCPCS | Mod: S$GLB,,, | Performed by: INTERNAL MEDICINE

## 2023-12-28 PROCEDURE — 90694 VACC AIIV4 NO PRSRV 0.5ML IM: CPT | Mod: S$GLB,,, | Performed by: INTERNAL MEDICINE

## 2023-12-28 PROCEDURE — G0008 FLU VACCINE - QUADRIVALENT - ADJUVANTED: ICD-10-PCS | Mod: S$GLB,,, | Performed by: INTERNAL MEDICINE

## 2023-12-28 RX ORDER — FUROSEMIDE 40 MG/1
40 TABLET ORAL DAILY
Qty: 120 TABLET | Refills: 3
Start: 2023-12-28

## 2023-12-28 NOTE — PROGRESS NOTES
Subjective     Patient ID: Sam Gamino is a 78 y.o. male.    Chief Complaint: Follow-up (Hosp f/u has been going shana)    He was admitted to the hospital on November 4, 2023 for a spontaneous subarachnoid hemorrhage.  He had multiple CTAs of the head and neck, none of which revealed an AVM, aneurysm, stenosis or spasm of any arteries.  On November 14, 2023 he became acutely short of breath and a CTA of the head and neck revealed bilateral pulmonary emboli.  He slowly improved and was sent to a skilled nursing facility.  He was discharged home on December 1, 2023.  He continues to improve.  He is walking with a walker now, but he does not use any assist devices in the home.      Hypertension  This is a chronic problem. The current episode started more than 1 year ago. The problem is controlled.           Follow-up      Review of Systems   Constitutional: Negative.    Respiratory: Negative.     Cardiovascular: Negative.           Objective     Physical Exam  Vitals and nursing note reviewed.   Constitutional:       Appearance: He is well-developed.   HENT:      Head: Normocephalic and atraumatic.   Eyes:      Pupils: Pupils are equal, round, and reactive to light.   Neck:      Vascular: No JVD.   Cardiovascular:      Rate and Rhythm: Normal rate and regular rhythm.      Heart sounds: Murmur heard.      Systolic murmur is present with a grade of 2/6.      Comments: Holosystolic murmur greatest at the left lower sternal border.  Pulmonary:      Effort: Pulmonary effort is normal.   Abdominal:      General: Abdomen is protuberant. Bowel sounds are normal.      Palpations: Abdomen is soft.      Tenderness: There is no rebound.   Musculoskeletal:      Right lower leg: Edema present.      Left lower leg: Edema present.   Neurological:      Mental Status: He is alert.            Assessment and Plan     1. Hypercholesterolemia  Overview:  2002: Statin was begun.      2. Heart failure, diastolic,  chronic  Overview:  5/16/2022: Echo: Normal left ventricular size and systolic function. EF 55%. Moderate MR. Mild to moderate TR.   6/6/2023: Echo: Normal left ventricular size and systolic function. EF 60%. LVGLS -15%. Moderate diastolic dysfunction. Mild to moderate AR. Mild to moderate MR. Mildly enlarged ascending aorta. Small pericardial effusion.        Orders:  -     furosemide (LASIX) 40 MG tablet; Take 1 tablet (40 mg total) by mouth once daily.  Dispense: 120 tablet; Refill: 3    3. Primary hypertension  Overview:  2002: Diagnosed.         4. Nonrheumatic mitral valve regurgitation  Overview:  2007 - Mild  4/22 - Mod/Severe      5. Pernicious anemia    6. Benign prostatic hyperplasia with urinary frequency    7. History of subdural hemorrhage  Overview:  11/5/2023: SAH.      8. Acute pulmonary embolism, unspecified pulmonary embolism type, unspecified whether acute cor pulmonale present  Overview:  CTA - 11/14/23 - Bilateral      Other orders  -     Influenza - Quadrivalent (Adjuvanted)        PLAN:  Per orders and D/C instructions.  Continue diet and/or meds for BPH, pernicious anemia, HTN, and high cholesterol, which are stable.  Follow-up with cardiology for diastolic heart failure and mitral valve regurgitation.  He continues to improve from his recent subarachnoid hemorrhage.   He is currently on Eliquis for bilateral pulmonary emboli 1st noted while in hospital on November 14th 2023.  He should consider discontinuing the Eliquis on February 14, 2024.  Flu shot today.      Follow up in about 3 months (around 3/28/2024).

## 2023-12-28 NOTE — PROGRESS NOTES
[facility] - Neurosurgery ([facility])  Neurosurgery  Progress Note    Subjective:     History of Present Illness: 78M h/o lymphoma in remission, CAD, nonrheumatic mitral regurg, CHF, first-degree AV block, aortic arthrosclerosis admitted to [facility] with SAH. Initially presented initially to outside hospital ED with complaints of bilateral neck pain that happened approximately 2 hours prior to arrival.  Reports that the pain started immediately after having a bowel movement.  He does have associated headache that radiates up to his temples. Patient is on baby ASA. Also reports some associated weakness with walking. States he woke up this morning feeling normal without any pain or weakness. Denies fever/chills, vision/hearing changes, dysphagia, dysarthria, vomiting, new-onset weakness or sensory change, bowel/bladder changes. CTH with prepontine SAH; CTA negative for vessel lesions.       Post-Op Info:  * No surgery found *       Interval History: NAEON. AFVSS. Pt reports feeling much improved today. Denies any SOB/CP/palpitations, denies HA. Neurologically stable. Sats 100% on 2L. Supratherapeutic on heparin gtt. CTH today. TTE negative for R heart strain.     Medications:  Continuous Infusions:   heparin (porcine) in D5W 14 Units/kg/hr (11/15/23 0536)     Scheduled Meds:   atorvastatin  80 mg Oral Daily    famotidine  20 mg Oral BID    losartan  100 mg Oral Daily    melatonin  6 mg Oral Nightly    metoprolol succinate  50 mg Oral Daily    tamsulosin  0.4 mg Oral Daily     PRN Meds:acetaminophen, dextrose 10%, dextrose 10%, heparin (PORCINE), heparin (PORCINE), hydrALAZINE, labetalol, ondansetron     Review of Systems  Objective:     Weight: 114.8 kg (253 lb)  Body mass index is 33.38 kg/m².  Vital Signs (Most Recent):  Temp: 98.5 °F (36.9 °C) (11/15/23 0801)  Pulse: 97 (11/15/23 0801)  Resp: 17 (11/15/23 0801)  BP: (!) 110/59 (11/15/23 0801)  SpO2: 100 % (11/15/23 0801) Vital Signs (24h Range):  Temp:  [97.9 °F (36.6  °C)-99.1 °F (37.3 °C)] 98.5 °F (36.9 °C)  Pulse:  [] 97  Resp:  [16-18] 17  SpO2:  [99 %-100 %] 100 %  BP: (107-126)/(57-78) 110/59     Date 11/15/23 0700 - 11/16/23 0659   Shift 6209-0953 3315-5681 8332-5775 24 Hour Total   INTAKE   P.O. 240   240   Shift Total(mL/kg) 240(2.1)   240(2.1)   OUTPUT   Shift Total(mL/kg)       Weight (kg) 114.8 114.8 114.8 114.8                               Physical Exam         Neurosurgery Physical Exam    General: well developed, well nourished, no distress.   Head: normocephalic, atraumatic  Neurologic: Alert and oriented. Thought content appropriate.  GCS: Motor: 6/Verbal: 5/Eyes: 4 GCS Total: 15  Mental Status: Awake, Alert, Oriented x 4  Language: No aphasia  Speech: No dysarthria  Cranial nerves: face symmetric, tongue midline, CN II-XII grossly intact.   Eyes: pupils equal, round, reactive to light with accomodation, EOMI.   Pulmonary: normal respirations, no signs of respiratory distress  Abdomen: soft, non-distended, not tender to palpation  Sensory: intact to light touch throughout  Motor Strength: Moves all extremities spontaneously with good tone. Grossly full strength upper and lower extremities. No abnormal movements seen.   Pronator Drift: no drift noted  Finger-to-nose: Intact bilaterally  Skin: Skin is warm, dry, intact.     Significant Labs:  Recent Labs   Lab 11/14/23  0902 11/15/23  0810   * 102    138   K 3.2* 3.5    104   CO2 25 28   BUN 18 20   CREATININE 0.8 0.8   CALCIUM 8.8 8.8     Recent Labs   Lab 11/14/23  0902 11/15/23  0316   WBC 6.16 7.16   HGB 14.1 13.4*   HCT 43.8 41.2    149*     Recent Labs   Lab 11/14/23  1404 11/15/23  0212 11/15/23  0316   INR 1.0  --   --    APTT 24.0 >150.0* >150.0*     Microbiology Results (last 7 days)       ** No results found for the last 168 hours. **          All pertinent labs from the last 24 hours have been reviewed.    Significant Diagnostics:  I have reviewed and interpreted all  pertinent imaging results/findings within the past 24 hours.  Assessment/Plan:     * SAH (subarachnoid hemorrhage)  78M h/o lymphoma in remission, CAD, nonrheumatic mitral regurg, CHF, first-degree AV block, aortic arthrosclerosis who was admitted to [facility] with spontaneous SAH. CTH with prepontine SAH; CTA negative for vessel lesions.     Imaging personally reviewed:  --CTA 11/4: negative for vascular pathology  --MRI/MRA Brain 11/5: negative for underlying lesion  --DSA 11/5: negative for vascular pathology  --CTA 11/11: no evidence of vasospasm  --CTA head/neck 11/14: Stable intracranial findings with interval decrease in SAH/IVH, no evidence of vasospasm. Bilateral pulmonary emboli visualized in the upper chest.     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    Neuro:  --Stepped down to nsgy service; q4 nc/vs  --HOB@30  --Keppra 500 BID x7d (completed)  --TCDs daily x14d (discontinued 11/10 per [facility] given low suspicion)  --Nimotop 60q4 x 21d (discontinued 11/9 per [facility] given low suspicion)  --Goal normotensive, SBP < 160  --Goal eunatremic  --Monitor I/O's, goal net even to +1L  --Regular diet as tolerated  --Continue to follow clinically and notify NSGY immediately if any neurostatus change    PPx:  --DVT Ppx:TEDs/anti-coagulation; no SCD's in setting of acute VTE  --Bowel Regimen: senna BID    Dispo: PT/OT recs CM/SW consult for dispo planning. Plan for SNF placement, anticipate likely medically ready 11/16    Plan d/w Dr. Cosby.    Electrolyte abnormality  - Monitor electrolytes, replete PRN    Acute pulmonary embolism  Episode of dizziness, tachycardia and diaphoresis on 11/14, RR called.  CTA head/neck revealed bilateral pulmonary emboli.    - High intensity heparin gtt started 11/14, pharmacy following  - Echo 11/15 negative for R heart strain  - CTH today  - Plan to transition to Eliquis pending stable CTH  - Supplemental O2 as needed, wean as tolerated  - Will need  outpatient follow up with established Cardiologist vs Pulmonary    Hypercholesterolemia  Continue home dose statin     Heart failure, diastolic, chronic  Followed by cardiology as OP   - Echo from 11/5: estimated ejection fraction of 55 - 60%. There is normal diastolic function.  - Continue home BB and ARB, hold diuretics in setting of SAH and goal euvolemia during this time    - Repeat Echo completed 11/15 in setting on new dx of PE:      Left Ventricle: The left ventricle is normal in size. Normal wall thickness. regional wall motion abnormalities present. See diagram for wall motion findings. There is low normal systolic function with a visually estimated ejection fraction of 50 - 55%. Ejection fraction by visual approximation is 55%.    Right Ventricle: Normal right ventricular cavity size. Wall thickness is normal. Right ventricle wall motion  is normal. Systolic function is normal.    Aortic Valve: There is mild aortic valve sclerosis. There is mild aortic regurgitation.    Pericardium: There is a small posterior effusion mostly base and base post laterally.    Primary hypertension  Continue home dose losartan, metoprolol    Benign prostatic hyperplasia with urinary frequency  Continue home dose tamsulosin     Coronary artery disease  At home on ASA  - hold at this time in setting of acute bleed   - continue home BB and ARB        Ashley Brasher PA-C  Neurosurgery  [facility] - Neurosurgery ([facility])   79

## 2024-01-02 LAB
OHS CV EVENT MONITOR DAY: 0
OHS CV HOLTER LENGTH DECIMAL HOURS: 48
OHS CV HOLTER LENGTH HOURS: 48
OHS CV HOLTER LENGTH MINUTES: 0
OHS CV HOLTER SINUS AVERAGE HR: 94
OHS CV HOLTER SINUS MAX HR: 136
OHS CV HOLTER SINUS MIN HR: 63

## 2024-01-16 ENCOUNTER — HOSPITAL ENCOUNTER (EMERGENCY)
Facility: OTHER | Age: 79
Discharge: HOME | End: 2024-01-16
Attending: EMERGENCY MEDICINE
Payer: MEDICARE

## 2024-01-16 ENCOUNTER — NURSE TRIAGE (OUTPATIENT)
Dept: ADMINISTRATIVE | Facility: CLINIC | Age: 79
End: 2024-01-16
Payer: MEDICARE

## 2024-01-16 VITALS
HEART RATE: 87 BPM | DIASTOLIC BLOOD PRESSURE: 86 MMHG | OXYGEN SATURATION: 99 % | SYSTOLIC BLOOD PRESSURE: 138 MMHG | RESPIRATION RATE: 18 BRPM | TEMPERATURE: 98 F

## 2024-01-16 DIAGNOSIS — E87.6 HYPOKALEMIA: ICD-10-CM

## 2024-01-16 DIAGNOSIS — R11.0 NAUSEA: ICD-10-CM

## 2024-01-16 DIAGNOSIS — R10.33 PERIUMBILICAL ABDOMINAL PAIN: Primary | ICD-10-CM

## 2024-01-16 LAB
ALBUMIN SERPL BCP-MCNC: 2.7 G/DL (ref 3.5–5.2)
ALP SERPL-CCNC: 44 U/L (ref 55–135)
ALT SERPL W/O P-5'-P-CCNC: 10 U/L (ref 10–44)
ANION GAP SERPL CALC-SCNC: 6 MMOL/L (ref 8–16)
AST SERPL-CCNC: 14 U/L (ref 10–40)
BACTERIA #/AREA URNS HPF: NORMAL /HPF
BASOPHILS # BLD AUTO: 0.04 K/UL (ref 0–0.2)
BASOPHILS NFR BLD: 0.6 % (ref 0–1.9)
BILIRUB SERPL-MCNC: 0.5 MG/DL (ref 0.1–1)
BILIRUB UR QL STRIP: NEGATIVE
BUN SERPL-MCNC: 13 MG/DL (ref 8–23)
CALCIUM SERPL-MCNC: 7 MG/DL (ref 8.7–10.5)
CHLORIDE SERPL-SCNC: 117 MMOL/L (ref 95–110)
CLARITY UR: CLEAR
CO2 SERPL-SCNC: 19 MMOL/L (ref 23–29)
COLOR UR: YELLOW
CREAT SERPL-MCNC: 0.7 MG/DL (ref 0.5–1.4)
CREAT SERPL-MCNC: 0.9 MG/DL (ref 0.5–1.4)
DIFFERENTIAL METHOD BLD: ABNORMAL
EOSINOPHIL # BLD AUTO: 0.1 K/UL (ref 0–0.5)
EOSINOPHIL NFR BLD: 1.3 % (ref 0–8)
ERYTHROCYTE [DISTWIDTH] IN BLOOD BY AUTOMATED COUNT: 12.1 % (ref 11.5–14.5)
EST. GFR  (NO RACE VARIABLE): >60 ML/MIN/1.73 M^2
GLUCOSE SERPL-MCNC: 101 MG/DL (ref 70–110)
GLUCOSE UR QL STRIP: ABNORMAL
HCT VFR BLD AUTO: 43.7 % (ref 40–54)
HGB BLD-MCNC: 14.3 G/DL (ref 14–18)
HGB UR QL STRIP: NEGATIVE
IMM GRANULOCYTES # BLD AUTO: 0.01 K/UL (ref 0–0.04)
IMM GRANULOCYTES NFR BLD AUTO: 0.2 % (ref 0–0.5)
KETONES UR QL STRIP: NEGATIVE
LEUKOCYTE ESTERASE UR QL STRIP: NEGATIVE
LIPASE SERPL-CCNC: 16 U/L (ref 4–60)
LYMPHOCYTES # BLD AUTO: 2.1 K/UL (ref 1–4.8)
LYMPHOCYTES NFR BLD: 33.6 % (ref 18–48)
MAGNESIUM SERPL-MCNC: 1.7 MG/DL (ref 1.6–2.6)
MCH RBC QN AUTO: 33.8 PG (ref 27–31)
MCHC RBC AUTO-ENTMCNC: 32.7 G/DL (ref 32–36)
MCV RBC AUTO: 103 FL (ref 82–98)
MICROSCOPIC COMMENT: NORMAL
MONOCYTES # BLD AUTO: 0.4 K/UL (ref 0.3–1)
MONOCYTES NFR BLD: 6 % (ref 4–15)
NEUTROPHILS # BLD AUTO: 3.7 K/UL (ref 1.8–7.7)
NEUTROPHILS NFR BLD: 58.3 % (ref 38–73)
NITRITE UR QL STRIP: NEGATIVE
NRBC BLD-RTO: 0 /100 WBC
PH UR STRIP: 8 [PH] (ref 5–8)
PLATELET # BLD AUTO: 186 K/UL (ref 150–450)
PMV BLD AUTO: 10.3 FL (ref 9.2–12.9)
POTASSIUM SERPL-SCNC: 3.2 MMOL/L (ref 3.5–5.1)
PROT SERPL-MCNC: 5.3 G/DL (ref 6–8.4)
PROT UR QL STRIP: ABNORMAL
RBC # BLD AUTO: 4.23 M/UL (ref 4.6–6.2)
RBC #/AREA URNS HPF: 3 /HPF (ref 0–4)
SAMPLE: NORMAL
SODIUM SERPL-SCNC: 142 MMOL/L (ref 136–145)
SP GR UR STRIP: >1.03 (ref 1–1.03)
SQUAMOUS #/AREA URNS HPF: 2 /HPF
TROPONIN I SERPL DL<=0.01 NG/ML-MCNC: <0.006 NG/ML (ref 0–0.03)
URN SPEC COLLECT METH UR: ABNORMAL
UROBILINOGEN UR STRIP-ACNC: NEGATIVE EU/DL
WBC # BLD AUTO: 6.36 K/UL (ref 3.9–12.7)
WBC #/AREA URNS HPF: 4 /HPF (ref 0–5)
YEAST URNS QL MICRO: NORMAL

## 2024-01-16 PROCEDURE — 85025 COMPLETE CBC W/AUTO DIFF WBC: CPT | Performed by: EMERGENCY MEDICINE

## 2024-01-16 PROCEDURE — 96375 TX/PRO/DX INJ NEW DRUG ADDON: CPT

## 2024-01-16 PROCEDURE — 84484 ASSAY OF TROPONIN QUANT: CPT | Performed by: EMERGENCY MEDICINE

## 2024-01-16 PROCEDURE — 80053 COMPREHEN METABOLIC PANEL: CPT | Performed by: EMERGENCY MEDICINE

## 2024-01-16 PROCEDURE — 81000 URINALYSIS NONAUTO W/SCOPE: CPT | Performed by: EMERGENCY MEDICINE

## 2024-01-16 PROCEDURE — 25500020 PHARM REV CODE 255: Performed by: EMERGENCY MEDICINE

## 2024-01-16 PROCEDURE — 63600175 PHARM REV CODE 636 W HCPCS: Performed by: EMERGENCY MEDICINE

## 2024-01-16 PROCEDURE — 25000003 PHARM REV CODE 250: Performed by: EMERGENCY MEDICINE

## 2024-01-16 PROCEDURE — 83690 ASSAY OF LIPASE: CPT | Performed by: EMERGENCY MEDICINE

## 2024-01-16 PROCEDURE — 83735 ASSAY OF MAGNESIUM: CPT | Performed by: EMERGENCY MEDICINE

## 2024-01-16 PROCEDURE — 99285 EMERGENCY DEPT VISIT HI MDM: CPT | Mod: 25

## 2024-01-16 PROCEDURE — 96374 THER/PROPH/DIAG INJ IV PUSH: CPT

## 2024-01-16 RX ORDER — FAMOTIDINE 10 MG/ML
20 INJECTION INTRAVENOUS
Status: COMPLETED | OUTPATIENT
Start: 2024-01-16 | End: 2024-01-16

## 2024-01-16 RX ORDER — POTASSIUM CHLORIDE 750 MG/1
10 TABLET, EXTENDED RELEASE ORAL DAILY
Qty: 30 TABLET | Refills: 0 | Status: SHIPPED | OUTPATIENT
Start: 2024-01-16 | End: 2024-02-22

## 2024-01-16 RX ORDER — POTASSIUM CHLORIDE 20 MEQ/1
20 TABLET, EXTENDED RELEASE ORAL
Status: COMPLETED | OUTPATIENT
Start: 2024-01-16 | End: 2024-01-16

## 2024-01-16 RX ORDER — ALUMINUM HYDROXIDE, MAGNESIUM HYDROXIDE, AND SIMETHICONE 1200; 120; 1200 MG/30ML; MG/30ML; MG/30ML
15 SUSPENSION ORAL
Status: DISCONTINUED | OUTPATIENT
Start: 2024-01-16 | End: 2024-01-16 | Stop reason: HOSPADM

## 2024-01-16 RX ORDER — ONDANSETRON 4 MG/1
4 TABLET, ORALLY DISINTEGRATING ORAL EVERY 6 HOURS PRN
Qty: 15 TABLET | Refills: 0 | Status: SHIPPED | OUTPATIENT
Start: 2024-01-16

## 2024-01-16 RX ORDER — ONDANSETRON HYDROCHLORIDE 2 MG/ML
4 INJECTION, SOLUTION INTRAVENOUS
Status: COMPLETED | OUTPATIENT
Start: 2024-01-16 | End: 2024-01-16

## 2024-01-16 RX ORDER — ACETAMINOPHEN 500 MG
1000 TABLET ORAL EVERY 6 HOURS PRN
Qty: 50 TABLET | Refills: 0 | Status: SHIPPED | OUTPATIENT
Start: 2024-01-16

## 2024-01-16 RX ORDER — FAMOTIDINE 20 MG/1
20 TABLET, FILM COATED ORAL 2 TIMES DAILY PRN
Qty: 20 TABLET | Refills: 0 | Status: SHIPPED | OUTPATIENT
Start: 2024-01-16 | End: 2025-01-15

## 2024-01-16 RX ADMIN — FAMOTIDINE 20 MG: 10 INJECTION, SOLUTION INTRAVENOUS at 12:01

## 2024-01-16 RX ADMIN — POTASSIUM CHLORIDE 20 MEQ: 1500 TABLET, EXTENDED RELEASE ORAL at 03:01

## 2024-01-16 RX ADMIN — ONDANSETRON 4 MG: 2 INJECTION INTRAMUSCULAR; INTRAVENOUS at 12:01

## 2024-01-16 RX ADMIN — IOHEXOL 100 ML: 350 INJECTION, SOLUTION INTRAVENOUS at 01:01

## 2024-01-16 RX ADMIN — ALUMINUM HYDROXIDE, MAGNESIUM HYDROXIDE, AND SIMETHICONE 15 ML: 200; 200; 20 SUSPENSION ORAL at 02:01

## 2024-01-16 NOTE — ED PROVIDER NOTES
Encounter Date: 1/16/2024       History     Chief Complaint   Patient presents with    Abdominal Pain     Reports abd pain around navel, nausea that started this morning after eating cookies. Denies vomiting, dizziness, diarrhea, dysuria.      This 78-year-old man with a complicated past medical history including heart failure as well as subarachnoid hemorrhage presenting for evaluation of nausea and abdominal pain after having eaten a cookie from [employer] this morning.  He would gone to sleep in his usual state of health, woke up feeling okay ate a cookie then began have intense cramping recurrent spasms through the abdomen.  He thereafter had 1 loose bowel movement.  Denies any fevers, chills, other injuries.  Nothing in particular seemed to make this any better, it seems to be persistent with time.  Can not recall any episodes like this previously.      Review of patient's allergies indicates:   Allergen Reactions    Lotensin [benazepril]      cough     Past Medical History:   Diagnosis Date    Abnormal echocardiogram 2/8/2013    Mild MVR 3/07    CAD (coronary artery disease) 2/8/2013    Angio 3/07 Dr. Lin    Hypercholesterolemia 6/27/2022    Lymphoma in remission 2/8/2013    S/p chemo/XRT 1992 Relapse 1993 BMT 1993    Normal cardiac stress test 2/8/2013    Nuclear stress 2/09    Pernicious anemia 2/8/2013    Thyroid nodule 2/8/2013    Right s/p Bx 6/08 Dr. Pelayo     Past Surgical History:   Procedure Laterality Date    EYE SURGERY Bilateral 2016    Cataracts     Family History   Problem Relation Age of Onset    Cancer Mother         uterine    No Known Problems Father     Hypertension Brother     No Known Problems Daughter     No Known Problems Sister     No Known Problems Sister     No Known Problems Brother     No Known Problems Brother     No Known Problems Brother     Thyroid disease Sister      Social History     Tobacco Use    Smoking status: Never    Smokeless tobacco: Never   Substance Use Topics     Alcohol use: Yes     Alcohol/week: 1.0 standard drink of alcohol     Types: 1 Standard drinks or equivalent per week     Comment: ocassionally    Drug use: No     Review of Systems  Constitutional-no fever  HEENT-no congestion  Eyes-no redness  Respiratory-no shortness of breath  Cardio-no chest pain  GI-positive abdominal pain  Endocrine-no cold intolerance  -no difficulty urinating  MSK-no myalgias  Skin-no rashes  Allergy-no environmental allergy  Neurologic-, no headache  Hematology-no swollen nodes  Behavioral-no confusion  Physical Exam     Initial Vitals [01/16/24 1137]   BP Pulse Resp Temp SpO2   (!) 143/66 68 18 97.3 °F (36.3 °C) 99 %      MAP       --         Physical Exam  Constitutional:  Uncomfortable appearing 78-year-old man in mild distress  Eyes: Conjunctivae normal.  ENT       Head: Normocephalic, atraumatic.       Nose: Normal external appearance        Mouth/Throat: no strigulous respirations   Hematological/Lymphatic/Immunilogical: no visible lymphadenopathy   Cardiovascular: Normal rate,   Respiratory: Normal respiratory effort.   Gastrointestinal:  Rounded, tender in the periumbilical region, no rebound, no guarding, negative Raza sign, negative Rovsing sign  Musculoskeletal: Normal range of motion in all extremities. No obvious deformities or swelling.  Neurologic: Alert, oriented. Normal speech and language. No gross focal neurologic deficits are appreciated.  Skin: Skin is warm, dry. No rash noted.  Psychiatric: Mood and affect are normal.   ED Course   Procedures  Labs Reviewed   CBC W/ AUTO DIFFERENTIAL - Abnormal; Notable for the following components:       Result Value    RBC 4.23 (*)      (*)     MCH 33.8 (*)     All other components within normal limits   URINALYSIS, REFLEX TO URINE CULTURE - Abnormal; Notable for the following components:    Specific Gravity, UA >1.030 (*)     Protein, UA Trace (*)     Glucose, UA 4+ (*)     All other components within normal limits     Narrative:     Specimen Source->Urine   COMPREHENSIVE METABOLIC PANEL - Abnormal; Notable for the following components:    Potassium 3.2 (*)     Chloride 117 (*)     CO2 19 (*)     Calcium 7.0 (*)     Total Protein 5.3 (*)     Albumin 2.7 (*)     Alkaline Phosphatase 44 (*)     Anion Gap 6 (*)     All other components within normal limits   TROPONIN I   LIPASE   MAGNESIUM   URINALYSIS MICROSCOPIC    Narrative:     Specimen Source->Urine   ISTAT CREATININE          Imaging Results              CT Abdomen Pelvis With IV Contrast NO Oral Contrast (Final result)  Result time 01/16/24 14:32:16      Final result by Alfonso Ramirez MD (01/16/24 14:32:16)                   Impression:      No acute findings in the abdomen or pelvis.    Small volume pericardial effusion.    Incidental findings discussed in the body of the report.      Electronically signed by: Alfonso Ramirez MD  Date:    01/16/2024  Time:    14:32               Narrative:    EXAMINATION:  CT ABDOMEN PELVIS WITH IV CONTRAST    CLINICAL HISTORY:  Bowel obstruction suspected;Pancreatitis, acute, severe;    TECHNIQUE:  Low dose axial images, sagittal and coronal reformations were obtained from the lung bases to the pubic symphysis following the IV administration of 100 mL of Omnipaque 350 .  Oral contrast was not given.    COMPARISON:  CT, 12/09/2008.    FINDINGS:  Lower Chest:    Fibrotic changes versus scarring or volume loss in the medial right lung base.  Lung bases otherwise essentially clear.  No pleural effusion.  Heart size is normal.  Coronary artery calcifications.  Small volume pericardial effusion.    Abdomen:    Liver is normal in size and contour.  No focal hepatic lesion.  Gallbladder is unremarkable. No intrahepatic biliary ductal dilatation.    Spleen is not enlarged.  No overt peripancreatic fat stranding.  No pancreatic duct dilatation.  Adrenal glands are unremarkable.    The kidneys are symmetric.  No hydronephrosis. Simple cyst in the  lower pole of the left kidney.    No small bowel obstruction.  Normal appendix.    No pneumoperitoneum or organized fluid collection.    No bulky lymphadenopathy.    Abdominal aorta is normal in caliber with mild calcific atherosclerosis.    Portal vein is patent.  No portal venous gas.    Pelvis:    Urinary bladder, pelvic organs, and rectum are unremarkable.  No free fluid in the pelvis.  No pelvic lymphadenopathy.    Bones and soft tissues:    No aggressive osseous lesions.  Mild anterolisthesis of L4 with respect to L5.  Facet arthropathy in the lower lumbar spine.  Mild degenerative changes elsewhere in the spine.  Probable significant central canal stenosis at L4-L5.  Small fat containing umbilical hernia.                                       Medications   aluminum-magnesium hydroxide-simethicone 200-200-20 mg/5 mL suspension 15 mL (15 mLs Oral Given 1/16/24 1454)   ondansetron injection 4 mg (4 mg Intravenous Given 1/16/24 1216)   famotidine (PF) injection 20 mg (20 mg Intravenous Given 1/16/24 1215)   iohexoL (OMNIPAQUE 350) injection 100 mL (100 mLs Intravenous Given 1/16/24 1353)   potassium chloride SA CR tablet 20 mEq (20 mEq Oral Given 1/16/24 1511)     Medical Decision Making  Abdominal pain represents a profoundly broad differential, this patient's symptoms are nondescript in nature and while unlikely could represent-  Pancreatitis, cholecystitis, appendicitis, gastritis, cystitis, pyleonephritis, PUD, obstructions constipation neoplasm among a myriad of other diagnoses.     A thorough examination and history was undertaken and appropriate diagnostics ordered with a diagnosis identified as below based on summative findings. It is possible this represents a more sinister underlying process and as such precautions related thereto were specifically discussed with the patient.     Problems Addressed:  Hypokalemia: acute illness or injury  Nausea: acute illness or injury  Periumbilical abdominal pain:  acute illness or injury    Amount and/or Complexity of Data Reviewed  Independent Historian: spouse     Details: Notes previous subarachnoid hemorrhage for   External Data Reviewed: labs, radiology, ECG and notes.     Details: Recent evaluation for subarachnoid hemorrhage in hospitalization  Labs: ordered. Decision-making details documented in ED Course.  Radiology: ordered and independent interpretation performed. Decision-making details documented in ED Course.    Risk  OTC drugs.  Prescription drug management.  Diagnosis or treatment significantly limited by social determinants of health.                                      Clinical Impression:  Final diagnoses:  [R10.33] Periumbilical abdominal pain (Primary)  [R11.0] Nausea  [E87.6] Hypokalemia          ED Disposition Condition    Discharge Stable          ED Prescriptions       Medication Sig Dispense Start Date End Date Auth. Provider    potassium chloride (KLOR-CON) 10 MEQ TbSR Take 1 tablet (10 mEq total) by mouth once daily. 30 tablet 1/16/2024 -- Sammy Hernandez MD    ondansetron (ZOFRAN-ODT) 4 MG TbDL Take 1 tablet (4 mg total) by mouth every 6 (six) hours as needed. 15 tablet 1/16/2024 -- Sammy Hernandez MD    famotidine (PEPCID) 20 MG tablet Take 1 tablet (20 mg total) by mouth 2 (two) times daily as needed for Heartburn. 20 tablet 1/16/2024 1/15/2025 Sammy Hernandez MD    acetaminophen (TYLENOL) 500 MG tablet Take 2 tablets (1,000 mg total) by mouth every 6 (six) hours as needed. 50 tablet 1/16/2024 -- Sammy Hernandez MD          Follow-up Information       Follow up With Specialties Details Why Contact Info    JAYLYN Zamora MD Internal Medicine Call in 1 day If symptoms worsen, For a follow up visit about today [address] [facility]  [id number]  [place] [address]  [phone]               Sammy Hernandez MD  01/16/24 [id number]

## 2024-01-16 NOTE — TELEPHONE ENCOUNTER
Patient transferred from patient access. He ate a cookie about 2 hours ago, chocolate cookie from [employer].Has never had a problem with chocolate before. Had a BM an hour ago with no relief from pain. Pain rates 8/10.   Reason for Disposition   MILD TO MODERATE constant pain lasting > 2 hours    Additional Information   Negative: Passed out (i.e., fainted, collapsed and was not responding)   Negative: Shock suspected (e.g., cold/pale/clammy skin, too weak to stand, low BP, rapid pulse)   Negative: Sounds like a life-threatening emergency to the triager   Negative: SEVERE abdominal pain (e.g., excruciating)   Negative: Vomiting red blood or black (coffee ground) material   Negative: Blood in bowel movements  (Exception: Blood on surface of BM with constipation.)   Negative: Black or tarry bowel movements  (Exception: Chronic-unchanged black-grey BMs AND is taking iron pills or Pepto-Bismol.)   Negative: Unable to urinate (or only a few drops) and bladder feels very full   Negative: Pain in scrotum persists > 1 hour    Protocols used: Abdominal Pain - Male-A-[place]  Triage done- dispo ED. Verb understanding.

## 2024-01-16 NOTE — DISCHARGE INSTRUCTIONS
Mr. Gamino,    Thank you for letting me care for you today! It was nice meeting you, and I hope you feel better soon.   If you would like access to your chart and what was done today please utilize the [facility].   Please come back to [facility] for all of your future medical needs.    Our goal in the emergency department is to always give you outstanding care and exceptional service. You may receive a survey by mail or e-mail in the next week regarding your experience in our ED. We would greatly appreciate you completing and returning the survey. Your feedback provides us with a way to recognize our staff who give very good care and it helps us learn how to improve when your experience was below our aspiration of excellence.     Sincerely,    Sammy Hernandez MD  Board Certified Emergency Physician

## 2024-01-16 NOTE — ED NOTES
Pt to ED with abd pain, nausea after eating some cookies from [employer] approx. 2 hours PTA. Recent admission to SNF for SAH. No active emesis noted.

## 2024-01-16 NOTE — ED PROVIDER NOTES
Encounter Date: 1/16/2024    SCRIBE #1 NOTE: I, Orlando Lisa, am scribing for, and in the presence of,  Sammy Hernandez MD.       History     Chief Complaint   Patient presents with    Abdominal Pain     Reports abd pain around navel, nausea that started this morning after eating cookies. Denies vomiting, dizziness, diarrhea, dysuria.      Time seen by provider: 12:31 PM    Sam Gamino is a 78 y.o. male, with a PMHx of ***, who presents to the ED with ***. The patient reports ***. This is the extent of the patient's complaints today in the Emergency Department.          Review of patient's allergies indicates:   Allergen Reactions    Lotensin [benazepril]      cough     Past Medical History:   Diagnosis Date    Abnormal echocardiogram 2/8/2013    Mild MVR 3/07    CAD (coronary artery disease) 2/8/2013    Angio 3/07 Dr. Lin    Hypercholesterolemia 6/27/2022    Lymphoma in remission 2/8/2013    S/p chemo/XRT 1992 Relapse 1993 BMT 1993    Normal cardiac stress test 2/8/2013    Nuclear stress 2/09    Pernicious anemia 2/8/2013    Thyroid nodule 2/8/2013    Right s/p Bx 6/08 Dr. Pelayo     Past Surgical History:   Procedure Laterality Date    EYE SURGERY Bilateral 2016    Cataracts     Family History   Problem Relation Age of Onset    Cancer Mother         uterine    No Known Problems Father     Hypertension Brother     No Known Problems Daughter     No Known Problems Sister     No Known Problems Sister     No Known Problems Brother     No Known Problems Brother     No Known Problems Brother     Thyroid disease Sister      Social History     Tobacco Use    Smoking status: Never    Smokeless tobacco: Never   Substance Use Topics    Alcohol use: Yes     Alcohol/week: 1.0 standard drink of alcohol     Types: 1 Standard drinks or equivalent per week     Comment: ocassionally    Drug use: No     Review of Systems  Constitutional-no fever  HEENT-no congestion  Eyes-no redness  Respiratory-no shortness of  breath  Cardio-no chest pain  GI-no abdominal pain  Endocrine-no cold intolerance  -no difficulty urinating  MSK-no myalgias  Skin-no rashes  Allergy-no environmental allergy  Neurologic-, no headache  Hematology-no swollen nodes  Behavioral-no confusion   Physical Exam     Initial Vitals [01/16/24 1137]   BP Pulse Resp Temp SpO2   (!) 143/66 68 18 97.3 °F (36.3 °C) 99 %      MAP       --         Physical Exam  Constitutional: ***Well appearing, no distress.  Eyes: Conjunctivae normal.  ENT       Head: Normocephalic, atraumatic.       Nose: No congestion.       Mouth/Throat: Mucous membranes are moist.  Hematological/Lymphatic/Immunilogical: No cervical lymphadenopathy.  Cardiovascular: Normal rate, regular rhythm. Normal and symmetric distal pulses.  Respiratory: Normal respiratory effort. Breath sounds are normal.  Gastrointestinal: Soft, nontender.   Musculoskeletal: Normal range of motion in all extremities. No obvious deformities or swelling.  Neurologic: Alert, oriented. Normal speech and language. No gross focal neurologic deficits are appreciated.  Skin: Skin is warm, dry. No rash noted.  Psychiatric: Mood and affect are normal.    ED Course   Procedures  Labs Reviewed   CBC W/ AUTO DIFFERENTIAL - Abnormal; Notable for the following components:       Result Value    RBC 4.23 (*)      (*)     MCH 33.8 (*)     All other components within normal limits   COMPREHENSIVE METABOLIC PANEL   LIPASE   MAGNESIUM   TROPONIN I   URINALYSIS, REFLEX TO URINE CULTURE          Imaging Results    None          Medications   ondansetron injection 4 mg (4 mg Intravenous Given 1/16/24 1216)   famotidine (PF) injection 20 mg (20 mg Intravenous Given 1/16/24 1215)     Medical Decision Making  Amount and/or Complexity of Data Reviewed  Labs: ordered.  Radiology: ordered.    Risk  Prescription drug management.            Scribe Attestation:   Scribe #1: I performed the above scribed service and the documentation accurately  "describes the services I performed. I attest to the accuracy of the note.    Physician Attestation for Scribe: I, ***, reviewed documentation as scribed in my presence, which is both accurate and complete.                              Clinical Impression:   ***Please document a Clinical Impression and click the "Refresh" button to refresh your note and automatically pull in before signing.***           "

## 2024-01-25 ENCOUNTER — EXTERNAL HOME HEALTH (OUTPATIENT)
Dept: HOME HEALTH SERVICES | Facility: HOSPITAL | Age: 79
End: 2024-01-25
Payer: MEDICARE

## 2024-01-25 RX ORDER — TAMSULOSIN HYDROCHLORIDE 0.4 MG/1
0.4 CAPSULE ORAL
Qty: 90 CAPSULE | Refills: 1 | Status: SHIPPED | OUTPATIENT
Start: 2024-01-25

## 2024-02-01 ENCOUNTER — LAB VISIT (OUTPATIENT)
Dept: LAB | Facility: OTHER | Age: 79
End: 2024-02-01
Attending: INTERNAL MEDICINE
Payer: MEDICARE

## 2024-02-01 ENCOUNTER — OFFICE VISIT (OUTPATIENT)
Dept: INTERNAL MEDICINE | Facility: CLINIC | Age: 79
End: 2024-02-01
Attending: INTERNAL MEDICINE
Payer: MEDICARE

## 2024-02-01 VITALS
OXYGEN SATURATION: 99 % | HEIGHT: 73 IN | BODY MASS INDEX: 31.28 KG/M2 | HEART RATE: 92 BPM | WEIGHT: 236 LBS | SYSTOLIC BLOOD PRESSURE: 98 MMHG | DIASTOLIC BLOOD PRESSURE: 56 MMHG

## 2024-02-01 DIAGNOSIS — I34.0 NONRHEUMATIC MITRAL VALVE REGURGITATION: Primary | ICD-10-CM

## 2024-02-01 DIAGNOSIS — E03.9 HYPOTHYROIDISM, UNSPECIFIED TYPE: ICD-10-CM

## 2024-02-01 DIAGNOSIS — D50.8 OTHER IRON DEFICIENCY ANEMIA: ICD-10-CM

## 2024-02-01 DIAGNOSIS — C85.90 LYMPHOMA IN REMISSION: ICD-10-CM

## 2024-02-01 DIAGNOSIS — I50.32 HEART FAILURE, DIASTOLIC, CHRONIC: ICD-10-CM

## 2024-02-01 DIAGNOSIS — Z12.5 SCREENING FOR PROSTATE CANCER: ICD-10-CM

## 2024-02-01 DIAGNOSIS — R79.89 OTHER SPECIFIED ABNORMAL FINDINGS OF BLOOD CHEMISTRY: ICD-10-CM

## 2024-02-01 DIAGNOSIS — D51.0 PERNICIOUS ANEMIA: ICD-10-CM

## 2024-02-01 DIAGNOSIS — I10 PRIMARY HYPERTENSION: ICD-10-CM

## 2024-02-01 DIAGNOSIS — Z13.31 SCREENING FOR DEPRESSION: ICD-10-CM

## 2024-02-01 DIAGNOSIS — Z13.39 SCREENING FOR ALCOHOLISM: ICD-10-CM

## 2024-02-01 DIAGNOSIS — R97.20 RISING PSA LEVEL: Primary | ICD-10-CM

## 2024-02-01 DIAGNOSIS — E66.9 OBESITY (BMI 30.0-34.9): ICD-10-CM

## 2024-02-01 DIAGNOSIS — R49.0 HOARSENESS OF VOICE: ICD-10-CM

## 2024-02-01 DIAGNOSIS — R97.20 RISING PSA LEVEL: ICD-10-CM

## 2024-02-01 DIAGNOSIS — Z13.89 SCREENING FOR OBESITY: ICD-10-CM

## 2024-02-01 DIAGNOSIS — E78.9 DISORDER OF LIPID METABOLISM: ICD-10-CM

## 2024-02-01 DIAGNOSIS — I26.99 ACUTE PULMONARY EMBOLISM, UNSPECIFIED PULMONARY EMBOLISM TYPE, UNSPECIFIED WHETHER ACUTE COR PULMONALE PRESENT: ICD-10-CM

## 2024-02-01 DIAGNOSIS — Z94.81 STATUS POST BONE MARROW TRANSPLANT: ICD-10-CM

## 2024-02-01 DIAGNOSIS — E78.00 HYPERCHOLESTEROLEMIA: ICD-10-CM

## 2024-02-01 DIAGNOSIS — I50.33 HEART FAILURE, DIASTOLIC, ACUTE ON CHRONIC: ICD-10-CM

## 2024-02-01 DIAGNOSIS — E55.9 VITAMIN D DEFICIENCY: ICD-10-CM

## 2024-02-01 PROBLEM — E66.811 OBESITY (BMI 30.0-34.9): Status: ACTIVE | Noted: 2024-02-01

## 2024-02-01 PROBLEM — E66.01 SEVERE OBESITY: Status: RESOLVED | Noted: 2017-05-17 | Resolved: 2024-02-01

## 2024-02-01 LAB
ALBUMIN SERPL BCP-MCNC: 3.9 G/DL (ref 3.5–5.2)
ALP SERPL-CCNC: 58 U/L (ref 55–135)
ALT SERPL W/O P-5'-P-CCNC: 21 U/L (ref 10–44)
ANION GAP SERPL CALC-SCNC: 8 MMOL/L (ref 8–16)
AST SERPL-CCNC: 22 U/L (ref 10–40)
BASOPHILS # BLD AUTO: 0.03 K/UL (ref 0–0.2)
BASOPHILS NFR BLD: 0.6 % (ref 0–1.9)
BILIRUB SERPL-MCNC: 1 MG/DL (ref 0.1–1)
BUN SERPL-MCNC: 23 MG/DL (ref 8–23)
CALCIUM SERPL-MCNC: 9.8 MG/DL (ref 8.7–10.5)
CHLORIDE SERPL-SCNC: 104 MMOL/L (ref 95–110)
CHOLEST SERPL-MCNC: 123 MG/DL (ref 120–199)
CHOLEST/HDLC SERPL: 3.4 {RATIO} (ref 2–5)
CK SERPL-CCNC: 57 U/L (ref 20–200)
CO2 SERPL-SCNC: 27 MMOL/L (ref 23–29)
COMPLEXED PSA SERPL-MCNC: 0.36 NG/ML (ref 0–4)
CREAT SERPL-MCNC: 1.1 MG/DL (ref 0.5–1.4)
D DIMER PPP IA.FEU-MCNC: 0.35 MG/L FEU
DIFFERENTIAL METHOD BLD: ABNORMAL
EOSINOPHIL # BLD AUTO: 0.1 K/UL (ref 0–0.5)
EOSINOPHIL NFR BLD: 2.1 % (ref 0–8)
ERYTHROCYTE [DISTWIDTH] IN BLOOD BY AUTOMATED COUNT: 12 % (ref 11.5–14.5)
EST. GFR  (NO RACE VARIABLE): >60 ML/MIN/1.73 M^2
ESTIMATED AVG GLUCOSE: 85 MG/DL (ref 68–131)
GLUCOSE SERPL-MCNC: 78 MG/DL (ref 70–110)
HBA1C MFR BLD: 4.6 % (ref 4–5.6)
HCT VFR BLD AUTO: 44.2 % (ref 40–54)
HDLC SERPL-MCNC: 36 MG/DL (ref 40–75)
HDLC SERPL: 29.3 % (ref 20–50)
HGB BLD-MCNC: 13.8 G/DL (ref 14–18)
IMM GRANULOCYTES # BLD AUTO: 0.01 K/UL (ref 0–0.04)
IMM GRANULOCYTES NFR BLD AUTO: 0.2 % (ref 0–0.5)
LDLC SERPL CALC-MCNC: 74.8 MG/DL (ref 63–159)
LYMPHOCYTES # BLD AUTO: 1.8 K/UL (ref 1–4.8)
LYMPHOCYTES NFR BLD: 37.7 % (ref 18–48)
MCH RBC QN AUTO: 32.8 PG (ref 27–31)
MCHC RBC AUTO-ENTMCNC: 31.2 G/DL (ref 32–36)
MCV RBC AUTO: 105 FL (ref 82–98)
MONOCYTES # BLD AUTO: 0.4 K/UL (ref 0.3–1)
MONOCYTES NFR BLD: 7.9 % (ref 4–15)
NEUTROPHILS # BLD AUTO: 2.4 K/UL (ref 1.8–7.7)
NEUTROPHILS NFR BLD: 51.5 % (ref 38–73)
NONHDLC SERPL-MCNC: 87 MG/DL
NRBC BLD-RTO: 0 /100 WBC
PLATELET # BLD AUTO: 208 K/UL (ref 150–450)
PMV BLD AUTO: 9.8 FL (ref 9.2–12.9)
POTASSIUM SERPL-SCNC: 4.5 MMOL/L (ref 3.5–5.1)
PROT SERPL-MCNC: 7.7 G/DL (ref 6–8.4)
RBC # BLD AUTO: 4.21 M/UL (ref 4.6–6.2)
SODIUM SERPL-SCNC: 139 MMOL/L (ref 136–145)
TRIGL SERPL-MCNC: 61 MG/DL (ref 30–150)
TSH SERPL DL<=0.005 MIU/L-ACNC: 2.09 UIU/ML (ref 0.4–4)
VIT B12 SERPL-MCNC: 467 PG/ML (ref 210–950)
WBC # BLD AUTO: 4.69 K/UL (ref 3.9–12.7)

## 2024-02-01 PROCEDURE — 84443 ASSAY THYROID STIM HORMONE: CPT | Performed by: INTERNAL MEDICINE

## 2024-02-01 PROCEDURE — 36415 COLL VENOUS BLD VENIPUNCTURE: CPT | Performed by: INTERNAL MEDICINE

## 2024-02-01 PROCEDURE — 82550 ASSAY OF CK (CPK): CPT | Performed by: INTERNAL MEDICINE

## 2024-02-01 PROCEDURE — 80053 COMPREHEN METABOLIC PANEL: CPT | Performed by: INTERNAL MEDICINE

## 2024-02-01 PROCEDURE — 84153 ASSAY OF PSA TOTAL: CPT | Performed by: INTERNAL MEDICINE

## 2024-02-01 PROCEDURE — 99214 OFFICE O/P EST MOD 30 MIN: CPT | Mod: 25,S$GLB,, | Performed by: INTERNAL MEDICINE

## 2024-02-01 PROCEDURE — 80061 LIPID PANEL: CPT | Performed by: INTERNAL MEDICINE

## 2024-02-01 PROCEDURE — 85025 COMPLETE CBC W/AUTO DIFF WBC: CPT | Performed by: INTERNAL MEDICINE

## 2024-02-01 PROCEDURE — G0447 BEHAVIOR COUNSEL OBESITY 15M: HCPCS | Mod: 59,S$GLB,, | Performed by: INTERNAL MEDICINE

## 2024-02-01 PROCEDURE — 83036 HEMOGLOBIN GLYCOSYLATED A1C: CPT | Performed by: INTERNAL MEDICINE

## 2024-02-01 PROCEDURE — 82607 VITAMIN B-12: CPT | Performed by: INTERNAL MEDICINE

## 2024-02-01 PROCEDURE — G0444 DEPRESSION SCREEN ANNUAL: HCPCS | Mod: 59,S$GLB,, | Performed by: INTERNAL MEDICINE

## 2024-02-01 PROCEDURE — G0442 ANNUAL ALCOHOL SCREEN 15 MIN: HCPCS | Mod: 59,S$GLB,, | Performed by: INTERNAL MEDICINE

## 2024-02-01 PROCEDURE — 85379 FIBRIN DEGRADATION QUANT: CPT | Performed by: INTERNAL MEDICINE

## 2024-02-01 NOTE — PROGRESS NOTES
Subjective     Patient ID: Sam Gamino is a 78 y.o. male.    Chief Complaint: Follow-up (Hosp f/u , at times when he goes to speak his voice does not come out)    He had subarachnoid hemorrhage on November 4, 2023.  While he was recovering in the hospital he had bilateral pulmonary emboli on November 14, 2023.  He has been on Eliquis since that time.  He went to the emergency room on January 16, 2024 for abdominal pain which occurred after eating a cookie.  He had an abdominal and pelvic CT which were unremarkable.    He notes that his voice sometimes week and hoarse.      Hypertension  This is a chronic problem. The current episode started more than 1 year ago. The problem is controlled.           Follow-up      Review of Systems   Constitutional: Negative.    HENT:  Positive for voice change.    Respiratory: Negative.     Cardiovascular: Negative.           Objective     Physical Exam  Vitals and nursing note reviewed.   Constitutional:       Appearance: He is well-developed.   HENT:      Head: Normocephalic and atraumatic.   Eyes:      Pupils: Pupils are equal, round, and reactive to light.   Neck:      Vascular: No JVD.   Cardiovascular:      Rate and Rhythm: Normal rate and regular rhythm.      Heart sounds: Murmur heard.      Systolic murmur is present with a grade of 2/6.      Comments: Holosystolic murmur greatest at the left lower sternal border.  Pulmonary:      Effort: Pulmonary effort is normal.   Abdominal:      General: Abdomen is protuberant. Bowel sounds are normal.      Palpations: Abdomen is soft.      Tenderness: There is no rebound.   Musculoskeletal:      Right lower leg: Edema present.      Left lower leg: Edema present.   Neurological:      Mental Status: He is alert.            Assessment and Plan     1. Nonrheumatic mitral valve regurgitation  Overview:  2007 - Mild  4/22 - Mod/Severe      2. Primary hypertension  Overview:  2002: Diagnosed.         3. Heart failure, diastolic,  chronic  Overview:  5/16/2022: Echo: Normal left ventricular size and systolic function. EF 55%. Moderate MR. Mild to moderate TR.   6/6/2023: Echo: Normal left ventricular size and systolic function. EF 60%. LVGLS -15%. Moderate diastolic dysfunction. Mild to moderate AR. Mild to moderate MR. Mildly enlarged ascending aorta. Small pericardial effusion.          4. Hypercholesterolemia  Overview:  2002: Statin was begun.      5. Hoarseness of voice  -     Ambulatory referral/consult to ENT; Future; Expected date: 02/08/2024    6. Status post bone marrow transplant    7. Lymphoma in remission  Overview:  S/p chemo/XRT 1992  Relapse 1993  BMT 1993      8. Acute pulmonary embolism, unspecified pulmonary embolism type, unspecified whether acute cor pulmonale present  Overview:  CTA - 11/14/23 - Bilateral      9. Heart failure, diastolic, acute on chronic  Overview:  5/16/2022: Echo: Normal left ventricular size and systolic function. EF 55%. Moderate MR. Mild to moderate TR.   6/6/2023: Echo: Normal left ventricular size and systolic function. EF 60%. LVGLS -15%. Moderate diastolic dysfunction. Mild to moderate AR. Mild to moderate MR. Mildly enlarged ascending aorta. Small pericardial effusion.          10. Screening for depression    11. Screening for alcoholism    12. Screening for obesity    13. Obesity (BMI 30.0-34.9)        PLAN:  Per orders and D/C instructions.  Continue diet and/or meds for HTN, Obesity, and high cholesterol, which are stable.  History of lymphoma status post bone marrow transplant are stable.  Refer to ENT for hoarseness of voice.  Follow-up with cardiology for mitral valve regurgitation and Hx heart failure.   He is currently on Eliquis for bilateral PTE while in hospital.  I have told him to discontinue the Eliquis after 3 months which will be February 15, 2024 if his cardiologist is in agreement.  He has an appointment with his cardiologist that same day.  Check routine labs  today.    Screening: The patient was screened for depression with the PHQ2 questionnaire and possible health consequences were discussed with the patient, who understands (15 minutes spent).       The patient was screened for the misuse of alcohol, by asking the number of drinks per average week, and if pt has had more than 4 drinks (more than 3 for women and elderly) in 1 day within the past year. The health and legal consequences of misuse were discussed (15 minutes spent).       The patient was screened for obesity (BMI>30), Since the current BMI is > 30, the possible consequences of obesity, as well as the benefits of diet, exercise, and weight loss were discussed. Any behavioral risks were identified, and methods to achieve appropriate treatment goals were discussed (15 minutes spent).  Follow up in about 3 months (around 5/1/2024).

## 2024-02-15 ENCOUNTER — OFFICE VISIT (OUTPATIENT)
Dept: CARDIOLOGY | Facility: CLINIC | Age: 79
End: 2024-02-15
Attending: INTERNAL MEDICINE
Payer: MEDICARE

## 2024-02-15 VITALS
HEIGHT: 73 IN | HEART RATE: 91 BPM | BODY MASS INDEX: 33.59 KG/M2 | WEIGHT: 253.44 LBS | SYSTOLIC BLOOD PRESSURE: 121 MMHG | OXYGEN SATURATION: 100 % | DIASTOLIC BLOOD PRESSURE: 60 MMHG

## 2024-02-15 DIAGNOSIS — I70.0 ATHEROSCLEROSIS OF AORTA: ICD-10-CM

## 2024-02-15 DIAGNOSIS — E04.1 THYROID NODULE: ICD-10-CM

## 2024-02-15 DIAGNOSIS — I10 PRIMARY HYPERTENSION: ICD-10-CM

## 2024-02-15 DIAGNOSIS — E78.00 HYPERCHOLESTEROLEMIA: ICD-10-CM

## 2024-02-15 DIAGNOSIS — I50.32 HEART FAILURE, DIASTOLIC, CHRONIC: ICD-10-CM

## 2024-02-15 DIAGNOSIS — I36.1 NONRHEUMATIC TRICUSPID VALVE REGURGITATION: ICD-10-CM

## 2024-02-15 DIAGNOSIS — C85.90 LYMPHOMA IN REMISSION: ICD-10-CM

## 2024-02-15 DIAGNOSIS — I34.0 NONRHEUMATIC MITRAL VALVE REGURGITATION: ICD-10-CM

## 2024-02-15 DIAGNOSIS — Z86.711 HISTORY OF PULMONARY EMBOLISM: ICD-10-CM

## 2024-02-15 DIAGNOSIS — I44.0 ATRIOVENTRICULAR BLOCK, FIRST DEGREE: ICD-10-CM

## 2024-02-15 DIAGNOSIS — I25.10 CORONARY ARTERY DISEASE INVOLVING NATIVE CORONARY ARTERY OF NATIVE HEART WITHOUT ANGINA PECTORIS: ICD-10-CM

## 2024-02-15 DIAGNOSIS — Z86.79 HISTORY OF SUBDURAL HEMORRHAGE: ICD-10-CM

## 2024-02-15 DIAGNOSIS — Z79.01 CHRONIC ANTICOAGULATION: ICD-10-CM

## 2024-02-15 PROCEDURE — 99214 OFFICE O/P EST MOD 30 MIN: CPT | Mod: S$PBB,,, | Performed by: INTERNAL MEDICINE

## 2024-02-15 PROCEDURE — 99214 OFFICE O/P EST MOD 30 MIN: CPT | Mod: PBBFAC | Performed by: INTERNAL MEDICINE

## 2024-02-15 PROCEDURE — 99999 PR PBB SHADOW E&M-EST. PATIENT-LVL IV: CPT | Mod: PBBFAC,,, | Performed by: INTERNAL MEDICINE

## 2024-02-15 NOTE — PROGRESS NOTES
Subjective:     Sam Gamino is a 78 y.o. male with hypertension and hypercholesterolemia. He is severely obese. He had lymphoma in 1992. He underwent an autologous bone marrow transplantation at [facility]. In 5/2022 he was admitted with heart failure. He was noted to have moderate to severe mitral regurgitation and severe pulmonary hypertension. He was diuresed. On 5/16/2022 he had an echocardiogram that revealed normal left ventricular size and systolic function. The ejection fraction was 55%. There was moderate mitral regurgitation. There was mild to moderate tricuspid regurgitation. He denies any exertional chest pain. In 9/2022 he was able to walk about two blocks before he had to stop due to dyspnea. He had mild edema of his legs. He was given empagliflozin and he was breathing better and able to walk farther. The edema resolved. On 11/1/2023 he is noted to have a first degree atrioventricular block with a SC interval of 320 ms. On 11/5/2023 he had sudden onset of a severe headache. He found to have had a subarachnoid hemorrhage. He was treated conservatively. During the stay he had a computed tomography angiography scan of his head and neck. The images revealed bilateral pulmonary emboli and he was anticoagulated with apixiban. No chest pain or shortness of breath. No palpitations or weak spells. No bleeding. No clear issues with any of his prescribed medications. Feeling well overall.      Congestive Heart Failure  Presents for follow-up visit. Pertinent negatives include no abdominal pain, chest pain, chest pressure, claudication, edema, fatigue, muscle weakness, near-syncope, nocturia, orthopnea, palpitations, paroxysmal nocturnal dyspnea, shortness of breath or unexpected weight change. The symptoms have been improving. His past medical history is significant for CAD.   Coronary Artery Disease  Presents for follow-up visit. Pertinent negatives include no chest pain, chest pressure,  chest tightness, dizziness, leg swelling, muscle weakness, palpitations, shortness of breath or weight gain. Risk factors include hyperlipidemia and obesity. His past medical history is significant for CHF.   Hypertension  This is a chronic problem. The current episode started more than 1 year ago. The problem is unchanged. The problem is controlled (usually 110-120/70-80 mmHg at home). Pertinent negatives include no anxiety, blurred vision, chest pain, headaches, malaise/fatigue, neck pain, orthopnea, palpitations, peripheral edema, PND, shortness of breath or sweats. There is no history of chronic renal disease.   Hyperlipidemia  This is a chronic problem. The current episode started more than 1 year ago. The problem is controlled. Recent lipid tests were reviewed and are normal. Exacerbating diseases include obesity. He has no history of chronic renal disease, diabetes, hypothyroidism, liver disease or nephrotic syndrome. Pertinent negatives include no chest pain, focal sensory loss, focal weakness, leg pain, myalgias or shortness of breath.   Cerebrovascular Accident  Pertinent negatives include no abdominal pain, chest pain, chills, coughing, fatigue, fever, headaches, myalgias, nausea, neck pain, numbness, rash, vertigo, vomiting or weakness.       Review of Systems   Constitutional: Negative for chills, fatigue, fever, malaise/fatigue, unexpected weight change and weight gain.   HENT:  Negative for nosebleeds and tinnitus.    Eyes:  Negative for blurred vision, double vision, vision loss in left eye and vision loss in right eye.   Cardiovascular:  Negative for chest pain, claudication, dyspnea on exertion, irregular heartbeat, leg swelling, near-syncope, orthopnea, palpitations, paroxysmal nocturnal dyspnea and syncope.   Respiratory:  Negative for chest tightness, cough, hemoptysis, shortness of breath and wheezing.    Endocrine: Negative for cold intolerance and heat intolerance.   Hematologic/Lymphatic:  Negative for bleeding problem. Does not bruise/bleed easily.   Skin:  Negative for color change and rash.   Musculoskeletal:  Negative for back pain, falls, muscle weakness, myalgias and neck pain.   Gastrointestinal:  Negative for abdominal pain, diarrhea, dysphagia, heartburn, hematemesis, hematochezia, hemorrhoids, jaundice, melena, nausea and vomiting.   Genitourinary:  Negative for dysuria, hematuria and nocturia.   Neurological:  Negative for dizziness, focal weakness, headaches, light-headedness, loss of balance, numbness, tremors, vertigo and weakness.   Psychiatric/Behavioral:  Negative for altered mental status, depression and memory loss. The patient is not nervous/anxious.    Allergic/Immunologic: Negative for hives and persistent infections.       Current Outpatient Medications on File Prior to Visit   Medication Sig Dispense Refill    acetaminophen (TYLENOL) 500 MG tablet Take 2 tablets (1,000 mg total) by mouth every 6 (six) hours as needed. 50 tablet 0    apixaban (ELIQUIS) 5 mg Tab Take 1 tablet (5 mg total) by mouth 2 (two) times daily. 60 tablet 2    aspirin 81 MG Chew Take 81 mg by mouth once daily.      atorvastatin (LIPITOR) 80 MG tablet TAKE ONE-HALF TABLET BY MOUTH ONCE DAILY 90 tablet 3    cetirizine (ZYRTEC) 10 MG tablet Take 10 mg by mouth daily as needed.      cholecalciferol, vitamin D3, (VITAMIN D3) 50 mcg (2,000 unit) Cap Take 1 capsule by mouth once daily.      cyanocobalamin 1,000 mcg/mL injection INJECT 1 ML IN THE MUSCLE EVERY 30 DAYS 10 mL 1    famotidine (PEPCID) 20 MG tablet Take 1 tablet (20 mg total) by mouth 2 (two) times daily as needed for Heartburn. 20 tablet 0    fluticasone propionate (FLONASE) 50 mcg/actuation nasal spray SHAKE LIQUID AND USE 2 SPRAYS(100 MCG) IN EACH NOSTRIL EVERY DAY 16 g 3    furosemide (LASIX) 40 MG tablet Take 1 tablet (40 mg total) by mouth once daily. 120 tablet 3    JARDIANCE 10 mg tablet TAKE 1 TABLET DAILY 90 tablet 3    linaCLOtide (LINZESS)  "290 mcg Cap capsule Take 1 capsule (290 mcg total) by mouth before breakfast. 90 capsule 3    losartan (COZAAR) 100 MG tablet Take 1 tablet (100 mg total) by mouth once daily. HOLD UNTIL FOLLOW-UP WITH PRIMARY CARE PROVIDER 90 tablet 3    omeprazole 20 mg TbEC Take 1 tablet by mouth once daily.      ondansetron (ZOFRAN-ODT) 4 MG TbDL Take 1 tablet (4 mg total) by mouth every 6 (six) hours as needed. 15 tablet 0    potassium chloride (KLOR-CON) 10 MEQ TbSR Take 1 tablet (10 mEq total) by mouth once daily. 30 tablet 0    solifenacin (VESICARE) 10 MG tablet Take 1 tablet (10 mg total) by mouth once daily. 30 tablet 11    syringe with needle 3 mL 23 x 1" Syrg 1 Syringe by Misc.(Non-Drug; Combo Route) route every 30 days. 50 Syringe 11    tamsulosin (FLOMAX) 0.4 mg Cap TAKE 1 CAPSULE(0.4 MG) BY MOUTH EVERY DAY 90 capsule 1     No current facility-administered medications on file prior to visit.       /60   Pulse 91   Ht 6' 1" (1.854 m)   Wt 114.9 kg (253 lb 6.7 oz)   SpO2 100%   BMI 33.43 kg/m²     Objective:     Physical Exam  Constitutional:       General: He is not in acute distress.     Appearance: Normal appearance. He is well-developed. He is not toxic-appearing or diaphoretic.   HENT:      Head: Normocephalic and atraumatic.      Nose: Nose normal.   Eyes:      General:         Right eye: No discharge.         Left eye: No discharge.      Conjunctiva/sclera:      Right eye: Right conjunctiva is not injected.      Left eye: Left conjunctiva is not injected.      Pupils: Pupils are equal.      Right eye: Pupil is round.      Left eye: Pupil is round.   Neck:      Thyroid: No thyromegaly.      Vascular: No carotid bruit or JVD.   Cardiovascular:      Rate and Rhythm: Normal rate and regular rhythm. No extrasystoles are present.     Chest Wall: PMI is not displaced.      Pulses:           Radial pulses are 2+ on the right side and 2+ on the left side.        Femoral pulses are 2+ on the right side and 2+ on " the left side.       Dorsalis pedis pulses are 2+ on the right side and 2+ on the left side.        Posterior tibial pulses are 2+ on the right side and 2+ on the left side.      Heart sounds: S1 normal and S2 normal. Murmur heard.      High-pitched blowing holosystolic murmur is present at the apex. P2 pronounced.      Gallop present. S3 and S4 sounds present.   Pulmonary:      Effort: Pulmonary effort is normal.      Breath sounds: No rales.   Abdominal:      Palpations: Abdomen is soft.      Tenderness: There is no abdominal tenderness.   Musculoskeletal:      Cervical back: Neck supple.      Right lower leg: No swelling. No edema.      Left lower leg: No swelling. No edema.   Lymphadenopathy:      Head:      Right side of head: No submandibular adenopathy.      Left side of head: No submandibular adenopathy.      Cervical: No cervical adenopathy.   Skin:     General: Skin is warm and dry.      Findings: No rash.      Nails: There is no clubbing.   Neurological:      General: No focal deficit present.      Mental Status: He is alert and oriented to person, place, and time. He is not disoriented.      Cranial Nerves: No cranial nerve deficit.   Psychiatric:         Attention and Perception: Attention and perception normal.         Mood and Affect: Mood and affect normal.         Speech: Speech normal.         Behavior: Behavior normal.         Thought Content: Thought content normal.         Cognition and Memory: Cognition and memory normal.         Judgment: Judgment normal.         Assessment:     1. Heart failure, diastolic, chronic    2. Nonrheumatic mitral valve regurgitation    3. Nonrheumatic tricuspid valve regurgitation    4. Coronary artery disease involving native coronary artery of native heart without angina pectoris    5. Atrioventricular block, first degree    6. History of subdural hemorrhage    7. Atherosclerosis of aorta    8. Primary hypertension    9. Hypercholesterolemia    10. Lymphoma in  remission    11. Thyroid nodule        Plan:     1. Heart Failure, Diastolic, Chronic   5/16/2022: Echo: Normal left ventricular size and systolic function. EF 55%. Moderate MR. Mild to moderate TR.   6/6/2023: Echo: Normal left ventricular size and systolic function. EF 60%. LVGLS -15%. Moderate diastolic dysfunction. Mild to moderate AR. Mild to moderate MR. Mildly enlarged ascending aorta. Small pericardial effusion.   8/8/2022: Spironolactone 25 mg Q24 was begun and KCl 20 mEq Q24 was discontinued.   10/10/2022: Empagliflozin 10 mg Q24 was begun.   10/27/2022: BNP 69.   11/1/2023: Metoprolol 50 mg Q24 was discontinued as the OH was 320 ms on an ECG.    On empagliflozin 10 mg Q24, losartan 100 mg Q24, spironolactone 25 mg Q24 and furosemide 40 mg Q24.   May take additional furosemide 40 mg Q24 PRN for edema which he has not needed.   Well compensated.   6/2024: Plan next Echo.      2. Mitral Regurgitation   5/16/2022: Echo: Moderate MR.   6/6/2023: Echo: Mild to moderate MR.     3. Tricuspid Regurgitation   5/16/2022: Echo: Mild to moderate TR.    4. Coronary Artery Disease   3/2007: Underwent coronary angiogram. Appears to have had mild disease.   He is anticoagulated.    5. Atrioventricular Block, First Degree   11/1/2023: ECG: OH interval 320 ms.   11/1/2023: Metoprolol 50 mg Q24 was discontinued.    12/19/2023: Holter: Sinus rhythm 94 () bpm. Long OH interval. Narrow QRS. No prolonged pauses. Frequent APC's - 15/hr. Rare VPC's - 2/hr. 3 V couplets. 2 V triplets. No reported symptoms.  No betablocker.   To be followed.    6. Chronic Anticoagulation   11/14/2023: CTA of Head and Neck: Bilateral pulmonary emboli.   Appears the PE was unprovoked.   On apixiban 5 mg Q12.    7. History of Pulmonary Embolism   11/14/2023: CTA of Head and Neck: Bilateral pulmonary emboli.   Appears the PE was unprovoked.   On apixiban.    8. History of Subarachnoid Hemorrhage   11/2023: SAH.   Conservative care.   11/2023:  Diagnosed with pulmonary embolism and benefit with anticoagulation felt to outweigh risk.     9. Atherosclerosis of Aorta   2023: Mentioned in chart.   No aspirin.    10. Hypertension   2002: Diagnosed.   11/1/2023: Metoprolol 50 mg Q24 was discontinued.     On losartan 100 mg Q24, spironolactone 25 mg Q24 and furosemide 40 mg Q24.   Keeping log at home.   Well controlled.      11. Hypercholesterolemia   2002: Statin was begun.   On atorvastatin 40 mg Q24.   4/12/2022: Chol 132. HDL 41. LDL 71. .   On atorvastatin 40 mg Q24.   Very favorable lipid panel.    12. Mild Obesity   6/27/2022: Weight 117 kg. BMI 35.   6/28/2023: Weight 119 kg. BMI 35.   12/7/2023: Weight 110 kg. BMI 32.   Encouraged to lose more weight.    13. 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    14. Primary Care   Dr. Herber Zamora.    F/u 2 months.    Ema Montemayor M.D.

## 2024-02-21 ENCOUNTER — DOCUMENTATION ONLY (OUTPATIENT)
Dept: NEUROLOGY | Facility: HOSPITAL | Age: 79
End: 2024-02-21
Payer: MEDICARE

## 2024-02-22 RX ORDER — POTASSIUM CHLORIDE 750 MG/1
10 TABLET, EXTENDED RELEASE ORAL DAILY
Qty: 30 TABLET | Refills: 0 | OUTPATIENT
Start: 2024-02-22

## 2024-03-04 DIAGNOSIS — E78.00 HYPERCHOLESTEROLEMIA: ICD-10-CM

## 2024-03-04 RX ORDER — ATORVASTATIN CALCIUM 80 MG/1
TABLET, FILM COATED ORAL
Qty: 90 TABLET | Refills: 3 | Status: ON HOLD | OUTPATIENT
Start: 2024-03-04 | End: 2024-04-09

## 2024-03-07 ENCOUNTER — DOCUMENTATION ONLY (OUTPATIENT)
Dept: INTERNAL MEDICINE | Facility: CLINIC | Age: 79
End: 2024-03-07
Payer: MEDICARE

## 2024-03-07 DIAGNOSIS — I10 ESSENTIAL HYPERTENSION: Primary | ICD-10-CM

## 2024-03-07 DIAGNOSIS — J31.0 RHINITIS, UNSPECIFIED TYPE: ICD-10-CM

## 2024-03-07 DIAGNOSIS — Z78.9 KNOWN MEDICAL PROBLEMS: ICD-10-CM

## 2024-03-07 DIAGNOSIS — I50.33 HEART FAILURE, DIASTOLIC, ACUTE ON CHRONIC: ICD-10-CM

## 2024-03-07 PROCEDURE — 99490 CHRNC CARE MGMT STAFF 1ST 20: CPT | Mod: S$GLB,,, | Performed by: INTERNAL MEDICINE

## 2024-03-07 RX ORDER — FLUTICASONE PROPIONATE 50 MCG
SPRAY, SUSPENSION (ML) NASAL
Qty: 16 G | Refills: 3 | Status: SHIPPED | OUTPATIENT
Start: 2024-03-07 | End: 2024-04-05

## 2024-03-07 RX ORDER — FLUTICASONE PROPIONATE 50 MCG
SPRAY, SUSPENSION (ML) NASAL
Qty: 16 G | Refills: 3 | Status: SHIPPED | OUTPATIENT
Start: 2024-03-07

## 2024-03-07 RX ORDER — IBUPROFEN 800 MG/1
800 TABLET ORAL DAILY PRN
Qty: 30 TABLET | Refills: 0 | Status: SHIPPED | OUTPATIENT
Start: 2024-03-07 | End: 2024-05-22

## 2024-03-25 ENCOUNTER — OFFICE VISIT (OUTPATIENT)
Dept: OTOLARYNGOLOGY | Facility: CLINIC | Age: 79
End: 2024-03-25
Payer: MEDICARE

## 2024-03-25 VITALS
DIASTOLIC BLOOD PRESSURE: 83 MMHG | SYSTOLIC BLOOD PRESSURE: 131 MMHG | WEIGHT: 251.56 LBS | BODY MASS INDEX: 33.19 KG/M2 | HEART RATE: 10 BPM

## 2024-03-25 DIAGNOSIS — R13.10 DYSPHAGIA, UNSPECIFIED TYPE: Primary | ICD-10-CM

## 2024-03-25 DIAGNOSIS — J38.7 PRESBYLARYNX: ICD-10-CM

## 2024-03-25 PROCEDURE — 99204 OFFICE O/P NEW MOD 45 MIN: CPT | Mod: S$PBB,,, | Performed by: OTOLARYNGOLOGY

## 2024-03-25 PROCEDURE — 99214 OFFICE O/P EST MOD 30 MIN: CPT | Mod: PBBFAC | Performed by: OTOLARYNGOLOGY

## 2024-03-25 PROCEDURE — 99999 PR PBB SHADOW E&M-EST. PATIENT-LVL IV: CPT | Mod: PBBFAC,,, | Performed by: OTOLARYNGOLOGY

## 2024-03-25 NOTE — PROGRESS NOTES
Chief Complaint   Patient presents with    Hoarse       HPI   78 y.o. male presents for evaluation of dysphonia and dysphagia.  He reports these problems have been worsening over time.  He has a remote history of radiation to the neck and chest for non-Hodgkin's lymphoma.  He reports that his voice Grannis week with use.  He denies pain.  He states that he feels as if things get stuck in his neck with swallowing.  He denies obstructive symptoms in the thoracic esophagus.    Review of Systems   Constitutional: Negative for fatigue and unexpected weight change.   HENT: Per HPI.  Eyes: Negative for visual disturbance.   Respiratory: Negative for shortness of breath, hemoptysis   Cardiovascular: Negative for chest pain and palpitations.   Musculoskeletal: Negative for decreased ROM, back pain.   Skin: Negative for rash, sunburn, itching.   Neurological: Negative for dizziness and seizures.   Hematological: Negative for adenopathy. Does not bruise/bleed easily.   Endocrine: Negative for rapid weight loss/weight gain, heat/cold intolerance.     Past Medical History   Patient Active Problem List   Diagnosis    Lymphoma in remission    Pernicious anemia    Coronary artery disease    Thyroid nodule    Normal cardiac stress test    Known medical problems    Slow transit constipation    Chronic bilateral low back pain with bilateral sciatica    Benign prostatic hyperplasia with urinary frequency    Mitral regurgitation    Tricuspid regurgitation    Lower extremity edema    Primary hypertension    Heart failure, diastolic, chronic    Hypercholesterolemia    Urinary frequency    Atrioventricular block, first degree    Aortic atherosclerosis    Atherosclerosis of aorta    History of subdural hemorrhage    History of pulmonary embolism    Electrolyte abnormality    Impaired functional mobility, balance, gait, and endurance    Status post bone marrow transplant    Obesity (BMI 30.0-34.9)    Chronic anticoagulation    Dysphagia     Presbylarynx           Past Surgical History   Past Surgical History:   Procedure Laterality Date    EYE SURGERY Bilateral 2016    Cataracts         Family History   Family History   Problem Relation Age of Onset    Cancer Mother         uterine    No Known Problems Father     Hypertension Brother     No Known Problems Daughter     No Known Problems Sister     No Known Problems Sister     No Known Problems Brother     No Known Problems Brother     No Known Problems Brother     Thyroid disease Sister            Social History   .  Social History     Socioeconomic History    Marital status:      Spouse name: Eileen    Number of children: 1+3   Occupational History    Occupation:    Tobacco Use    Smoking status: Never    Smokeless tobacco: Never   Substance and Sexual Activity    Alcohol use: Yes     Alcohol/week: 1.0 standard drink of alcohol     Types: 1 Standard drinks or equivalent per week     Comment: ocassionally    Drug use: No    Sexual activity: Not Currently     Partners: Female   Social History Narrative    No exercise.    Wants to retire in 2019    Going to retire - 12/21 1/24 - he walks with a walker     Social Determinants of Health     Financial Resource Strain: Low Risk  (11/6/2023)    Overall Financial Resource Strain (CARDIA)     Difficulty of Paying Living Expenses: Not hard at all   Food Insecurity: No Food Insecurity (11/6/2023)    Hunger Vital Sign     Worried About Running Out of Food in the Last Year: Never true     Ran Out of Food in the Last Year: Never true   Transportation Needs: No Transportation Needs (11/6/2023)    PRAPARE - Transportation     Lack of Transportation (Medical): No     Lack of Transportation (Non-Medical): No   Physical Activity: Unknown (11/6/2023)    Exercise Vital Sign     Minutes of Exercise per Session: 30 min   Stress: No Stress Concern Present (11/6/2023)    [employer] - Occupational Stress Questionnaire     Feeling  of Stress : Not at all   Social Connections: Unknown (11/6/2023)    Social Connection and Isolation Panel [NHANES]     Frequency of Social Gatherings with Friends and Family: Once a week     Attends Yarsanism Services: Never     Active Member of Clubs or Organizations: No     Attends Club or Organization Meetings: Never     Marital Status:    Housing Stability: Low Risk  (11/6/2023)    Housing Stability Vital Sign     Unable to Pay for Housing in the Last Year: No     Number of Places Lived in the Last Year: 1     Unstable Housing in the Last Year: No         Allergies   Review of patient's allergies indicates:   Allergen Reactions    Lotensin [benazepril]      cough           Physical Exam     Vitals:    03/25/24 1007   BP: 131/83   Pulse: (!) 10         Body mass index is 33.19 kg/m².      General: AOx3, NAD   Respiratory:  Symmetric chest rise, normal effort  Nose: No gross nasal septal deviation. Inferior Turbinates WNL bilaterally. No septal perforation. No masses/lesions.   Oral Cavity:  Oral Tongue mobile, no lesions noted. Hard Palate WNL. No buccal or FOM lesions.  Oropharynx:  No masses/lesions of the posterior pharyngeal wall. Tonsillar fossa without lesions. Soft palate without masses. Midline uvula.   Neck: No scars.  No cervical lymphadenopathy, thyromegaly or thyroid nodules.  Normal range of motion.    Face: House Brackmann I bilaterally.     Flex Naso Fernanda Hypo Procedures #2    Procedure:  Diagnostic flexible nasopharyngoscopy, laryngoscopy and hypopharyngoscopy:    Routine preparation with local atomizer with 1% neosynephrine/pontocaine with customary flexible endoscope.    Nasopharynx:  No lesions.   Mucosa:  No lesions.   Adenoids:  Present.  Posterior Choanae:  Patent.  Eustachian Tubes:  Patent.  Posterior pharynx:  No lesions.  Larynx/hypopharynx:   Epiglottis:  No lesions, without edema.   AE Folds:  No lesions.   Vocal cords:  No polyps, nodules, ulcers or lesions.   Mobility:  Equal  and normal bilateral.   Hypopharynx:  No lesions.   Piriform sinus:  No pooling, no lesions.   Post Cricoid:  No erythema, no edema.        Assessment/Plan  Problem List Items Addressed This Visit          ENT    Presbylarynx     Suspect dysphagia and voice changes are likely due to age-related changes exacerbated by remote effects of radiation.  Modified barium swallow ordered to assess swallowing complaints.  Will [facility] in [facility] for dysph[facility].         Relevant Orders    Fl Modified Barium Swallow Speech    SLP video swallow       GI    Dysphagia - Primary    Relevant Orders    Fl Modified Barium Swallow Speech    SLP video swallow

## 2024-03-25 NOTE — PATIENT INSTRUCTIONS
The insurance will not approve/cover the Glyxambi    Are there alternatives that you want me to have the patient check with her insurance to see if covered? hayley

## 2024-03-25 NOTE — ASSESSMENT & PLAN NOTE
Suspect dysphagia and voice changes are likely due to age-related changes exacerbated by remote effects of radiation.  Modified barium swallow ordered to assess swallowing complaints.  Will arrange for evaluation in [facility] for dysphonia.

## 2024-03-28 ENCOUNTER — TELEPHONE (OUTPATIENT)
Dept: SPEECH THERAPY | Facility: HOSPITAL | Age: 79
End: 2024-03-28
Payer: MEDICARE

## 2024-03-28 NOTE — TELEPHONE ENCOUNTER
Sw pt spouse to schedule mbss 4/5@2pm. Informed to fast 2hrs before appt [facility] radi[facility].

## 2024-04-05 ENCOUNTER — HOSPITAL ENCOUNTER (INPATIENT)
Facility: HOSPITAL | Age: 79
LOS: 3 days | Discharge: NURSING, REHAB OR LONG-TERM CARE | DRG: 280 | End: 2024-04-09
Attending: EMERGENCY MEDICINE | Admitting: INTERNAL MEDICINE
Payer: MEDICARE

## 2024-04-05 ENCOUNTER — TELEPHONE (OUTPATIENT)
Dept: SPEECH THERAPY | Facility: HOSPITAL | Age: 79
End: 2024-04-05
Payer: MEDICARE

## 2024-04-05 DIAGNOSIS — B34.9 VIRAL SYNDROME: ICD-10-CM

## 2024-04-05 DIAGNOSIS — I50.31 ACUTE HEART FAILURE WITH PRESERVED EJECTION FRACTION (HFPEF): ICD-10-CM

## 2024-04-05 DIAGNOSIS — R50.9 FEVER OF UNKNOWN ORIGIN: Primary | ICD-10-CM
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
## 2024-04-05 DIAGNOSIS — I50.9 HEART FAILURE: ICD-10-CM

## 2024-04-05 DIAGNOSIS — R53.1 GENERALIZED WEAKNESS: ICD-10-CM

## 2024-04-05 DIAGNOSIS — R53.1 WEAKNESS: ICD-10-CM

## 2024-04-05 DIAGNOSIS — E78.00 HYPERCHOLESTEROLEMIA: ICD-10-CM

## 2024-04-05 LAB
ALBUMIN SERPL BCP-MCNC: 3.6 G/DL (ref 3.5–5.2)
ALLENS TEST: NORMAL
ALP SERPL-CCNC: 61 U/L (ref 55–135)
ALT SERPL W/O P-5'-P-CCNC: 16 U/L (ref 10–44)
ANION GAP SERPL CALC-SCNC: 9 MMOL/L (ref 8–16)
AST SERPL-CCNC: 24 U/L (ref 10–40)
BACTERIA #/AREA URNS AUTO: ABNORMAL /HPF
BASOPHILS # BLD AUTO: 0.02 K/UL (ref 0–0.2)
BASOPHILS NFR BLD: 0.4 % (ref 0–1.9)
BILIRUB SERPL-MCNC: 1.4 MG/DL (ref 0.1–1)
BILIRUB UR QL STRIP: NEGATIVE
BNP SERPL-MCNC: 145 PG/ML (ref 0–99)
BUN SERPL-MCNC: 18 MG/DL (ref 8–23)
CALCIUM SERPL-MCNC: 9 MG/DL (ref 8.7–10.5)
CHLORIDE SERPL-SCNC: 109 MMOL/L (ref 95–110)
CLARITY UR REFRACT.AUTO: CLEAR
CO2 SERPL-SCNC: 21 MMOL/L (ref 23–29)
COLOR UR AUTO: YELLOW
CREAT SERPL-MCNC: 0.8 MG/DL (ref 0.5–1.4)
DIFFERENTIAL METHOD BLD: ABNORMAL
EOSINOPHIL # BLD AUTO: 0.1 K/UL (ref 0–0.5)
EOSINOPHIL NFR BLD: 0.9 % (ref 0–8)
ERYTHROCYTE [DISTWIDTH] IN BLOOD BY AUTOMATED COUNT: 12.4 % (ref 11.5–14.5)
EST. GFR  (NO RACE VARIABLE): >60 ML/MIN/1.73 M^2
GLUCOSE SERPL-MCNC: 85 MG/DL (ref 70–110)
GLUCOSE UR QL STRIP: ABNORMAL
HCT VFR BLD AUTO: 40.4 % (ref 40–54)
HCV AB SERPL QL IA: NORMAL
HGB BLD-MCNC: 12.9 G/DL (ref 14–18)
HGB UR QL STRIP: ABNORMAL
HIV 1+2 AB+HIV1 P24 AG SERPL QL IA: NORMAL
IMM GRANULOCYTES # BLD AUTO: 0.02 K/UL (ref 0–0.04)
IMM GRANULOCYTES NFR BLD AUTO: 0.4 % (ref 0–0.5)
INFLUENZA A, MOLECULAR: NEGATIVE
INFLUENZA B, MOLECULAR: NEGATIVE
KETONES UR QL STRIP: ABNORMAL
LDH SERPL L TO P-CCNC: 0.9 MMOL/L (ref 0.5–2.2)
LEUKOCYTE ESTERASE UR QL STRIP: NEGATIVE
LYMPHOCYTES # BLD AUTO: 0.8 K/UL (ref 1–4.8)
LYMPHOCYTES NFR BLD: 14.3 % (ref 18–48)
MAGNESIUM SERPL-MCNC: 1.8 MG/DL (ref 1.6–2.6)
MCH RBC QN AUTO: 33.2 PG (ref 27–31)
MCHC RBC AUTO-ENTMCNC: 31.9 G/DL (ref 32–36)
MCV RBC AUTO: 104 FL (ref 82–98)
MICROSCOPIC COMMENT: ABNORMAL
MONOCYTES # BLD AUTO: 0.3 K/UL (ref 0.3–1)
MONOCYTES NFR BLD: 6.4 % (ref 4–15)
NEUTROPHILS # BLD AUTO: 4.1 K/UL (ref 1.8–7.7)
NEUTROPHILS NFR BLD: 77.6 % (ref 38–73)
NITRITE UR QL STRIP: NEGATIVE
NRBC BLD-RTO: 0 /100 WBC
OHS QRS DURATION: 104 MS
OHS QTC CALCULATION: 476 MS
PH UR STRIP: 6 [PH] (ref 5–8)
PHOSPHATE SERPL-MCNC: 2.4 MG/DL (ref 2.7–4.5)
PLATELET # BLD AUTO: 143 K/UL (ref 150–450)
PMV BLD AUTO: 10.9 FL (ref 9.2–12.9)
POTASSIUM SERPL-SCNC: 4.2 MMOL/L (ref 3.5–5.1)
PROT SERPL-MCNC: 7.3 G/DL (ref 6–8.4)
PROT UR QL STRIP: ABNORMAL
RBC # BLD AUTO: 3.88 M/UL (ref 4.6–6.2)
RBC #/AREA URNS AUTO: 7 /HPF (ref 0–4)
SAMPLE: NORMAL
SARS-COV-2 RDRP RESP QL NAA+PROBE: NEGATIVE
SITE: NORMAL
SODIUM SERPL-SCNC: 139 MMOL/L (ref 136–145)
SP GR UR STRIP: >1.03 (ref 1–1.03)
SPECIMEN SOURCE: NORMAL
SQUAMOUS #/AREA URNS AUTO: 1 /HPF
TROPONIN I SERPL DL<=0.01 NG/ML-MCNC: 0.01 NG/ML (ref 0–0.03)
TROPONIN I SERPL DL<=0.01 NG/ML-MCNC: 0.03 NG/ML (ref 0–0.03)
URN SPEC COLLECT METH UR: ABNORMAL
WBC # BLD AUTO: 5.32 K/UL (ref 3.9–12.7)
WBC #/AREA URNS AUTO: 1 /HPF (ref 0–5)
YEAST UR QL AUTO: ABNORMAL

## 2024-04-05 PROCEDURE — 96365 THER/PROPH/DIAG IV INF INIT: CPT

## 2024-04-05 PROCEDURE — G0378 HOSPITAL OBSERVATION PER HR: HCPCS

## 2024-04-05 PROCEDURE — 84484 ASSAY OF TROPONIN QUANT: CPT | Performed by: EMERGENCY MEDICINE

## 2024-04-05 PROCEDURE — 25000242 PHARM REV CODE 250 ALT 637 W/ HCPCS: Performed by: INTERNAL MEDICINE

## 2024-04-05 PROCEDURE — 83605 ASSAY OF LACTIC ACID: CPT

## 2024-04-05 PROCEDURE — U0002 COVID-19 LAB TEST NON-CDC: HCPCS | Performed by: EMERGENCY MEDICINE

## 2024-04-05 PROCEDURE — 25000003 PHARM REV CODE 250: Performed by: INTERNAL MEDICINE

## 2024-04-05 PROCEDURE — 86803 HEPATITIS C AB TEST: CPT | Performed by: EMERGENCY MEDICINE

## 2024-04-05 PROCEDURE — 25000003 PHARM REV CODE 250: Performed by: EMERGENCY MEDICINE

## 2024-04-05 PROCEDURE — 84100 ASSAY OF PHOSPHORUS: CPT | Performed by: EMERGENCY MEDICINE

## 2024-04-05 PROCEDURE — 63600175 PHARM REV CODE 636 W HCPCS: Performed by: INTERNAL MEDICINE

## 2024-04-05 PROCEDURE — 99900035 HC TECH TIME PER 15 MIN (STAT)

## 2024-04-05 PROCEDURE — 63600175 PHARM REV CODE 636 W HCPCS: Performed by: EMERGENCY MEDICINE

## 2024-04-05 PROCEDURE — 87389 HIV-1 AG W/HIV-1&-2 AB AG IA: CPT | Performed by: EMERGENCY MEDICINE

## 2024-04-05 PROCEDURE — 80053 COMPREHEN METABOLIC PANEL: CPT | Performed by: EMERGENCY MEDICINE

## 2024-04-05 PROCEDURE — 84484 ASSAY OF TROPONIN QUANT: CPT | Mod: 91 | Performed by: INTERNAL MEDICINE

## 2024-04-05 PROCEDURE — 36415 COLL VENOUS BLD VENIPUNCTURE: CPT | Performed by: INTERNAL MEDICINE

## 2024-04-05 PROCEDURE — 87502 INFLUENZA DNA AMP PROBE: CPT | Performed by: EMERGENCY MEDICINE

## 2024-04-05 PROCEDURE — 94640 AIRWAY INHALATION TREATMENT: CPT

## 2024-04-05 PROCEDURE — 87040 BLOOD CULTURE FOR BACTERIA: CPT | Mod: 59 | Performed by: EMERGENCY MEDICINE

## 2024-04-05 PROCEDURE — 83735 ASSAY OF MAGNESIUM: CPT | Performed by: EMERGENCY MEDICINE

## 2024-04-05 PROCEDURE — 85025 COMPLETE CBC W/AUTO DIFF WBC: CPT | Performed by: EMERGENCY MEDICINE

## 2024-04-05 PROCEDURE — 99285 EMERGENCY DEPT VISIT HI MDM: CPT | Mod: 25

## 2024-04-05 PROCEDURE — 93010 ELECTROCARDIOGRAM REPORT: CPT | Mod: ,,, | Performed by: INTERNAL MEDICINE

## 2024-04-05 PROCEDURE — 83880 ASSAY OF NATRIURETIC PEPTIDE: CPT | Performed by: EMERGENCY MEDICINE

## 2024-04-05 PROCEDURE — 87502 INFLUENZA DNA AMP PROBE: CPT

## 2024-04-05 PROCEDURE — 81001 URINALYSIS AUTO W/SCOPE: CPT | Performed by: EMERGENCY MEDICINE

## 2024-04-05 PROCEDURE — 93005 ELECTROCARDIOGRAM TRACING: CPT

## 2024-04-05 RX ORDER — TAMSULOSIN HYDROCHLORIDE 0.4 MG/1
0.4 CAPSULE ORAL DAILY
Status: DISCONTINUED | OUTPATIENT
Start: 2024-04-06 | End: 2024-04-09 | Stop reason: HOSPADM

## 2024-04-05 RX ORDER — ALBUTEROL SULFATE 2.5 MG/.5ML
2.5 SOLUTION RESPIRATORY (INHALATION) EVERY 4 HOURS
Status: DISCONTINUED | OUTPATIENT
Start: 2024-04-05 | End: 2024-04-06

## 2024-04-05 RX ORDER — FUROSEMIDE 10 MG/ML
40 INJECTION INTRAMUSCULAR; INTRAVENOUS ONCE
Status: COMPLETED | OUTPATIENT
Start: 2024-04-05 | End: 2024-04-05

## 2024-04-05 RX ORDER — BENZONATATE 100 MG/1
100 CAPSULE ORAL 3 TIMES DAILY PRN
Status: DISCONTINUED | OUTPATIENT
Start: 2024-04-05 | End: 2024-04-09 | Stop reason: HOSPADM

## 2024-04-05 RX ORDER — NAPROXEN SODIUM 220 MG/1
81 TABLET, FILM COATED ORAL DAILY
Status: DISCONTINUED | OUTPATIENT
Start: 2024-04-06 | End: 2024-04-09 | Stop reason: HOSPADM

## 2024-04-05 RX ORDER — SODIUM CHLORIDE 0.9 % (FLUSH) 0.9 %
10 SYRINGE (ML) INJECTION
Status: DISCONTINUED | OUTPATIENT
Start: 2024-04-05 | End: 2024-04-09 | Stop reason: HOSPADM

## 2024-04-05 RX ORDER — SODIUM CHLORIDE 9 MG/ML
INJECTION, SOLUTION INTRAVENOUS CONTINUOUS
Status: DISCONTINUED | OUTPATIENT
Start: 2024-04-05 | End: 2024-04-05

## 2024-04-05 RX ORDER — ATORVASTATIN CALCIUM 40 MG/1
40 TABLET, FILM COATED ORAL DAILY
Status: DISCONTINUED | OUTPATIENT
Start: 2024-04-06 | End: 2024-04-09 | Stop reason: HOSPADM

## 2024-04-05 RX ORDER — ONDANSETRON 4 MG/1
4 TABLET, ORALLY DISINTEGRATING ORAL EVERY 6 HOURS PRN
Status: DISCONTINUED | OUTPATIENT
Start: 2024-04-05 | End: 2024-04-09 | Stop reason: HOSPADM

## 2024-04-05 RX ORDER — LOSARTAN POTASSIUM 50 MG/1
100 TABLET ORAL DAILY
Status: DISCONTINUED | OUTPATIENT
Start: 2024-04-06 | End: 2024-04-09 | Stop reason: HOSPADM

## 2024-04-05 RX ORDER — ACETAMINOPHEN 325 MG/1
650 TABLET ORAL
Status: COMPLETED | OUTPATIENT
Start: 2024-04-05 | End: 2024-04-05

## 2024-04-05 RX ORDER — PANTOPRAZOLE SODIUM 40 MG/1
40 TABLET, DELAYED RELEASE ORAL DAILY
Status: DISCONTINUED | OUTPATIENT
Start: 2024-04-06 | End: 2024-04-09 | Stop reason: HOSPADM

## 2024-04-05 RX ORDER — ACETAMINOPHEN 500 MG
1000 TABLET ORAL EVERY 6 HOURS PRN
Status: DISCONTINUED | OUTPATIENT
Start: 2024-04-05 | End: 2024-04-09 | Stop reason: HOSPADM

## 2024-04-05 RX ADMIN — ALBUTEROL SULFATE 2.5 MG: 2.5 SOLUTION RESPIRATORY (INHALATION) at 05:04

## 2024-04-05 RX ADMIN — APIXABAN 5 MG: 5 TABLET, FILM COATED ORAL at 10:04

## 2024-04-05 RX ADMIN — ACETAMINOPHEN 650 MG: 325 TABLET ORAL at 10:04

## 2024-04-05 RX ADMIN — FUROSEMIDE 40 MG: 10 INJECTION, SOLUTION INTRAVENOUS at 05:04

## 2024-04-05 RX ADMIN — CEFTRIAXONE 2 G: 2 INJECTION, POWDER, FOR SOLUTION INTRAMUSCULAR; INTRAVENOUS at 10:04

## 2024-04-05 RX ADMIN — SODIUM CHLORIDE, POTASSIUM CHLORIDE, SODIUM LACTATE AND CALCIUM CHLORIDE 1000 ML: 600; 310; 30; 20 INJECTION, SOLUTION INTRAVENOUS at 10:04

## 2024-04-05 RX ADMIN — GUAIFENESIN AND DEXTROMETHORPHAN HYDROBROMIDE 1 TABLET: 600; 30 TABLET, EXTENDED RELEASE ORAL at 10:04

## 2024-04-05 NOTE — ED PROVIDER NOTES
Emergency Department Encounter  Provider Note    Sam Gamino  [id number]  4/5/2024    Evaluation:    History Acquisition:     Chief Complaint   Patient presents with    Weakness     Right sided weakness. Pt had a fall this morning.     Fall       History of Present Illness:  Sam Gamino is a 78 y.o. male who has a past medical history of Abnormal echocardiogram (2/8/2013), CAD (coronary artery disease) (2/8/2013), Hypercholesterolemia (6/27/2022), Lymphoma in remission (2/8/2013), Normal cardiac stress test (2/8/2013), Pernicious anemia (2/8/2013), and Thyroid nodule (2/8/2013).    The patient presents to the ED due to fall and weakness.   On arrival, patient is a poor historian, and family is at bedside to provide additional history.      Additional historians utilized:  Family member at bedside - states patient seems confused and more weak today. She was concerned because he could not walk today and had to hold onto the bed to stand up. He has also been having cough and URI symptoms for the last few days. No fever, N/V.    Prior medical records were reviewed:   ENT visit 3/25 for dysphagia  Cards visit 2/15 for CHF  IM visit 2/1 for f/u after hospitalization    The patient's list of active medical history, family/social history, medications, and allergies as documented has been reviewed.     Past Medical History:   Diagnosis Date    Abnormal echocardiogram 2/8/2013    Mild MVR 3/07    CAD (coronary artery disease) 2/8/2013    Angio 3/07 Dr. Lin    Hypercholesterolemia 6/27/2022    Lymphoma in remission 2/8/2013    S/p chemo/XRT 1992 Relapse 1993 BMT 1993    Normal cardiac stress test 2/8/2013    Nuclear stress 2/09    Pernicious anemia 2/8/2013    Thyroid nodule 2/8/2013    Right s/p Bx 6/08 Dr. Pelayo     Past Surgical History:   Procedure Laterality Date    EYE SURGERY Bilateral 2016    Cataracts     Family History   Problem Relation Age of Onset    Cancer Mother         uterine    No Known Problems  Father     Hypertension Brother     No Known Problems Daughter     No Known Problems Sister     No Known Problems Sister     No Known Problems Brother     No Known Problems Brother     No Known Problems Brother     Thyroid disease Sister      Social History     Socioeconomic History    Marital status:      Spouse name: Eileen    Number of children: 1+3   Occupational History    Occupation:    Tobacco Use    Smoking status: Never    Smokeless tobacco: Never   Substance and Sexual Activity    Alcohol use: Yes     Alcohol/week: 1.0 standard drink of alcohol     Types: 1 Standard drinks or equivalent per week     Comment: ocassionally    Drug use: No    Sexual activity: Not Currently     Partners: Female   Social History Narrative    No exercise.    Wants to retire in 2019    Going to retire - 12/21 1/24 - he walks with a walker     Social Determinants of Health     Financial Resource Strain: Low Risk  (11/6/2023)    Overall Financial Resource Strain (CARDIA)     Difficulty of Paying Living Expenses: Not hard at all   Food Insecurity: No Food Insecurity (11/6/2023)    Hunger Vital Sign     Worried About Running Out of Food in the Last Year: Never true     Ran Out of Food in the Last Year: Never true   Transportation Needs: No Transportation Needs (11/6/2023)    PRAPARE - Transportation     Lack of Transportation (Medical): No     Lack of Transportation (Non-Medical): No   Physical Activity: Unknown (11/6/2023)    Exercise Vital Sign     Minutes of Exercise per Session: 30 min   Stress: No Stress Concern Present (11/6/2023)    [employer] [place] [employer] - Occupational Stress Questionnaire     Feeling of Stress : Not at all   Social Connections: Unknown (11/6/2023)    Social Connection and Isolation Panel [NHANES]     Frequency of Social Gatherings with Friends and Family: Once a week     Attends Yazidism Services: Never     Active Member of Clubs or Organizations: No     Attends Club or  Organization Meetings: Never     Marital Status:    Housing Stability: Low Risk  (11/6/2023)    Housing Stability Vital Sign     Unable to Pay for Housing in the Last Year: No     Number of Places Lived in the Last Year: 1     Unstable Housing in the Last Year: No       Medications:  Current Discharge Medication List        CONTINUE these medications which have NOT CHANGED    Details   acetaminophen (TYLENOL) 500 MG tablet Take 2 tablets (1,000 mg total) by mouth every 6 (six) hours as needed.  Qty: 50 tablet, Refills: 0      apixaban (ELIQUIS) 5 mg Tab Take 1 tablet (5 mg total) by mouth 2 (two) times daily.  Qty: 60 tablet, Refills: 2      aspirin 81 MG Chew Take 81 mg by mouth once daily.      atorvastatin (LIPITOR) 80 MG tablet TAKE ONE-HALF TABLET BY MOUTH ONCE DAILY  Qty: 90 tablet, Refills: 3    Associated Diagnoses: Hypercholesterolemia      cetirizine (ZYRTEC) 10 MG tablet Take 10 mg by mouth daily as needed.      cholecalciferol, vitamin D3, (VITAMIN D3) 50 mcg (2,000 unit) Cap Take 1 capsule by mouth once daily.      cyanocobalamin 1,000 mcg/mL injection INJECT 1 ML IN THE MUSCLE EVERY 30 DAYS  Qty: 10 mL, Refills: 1    Associated Diagnoses: B12 deficiency      famotidine (PEPCID) 20 MG tablet Take 1 tablet (20 mg total) by mouth 2 (two) times daily as needed for Heartburn.  Qty: 20 tablet, Refills: 0      fluticasone propionate (FLONASE) 50 mcg/actuation nasal spray SHAKE LIQUID AND USE 2 SPRAYS(100 MCG) IN EACH NOSTRIL EVERY DAY  Qty: 16 g, Refills: 3    Associated Diagnoses: Rhinitis, unspecified type      furosemide (LASIX) 40 MG tablet Take 1 tablet (40 mg total) by mouth once daily.  Qty: 120 tablet, Refills: 3    Associated Diagnoses: Heart failure, diastolic, chronic      ibuprofen (ADVIL,MOTRIN) 800 MG tablet Take 1 tablet (800 mg total) by mouth daily as needed for Pain.  Qty: 30 tablet, Refills: 0      JARDIANCE 10 mg tablet TAKE 1 TABLET DAILY  Qty: 90 tablet, Refills: 3    Associated  "Diagnoses: Heart failure, systolic and diastolic, chronic      linaCLOtide (LINZESS) 290 mcg Cap capsule Take 1 capsule (290 mcg total) by mouth before breakfast.  Qty: 90 capsule, Refills: 3    Associated Diagnoses: Slow transit constipation      losartan (COZAAR) 100 MG tablet Take 1 tablet (100 mg total) by mouth once daily. HOLD UNTIL FOLLOW-UP WITH PRIMARY CARE PROVIDER  Qty: 90 tablet, Refills: 3    Comments: .  Associated Diagnoses: Heart failure, diastolic, chronic      omeprazole 20 mg TbEC Take 1 tablet by mouth once daily.      ondansetron (ZOFRAN-ODT) 4 MG TbDL Take 1 tablet (4 mg total) by mouth every 6 (six) hours as needed.  Qty: 15 tablet, Refills: 0      solifenacin (VESICARE) 10 MG tablet Take 1 tablet (10 mg total) by mouth once daily.  Qty: 30 tablet, Refills: 11    Associated Diagnoses: Urinary frequency      syringe with needle 3 mL 23 x 1" Syrg 1 Syringe by Misc.(Non-Drug; Combo Route) route every 30 days.  Qty: 50 Syringe, Refills: 11    Associated Diagnoses: B12 deficiency      tamsulosin (FLOMAX) 0.4 mg Cap TAKE 1 CAPSULE(0.4 MG) BY MOUTH EVERY DAY  Qty: 90 capsule, Refills: 1             Allergies:  Review of patient's allergies indicates:   Allergen Reactions    Lotensin [benazepril]      cough       Review of Systems   Constitutional:  Positive for fatigue.   Neurological:  Positive for weakness.   Psychiatric/Behavioral:  Positive for confusion.          Physical Exam:     Initial Vitals [04/05/24 0949]   BP Pulse Resp Temp SpO2   (!) 150/90 106 (!) 23 (!) 101.3 °F (38.5 °C) 96 %      MAP       --         Physical Exam    Nursing note and vitals reviewed.  Constitutional: He appears well-developed and well-nourished. He is not diaphoretic. No distress.   HENT:   Head: Normocephalic and atraumatic.   Mouth/Throat: Oropharynx is clear and moist.   Eyes: EOM are normal. Pupils are equal, round, and reactive to light.   Neck: No tracheal deviation present.   Cardiovascular:  Normal rate, " regular rhythm, normal heart sounds and intact distal pulses.           Pulmonary/Chest: Breath sounds normal. No stridor. No respiratory distress.   Abdominal: Abdomen is soft. He exhibits no distension and no mass. There is no abdominal tenderness.   Musculoskeletal:         General: No edema. Normal range of motion.     Neurological: He is alert and oriented to person, place, and time. No cranial nerve deficit or sensory deficit.   Skin: Skin is warm and dry. Capillary refill takes less than 2 seconds. No rash noted.   Psychiatric: He has a normal mood and affect. His behavior is normal. Thought content normal.         Differential Diagnoses:   Based on available information and initial assessment, Differential Diagnosis includes, but is not limited to:  Sepsis, bacteremia, UTI, pneumonia, cellulitis, abscess, indwelling line/catheter infection, cholecystitis, viral URI, gastroenteritis, viral syndrome, sinusitis, otitis media/externa, neoplasm, drug reaction, serotonin syndrome, intoxication/withdrawal syndrome.      ED Management:   Procedures    Orders Placed This Encounter    Blood culture x two cultures. Draw prior to antibiotics.    Influenza A & B by Molecular    X-Ray Chest AP Portable    CT Head Without Contrast    CT Cervical Spine Without Contrast    CBC auto differential    Comprehensive metabolic panel    Urinalysis, Reflex to Urine Culture Urine, Clean Catch    Phosphorus    Magnesium    Troponin I    COVID-19 Rapid Screening    HIV 1/2 Ag/Ab (4th Gen)    Hepatitis C Antibody    Urinalysis Microscopic    Brain natriuretic peptide    Troponin I    Diet Low Sodium, 2gm Fluid - 1500mL    ED Preference List Used to Initiate Sepsis Orders    Vital signs    Strict intake and output    Straight Cath    Nursing communication    Cardiac Monitoring - Adult    Fluid restriction    Height and weight On admission. Standing Weight Method required.    Daily weights On presentation to floor. Standing Weight Method  required.    Strict intake and output 1.5 Liters maximum per 24 hours.    Strict intake and output 1.5 Liters maximum per 24 hours.    Notify Physician    Notify Physician - Potential Need of Opioid Reversal    General Heart Failure Pathway Patient    Telesitter    Full code    Inpatient consult to Social Work/Case Management    Inpatient consult to Registered Dietitian/Nutritionist    OT evaluate and treat    PT evaluate and treat    POCT Venous Blood Gas (Lactate) #1    Inhalation Treatment Q4H    Pulse Oximetry Q4H    Oxygen Continuous    EKG 12-lead    Echo    Saline lock IV    Possible Hospitalization    Place in Observation    Fall precautions    ISTAT Lactate    cefTRIAXone (ROCEPHIN) 2 g in dextrose 5 % in water (D5W) 100 mL IVPB (MB+)    lactated ringers bolus 1,000 mL    acetaminophen tablet 650 mg    acetaminophen tablet 1,000 mg    apixaban tablet 5 mg    aspirin chewable tablet 81 mg    atorvastatin tablet 40 mg    pantoprazole EC tablet 40 mg    ondansetron disintegrating tablet 4 mg    tamsulosin 24 hr capsule 0.4 mg    losartan tablet 100 mg    furosemide injection 40 mg    albuterol sulfate nebulizer solution 2.5 mg    dextromethorphan-guaiFENesin  mg per 12 hr tablet 1 tablet    benzonatate capsule 100 mg    sodium chloride 0.9% flush 10 mL    Turn patient every 2 hours          EKG:   EKG interpretation by ED attending physician:  Sinus tach vs junctional rhythm, rate 110, no ST changes or acute ischemia, normal intervals.  Compared with prior EKG dated 11/2023, grossly stable without significant change.      Labs:     Labs Reviewed   CBC W/ AUTO DIFFERENTIAL - Abnormal; Notable for the following components:       Result Value    RBC 3.88 (*)     Hemoglobin 12.9 (*)      (*)     MCH 33.2 (*)     MCHC 31.9 (*)     Platelets 143 (*)     Lymph # 0.8 (*)     Gran % 77.6 (*)     Lymph % 14.3 (*)     All other components within normal limits   COMPREHENSIVE METABOLIC PANEL - Abnormal;  Notable for the following components:    CO2 21 (*)     Total Bilirubin 1.4 (*)     All other components within normal limits   URINALYSIS, REFLEX TO URINE CULTURE - Abnormal; Notable for the following components:    Specific Gravity, UA >1.030 (*)     Protein, UA Trace (*)     Glucose, UA 4+ (*)     Ketones, UA 2+ (*)     Occult Blood UA Trace (*)     All other components within normal limits    Narrative:     Specimen Source->Urine   PHOSPHORUS - Abnormal; Notable for the following components:    Phosphorus 2.4 (*)     All other components within normal limits   URINALYSIS MICROSCOPIC - Abnormal; Notable for the following components:    RBC, UA 7 (*)     All other components within normal limits    Narrative:     Specimen Source->Urine   B-TYPE NATRIURETIC PEPTIDE - Abnormal; Notable for the following components:     (*)     All other components within normal limits   INFLUENZA A & B BY MOLECULAR   MAGNESIUM   TROPONIN I   SARS-COV-2 RNA AMPLIFICATION, QUAL   HIV 1 / 2 ANTIBODY    Narrative:     Release to patient->Immediate   HEPATITIS C ANTIBODY    Narrative:     Release to patient->Immediate   ISTAT LACTATE     Independent review of the labs ordered include:   See ED course    Imaging:     Imaging Results              CT Cervical Spine Without Contrast (Final result)  Result time 04/05/24 11:59:15      Final result by Asael Rosen MD (04/05/24 11:59:15)                   Impression:      1. No acute intracranial findings.  2. No acute cervical spine fracture.  3. Degenerative findings in the cervical spine, as discussed.      Electronically signed by: Asael Rosen  Date:    04/05/2024  Time:    11:59               Narrative:    EXAMINATION:  CT HEAD WITHOUT CONTRAST; CT CERVICAL SPINE WITHOUT CONTRAST    CLINICAL HISTORY:  Mental status change, unknown cause;; Neck trauma (Age >= 65y);    TECHNIQUE:  Low dose axial images were obtained through the head and cervical spine..  Coronal and sagittal  reformations were also performed. Contrast was not administered.    COMPARISON:  CT head and CTA head neck 12/14/2023.    FINDINGS:  Head:    Blood: No acute intracranial hemorrhage.    Parenchyma: No definite loss of gray-white differentiation to suggest acute or subacute transcortical infarct. Parenchymal volume loss.  Nonspecific areas of white matter hypoattenuation.  Small remote infarct superior left cerebellum.    Ventricles/Extra-axial spaces: Overall similar size and configuration of the ventricles relative to 12/14/2023.  As before relined enlargement of the ventricles out of proportion to sulci, finding which may be seen the setting of normal pressure hydrocephalus, the patient did experience ataxia.  Basal cisterns patent.    Vessels: Mild atherosclerotic calcifications.    Orbits: Status post bilateral lens replacements.    Scalp: Unremarkable.    Skull: There are no depressed skull fractures or destructive bone lesions.    Sinuses and mastoids: Scattered relatively modest paranasal sinus mucosal thickening.  Small retention cysts.    Other findings: None    Cervical spine:    Fractures: No acute fractures    Alignment: Minimal grade 1 anterolisthesis of C3 on C4 and of C4 on C5.  Atlanto-axial and atlanto-occipital joints: Atlanto-axial and atlanto-occipital intervals are not widened.  Facet joints: There is no traumatic facet joint widening.  Vertebral bodies: Degenerative endplate change.  Discs: Degenerative disc disease.  Spinal canal and foraminal narrowing: Although CT does not optimally evaluate the soft tissue contents of the spinal canal and foramina, no critical stenosis is suggested.    At C2-3, moderate central spinal canal stenosis and moderate to severe right and moderate left foraminal narrowing    At C3-4, moderate severe central spinal canal stenosis and moderate to severe right and mild left foraminal narrowing    At C4-5, moderate to severe central spinal canal stenosis and moderate  right and severe left foraminal narrowing    At C5-6, moderate central spinal canal stenosis and mild-to-moderate left foraminal narrowing    At C6-7, moderate severe central spinal canal stenosis and moderate to severe bilateral foraminal narrowing  Paraspinal soft tissues: Unremarkable.    Upper Lungs:No acute abnormality.                                       CT Head Without Contrast (Final result)  Result time 04/05/24 11:59:15      Final result by Asael Rosen MD (04/05/24 11:59:15)                   Impression:      1. No acute intracranial findings.  2. No acute cervical spine fracture.  3. Degenerative findings in the cervical spine, as discussed.      Electronically signed by: Asael Rosen  Date:    04/05/2024  Time:    11:59               Narrative:    EXAMINATION:  CT HEAD WITHOUT CONTRAST; CT CERVICAL SPINE WITHOUT CONTRAST    CLINICAL HISTORY:  Mental status change, unknown cause;; Neck trauma (Age >= 65y);    TECHNIQUE:  Low dose axial images were obtained through the head and cervical spine..  Coronal and sagittal reformations were also performed. Contrast was not administered.    COMPARISON:  CT head and CTA head neck 12/14/2023.    FINDINGS:  Head:    Blood: No acute intracranial hemorrhage.    Parenchyma: No definite loss of gray-white differentiation to suggest acute or subacute transcortical infarct. Parenchymal volume loss.  Nonspecific areas of white matter hypoattenuation.  Small remote infarct superior left cerebellum.    Ventricles/Extra-axial spaces: Overall similar size and configuration of the ventricles relative to 12/14/2023.  As before relined enlargement of the ventricles out of proportion to sulci, finding which may be seen the setting of normal pressure hydrocephalus, the patient did experience ataxia.  Basal cisterns patent.    Vessels: Mild atherosclerotic calcifications.    Orbits: Status post bilateral lens replacements.    Scalp: Unremarkable.    Skull: There are no  depressed skull fractures or destructive bone lesions.    Sinuses and mastoids: Scattered relatively modest paranasal sinus mucosal thickening.  Small retention cysts.    Other findings: None    Cervical spine:    Fractures: No acute fractures    Alignment: Minimal grade 1 anterolisthesis of C3 on C4 and of C4 on C5.  Atlanto-axial and atlanto-occipital joints: Atlanto-axial and atlanto-occipital intervals are not widened.  Facet joints: There is no traumatic facet joint widening.  Vertebral bodies: Degenerative endplate change.  Discs: Degenerative disc disease.  Spinal canal and foraminal narrowing: Although CT does not optimally evaluate the soft tissue contents of the spinal canal and foramina, no critical stenosis is suggested.    At C2-3, moderate central spinal canal stenosis and moderate to severe right and moderate left foraminal narrowing    At C3-4, moderate severe central spinal canal stenosis and moderate to severe right and mild left foraminal narrowing    At C4-5, moderate to severe central spinal canal stenosis and moderate right and severe left foraminal narrowing    At C5-6, moderate central spinal canal stenosis and mild-to-moderate left foraminal narrowing    At C6-7, moderate severe central spinal canal stenosis and moderate to severe bilateral foraminal narrowing  Paraspinal soft tissues: Unremarkable.    Upper Lungs:No acute abnormality.                                       X-Ray Chest AP Portable (Final result)  Result time 04/05/24 11:01:25      Final result by Leisa Crocker MD (04/05/24 11:01:25)                   Impression:      No significant change from the prior study.      Electronically signed by: Leisa Crocker MD  Date:    04/05/2024  Time:    11:01               Narrative:    EXAMINATION:  XR CHEST AP PORTABLE    CLINICAL HISTORY:  Sepsis;    TECHNIQUE:  Single frontal view of the chest was performed.    COMPARISON:  05/16/2022    FINDINGS:  The cardiac silhouette  is slightly prominent.  The pulmonary vascularity is normal.    Nonspecific mild increased interstitial lung markings noted diffusely, unchanged.  No focal area of airspace disease.  No pleural effusion.  No pneumothorax.  The osseous structures appear normal.                                         Medications Given:     Medications   acetaminophen tablet 1,000 mg (has no administration in time range)   apixaban tablet 5 mg (5 mg Oral Given 4/6/24 0827)   aspirin chewable tablet 81 mg (81 mg Oral Given 4/6/24 0827)   atorvastatin tablet 40 mg (40 mg Oral Given 4/6/24 0827)   pantoprazole EC tablet 40 mg (40 mg Oral Given 4/6/24 0827)   ondansetron disintegrating tablet 4 mg (has no administration in time range)   tamsulosin 24 hr capsule 0.4 mg (0.4 mg Oral Given 4/6/24 0826)   losartan tablet 100 mg (100 mg Oral Given 4/6/24 0827)   albuterol sulfate nebulizer solution 2.5 mg (2.5 mg Nebulization Given 4/6/24 0749)   dextromethorphan-guaiFENesin  mg per 12 hr tablet 1 tablet (1 tablet Oral Given 4/6/24 0827)   benzonatate capsule 100 mg (has no administration in time range)   sodium chloride 0.9% flush 10 mL (has no administration in time range)   cefTRIAXone (ROCEPHIN) 2 g in dextrose 5 % in water (D5W) 100 mL IVPB (MB+) (0 g Intravenous Stopped 4/5/24 1208)   lactated ringers bolus 1,000 mL (0 mLs Intravenous Stopped 4/5/24 1208)   acetaminophen tablet 650 mg (650 mg Oral Given 4/5/24 1031)   furosemide injection 40 mg (40 mg Intravenous Given 4/5/24 1728)        Medical Decision Making:    Additional Consideration:   Additional testing considered during clinical course: none    Social determinants of health considered during development of treatment plan include: poor access to care    Current co-morbidities considered which impacted clinical decision making: lymphoma in remission, CAD, nonrheumatic mitral regurgitation, CHF, aortic arthrosclerosis, SAH    Case discussed with additional provider: [facility]   service, Dr. Arreguin, for admission and further management of confusion, falls, fever of unknown origin    ED Course as of 04/06/24 0948 Fri Apr 05, 2024   0955 SpO2: 96 % [SS]   0955 Resp(!): 23 [SS]   0955 Pulse: 106 [SS]   0955 Temp Source: Oral [SS]   0955 Temp(!): 101.3 °F (38.5 °C) [SS]   0955 BP(!): 150/90  Patient is a 78 y.o. male presenting to ED with weakness, fall this AM.  Initial vital signs concerning for SIRS/sepsis due to fever, tachycardia.  Exam without focal/unilateral weakness.  Sepsis order set utilized. Will obtain infectious evaluation including labs, UA, lactate, blood cultures, CXR.  IVF and broad-spectrum ABX initiated.  Will continue to monitor closely and reassess. [SS]   1214 CBC auto differential(!)  Unremarkable  [SS]   1215 Phosphorus(!)  Mildly low [SS]   1215 Comprehensive metabolic panel(!)  Unremarkable  [SS]   1215 HIV 1/2 Ag/Ab (4th Gen)  Negative  [SS]   1215 Hepatitis C Antibody  Negative  [SS]   1215 Magnesium  WNL [SS]   1215 Troponin I  WNL [SS]   1215 Blood culture x two cultures. Draw prior to antibiotics.  WNL [SS]   1215 X-Ray Chest AP Portable  CXR independently interpreted: no focal infiltrate or effusion, mild interstitial edema, no free air or other acute process.  Agree with radiologist interpretation.    [SS]   1216 CT Cervical Spine Without Contrast  CT c-spine independently interpreted: no fracture or significant subluxation, degenerative changes seen.   Agree with radiologist interpretation.    [SS]   1216 CT Head Without Contrast  CT head independently interpreted: no intracranial hemorrhage, mass effect, or midline shift.  Agree with radiologist interpretation.    [SS]   1217 Influenza A & B by Molecular  Negative  [SS]      ED Course User Index  [SS] Milton Perez MD            Medical Decision Making  79 yo M with lymphoma in remission, CAD, nonrheumatic mitral regurgitation, CHF, aortic arthrosclerosis, SAH presents to ED with weakness, fatigue, and  fall this morning. Febrile on arrival.  Infectious workup unremarkable, labs appear at baseline, COVID/flu negative. CXR clear, UA unremarkable.  CT head/c-spine without acute findings.  Suspect viral syndrome, but patient too weak to stand and family uncomfortable with discharge. Requesting observation for further management of acute encephalopathy, weakness, fever of unknown origin.     Problems Addressed:  Fall from standing, initial encounter: acute illness or injury  Fever of unknown origin: complicated acute illness or injury with systemic symptoms that poses a threat to life or bodily functions  Generalized weakness: acute illness or injury  Viral syndrome: acute illness or injury  Weakness: acute illness or injury    Amount and/or Complexity of Data Reviewed  Independent Historian: guardian  External Data Reviewed: notes.  Labs: ordered. Decision-making details documented in ED Course.  Radiology: ordered and independent interpretation performed. Decision-making details documented in ED Course.  ECG/medicine tests: ordered and independent interpretation performed. Decision-making details documented in ED Course.    Risk  OTC drugs.  Decision regarding hospitalization.  Diagnosis or treatment significantly limited by social determinants of health.    Critical Care  Total time providing critical care: 60 minutes        Clinical Impression:       ICD-10-CM ICD-9-CM   1. Fever of unknown origin  R50.9 780.60   2. Weakness  R53.1 780.79   3. Heart failure  I50.9 428.9   4. Viral syndrome  B34.9 079.99   5. Generalized weakness  R53.1 780.79   6. Fall from standing, initial encounter  W19.XXXA E888.9         Follow-up Information    None          ED Disposition Condition    Observation               On re-evaluation, the patient's status has remained stable.  At this time, I believe the patient should be admitted to the hospital for further evaluation and management.  The consulting physician/team agrees with plan  and will admit under their service.   The patient and family were updated with test results, overall impression, and further plan of care. All questions were answered.       Milton Perez MD  04/06/24 0948       Milton Perez MD  05/01/24 0937

## 2024-04-05 NOTE — PROGRESS NOTES
The patient's electronic chart was reviewed during this month. The patient's medical, functional, and psychosocial needs were assessed. Need for Home health care, PT, OT, , psychiatric care, and hospice was assessed. All preventative care measures were reviewed and updated. All medications were reviewed and reconciled. Potential drug interactions and medication adherence was reviewed. Prescriptions were renewed as appropriate. Education was provided to the patient and/or caregiver as needed, and all questions were answered. Over 20 minutes were spent providing these non-face-to-face services during this calendar month.     Refilled    fluticasone propionate (FLONASE) 50 mcg/actuation nasal spray 16 g 3 3/7/2024 -- No   Sig: SHAKE LIQUID AND USE 2 SPRAYS(100 MCG) IN EACH NOSTRIL EVERY DAY   Sent to pharmacy as: fluticasone propionate (FLONASE) 50 mcg/actuation nasal spray   Class: Normal   Order: [id number]   Date/Time Signed: 3/7/2024 10:52       E-Prescribing Status: Receipt confirmed by pharmacy (3/7/2024 10:52 AM CST)      Disp Refills Start End SHAHAB   ibuprofen (ADVIL,MOTRIN) 800 MG tablet 30 tablet 0 3/7/2024 -- No   Sig - Route: Take 1 tablet (800 mg total) by mouth daily as needed for Pain. - Oral   Sent to pharmacy as: ibuprofen (ADVIL,MOTRIN) 800 MG tablet   Class: Normal   Order: [id number]   Date/Time Signed: 3/7/2024 10:53       E-Prescribing Status: Receipt confirmed by pharmacy (3/7/2024 10:53 AM CST)

## 2024-04-05 NOTE — ED NOTES
"[facility] with right sided weakness. EMS reports wife present on scene, states patient is generally ambulatory with assistance and does not have right sided weakness=== stated patient has history of left sided weakness from previous stroke. EMS reported right sided weakness along with right sided facial droop and confusion on scene. Weakness has now resolved- alert and oriented to name and place. Denies any pain, denies chest pain, denies vision change, denies shortness of breath at this time. Complains of generalized weakness, feels "tired".  "

## 2024-04-05 NOTE — PLAN OF CARE
Nurses Note -- 4 Eyes      4/5/2024   4:58 PM      Skin assessed during: Admit      [x] No Altered Skin Integrity Present    []Prevention Measures Documented      [] Yes- Altered Skin Integrity Present or Discovered   [] LDA Added if Not in Epic (Describe Wound)   [] New Altered Skin Integrity was Present on Admit and Documented in LDA   [] Wound Image Taken    Wound Care Consulted? No    Attending Nurse:  Stacey Subramanian RN/Staff Member:   MAT Patton

## 2024-04-06 PROBLEM — G93.41 ACUTE METABOLIC ENCEPHALOPATHY: Status: ACTIVE | Noted: 2024-04-06

## 2024-04-06 PROBLEM — J06.9 VIRAL URI WITH COUGH: Status: ACTIVE | Noted: 2024-04-06

## 2024-04-06 PROBLEM — R53.1 GENERALIZED WEAKNESS: Status: ACTIVE | Noted: 2024-04-06

## 2024-04-06 PROBLEM — E78.5 HYPERLIPIDEMIA: Status: ACTIVE | Noted: 2022-06-27

## 2024-04-06 PROBLEM — I21.4 NSTEMI (NON-ST ELEVATED MYOCARDIAL INFARCTION): Status: ACTIVE | Noted: 2024-04-06

## 2024-04-06 LAB
ALBUMIN SERPL BCP-MCNC: 3.5 G/DL (ref 3.5–5.2)
ANION GAP SERPL CALC-SCNC: 8 MMOL/L (ref 8–16)
ASCENDING AORTA: 3.23 CM
AV INDEX (PROSTH): 0.71
AV MEAN GRADIENT: 4 MMHG
AV PEAK GRADIENT: 5 MMHG
AV VALVE AREA BY VELOCITY RATIO: 2.08 CM²
AV VALVE AREA: 2.4 CM²
AV VELOCITY RATIO: 0.61
BSA FOR ECHO PROCEDURE: 2.42 M2
BUN SERPL-MCNC: 18 MG/DL (ref 8–23)
CALCIUM SERPL-MCNC: 9.5 MG/DL (ref 8.7–10.5)
CHLORIDE SERPL-SCNC: 103 MMOL/L (ref 95–110)
CO2 SERPL-SCNC: 27 MMOL/L (ref 23–29)
CREAT SERPL-MCNC: 1 MG/DL (ref 0.5–1.4)
CV ECHO LV RWT: 0.54 CM
DOP CALC AO PEAK VEL: 1.16 M/S
DOP CALC AO VTI: 15.75 CM
DOP CALC LVOT AREA: 3.4 CM2
DOP CALC LVOT DIAMETER: 2.08 CM
DOP CALC LVOT PEAK VEL: 0.71 M/S
DOP CALC LVOT STROKE VOLUME: 37.77 CM3
DOP CALCLVOT PEAK VEL VTI: 11.12 CM
E/E' RATIO: 7.68 M/S
ECHO LV POSTERIOR WALL: 1.16 CM (ref 0.6–1.1)
EST. GFR  (NO RACE VARIABLE): >60 ML/MIN/1.73 M^2
FRACTIONAL SHORTENING: 47 % (ref 28–44)
GLUCOSE SERPL-MCNC: 112 MG/DL (ref 70–110)
INTERVENTRICULAR SEPTUM: 0.91 CM (ref 0.6–1.1)
IVRT: 102.76 MSEC
LA MAJOR: 5.33 CM
LA MINOR: 5.27 CM
LA WIDTH: 3.57 CM
LEFT ATRIUM SIZE: 3.73 CM
LEFT ATRIUM VOLUME INDEX MOD: 16.8 ML/M2
LEFT ATRIUM VOLUME INDEX: 25.3 ML/M2
LEFT ATRIUM VOLUME MOD: 39.77 CM3
LEFT ATRIUM VOLUME: 59.99 CM3
LEFT INTERNAL DIMENSION IN SYSTOLE: 2.26 CM (ref 2.1–4)
LEFT VENTRICLE DIASTOLIC VOLUME INDEX: 34.66 ML/M2
LEFT VENTRICLE DIASTOLIC VOLUME: 82.15 ML
LEFT VENTRICLE MASS INDEX: 63 G/M2
LEFT VENTRICLE SYSTOLIC VOLUME INDEX: 7.3 ML/M2
LEFT VENTRICLE SYSTOLIC VOLUME: 17.37 ML
LEFT VENTRICULAR INTERNAL DIMENSION IN DIASTOLE: 4.28 CM (ref 3.5–6)
LEFT VENTRICULAR MASS: 148.4 G
LV LATERAL E/E' RATIO: 8.5 M/S
LV SEPTAL E/E' RATIO: 7 M/S
MAGNESIUM SERPL-MCNC: 1.9 MG/DL (ref 1.6–2.6)
MV PEAK E VEL: 1.19 M/S
PHOSPHATE SERPL-MCNC: 3 MG/DL (ref 2.7–4.5)
PISA TR MAX VEL: 2.81 M/S
POTASSIUM SERPL-SCNC: 3.9 MMOL/L (ref 3.5–5.1)
RA MAJOR: 4.3 CM
RA PRESSURE ESTIMATED: 3 MMHG
RA WIDTH: 3.49 CM
RIGHT VENTRICULAR END-DIASTOLIC DIMENSION: 3.19 CM
RV TB RVSP: 6 MMHG
SINUS: 3.55 CM
SODIUM SERPL-SCNC: 138 MMOL/L (ref 136–145)
STJ: 2.8 CM
TDI LATERAL: 0.14 M/S
TDI SEPTAL: 0.17 M/S
TDI: 0.16 M/S
TR MAX PG: 32 MMHG
TRICUSPID ANNULAR PLANE SYSTOLIC EXCURSION: 1.34 CM
TROPONIN I SERPL DL<=0.01 NG/ML-MCNC: 0.02 NG/ML (ref 0–0.03)
TV REST PULMONARY ARTERY PRESSURE: 35 MMHG
Z-SCORE OF LEFT VENTRICULAR DIMENSION IN END DIASTOLE: -8.73
Z-SCORE OF LEFT VENTRICULAR DIMENSION IN END SYSTOLE: -7.91

## 2024-04-06 PROCEDURE — 83735 ASSAY OF MAGNESIUM: CPT | Performed by: INTERNAL MEDICINE

## 2024-04-06 PROCEDURE — 36415 COLL VENOUS BLD VENIPUNCTURE: CPT | Performed by: INTERNAL MEDICINE

## 2024-04-06 PROCEDURE — 63600175 PHARM REV CODE 636 W HCPCS: Performed by: INTERNAL MEDICINE

## 2024-04-06 PROCEDURE — 25000003 PHARM REV CODE 250: Performed by: INTERNAL MEDICINE

## 2024-04-06 PROCEDURE — 84484 ASSAY OF TROPONIN QUANT: CPT | Performed by: INTERNAL MEDICINE

## 2024-04-06 PROCEDURE — 80069 RENAL FUNCTION PANEL: CPT | Performed by: INTERNAL MEDICINE

## 2024-04-06 PROCEDURE — 25000242 PHARM REV CODE 250 ALT 637 W/ HCPCS: Performed by: INTERNAL MEDICINE

## 2024-04-06 PROCEDURE — 99900035 HC TECH TIME PER 15 MIN (STAT)

## 2024-04-06 PROCEDURE — 97165 OT EVAL LOW COMPLEX 30 MIN: CPT

## 2024-04-06 PROCEDURE — 97112 NEUROMUSCULAR REEDUCATION: CPT

## 2024-04-06 PROCEDURE — 97530 THERAPEUTIC ACTIVITIES: CPT

## 2024-04-06 PROCEDURE — 94761 N-INVAS EAR/PLS OXIMETRY MLT: CPT

## 2024-04-06 PROCEDURE — 97535 SELF CARE MNGMENT TRAINING: CPT

## 2024-04-06 PROCEDURE — 97162 PT EVAL MOD COMPLEX 30 MIN: CPT

## 2024-04-06 PROCEDURE — 21400001 HC TELEMETRY ROOM

## 2024-04-06 PROCEDURE — 94640 AIRWAY INHALATION TREATMENT: CPT

## 2024-04-06 PROCEDURE — 87798 DETECT AGENT NOS DNA AMP: CPT | Performed by: INTERNAL MEDICINE

## 2024-04-06 PROCEDURE — 27000221 HC OXYGEN, UP TO 24 HOURS

## 2024-04-06 RX ORDER — FLUTICASONE PROPIONATE 50 MCG
2 SPRAY, SUSPENSION (ML) NASAL DAILY
Status: DISCONTINUED | OUTPATIENT
Start: 2024-04-06 | End: 2024-04-09 | Stop reason: HOSPADM

## 2024-04-06 RX ORDER — FUROSEMIDE 10 MG/ML
40 INJECTION INTRAMUSCULAR; INTRAVENOUS 2 TIMES DAILY
Status: DISCONTINUED | OUTPATIENT
Start: 2024-04-06 | End: 2024-04-08

## 2024-04-06 RX ORDER — CETIRIZINE HYDROCHLORIDE 10 MG/1
10 TABLET ORAL DAILY
Status: DISCONTINUED | OUTPATIENT
Start: 2024-04-06 | End: 2024-04-09 | Stop reason: HOSPADM

## 2024-04-06 RX ORDER — BENZONATATE 100 MG/1
100 CAPSULE ORAL 3 TIMES DAILY
Status: DISCONTINUED | OUTPATIENT
Start: 2024-04-06 | End: 2024-04-09 | Stop reason: HOSPADM

## 2024-04-06 RX ORDER — ALBUTEROL SULFATE 2.5 MG/.5ML
2.5 SOLUTION RESPIRATORY (INHALATION)
Status: DISCONTINUED | OUTPATIENT
Start: 2024-04-06 | End: 2024-04-09 | Stop reason: HOSPADM

## 2024-04-06 RX ADMIN — APIXABAN 5 MG: 5 TABLET, FILM COATED ORAL at 09:04

## 2024-04-06 RX ADMIN — ALBUTEROL SULFATE 2.5 MG: 2.5 SOLUTION RESPIRATORY (INHALATION) at 07:04

## 2024-04-06 RX ADMIN — BENZONATATE 100 MG: 100 CAPSULE ORAL at 09:04

## 2024-04-06 RX ADMIN — PANTOPRAZOLE SODIUM 40 MG: 40 TABLET, DELAYED RELEASE ORAL at 08:04

## 2024-04-06 RX ADMIN — FUROSEMIDE 40 MG: 10 INJECTION, SOLUTION INTRAVENOUS at 02:04

## 2024-04-06 RX ADMIN — LOSARTAN POTASSIUM 100 MG: 50 TABLET, FILM COATED ORAL at 08:04

## 2024-04-06 RX ADMIN — GUAIFENESIN AND DEXTROMETHORPHAN HYDROBROMIDE 1 TABLET: 600; 30 TABLET, EXTENDED RELEASE ORAL at 08:04

## 2024-04-06 RX ADMIN — ALBUTEROL SULFATE 2.5 MG: 2.5 SOLUTION RESPIRATORY (INHALATION) at 04:04

## 2024-04-06 RX ADMIN — ATORVASTATIN CALCIUM 40 MG: 40 TABLET, FILM COATED ORAL at 08:04

## 2024-04-06 RX ADMIN — FLUTICASONE PROPIONATE 100 MCG: 50 SPRAY, METERED NASAL at 05:04

## 2024-04-06 RX ADMIN — TAMSULOSIN HYDROCHLORIDE 0.4 MG: 0.4 CAPSULE ORAL at 08:04

## 2024-04-06 RX ADMIN — ALBUTEROL SULFATE 2.5 MG: 2.5 SOLUTION RESPIRATORY (INHALATION) at 08:04

## 2024-04-06 RX ADMIN — FUROSEMIDE 40 MG: 10 INJECTION, SOLUTION INTRAVENOUS at 09:04

## 2024-04-06 RX ADMIN — CETIRIZINE HYDROCHLORIDE 10 MG: 10 TABLET, FILM COATED ORAL at 09:04

## 2024-04-06 RX ADMIN — ALBUTEROL SULFATE 2.5 MG: 2.5 SOLUTION RESPIRATORY (INHALATION) at 12:04

## 2024-04-06 RX ADMIN — APIXABAN 5 MG: 5 TABLET, FILM COATED ORAL at 08:04

## 2024-04-06 RX ADMIN — GUAIFENESIN AND DEXTROMETHORPHAN HYDROBROMIDE 1 TABLET: 600; 30 TABLET, EXTENDED RELEASE ORAL at 09:04

## 2024-04-06 RX ADMIN — ASPIRIN 81 MG CHEWABLE TABLET 81 MG: 81 TABLET CHEWABLE at 08:04

## 2024-04-06 RX ADMIN — ALBUTEROL SULFATE 2.5 MG: 2.5 SOLUTION RESPIRATORY (INHALATION) at 03:04

## 2024-04-06 NOTE — ASSESSMENT & PLAN NOTE
Troponin slightly increasing. Follow until plateaued  Type II due to heart failure  Resume atorvastatin and ASA 81 mg daily

## 2024-04-06 NOTE — PT/OT/SLP EVAL
"Occupational Therapy   Evaluation    Name: Sam Gamino  MRN: [id number]  Admitting Diagnosis: <principal problem not specified>  Recent Surgery: * No surgery found *      Recommendations:     Discharge Recommendations: Moderate Intensity Therapy  Discharge Equipment Recommendations:  bedside commode, bath bench  Barriers to discharge:  Decreased caregiver support    Assessment:     Sam Gamino is a 78 y.o. male with a medical diagnosis of <principal problem not specified>.  He presents with the following performance deficits affecting function: weakness, impaired endurance, impaired self care skills, impaired functional mobility, gait instability, impaired balance, decreased lower extremity function, decreased upper extremity function, decreased ROM.  Pt willing to participate, tolerated session fairly poorly overall. Pt c/ decreased trunk control sitting eob, required significant assistance to perform functional mobility to chair. Pt able to complete hygiene task in chair without assistance.     Rehab Prognosis: Good; patient would benefit from acute skilled OT services to address these deficits and reach maximum level of function.       Plan:     Patient to be seen 4 x/week to address the above listed problems via self-care/home management, therapeutic activities, therapeutic exercises, neuromuscular re-education  Plan of Care Expires: 05/06/24  Plan of Care Reviewed with: patient    Subjective     Chief Complaint: generalized weakness  Patient/Family Comments/goals: "I can't believe how weak I've gotten these past few days."    Occupational Profile:  Living Environment: 3rd flr apt c/ elevator; level entry, lives c/ wife, tub/shower, standard toilet  Previous level of function: indep; reported using RW when feeling weak, drives  Roles and Routines: retired   Equipment Used at Home: walker, rolling, shower chair  Assistance upon Discharge: per pt report, wife retired but unable to assist, states " neighbors could help; appears limited at this time    Pain/Comfort:  Pain Rating 1: 0/10  Pain Rating Post-Intervention 1: 0/10    Patients cultural, spiritual, Religion conflicts given the current situation: no    Objective:     Communicated with: RN prior to session.  Patient found supine with oxygen, telemetry, PureWick upon OT entry to room.    General Precautions: Standard, fall  Orthopedic Precautions: N/A  Braces: N/A  Respiratory Status: Nasal cannula, flow 3 L/min    Occupational Performance:    Bed Mobility:    Patient completed Rolling/Turning to Left with  stand by assistance  Patient completed Scooting/Bridging with minimum assistance  Patient completed Supine to Sit with minimum assistance    Functional Mobility/Transfers:  Patient completed Sit <> Stand Transfer with moderate assistance  with  hand-held assist   Chair t/f: step t/f; Max A HHA; max vc's for sequencing; needed assistance to help move LE's  Functional Mobility: not attempted 2/2 to pt weakness, concern for safety    Activities of Daily Living:  Grooming: setup assistance; pt brushed teeth sitting up in chair; able to initiate, sequence and complete appropriately    Upper Body Dressing: moderate assistance donned gown as robe sitting eob  Lower Body Dressing: maximal assistance donned socks sitting eob  Toileting: pt c/ purewick; not needing to have BM during session      Cognitive/Visual Perceptual:  Cognitive/Psychosocial Skills:     -       Oriented to: Person, Place, Time, and Situation   -       Follows Commands/attention:Follows one-step commands and Follows two-step commands  -       Safety awareness/insight to disability: intact     Physical Exam:  Balance:    -       static sitting balance: CGA-Min A sitting eob; fatigued over time; L posterior lean; use of bed rail for support; mod vc's and tc's to correct, pt unable to self-correct  -        static standing balance: Max A  Dominant hand:    -       right  Upper Extremity  Range of Motion:     -       Right Upper Extremity: WFL  -       Left Upper Extremity: WFL  Upper Extremity Strength:    -       Right Upper Extremity: grossly 4/5  -       Left Upper Extremity: grossly 4/5   Strength:    -       Right Upper Extremity: WFL  -       Left Upper Extremity: WFL  Fine Motor Coordination:    -       Intact  Left hand thumb/finger opposition skills and Right hand thumb/finger opposition skills    AMPA 6 Click ADL:  AMPAC Total Score: 16    Treatment & Education:  Pt edu on role of OT, POC, safety when performing self care tasks , benefit of performing OOB activity, and safety when performing functional transfers and mobility.  - White board updated  - Self care tasks completed-- as noted above      Patient left up in chair with all lines intact, call button in reach, and RN notified    GOALS:   Multidisciplinary Problems       Occupational Therapy Goals          Problem: Occupational Therapy    Goal Priority Disciplines Outcome Interventions   Occupational Therapy Goal     OT, PT/OT Ongoing, Progressing    Description: Goals to be met by: 4/20/2024    Patient will increase functional independence with ADLs by performing:    UE Dressing with Set-up Assistance.  LE Dressing with Minimal Assistance.  Grooming while standing at sink with Contact Guard Assistance.  Toileting from bedside commode with Supervision for hygiene and clothing management.   Toilet transfer to bedside commode with Minimal Assistance.                         History:     Past Medical History:   Diagnosis Date    Abnormal echocardiogram 2/8/2013    Mild MVR 3/07    CAD (coronary artery disease) 2/8/2013    Angio 3/07 Dr. Lin    Hypercholesterolemia 6/27/2022    Lymphoma in remission 2/8/2013    S/p chemo/XRT 1992 Relapse 1993 BMT 1993    Normal cardiac stress test 2/8/2013    Nuclear stress 2/09    Pernicious anemia 2/8/2013    Thyroid nodule 2/8/2013    Right s/p Bx 6/08 Dr. Pelayo         Past Surgical  History:   Procedure Laterality Date    EYE SURGERY Bilateral 2016    Cataracts       Time Tracking:     OT Date of Treatment: 04/06/24  OT Start Time: 0931  OT Stop Time: 1009  OT Total Time (min): 38 min    Billable Minutes:Evaluation 10  Self Care/Home Management 15  Neuromuscular Re-education 13    4/6/2024

## 2024-04-06 NOTE — ASSESSMENT & PLAN NOTE
Parainfluenza infection  Not hypoxic and no leukocytosis  COVID and flu negative  Albuterol 5 mg q4h while awake  Given a dose of ceftriaxone in ER empirically  Blood culture no growth  improved

## 2024-04-06 NOTE — ASSESSMENT & PLAN NOTE
BNP  Recent Labs   Lab 04/05/24  1511   *     BNP above baseline elevated t bili  Furosemide 40 mg IV BID --> transition to oral 80 mg daily  Heart failure pathway  ECHO unchanged from before, normal left sided and right sided systolic function, normal diastolic function, PASP 35 mmHg, and IVC 3

## 2024-04-06 NOTE — PROGRESS NOTES
"Hospital Medicine  Progress note    [place] [facility] Fariba Arreguin MD  Admit Date: 4/5/2024  Code status: Full Code    Principal Problem:  Acute on chronic diastolic heart failure    Interval hx:  breathing minimally improved    PEx  Temp:  [97.4 °F (36.3 °C)-100.7 °F (38.2 °C)]   Pulse:  [100-111]   Resp:  [16-21]   BP: (103-150)/(57-93)   SpO2:  [96 %-99 %]     Intake/Output Summary (Last 24 hours) at 4/6/2024 1731  Last data filed at 4/6/2024 1230  Gross per 24 hour   Intake 475 ml   Output 1800 ml   Net -1325 ml       General Appearance: no acute distress, WD WN  Heart: regular rate and rhythm, no heave, JVD less prominent but still 8 cm, 1+edema of BLE softer  Respiratory: Normal respiratory effort, symmetric excursion, bilateral vesicular breath sounds   Abdomen: Soft, non-tender; bowel sounds active  Skin: intact, no rash, no ulcers  Neurologic:  No focal numbness or weakness  Mental status: Alert, oriented x 4, affect appropriate    Recent Labs   Lab 04/05/24  1016   WBC 5.32   HGB 12.9*   HCT 40.4   *     Recent Labs   Lab 04/05/24  1016      K 4.2      CO2 21*   BUN 18   CREATININE 0.8   GLU 85   CALCIUM 9.0   MG 1.8   PHOS 2.4*     Recent Labs   Lab 04/05/24  1016   ALKPHOS 61   ALT 16   AST 24   ALBUMIN 3.6   PROT 7.3   BILITOT 1.4*        No results for input(s): "POCTGLUCOSE" in the last 168 hours.    Scheduled Meds:   albuterol sulfate  2.5 mg Nebulization Q4H WAKE    apixaban  5 mg Oral BID    aspirin  81 mg Oral Daily    atorvastatin  40 mg Oral Daily    benzonatate  100 mg Oral TID    dextromethorphan-guaiFENesin  mg  1 tablet Oral BID    fluticasone propionate  2 spray Each Nostril Daily    furosemide (LASIX) injection  40 mg Intravenous BID    losartan  100 mg Oral Daily    pantoprazole  40 mg Oral Daily    tamsulosin  0.4 mg Oral Daily     Continuous Infusions:  As Needed:  acetaminophen, benzonatate, ondansetron, sodium chloride 0.9%    Assessment and Plan  / " Problems managed today    * Acute on chronic diastolic heart failure  BNP  Recent Labs   Lab 04/05/24  1511   *     BNP above baseline elevated t bili  Furosemide 40 mg IV BID  Heart failure pathway  ECHO pending    History of pulmonary embolism  Continue apixaban    NSTEMI (non-ST elevated myocardial infarction)  Troponin slightly increasing. Follow until plateaued  Type II due to heart failure  Resume atorvastatin and ASA 81 mg daily     Viral URI with cough  Febrile illness  Not hypoxic and no leukocytosis  COVID and flu negative  Albuterol 5 mg q4h while awake  Given a dose of ceftriaxone in ER empirically  Blood culture drawn    Acute metabolic encephalopathy  CT head negative  Delirium precautions    Generalized weakness  CT head negative for acute process  Likely related to acute medical condition    Hyperlipidemia  Atorvastatin 40 mg daily    Primary hypertension  Losartan 100 mg daily    Benign prostatic hyperplasia with urinary frequency  Tamsulosin 0.4 mg daily    Diet:  low sodium  GI PPx: protonix  DVT PPx:  apixaban  Airways: room air  Wounds: none    Goals of Care:  Return to prior functional status    Discharge Planning   ROLANDO: 4/7/2024   Is the patient medically ready for discharge?:     Reason for patient still in hospital (select all that apply): Patient trending condition and Treatment  Discharge Plan A: Home with family        Fariba Arreguin MD

## 2024-04-06 NOTE — CARE UPDATE
"RAPID RESPONSE NURSE CHART REVIEW        Chart Reviewed: 4/5/2024, 2330    MRN: [id number]  Bed: 656/656 A    Dx: ayana Gamino has a past medical history of Abnormal echocardiogram, CAD (coronary artery disease), Hypercholesterolemia, Lymphoma in remission, Normal cardiac stress test, Pernicious anemia, and Thyroid nodule.    Last VS: BP (!) 150/93 (BP Location: Right arm, Patient Position: Lying)   Pulse 108   Temp 100.3 °F (37.9 °C) (Oral)   Resp 18   Ht 6' 1" (1.854 m)   Wt 113.5 kg (250 lb 3.6 oz)   SpO2 99%   BMI 33.01 kg/m²     24H Vital Sign Range:  Temp:  [99.1 °F (37.3 °C)-101.3 °F (38.5 °C)]   Pulse:  []   Resp:  [18-24]   BP: (135-153)/()   SpO2:  [95 %-99 %]     Level of Consciousness (AVPU): alert    Recent Labs     04/05/24  1016   WBC 5.32   HGB 12.9*   HCT 40.4   *       Recent Labs     04/05/24  1016      K 4.2      CO2 21*   BUN 18   CREATININE 0.8   GLU 85   PHOS 2.4*   MG 1.8        No results for input(s): "PH", "PCO2", "PO2", "HCO3", "POCSATURATED", "BE" in the last 72 hours.     OXYGEN:     Oxygen Concentration (%): 3       MEWS score: 2    Rounding completed with charge MAT Galaviz reports HR currently low 100s, BP ok. No additional concerns verbalized at this time. Instructed to call [phone] for further concerns or assistance.    VALERY Ghosh RN       "

## 2024-04-06 NOTE — PT/OT/SLP EVAL
"Physical Therapy Evaluation and Treatment    Patient Name:  Sam Gamino   MRN:  [id number]  Admit Date: 4/5/2024  Admitting Diagnosis:  Acute on chronic diastolic heart failure   Length of Stay: 0 days  Recent Surgery: * No surgery found *      Recommendations:     Discharge Recommendations:  Moderate Intensity Therapy  Discharge Equipment Recommendations: bedside commode, bath bench   Justification for Equipment: N/A  Barriers to discharge: Evolving Clinical Presentation    Assessment:     Sam Gamino is a 78 y.o. male admitted with a medical diagnosis of Acute on chronic diastolic heart failure.  He presents with the following impairments/functional limitations: weakness, impaired functional mobility, gait instability, impaired endurance, impaired balance, impaired self care skills, decreased lower extremity function, decreased safety awareness, edema.     Pt agreeable to therapy, on bedpan upon arrival, transferred to bedside commode to attempt bowel movement. Significant assistance for stands and transfers. Pt spent time on bedside commode but unable to have bowel movement    Rehab Prognosis: Fair; patient would benefit from acute skilled PT services to address these deficits and reach maximum level of function.      Treatment Tolerated: Fairly Well    Highest level of mobility achieved this visit: stand pivot transfer to/from chair/bedside commode with max A    Activity with RN/PCT: transfer with two person assist    Plan:     During this hospitalization, patient to be seen 4 x/week to address the identified rehab impairments via gait training, therapeutic activities, therapeutic exercises, neuromuscular re-education and progress towards the established goals.    Plan of Care Expires:  05/06/24    Subjective     MAT Mackay notified prior to session. Pt's wife and sister in law present upon PT entrance into room.    Chief Complaint: can't have bowel movement  Patient/Family Comments/goals: "I served in the " " for 26 years"  Pain/Comfort:  Pain Rating 1: 0/10    Social History:  Residence: lives with their spouse  3rd level apartment with elevator access.  Support available: family  Equipment Used: walker, rolling, shower chair  Equipment Owned (not using): None  Prior level of function: independent  Work: Retired.   Drive: yes.       Objective:     Additional staff present: none    Patient found up in chair with: oxygen, telemetry, PureWick     General Precautions: Standard, fall   Orthopedic Precautions:N/A   Braces: N/A   Body mass index is 33.01 kg/m².  Oxygen Device: Nasal Cannula 3L    Vitals: /60 (BP Location: Left arm, Patient Position: Lying)   Pulse 103   Temp 97.4 °F (36.3 °C)   Resp 16   Ht 6' 1" (1.854 m)   Wt 113.5 kg (250 lb 3.6 oz)   SpO2 96%   BMI 33.01 kg/m²     Exams:  Cognition:   Alert and Cooperative  Command following: Follows two-step verbal commands  Fluency: clear/fluent  Hearing: Intact  Vision:  Intact  Skin Integrity: Visible skin intact    Physical Exam:   Edema - Moderate RUTH LEs  ROM - RUTH LEs WFL  Strength - RUTH hips 4/5, RUTH knees and ankles 5/5   Sensation - Intact to light touch  Coordination - No deficits noted    Outcome Measures:    AM-PAC 6 CLICK MOBILITY  Turning over in bed (including adjusting bedclothes, sheets and blankets)?: 2  Sitting down on and standing up from a chair with arms (e.g., wheelchair, bedside commode, etc.): 2  Moving from lying on back to sitting on the side of the bed?: 3  Moving to and from a bed to a chair (including a wheelchair)?: 2  Need to walk in hospital room?: 1  Climbing 3-5 steps with a railing?: 1  Basic Mobility Total Score: 11     Functional Mobility:    Bed Mobility:   Pt found/returned to bedside chair    Transfers:   Sit <> Stand Transfer: moderate assistance progressing from maximal assistance with no assistive device  Stand <> Sit Transfer: moderate assistance with no assistive device  x1 trials from EOB and x2 trials " from bedside commode  Chair <> Commode Transfer: Stand Pivot (with small steps) technique with maximal assistance with no assistive device  Commode on patient's R  Commode <> Chair Transfer: Stand Pivot (with small steps) technique with maximal assistance with no assistive device  Chair on patient's L    Standing Balance:  Assistance Level Required: Moderate Assistance  Patient used: no assistive device  Time: 2 min x 2 trials  Postural deviations noted: posterior lean  Encouraged: upright stance      Gait:   Patient ambulated: small steps to bedside chair   Patient required: maximal assist  Patient used:  no assistive device  Gait Pattern observed: swing-to gait  Gait Deviation(s): occasional unsteady gait, decreased step length, decreased weight shift, shuffle gait, and decreased cash  Impairments due to: impaired balance, decreased strength, and decreased endurance  all lines remained intact throughout ambulation trial  Gait belt utilized  Comments: great difficulty moving LLE    Therapeutic Exercises:   Static and dynamic sitting balance at bedside commode  upright posture and sitting tolerance  Static and dynamic standing balance at EOB  standing tolerance, upright stance, and weight shifting    Education:  Time provided for education, counseling and discussion of health disposition in regards to patient's current status  All questions answered within PT scope of practice and to patient's satisfaction  PT role in POC to address current functional deficits  Pt educated on proper body mechanics, safety techniques, and energy conservation with PT facilitation and cueing throughout session  Call nursing/pct to transfer to chair/use bathroom. Pt stated understanding.    Patient left up in chair with all lines intact, call button in reach, and family present.    GOALS:   Multidisciplinary Problems       Physical Therapy Goals          Problem: Physical Therapy    Goal Priority Disciplines Outcome Goal Variances  Interventions   Physical Therapy Goal     PT, PT/OT Ongoing, Progressing     Description: Goals to met by 4/20/2024    1. Supine to sit with Stand-by Assistance  2. Sit to supine with Stand-by Assistance  3. Rolling to Left and Right with Stand-by Assistance.  4. Sit to stand transfer with Contact Guard Assistance  5. Bed to chair transfer with Contact Guard Assistance using LRAD  6. Gait  x 75 feet with Contact Guard Assistance using Rolling Walker   7. Stand for 5 minutes with Stand-by Assistance using Rolling Walker  8. Lower extremity exercise program x15 reps per Instruction, with assistance as needed in order to facilitate improved strength, improved postural control, and improvement in functional independence                       History:     Past Medical History:   Diagnosis Date    Abnormal echocardiogram 02/08/2013    Mild MVR 3/07    Atherosclerosis of aorta 11/01/2023 2023: Mentioned in chart.    CAD (coronary artery disease) 02/08/2013    Angio 3/07 Dr. Lin    Chronic diastolic (congestive) heart failure     History of pulmonary embolism 11/14/2023    CTA - 11/14/23 - Bilateral    History of subdural hemorrhage 11/04/2023 11/5/2023: [facility].    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 Pelayo       Past Surgical History:   Procedure Laterality Date    EYE SURGERY Bilateral 2016    Cataracts       Time Tracking:     PT Received On: 04/06/24  PT Start Time: 1312     PT Stop Time: 1352  PT Total Time (min): 40 min     Billable Minutes: Evaluation 1 procedure, Therapeutic Activity 15 min, and Neuromuscular Re-education 10 min    John Lang, PT, DPT  4/6/2024

## 2024-04-06 NOTE — H&P
Hospital Medicine  History and Physical Exam    Team: [facility] MED S Fariba Arreguin MD  Admit Date: 4/5/2024  [facility]O   Principal Problem:  Acute on chronic diastolic heart failure   Patient information was obtained from patient and ER records.   Primary care Physician: JAYLYN Zamora MD  Code status: Full Code    HPI: Sam Gamino is a 78 y.o. male who has a past medical history of CAD, hyperlipidemia, chronic diastolic heart failure and bilateral pulmonary embolism (11/2013) is here for generalized weakness and confusion since this morning. Patient required assistance to get out of bed and unable to ambulate. He has had URI symptoms cough, rhinorrhea, and post-nasal drip. He has associated SOB and wheezing. Cough is dry. He denies fever, chills, myalgias, arthralgias, rash, nausea, vomiting or diarrhea. Admits to decreased appetite and intake, including fluids for last three days as well. No know sick contacts. Denies worsened leg swelling and orthopnea. SOB is present at rest and activity.     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    Past Surgical History: Patient has a past surgical history that includes Eye surgery (Bilateral, 2016).    Social History: Patient reports that he has never smoked. He has never used smokeless tobacco. He reports current alcohol use of about 1.0 standard drink of alcohol per week. He reports that he does not use drugs.    Family History: family history includes Cancer in his mother; Hypertension in his brother; No Known Problems in his brother, brother, brother, daughter, father, sister, and sister; Thyroid disease in his sister.    Medications:   Prior to Admission medications    Medication Sig   acetaminophen (TYLENOL)  "500 MG tablet Take 2 tablets (1,000 mg total) by mouth every 6 (six) hours as needed.   apixaban (ELIQUIS) 5 mg Tab Take 1 tablet (5 mg total) by mouth 2 (two) times daily.   aspirin 81 MG Chew Take 81 mg by mouth once daily.   atorvastatin (LIPITOR) 80 MG tablet TAKE ONE-HALF TABLET BY MOUTH ONCE DAILY   cetirizine (ZYRTEC) 10 MG tablet Take 10 mg by mouth daily as needed.   cholecalciferol, vitamin D3, (VITAMIN D3) 50 mcg (2,000 unit) Cap Take 1 capsule by mouth once daily.   cyanocobalamin 1,000 mcg/mL injection INJECT 1 ML IN THE MUSCLE EVERY 30 DAYS   famotidine (PEPCID) 20 MG tablet Take 1 tablet (20 mg total) by mouth 2 (two) times daily as needed for Heartburn.   fluticasone propionate (FLONASE) 50 mcg/actuation nasal spray SHAKE LIQUID AND USE 2 SPRAYS(100 MCG) IN EACH NOSTRIL EVERY DAY   furosemide (LASIX) 40 MG tablet Take 1 tablet (40 mg total) by mouth once daily.   ibuprofen (ADVIL,MOTRIN) 800 MG tablet Take 1 tablet (800 mg total) by mouth daily as needed for Pain.   JARDIANCE 10 mg tablet TAKE 1 TABLET DAILY   linaCLOtide (LINZESS) 290 mcg Cap capsule Take 1 capsule (290 mcg total) by mouth before breakfast.   losartan (COZAAR) 100 MG tablet Take 1 tablet (100 mg total) by mouth once daily. HOLD UNTIL FOLLOW-UP WITH PRIMARY CARE PROVIDER   omeprazole 20 mg TbEC Take 1 tablet by mouth once daily.   ondansetron (ZOFRAN-ODT) 4 MG TbDL Take 1 tablet (4 mg total) by mouth every 6 (six) hours as needed.   solifenacin (VESICARE) 10 MG tablet Take 1 tablet (10 mg total) by mouth once daily.   syringe with needle 3 mL 23 x 1" Syrg 1 Syringe by Misc.(Non-Drug; Combo Route) route every 30 days.   tamsulosin (FLOMAX) 0.4 mg Cap TAKE 1 CAPSULE(0.4 MG) BY MOUTH EVERY DAY       Allergies: Patient is allergic to lotensin [benazepril].    ROS  Constitutional: see HPI  Eyes: neg for visual changes  ENT: see HPI  Respiratory: neg for cough or shortness of breath  Cardiovascular: neg for chest pain or " palpitations  Gastrointestinal: neg for nausea or vomiting or abdominal pain. Neg for change in bowel habits  Genitourinary: neg for hematuria or dysuria  Integument/Breast: neg for rash or pruritis  Hematologic/Lymphatic: neg for easy bruising neg for lymphadenopathy   Musculoskeletal: neg for arthralgias or myalgias  Neurological: neg for seizures or tremors  Endocrine: neg for heat or cold intolerance    PEx  Temp:  [97.6 °F (36.4 °C)-100.7 °F (38.2 °C)]   Pulse:  []   Resp:  [16-24]   BP: (122-153)/()   SpO2:  [95 %-99 %]   Body mass index is 33.01 kg/m².     General: well developed, well nourished, neg for acute distress  Eyes: conjunctivae/corneas clear.   Head: normocephalic, atraumatic.   Neck: supple, symmetrical, trachea midline, Very prominent JVD all the way to ears, neg for carotid bruits  Lungs: clear to auscultation bilaterally, normal respiratory effort, faint expiratory wheezing through out, decreased breath sounds  Heart: regular rate and rhythm, neg for murmur  Extremities: neg for edema, neg for joint swelling, pulses: 2+ at ankles   Abdomen: Soft, non-tender; liver and spleen not palpable, bowel sounds active, neg for aortic bruits  Skin: Skin color, texture, turgor normal. Neg for rashes or lesions.   Neurologic: CN II-XII grossly intact, DTR: 2/4 bilaterally. Normal muscle strength and tone. Neg for focal numbness or weakness  Mental status: Alert, oriented x 4, affect appropriate, memory intact    Recent Labs   Lab 04/05/24  1016   WBC 5.32   HGB 12.9*   HCT 40.4   *     Recent Labs   Lab 04/05/24  1016      K 4.2      CO2 21*   BUN 18   CREATININE 0.8   GLU 85   CALCIUM 9.0   MG 1.8   PHOS 2.4*     Recent Labs   Lab 04/05/24  1016   ALKPHOS 61   ALT 16   AST 24   ALBUMIN 3.6   PROT 7.3   BILITOT 1.4*      Assessment and Plan:    * Acute on chronic diastolic heart failure  BNP  Recent Labs   Lab 04/05/24  1511   *     BNP above baseline  Furosemide 40 mg  IV once  Heart failure pathway  ECHO    History of pulmonary embolism  Continue apixaban    NSTEMI (non-ST elevated myocardial infarction)  Troponin slightly increasing. Follow until plateaued      Viral URI with cough  Febrile illness  Not hypoxic and no leukocytosis  COVID and flu negative  Albuterol 5 mg q4h while awake  Given a dose of ceftriaxone in ER empirically  Blood culture drawn    Acute metabolic encephalopathy  CT head negative  Delirium precautions    Generalized weakness  CT head negative for acute process  Likely related to acute medical condition    Hyperlipidemia  Atorvastatin 40 mg daily    Primary hypertension  Losartan 100 mg daily    Benign prostatic hyperplasia with urinary frequency  Tamsulosin 0.4 mg daily    Diet:  low sodium  GI PPx: protonix  DVT PPx:  apixaban  Airways: room air  Wounds: none    Goals of Care: Return to prior functional status  Discharge plan: home    Fariba Arreguin MD

## 2024-04-06 NOTE — HPI
Sam Gamino is a 78 y.o. male who has a past medical history of CAD, hyperlipidemia, chronic diastolic heart failure and bilateral pulmonary embolism (11/2013) is here for generalized weakness and confusion since this morning. Patient required assistance to get out of bed and unable to ambulate. He has had URI symptoms cough, rhinorrhea, and post-nasal drip. He has associated SOB and wheezing. Cough is dry. He denies fever, chills, myalgias, arthralgias, rash, nausea, vomiting or diarrhea. Admits to decreased appetite and intake, including fluids for last three days as well. No know sick contacts. Denies worsened leg swelling and orthopnea. SOB is present at rest and activity.

## 2024-04-06 NOTE — ASSESSMENT & PLAN NOTE
CT head negative for acute process  Likely related to acute medical condition  Improving  Moderate intensity recommended

## 2024-04-06 NOTE — PLAN OF CARE
Problem: Physical Therapy  Goal: Physical Therapy Goal  Description: Goals to met by 4/20/2024    1. Supine to sit with Stand-by Assistance  2. Sit to supine with Stand-by Assistance  3. Rolling to Left and Right with Stand-by Assistance.  4. Sit to stand transfer with Contact Guard Assistance  5. Bed to chair transfer with Contact Guard Assistance using LRAD  6. Gait  x 75 feet with Contact Guard Assistance using Rolling Walker   7. Stand for 5 minutes with Stand-by Assistance using Rolling Walker  8. Lower extremity exercise program x15 reps per Instruction, with assistance as needed in order to facilitate improved strength, improved postural control, and improvement in functional independence    PT Eval: 4/6/2024

## 2024-04-06 NOTE — CONSULTS
Food & Nutrition  Education     Diet Education: Fluid and Salt restriction   Time Spent: 10 minutes   Learners: Patient      Handouts provided: Low Sodium Nutrition Therapy, Fluid-Restricted Nutrition Therapy      Comments: Discussed importance of a low sodium diet. Reviewed high sodium foods that should be avoided. Food labels, salt free seasonings, and recommended sodium intake reviewed. Encouraged healthy, fresh foods that are low in sodium that are good for consumption. Fluid intake and conversions discussed. Foods considered fluids were reviewed and encouraged to monitor. General healthy diet encouraged. All questions/concerns were addressed.     Follow-Up: Yes     Please Re-consult as needed.   Thanks!    Rosalina Caputo, MS, RD, LDN

## 2024-04-06 NOTE — PLAN OF CARE
Problem: Infection  Goal: Absence of Infection Signs and Symptoms  Outcome: Ongoing, Progressing  Intervention: Prevent or Manage Infection  Flowsheets (Taken 4/6/2024 1850)  Infection Management: aseptic technique maintained  Isolation Precautions: protective     Problem: Adult Inpatient Plan of Care  Goal: Plan of Care Review  Outcome: Ongoing, Progressing  Flowsheets (Taken 4/6/2024 1850)  Plan of Care Reviewed With:   patient   spouse   family  Goal: Absence of Hospital-Acquired Illness or Injury  Outcome: Ongoing, Progressing  Intervention: Identify and Manage Fall Risk  Flowsheets (Taken 4/6/2024 1850)  Safety Promotion/Fall Prevention:   assistive device/personal item within reach   Fall Risk reviewed with patient/family   family to remain at bedside   in recliner, wheels locked   medications reviewed   pulse ox   side rails raised x 2   supervised activity   instructed to call staff for mobility  Intervention: Prevent Skin Injury  Flowsheets (Taken 4/6/2024 1850)  Body Position:   30 degrees   log-rolled   neutral head position  Intervention: Prevent and Manage VTE (Venous Thromboembolism) Risk  Flowsheets (Taken 4/6/2024 1850)  Activity Management:   Arm raise - L1   Partial stand - L2   Rolling - L1   Sitting at edge of bed - L2   Standing - L3   Step side-to-side along edge of bed - L3   Up in chair - L3   Up to bedside commode - L3   Walk with assistive devise and /or staff member - L3  VTE Prevention/Management:   bleeding precations maintained   bleeding risk assessed   ROM (active) performed   ROM (passive) performed  Range of Motion: active ROM (range of motion) encouraged  Intervention: Prevent Infection  Flowsheets (Taken 4/6/2024 1850)  Infection Prevention:   environmental surveillance performed   hand hygiene promoted   personal protective equipment utilized   equipment surfaces disinfected   single patient room provided  Goal: Optimal Comfort and Wellbeing  Outcome: Ongoing,  Progressing  Intervention: Provide Person-Centered Care  Flowsheets (Taken 4/6/2024 1850)  Trust Relationship/Rapport:   care explained   choices provided   emotional support provided   empathic listening provided   questions answered   questions encouraged   thoughts/feelings acknowledged     Problem: Skin Injury Risk Increased  Goal: Skin Health and Integrity  Outcome: Ongoing, Progressing  Intervention: Optimize Skin Protection  Flowsheets (Taken 4/6/2024 1850)  Head of Bed (HOB) Positioning:   HOB at 30 degrees   HOB elevated  Intervention: Promote and Optimize Oral Intake  Flowsheets (Taken 4/6/2024 1850)  Oral Nutrition Promotion: medicated

## 2024-04-06 NOTE — PLAN OF CARE
Problem: Occupational Therapy  Goal: Occupational Therapy Goal  Description: Goals to be met by: 4/20/2024    Patient will increase functional independence with ADLs by performing:    UE Dressing with Set-up Assistance.  LE Dressing with Minimal Assistance.  Grooming while standing at sink with Contact Guard Assistance.  Toileting from bedside commode with Supervision for hygiene and clothing management.   Toilet transfer to bedside commode with Minimal Assistance.    Outcome: Ongoing, Progressing

## 2024-04-06 NOTE — PLAN OF CARE
[facility]  Discharge Assessment    Primary Care Provider: JAYLYN Zamora MD     CM at bedside to discuss discharge planning assessment.   Patient lives with his wife in a third floor condominium with an elevator access.   Independent with ADL and uses a walker.   Explained CM services during patient's stay in hospital.       Discharge Assessment (most recent)       BRIEF DISCHARGE ASSESSMENT - 04/06/24 1160          Discharge Planning    Assessment Type Discharge Planning Brief Assessment     Resource/Environmental Concerns none     Support Systems Spouse/significant other     Equipment Currently Used at Home walker, rolling;shower chair     Current Living Arrangements condominium     Patient/Family Anticipates Transition to home with family     Patient/Family Anticipated Services at Transition none     DME Needed Upon Discharge  --   TBD    Discharge Plan A Home with family     Discharge Plan B Home Health        Financial Resource Strain    How hard is it for you to pay for the very basics like food, housing, medical care, and heating? Not hard at all        Housing Stability    In the last 12 months, was there a time when you were not able to pay the mortgage or rent on time? No     In the last 12 months, how many places have you lived? 1     In the last 12 months, was there a time when you did not have a steady place to sleep or slept in a shelter (including now)? No        Transportation Needs    In the past 12 months, has lack of transportation kept you from medical appointments or from getting medications? No     In the past 12 months, has lack of transportation kept you from meetings, work, or from getting things needed for daily living? No        Food Insecurity    Within the past 12 months, you worried that your food would run out before you got the money to buy more. Never true     Within the past 12 months, the food you bought just didn't last and you didn't have money to get more. Never true         Stress    Do you feel stress - tense, restless, nervous, or anxious, or unable to sleep at night because your mind is troubled all the time - these days? Not at all        Social Connections    In a typical week, how many times do you talk on the phone with family, friends, or neighbors? Patient declined     How often do you get together with friends or relatives? Once a week     How often do you attend Buddhism or Roman Catholic services? Never     Do you belong to any clubs or organizations such as Buddhism groups, unions, fraternal or athletic groups, or school groups? No     How often do you attend meetings of the clubs or organizations you belong to? Never     Are you , , , , never , or living with a partner?         Alcohol Use    Q1: How often do you have a drink containing alcohol? Monthly or less     Q2: How many drinks containing alcohol do you have on a typical day when you are drinking? 1 or 2     Q3: How often do you have six or more drinks on one occasion? Never

## 2024-04-07 LAB
ADENOVIRUS: NOT DETECTED
ALBUMIN SERPL BCP-MCNC: 3.2 G/DL (ref 3.5–5.2)
ANION GAP SERPL CALC-SCNC: 10 MMOL/L (ref 8–16)
BORDETELLA PARAPERTUSSIS (IS1001): NOT DETECTED
BORDETELLA PERTUSSIS (PTXP): NOT DETECTED
BUN SERPL-MCNC: 21 MG/DL (ref 8–23)
CALCIUM SERPL-MCNC: 9.3 MG/DL (ref 8.7–10.5)
CHLAMYDIA PNEUMONIAE: NOT DETECTED
CHLORIDE SERPL-SCNC: 102 MMOL/L (ref 95–110)
CO2 SERPL-SCNC: 26 MMOL/L (ref 23–29)
CORONAVIRUS 229E, COMMON COLD VIRUS: NOT DETECTED
CORONAVIRUS HKU1, COMMON COLD VIRUS: NOT DETECTED
CORONAVIRUS NL63, COMMON COLD VIRUS: NOT DETECTED
CORONAVIRUS OC43, COMMON COLD VIRUS: NOT DETECTED
CREAT SERPL-MCNC: 0.9 MG/DL (ref 0.5–1.4)
EST. GFR  (NO RACE VARIABLE): >60 ML/MIN/1.73 M^2
FLUBV RNA NPH QL NAA+NON-PROBE: NOT DETECTED
GLUCOSE SERPL-MCNC: 104 MG/DL (ref 70–110)
HPIV1 RNA NPH QL NAA+NON-PROBE: NOT DETECTED
HPIV2 RNA NPH QL NAA+NON-PROBE: NOT DETECTED
HPIV3 RNA NPH QL NAA+NON-PROBE: DETECTED
HPIV4 RNA NPH QL NAA+NON-PROBE: NOT DETECTED
HUMAN METAPNEUMOVIRUS: NOT DETECTED
INFLUENZA A (SUBTYPES H1,H1-2009,H3): NOT DETECTED
MAGNESIUM SERPL-MCNC: 1.9 MG/DL (ref 1.6–2.6)
MYCOPLASMA PNEUMONIAE: NOT DETECTED
PHOSPHATE SERPL-MCNC: 2.8 MG/DL (ref 2.7–4.5)
POTASSIUM SERPL-SCNC: 3.3 MMOL/L (ref 3.5–5.1)
RESPIRATORY INFECTION PANEL SOURCE: ABNORMAL
RSV RNA NPH QL NAA+NON-PROBE: NOT DETECTED
RV+EV RNA NPH QL NAA+NON-PROBE: NOT DETECTED
SARS-COV-2 RNA RESP QL NAA+PROBE: NOT DETECTED
SODIUM SERPL-SCNC: 138 MMOL/L (ref 136–145)

## 2024-04-07 PROCEDURE — 63600175 PHARM REV CODE 636 W HCPCS: Performed by: INTERNAL MEDICINE

## 2024-04-07 PROCEDURE — 25000003 PHARM REV CODE 250: Performed by: INTERNAL MEDICINE

## 2024-04-07 PROCEDURE — 21400001 HC TELEMETRY ROOM

## 2024-04-07 PROCEDURE — 80069 RENAL FUNCTION PANEL: CPT | Performed by: INTERNAL MEDICINE

## 2024-04-07 PROCEDURE — 94761 N-INVAS EAR/PLS OXIMETRY MLT: CPT

## 2024-04-07 PROCEDURE — 25000242 PHARM REV CODE 250 ALT 637 W/ HCPCS: Performed by: INTERNAL MEDICINE

## 2024-04-07 PROCEDURE — 94640 AIRWAY INHALATION TREATMENT: CPT

## 2024-04-07 PROCEDURE — 83735 ASSAY OF MAGNESIUM: CPT | Performed by: INTERNAL MEDICINE

## 2024-04-07 PROCEDURE — 36415 COLL VENOUS BLD VENIPUNCTURE: CPT | Performed by: INTERNAL MEDICINE

## 2024-04-07 RX ORDER — POTASSIUM CHLORIDE 750 MG/1
30 CAPSULE, EXTENDED RELEASE ORAL ONCE
Status: COMPLETED | OUTPATIENT
Start: 2024-04-07 | End: 2024-04-07

## 2024-04-07 RX ORDER — POTASSIUM CHLORIDE 750 MG/1
30 CAPSULE, EXTENDED RELEASE ORAL
Status: DISCONTINUED | OUTPATIENT
Start: 2024-04-08 | End: 2024-04-08

## 2024-04-07 RX ADMIN — ATORVASTATIN CALCIUM 40 MG: 40 TABLET, FILM COATED ORAL at 08:04

## 2024-04-07 RX ADMIN — FLUTICASONE PROPIONATE 100 MCG: 50 SPRAY, METERED NASAL at 08:04

## 2024-04-07 RX ADMIN — ALBUTEROL SULFATE 2.5 MG: 2.5 SOLUTION RESPIRATORY (INHALATION) at 08:04

## 2024-04-07 RX ADMIN — BENZONATATE 100 MG: 100 CAPSULE ORAL at 03:04

## 2024-04-07 RX ADMIN — APIXABAN 5 MG: 5 TABLET, FILM COATED ORAL at 08:04

## 2024-04-07 RX ADMIN — LOSARTAN POTASSIUM 100 MG: 50 TABLET, FILM COATED ORAL at 08:04

## 2024-04-07 RX ADMIN — TAMSULOSIN HYDROCHLORIDE 0.4 MG: 0.4 CAPSULE ORAL at 08:04

## 2024-04-07 RX ADMIN — GUAIFENESIN AND DEXTROMETHORPHAN HYDROBROMIDE 1 TABLET: 600; 30 TABLET, EXTENDED RELEASE ORAL at 08:04

## 2024-04-07 RX ADMIN — CETIRIZINE HYDROCHLORIDE 10 MG: 10 TABLET, FILM COATED ORAL at 08:04

## 2024-04-07 RX ADMIN — PANTOPRAZOLE SODIUM 40 MG: 40 TABLET, DELAYED RELEASE ORAL at 08:04

## 2024-04-07 RX ADMIN — ASPIRIN 81 MG CHEWABLE TABLET 81 MG: 81 TABLET CHEWABLE at 08:04

## 2024-04-07 RX ADMIN — ALBUTEROL SULFATE 2.5 MG: 2.5 SOLUTION RESPIRATORY (INHALATION) at 09:04

## 2024-04-07 RX ADMIN — FUROSEMIDE 40 MG: 10 INJECTION, SOLUTION INTRAVENOUS at 08:04

## 2024-04-07 RX ADMIN — BENZONATATE 100 MG: 100 CAPSULE ORAL at 08:04

## 2024-04-07 RX ADMIN — ALBUTEROL SULFATE 2.5 MG: 2.5 SOLUTION RESPIRATORY (INHALATION) at 11:04

## 2024-04-07 RX ADMIN — ALBUTEROL SULFATE 2.5 MG: 2.5 SOLUTION RESPIRATORY (INHALATION) at 03:04

## 2024-04-07 RX ADMIN — POTASSIUM CHLORIDE 30 MEQ: 10 CAPSULE, COATED, EXTENDED RELEASE ORAL at 08:04

## 2024-04-07 NOTE — NURSING
MD informed of pt's Potassium 3.3 this morning from AM labs. KCL 30 mEq tablet ordered as per MD one time dose.

## 2024-04-07 NOTE — PLAN OF CARE
Pt was titrated off of oxygen this morning. Stable on room air throughout the day, no shortness of breath reported. Still hear some wheezing with extended activity such as ambulating to restroom. Otherwise oxygen remained stable and did not require any with ambulation. Pt is a one person assist with walker to restroom. Pt is sitting up for majority of the day and will sit up for rest of the day until ready for bed. Otherwise no other major events during day shift. Potassium was 3.3 this morning, oral KCL 30 mEq was given as replacement during day shift.   Problem: Infection  Goal: Absence of Infection Signs and Symptoms  Outcome: Ongoing, Progressing  Intervention: Prevent or Manage Infection  Flowsheets (Taken 4/7/2024 1614)  Infection Management: aseptic technique maintained  Isolation Precautions: protective     Problem: Adult Inpatient Plan of Care  Goal: Plan of Care Review  Outcome: Ongoing, Progressing  Flowsheets (Taken 4/7/2024 1614)  Plan of Care Reviewed With:   patient   spouse   family  Goal: Patient-Specific Goal (Individualized)  Outcome: Ongoing, Progressing  Flowsheets (Taken 4/7/2024 1614)  Anxieties, Fears or Concerns: Wants to gain strength back and breath better without oxygen  Individualized Care Needs: Wants to regain strength and be more independent  Goal: Absence of Hospital-Acquired Illness or Injury  Outcome: Ongoing, Progressing  Intervention: Identify and Manage Fall Risk  Flowsheets (Taken 4/7/2024 1614)  Safety Promotion/Fall Prevention:   assistive device/personal item within reach   bed alarm set   Fall Risk reviewed with patient/family   Fall Risk signage in place   high risk medications identified   medications reviewed   nonskid shoes/socks when out of bed   side rails raised x 2   supervised activity   toileting scheduled   instructed to call staff for mobility   /camera at bedside  Intervention: Prevent Skin Injury  Flowsheets (Taken 4/7/2024 1614)  Body  Position: position changed independently  Skin Protection:   adhesive use limited   incontinence pads utilized   skin sealant/moisture barrier applied   skin-to-device areas padded   skin-to-skin areas padded   transparent dressing maintained   tubing/devices free from skin contact  Intervention: Prevent and Manage VTE (Venous Thromboembolism) Risk  Flowsheets (Taken 4/7/2024 1614)  Activity Management:   Ambulated -L4   Ambulated to bathroom - L4   Ambulated in room - L4   Arm raise - L1   Ankle pumps - L1   Sitting at edge of bed - L2   Seated marching - L2   Standing - L3   Step forward and back - L3   Step side-to-side along edge of bed - L3   Up in chair - L3   Walk with assistive devise and /or staff member - L3   Walking in place - L3  VTE Prevention/Management:   ambulation promoted   bleeding risk assessed   bleeding precations maintained   bleeding risk factor(s) identified, provider notified   dorsiflexion/plantar flexion performed   ROM (active) performed   ROM (passive) performed  Range of Motion:   active ROM (range of motion) encouraged   ROM (range of motion) performed  Intervention: Prevent Infection  Flowsheets (Taken 4/7/2024 1614)  Infection Prevention:   equipment surfaces disinfected   hand hygiene promoted   personal protective equipment utilized   single patient room provided   environmental surveillance performed  Goal: Optimal Comfort and Wellbeing  Outcome: Ongoing, Progressing  Intervention: Monitor Pain and Promote Comfort  Flowsheets (Taken 4/7/2024 1614)  Pain Management Interventions:   care clustered   pillow support provided   position adjusted   quiet environment facilitated   relaxation techniques promoted  Intervention: Provide Person-Centered Care  Flowsheets (Taken 4/7/2024 1614)  Trust Relationship/Rapport:   care explained   choices provided   questions answered   questions encouraged   thoughts/feelings acknowledged   reassurance provided  Goal: Readiness for Transition of  Care  Outcome: Ongoing, Progressing  Intervention: Mutually Develop Transition Plan  Flowsheets (Taken 4/7/2024 1614)  Equipment Currently Used at Home:   walker, rolling   shower chair  Transportation Anticipated: family or friend will provide  Communicated ROLANDO with patient/caregiver: Yes  Do you expect to return to your current living situation?: Yes  Do you have help at home or someone to help you manage your care at home?: No  Readmission within 30 days?: No  Do you currently have service(s) that help you manage your care at home?: No  Is the pt/caregiver preference to resume services with current agency: No     Problem: Skin Injury Risk Increased  Goal: Skin Health and Integrity  Outcome: Ongoing, Progressing  Intervention: Optimize Skin Protection  Flowsheets (Taken 4/7/2024 1614)  Pressure Reduction Techniques:   frequent weight shift encouraged   pressure points protected   sit time limited to 2 hours  Pressure Reduction Devices: positioning supports utilized  Skin Protection:   adhesive use limited   incontinence pads utilized   skin sealant/moisture barrier applied   skin-to-device areas padded   skin-to-skin areas padded   transparent dressing maintained   tubing/devices free from skin contact  Head of Bed (HOB) Positioning: HOB elevated  Intervention: Promote and Optimize Oral Intake  Flowsheets (Taken 4/7/2024 1614)  Oral Nutrition Promotion:   rest periods promoted   medicated   safe use of adaptive equipment encouraged   physical activity promoted     Problem: Adjustment to Illness (Heart Failure)  Goal: Optimal Coping  Outcome: Ongoing, Progressing  Intervention: Support Psychosocial Response  Flowsheets (Taken 4/7/2024 1614)  Supportive Measures:   active listening utilized   self-care encouraged   relaxation techniques promoted   self-reflection promoted   self-responsibility promoted   verbalization of feelings encouraged  Family/Support System Care:   support provided   self-care encouraged      Problem: Cardiac Output Decreased (Heart Failure)  Goal: Optimal Cardiac Output  Outcome: Ongoing, Progressing  Intervention: Optimize Cardiac Output  Flowsheets (Taken 4/7/2024 1614)  Environmental Support:   calm environment promoted   personal routine supported   environmental consistency promoted   rest periods encouraged     Problem: Dysrhythmia (Heart Failure)  Goal: Stable Heart Rate and Rhythm  Outcome: Ongoing, Progressing     Problem: Fluid Imbalance (Heart Failure)  Goal: Fluid Balance  Outcome: Ongoing, Progressing  Intervention: Monitor and Manage Fluid Balance  Flowsheets (Taken 4/7/2024 1614)  Fluid/Electrolyte Management: fluids restricted     Problem: Functional Ability Impaired (Heart Failure)  Goal: Optimal Functional Ability  Outcome: Ongoing, Progressing  Intervention: Optimize Functional Ability  Flowsheets (Taken 4/7/2024 1614)  Self-Care Promotion:   independence encouraged   BADL personal objects within reach   BADL personal routines maintained   meal set-up provided   safe use of adaptive equipment encouraged  Activity Management:   Ambulated -L4   Ambulated to bathroom - L4   Ambulated in room - L4   Arm raise - L1   Ankle pumps - L1   Sitting at edge of bed - L2   Seated marching - L2   Standing - L3   Step forward and back - L3   Step side-to-side along edge of bed - L3   Up in chair - L3   Walk with assistive devise and /or staff member - L3   Walking in place - L3     Problem: Oral Intake Inadequate (Heart Failure)  Goal: Optimal Nutrition Intake  Outcome: Ongoing, Progressing  Intervention: Promote and Optimize Nutrition Intake  Flowsheets (Taken 4/7/2024 1614)  Oral Nutrition Promotion:   rest periods promoted   medicated   safe use of adaptive equipment encouraged   physical activity promoted     Problem: Respiratory Compromise (Heart Failure)  Goal: Effective Oxygenation and Ventilation  Outcome: Ongoing, Progressing  Intervention: Promote Airway Secretion Clearance  Flowsheets  (Taken 4/7/2024 1614)  Breathing Techniques/Airway Clearance: deep/controlled cough encouraged  Cough And Deep Breathing: done independently per patient  Intervention: Optimize Oxygenation and Ventilation  Flowsheets (Taken 4/7/2024 1614)  Airway/Ventilation Management: airway patency maintained     Problem: Sleep Disordered Breathing (Heart Failure)  Goal: Effective Breathing Pattern During Sleep  Outcome: Ongoing, Progressing     Problem: Fall Injury Risk  Goal: Absence of Fall and Fall-Related Injury  Outcome: Ongoing, Progressing  Intervention: Identify and Manage Contributors  Flowsheets (Taken 4/7/2024 1614)  Self-Care Promotion:   independence encouraged   BADL personal objects within reach   BADL personal routines maintained   meal set-up provided   safe use of adaptive equipment encouraged  Medication Review/Management:   medications reviewed   high-risk medications identified  Intervention: Promote Injury-Free Environment  Flowsheets (Taken 4/7/2024 1614)  Safety Promotion/Fall Prevention:   assistive device/personal item within reach   bed alarm set   Fall Risk reviewed with patient/family   Fall Risk signage in place   high risk medications identified   medications reviewed   nonskid shoes/socks when out of bed   side rails raised x 2   supervised activity   toileting scheduled   instructed to call staff for mobility   /camera at bedside

## 2024-04-07 NOTE — PROGRESS NOTES
VSS--1L O2/NC on. R AC & L AC SL's intact. Riky diet. Voiding well per condom cath-- replaced. Telemetry in SR-ST. Small BM per BSC. Up with walker & CGA X1. Denies pain. Zyrtec ordered per pt request. Slept well. Telesitter in place. Pleasant.

## 2024-04-08 PROBLEM — E87.6 HYPOKALEMIA: Status: ACTIVE | Noted: 2024-04-08

## 2024-04-08 LAB
ALBUMIN SERPL BCP-MCNC: 3 G/DL (ref 3.5–5.2)
ANION GAP SERPL CALC-SCNC: 10 MMOL/L (ref 8–16)
BUN SERPL-MCNC: 27 MG/DL (ref 8–23)
CALCIUM SERPL-MCNC: 9.3 MG/DL (ref 8.7–10.5)
CHLORIDE SERPL-SCNC: 105 MMOL/L (ref 95–110)
CO2 SERPL-SCNC: 26 MMOL/L (ref 23–29)
CREAT SERPL-MCNC: 0.9 MG/DL (ref 0.5–1.4)
EST. GFR  (NO RACE VARIABLE): >60 ML/MIN/1.73 M^2
GLUCOSE SERPL-MCNC: 95 MG/DL (ref 70–110)
MAGNESIUM SERPL-MCNC: 1.9 MG/DL (ref 1.6–2.6)
PHOSPHATE SERPL-MCNC: 3.2 MG/DL (ref 2.7–4.5)
POTASSIUM SERPL-SCNC: 3.1 MMOL/L (ref 3.5–5.1)
SODIUM SERPL-SCNC: 141 MMOL/L (ref 136–145)

## 2024-04-08 PROCEDURE — 80069 RENAL FUNCTION PANEL: CPT | Performed by: INTERNAL MEDICINE

## 2024-04-08 PROCEDURE — 25000242 PHARM REV CODE 250 ALT 637 W/ HCPCS: Performed by: INTERNAL MEDICINE

## 2024-04-08 PROCEDURE — 99222 1ST HOSP IP/OBS MODERATE 55: CPT | Mod: ,,, | Performed by: NURSE PRACTITIONER

## 2024-04-08 PROCEDURE — 97530 THERAPEUTIC ACTIVITIES: CPT | Mod: CQ

## 2024-04-08 PROCEDURE — 25000003 PHARM REV CODE 250: Performed by: INTERNAL MEDICINE

## 2024-04-08 PROCEDURE — 27000207 HC ISOLATION

## 2024-04-08 PROCEDURE — 99900035 HC TECH TIME PER 15 MIN (STAT)

## 2024-04-08 PROCEDURE — 36415 COLL VENOUS BLD VENIPUNCTURE: CPT | Performed by: INTERNAL MEDICINE

## 2024-04-08 PROCEDURE — 97116 GAIT TRAINING THERAPY: CPT | Mod: CQ

## 2024-04-08 PROCEDURE — 83735 ASSAY OF MAGNESIUM: CPT | Performed by: INTERNAL MEDICINE

## 2024-04-08 PROCEDURE — 63600175 PHARM REV CODE 636 W HCPCS: Performed by: INTERNAL MEDICINE

## 2024-04-08 PROCEDURE — 94640 AIRWAY INHALATION TREATMENT: CPT

## 2024-04-08 PROCEDURE — 94761 N-INVAS EAR/PLS OXIMETRY MLT: CPT

## 2024-04-08 PROCEDURE — 21400001 HC TELEMETRY ROOM

## 2024-04-08 RX ORDER — POTASSIUM CHLORIDE 750 MG/1
30 CAPSULE, EXTENDED RELEASE ORAL 2 TIMES DAILY WITH MEALS
Status: DISCONTINUED | OUTPATIENT
Start: 2024-04-08 | End: 2024-04-09 | Stop reason: HOSPADM

## 2024-04-08 RX ORDER — FUROSEMIDE 80 MG/1
80 TABLET ORAL DAILY
Status: DISCONTINUED | OUTPATIENT
Start: 2024-04-08 | End: 2024-04-09 | Stop reason: HOSPADM

## 2024-04-08 RX ADMIN — ATORVASTATIN CALCIUM 40 MG: 40 TABLET, FILM COATED ORAL at 08:04

## 2024-04-08 RX ADMIN — ASPIRIN 81 MG CHEWABLE TABLET 81 MG: 81 TABLET CHEWABLE at 08:04

## 2024-04-08 RX ADMIN — TAMSULOSIN HYDROCHLORIDE 0.4 MG: 0.4 CAPSULE ORAL at 08:04

## 2024-04-08 RX ADMIN — FUROSEMIDE 80 MG: 80 TABLET ORAL at 05:04

## 2024-04-08 RX ADMIN — FLUTICASONE PROPIONATE 100 MCG: 50 SPRAY, METERED NASAL at 10:04

## 2024-04-08 RX ADMIN — BENZONATATE 100 MG: 100 CAPSULE ORAL at 04:04

## 2024-04-08 RX ADMIN — POTASSIUM CHLORIDE 30 MEQ: 10 CAPSULE, COATED, EXTENDED RELEASE ORAL at 04:04

## 2024-04-08 RX ADMIN — FUROSEMIDE 40 MG: 10 INJECTION, SOLUTION INTRAVENOUS at 08:04

## 2024-04-08 RX ADMIN — BENZONATATE 100 MG: 100 CAPSULE ORAL at 08:04

## 2024-04-08 RX ADMIN — ALBUTEROL SULFATE 2.5 MG: 2.5 SOLUTION RESPIRATORY (INHALATION) at 09:04

## 2024-04-08 RX ADMIN — POTASSIUM CHLORIDE 30 MEQ: 10 CAPSULE, COATED, EXTENDED RELEASE ORAL at 06:04

## 2024-04-08 RX ADMIN — CETIRIZINE HYDROCHLORIDE 10 MG: 10 TABLET, FILM COATED ORAL at 09:04

## 2024-04-08 RX ADMIN — ALBUTEROL SULFATE 2.5 MG: 2.5 SOLUTION RESPIRATORY (INHALATION) at 03:04

## 2024-04-08 RX ADMIN — GUAIFENESIN AND DEXTROMETHORPHAN HYDROBROMIDE 1 TABLET: 600; 30 TABLET, EXTENDED RELEASE ORAL at 08:04

## 2024-04-08 RX ADMIN — ALBUTEROL SULFATE 2.5 MG: 2.5 SOLUTION RESPIRATORY (INHALATION) at 12:04

## 2024-04-08 RX ADMIN — APIXABAN 5 MG: 5 TABLET, FILM COATED ORAL at 08:04

## 2024-04-08 RX ADMIN — LOSARTAN POTASSIUM 100 MG: 50 TABLET, FILM COATED ORAL at 08:04

## 2024-04-08 RX ADMIN — ALBUTEROL SULFATE 2.5 MG: 2.5 SOLUTION RESPIRATORY (INHALATION) at 08:04

## 2024-04-08 RX ADMIN — PANTOPRAZOLE SODIUM 40 MG: 40 TABLET, DELAYED RELEASE ORAL at 08:04

## 2024-04-08 NOTE — PROGRESS NOTES
"Hospital Medicine  Progress note    [place] [facility] Fariba Arreguin MD  Admit Date: 4/5/2024  Code status: Full Code    Principal Problem:  Acute heart failure with preserved ejection fraction (HFpEF)    Interval hx:  breathing minimally improved    PEx  Temp:  [97.3 °F (36.3 °C)-98.8 °F (37.1 °C)]   Pulse:  [101-113]   Resp:  [16-18]   BP: (107-129)/(58-71)   SpO2:  [93 %-99 %]     Intake/Output Summary (Last 24 hours) at 4/7/2024 2338  Last data filed at 4/7/2024 1253  Gross per 24 hour   Intake 225 ml   Output 2550 ml   Net -2325 ml         General Appearance: no acute distress, WD WN  Heart: regular rate and rhythm, no heave, JVD less prominent but still 8 cm, 1+edema of BLE softer  Respiratory: Normal respiratory effort, symmetric excursion, bilateral vesicular breath sounds   Abdomen: Soft, non-tender; bowel sounds active  Skin: intact, no rash, no ulcers  Neurologic:  No focal numbness or weakness  Mental status: Alert, oriented x 4, affect appropriate    Recent Labs   Lab 04/05/24  1016   WBC 5.32   HGB 12.9*   HCT 40.4   *       Recent Labs   Lab 04/05/24  1016 04/06/24  1654 04/07/24  0526    138 138   K 4.2 3.9 3.3*    103 102   CO2 21* 27 26   BUN 18 18 21   CREATININE 0.8 1.0 0.9   GLU 85 112* 104   CALCIUM 9.0 9.5 9.3   MG 1.8 1.9 1.9   PHOS 2.4* 3.0 2.8       Recent Labs   Lab 04/05/24  1016 04/06/24  1654 04/07/24  0526   ALKPHOS 61  --   --    ALT 16  --   --    AST 24  --   --    ALBUMIN 3.6 3.5 3.2*   PROT 7.3  --   --    BILITOT 1.4*  --   --           No results for input(s): "POCTGLUCOSE" in the last 168 hours.    Scheduled Meds:   albuterol sulfate  2.5 mg Nebulization Q4H WAKE    apixaban  5 mg Oral BID    aspirin  81 mg Oral Daily    atorvastatin  40 mg Oral Daily    benzonatate  100 mg Oral TID    cetirizine  10 mg Oral Daily    dextromethorphan-guaiFENesin  mg  1 tablet Oral BID    fluticasone propionate  2 spray Each Nostril Daily    furosemide (LASIX) " injection  40 mg Intravenous BID    losartan  100 mg Oral Daily    pantoprazole  40 mg Oral Daily    [START ON 4/8/2024] potassium chloride  30 mEq Oral with breakfast    tamsulosin  0.4 mg Oral Daily     Continuous Infusions:  As Needed:  acetaminophen, benzonatate, ondansetron, sodium chloride 0.9%    Assessment and Plan  / Problems managed today    * Acute heart failure with preserved ejection fraction (HFpEF)  BNP  Recent Labs   Lab 04/05/24  1511   *     BNP above baseline elevated t bili  Furosemide 40 mg IV BID  Heart failure pathway  ECHO unchanged from before, normal left sided and right sided systolic function, normal diastolic function, PASP 35 mmHg, and IVC 3    History of pulmonary embolism  Continue apixaban    NSTEMI (non-ST elevated myocardial infarction)  Troponin slightly increasing. Follow until plateaued  Type II due to heart failure  Resume atorvastatin and ASA 81 mg daily     Viral URI with cough  Parainfluenza infection  Not hypoxic and no leukocytosis  COVID and flu negative  Albuterol 5 mg q4h while awake  Given a dose of ceftriaxone in ER empirically  Blood culture no growth  improved    Acute metabolic encephalopathy  CT head negative  Delirium precautions    Generalized weakness  CT head negative for acute process  Likely related to acute medical condition    Hyperlipidemia  Atorvastatin 40 mg daily    Primary hypertension  Losartan 100 mg daily    Benign prostatic hyperplasia with urinary frequency  Tamsulosin 0.4 mg daily    Diet:  low sodium  GI PPx: protonix  DVT PPx:  apixaban  Airways: room air  Wounds: none    Goals of Care:  Return to prior functional status    Discharge Planning   ROLANDO: 4/8/2024   Is the patient medically ready for discharge?:     Reason for patient still in hospital (select all that apply): Patient trending condition and Treatment  Discharge Plan A: Home with family        Fariba Arreguin MD

## 2024-04-08 NOTE — PLAN OF CARE
Patient stable.AOX3. VSS. Airborne droplet precaution maintained. Tele sitter in room. Safety measures ensured.call light within reach.bed in low position. No distress over night.

## 2024-04-08 NOTE — ASSESSMENT & PLAN NOTE
Patient has hypokalemia which is Acute and currently controlled. Most recent potassium levels reviewed-   Lab Results   Component Value Date    K 3.1 (L) 04/08/2024   . Will continue potassium replacement per protocol and recheck repeat levels after replacement completed.

## 2024-04-08 NOTE — PROGRESS NOTES
"Hospital Medicine  Progress note    [place]: [facility] Fariba Arreguin MD  Admit Date: 4/5/2024  Code status: Full Code    Principal Problem:  Acute heart failure with preserved ejection fraction (HFpEF)    Interval hx:  breathing improved and on room air. Still weak    PEx  Temp:  [97.2 °F (36.2 °C)-98.6 °F (37 °C)]   Pulse:  []   Resp:  [16-19]   BP: (107-129)/(58-78)   SpO2:  [93 %-97 %]     Intake/Output Summary (Last 24 hours) at 4/8/2024 1016  Last data filed at 4/8/2024 0851  Gross per 24 hour   Intake --   Output 1700 ml   Net -1700 ml         General Appearance: no acute distress, WD WN  Heart: regular rate and rhythm, no heave, JVD minimal and BLE with trace edema  Respiratory: Normal respiratory effort, symmetric excursion, bilateral vesicular breath sounds   Abdomen: Soft, non-tender; bowel sounds active  Skin: intact, no rash, no ulcers  Neurologic:  No focal numbness or weakness  Mental status: Alert, oriented x 4, affect appropriate    Recent Labs   Lab 04/05/24  1016   WBC 5.32   HGB 12.9*   HCT 40.4   *       Recent Labs   Lab 04/06/24  1654 04/07/24  0526 04/08/24  0440    138 141   K 3.9 3.3* 3.1*    102 105   CO2 27 26 26   BUN 18 21 27*   CREATININE 1.0 0.9 0.9   * 104 95   CALCIUM 9.5 9.3 9.3   MG 1.9 1.9 1.9   PHOS 3.0 2.8 3.2       Recent Labs   Lab 04/05/24  1016 04/06/24  1654 04/07/24  0526 04/08/24  0440   ALKPHOS 61  --   --   --    ALT 16  --   --   --    AST 24  --   --   --    ALBUMIN 3.6 3.5 3.2* 3.0*   PROT 7.3  --   --   --    BILITOT 1.4*  --   --   --           No results for input(s): "POCTGLUCOSE" in the last 168 hours.    Scheduled Meds:   albuterol sulfate  2.5 mg Nebulization Q4H WAKE    apixaban  5 mg Oral BID    aspirin  81 mg Oral Daily    atorvastatin  40 mg Oral Daily    benzonatate  100 mg Oral TID    cetirizine  10 mg Oral Daily    dextromethorphan-guaiFENesin  mg  1 tablet Oral BID    fluticasone propionate  2 spray Each Nostril " Daily    furosemide  80 mg Oral Daily    losartan  100 mg Oral Daily    pantoprazole  40 mg Oral Daily    potassium chloride  30 mEq Oral BID WM    tamsulosin  0.4 mg Oral Daily     Continuous Infusions:  As Needed:  acetaminophen, benzonatate, ondansetron, sodium chloride 0.9%    Assessment and Plan  / Problems managed today    * Acute heart failure with preserved ejection fraction (HFpEF)  BNP  Recent Labs   Lab 04/05/24  1511   *     BNP above baseline elevated t bili  Furosemide 40 mg IV BID  Heart failure pathway  ECHO unchanged from before, normal left sided and right sided systolic function, normal diastolic function, PASP 35 mmHg, and IVC 3    History of pulmonary embolism  Continue apixaban    Hypokalemia  Patient has hypokalemia which is Acute and currently controlled. Most recent potassium levels reviewed-   Lab Results   Component Value Date    K 3.1 (L) 04/08/2024   . Will continue potassium replacement per protocol and recheck repeat levels after replacement completed.     NSTEMI (non-ST elevated myocardial infarction)  Troponin slightly increasing. Follow until plateaued  Type II due to heart failure  Resume atorvastatin and ASA 81 mg daily     Viral URI with cough  Parainfluenza infection  Not hypoxic and no leukocytosis  COVID and flu negative  Albuterol 5 mg q4h while awake  Given a dose of ceftriaxone in ER empirically  Blood culture no growth  improved    Acute metabolic encephalopathy  CT head negative  Delirium precautions    Generalized weakness  CT head negative for acute process  Likely related to acute medical condition  Improving  Moderate intensity recommended    Hyperlipidemia  Atorvastatin 40 mg daily    Primary hypertension  Losartan 100 mg daily    Benign prostatic hyperplasia with urinary frequency  Tamsulosin 0.4 mg daily    Diet:  low sodium  GI PPx: protonix  DVT PPx:  apixaban  Airways: room air  Wounds: none    Goals of Care:  Return to prior functional  status    Discharge Planning   ROLANDO: 4/9/2024   Is the patient medically ready for discharge?: No    Reason for patient still in hospital (select all that apply): Patient trending condition and Treatment  Discharge Plan A: Home with family        Fariba Arreguin MD

## 2024-04-08 NOTE — PT/OT/SLP PROGRESS
Physical Therapy Treatment    Patient Name:  Sam Gamino   MRN:  [id number]    Recommendations:     Discharge Recommendations: Moderate Intensity Therapy  Discharge Equipment Recommendations: walker, rolling  Barriers to discharge: None    Assessment:     Sam Gamino is a 78 y.o. male admitted with a medical diagnosis of Acute heart failure with preserved ejection fraction (HFpEF).  He presents with the following impairments/functional limitations: weakness, impaired endurance, impaired self care skills, impaired functional mobility, gait instability, impaired balance, impaired cognition, decreased upper extremity function, decreased lower extremity function, decreased safety awareness requiring min assistance and verbal cues for bed mob, scooting to EOB, sit < > stand transitions, and gait to prevent falls due to weakness, instability, FFP, cognitive deficits.   In light of pt's current functional level and deficits, it is anticipated that pt will need to participate in a moderate intensity rehab program consisting of PT and OT in order to achieve full rehab potential to return to previous level of function and roles.  Pt remains motivated to participate in PT session and will cont to benefit from skilled PT intervention..    Rehab Prognosis: Good; patient would benefit from acute skilled PT services to address these deficits and reach maximum level of function.    Recent Surgery: * No surgery found *      Plan:     During this hospitalization, patient to be seen 4 x/week to address the identified rehab impairments via gait training, therapeutic activities, therapeutic exercises, neuromuscular re-education and progress toward the following goals:    Plan of Care Expires:  05/06/24    Subjective     Chief Complaint: weakness, fatigue  Pain/Comfort:  Pain Rating 1: 0/10  Pain Rating Post-Intervention 1: 0/10      Objective:     Communicated with nurse (Abdi) prior to session.  Patient found HOB elevated with  telemetry (telesitter camera, wife present) upon PT entry to room.     General Precautions: Standard, fall  Orthopedic Precautions: N/A  Braces: N/A  Respiratory Status: Room air     Functional Mobility:  Bed Mobility:     Scooting: anteriorly to the EOB with min A  Supine to Sit: SBA, exiting on the L side, HOB flat, increased time required  Transfers:     Sit to Stand:  from EOB with min A for hip elevation and balance with rolling walker  Gait: RW min A for balance and mgt of AD x 8-10 minimal lateral steps to the R and then to the L and then 14ft forward, including turns to the R and to the L (seated rest break in between trials)  Balance: Static sitting at the EOB with close sup      AM-PAC 6 CLICK MOBILITY  Turning over in bed (including adjusting bedclothes, sheets and blankets)?: 3  Sitting down on and standing up from a chair with arms (e.g., wheelchair, bedside commode, etc.): 3  Moving from lying on back to sitting on the side of the bed?: 3  Moving to and from a bed to a chair (including a wheelchair)?: 3  Need to walk in hospital room?: 3  Climbing 3-5 steps with a railing?: 1  Basic Mobility Total Score: 16       Treatment & Education:  Patient provided with daily orientation and goals of this PT session. They were educated to call for assistance and to transfer with hospital staff only.  Also, pt was educated on the effects of prolonged immobility and the importance of performing OOB activity and exercises to promote healing and reduce recovery time    Patient left up in chair with all lines intact, call button in reach, telesitter staff and nurse notified, and wife present..    GOALS:   Multidisciplinary Problems       Physical Therapy Goals          Problem: Physical Therapy    Goal Priority Disciplines Outcome Goal Variances Interventions   Physical Therapy Goal     PT, PT/OT Ongoing, Progressing     Description: Goals to met by 4/20/2024    1. Supine to sit with Stand-by Assistance  2. Sit to  supine with Stand-by Assistance  3. Rolling to Left and Right with Stand-by Assistance.  4. Sit to stand transfer with Contact Guard Assistance  5. Bed to chair transfer with Contact Guard Assistance using LRAD  6. Gait  x 75 feet with Contact Guard Assistance using Rolling Walker   7. Stand for 5 minutes with Stand-by Assistance using Rolling Walker  8. Lower extremity exercise program x15 reps per Instruction, with assistance as needed in order to facilitate improved strength, improved postural control, and improvement in functional independence                       Time Tracking:     PT Received On: 04/08/24  PT Start Time: 1457     PT Stop Time: 1525  PT Total Time (min): 28 min     Billable Minutes: Gait Training 18 and Therapeutic Activity 10    Treatment Type: Treatment  PT/PTA: PTA     Number of PTA visits since last PT visit: 1 04/08/2024

## 2024-04-08 NOTE — CONSULTS
Inpatient consult to Physical Medicine Rehab  Consult performed by: Sheba Patel NP  Consult ordered by: Fariba Arreguin MD  Reason for consult: Rehab      Consult received.     MARBELLA Boothe, FNP-C  Physical Medicine & Rehabilitation   04/08/2024      No

## 2024-04-08 NOTE — PLAN OF CARE
Problem: Infection  Goal: Absence of Infection Signs and Symptoms  Outcome: Ongoing, Progressing     Problem: Adult Inpatient Plan of Care  Goal: Plan of Care Review  Outcome: Ongoing, Progressing  Goal: Patient-Specific Goal (Individualized)  Outcome: Ongoing, Progressing  Goal: Absence of Hospital-Acquired Illness or Injury  Outcome: Ongoing, Progressing  Goal: Optimal Comfort and Wellbeing  Outcome: Ongoing, Progressing  Goal: Readiness for Transition of Care  Outcome: Ongoing, Progressing     Problem: Skin Injury Risk Increased  Goal: Skin Health and Integrity  Outcome: Ongoing, Progressing     Problem: Adjustment to Illness (Heart Failure)  Goal: Optimal Coping  Outcome: Ongoing, Progressing     Problem: Cardiac Output Decreased (Heart Failure)  Goal: Optimal Cardiac Output  Outcome: Ongoing, Progressing     Problem: Dysrhythmia (Heart Failure)  Goal: Stable Heart Rate and Rhythm  Outcome: Ongoing, Progressing     Problem: Fluid Imbalance (Heart Failure)  Goal: Fluid Balance  Outcome: Ongoing, Progressing     Problem: Functional Ability Impaired (Heart Failure)  Goal: Optimal Functional Ability  Outcome: Ongoing, Progressing     Problem: Oral Intake Inadequate (Heart Failure)  Goal: Optimal Nutrition Intake  Outcome: Ongoing, Progressing     Problem: Respiratory Compromise (Heart Failure)  Goal: Effective Oxygenation and Ventilation  Outcome: Ongoing, Progressing     Problem: Sleep Disordered Breathing (Heart Failure)  Goal: Effective Breathing Pattern During Sleep  Outcome: Ongoing, Progressing     Problem: Fall Injury Risk  Goal: Absence of Fall and Fall-Related Injury  Outcome: Ongoing, Progressing     Problem: Pain Acute  Goal: Acceptable Pain Control and Functional Ability  Outcome: Ongoing, Progressing

## 2024-04-08 NOTE — PLAN OF CARE
[facility]  Discharge Reassessment    Primary Care Provider: JAYLYN Zamora MD    Expected Discharge Date: 4/9/2024    Reassessment (most recent)       Discharge Reassessment - 04/08/24 1334          Discharge Reassessment    Assessment Type Discharge Planning Reassessment     Did the patient's condition or plan change since previous assessment? Yes     Discharge Plan discussed with: Spouse/sig other;Patient     Name(s) and Number(s) Eileen Gamino - Spouse - [phone]     Communicated ROLANDO with patient/caregiver Yes     Discharge Plan A Rehab     Discharge Plan B Home;Home Health     DME Needed Upon Discharge  none     Transition of Care Barriers None     Why the patient remains in the hospital Requires continued medical care        Post-Acute Status    Post-Acute Authorization Placement     Post-Acute Placement Status Referrals Sent     Discharge Delays None known at this time                 Pt wishes to go to rehab. Referral made to rehab through [facility]. PMR consult placed. Awaiting acceptance. No other needs noted at this time. Will continue to follow and offer support as needed.  Discharge Plan A and Plan B have been determined by review of patient's clinical status, future medical and therapeutic needs, and coverage/benefits for post-acute care in coordination with multidisciplinary team members.  Sixto Wynne, [facility]    [facility]  [phone]

## 2024-04-09 ENCOUNTER — DOCUMENTATION ONLY (OUTPATIENT)
Dept: CARDIOLOGY | Facility: CLINIC | Age: 79
End: 2024-04-09
Payer: MEDICARE

## 2024-04-09 ENCOUNTER — EXTERNAL HOME HEALTH (OUTPATIENT)
Dept: HOME HEALTH SERVICES | Facility: HOSPITAL | Age: 79
End: 2024-04-09
Payer: MEDICARE

## 2024-04-09 VITALS
RESPIRATION RATE: 19 BRPM | HEIGHT: 73 IN | BODY MASS INDEX: 31.96 KG/M2 | OXYGEN SATURATION: 98 % | DIASTOLIC BLOOD PRESSURE: 77 MMHG | TEMPERATURE: 98 F | HEART RATE: 94 BPM | WEIGHT: 241.19 LBS | SYSTOLIC BLOOD PRESSURE: 121 MMHG

## 2024-04-09 LAB
ALBUMIN SERPL BCP-MCNC: 3 G/DL (ref 3.5–5.2)
ANION GAP SERPL CALC-SCNC: 9 MMOL/L (ref 8–16)
BUN SERPL-MCNC: 29 MG/DL (ref 8–23)
CALCIUM SERPL-MCNC: 9.3 MG/DL (ref 8.7–10.5)
CHLORIDE SERPL-SCNC: 104 MMOL/L (ref 95–110)
CO2 SERPL-SCNC: 27 MMOL/L (ref 23–29)
CREAT SERPL-MCNC: 1.1 MG/DL (ref 0.5–1.4)
EST. GFR  (NO RACE VARIABLE): >60 ML/MIN/1.73 M^2
GLUCOSE SERPL-MCNC: 92 MG/DL (ref 70–110)
MAGNESIUM SERPL-MCNC: 1.9 MG/DL (ref 1.6–2.6)
PHOSPHATE SERPL-MCNC: 4.1 MG/DL (ref 2.7–4.5)
POCT GLUCOSE: 159 MG/DL (ref 70–110)
POTASSIUM SERPL-SCNC: 3.2 MMOL/L (ref 3.5–5.1)
SODIUM SERPL-SCNC: 140 MMOL/L (ref 136–145)

## 2024-04-09 PROCEDURE — 99900035 HC TECH TIME PER 15 MIN (STAT)

## 2024-04-09 PROCEDURE — 36415 COLL VENOUS BLD VENIPUNCTURE: CPT | Performed by: INTERNAL MEDICINE

## 2024-04-09 PROCEDURE — 94640 AIRWAY INHALATION TREATMENT: CPT

## 2024-04-09 PROCEDURE — 83735 ASSAY OF MAGNESIUM: CPT | Performed by: INTERNAL MEDICINE

## 2024-04-09 PROCEDURE — 94761 N-INVAS EAR/PLS OXIMETRY MLT: CPT

## 2024-04-09 PROCEDURE — 25000003 PHARM REV CODE 250: Performed by: INTERNAL MEDICINE

## 2024-04-09 PROCEDURE — 25000242 PHARM REV CODE 250 ALT 637 W/ HCPCS: Performed by: INTERNAL MEDICINE

## 2024-04-09 PROCEDURE — 80069 RENAL FUNCTION PANEL: CPT | Performed by: INTERNAL MEDICINE

## 2024-04-09 PROCEDURE — 99222 1ST HOSP IP/OBS MODERATE 55: CPT | Mod: ,,, | Performed by: PHYSICAL MEDICINE & REHABILITATION

## 2024-04-09 RX ORDER — PHENOL/SODIUM PHENOLATE
1 AEROSOL, SPRAY (ML) MUCOUS MEMBRANE DAILY
Start: 2024-04-09

## 2024-04-09 RX ORDER — ATORVASTATIN CALCIUM 80 MG/1
40 TABLET, FILM COATED ORAL DAILY
Qty: 90 TABLET | Refills: 3
Start: 2024-04-09 | End: 2024-05-01

## 2024-04-09 RX ORDER — POTASSIUM CHLORIDE 750 MG/1
30 CAPSULE, EXTENDED RELEASE ORAL 2 TIMES DAILY WITH MEALS
Refills: 0
Start: 2024-04-09 | End: 2024-04-11

## 2024-04-09 RX ORDER — BENZONATATE 100 MG/1
100 CAPSULE ORAL 3 TIMES DAILY PRN
Start: 2024-04-09 | End: 2024-04-19

## 2024-04-09 RX ADMIN — BENZONATATE 100 MG: 100 CAPSULE ORAL at 02:04

## 2024-04-09 RX ADMIN — FLUTICASONE PROPIONATE 100 MCG: 50 SPRAY, METERED NASAL at 09:04

## 2024-04-09 RX ADMIN — BENZONATATE 100 MG: 100 CAPSULE ORAL at 08:04

## 2024-04-09 RX ADMIN — APIXABAN 5 MG: 5 TABLET, FILM COATED ORAL at 08:04

## 2024-04-09 RX ADMIN — ASPIRIN 81 MG CHEWABLE TABLET 81 MG: 81 TABLET CHEWABLE at 08:04

## 2024-04-09 RX ADMIN — LOSARTAN POTASSIUM 100 MG: 50 TABLET, FILM COATED ORAL at 08:04

## 2024-04-09 RX ADMIN — PANTOPRAZOLE SODIUM 40 MG: 40 TABLET, DELAYED RELEASE ORAL at 08:04

## 2024-04-09 RX ADMIN — ALBUTEROL SULFATE 2.5 MG: 2.5 SOLUTION RESPIRATORY (INHALATION) at 08:04

## 2024-04-09 RX ADMIN — CETIRIZINE HYDROCHLORIDE 10 MG: 10 TABLET, FILM COATED ORAL at 08:04

## 2024-04-09 RX ADMIN — FUROSEMIDE 80 MG: 80 TABLET ORAL at 08:04

## 2024-04-09 RX ADMIN — POTASSIUM CHLORIDE 30 MEQ: 10 CAPSULE, COATED, EXTENDED RELEASE ORAL at 08:04

## 2024-04-09 RX ADMIN — TAMSULOSIN HYDROCHLORIDE 0.4 MG: 0.4 CAPSULE ORAL at 08:04

## 2024-04-09 RX ADMIN — ATORVASTATIN CALCIUM 40 MG: 40 TABLET, FILM COATED ORAL at 08:04

## 2024-04-09 RX ADMIN — GUAIFENESIN AND DEXTROMETHORPHAN HYDROBROMIDE 1 TABLET: 600; 30 TABLET, EXTENDED RELEASE ORAL at 08:04

## 2024-04-09 RX ADMIN — ALBUTEROL SULFATE 2.5 MG: 2.5 SOLUTION RESPIRATORY (INHALATION) at 12:04

## 2024-04-09 NOTE — NURSING
Resting well in bed, no distress noted resp. even and unlabored, 95% on room air. Pt remains AAOX3, follows commands and calls for assistance to bathroom if needed, tele sitter d/ed at this time.  Bed alarm set. No needs currently. Monitoring ongoing.

## 2024-04-09 NOTE — PLAN OF CARE
Problem: Adult Inpatient Plan of Care  Goal: Plan of Care Review  Outcome: Ongoing, Progressing     Problem: Cardiac Output Decreased (Heart Failure)  Goal: Optimal Cardiac Output  Outcome: Ongoing, Progressing     Problem: Fluid Imbalance (Heart Failure)  Goal: Fluid Balance  Outcome: Ongoing, Progressing     Problem: Respiratory Compromise (Heart Failure)  Goal: Effective Oxygenation and Ventilation  Outcome: Ongoing, Progressing

## 2024-04-09 NOTE — PROGRESS NOTES
[facility] ([facility]) Team notified of pt referral via [facility] (automated inbasket notification) .    Patient screened today 4-9-2024 by provider and RN for enrollment to program.      Pt was deemed not a candidate for enrollment at this time related to  PT D/C to a SNF.    Pt will require additional follow up planning per primary team.     If pt status, diagnosis, or treatment plan changes , please place AMB referral to [facility] ([id number]) for HFTCC enrollment re-evalution.

## 2024-04-09 NOTE — PLAN OF CARE
APPOINTMENT:    Patient Appointment(s) scheduled with  JAYLYN Zamora MD  Tuesday Apr 16, 2024 11:30 AM

## 2024-04-09 NOTE — CONSULTS
[facility] S[facility]  Physical Medicine & Rehab  Consult Note    Patient Name: Sam Gamino  MRN: [id number]  Admission Date: 4/5/2024  Hospital Length of Stay: 2 days  Attending Physician: Fariba Arreguin MD     Inpatient consult to Physical Medicine & Rehabilitation  Consult performed by: Sheba Patel NP  Consult requested by:  Fariba Arreguin MD    Collaborating Physician: Uma Brasher MD  Reason for Consult:  Assess rehabilitation needs      Consults  Subjective:     Principal Problem: Acute heart failure with preserved ejection fraction (HFpEF)    HPI: Sam Gamino is a 78-year-old female with PMHx of CAD, HLD, HTN, PE (Eliquis), Acute heart failure. Patient presented to [facility] on 4/5/24 for generalized weakness and confusion. He has had URI symptoms cough, rhinorrhea, and post-nasal drip. Covid and flu negative. CTH negative. Given a dose of Rocephin in ER empirically. Blood cx NGTD. Hospital course complicated by NSTEMI (Troponin slightly increased and followed).    Functional History: Patient lives with wife in a 4rd level apartment with elevator access. Prior to admission, Jumana. DME: RW.     Hospital Course:   4/6/24: Bed mob mod- maxA. Participated w/ PT Ambulated small steps maxA with no AD.     Past Medical History:   Diagnosis Date    Abnormal echocardiogram 02/08/2013    Mild MVR 3/07    Atherosclerosis of aorta 11/01/2023 2023: Mentioned in chart.    CAD (coronary artery disease) 02/08/2013    Angio 3/07 Dr. Lin    Chronic diastolic (congestive) heart failure     History of pulmonary embolism 11/14/2023    CTA - 11/14/23 - Bilateral    History of subdural hemorrhage 11/04/2023 11/5/2023: SAH.    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 Pelayo     Past Surgical History:   Procedure Laterality Date    EYE SURGERY Bilateral 2016    Cataracts     Review of patient's allergies indicates:   Allergen Reactions    Lotensin [benazepril]      cough       Scheduled Medications:    albuterol sulfate  2.5 mg Nebulization Q4H WAKE    apixaban  5 mg Oral BID    aspirin  81 mg Oral Daily    atorvastatin  40 mg Oral Daily    benzonatate  100 mg Oral TID    cetirizine  10 mg Oral Daily    dextromethorphan-guaiFENesin  mg  1 tablet Oral BID    fluticasone propionate  2 spray Each Nostril Daily    furosemide  80 mg Oral Daily    losartan  100 mg Oral Daily    pantoprazole  40 mg Oral Daily    potassium chloride  30 mEq Oral BID WM    tamsulosin  0.4 mg Oral Daily       PRN Medications: acetaminophen, benzonatate, ondansetron, sodium chloride 0.9%    Family History       Problem Relation (Age of Onset)    Cancer Mother    Hypertension Brother    No Known Problems Father, Daughter, Sister, Sister, Brother, Brother, Brother    Thyroid disease Sister          Tobacco Use    Smoking status: Never    Smokeless tobacco: Never   Substance and Sexual Activity    Alcohol use: Yes     Alcohol/week: 1.0 standard drink of alcohol     Types: 1 Standard drinks or equivalent per week     Comment: ocassionally    Drug use: No    Sexual activity: Not Currently     Partners: Female     Review of Systems   Constitutional:  Positive for activity change.   HENT:  Negative for trouble swallowing.    Cardiovascular:  Positive for leg swelling.   Musculoskeletal:  Positive for gait problem.   Neurological:  Positive for weakness.     Objective:     Vital Signs (Most Recent):  Temp: 98.4 °F (36.9 °C) (04/08/24 2004)  Pulse: 104 (04/08/24 2004)  Resp: 18 (04/08/24 2004)  BP: 100/62 (04/08/24 2004)  SpO2: 96 % (04/08/24 2004)    Vital Signs (24h Range):  Temp:  [97 °F (36.1 °C)-98.6 °F (37 °C)] 98.4 °F (36.9 °C)  Pulse:  [] 104  Resp:  [16-20]  18  SpO2:  [93 %-98 %] 96 %  BP: (100-124)/(56-78) 100/62     Body mass index is 31.82 kg/m².     Physical Exam  Vitals and nursing note reviewed.   Constitutional:       Appearance: Normal appearance. He is well-developed.   HENT:      Head: Normocephalic and atraumatic.      Nose: Nose normal.      Mouth/Throat:      Mouth: Mucous membranes are moist.   Eyes:      General:         Right eye: No discharge.         Left eye: No discharge.      Pupils: Pupils are equal, round, and reactive to light.   Pulmonary:      Effort: Pulmonary effort is normal. No respiratory distress.   Abdominal:      General: There is no distension.      Palpations: Abdomen is soft.      Tenderness: There is no abdominal tenderness.   Musculoskeletal:         General: No deformity.      Cervical back: Neck supple.      Comments: Generalized weakness and mild BLE swelling present   Skin:     General: Skin is warm and dry.   Neurological:      Mental Status: He is alert. Mental status is at baseline.      Sensory: No sensory deficit.      Motor: Weakness present. No abnormal muscle tone.      Gait: Gait abnormal.   Psychiatric:         Mood and Affect: Mood normal.         Behavior: Behavior normal.     Diagnostic Results:   Labs: Reviewed  ECG: Reviewed  X-Ray: Reviewed  CT: Reviewed    Assessment/Plan:     Acute heart failure with preserved ejection fraction (HFpEF)  - ECHO unchanged per   - Continues Lasix    NSTEMI (non-ST elevated myocardial infarction)  - Troponin slightly increased and followed    Acute metabolic encephalopathy  - improved    Generalized weakness  - Related to prolonged/acute hospital course.     Recommendations  -  Encourage mobility, OOB in chair at least 3 hours per day, and early ambulation as appropriate  -  PT/OT evaluate and treat  -  Pain management  -  Monitor for and prevent skin breakdown and pressure ulcers  Early mobility, repositioning/weight shifting every 20-30 minutes when sitting, turn patient  every 2 hours, proper mattress/overlay and chair cushioning, pressure relief/heel protector boots  -  DVT prophylaxis    -  Reviewed discharge options (IP rehab, SNF, HH therapy, and OP therapy)     History of pulmonary embolism  - Eliquis    PM&R Recommendation:     At this time, the PM&R team has reviewed this patient's ongoing medical case including inpatient diagnosis, medical history, clinical examination, labs, vitals, current social and functional history to provide the post-acute recommendation as follows:     RECOMMENDATIONS: inpatient rehabilitation due to good motivation/participation with therapies, has been determined to tolerate 3 hours of therapy and good potential for recovery.     The patient will be admitted for comprehensive interdisciplinary inpatient rehabilitation to address the impairments due to medical diagnosis of Debility. The patient will benefit from an inpatient rehabilitation program to promote functional recovery, implement compensatory strategies and will undergo assessment for needs for durable medical equipment for safe discharge to the community. This patient will benefit from a coordinated interdisciplinary rehabilitation program services that require close monitoring and treatment with 24-hour rehabilitative nursing and physical/occupational therapies for 3 hours/day for 5 days/week.This interdisciplinary program will be performed under the direction of a physiatrist.    MEDICAL STABILITY:     At this time, barriers for post-acute rehab admission include removal of camera sitter and plan for isolation.     We will continue to follow.     Thank you for your consult.     Sheba Patel NP  Department of Physical Medicine & Rehab  [facility]

## 2024-04-09 NOTE — SUBJECTIVE & OBJECTIVE
Past Medical History:   Diagnosis Date    Abnormal echocardiogram 02/08/2013    Mild MVR 3/07    Atherosclerosis of aorta 11/01/2023 2023: Mentioned in chart.    CAD (coronary artery disease) 02/08/2013    Angio 3/07 Dr. Lin    Chronic diastolic (congestive) heart failure     History of pulmonary embolism 11/14/2023    CTA - 11/14/23 - Bilateral    History of subdural hemorrhage 11/04/2023 11/5/2023: SAH.    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 Pelayo     Past Surgical History:   Procedure Laterality Date    EYE SURGERY Bilateral 2016    Cataracts     Review of patient's allergies indicates:   Allergen Reactions    Lotensin [benazepril]      cough       Scheduled Medications:    albuterol sulfate  2.5 mg Nebulization Q4H WAKE    apixaban  5 mg Oral BID    aspirin  81 mg Oral Daily    atorvastatin  40 mg Oral Daily    benzonatate  100 mg Oral TID    cetirizine  10 mg Oral Daily    dextromethorphan-guaiFENesin  mg  1 tablet Oral BID    fluticasone propionate  2 spray Each Nostril Daily    furosemide  80 mg Oral Daily    losartan  100 mg Oral Daily    pantoprazole  40 mg Oral Daily    potassium chloride  30 mEq Oral BID WM    tamsulosin  0.4 mg Oral Daily       PRN Medications: acetaminophen, benzonatate, ondansetron, sodium chloride 0.9%    Family History       Problem Relation (Age of Onset)    Cancer Mother    Hypertension Brother    No Known Problems Father, Daughter, Sister, Sister, Brother, Brother, Brother    Thyroid disease Sister          Tobacco Use    Smoking status: Never    Smokeless tobacco: Never   Substance and Sexual Activity    Alcohol use: Yes     Alcohol/week: 1.0 standard drink of alcohol      Types: 1 Standard drinks or equivalent per week     Comment: ocassionally    Drug use: No    Sexual activity: Not Currently     Partners: Female     Review of Systems   Constitutional:  Positive for activity change.   HENT:  Negative for trouble swallowing.    Cardiovascular:  Positive for leg swelling.   Musculoskeletal:  Positive for gait problem.   Neurological:  Positive for weakness.     Objective:     Vital Signs (Most Recent):  Temp: 98.4 °F (36.9 °C) (04/08/24 2004)  Pulse: 104 (04/08/24 2004)  Resp: 18 (04/08/24 2004)  BP: 100/62 (04/08/24 2004)  SpO2: 96 % (04/08/24 2004)    Vital Signs (24h Range):  Temp:  [97 °F (36.1 °C)-98.6 °F (37 °C)] 98.4 °F (36.9 °C)  Pulse:  [] 104  Resp:  [16-20] 18  SpO2:  [93 %-98 %] 96 %  BP: (100-124)/(56-78) 100/62     Body mass index is 31.82 kg/m².     Physical Exam  Vitals and nursing note reviewed.   Constitutional:       Appearance: Normal appearance. He is well-developed.   HENT:      Head: Normocephalic and atraumatic.      Nose: Nose normal.      Mouth/Throat:      Mouth: Mucous membranes are moist.   Eyes:      General:         Right eye: No discharge.         Left eye: No discharge.      Pupils: Pupils are equal, round, and reactive to light.   Pulmonary:      Effort: Pulmonary effort is normal. No respiratory distress.   Abdominal:      General: There is no distension.      Palpations: Abdomen is soft.      Tenderness: There is no abdominal tenderness.   Musculoskeletal:         General: No deformity.      Cervical back: Neck supple.      Comments: Generalized weakness and mild BLE swelling present   Skin:     General: Skin is warm and dry.   Neurological:      Mental Status: He is alert. Mental status is at baseline.      Sensory: No sensory deficit.      Motor: Weakness present. No abnormal muscle tone.      Gait: Gait abnormal.   Psychiatric:         Mood and Affect: Mood normal.         Behavior: Behavior normal.          NEUROLOGICAL EXAMINATION:      CRANIAL NERVES     CN III, IV, VI   Pupils are equal, round, and reactive to light.      Diagnostic Results: Labs: Reviewed  ECG: Reviewed  X-Ray: Reviewed  CT: Reviewed   [email or web address]

## 2024-04-09 NOTE — PLAN OF CARE
[facility]     Department of Hospital Medicine     [address] [place] [address]     [phone] after hours  [phone] fax                                        FACILITY TRANSFER ORDERS     04/09/2024    Admit to:  rehab    Diagnoses:  Active Hospital Problems    Diagnosis  POA    *Acute heart failure with preserved ejection fraction (HFpEF) [I50.31]  Yes     Priority: 1 - High     5/16/2022: Echo: Normal left ventricular size and systolic function. EF 55%. Moderate MR. Mild to moderate TR.   6/6/2023: Echo: Normal left ventricular size and systolic function. EF 60%. LVGLS -15%. Moderate diastolic dysfunction. Mild to moderate AR. Mild to moderate MR. Mildly enlarged ascending aorta. Small pericardial effusion.          History of pulmonary embolism [Z86.711]  Yes     Priority: 2      CTA - 11/14/23 - Bilateral      Hypokalemia [E87.6]  No    Generalized weakness [R53.1]  Yes    Acute metabolic encephalopathy [G93.41]  Yes    Viral URI with cough [J06.9]  Yes    NSTEMI (non-ST elevated myocardial infarction) [I21.4]  Yes    Hyperlipidemia [E78.5]  Yes     2002: Statin was begun.      Primary hypertension [I10]  Yes     2002: Diagnosed.         Benign prostatic hyperplasia with urinary frequency [N40.1, R35.0]  Yes      Resolved Hospital Problems   No resolved problems to display.       Vital Signs: Routine.    Allergies:  Review of patient's allergies indicates:   Allergen Reactions    Lotensin [benazepril]      cough       Diet: low sodium diet fluid restriction 1500 mL    Acitivities: as tolerated    Nursing: per unit routine    droplet isolation x 5 days for parainfluenza    Labs: per unit routine    Consults: PT/OT    Medications:     benzonatate 100 MG capsule  Commonly known as: TESSALON  Take 1 capsule (100 mg total) by mouth 3 (three) times daily as needed for Cough.     dextromethorphan-guaiFENesin  mg  mg per 12 hr tablet  Commonly  known as: MUCINEX DM  Take 1 tablet by mouth 2 (two) times daily. for 10 days     potassium chloride 10 MEQ Cpsr  Commonly known as: MICRO-K  Take 3 capsules (30 mEq total) by mouth 2 (two) times daily with meals. for 2 days       CHANGE how you take these medications      atorvastatin 80 MG tablet  Commonly known as: LIPITOR  Take 0.5 tablets (40 mg total) by mouth once daily.  What changed: See the new instructions.       CONTINUE taking these medications      acetaminophen 500 MG tablet  Commonly known as: TYLENOL  Take 2 tablets (1,000 mg total) by mouth every 6 (six) hours as needed.     apixaban 5 mg Tab  Commonly known as: ELIQUIS  Take 1 tablet (5 mg total) by mouth 2 (two) times daily.     aspirin 81 MG Chew     cetirizine 10 MG tablet  Commonly known as: ZYRTEC     cholecalciferol (vitamin D3) 50 mcg (2,000 unit) Cap capsule  Commonly known as: VITAMIN D3     cyanocobalamin 1,000 mcg/mL injection  INJECT 1 ML IN THE MUSCLE EVERY 30 DAYS     famotidine 20 MG tablet  Commonly known as: PEPCID  Take 1 tablet (20 mg total) by mouth 2 (two) times daily as needed for Heartburn.     fluticasone propionate 50 mcg/actuation nasal spray  Commonly known as: FLONASE  SHAKE LIQUID AND USE 2 SPRAYS(100 MCG) IN EACH NOSTRIL EVERY DAY     furosemide 40 MG tablet  Commonly known as: LASIX  Take 1 tablet (40 mg total) by mouth once daily.     ibuprofen 800 MG tablet  Commonly known as: ADVIL,MOTRIN  Take 1 tablet (800 mg total) by mouth daily as needed for Pain.     JARDIANCE 10 mg tablet  Generic drug: empagliflozin  TAKE 1 TABLET DAILY     linaCLOtide 290 mcg Cap capsule  Commonly known as: LINZESS  Take 1 capsule (290 mcg total) by mouth before breakfast.     losartan 100 MG tablet  Commonly known as: COZAAR  Take 1 tablet (100 mg total) by mouth once daily. HOLD UNTIL FOLLOW-UP WITH PRIMARY CARE PROVIDER     omeprazole 20 mg Tbec  Take 1 tablet by mouth once daily.     ondansetron 4 MG Tbdl  Commonly known as:  ZOFRAN-ODT  Take 1 tablet (4 mg total) by mouth every 6 (six) hours as needed.     solifenacin 10 MG tablet  Commonly known as: VESICARE  Take 1 tablet (10 mg total) by mouth once daily.     tamsulosin 0.4 mg Cap  Commonly known as: FLOMAX  TAKE 1 CAPSULE(0.4 MG) BY MOUTH EVERY DAY           _________________________________  Fariba Arreguin MD  04/09/2024

## 2024-04-09 NOTE — PLAN OF CARE
Pt being D/C to [facility]. Report number is [phone]. Transport setup by W/C for 3pm. Pt and family notified.    Sixto Wynne [facility]    [facility]  [phone]

## 2024-04-09 NOTE — HPI
Sam Gamino is a 78-year-old female with PMHx of CAD, HLD, HTN, PE (Eliquis), Acute heart failure. Patient [place] to [facility] [place] 4/5/24 for generalized weakness and confusion. He has had URI symptoms cough, rhinorrhea, and post-nasal drip. Covid and flu negative. CTH negative. Given a dose of Rocephin in ER empirically. Blood cx NGTD. Hospital course complicated by NSTEMI (Troponin slightly increased and followed).    Functional History: Patient lives with wife in a 4rd level apartment with elevator access. Prior to admission, Jumana. DME: DIANNE.

## 2024-04-10 LAB
BACTERIA BLD CULT: NORMAL
BACTERIA BLD CULT: NORMAL

## 2024-04-10 NOTE — PLAN OF CARE
[facility] - Med Surg  Discharge Final Note    Primary Care Provider: JAYLYN Zamora MD    Expected Discharge Date: 4/9/2024    Final Discharge Note (most recent)       Final Note - 04/10/24 0850          Final Note    Assessment Type Final Discharge Note     Anticipated Discharge Disposition Rehab Facility     What phone number can be called within the next 1-3 days to see how you are doing after discharge? [phone]        Post-Acute Status    Post-Acute Authorization Placement     Post-Acute Placement Status Set-up Complete/Auth obtained     Discharge Delays None known at this time                     Important Message from Medicare             Contact Info       JAYLYN Zamora MD   Specialty: Internal Medicine   Relationship: PCP - General    [address] [facility]  [address] [place] [address]   Phone: [phone]       Next Steps: Follow up          Pt D/C to [facility]. Family notified of patient's d/c. No other needs noted. Instructed the patient and [facility] that [facility] might [facility] once [facility]C from re[facility]. Pt in good spirits and looking forward to going to rehab.  Discharge Plan A and Plan B have been determined by review of patient's clinical status, future medical and therapeutic needs, and coverage/benefits for post-acute care in coordination with multidisciplinary team members.  Sixto Wynne, [facility] [place] [facility]    [facility]  [phone]

## 2024-04-16 ENCOUNTER — DOCUMENTATION ONLY (OUTPATIENT)
Dept: CARDIOLOGY | Facility: CLINIC | Age: 79
End: 2024-04-16
Payer: MEDICARE

## 2024-04-16 ENCOUNTER — DOCUMENT SCAN (OUTPATIENT)
Dept: HOME HEALTH SERVICES | Facility: HOSPITAL | Age: 79
End: 2024-04-16
Payer: MEDICARE

## 2024-04-16 NOTE — PROGRESS NOTES
[facility] ([facility]) Team notified of pt referral via Ambulatory Referral to Heart Failure Transitional Care ([id number]).    Patient screened today 04/16/2024 by provider and RN for enrollment to program.      Pt was deemed not a candidate for enrollment at this time related to patient has follow up barrier: pt is being discharged to non-[facility] skilled nursing/rehab facility, nursing home or other facility that will be unable to assist pt with participation.     Pt will require additional follow up planning per primary team.     If pt status, diagnosis, or treatment plan changes , please place AMB referral to [facility] ([id number]) for HFTCC enrollment re-evalution.

## 2024-04-22 ENCOUNTER — TELEPHONE (OUTPATIENT)
Dept: CARDIOLOGY | Facility: CLINIC | Age: 79
End: 2024-04-22
Payer: MEDICARE

## 2024-04-22 NOTE — DISCHARGE SUMMARY
Discharge Summary  Hospital Medicine    Attending Provider on Discharge: Fariba Arreguin MD  [facility] Medicine Team: [facility]OS[facility] MED S  Date of Admission:  4/5/2024     Date of Discharge:  4/9/2024  Code status: Full Code    Active Hospital Problems    Diagnosis  POA    *Acute heart failure with preserved ejection fraction (HFpEF) [I50.31]  Yes     Priority: 1 - High     5/16/2022: Echo: Normal left ventricular size and systolic function. EF 55%. Moderate MR. Mild to moderate TR.   6/6/2023: Echo: Normal left ventricular size and systolic function. EF 60%. LVGLS -15%. Moderate diastolic dysfunction. Mild to moderate AR. Mild to moderate MR. Mildly enlarged ascending aorta. Small pericardial effusion.          History of pulmonary embolism [Z86.711]  Yes     Priority: 2      CTA - 11/14/23 - Bilateral      Hypokalemia [E87.6]  No    Generalized weakness [R53.1]  Yes    Acute metabolic encephalopathy [G93.41]  Yes    Viral URI with cough [J06.9]  Yes    NSTEMI (non-ST elevated myocardial infarction) [I21.4]  Yes    Hyperlipidemia [E78.5]  Yes     2002: Statin was begun.      Primary hypertension [I10]  Yes     2002: Diagnosed.         Benign prostatic hyperplasia with urinary frequency [N40.1, R35.0]  Yes      Resolved Hospital Problems   No resolved problems to display.     JESSICA Gamino is a 78 y.o. male who has a past medical history of CAD, hyperlipidemia, chronic diastolic heart failure and bilateral pulmonary embolism (11/2013) is here for generalized weakness and confusion since this morning. Patient required assistance to get out of bed and unable to ambulate. He has had URI symptoms cough, rhinorrhea, and post-nasal drip. He has associated SOB and wheezing. Cough is dry. He denies fever, chills, myalgias, arthralgias, rash, nausea, vomiting or diarrhea. Admits to decreased appetite and intake, including fluids for last three days as well. No know sick contacts. Denies worsened leg swelling and orthopnea.  SOB is present at rest and activity.     Hospital Course  * Acute heart failure with preserved ejection fraction (HFpEF)  BNP  Recent Labs   Lab 04/05/24  1511   *     BNP above baseline elevated t bili  Furosemide 40 mg IV BID --> transition to oral 80 mg daily  Heart failure pathway  ECHO unchanged from before, normal left sided and right sided systolic function, normal diastolic function, PASP 35 mmHg, and IVC 3  Transitioned to oral furosemide 40 mg daily once close to euvolemia. Medications not adjusted as exacerbation due to parainfuenza infection    History of pulmonary embolism  Continue apixaban    Hypokalemia  Replaced    NSTEMI (non-ST elevated myocardial infarction)  Troponin slightly increasing. Follow until plateaued  Type II due to heart failure  Resume atorvastatin and ASA 81 mg daily     Viral URI with cough  Parainfluenza infection  Not hypoxic and no leukocytosis  COVID and flu negative  Albuterol 5 mg q4h while awake  Given a dose of ceftriaxone in ER empirically  Blood culture no growth  improved    Acute metabolic encephalopathy  CT head negative  Delirium precautions    Generalized weakness  CT head negative for acute process  Likely related to acute medical condition  Improving  Moderate intensity recommended    Hyperlipidemia  Atorvastatin 40 mg daily    Primary hypertension  Losartan 100 mg daily    Benign prostatic hyperplasia with urinary frequency  Tamsulosin 0.4 mg daily      Procedures: none    Consultants: none    Discharge Medication List as of 4/9/2024  1:27 PM        START taking these medications    Details   benzonatate (TESSALON) 100 MG capsule Take 1 capsule (100 mg total) by mouth 3 (three) times daily as needed for Cough., Starting Tue 4/9/2024, Until Fri 4/19/2024 at 2359, No Print      dextromethorphan-guaiFENesin  mg (MUCINEX DM)  mg per 12 hr tablet Take 1 tablet by mouth 2 (two) times daily. for 10 days, Starting Tue 4/9/2024, Until Fri 4/19/2024, No  Print      potassium chloride (MICRO-K) 10 MEQ CpSR Take 3 capsules (30 mEq total) by mouth 2 (two) times daily with meals. for 2 days, Starting Tue 4/9/2024, Until Thu 4/11/2024, No Print           CONTINUE these medications which have CHANGED    Details   atorvastatin (LIPITOR) 80 MG tablet Take 0.5 tablets (40 mg total) by mouth once daily., Starting Tue 4/9/2024, No Print      omeprazole 20 mg TbEC Take 1 tablet by mouth once daily., Starting Tue 4/9/2024, No Print           CONTINUE these medications which have NOT CHANGED    Details   acetaminophen (TYLENOL) 500 MG tablet Take 2 tablets (1,000 mg total) by mouth every 6 (six) hours as needed., Starting Tue 1/16/2024, Normal      apixaban (ELIQUIS) 5 mg Tab Take 1 tablet (5 mg total) by mouth 2 (two) times daily., Starting Thu 11/30/2023, Normal      aspirin 81 MG Chew Take 81 mg by mouth once daily., Historical Med      cetirizine (ZYRTEC) 10 MG tablet Take 10 mg by mouth daily as needed., Historical Med      cholecalciferol, vitamin D3, (VITAMIN D3) 50 mcg (2,000 unit) Cap Take 1 capsule by mouth once daily., Historical Med      cyanocobalamin 1,000 mcg/mL injection INJECT 1 ML IN THE MUSCLE EVERY 30 DAYS, Normal      famotidine (PEPCID) 20 MG tablet Take 1 tablet (20 mg total) by mouth 2 (two) times daily as needed for Heartburn., Starting Tue 1/16/2024, Until Wed 1/15/2025 at 2359, Normal      fluticasone propionate (FLONASE) 50 mcg/actuation nasal spray SHAKE LIQUID AND USE 2 SPRAYS(100 MCG) IN EACH NOSTRIL EVERY DAY, Normal      furosemide (LASIX) 40 MG tablet Take 1 tablet (40 mg total) by mouth once daily., Starting Thu 12/28/2023, No Print      ibuprofen (ADVIL,MOTRIN) 800 MG tablet Take 1 tablet (800 mg total) by mouth daily as needed for Pain., Starting Thu 3/7/2024, Normal      JARDIANCE 10 mg tablet TAKE 1 TABLET DAILY, Starting Wed 9/20/2023, Normal      linaCLOtide (LINZESS) 290 mcg Cap capsule Take 1 capsule (290 mcg total) by mouth before  "breakfast., Starting Tue 11/14/2023, Normal      losartan (COZAAR) 100 MG tablet Take 1 tablet (100 mg total) by mouth once daily. HOLD UNTIL FOLLOW-UP WITH PRIMARY CARE PROVIDER, Starting Thu 11/30/2023, No Print      ondansetron (ZOFRAN-ODT) 4 MG TbDL Take 1 tablet (4 mg total) by mouth every 6 (six) hours as needed., Starting Tue 1/16/2024, Normal      solifenacin (VESICARE) 10 MG tablet Take 1 tablet (10 mg total) by mouth once daily., Starting Mon 6/26/2023, Until Tue 6/25/2024, Normal      tamsulosin (FLOMAX) 0.4 mg Cap TAKE 1 CAPSULE(0.4 MG) BY MOUTH EVERY DAY, Starting Thu 1/25/2024, Normal           STOP taking these medications       syringe with needle 3 mL 23 x 1" Syrg Comments:   Reason for Stopping:               Discharge Diet:regular diet     Activity: activity as tolerated    Discharge Condition: Good    Disposition: Home or Self Care    Tests pending at the time of discharge: none      Time spent  on the discharge of the patient including review of hospital course with the patient. reviewing discharge medications and arranging follow-up care 35 min    Discharge examination Patient was seen and examined on the date of discharge and determined to be suitable for discharge.    Discharge plan     Future Appointments   Date Time Provider Department Center   5/1/2024  9:30 AM JAYLYN Zamora MD [facility]       Fariba Arreguin MD      "

## 2024-04-22 NOTE — TELEPHONE ENCOUNTER
----- Message from Baldo Tyson sent at 4/22/2024  9:46 AM CDT -----   Name of Who is Calling:     What is the request in detail:  please contact patient hospital follow up discharge today 04/22/24 / [facility] in patient rehab Please contact to further discuss and advise      Can the clinic reply by [facility]:     What Number to Call Back if not in [facility]:   [phone]-patient   )  caller is Yulissa / [phone] / [facility]

## 2024-04-24 ENCOUNTER — TELEPHONE (OUTPATIENT)
Dept: CARDIOLOGY | Facility: CLINIC | Age: 79
End: 2024-04-24
Payer: MEDICARE

## 2024-04-24 PROCEDURE — G0180 MD CERTIFICATION HHA PATIENT: HCPCS | Mod: ,,, | Performed by: PHYSICAL MEDICINE & REHABILITATION

## 2024-04-24 NOTE — TELEPHONE ENCOUNTER
----- Message from Martha Mcmanus sent at 4/24/2024  1:55 PM CDT -----  Regarding: Appointment  Name of Who is Calling:  Patient          What is the request in detail:  Patient would like to make an appointment for his 2 month follow up, the next available appointment with  was for August. Patient does not want to see anyone else.            Can the clinic reply by [facility]: No            [facility]:[phone]

## 2024-04-25 ENCOUNTER — TELEPHONE (OUTPATIENT)
Dept: CARDIOLOGY | Facility: CLINIC | Age: 79
End: 2024-04-25
Payer: MEDICARE

## 2024-04-25 NOTE — TELEPHONE ENCOUNTER
----- Message from Monae Alvarez sent at 4/25/2024 11:58 AM CDT -----  Regarding: call back  Type: Patient Call Back    Who called: Ra[facility]l [facility] aide     What is the request in detail: requesting provider reach out to pt to schedule a hospital f/u after recent admission     Can the clinic reply by [facility]?no    Would the patient rather a call back or a response via [facility]? call    Best call back number: [phone]     Additional Information:

## 2024-04-26 NOTE — PHYSICIAN QUERY
Question: Provider, due to the conflicting clinical picture, please clinically validate the diagnosis of              NSTEMI Type 2.            If validated, please provide additional clinical support for the diagnosis.      Provider Query Response:  Elevated troponins

## 2024-05-01 ENCOUNTER — OFFICE VISIT (OUTPATIENT)
Dept: INTERNAL MEDICINE | Facility: CLINIC | Age: 79
End: 2024-05-01
Attending: INTERNAL MEDICINE
Payer: MEDICARE

## 2024-05-01 VITALS
BODY MASS INDEX: 33.27 KG/M2 | SYSTOLIC BLOOD PRESSURE: 100 MMHG | DIASTOLIC BLOOD PRESSURE: 70 MMHG | HEART RATE: 95 BPM | WEIGHT: 251 LBS | OXYGEN SATURATION: 98 % | HEIGHT: 73 IN

## 2024-05-01 DIAGNOSIS — I50.32 HEART FAILURE, DIASTOLIC, CHRONIC: ICD-10-CM

## 2024-05-01 DIAGNOSIS — I34.0 NONRHEUMATIC MITRAL VALVE REGURGITATION: Primary | ICD-10-CM

## 2024-05-01 DIAGNOSIS — I50.31 ACUTE HEART FAILURE WITH PRESERVED EJECTION FRACTION (HFPEF): ICD-10-CM

## 2024-05-01 DIAGNOSIS — E78.5 HYPERLIPIDEMIA, UNSPECIFIED HYPERLIPIDEMIA TYPE: ICD-10-CM

## 2024-05-01 DIAGNOSIS — I10 PRIMARY HYPERTENSION: ICD-10-CM

## 2024-05-01 PROCEDURE — 99214 OFFICE O/P EST MOD 30 MIN: CPT | Mod: S$GLB,,, | Performed by: INTERNAL MEDICINE

## 2024-05-01 RX ORDER — LOSARTAN POTASSIUM 100 MG/1
100 TABLET ORAL DAILY
Start: 2024-05-01

## 2024-05-01 NOTE — PROGRESS NOTES
Subjective     Patient ID: Sam Gamino is a 78 y.o. male.    Chief Complaint: Follow-up (Hosp f/u was dizzy and incoherent )    Hospital Follow-up:    Admit date: 24  Discharge date: 24  Hospital D/c for: Acute on chronic heart failure with preserved EF and parainfluenza.    Patient was called within 2 days of hospital discharge and made a follow-up appt with me within 14 calendar days of discharge.  The Discharge Summary was reviewed.       Family and/or Caretaker present at visit?  No  Diagnostic tests reviewed/disposition:  Yes.  Disease/illness education: Yes.   Home health/community services discussion/referrals:  He is active with home health and home PT.  Establishment or re-establishment of referral orders for community resources: None needed   Discussion with other health care providers: Notes in EPIC for review by all healthcare providers.          Follow-up  This is a chronic problem. The current episode started more than 1 year ago. The problem has been gradually improving.     Review of Systems   Constitutional: Negative.    Respiratory: Negative.     Cardiovascular: Negative.           Objective     Physical Exam  Vitals and nursing note reviewed.   Constitutional:       Appearance: He is well-developed.   HENT:      Head: Normocephalic and atraumatic.   Eyes:      Pupils: Pupils are equal, round, and reactive to light.   Neck:      Vascular: No JVD.   Cardiovascular:      Rate and Rhythm: Normal rate and regular rhythm.      Heart sounds: Murmur heard.      Systolic murmur is present with a grade of 2/6.      Comments: Holosystolic murmur greatest at the left lower sternal border.  Pulmonary:      Effort: Pulmonary effort is normal.   Abdominal:      General: Abdomen is protuberant. Bowel sounds are normal.      Palpations: Abdomen is soft.      Tenderness: There is no rebound.   Musculoskeletal:      Right lower le+ Edema present.      Left lower le+ Edema present.    Neurological:      Mental Status: He is alert.            Assessment and Plan     1. Nonrheumatic mitral valve regurgitation  Overview:  2007 - Mild  4/22 - Mod/Severe    Orders:  -     Ambulatory referral/consult to Cardiology; Future; Expected date: 05/08/2024    2. Primary hypertension  Overview:  2002: Diagnosed.         3. Acute heart failure with preserved ejection fraction (HFpEF)  Overview:  5/16/2022: Echo: Normal left ventricular size and systolic function. EF 55%. Moderate MR. Mild to moderate TR.   6/6/2023: Echo: Normal left ventricular size and systolic function. EF 60%. LVGLS -15%. Moderate diastolic dysfunction. Mild to moderate AR. Mild to moderate MR. Mildly enlarged ascending aorta. Small pericardial effusion.        Orders:  -     Ambulatory referral/consult to Cardiology; Future; Expected date: 05/08/2024    4. Hyperlipidemia, unspecified hyperlipidemia type  Overview:  2002: Statin was begun.      5. Heart failure, diastolic, chronic  -     losartan (COZAAR) 100 MG tablet; Take 1 tablet (100 mg total) by mouth once daily.        PLAN:  Per orders and D/C instructions.  Continue diet and/or meds for HTN, and high cholesterol, which are stable.  Follow-up with cardiology for mitral valve regurgitation and acute on chronic heart failure with preserved EF.         Follow up in about 3 months (around 8/1/2024).

## 2024-05-02 ENCOUNTER — TELEPHONE (OUTPATIENT)
Dept: CARDIOLOGY | Facility: CLINIC | Age: 79
End: 2024-05-02
Payer: MEDICARE

## 2024-05-20 ENCOUNTER — OFFICE VISIT (OUTPATIENT)
Dept: CARDIOLOGY | Facility: CLINIC | Age: 79
End: 2024-05-20
Attending: INTERNAL MEDICINE
Payer: MEDICARE

## 2024-05-20 VITALS
BODY MASS INDEX: 32.34 KG/M2 | HEIGHT: 73 IN | HEART RATE: 100 BPM | OXYGEN SATURATION: 98 % | SYSTOLIC BLOOD PRESSURE: 114 MMHG | DIASTOLIC BLOOD PRESSURE: 55 MMHG | WEIGHT: 244 LBS

## 2024-05-20 DIAGNOSIS — I10 PRIMARY HYPERTENSION: ICD-10-CM

## 2024-05-20 DIAGNOSIS — C85.90 LYMPHOMA IN REMISSION: ICD-10-CM

## 2024-05-20 DIAGNOSIS — E04.1 THYROID NODULE: ICD-10-CM

## 2024-05-20 DIAGNOSIS — I34.0 NONRHEUMATIC MITRAL VALVE REGURGITATION: ICD-10-CM

## 2024-05-20 DIAGNOSIS — Z86.79 HISTORY OF SUBDURAL HEMORRHAGE: ICD-10-CM

## 2024-05-20 DIAGNOSIS — I25.10 CORONARY ARTERY DISEASE INVOLVING NATIVE CORONARY ARTERY OF NATIVE HEART WITHOUT ANGINA PECTORIS: ICD-10-CM

## 2024-05-20 DIAGNOSIS — I50.31 ACUTE HEART FAILURE WITH PRESERVED EJECTION FRACTION (HFPEF): ICD-10-CM

## 2024-05-20 DIAGNOSIS — Z86.711 HISTORY OF PULMONARY EMBOLISM: ICD-10-CM

## 2024-05-20 DIAGNOSIS — I44.0 ATRIOVENTRICULAR BLOCK, FIRST DEGREE: ICD-10-CM

## 2024-05-20 DIAGNOSIS — Z79.01 CHRONIC ANTICOAGULATION: ICD-10-CM

## 2024-05-20 DIAGNOSIS — I70.0 ATHEROSCLEROSIS OF AORTA: ICD-10-CM

## 2024-05-20 DIAGNOSIS — I36.1 NONRHEUMATIC TRICUSPID VALVE REGURGITATION: ICD-10-CM

## 2024-05-20 DIAGNOSIS — I50.32 HEART FAILURE, DIASTOLIC, CHRONIC: ICD-10-CM

## 2024-05-20 DIAGNOSIS — E78.5 HYPERLIPIDEMIA, UNSPECIFIED HYPERLIPIDEMIA TYPE: ICD-10-CM

## 2024-05-20 PROCEDURE — 99214 OFFICE O/P EST MOD 30 MIN: CPT | Mod: S$PBB,,, | Performed by: INTERNAL MEDICINE

## 2024-05-20 PROCEDURE — 99214 OFFICE O/P EST MOD 30 MIN: CPT | Mod: PBBFAC | Performed by: INTERNAL MEDICINE

## 2024-05-20 PROCEDURE — 99999 PR PBB SHADOW E&M-EST. PATIENT-LVL IV: CPT | Mod: PBBFAC,,, | Performed by: INTERNAL MEDICINE

## 2024-05-20 NOTE — PROGRESS NOTES
Subjective:     Sam Gamino is a 78 y.o. male with hypertension and hypercholesterolemia. He is severely obese. 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 In 5/2022 he was admitted with heart failure. He was noted to have moderate to severe mitral regurgitation and severe pulmonary hypertension. He was diuresed. On 5/16/2022 he had an echocardiogram that revealed normal left ventricular size and systolic function. The ejection fraction was 55%. There was moderate mitral regurgitation. There was mild to moderate tricuspid regurgitation. He denies any exertional chest pain. In 9/2022 he was able to walk about two blocks before he had to stop due to dyspnea. He had mild edema of his legs. He was given empagliflozin and he was breathing better and able to walk farther. The edema resolved. On 11/1/2023 he is noted to have a first degree atrioventricular block with a VA interval of 320 ms. On 11/5/2023 he had sudden onset of a severe headache. He found to have had a subarachnoid hemorrhage. He was treated conservatively. During the stay he had a computed tomography angiography scan of his head and neck. The images revealed bilateral pulmonary emboli and he was anticoagulated with apixiban. In 4/2024 he was admitted with heart failure. No chest pain or shortness of breath. No palpitations or weak spells. No bleeding. No clear issues with any of his prescribed medications. Feeling well overall.      Congestive Heart Failure  Presents for follow-up visit. Pertinent negatives include no abdominal pain, chest pain, chest pressure, claudication, edema, fatigue, muscle weakness, near-syncope, nocturia, orthopnea, palpitations, paroxysmal nocturnal dyspnea, shortness of breath or unexpected weight change. The symptoms have been stable. His past medical history is significant for CAD.   Coronary Artery Disease  Presents for follow-up visit. Pertinent negatives  include no chest pain, chest pressure, chest tightness, dizziness, leg swelling, muscle weakness, palpitations, shortness of breath or weight gain. Risk factors include hyperlipidemia and obesity. His past medical history is significant for CHF.   Hypertension  This is a chronic problem. The current episode started more than 1 year ago. The problem is unchanged. The problem is controlled (usually 110-120/70-80 mmHg at home). Pertinent negatives include no anxiety, blurred vision, chest pain, headaches, malaise/fatigue, neck pain, orthopnea, palpitations, peripheral edema, PND, shortness of breath or sweats. There is no history of chronic renal disease.   Hyperlipidemia  This is a chronic problem. The current episode started more than 1 year ago. The problem is controlled. Recent lipid tests were reviewed and are normal. Exacerbating diseases include obesity. He has no history of chronic renal disease, diabetes, hypothyroidism, liver disease or nephrotic syndrome. Pertinent negatives include no chest pain, focal sensory loss, focal weakness, leg pain, myalgias or shortness of breath.   Cerebrovascular Accident  Pertinent negatives include no abdominal pain, chest pain, chills, coughing, fatigue, fever, headaches, myalgias, nausea, neck pain, numbness, rash, vertigo, vomiting or weakness.       Review of Systems   Constitutional: Negative for chills, fatigue, fever, malaise/fatigue, unexpected weight change and weight gain.   HENT:  Negative for nosebleeds and tinnitus.    Eyes:  Negative for blurred vision, double vision, vision loss in left eye and vision loss in right eye.   Cardiovascular:  Negative for chest pain, claudication, dyspnea on exertion, irregular heartbeat, leg swelling, near-syncope, orthopnea, palpitations, paroxysmal nocturnal dyspnea and syncope.   Respiratory:  Negative for chest tightness, cough, hemoptysis, shortness of breath and wheezing.    Endocrine: Negative for cold intolerance and heat  intolerance.   Hematologic/Lymphatic: Negative for bleeding problem. Does not bruise/bleed easily.   Skin:  Negative for color change and rash.   Musculoskeletal:  Negative for back pain, falls, muscle weakness, myalgias and neck pain.   Gastrointestinal:  Negative for abdominal pain, diarrhea, dysphagia, heartburn, hematemesis, hematochezia, hemorrhoids, jaundice, melena, nausea and vomiting.   Genitourinary:  Negative for dysuria, hematuria and nocturia.   Neurological:  Negative for dizziness, focal weakness, headaches, light-headedness, loss of balance, numbness, tremors, vertigo and weakness.   Psychiatric/Behavioral:  Negative for altered mental status, depression and memory loss. The patient is not nervous/anxious.    Allergic/Immunologic: Negative for hives and persistent infections.       Current Outpatient Medications on File Prior to Visit   Medication Sig Dispense Refill    acetaminophen (TYLENOL) 500 MG tablet Take 2 tablets (1,000 mg total) by mouth every 6 (six) hours as needed. 50 tablet 0    aspirin 81 MG Chew Take 81 mg by mouth once daily.      cetirizine (ZYRTEC) 10 MG tablet Take 10 mg by mouth daily as needed.      cholecalciferol, vitamin D3, (VITAMIN D3) 50 mcg (2,000 unit) Cap Take 1 capsule by mouth once daily.      cyanocobalamin 1,000 mcg/mL injection INJECT 1 ML IN THE MUSCLE EVERY 30 DAYS 10 mL 1    famotidine (PEPCID) 20 MG tablet Take 1 tablet (20 mg total) by mouth 2 (two) times daily as needed for Heartburn. 20 tablet 0    fluticasone propionate (FLONASE) 50 mcg/actuation nasal spray SHAKE LIQUID AND USE 2 SPRAYS(100 MCG) IN EACH NOSTRIL EVERY DAY 16 g 3    furosemide (LASIX) 40 MG tablet Take 1 tablet (40 mg total) by mouth once daily. 120 tablet 3    ibuprofen (ADVIL,MOTRIN) 800 MG tablet Take 1 tablet (800 mg total) by mouth daily as needed for Pain. 30 tablet 0    JARDIANCE 10 mg tablet TAKE 1 TABLET DAILY 90 tablet 3    linaCLOtide (LINZESS) 290 mcg Cap capsule Take 1 capsule  "(290 mcg total) by mouth before breakfast. 90 capsule 3    omeprazole 20 mg TbEC Take 1 tablet by mouth once daily.      tamsulosin (FLOMAX) 0.4 mg Cap TAKE 1 CAPSULE(0.4 MG) BY MOUTH EVERY DAY 90 capsule 1    losartan (COZAAR) 100 MG tablet Take 1 tablet (100 mg total) by mouth once daily. (Patient not taking: Reported on 5/20/2024)      ondansetron (ZOFRAN-ODT) 4 MG TbDL Take 1 tablet (4 mg total) by mouth every 6 (six) hours as needed. (Patient not taking: Reported on 5/20/2024) 15 tablet 0    solifenacin (VESICARE) 10 MG tablet Take 1 tablet (10 mg total) by mouth once daily. (Patient not taking: Reported on 5/20/2024) 30 tablet 11     No current facility-administered medications on file prior to visit.       BP (!) 114/55 (BP Location: Left arm, Patient Position: Sitting, BP Method: Large (Manual))   Pulse 100   Ht 6' 1" (1.854 m)   Wt 110.7 kg (244 lb)   SpO2 98%   BMI 32.19 kg/m²     Objective:     Physical Exam  Constitutional:       General: He is not in acute distress.     Appearance: Normal appearance. He is well-developed. He is not toxic-appearing or diaphoretic.   HENT:      Head: Normocephalic and atraumatic.      Nose: Nose normal.   Eyes:      General:         Right eye: No discharge.         Left eye: No discharge.      Conjunctiva/sclera:      Right eye: Right conjunctiva is not injected.      Left eye: Left conjunctiva is not injected.      Pupils: Pupils are equal.      Right eye: Pupil is round.      Left eye: Pupil is round.   Neck:      Thyroid: No thyromegaly.      Vascular: No carotid bruit or JVD.   Cardiovascular:      Rate and Rhythm: Normal rate and regular rhythm. No extrasystoles are present.     Chest Wall: PMI is not displaced.      Pulses:           Radial pulses are 2+ on the right side and 2+ on the left side.        Femoral pulses are 2+ on the right side and 2+ on the left side.       Dorsalis pedis pulses are 2+ on the right side and 2+ on the left side.        Posterior " tibial pulses are 2+ on the right side and 2+ on the left side.      Heart sounds: S1 normal and S2 normal. Murmur heard.      High-pitched blowing holosystolic murmur is present at the apex. P2 pronounced.      Gallop present. S3 and S4 sounds present.   Pulmonary:      Effort: Pulmonary effort is normal.      Breath sounds: No rales.   Abdominal:      Palpations: Abdomen is soft.      Tenderness: There is no abdominal tenderness.   Musculoskeletal:      Cervical back: Neck supple.      Right lower leg: No swelling. No edema.      Left lower leg: No swelling. No edema.   Lymphadenopathy:      Head:      Right side of head: No submandibular adenopathy.      Left side of head: No submandibular adenopathy.      Cervical: No cervical adenopathy.   Skin:     General: Skin is warm and dry.      Findings: No rash.      Nails: There is no clubbing.   Neurological:      General: No focal deficit present.      Mental Status: He is alert and oriented to person, place, and time. He is not disoriented.      Cranial Nerves: No cranial nerve deficit.   Psychiatric:         Attention and Perception: Attention and perception normal.         Mood and Affect: Mood and affect normal.         Speech: Speech normal.         Behavior: Behavior normal.         Thought Content: Thought content normal.         Cognition and Memory: Cognition and memory normal.         Judgment: Judgment normal.         Assessment:     1. Acute heart failure with preserved ejection fraction (HFpEF)    2. Heart failure, diastolic, chronic    3. Nonrheumatic tricuspid valve regurgitation    4. Nonrheumatic mitral valve regurgitation    5. Coronary artery disease involving native coronary artery of native heart without angina pectoris    6. Atrioventricular block, first degree    7. Chronic anticoagulation    8. History of pulmonary embolism    9. History of subdural hemorrhage    10. Atherosclerosis of aorta    11. Primary hypertension    12. Hyperlipidemia,  unspecified hyperlipidemia type    13. Lymphoma in remission    14. Thyroid nodule        Plan:     1. Heart Failure, Diastolic, Chronic   5/16/2022: Echo: Normal left ventricular size and systolic function. EF 55%. Moderate MR. Mild to moderate TR.   6/6/2023: Echo: Normal left ventricular size and systolic function. EF 60%. LVGLS -15%. Moderate diastolic dysfunction. Mild to moderate AR. Mild to moderate MR. Mildly enlarged ascending aorta. Small pericardial effusion.   8/8/2022: Spironolactone 25 mg Q24 was begun and KCl 20 mEq Q24 was discontinued.   10/10/2022: Empagliflozin 10 mg Q24 was begun.   10/27/2022: BNP 69.   11/1/2023: Metoprolol 50 mg Q24 was discontinued as the ME was 320 ms on an ECG.    On empagliflozin 10 mg Q24, losartan 100 mg Q24, spironolactone 25 mg Q24 and furosemide 40 mg Q24.   May take additional furosemide 40 mg Q24 PRN for edema which he has not needed.   Well compensated.   6/2024: Plan next Echo.      2. Mitral Regurgitation   5/16/2022: Echo: Moderate MR.   6/6/2023: Echo: Mild to moderate MR.     3. Tricuspid Regurgitation   5/16/2022: Echo: Mild to moderate TR.    4. Coronary Artery Disease   3/2007: Underwent coronary angiogram. Appears to have had mild disease.   He is anticoagulated.    5. Atrioventricular Block, First Degree   11/1/2023: ECG: ME interval 320 ms.   11/1/2023: Metoprolol 50 mg Q24 was discontinued.    12/19/2023: Holter: Sinus rhythm 94 () bpm. Long ME interval. Narrow QRS. No prolonged pauses. Frequent APC's - 15/hr. Rare VPC's - 2/hr. 3 V couplets. 2 V triplets. No reported symptoms.  No betablocker.   To be followed.    6. Chronic Anticoagulation   11/14/2023: CTA of Head and Neck: Bilateral pulmonary emboli.   Appears the PE was unprovoked.   On apixiban 5 mg Q12.    7. History of Pulmonary Embolism   11/14/2023: CTA of Head and Neck: Bilateral pulmonary emboli.   Appears the PE was unprovoked.   On apixiban.    8. History of Subarachnoid  Hemorrhage   11/2023: SAH.   Conservative care.   11/2023: Diagnosed with pulmonary embolism and benefit with anticoagulation felt to outweigh risk.     9. Atherosclerosis of Aorta   2023: Mentioned in chart.   No aspirin.    10. Hypertension   2002: Diagnosed.   11/1/2023: Metoprolol 50 mg Q24 was discontinued.     On losartan 100 mg Q24, spironolactone 25 mg Q24 and furosemide 40 mg Q24.   Keeping log at home.   Well controlled.      11. Hypercholesterolemia   2002: Statin was begun.   On atorvastatin 40 mg Q24.   4/12/2022: Chol 132. HDL 41. LDL 71. .   On atorvastatin 40 mg Q24.   Very favorable lipid panel.    12. Mild Obesity   6/27/2022: Weight 117 kg. BMI 35.   6/28/2023: Weight 119 kg. BMI 35.   12/7/2023: Weight 110 kg. BMI 32.   Encouraged to lose more weight.    13. 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    14. Primary Care   Dr. Herber Zamora.    F/u 2 months.    Ema Montemayor M.D.

## 2024-05-22 ENCOUNTER — DOCUMENTATION ONLY (OUTPATIENT)
Dept: INTERNAL MEDICINE | Facility: CLINIC | Age: 79
End: 2024-05-22
Payer: MEDICARE

## 2024-05-22 DIAGNOSIS — I34.0 NONRHEUMATIC MITRAL VALVE REGURGITATION: ICD-10-CM

## 2024-05-22 DIAGNOSIS — Z78.9 KNOWN MEDICAL PROBLEMS: ICD-10-CM

## 2024-05-22 DIAGNOSIS — I10 ESSENTIAL HYPERTENSION: Primary | ICD-10-CM

## 2024-05-22 DIAGNOSIS — I50.33 HEART FAILURE, DIASTOLIC, ACUTE ON CHRONIC: ICD-10-CM

## 2024-05-22 PROCEDURE — 99490 CHRNC CARE MGMT STAFF 1ST 20: CPT | Mod: S$GLB,,, | Performed by: INTERNAL MEDICINE

## 2024-05-22 RX ORDER — IBUPROFEN 800 MG/1
TABLET ORAL
Qty: 30 TABLET | Refills: 0 | Status: SHIPPED | OUTPATIENT
Start: 2024-05-22

## 2024-05-24 NOTE — PROGRESS NOTES
The patient's electronic chart was reviewed during this month. The patient's medical, functional, and psychosocial needs were assessed. Need for Home health care, PT, OT, , psychiatric care, and hospice was assessed. All preventative care measures were reviewed and updated. All medications were reviewed and reconciled. Potential drug interactions and medication adherence was reviewed. Prescriptions were renewed as appropriate. Education was provided to the patient and/or caregiver as needed, and all questions were answered. Over 20 minutes were spent providing these non-face-to-face services during this calendar month.     Refilled     Disp Refills Start End SHAHAB   ibuprofen (ADVIL,MOTRIN) 800 MG tablet 30 tablet 0 5/22/2024 -- No   Sig: TAKE 1 TABLET(800 MG) BY MOUTH DAILY AS NEEDED FOR PAIN   Sent to pharmacy as: ibuprofen (ADVIL,MOTRIN) 800 MG tablet   Class: Normal   Order: [id number]   Date/Time Signed: 5/22/2024 16:17       E-Prescribing Status: Receipt confirmed by pharmacy (5/22/2024  4:17 PM CDT)

## 2024-06-17 ENCOUNTER — DOCUMENTATION ONLY (OUTPATIENT)
Dept: INTERNAL MEDICINE | Facility: CLINIC | Age: 79
End: 2024-06-17
Payer: MEDICARE

## 2024-06-17 DIAGNOSIS — E78.5 HYPERLIPIDEMIA, UNSPECIFIED HYPERLIPIDEMIA TYPE: ICD-10-CM

## 2024-06-17 DIAGNOSIS — Z78.9 KNOWN MEDICAL PROBLEMS: ICD-10-CM

## 2024-06-17 DIAGNOSIS — I10 PRIMARY HYPERTENSION: Primary | ICD-10-CM

## 2024-06-17 PROCEDURE — 99491 CHRNC CARE MGMT PHYS 1ST 30: CPT | Mod: S$GLB,,, | Performed by: INTERNAL MEDICINE

## 2024-06-17 RX ORDER — BENZONATATE 100 MG/1
100 CAPSULE ORAL 3 TIMES DAILY PRN
Qty: 90 CAPSULE | Refills: 1 | Status: SHIPPED | OUTPATIENT
Start: 2024-06-17 | End: 2024-06-27

## 2024-06-17 NOTE — PROGRESS NOTES
The patient's electronic chart was reviewed during this month. The patient's medical, functional, and psychosocial needs were assessed. Need for Home health care, PT, OT, , psychiatric care, and hospice was assessed. All preventative care measures were reviewed and updated. All medications were reviewed and reconciled. Potential drug interactions and medication adherence was reviewed. Prescriptions were renewed as appropriate. Education was provided to the patient and/or caregiver as needed, and all questions were answered. Over 30 minutes were spent providing these non-face-to-face services during this calendar month.    These services were provided directly by myself, the physician.    Patient sent a message through the patient portal requesting benzonatate 100 mg.  A prescription was sent to the pharmacy.

## 2024-07-08 PROBLEM — I21.4 NSTEMI (NON-ST ELEVATED MYOCARDIAL INFARCTION): Status: RESOLVED | Noted: 2024-04-06 | Resolved: 2024-07-08

## 2024-07-09 ENCOUNTER — OFFICE VISIT (OUTPATIENT)
Dept: OTOLARYNGOLOGY | Facility: CLINIC | Age: 79
End: 2024-07-09
Payer: MEDICARE

## 2024-07-09 VITALS — HEART RATE: 105 BPM | SYSTOLIC BLOOD PRESSURE: 108 MMHG | DIASTOLIC BLOOD PRESSURE: 70 MMHG

## 2024-07-09 DIAGNOSIS — J38.7 PRESBYLARYNX: Primary | ICD-10-CM

## 2024-07-09 PROCEDURE — 31575 DIAGNOSTIC LARYNGOSCOPY: CPT | Mod: PBBFAC | Performed by: OTOLARYNGOLOGY

## 2024-07-09 PROCEDURE — 99213 OFFICE O/P EST LOW 20 MIN: CPT | Mod: PBBFAC | Performed by: OTOLARYNGOLOGY

## 2024-07-09 PROCEDURE — 99999 PR PBB SHADOW E&M-EST. PATIENT-LVL III: CPT | Mod: PBBFAC,,, | Performed by: OTOLARYNGOLOGY

## 2024-07-10 ENCOUNTER — EXTERNAL HOME HEALTH (OUTPATIENT)
Dept: HOME HEALTH SERVICES | Facility: HOSPITAL | Age: 79
End: 2024-07-10
Payer: MEDICARE

## 2024-07-10 NOTE — PROGRESS NOTES
CC: Hoarseness    HPI   78 y.o. male presents for evaluation of dysphonia and dysphagia.  He reports these problems have been worsening over time.  He has a remote history of radiation to the neck and chest for non-Hodgkin's lymphoma.  He reports that his voice grows weak with use.  He denies pain. No swallowing complaints today.    Review of Systems   Constitutional: Negative for fatigue and unexpected weight change.   HENT: Per HPI.  Eyes: Negative for visual disturbance.   Respiratory: Negative for shortness of breath, hemoptysis   Cardiovascular: Negative for chest pain and palpitations.   Musculoskeletal: Negative for decreased ROM, back pain.   Skin: Negative for rash, sunburn, itching.   Neurological: Negative for dizziness and seizures.   Hematological: Negative for adenopathy. Does not bruise/bleed easily.   Endocrine: Negative for rapid weight loss/weight gain, heat/cold intolerance.     Past Medical History   Patient Active Problem List   Diagnosis    Lymphoma in remission    Pernicious anemia    Coronary artery disease    Thyroid nodule    Normal cardiac stress test    Slow transit constipation    Chronic bilateral low back pain with bilateral sciatica    Benign prostatic hyperplasia with urinary frequency    Mitral regurgitation    Tricuspid regurgitation    Lower extremity edema    Primary hypertension    Heart failure, diastolic, chronic    Hyperlipidemia    Urinary frequency    Atrioventricular block, first degree    Aortic atherosclerosis    Atherosclerosis of aorta    History of subdural hemorrhage    History of pulmonary embolism    Electrolyte abnormality    Impaired functional mobility, balance, gait, and endurance    Status post bone marrow transplant    Obesity (BMI 30.0-34.9)    Chronic anticoagulation    Dysphagia    Presbylarynx    Generalized weakness    Acute metabolic encephalopathy    Viral URI with cough    Hypokalemia    Known medical problems           Past Surgical History   Past  Surgical History:   Procedure Laterality Date    EYE SURGERY Bilateral 2016    Cataracts         Family History   Family History   Problem Relation Name Age of Onset    Cancer Mother          uterine    No Known Problems Father      Hypertension Brother Orlando     No Known Problems Daughter Katherine     No Known Problems Sister Lynda     No Known Problems Sister Cheryl     No Known Problems Brother Black     No Known Problems Brother Naseem     No Known Problems Brother      Thyroid disease Sister Halina            Social History   .  Social History     Socioeconomic History    Marital status:      Spouse name: Eileen    Number of children: 1+3   Occupational History    Occupation:    Tobacco Use    Smoking status: Never    Smokeless tobacco: Never   Substance and Sexual Activity    Alcohol use: Yes     Alcohol/week: 1.0 standard drink of alcohol     Types: 1 Standard drinks or equivalent per week     Comment: ocassionally    Drug use: No    Sexual activity: Not Currently     Partners: Female   Social History Narrative    No exercise.    Wants to retire in 2019    Going to retire - 12/21 1/24 - he walks with a walker     Social Determinants of Health     Financial Resource Strain: Low Risk  (4/8/2024)    Overall Financial Resource Strain (CARDIA)     Difficulty of Paying Living Expenses: Not hard at all   Food Insecurity: No Food Insecurity (4/8/2024)    Hunger Vital Sign     Worried About Running Out of Food in the Last Year: Never true     Ran Out of Food in the Last Year: Never true   Transportation Needs: No Transportation Needs (4/8/2024)    PRAPARE - Transportation     Lack of Transportation (Medical): No     Lack of Transportation (Non-Medical): No   Physical Activity: Unknown (11/6/2023)    Exercise Vital Sign     Minutes of Exercise per Session: 30 min   Stress: No Stress Concern Present (4/8/2024)    [employer] - Occupational Stress Questionnaire     Feeling of  Stress : Not at all   Housing Stability: Low Risk  (4/8/2024)    Housing Stability Vital Sign     Unable to Pay for Housing in the Last Year: No     Number of Places Lived in the Last Year: 1     Unstable Housing in the Last Year: No         Allergies   Review of patient's allergies indicates:   Allergen Reactions    Lotensin [benazepril]      cough           Physical Exam     Vitals:    07/09/24 1440   BP: 108/70   Pulse: 105         There is no height or weight on file to calculate BMI.      General: AOx3, NAD   Respiratory:  Symmetric chest rise, normal effort  Nose: No gross nasal septal deviation. Inferior Turbinates WNL bilaterally. No septal perforation. No masses/lesions.   Oral Cavity:  Oral Tongue mobile, no lesions noted. Hard Palate WNL. No buccal or FOM lesions.  Oropharynx:  No masses/lesions of the posterior pharyngeal wall. Tonsillar fossa without lesions. Soft palate without masses. Midline uvula.   Neck: No scars.  No cervical lymphadenopathy, thyromegaly or thyroid nodules.  Normal range of motion.    Face: House Brackmann I bilaterally.     Flex Naso Fernanda Hypo Procedures #2    Procedure:  Diagnostic flexible nasopharyngoscopy, laryngoscopy and hypopharyngoscopy:    Routine preparation with local atomizer with 1% neosynephrine/pontocaine with customary flexible endoscope.    Nasopharynx:  No lesions.   Mucosa:  No lesions.   Adenoids:  Present.  Posterior Choanae:  Patent.  Eustachian Tubes:  Patent.  Posterior pharynx:  No lesions.  Larynx/hypopharynx:   Epiglottis:  No lesions, without edema.   AE Folds:  No lesions.   Vocal cords:  No polyps, nodules, ulcers or lesions.   Mobility:  Equal and normal bilateral.   Hypopharynx:  No lesions.   Piriform sinus:  No pooling, no lesions.   Post Cricoid:  No erythema, no edema.        Assessment/Plan  Problem List Items Addressed This Visit          ENT    Presbylarynx - Primary     No worrisome lesions.  Refer to voice center for further eval.

## 2024-08-03 DIAGNOSIS — R35.0 URINARY FREQUENCY: ICD-10-CM

## 2024-08-04 RX ORDER — SOLIFENACIN SUCCINATE 10 MG/1
10 TABLET, FILM COATED ORAL
Qty: 90 TABLET | Refills: 3 | Status: SHIPPED | OUTPATIENT
Start: 2024-08-04

## 2024-08-14 ENCOUNTER — OFFICE VISIT (OUTPATIENT)
Dept: INTERNAL MEDICINE | Facility: CLINIC | Age: 79
End: 2024-08-14
Attending: INTERNAL MEDICINE
Payer: MEDICARE

## 2024-08-14 VITALS
HEIGHT: 73 IN | WEIGHT: 245 LBS | SYSTOLIC BLOOD PRESSURE: 114 MMHG | DIASTOLIC BLOOD PRESSURE: 66 MMHG | BODY MASS INDEX: 32.47 KG/M2 | OXYGEN SATURATION: 95 % | HEART RATE: 99 BPM

## 2024-08-14 DIAGNOSIS — R41.3 MEMORY LOSS: ICD-10-CM

## 2024-08-14 DIAGNOSIS — I10 PRIMARY HYPERTENSION: ICD-10-CM

## 2024-08-14 DIAGNOSIS — N40.1 BENIGN PROSTATIC HYPERPLASIA WITH URINARY FREQUENCY: ICD-10-CM

## 2024-08-14 DIAGNOSIS — I34.0 NONRHEUMATIC MITRAL VALVE REGURGITATION: Primary | ICD-10-CM

## 2024-08-14 DIAGNOSIS — Z86.711 HISTORY OF PULMONARY EMBOLISM: ICD-10-CM

## 2024-08-14 DIAGNOSIS — L30.9 ECZEMA, UNSPECIFIED TYPE: ICD-10-CM

## 2024-08-14 DIAGNOSIS — I36.1 NONRHEUMATIC TRICUSPID VALVE REGURGITATION: ICD-10-CM

## 2024-08-14 DIAGNOSIS — Z86.79 HISTORY OF SUBDURAL HEMORRHAGE: ICD-10-CM

## 2024-08-14 DIAGNOSIS — E78.5 HYPERLIPIDEMIA, UNSPECIFIED HYPERLIPIDEMIA TYPE: ICD-10-CM

## 2024-08-14 DIAGNOSIS — I50.32 HEART FAILURE, DIASTOLIC, CHRONIC: ICD-10-CM

## 2024-08-14 DIAGNOSIS — R60.0 LOWER EXTREMITY EDEMA: ICD-10-CM

## 2024-08-14 DIAGNOSIS — Z71.89 ACP (ADVANCE CARE PLANNING): ICD-10-CM

## 2024-08-14 DIAGNOSIS — R35.0 BENIGN PROSTATIC HYPERPLASIA WITH URINARY FREQUENCY: ICD-10-CM

## 2024-08-14 PROBLEM — Z79.01 CHRONIC ANTICOAGULATION: Status: RESOLVED | Noted: 2024-02-15 | Resolved: 2024-08-14

## 2024-08-14 PROCEDURE — 99214 OFFICE O/P EST MOD 30 MIN: CPT | Mod: S$GLB,,, | Performed by: INTERNAL MEDICINE

## 2024-08-14 PROCEDURE — 99497 ADVNCD CARE PLAN 30 MIN: CPT | Mod: S$GLB,,, | Performed by: INTERNAL MEDICINE

## 2024-08-14 RX ORDER — LOSARTAN POTASSIUM 100 MG/1
100 TABLET ORAL DAILY
Qty: 90 TABLET | Refills: 3 | Status: SHIPPED | OUTPATIENT
Start: 2024-08-14

## 2024-08-14 RX ORDER — CLOTRIMAZOLE AND BETAMETHASONE DIPROPIONATE 10; .64 MG/G; MG/G
CREAM TOPICAL 2 TIMES DAILY PRN
Qty: 45 G | Refills: 0 | Status: SHIPPED | OUTPATIENT
Start: 2024-08-14

## 2024-08-14 RX ORDER — DONEPEZIL HYDROCHLORIDE 5 MG/1
5 TABLET, FILM COATED ORAL NIGHTLY
Qty: 90 TABLET | Refills: 3 | Status: SHIPPED | OUTPATIENT
Start: 2024-08-14 | End: 2025-08-14

## 2024-08-14 RX ORDER — IBUPROFEN 800 MG/1
800 TABLET ORAL DAILY PRN
Qty: 30 TABLET | Refills: 0 | Status: SHIPPED | OUTPATIENT
Start: 2024-08-14

## 2024-08-14 NOTE — PATIENT INSTRUCTIONS
Please bring a copy of your living will to my office so we can scan it in your electronic chart.    Consider getting an RSV vaccine at your pharmacy.

## 2024-08-14 NOTE — PROGRESS NOTES
Subjective     Patient ID: Sam Gamino is a 79 y.o. male.    Chief Complaint: Follow-up (Linzess is not helping him much, memory has not been that good )    His memory has gotten a bit worse since he had the subarachnoid hemorrhage in 2023.      Hypertension  This is a chronic problem. The current episode started more than 1 year ago. The problem is controlled.           Follow-up  This is a chronic problem. The current episode started more than 1 year ago. The problem has been unchanged.     Review of Systems   Constitutional: Negative.    Respiratory: Negative.     Cardiovascular: Negative.    Neurological:  Positive for memory loss.          Objective     Physical Exam  Vitals and nursing note reviewed.   Constitutional:       Appearance: He is well-developed.   HENT:      Head: Normocephalic and atraumatic.   Eyes:      Pupils: Pupils are equal, round, and reactive to light.   Neck:      Vascular: No JVD.   Cardiovascular:      Rate and Rhythm: Normal rate and regular rhythm.      Heart sounds: Murmur heard.      Systolic murmur is present with a grade of 2/6.      Comments: Holosystolic murmur greatest at the left lower sternal border.  Pulmonary:      Effort: Pulmonary effort is normal.   Abdominal:      General: Abdomen is protuberant. Bowel sounds are normal.      Palpations: Abdomen is soft.      Tenderness: There is no rebound.   Musculoskeletal:      Right lower le+ Edema present.      Left lower le+ Edema present.   Neurological:      Mental Status: He is alert.            Assessment and Plan     1. Nonrheumatic mitral valve regurgitation  Overview:   - Mild   - Mod/Severe      2. Primary hypertension  Overview:  : Diagnosed.         3. Heart failure, diastolic, chronic  Overview:  2022: Echo: Normal left ventricular size and systolic function. EF 55%. Moderate MR. Mild to moderate TR.   2023: Echo: Normal left ventricular size and systolic function. EF 60%.  LVGLS -15%. Moderate diastolic dysfunction. Mild to moderate AR. Mild to moderate MR. Mildly enlarged ascending aorta. Small pericardial effusion.        Orders:  -     losartan (COZAAR) 100 MG tablet; Take 1 tablet (100 mg total) by mouth once daily.  Dispense: 90 tablet; Refill: 3    4. Hyperlipidemia, unspecified hyperlipidemia type  Overview:  2002: Statin was begun.      5. ACP (advance care planning)    6. Memory loss    7. Eczema, unspecified type  -     clotrimazole-betamethasone 1-0.05% (LOTRISONE) cream; Apply topically 2 (two) times daily as needed (rash).  Dispense: 45 g; Refill: 0    8. Benign prostatic hyperplasia with urinary frequency    9. Nonrheumatic tricuspid valve regurgitation  Overview:  Echo 4/22 - PAP=93      10. Lower extremity edema    11. History of subdural hemorrhage  Overview:  11/5/2023: [facility].      12. History of pulmonary embolism  Overview:  [facility] - 11/14/23 - Bilateral      Other orders  -     ibuprofen (ADVIL,MOTRIN) 800 MG tablet; Take 1 tablet (800 mg total) by mouth daily as needed for Pain. Take with food  Dispense: 30 tablet; Refill: 0  -     donepeziL (ARICEPT) 5 MG tablet; Take 1 tablet (5 mg total) by mouth every evening.  Dispense: 90 tablet; Refill: 3        PLAN:  Per orders and D/C instructions.  Continue diet and/or meds for BPH and high cholesterol, which are stable.  The sub arachnoid hemorrhage and bilateral pulmonary emboli have resolved.  Restart losartan 100 mg daily for hypertension, diastolic heart failure, mitral valve regurg, and tricuspid valve regurgitation.   His wife wants him to take a pill for memory loss.  We discussed that Aricept is only indicated for Alzheimer's disease, but is often used for mild cognitive impairment of any cause.  They would like to initiate Aricept.    We discussed advanced care planning and end of life care. The patient has designated a POA (his wife) and wishes that person to know about his/her condition and changes in condition.  We discussed end of life treatment options including level of comfort care.  Advanced directives have been completed and will be scanned into EPIC, and the patient will keep the original on them (20-30 minutes were spent).   He will drop off a copy of his advanced care directives in the near future for us to scan in his chart.    Follow up in about 4 months (around 12/14/2024).

## 2024-08-23 ENCOUNTER — OFFICE VISIT (OUTPATIENT)
Dept: OTOLARYNGOLOGY | Facility: CLINIC | Age: 79
End: 2024-08-23
Payer: MEDICARE

## 2024-08-23 VITALS — DIASTOLIC BLOOD PRESSURE: 76 MMHG | SYSTOLIC BLOOD PRESSURE: 118 MMHG | HEART RATE: 98 BPM

## 2024-08-23 DIAGNOSIS — J38.02 BILATERAL PARTIAL VOCAL CORD PARALYSIS: ICD-10-CM

## 2024-08-23 DIAGNOSIS — J38.3 VOCAL FOLD ATROPHY: ICD-10-CM

## 2024-08-23 DIAGNOSIS — R49.0 DYSPHONIA: Primary | ICD-10-CM

## 2024-08-23 PROCEDURE — 99214 OFFICE O/P EST MOD 30 MIN: CPT | Mod: PBBFAC | Performed by: OTOLARYNGOLOGY

## 2024-08-23 PROCEDURE — 99999 PR PBB SHADOW E&M-EST. PATIENT-LVL IV: CPT | Mod: PBBFAC,,, | Performed by: OTOLARYNGOLOGY

## 2024-08-23 NOTE — PATIENT INSTRUCTIONS
WHAT TO EXPECT FROM VOICE THERAPY    Purpose  The purpose of voice therapy is to help you find a better, more efficient way to use your voice or to reduce symptoms such as coughing, throat clearing, or difficulty breathing.  Depending on your symptoms, you may learn how to produce clearer voice quality, how to reduce fatigue or pain associated with speaking, how to take care of your voice, and how to eliminate chronic coughing or throat clearing.      Process: Evaluation  First, you may go through some initial testing.  In most cases, a videostroboscopy will be performed in order to allow your speech pathologist and your physician to look at your vocal cords to aid in deciding if you would benefit from voice therapy.  Next, you may work with the speech pathologist to assess the current capabilities of your voice.  Following evaluation, your speech pathologist will design a therapeutic plan to improve your voice as well as other symptoms that may bother you.  At the time of evaluation, your speech pathologist may provide you with exercises to try at home.      Process: Therapy  Most patients benefit from 2-8 sessions over 1-3 months.  Voice therapy involves changing the behavior of your vocal cords and speaking habits, so it is very important that you attend your appointments and do home practices as instructed by your speech pathologist.  Home practice and participation in therapy are critical to meeting your desired voice goals, so of course, we are able to work with you to schedule appointments that are convenient for you.          VOCAL FOLD INJECTION AUGMENTATION     Description   If the vocal folds (vocal cords) cannot close completely, you may experience voice problems: roughness, breathiness, inability to get loud, increased vocal effort, increased vocal fatigue. Some patients may also experience aspiration (coughing or choking with swallowing). Vocal fold injection augmentation plumps up the vocal  fold and/or repositions it in the midline in order to help the vocal folds close completely while speaking or swallowing. Following the procedure, most patients experience a louder, stronger, clearer voice. The procedure also helps some patients protect against aspiration, although the swallowing improvement is not as dramatic as the voice improvement. We use the following materials for the procedure: hyaluronic acid (Restylane); carboxymethylcellulose (Radiesse Voice Gel); and calcium hydroxyapatite (Radiesse Voice). For most patients, the injection is performed with a small needle passed through the skin of the neck. However, in some patients we perform the injection with a device passed through the mouth. In either case, the injection is guided by the visualization provided by a scope passed through the nose.     What to expect during the procedure   We perform the injection in our office under local (topical) anesthesia. You are awake the whole time, and the entire procedure lasts about 15 minutes. Our primary focus is your safety and comfort. We usually make the larynx numb by spraying the throat and/or dripping anesthetic on the larynx. At this time, you may cough or gag, or you may have the sensation that you spilled some water down the wrong pipe. These are temporary sensations that allow us to get you numb. The numbing process takes about 2 minutes. We will not proceed until we know you will be as comfortable as possible. A small needle is passed either through the skin of the neck or via a long instrument through the mouth to perform the injection. During the injection, you may experience mild discomfort in the throat. You may feel an unusual sensation in the ear because the larynx and the ear share the same sensory nerve. In rare cases in which a patient does not tolerate the procedure, it may be performed in the operating room, with the patient completely asleep under general anesthesia.     What to  expect afterwards   Most patients note a stronger, less effortful voice immediately after the injection. Sometimes the voice is tight or pressed voice is noted right after the injection. The voice usually has good days and bad days and gradually improves until you reach your new baseline voice over the first 1-2 weeks. Voice therapy may be a necessary part of your treatment plan to optimize your vocal outcome. None of the materials we inject are permanent. As the material dissolves, you may experience a gradual worsening of voice quality over the course of several months. At this point we may consider repeating the injection. You may be a candidate for a permanent fix, which involves an open surgery in the neck performed in the operating room.     Instructions: before the procedure   1. Do not take aspirin-containing products or other medications that can thin the blood (such as ibuprofen, Advil, Motrin, Aleve, Plavix) for 7 days prior to the injection. If you take Coumadin (warfarin), you may need to stop using this a few days prior to the injection. If you are on blood thinning (anti-platelet or anti-coagulant) medication and it is not clear what you should do, please clarify this with your physician.   2. On the day of your procedure, please make sure you take your other regular morning medications.   3. On the day of your procedure, it is OK to eat and drink as you would normally, up until 3 hours before to your appointment time. During that time frame, we ask that you restrict yourself only to clear liquids. A clear liquid is anything you can see through (water, ginger ale, apple juice).     Instructions: after the procedure   1. Please refrain from eating or drinking for 1 hour following the procedure. This allows time for the numbing medication to wear off.   2. Most patients experience very little pain. If necessary, most patients are able to keep comfortable with plain Tylenol (acetaminophen) and/or other  non-steroidal anti-inflammatory medications such as ibuprofen (Advil, Motrin). Please follow package instructions if considering taking these medications.   3. In most instances, it is OK to use your voice immediately after the procedure. However, for the first week, you should avoid speaking over heavy background noise or in a very loud voice. It is rare, but in some cases you will be asked to rest your voice for the first 24 hours.   4. Please call the [facility] at [phone] if   You have a temperature above 101°F   You develop Increasing throat pain not relieved by over-the-counter medications   You have any other post-operative questions or concerns   5. Please go immediately to the nearest emergency room if you are experiencing   Shortness of breath   Difficulty breathing   Difficulty swallowing   Severe bleeding     FREQUENTLY ASKED QUESTIONS     Is this a Botox injection? No. Botox weakens the voice box muscles. Instead, with a vocal fold injection augmentation, we are injecting filler material to bulk up the vocal fold(s).     How do you decide which material to use for the injection? Our decision is based on the indication for the procedure, the position of the vocal fold, and other patient historical/anatomical factors. In some instances, the approval of your insurance company is an important factor.     How to you decide which technique to use (through the neck versus through the mouth)? Patient anatomy and the position of the vocal fold play an important role. Other patient factors such as gag reflex are also strongly considered.     Why do you perform this in your office instead of in the operating room? Performing the procedure in the office is safe and precise. In addition, performing the injection with the patient awake gives us direct visual and auditory feedback, which we do not get when the patient is asleep in the operating room. Furthermore, an office-based injection is much less time  consuming, is more convenient for the patient, and does not involve the risks or the recovery time associated with general anesthesia. We can still do this in the operating room; we save that setting for specific diagnoses or situations, as well as for the rare patient who is unable to tolerate the awake procedure.     Why did I have discomfort in my ear (during or after the injection)? This is an example of referred pain. The ear and the larynx share the same sensory nerve.     I got an injection 3 days ago. Why is my voice still hoarse? To optimize vocal outcome, we overinject a little bit. Additionally, there may be a mild amount of swelling. Finally, the muscles of the larynx need to adjust to the injected material. Most people will have good days and bad days at first. After 1-2 weeks, you should settle out to your new baseline voice.     How long does the injection last? Carboxymethylcellulose (Radiesse Voice Gel) lasts approximately 1-2 months. Hyaluronic acid (Restylane) lasts approximately 4 months. Calcium hydroxyapatite (Radiesse Voice) lasts up to 1 year; however its characteristics are such that only few patients are appropriate for using this material.     Is there a permanent injectable material? No.     Can the injection be repeated? Yes. There is no limit to the number of times an injection can be repeated. However, a permanent surgical fix is often an option to consider.

## 2024-08-23 NOTE — PROGRESS NOTES
[facility]  Department of Otorhinolaryngology and Communication Sciences    Sam Gamino is a 79 y.o. male who presents to the [facility]  on referral from Dr. Thornton  for further management of hoarseness.     He complains of progressive low volume to the voice, gradual in onset for the last few years, but particularly evident for the last 6-12 months.  There was no clear inciting event, although the patient did sustain a CVA approximately 2 months ago. Time course is constant. Symptoms are stable. He denies any exacerbating factors. He denies any alleviating factors. He denies any associated symptoms.  He has a remote history of radiation therapy for non-Hodgkin lymphoma.  He has a prior career in the [employer], including time serving as a .    Past Medical History  He has a past medical history of Abnormal echocardiogram, Atherosclerosis of aorta, CAD (coronary artery disease), Chronic diastolic (congestive) heart failure, History of pulmonary embolism, History of subdural hemorrhage, Hypercholesterolemia, Lymphoma in remission, Normal cardiac stress test, Obesity (BMI 30.0-34.9), Pernicious anemia, Presbylarynx, and Thyroid nodule.    Past Surgical History  He has a past surgical history that includes Eye surgery (Bilateral, 2016).    Family History  His family history includes Cancer in his mother; Hypertension in his brother; No Known Problems in his brother, brother, brother, daughter, father, sister, and sister; Thyroid disease in his sister.    Social History  He reports that he has never smoked. He has never used smokeless tobacco. He reports current alcohol use of about 1.0 standard drink of alcohol per week. He reports that he does not use drugs.    Allergies  He is allergic to lotensin [benazepril].    Medications  He has a current medication list which includes the following prescription(s): cetirizine, acetaminophen, aspirin, cholecalciferol (vitamin d3),  clotrimazole-betamethasone 1-0.05%, cyanocobalamin, donepezil, famotidine, fluticasone propionate, furosemide, ibuprofen, jardiance, linaclotide, losartan, omeprazole, solifenacin, and tamsulosin.    Review of Systems       Objective:     VS reviewed  Physical Exam  Constitutional: comfortable, well dressed  Psychiatric: appropriate affect  Respiratory: comfortably breathing, symmetric chest rise, no stridor  Voice:  Variable moderate to severe breathy hypophonia, variable mild to severe strain; inconsistent breakthroughs of improved volume/clarity yet not stimulable  Cardiovascular: upper extremities non-edematous  Lymphatic: no cervical lymphadenopathy  Neurologic: alert and oriented to time, place, person, and situation; cranial nerves 3-12 grossly intact  Head: normocephalic  Eyes: conjunctivae and sclerae clear  Ears: normal pinnae, normal external auditory canals, tympanic membranes intact  Nose: mucosa pink and noncongested, no masses, no mucopurulence, no polyps  Oral cavity / oropharynx: no mucosal lesions  Neck: soft, full range of motion, laryngotracheal complex palpable with appropriate landmarks, larynx elevates on swallowing  Indirect laryngoscopy: limited due to gag    Procedure  Flexible Laryngeal Videostroboscopy (82614): Laryngeal videostroboscopy is indicated to assess laryngeal vibratory biomechanics and vocal fold oscillation, which cannot be assessed with a plain light examination. This was carried out transnasally with a distal chip videoendoscope. After verbal consent was obtained, the patient was positioned and the nose was topically decongested with 1% phenylephrine and topically anesthetized with 4% lidocaine. The endoscope was passed through the most patent nasal cavity and positioned to image the nasopharynx, larynx, and hypopharynx in detail. The following features were examined: nasopharyngeal, laryngeal, hypopharyngeal masses; velopharyngeal strength, closure, and symmetry of motion;  vocal fold range and symmetry of motion; laryngeal mucosal edema, erythema, inflammation, and hydration; salivary pooling; and gross laryngeal sensation. During phonation, the vocal folds were assessed for glottal closure; mucosal wave; vocal fold lesions; vibratory periodicity, amplitude, and phase symmetry; and vertical height match. The equipment was removed. The patient tolerated the procedure well without complication. All findings were normal except:  - moderate bilateral vocal fold bowing  - glottal insufficiency with spindle shaped gap  - severe concentric supraglottic hyperfunction on phonation      Assessment:     Sam Gamino is a 79 y.o. male with chronic dysphonia, multifactorial, with contributions from the following;  - presbylaryngis, vocal fold atrophy, glottal insufficiency  - vocal fold scar  - history of radiation therapy for non-Hodgkin lymphoma  - recent history of stroke  - secondary muscle tension dysphonia       Plan:        I had a discussion with the patient regarding his condition and the further workup and management options.      I demonstrated his examination to him on the video monitor    Treatment options which we discussed included voice therapy and vocal fold injection augmentation.  I discussed with him the risks and benefits of the procedure.  I gave him the opportunity to ask questions, and answered all them to his satisfaction.    He will consider the above and will contact our team if you would like to arrange treatment    All questions were answered, and the patient is in agreement with the above.     Mushtaq Resendiz M.D.  [facility]  Department of Otorhinolaryngology and Communication Sciences

## 2024-09-10 DIAGNOSIS — I50.32 HEART FAILURE, DIASTOLIC, CHRONIC: ICD-10-CM

## 2024-09-10 RX ORDER — FUROSEMIDE 40 MG/1
40 TABLET ORAL DAILY
Qty: 30 TABLET | Refills: 0 | Status: SHIPPED | OUTPATIENT
Start: 2024-09-10 | End: 2024-09-10

## 2024-09-10 RX ORDER — FUROSEMIDE 40 MG/1
40 TABLET ORAL
Qty: 90 TABLET | Refills: 1 | Status: SHIPPED | OUTPATIENT
Start: 2024-09-10

## 2024-09-10 RX ORDER — TAMSULOSIN HYDROCHLORIDE 0.4 MG/1
0.4 CAPSULE ORAL DAILY
Qty: 90 CAPSULE | Refills: 1 | Status: SHIPPED | OUTPATIENT
Start: 2024-09-10

## 2024-09-19 ENCOUNTER — OFFICE VISIT (OUTPATIENT)
Dept: CARDIOLOGY | Facility: CLINIC | Age: 79
End: 2024-09-19
Attending: INTERNAL MEDICINE
Payer: MEDICARE

## 2024-09-19 VITALS
SYSTOLIC BLOOD PRESSURE: 111 MMHG | BODY MASS INDEX: 32.79 KG/M2 | HEIGHT: 73 IN | DIASTOLIC BLOOD PRESSURE: 59 MMHG | HEART RATE: 100 BPM | WEIGHT: 247.38 LBS | OXYGEN SATURATION: 97 %

## 2024-09-19 DIAGNOSIS — I25.10 CORONARY ARTERY DISEASE INVOLVING NATIVE CORONARY ARTERY OF NATIVE HEART WITHOUT ANGINA PECTORIS: ICD-10-CM

## 2024-09-19 DIAGNOSIS — C85.90 LYMPHOMA IN REMISSION: ICD-10-CM

## 2024-09-19 DIAGNOSIS — I44.0 ATRIOVENTRICULAR BLOCK, FIRST DEGREE: ICD-10-CM

## 2024-09-19 DIAGNOSIS — I10 PRIMARY HYPERTENSION: ICD-10-CM

## 2024-09-19 DIAGNOSIS — Z86.79 HISTORY OF SUBDURAL HEMORRHAGE: ICD-10-CM

## 2024-09-19 DIAGNOSIS — I70.0 ATHEROSCLEROSIS OF AORTA: ICD-10-CM

## 2024-09-19 DIAGNOSIS — I50.42 HEART FAILURE, SYSTOLIC AND DIASTOLIC, CHRONIC: ICD-10-CM

## 2024-09-19 DIAGNOSIS — E53.8 B12 DEFICIENCY: ICD-10-CM

## 2024-09-19 DIAGNOSIS — I36.1 NONRHEUMATIC TRICUSPID VALVE REGURGITATION: ICD-10-CM

## 2024-09-19 DIAGNOSIS — I50.32 HEART FAILURE, DIASTOLIC, CHRONIC: ICD-10-CM

## 2024-09-19 DIAGNOSIS — E78.5 HYPERLIPIDEMIA, UNSPECIFIED HYPERLIPIDEMIA TYPE: ICD-10-CM

## 2024-09-19 DIAGNOSIS — E04.1 THYROID NODULE: ICD-10-CM

## 2024-09-19 DIAGNOSIS — Z86.711 HISTORY OF PULMONARY EMBOLISM: ICD-10-CM

## 2024-09-19 DIAGNOSIS — I34.0 NONRHEUMATIC MITRAL VALVE REGURGITATION: ICD-10-CM

## 2024-09-19 PROCEDURE — 99214 OFFICE O/P EST MOD 30 MIN: CPT | Mod: PBBFAC | Performed by: INTERNAL MEDICINE

## 2024-09-19 PROCEDURE — 99215 OFFICE O/P EST HI 40 MIN: CPT | Mod: S$PBB,,, | Performed by: INTERNAL MEDICINE

## 2024-09-19 PROCEDURE — 99999 PR PBB SHADOW E&M-EST. PATIENT-LVL IV: CPT | Mod: PBBFAC,,, | Performed by: INTERNAL MEDICINE

## 2024-09-19 RX ORDER — FUROSEMIDE 40 MG/1
40 TABLET ORAL DAILY
Qty: 90 TABLET | Refills: 3 | Status: SHIPPED | OUTPATIENT
Start: 2024-09-19

## 2024-09-19 RX ORDER — SPIRONOLACTONE 25 MG/1
25 TABLET ORAL DAILY
Qty: 90 TABLET | Refills: 3 | Status: SHIPPED | OUTPATIENT
Start: 2024-09-19

## 2024-09-19 RX ORDER — LOSARTAN POTASSIUM 100 MG/1
100 TABLET ORAL DAILY
Qty: 90 TABLET | Refills: 3 | Status: SHIPPED | OUTPATIENT
Start: 2024-09-19

## 2024-09-19 RX ORDER — CYANOCOBALAMIN 1000 UG/ML
INJECTION, SOLUTION INTRAMUSCULAR; SUBCUTANEOUS
Qty: 10 ML | Refills: 1 | Status: SHIPPED | OUTPATIENT
Start: 2024-09-19

## 2024-09-19 NOTE — PROGRESS NOTES
Subjective:     Sam Gamino is a 79 y.o. male with hypertension and hypercholesterolemia. He is severely obese. 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 In 5/2022 he was admitted with heart failure. He was noted to have moderate to severe mitral regurgitation and severe pulmonary hypertension. He was diuresed. On 5/16/2022 he had an echocardiogram that revealed normal left ventricular size and systolic function. The ejection fraction was 55%. There was moderate mitral regurgitation. There was mild to moderate tricuspid regurgitation. He denies any exertional chest pain. In 9/2022 he was able to walk about two blocks before he had to stop due to dyspnea. He had mild edema of his legs. He was given empagliflozin and he was breathing better and able to walk farther. The edema resolved. On 11/1/2023 he is noted to have a first degree atrioventricular block with a HI interval of 320 ms. On 11/5/2023 he had sudden onset of a severe headache. He found to have had a subarachnoid hemorrhage. He was treated conservatively. During the stay he had a computed tomography angiography scan of his head and neck. The images revealed bilateral pulmonary emboli and he was anticoagulated with apixiban. In 4/2024 he was admitted with heart failure. He was continuing rehabilitation until 8/2024. No chest pain or shortness of breath. No palpitations or weak spells. No bleeding. No clear issues with any of his prescribed medications. Feeling well overall.      Congestive Heart Failure  Presents for follow-up visit. Pertinent negatives include no abdominal pain, chest pain, chest pressure, claudication, edema, fatigue, muscle weakness, near-syncope, nocturia, orthopnea, palpitations, paroxysmal nocturnal dyspnea, shortness of breath or unexpected weight change. The symptoms have been stable. His past medical history is significant for CAD.   Coronary Artery  Disease  Presents for follow-up visit. Pertinent negatives include no chest pain, chest pressure, chest tightness, dizziness, leg swelling, muscle weakness, palpitations, shortness of breath or weight gain. Risk factors include hyperlipidemia and obesity. His past medical history is significant for CHF.   Hypertension  This is a chronic problem. The current episode started more than 1 year ago. The problem is unchanged. The problem is controlled (usually 110-120/70-80 mmHg at home). Pertinent negatives include no anxiety, blurred vision, chest pain, headaches, malaise/fatigue, neck pain, orthopnea, palpitations, peripheral edema, PND, shortness of breath or sweats. There is no history of chronic renal disease.   Hyperlipidemia  This is a chronic problem. The current episode started more than 1 year ago. The problem is controlled. Recent lipid tests were reviewed and are normal. Exacerbating diseases include obesity. He has no history of chronic renal disease, diabetes, hypothyroidism, liver disease or nephrotic syndrome. Pertinent negatives include no chest pain, focal sensory loss, focal weakness, leg pain, myalgias or shortness of breath.   Cerebrovascular Accident  Pertinent negatives include no abdominal pain, chest pain, chills, coughing, fatigue, fever, headaches, myalgias, nausea, neck pain, numbness, rash, vertigo, vomiting or weakness.       Review of Systems   Constitutional: Negative for chills, fatigue, fever, malaise/fatigue, unexpected weight change and weight gain.   HENT:  Negative for nosebleeds and tinnitus.    Eyes:  Negative for blurred vision, double vision, vision loss in left eye and vision loss in right eye.   Cardiovascular:  Negative for chest pain, claudication, dyspnea on exertion, irregular heartbeat, leg swelling, near-syncope, orthopnea, palpitations, paroxysmal nocturnal dyspnea and syncope.   Respiratory:  Negative for chest tightness, cough, hemoptysis, shortness of breath and  wheezing.    Endocrine: Negative for cold intolerance and heat intolerance.   Hematologic/Lymphatic: Negative for bleeding problem. Does not bruise/bleed easily.   Skin:  Negative for color change and rash.   Musculoskeletal:  Negative for back pain, falls, muscle weakness, myalgias and neck pain.   Gastrointestinal:  Negative for abdominal pain, diarrhea, dysphagia, heartburn, hematemesis, hematochezia, hemorrhoids, jaundice, melena, nausea and vomiting.   Genitourinary:  Negative for dysuria, hematuria and nocturia.   Neurological:  Negative for dizziness, focal weakness, headaches, light-headedness, loss of balance, numbness, tremors, vertigo and weakness.   Psychiatric/Behavioral:  Negative for altered mental status, depression and memory loss. The patient is not nervous/anxious.    Allergic/Immunologic: Negative for hives and persistent infections.       Current Outpatient Medications on File Prior to Visit   Medication Sig Dispense Refill    acetaminophen (TYLENOL) 500 MG tablet Take 2 tablets (1,000 mg total) by mouth every 6 (six) hours as needed. 50 tablet 0    aspirin 81 MG Chew Take 81 mg by mouth once daily.      cetirizine (ZYRTEC) 10 MG tablet Take 10 mg by mouth daily as needed.      cholecalciferol, vitamin D3, (VITAMIN D3) 50 mcg (2,000 unit) Cap Take 1 capsule by mouth once daily.      clotrimazole-betamethasone 1-0.05% (LOTRISONE) cream Apply topically 2 (two) times daily as needed (rash). 45 g 0    donepeziL (ARICEPT) 5 MG tablet Take 1 tablet (5 mg total) by mouth every evening. 90 tablet 3    famotidine (PEPCID) 20 MG tablet Take 1 tablet (20 mg total) by mouth 2 (two) times daily as needed for Heartburn. 20 tablet 0    fluticasone propionate (FLONASE) 50 mcg/actuation nasal spray SHAKE LIQUID AND USE 2 SPRAYS(100 MCG) IN EACH NOSTRIL EVERY DAY 16 g 3    furosemide (LASIX) 40 MG tablet TAKE 1 TABLET(40 MG) BY MOUTH DAILY 90 tablet 1    ibuprofen (ADVIL,MOTRIN) 800 MG tablet Take 1 tablet (800  "mg total) by mouth daily as needed for Pain. Take with food 30 tablet 0    JARDIANCE 10 mg tablet TAKE 1 TABLET DAILY 90 tablet 3    linaCLOtide (LINZESS) 290 mcg Cap capsule Take 1 capsule (290 mcg total) by mouth before breakfast. 90 capsule 3    losartan (COZAAR) 100 MG tablet Take 1 tablet (100 mg total) by mouth once daily. 90 tablet 3    omeprazole 20 mg TbEC Take 1 tablet by mouth once daily.      tamsulosin (FLOMAX) 0.4 mg Cap Take 1 capsule (0.4 mg total) by mouth once daily. 90 capsule 1    solifenacin (VESICARE) 10 MG tablet TAKE 1 TABLET(10 MG) BY MOUTH EVERY DAY (Patient not taking: Reported on 8/14/2024) 90 tablet 3    [DISCONTINUED] cyanocobalamin 1,000 mcg/mL injection INJECT 1 ML IN THE MUSCLE EVERY 30 DAYS 10 mL 1     No current facility-administered medications on file prior to visit.       BP (!) 111/59   Pulse 100   Ht 6' 1" (1.854 m)   Wt 112.2 kg (247 lb 5.7 oz)   SpO2 97%   BMI 32.63 kg/m²     Objective:     Physical Exam  Constitutional:       General: He is not in acute distress.     Appearance: Normal appearance. He is well-developed. He is not toxic-appearing or diaphoretic.   HENT:      Head: Normocephalic and atraumatic.      Nose: Nose normal.   Eyes:      General:         Right eye: No discharge.         Left eye: No discharge.      Conjunctiva/sclera:      Right eye: Right conjunctiva is not injected.      Left eye: Left conjunctiva is not injected.      Pupils: Pupils are equal.      Right eye: Pupil is round.      Left eye: Pupil is round.   Neck:      Thyroid: No thyromegaly.      Vascular: No carotid bruit or JVD.   Cardiovascular:      Rate and Rhythm: Normal rate and regular rhythm. No extrasystoles are present.     Chest Wall: PMI is not displaced.      Pulses:           Radial pulses are 2+ on the right side and 2+ on the left side.        Femoral pulses are 2+ on the right side and 2+ on the left side.       Dorsalis pedis pulses are 2+ on the right side and 2+ on the " left side.        Posterior tibial pulses are 2+ on the right side and 2+ on the left side.      Heart sounds: S1 normal and S2 normal. Murmur heard.      High-pitched blowing holosystolic murmur is present at the apex. P2 pronounced.      Gallop present. S3 and S4 sounds present.   Pulmonary:      Effort: Pulmonary effort is normal.      Breath sounds: No rales.   Abdominal:      Palpations: Abdomen is soft.      Tenderness: There is no abdominal tenderness.   Musculoskeletal:      Cervical back: Neck supple.      Right lower leg: No swelling. No edema.      Left lower leg: No swelling. No edema.   Lymphadenopathy:      Head:      Right side of head: No submandibular adenopathy.      Left side of head: No submandibular adenopathy.      Cervical: No cervical adenopathy.   Skin:     General: Skin is warm and dry.      Findings: No rash.      Nails: There is no clubbing.   Neurological:      General: No focal deficit present.      Mental Status: He is alert and oriented to person, place, and time. He is not disoriented.      Cranial Nerves: No cranial nerve deficit.   Psychiatric:         Attention and Perception: Attention and perception normal.         Mood and Affect: Mood and affect normal.         Speech: Speech normal.         Behavior: Behavior normal.         Thought Content: Thought content normal.         Cognition and Memory: Cognition and memory normal.         Judgment: Judgment normal.         Assessment:     1. Heart failure, diastolic, chronic    2. Nonrheumatic mitral valve regurgitation    3. Nonrheumatic tricuspid valve regurgitation    4. Coronary artery disease involving native coronary artery of native heart without angina pectoris    5. Atrioventricular block, first degree    6. History of pulmonary embolism    7. History of subdural hemorrhage    8. Atherosclerosis of aorta    9. Primary hypertension    10. Hyperlipidemia, unspecified hyperlipidemia type    11. Lymphoma in remission    12.  Thyroid nodule          Plan:     1. Heart Failure, Diastolic, Chronic   5/16/2022: Echo: Normal left ventricular size and systolic function. EF 55%. Moderate MR. Mild to moderate TR.   6/6/2023: Echo: Normal left ventricular size and systolic function. EF 60%. LVGLS -15%. Moderate diastolic dysfunction. Mild to moderate AR. Mild to moderate MR. Mildly enlarged ascending aorta. Small pericardial effusion.   8/8/2022: Spironolactone 25 mg Q24 was begun and KCl 20 mEq Q24 was discontinued.   10/10/2022: Empagliflozin 10 mg Q24 was begun.   10/27/2022: BNP 69.   11/1/2023: Metoprolol 50 mg Q24 was discontinued as the ME was 320 ms on an ECG.    On empagliflozin 10 mg Q24, losartan 100 mg Q24, spironolactone 25 mg Q24 and furosemide 40 mg Q24.   May take additional furosemide 40 mg Q24 PRN for edema which he has not needed.   Appears well compensated.   6/2024: Plan was next Echo. Do soon. Renew above medicatons. In 2 weeks; Do BMP, BNP and CBC.      2. Mitral Regurgitation   5/16/2022: Echo: Moderate MR.   6/6/2023: Echo: Mild to moderate MR.     3. Tricuspid Regurgitation   5/16/2022: Echo: Mild to moderate TR.    4. Coronary Artery Disease   3/2007: Underwent coronary angiogram. Appeared to have had mild disease.   No aspirin as anticoagulated with apixiban.    5. Atrioventricular Block, First Degree   11/1/2023: ECG: ME interval 320 ms.   11/1/2023: Metoprolol 50 mg Q24 was discontinued.    12/19/2023: Holter: Sinus rhythm 94 () bpm. Long ME interval. Narrow QRS. No prolonged pauses. Frequent APC's - 15/hr. Rare VPC's - 2/hr. 3 V couplets. 2 V triplets. No reported symptoms.  No betablocker.   To be followed.    6. Chronic Anticoagulation   11/14/2023: CTA of Head and Neck: Bilateral pulmonary emboli.   Appears the PE was unprovoked.   On apixiban 5 mg Q12.   Lately says Rx was not refill.   9/19/2024: Apixiban 2.5 mg Q12 to be resumed. Then no aspirin.     7. History of Pulmonary Embolism   11/14/2023: CTA of  Head and Neck: Bilateral pulmonary emboli.   Appears the PE was unprovoked.   On apixiban.    8. History of Subarachnoid Hemorrhage   11/2023: SAH.   Conservative care.   11/2023: Diagnosed with pulmonary embolism and benefit with anticoagulation felt to outweigh risk.     9. Atherosclerosis of Aorta   2023: Mentioned in chart.   No aspirin.    10. Hypertension   2002: Diagnosed.   11/1/2023: Metoprolol 50 mg Q24 was discontinued.     On losartan 100 mg Q24, spironolactone 25 mg Q24 and furosemide 40 mg Q24.   Keeping log at home.   Well controlled.      11. Hypercholesterolemia   2002: Statin was begun.   On atorvastatin 40 mg Q24.   4/12/2022: Chol 132. HDL 41. LDL 71. .   On atorvastatin 40 mg Q24.   Very favorable lipid panel.    12. Mild Obesity   6/27/2022: Weight 117 kg. BMI 35.   6/28/2023: Weight 119 kg. BMI 35.   12/7/2023: Weight 110 kg. BMI 32.   Encouraged to lose more weight.    13. 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    14. Primary Care   Dr. Herber Zamora.    F/u 2 months.    Ema Montemayor M.D.

## 2024-09-24 ENCOUNTER — TELEPHONE (OUTPATIENT)
Dept: SPEECH THERAPY | Facility: HOSPITAL | Age: 79
End: 2024-09-24
Payer: MEDICARE

## 2024-09-24 DIAGNOSIS — J38.02 BILATERAL PARTIAL VOCAL CORD PARALYSIS: ICD-10-CM

## 2024-09-24 DIAGNOSIS — J38.3 VOCAL FOLD ATROPHY: ICD-10-CM

## 2024-09-24 DIAGNOSIS — R49.0 DYSPHONIA: Primary | ICD-10-CM

## 2024-10-02 ENCOUNTER — CLINICAL SUPPORT (OUTPATIENT)
Dept: SPEECH THERAPY | Facility: HOSPITAL | Age: 79
End: 2024-10-02
Attending: OTOLARYNGOLOGY
Payer: MEDICARE

## 2024-10-02 DIAGNOSIS — J38.3 VOCAL FOLD ATROPHY: ICD-10-CM

## 2024-10-02 DIAGNOSIS — J38.02 BILATERAL PARTIAL VOCAL CORD PARALYSIS: ICD-10-CM

## 2024-10-02 DIAGNOSIS — R49.0 DYSPHONIA: ICD-10-CM

## 2024-10-02 PROCEDURE — 92524 BEHAVRAL QUALIT ANALYS VOICE: CPT | Mod: GN | Performed by: SPEECH-LANGUAGE PATHOLOGIST

## 2024-10-02 NOTE — PROGRESS NOTES
Referring provider: Dr. Mushtaq Resendiz  Reason for visit:  Behavioral and qualitative analysis of voice and resonance (CPT 26181)    Subjective / History    Sam Gamino is a 79 y.o. male referred for voice evaluation (CPT 70007) by Dr. Resendiz.  He presents with complaints of voice loss, voice weakness which began almost a year ago.  The patient also reported the following complaints:  fatigue with use, tightness, and reduced volume.  Patient does not work outside the home, prior [employer] .  He had a CVA a few months ago.  His wife reports he is often forgetful.  He was evaluated by Dr. Resendiz about a month ago and [facility].      Swallowing: no c/o   Breathing:  no c/o     Smokin packs/day     Stroboscopy findings (per Dr. Resendiz on 24)  - moderate bilateral vocal fold bowing  - glottal insufficiency with spindle shaped gap  - severe concentric supraglottic hyperfunction on phonation     Past Medical History:   Diagnosis Date    Abnormal echocardiogram 2013    Mild MVR 3/07    Atherosclerosis of aorta 2023: Mentioned in chart.    CAD (coronary artery disease) 2013    Angio 3/07 Dr. Lin    Chronic diastolic (congestive) heart failure     History of pulmonary embolism 2023    CTA - 23 - Bilateral    History of subdural hemorrhage 2023: SAH.    Hypercholesterolemia 2022    Lymphoma in remission 2013    S/p chemo/XRT 1992 Relapse  BMT     Normal cardiac stress test 2013    Nuclear stress     Obesity (BMI 30.0-34.9) 2024    Pernicious anemia 2013    Presbylarynx 2024    Thyroid nodule 2013    Right s/p Bx  Dr. Pelayo     Current Outpatient Medications on File Prior to Visit   Medication Sig Dispense Refill    acetaminophen (TYLENOL) 500 MG tablet Take 2 tablets (1,000 mg total) by mouth every 6 (six) hours as needed. 50 tablet 0    apixaban  (ELIQUIS) 2.5 mg Tab Take 1 tablet (2.5 mg total) by mouth 2 (two) times daily. 60 tablet 11    aspirin 81 MG Chew Take 81 mg by mouth once daily.      cetirizine (ZYRTEC) 10 MG tablet Take 10 mg by mouth daily as needed.      cholecalciferol, vitamin D3, (VITAMIN D3) 50 mcg (2,000 unit) Cap Take 1 capsule by mouth once daily.      clotrimazole-betamethasone 1-0.05% (LOTRISONE) cream Apply topically 2 (two) times daily as needed (rash). 45 g 0    cyanocobalamin 1,000 mcg/mL injection INJECT 1ML IN THE MUSCLE EVERY 30 DAYS 10 mL 1    donepeziL (ARICEPT) 5 MG tablet Take 1 tablet (5 mg total) by mouth every evening. 90 tablet 3    empagliflozin (JARDIANCE) 10 mg tablet Take 1 tablet (10 mg total) by mouth once daily. 90 tablet 3    famotidine (PEPCID) 20 MG tablet Take 1 tablet (20 mg total) by mouth 2 (two) times daily as needed for Heartburn. 20 tablet 0    fluticasone propionate (FLONASE) 50 mcg/actuation nasal spray SHAKE LIQUID AND USE 2 SPRAYS(100 MCG) IN EACH NOSTRIL EVERY DAY 16 g 3    furosemide (LASIX) 40 MG tablet Take 1 tablet (40 mg total) by mouth once daily. 90 tablet 3    ibuprofen (ADVIL,MOTRIN) 800 MG tablet Take 1 tablet (800 mg total) by mouth daily as needed for Pain. Take with food 30 tablet 0    linaCLOtide (LINZESS) 290 mcg Cap capsule Take 1 capsule (290 mcg total) by mouth before breakfast. 90 capsule 3    losartan (COZAAR) 100 MG tablet Take 1 tablet (100 mg total) by mouth once daily. 90 tablet 3    omeprazole 20 mg TbEC Take 1 tablet by mouth once daily.      solifenacin (VESICARE) 10 MG tablet TAKE 1 TABLET(10 MG) BY MOUTH EVERY DAY 90 tablet 3    spironolactone (ALDACTONE) 25 MG tablet Take 1 tablet (25 mg total) by mouth once daily. 90 tablet 3    tamsulosin (FLOMAX) 0.4 mg Cap Take 1 capsule (0.4 mg total) by mouth once daily. 90 capsule 1     No current facility-administered medications on file prior to visit.       Objective    Perceptual/behavioral assessment  -CAPE-V Overall Score:  42  -Quality: strained and breathy  -Volume: decreased projection  -Pitch: high F0  -Flexibility: diminished  -Habitual respiratory pattern: chest/clavicular    Education / Stimulability Trials  Discussed importance of vocal hygiene including: hydration.  Patient  was modestly  stimulable for improved voice using exuberant exercises.  Provided outline of exuberant voice exercises including sustained phonation and projected phrases/reading.  Pt did acknowledge that his voice sounded a bit stronger during these exercises but that he felt like he was yelling.   Encouraged practicing exercises several times daily in isolation and into short phrases to solidify muscle memory patterns and reduce extralaryngeal tension during speech tasks.  He was amenable to all suggestions.     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 Resendiz.  Prognosis for continued improvement is  fair .     Recommendations / POC    -Recommend 2-4 sessions of voice therapy over 4-12 weeks with a speech-language pathologist to optimize glottal postures for improved vocal function, vocal efficiency, and ease of phonation  -Continue exercises as discussed in session  -Contact clinician with any further questions     Functional goals  Length Status Goal   Long term Initiated  Patient and clinician will facilitate changes in vocal function in order to restore functional use of voice for daily occupational, social, and emotional demands.    Long term Initiated  Patient will re-establish phonation with adequate balance of airflow and resonance with decreased muscle tension.    Long term Initiated   Patient will improve coordination of respiration and phonation for efficient vocal production at a conversational level.    Short term Initiated  Patient will complete SOVT exercises and/or resonant-focused exercises 3-5x daily to strengthen and balance the intrinsic laryngeal musculature and  maximize glottic closure without medial hyperfunction.   Short term Initiated  Patient will improve the quality, efficiency, and ease of phonation through resonant and/or airflow exercises from the syllable to conversation level with 80% accuracy.   Short term Initiated  Patient will discriminate between easy and strained phonation with 80% accuracy.    Short term Initiated  Patient will demonstrate the ability to increase awareness of voicing behavior through self-monitoring to facilitate generalization in functional speaking situations with 80% accuracy.

## 2024-10-02 NOTE — PLAN OF CARE
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 Resendiz.  Prognosis for continued improvement is fair.     Recommendations / POC    -Recommend 2-4 sessions of voice therapy over 4-12 weeks with a speech-language pathologist to optimize glottal postures for improved vocal function, vocal efficiency, and ease of phonation  -Continue exercises as discussed in session  -Contact clinician with any further questions     Functional goals  Length Status Goal   Long term Initiated  Patient and clinician will facilitate changes in vocal function in order to restore functional use of voice for daily occupational, social, and emotional demands.    Long term Initiated  Patient will re-establish phonation with adequate balance of airflow and resonance with decreased muscle tension.    Long term Initiated   Patient will improve coordination of respiration and phonation for efficient vocal production at a conversational level.    Short term Initiated  Patient will complete SOVT exercises and/or resonant-focused exercises 3-5x daily to strengthen and balance the intrinsic laryngeal musculature and maximize glottic closure without medial hyperfunction.   Short term Initiated  Patient will improve the quality, efficiency, and ease of phonation through resonant and/or airflow exercises from the syllable to conversation level with 80% accuracy.   Short term Initiated  Patient will discriminate between easy and strained phonation with 80% accuracy.    Short term Initiated  Patient will demonstrate the ability to increase awareness of voicing behavior through self-monitoring to facilitate generalization in functional speaking situations with 80% accuracy.

## 2024-10-04 ENCOUNTER — HOSPITAL ENCOUNTER (OUTPATIENT)
Dept: CARDIOLOGY | Facility: OTHER | Age: 79
Discharge: HOME | End: 2024-10-04
Attending: INTERNAL MEDICINE
Payer: MEDICARE

## 2024-10-04 ENCOUNTER — TELEPHONE (OUTPATIENT)
Dept: CARDIOLOGY | Facility: CLINIC | Age: 79
End: 2024-10-04

## 2024-10-04 ENCOUNTER — TELEPHONE (OUTPATIENT)
Dept: CARDIOLOGY | Facility: CLINIC | Age: 79
End: 2024-10-04
Payer: MEDICARE

## 2024-10-04 VITALS
WEIGHT: 247 LBS | BODY MASS INDEX: 32.74 KG/M2 | DIASTOLIC BLOOD PRESSURE: 59 MMHG | HEIGHT: 73 IN | SYSTOLIC BLOOD PRESSURE: 111 MMHG | HEART RATE: 100 BPM

## 2024-10-04 DIAGNOSIS — I50.32 HEART FAILURE, DIASTOLIC, CHRONIC: ICD-10-CM

## 2024-10-04 LAB
ASCENDING AORTA: 3.23 CM
AV INDEX (PROSTH): 0.81
AV MEAN GRADIENT: 3.9 MMHG
AV PEAK GRADIENT: 7.8 MMHG
AV REGURGITATION PRESSURE HALF TIME: 388 MS
AV VALVE AREA BY VELOCITY RATIO: 2.7 CM²
AV VALVE AREA: 2.8 CM²
AV VELOCITY RATIO: 0.79
BSA FOR ECHO PROCEDURE: 2.4 M2
CV ECHO LV RWT: 0.4 CM
DOP CALC AO PEAK VEL: 1.4 M/S
DOP CALC AO VTI: 22.3 CM
DOP CALC LVOT AREA: 3.5 CM2
DOP CALC LVOT DIAMETER: 2.1 CM
DOP CALC LVOT PEAK VEL: 1.1 M/S
DOP CALC LVOT STROKE VOLUME: 62.3 CM3
DOP CALC RVOT PEAK VEL: 0.83 M/S
DOP CALC RVOT VTI: 12.5 CM
DOP CALCLVOT PEAK VEL VTI: 18 CM
E WAVE DECELERATION TIME: 111.19 MSEC
E/E' RATIO: 8.44 M/S
ECHO LV POSTERIOR WALL: 0.9 CM (ref 0.6–1.1)
EJECTION FRACTION: 60 %
FRACTIONAL SHORTENING: 33.3 % (ref 28–44)
INTERVENTRICULAR SEPTUM: 0.9 CM (ref 0.6–1.1)
IVRT: 139.87 MSEC
LA MAJOR: 6.08 CM
LA MINOR: 5.64 CM
LA WIDTH: 3.9 CM
LEFT ATRIUM AREA SYSTOLIC (APICAL 2 CHAMBER): 21.47 CM2
LEFT ATRIUM AREA SYSTOLIC (APICAL 4 CHAMBER): 22.59 CM2
LEFT ATRIUM SIZE: 3.39 CM
LEFT ATRIUM VOLUME INDEX MOD: 28.6 ML/M2
LEFT ATRIUM VOLUME INDEX: 28 ML/M2
LEFT ATRIUM VOLUME MOD: 67.14 ML
LEFT ATRIUM VOLUME: 65.76 CM3
LEFT INTERNAL DIMENSION IN SYSTOLE: 3 CM (ref 2.1–4)
LEFT VENTRICLE DIASTOLIC VOLUME INDEX: 40.19 ML/M2
LEFT VENTRICLE DIASTOLIC VOLUME: 94.45 ML
LEFT VENTRICLE END SYSTOLIC VOLUME APICAL 2 CHAMBER: 64.63 ML
LEFT VENTRICLE END SYSTOLIC VOLUME APICAL 4 CHAMBER: 69.94 ML
LEFT VENTRICLE MASS INDEX: 56.5 G/M2
LEFT VENTRICLE SYSTOLIC VOLUME INDEX: 14.7 ML/M2
LEFT VENTRICLE SYSTOLIC VOLUME: 34.51 ML
LEFT VENTRICULAR INTERNAL DIMENSION IN DIASTOLE: 4.5 CM (ref 3.5–6)
LEFT VENTRICULAR MASS: 132.8 G
LV LATERAL E/E' RATIO: 8.14 M/S
LV SEPTAL E/E' RATIO: 8.77 M/S
LVED V (TEICH): 94.45 ML
LVES V (TEICH): 34.51 ML
LVOT MG: 2.24 MMHG
LVOT MV: 0.68 CM/S
MV PEAK E VEL: 1.14 M/S
MV STENOSIS PRESSURE HALF TIME: 32.25 MS
MV VALVE AREA P 1/2 METHOD: 6.82 CM2
OHS CV RV/LV RATIO: 0.89 CM
PISA AR MAX VEL: 3.04 M/S
PISA MRMAX VEL: 4.62 M/S
PISA TR MAX VEL: 3.63 M/S
PULM VEIN S/D RATIO: 2.95
PV MEAN GRADIENT: 2 MMHG
PV PEAK D VEL: 0.21 M/S
PV PEAK GRADIENT: 4 MMHG
PV PEAK S VEL: 0.62 M/S
PV PEAK VELOCITY: 1 M/S
RA MAJOR: 4.78 CM
RA PRESSURE ESTIMATED: 0 MMHG
RA WIDTH: 4.1 CM
RIGHT VENTRICLE DIASTOLIC BASEL DIMENSION: 4 CM
RV TB RVSP: 4 MMHG
RV TISSUE DOPPLER FREE WALL SYSTOLIC VELOCITY 1 (APICAL 4 CHAMBER VIEW): 9.99 CM/S
SINUS: 3.3 CM
STJ: 3.29 CM
TDI LATERAL: 0.14 M/S
TDI SEPTAL: 0.13 M/S
TDI: 0.14 M/S
TR MAX PG: 53 MMHG
TRICUSPID ANNULAR PLANE SYSTOLIC EXCURSION: 2.2 CM
TV REST PULMONARY ARTERY PRESSURE: 53 MMHG
Z-SCORE OF LEFT VENTRICULAR DIMENSION IN END DIASTOLE: -7.81
Z-SCORE OF LEFT VENTRICULAR DIMENSION IN END SYSTOLE: -5.36

## 2024-10-04 PROCEDURE — 93306 TTE W/DOPPLER COMPLETE: CPT | Mod: 26,,, | Performed by: INTERNAL MEDICINE

## 2024-10-04 PROCEDURE — 93306 TTE W/DOPPLER COMPLETE: CPT

## 2024-10-04 NOTE — TELEPHONE ENCOUNTER
Pt notified and verbalized understanding.      ----- Message from Ema Montemayor MD sent at 10/4/2024  1:22 PM CDT -----  Laboratory data reviewed. All results favorable.    Stay with plan.     Please call the patient and inform the patient about results.     Ema Montemayor M.D.

## 2024-10-04 NOTE — TELEPHONE ENCOUNTER
Left message on voice mail      ----- Message from Ema Montemayor MD sent at 10/4/2024  8:30 AM CDT -----  Laboratory data reviewed. All results acceptable.     Please call the patient and inform the patient about results.     Ema Montemayor M.D.

## 2024-10-04 NOTE — TELEPHONE ENCOUNTER
Spoke to wife, Dr. Montemayor left message on voice mail      ----- Message from Fidelina sent at 10/4/2024  4:12 PM CDT -----  Regarding: missed call  Type:  Patient Returning Call    Who Called:patient  Who Left Message for Patient:Des  Does the patient know what this is regarding?:no  Would the patient rather a call back or a response via [facility]? call  Best Call Back Number:[phone]  Additional Information:

## 2024-10-22 ENCOUNTER — CLINICAL SUPPORT (OUTPATIENT)
Dept: SPEECH THERAPY | Facility: HOSPITAL | Age: 79
End: 2024-10-22
Payer: MEDICARE

## 2024-10-22 DIAGNOSIS — J38.02 BILATERAL PARTIAL VOCAL CORD PARALYSIS: ICD-10-CM

## 2024-10-22 DIAGNOSIS — R49.0 DYSPHONIA: Primary | ICD-10-CM

## 2024-10-22 DIAGNOSIS — J38.3 VOCAL FOLD ATROPHY: ICD-10-CM

## 2024-10-22 PROCEDURE — 92507 TX SP LANG VOICE COMM INDIV: CPT | Mod: GN | Performed by: SPEECH-LANGUAGE PATHOLOGIST

## 2024-10-22 NOTE — PROGRESS NOTES
Referring provider: Dr. Mushtaq Resendiz  Reason for visit:  Voice treatment (CPT 46719)  Session #1    History / Subjective   I had the pleasure of seeing Sam Gamino for his first treatment session following complete voice evaluation on 9/24/24.  During that time, modest improvements were noted on exuberant voice exercises.    10/22/2024:  Since evaluation, he feels his voice is getting better.  He has been doing the exercises sometimes but feels he would like to do them more.      Objective   The primary goal of todays session was to review HEP.  This was targeted using exuberant treatment modalities.    Perceptual/behavioral assessment  -CAPE-V Overall Score: 27  -Quality:  intermittent roughness  -Volume: decreased projection  -Pitch: appropriate for age and gender identity  -Flexibility: diminished  -Habitual respiratory pattern: chest/clavicular    Treatment  Reviewed exuberant exercises in isolation and into connected speech.  He was able to maintain improved strong voice with 75% accuracy in connected speech but benefited from cues to continue projecting.  He notes he still feels a little like he is yelling but knows that's not how it sounds to others.  For home practice, clinician reviewed home exercises as practiced during the session with the patient.     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 Resendiz.  He has made progress toward goals and feels he will continue to improve with further practice.      Recommendations / POC    Due to patient and clinician perceived improvement along with patient's confidence in his HEP and improvement potential with exercise compliance, d/c from voice therapy at this time. He should continue exercises as discussed in session and contact me with any further questions.      Functional goals  Length Status Goal   Long term Ongoing  Patient and clinician will facilitate changes in vocal function in  order to restore functional use of voice for daily occupational, social, and emotional demands.    Long term Met Patient will re-establish phonation with adequate balance of airflow and resonance with decreased muscle tension.    Long term Ongoing Patient will improve coordination of respiration and phonation for efficient vocal production at a conversational level.    Short term Ongoing Patient will complete SOVT exercises and/or resonant-focused exercises 3-5x daily to strengthen and balance the intrinsic laryngeal musculature and maximize glottic closure without medial hyperfunction.   Short term Ongoing Patient will improve the quality, efficiency, and ease of phonation through resonant and/or airflow exercises from the syllable to conversation level with 80% accuracy.   Short term Met Patient will discriminate between easy and strained phonation with 80% accuracy.    Short term Ongoing Patient will demonstrate the ability to increase awareness of voicing behavior through self-monitoring to facilitate generalization in functional speaking situations with 80% accuracy.

## 2024-11-14 ENCOUNTER — TELEPHONE (OUTPATIENT)
Dept: SPEECH THERAPY | Facility: HOSPITAL | Age: 79
End: 2024-11-14
Payer: MEDICARE

## 2024-12-02 ENCOUNTER — OFFICE VISIT (OUTPATIENT)
Dept: CARDIOLOGY | Facility: CLINIC | Age: 79
End: 2024-12-02
Attending: INTERNAL MEDICINE
Payer: MEDICARE

## 2024-12-02 VITALS
OXYGEN SATURATION: 97 % | BODY MASS INDEX: 32.72 KG/M2 | HEART RATE: 96 BPM | HEIGHT: 73 IN | WEIGHT: 246.88 LBS | DIASTOLIC BLOOD PRESSURE: 58 MMHG | SYSTOLIC BLOOD PRESSURE: 118 MMHG

## 2024-12-02 DIAGNOSIS — I25.10 CORONARY ARTERY DISEASE INVOLVING NATIVE CORONARY ARTERY OF NATIVE HEART WITHOUT ANGINA PECTORIS: ICD-10-CM

## 2024-12-02 DIAGNOSIS — I70.0 ATHEROSCLEROSIS OF AORTA: ICD-10-CM

## 2024-12-02 DIAGNOSIS — Z86.711 HISTORY OF PULMONARY EMBOLISM: ICD-10-CM

## 2024-12-02 DIAGNOSIS — I10 PRIMARY HYPERTENSION: ICD-10-CM

## 2024-12-02 DIAGNOSIS — E78.00 HYPERCHOLESTEROLEMIA: ICD-10-CM

## 2024-12-02 DIAGNOSIS — I50.32 HEART FAILURE, DIASTOLIC, CHRONIC: ICD-10-CM

## 2024-12-02 DIAGNOSIS — Z86.79 HISTORY OF SUBDURAL HEMORRHAGE: ICD-10-CM

## 2024-12-02 DIAGNOSIS — E04.1 THYROID NODULE: ICD-10-CM

## 2024-12-02 DIAGNOSIS — C85.9A LYMPHOMA IN REMISSION: ICD-10-CM

## 2024-12-02 DIAGNOSIS — I36.1 NONRHEUMATIC TRICUSPID VALVE REGURGITATION: ICD-10-CM

## 2024-12-02 DIAGNOSIS — I34.0 NONRHEUMATIC MITRAL VALVE REGURGITATION: ICD-10-CM

## 2024-12-02 DIAGNOSIS — I44.0 ATRIOVENTRICULAR BLOCK, FIRST DEGREE: ICD-10-CM

## 2024-12-02 PROCEDURE — 99214 OFFICE O/P EST MOD 30 MIN: CPT | Mod: PBBFAC | Performed by: INTERNAL MEDICINE

## 2024-12-02 PROCEDURE — 99999 PR PBB SHADOW E&M-EST. PATIENT-LVL IV: CPT | Mod: PBBFAC,,, | Performed by: INTERNAL MEDICINE

## 2024-12-02 PROCEDURE — 99214 OFFICE O/P EST MOD 30 MIN: CPT | Mod: S$PBB,,, | Performed by: INTERNAL MEDICINE

## 2024-12-02 NOTE — PROGRESS NOTES
Subjective:     Sam Gamino is a 79 y.o. male with hypertension and hypercholesterolemia. He is severely obese. 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 In 5/2022 he was admitted with heart failure. He was noted to have moderate to severe mitral regurgitation and severe pulmonary hypertension. He was diuresed. On 5/16/2022 he had an echocardiogram that revealed normal left ventricular size and systolic function. The ejection fraction was 55%. There was moderate mitral regurgitation. There was mild to moderate tricuspid regurgitation. He denies any exertional chest pain. In 9/2022 he was able to walk about two blocks before he had to stop due to dyspnea. He had mild edema of his legs. He was given empagliflozin and he was breathing better and able to walk farther. The edema resolved. On 11/1/2023 he is noted to have a first degree atrioventricular block with a IL interval of 320 ms. On 11/5/2023 he had sudden onset of a severe headache. He found to have had a subarachnoid hemorrhage. He was treated conservatively. During the stay he had a computed tomography angiography scan of his head and neck. The images revealed bilateral pulmonary emboli and he was anticoagulated with apixiban. In 4/2024 he was admitted with heart failure. He was continuing rehabilitation until 8/2024. No chest pain or shortness of breath. No palpitations or weak spells. No bleeding. No clear issues with any of his prescribed medications. Feeling well overall.      Congestive Heart Failure  Presents for follow-up visit. Pertinent negatives include no abdominal pain, chest pain, chest pressure, claudication, edema, fatigue, muscle weakness, near-syncope, nocturia, orthopnea, palpitations, paroxysmal nocturnal dyspnea, shortness of breath or unexpected weight change. The symptoms have been stable.   Coronary Artery Disease  Presents for follow-up visit. Pertinent negatives  include no chest pain, chest pressure, chest tightness, dizziness, leg swelling, muscle weakness, palpitations, shortness of breath or weight gain. Risk factors include obesity.   Hypertension  This is a chronic problem. The current episode started more than 1 year ago. The problem is unchanged. The problem is controlled (usually 110-120/70-80 mmHg at home). Pertinent negatives include no anxiety, blurred vision, chest pain, headaches, malaise/fatigue, neck pain, orthopnea, palpitations, peripheral edema, PND, shortness of breath or sweats. There is no history of chronic renal disease.   Hyperlipidemia  This is a chronic problem. The current episode started more than 1 year ago. The problem is controlled. Recent lipid tests were reviewed and are normal. Exacerbating diseases include obesity. He has no history of chronic renal disease, diabetes, hypothyroidism, liver disease or nephrotic syndrome. Pertinent negatives include no chest pain, focal sensory loss, focal weakness, leg pain, myalgias or shortness of breath.   Cerebrovascular Accident  Pertinent negatives include no abdominal pain, chest pain, chills, coughing, fatigue, fever, headaches, myalgias, nausea, neck pain, numbness, rash, vertigo, vomiting or weakness.       Review of Systems   Constitutional: Negative for chills, fatigue, fever, malaise/fatigue, unexpected weight change and weight gain.   HENT:  Negative for nosebleeds and tinnitus.    Eyes:  Negative for blurred vision, double vision, vision loss in left eye and vision loss in right eye.   Cardiovascular:  Negative for chest pain, claudication, dyspnea on exertion, irregular heartbeat, leg swelling, near-syncope, orthopnea, palpitations, paroxysmal nocturnal dyspnea and syncope.   Respiratory:  Negative for chest tightness, cough, hemoptysis, shortness of breath and wheezing.    Endocrine: Negative for cold intolerance and heat intolerance.   Hematologic/Lymphatic: Negative for bleeding problem.  Does not bruise/bleed easily.   Skin:  Negative for color change and rash.   Musculoskeletal:  Negative for back pain, falls, muscle weakness, myalgias and neck pain.   Gastrointestinal:  Negative for abdominal pain, diarrhea, dysphagia, heartburn, hematemesis, hematochezia, hemorrhoids, jaundice, melena, nausea and vomiting.   Genitourinary:  Negative for dysuria, hematuria and nocturia.   Neurological:  Negative for dizziness, focal weakness, headaches, light-headedness, loss of balance, numbness, tremors, vertigo and weakness.   Psychiatric/Behavioral:  Negative for altered mental status, depression and memory loss. The patient is not nervous/anxious.    Allergic/Immunologic: Negative for hives and persistent infections.       Current Outpatient Medications on File Prior to Visit   Medication Sig Dispense Refill    acetaminophen (TYLENOL) 500 MG tablet Take 2 tablets (1,000 mg total) by mouth every 6 (six) hours as needed. 50 tablet 0    apixaban (ELIQUIS) 2.5 mg Tab Take 1 tablet (2.5 mg total) by mouth 2 (two) times daily. 60 tablet 11    aspirin 81 MG Chew Take 81 mg by mouth once daily.      cetirizine (ZYRTEC) 10 MG tablet Take 10 mg by mouth daily as needed.      cholecalciferol, vitamin D3, (VITAMIN D3) 50 mcg (2,000 unit) Cap Take 1 capsule by mouth once daily.      clotrimazole-betamethasone 1-0.05% (LOTRISONE) cream Apply topically 2 (two) times daily as needed (rash). 45 g 0    cyanocobalamin 1,000 mcg/mL injection INJECT 1ML IN THE MUSCLE EVERY 30 DAYS 10 mL 1    donepeziL (ARICEPT) 5 MG tablet Take 1 tablet (5 mg total) by mouth every evening. 90 tablet 3    empagliflozin (JARDIANCE) 10 mg tablet Take 1 tablet (10 mg total) by mouth once daily. 90 tablet 3    famotidine (PEPCID) 20 MG tablet Take 1 tablet (20 mg total) by mouth 2 (two) times daily as needed for Heartburn. 20 tablet 0    fluticasone propionate (FLONASE) 50 mcg/actuation nasal spray SHAKE LIQUID AND USE 2 SPRAYS(100 MCG) IN EACH  "NOSTRIL EVERY DAY 16 g 3    furosemide (LASIX) 40 MG tablet Take 1 tablet (40 mg total) by mouth once daily. 90 tablet 3    ibuprofen (ADVIL,MOTRIN) 800 MG tablet Take 1 tablet (800 mg total) by mouth daily as needed for Pain. Take with food 30 tablet 0    linaCLOtide (LINZESS) 290 mcg Cap capsule Take 1 capsule (290 mcg total) by mouth before breakfast. 90 capsule 3    losartan (COZAAR) 100 MG tablet Take 1 tablet (100 mg total) by mouth once daily. 90 tablet 3    omeprazole 20 mg TbEC Take 1 tablet by mouth once daily.      solifenacin (VESICARE) 10 MG tablet TAKE 1 TABLET(10 MG) BY MOUTH EVERY DAY 90 tablet 3    spironolactone (ALDACTONE) 25 MG tablet Take 1 tablet (25 mg total) by mouth once daily. 90 tablet 3    tamsulosin (FLOMAX) 0.4 mg Cap Take 1 capsule (0.4 mg total) by mouth once daily. 90 capsule 1     No current facility-administered medications on file prior to visit.       BP (!) 118/58   Pulse 96   Ht 6' 1" (1.854 m)   Wt 112 kg (246 lb 14.4 oz)   SpO2 97%   BMI 32.57 kg/m²     Objective:     Physical Exam  Constitutional:       General: He is not in acute distress.     Appearance: Normal appearance. He is well-developed. He is not toxic-appearing or diaphoretic.   HENT:      Head: Normocephalic and atraumatic.      Nose: Nose normal.   Eyes:      General:         Right eye: No discharge.         Left eye: No discharge.      Conjunctiva/sclera:      Right eye: Right conjunctiva is not injected.      Left eye: Left conjunctiva is not injected.      Pupils: Pupils are equal.      Right eye: Pupil is round.      Left eye: Pupil is round.   Neck:      Thyroid: No thyromegaly.      Vascular: No carotid bruit or JVD.   Cardiovascular:      Rate and Rhythm: Normal rate and regular rhythm. No extrasystoles are present.     Chest Wall: PMI is not displaced.      Pulses:           Radial pulses are 2+ on the right side and 2+ on the left side.        Femoral pulses are 2+ on the right side and 2+ on the " left side.       Dorsalis pedis pulses are 2+ on the right side and 2+ on the left side.        Posterior tibial pulses are 2+ on the right side and 2+ on the left side.      Heart sounds: S1 normal and S2 normal. Murmur heard.      High-pitched blowing holosystolic murmur is present at the apex. P2 pronounced.      Gallop present. S3 and S4 sounds present.   Pulmonary:      Effort: Pulmonary effort is normal.      Breath sounds: No rales.   Abdominal:      Palpations: Abdomen is soft.      Tenderness: There is no abdominal tenderness.   Musculoskeletal:      Cervical back: Neck supple.      Right lower leg: No swelling. No edema.      Left lower leg: No swelling. No edema.   Lymphadenopathy:      Head:      Right side of head: No submandibular adenopathy.      Left side of head: No submandibular adenopathy.      Cervical: No cervical adenopathy.   Skin:     General: Skin is warm and dry.      Findings: No rash.      Nails: There is no clubbing.   Neurological:      General: No focal deficit present.      Mental Status: He is alert and oriented to person, place, and time. He is not disoriented.      Cranial Nerves: No cranial nerve deficit.   Psychiatric:         Attention and Perception: Attention and perception normal.         Mood and Affect: Mood and affect normal.         Speech: Speech normal.         Behavior: Behavior normal.         Thought Content: Thought content normal.         Cognition and Memory: Cognition and memory normal.         Judgment: Judgment normal.         Assessment:     1. Heart failure, diastolic, chronic    2. Nonrheumatic mitral valve regurgitation    3. Nonrheumatic tricuspid valve regurgitation    4. Coronary artery disease involving native coronary artery of native heart without angina pectoris    5. Atrioventricular block, first degree    6. History of pulmonary embolism    7. History of subdural hemorrhage    8. Atherosclerosis of aorta    9. Primary hypertension    10.  Hypercholesterolemia    11. Lymphoma in remission    12. Thyroid nodule          Plan:     1. Heart Failure, Diastolic, Chronic   5/16/2022: Echo: Normal left ventricular size and systolic function. EF 55%. Moderate MR. Mild to moderate TR.   6/6/2023: Echo: Normal left ventricular size and systolic function. EF 60%. LVGLS -15%. Moderate diastolic dysfunction. Mild to moderate AR. Mild to moderate MR. Mildly enlarged ascending aorta. Small pericardial effusion.   10/4/2024: Echo: Normal left ventricular size and systolic function. EF 60%. Moderate diastolic dysfunction. Mild aortic valve sclerosis. Moderate AR. Moderate MR. Mild to moderate TR. SPAP 53. Small pericardial effusion.   8/8/2022: Spironolactone 25 mg Q24 was begun and KCl 20 mEq Q24 was discontinued.   10/10/2022: Empagliflozin 10 mg Q24 was begun.   10/27/2022: BNP 69.   11/1/2023: Metoprolol 50 mg Q24 was discontinued as the PA was 320 ms on an ECG.    On empagliflozin 10 mg Q24, losartan 100 mg Q24, spironolactone 25 mg Q24 and furosemide 40 mg Q24.   May take additional furosemide 40 mg Q24 in early afternoon PRN for edema which he has not needed.   Appears well compensated.       2. Mitral Regurgitation   5/16/2022: Echo: Moderate MR.   6/6/2023: Echo: Mild to moderate MR.     3. Tricuspid Regurgitation   5/16/2022: Echo: Mild to moderate TR.    4. Coronary Artery Disease   3/2007: Underwent coronary angiogram. Appeared to have had mild disease.   No aspirin as anticoagulated with apixiban.    5. Atrioventricular Block, First Degree   11/1/2023: ECG: PA interval 320 ms.   11/1/2023: Metoprolol 50 mg Q24 was discontinued.    12/19/2023: Holter: Sinus rhythm 94 () bpm. Long PA interval. Narrow QRS. No prolonged pauses. Frequent APC's - 15/hr. Rare VPC's - 2/hr. 3 V couplets. 2 V triplets. No reported symptoms.  No betablocker.   To be followed.    6. Chronic Anticoagulation   11/14/2023: CTA of Head and Neck: Bilateral pulmonary  emboli.   Appears the PE was unprovoked.   9/19/2024: Apixiban 2.5 mg Q12 was resumed. Then no aspirin.    On apixiban 2.5 mg Q12.   No bleeding.     7. History of Pulmonary Embolism   11/14/2023: CTA of Head and Neck: Bilateral pulmonary emboli.   Appears the PE was unprovoked.   On apixiban.    8. History of Subarachnoid Hemorrhage   11/2023: SAH.   Conservative care.   11/2023: Diagnosed with pulmonary embolism and benefit with anticoagulation felt to outweigh risk.     9. Atherosclerosis of Aorta   2023: Mentioned in chart.   No aspirin.    10. Hypertension   2002: Diagnosed.   11/1/2023: Metoprolol 50 mg Q24 was discontinued.     On losartan 100 mg Q24, spironolactone 25 mg Q24 and furosemide 40 mg Q24.   Keeping log at home.   Well controlled.      11. Hypercholesterolemia   2002: Statin was begun.   On atorvastatin 40 mg Q24.   4/12/2022: Chol 132. HDL 41. LDL 71. .   On atorvastatin 40 mg Q24.   Very favorable lipid panel.    12. Mild Obesity   6/27/2022: Weight 117 kg. BMI 35.   6/28/2023: Weight 119 kg. BMI 35.   12/7/2023: Weight 110 kg. BMI 32.   Encouraged to lose more weight.    13. 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    14. Primary Care   Dr. Herber Zamora.    F/u 4 months.    Ema Montemayor M.D.

## 2024-12-30 ENCOUNTER — HOSPITAL ENCOUNTER (INPATIENT)
Facility: OTHER | Age: 79
LOS: 5 days | Discharge: HOME HEALTH | DRG: 193 | End: 2025-01-05
Admitting: HOSPITALIST
Payer: MEDICARE

## 2024-12-30 DIAGNOSIS — J10.1 INFLUENZA A: ICD-10-CM

## 2024-12-30 DIAGNOSIS — R50.9 FEVER, UNSPECIFIED FEVER CAUSE: ICD-10-CM

## 2024-12-30 DIAGNOSIS — I27.29 PULMONARY HYPERTENSION ASSOCIATED WITH UNCLEAR MULTI-FACTORIAL MECHANISMS: ICD-10-CM

## 2024-12-30 DIAGNOSIS — I50.9 ACUTE ON CHRONIC CONGESTIVE HEART FAILURE, UNSPECIFIED HEART FAILURE TYPE: Primary | ICD-10-CM

## 2024-12-30 DIAGNOSIS — R00.0 TACHYCARDIA: ICD-10-CM

## 2024-12-30 DIAGNOSIS — R07.9 CHEST PAIN: ICD-10-CM

## 2024-12-30 LAB
ALBUMIN SERPL BCP-MCNC: 4 G/DL (ref 3.5–5.2)
ALP SERPL-CCNC: 77 U/L (ref 40–150)
ALT SERPL W/O P-5'-P-CCNC: 33 U/L (ref 10–44)
ANION GAP SERPL CALC-SCNC: 10 MMOL/L (ref 8–16)
AST SERPL-CCNC: 33 U/L (ref 10–40)
BACTERIA #/AREA URNS HPF: NORMAL /HPF
BASOPHILS # BLD AUTO: 0.02 K/UL (ref 0–0.2)
BASOPHILS NFR BLD: 0.3 % (ref 0–1.9)
BILIRUB SERPL-MCNC: 1.9 MG/DL (ref 0.1–1)
BILIRUB UR QL STRIP: NEGATIVE
BNP SERPL-MCNC: 164 PG/ML (ref 0–99)
BUN SERPL-MCNC: 15 MG/DL (ref 8–23)
CALCIUM SERPL-MCNC: 9.6 MG/DL (ref 8.7–10.5)
CHLORIDE SERPL-SCNC: 106 MMOL/L (ref 95–110)
CLARITY UR: CLEAR
CO2 SERPL-SCNC: 22 MMOL/L (ref 23–29)
COLOR UR: YELLOW
CREAT SERPL-MCNC: 1 MG/DL (ref 0.5–1.4)
CTP QC/QA: YES
CTP QC/QA: YES
DIFFERENTIAL METHOD BLD: ABNORMAL
EOSINOPHIL # BLD AUTO: 0 K/UL (ref 0–0.5)
EOSINOPHIL NFR BLD: 0.4 % (ref 0–8)
ERYTHROCYTE [DISTWIDTH] IN BLOOD BY AUTOMATED COUNT: 12.4 % (ref 11.5–14.5)
EST. GFR  (NO RACE VARIABLE): >60 ML/MIN/1.73 M^2
GLUCOSE SERPL-MCNC: 88 MG/DL (ref 70–110)
GLUCOSE UR QL STRIP: ABNORMAL
HCT VFR BLD AUTO: 40.5 % (ref 40–54)
HGB BLD-MCNC: 13.5 G/DL (ref 14–18)
HGB UR QL STRIP: ABNORMAL
IMM GRANULOCYTES # BLD AUTO: 0.01 K/UL (ref 0–0.04)
IMM GRANULOCYTES NFR BLD AUTO: 0.1 % (ref 0–0.5)
KETONES UR QL STRIP: ABNORMAL
LEUKOCYTE ESTERASE UR QL STRIP: NEGATIVE
LYMPHOCYTES # BLD AUTO: 0.5 K/UL (ref 1–4.8)
LYMPHOCYTES NFR BLD: 7.2 % (ref 18–48)
MAGNESIUM SERPL-MCNC: 1.7 MG/DL (ref 1.6–2.6)
MCH RBC QN AUTO: 34.2 PG (ref 27–31)
MCHC RBC AUTO-ENTMCNC: 33.3 G/DL (ref 32–36)
MCV RBC AUTO: 103 FL (ref 82–98)
MICROSCOPIC COMMENT: NORMAL
MONOCYTES # BLD AUTO: 0.3 K/UL (ref 0.3–1)
MONOCYTES NFR BLD: 4 % (ref 4–15)
NEUTROPHILS # BLD AUTO: 6.3 K/UL (ref 1.8–7.7)
NEUTROPHILS NFR BLD: 88 % (ref 38–73)
NITRITE UR QL STRIP: NEGATIVE
NRBC BLD-RTO: 0 /100 WBC
PH UR STRIP: 6 [PH] (ref 5–8)
PHOSPHATE SERPL-MCNC: 2.7 MG/DL (ref 2.7–4.5)
PLATELET # BLD AUTO: 157 K/UL (ref 150–450)
PMV BLD AUTO: 9.8 FL (ref 9.2–12.9)
POC MOLECULAR INFLUENZA A AGN: NEGATIVE
POC MOLECULAR INFLUENZA B AGN: NEGATIVE
POTASSIUM SERPL-SCNC: 3.8 MMOL/L (ref 3.5–5.1)
PROT SERPL-MCNC: 8.1 G/DL (ref 6–8.4)
PROT UR QL STRIP: NEGATIVE
RBC # BLD AUTO: 3.95 M/UL (ref 4.6–6.2)
RBC #/AREA URNS HPF: 2 /HPF (ref 0–4)
SARS-COV-2 RDRP RESP QL NAA+PROBE: NEGATIVE
SODIUM SERPL-SCNC: 138 MMOL/L (ref 136–145)
SP GR UR STRIP: 1.03 (ref 1–1.03)
SQUAMOUS #/AREA URNS HPF: 0 /HPF
TROPONIN I SERPL DL<=0.01 NG/ML-MCNC: 0.01 NG/ML (ref 0–0.03)
URN SPEC COLLECT METH UR: ABNORMAL
UROBILINOGEN UR STRIP-ACNC: NEGATIVE EU/DL
WBC # BLD AUTO: 7.21 K/UL (ref 3.9–12.7)
WBC #/AREA URNS HPF: 1 /HPF (ref 0–5)
YEAST URNS QL MICRO: NORMAL

## 2024-12-30 PROCEDURE — 25000003 PHARM REV CODE 250: Performed by: NURSE PRACTITIONER

## 2024-12-30 PROCEDURE — 63600175 PHARM REV CODE 636 W HCPCS

## 2024-12-30 PROCEDURE — 93005 ELECTROCARDIOGRAM TRACING: CPT

## 2024-12-30 PROCEDURE — 85025 COMPLETE CBC W/AUTO DIFF WBC: CPT | Performed by: NURSE PRACTITIONER

## 2024-12-30 PROCEDURE — 84484 ASSAY OF TROPONIN QUANT: CPT | Performed by: NURSE PRACTITIONER

## 2024-12-30 PROCEDURE — 96374 THER/PROPH/DIAG INJ IV PUSH: CPT

## 2024-12-30 PROCEDURE — 99285 EMERGENCY DEPT VISIT HI MDM: CPT | Mod: 25

## 2024-12-30 PROCEDURE — 81000 URINALYSIS NONAUTO W/SCOPE: CPT

## 2024-12-30 PROCEDURE — 83880 ASSAY OF NATRIURETIC PEPTIDE: CPT | Performed by: NURSE PRACTITIONER

## 2024-12-30 PROCEDURE — 80053 COMPREHEN METABOLIC PANEL: CPT | Performed by: NURSE PRACTITIONER

## 2024-12-30 PROCEDURE — 93010 ELECTROCARDIOGRAM REPORT: CPT | Mod: ,,, | Performed by: INTERNAL MEDICINE

## 2024-12-30 PROCEDURE — 83735 ASSAY OF MAGNESIUM: CPT | Performed by: NURSE PRACTITIONER

## 2024-12-30 PROCEDURE — 84100 ASSAY OF PHOSPHORUS: CPT | Performed by: NURSE PRACTITIONER

## 2024-12-30 PROCEDURE — 87635 SARS-COV-2 COVID-19 AMP PRB: CPT

## 2024-12-30 RX ORDER — ASPIRIN 325 MG
325 TABLET ORAL
Status: COMPLETED | OUTPATIENT
Start: 2024-12-30 | End: 2024-12-30

## 2024-12-30 RX ORDER — FUROSEMIDE 10 MG/ML
40 INJECTION INTRAMUSCULAR; INTRAVENOUS
Status: COMPLETED | OUTPATIENT
Start: 2024-12-30 | End: 2024-12-30

## 2024-12-30 RX ADMIN — ASPIRIN 325 MG ORAL TABLET 325 MG: 325 PILL ORAL at 09:12

## 2024-12-30 RX ADMIN — FUROSEMIDE 40 MG: 10 INJECTION, SOLUTION INTRAMUSCULAR; INTRAVENOUS at 10:12

## 2024-12-30 NOTE — Clinical Note
Diagnosis: Acute on chronic congestive heart failure, unspecified heart failure type [[id number]]   Future Attending Provider: SALINAS ROCK [[id number]]

## 2024-12-31 PROBLEM — I50.33 ACUTE ON CHRONIC DIASTOLIC HEART FAILURE: Status: ACTIVE | Noted: 2024-12-31

## 2024-12-31 PROBLEM — I50.43 ACUTE ON CHRONIC COMBINED SYSTOLIC AND DIASTOLIC HEART FAILURE: Status: ACTIVE | Noted: 2024-12-31

## 2024-12-31 PROBLEM — R50.9 FEVER: Status: ACTIVE | Noted: 2024-12-31

## 2024-12-31 PROBLEM — G93.41 ENCEPHALOPATHY, METABOLIC: Status: ACTIVE | Noted: 2024-12-31

## 2024-12-31 PROBLEM — J10.1 INFLUENZA A: Status: ACTIVE | Noted: 2024-12-31

## 2024-12-31 PROBLEM — I27.29 PULMONARY HYPERTENSION ASSOCIATED WITH UNCLEAR MULTI-FACTORIAL MECHANISMS: Status: ACTIVE | Noted: 2024-12-31

## 2024-12-31 LAB
ANION GAP SERPL CALC-SCNC: 13 MMOL/L (ref 8–16)
BASOPHILS # BLD AUTO: 0.02 K/UL (ref 0–0.2)
BASOPHILS NFR BLD: 0.3 % (ref 0–1.9)
BUN SERPL-MCNC: 13 MG/DL (ref 8–23)
CALCIUM SERPL-MCNC: 9.3 MG/DL (ref 8.7–10.5)
CHLORIDE SERPL-SCNC: 105 MMOL/L (ref 95–110)
CO2 SERPL-SCNC: 21 MMOL/L (ref 23–29)
CREAT SERPL-MCNC: 0.9 MG/DL (ref 0.5–1.4)
DIFFERENTIAL METHOD BLD: ABNORMAL
EOSINOPHIL # BLD AUTO: 0 K/UL (ref 0–0.5)
EOSINOPHIL NFR BLD: 0.2 % (ref 0–8)
ERYTHROCYTE [DISTWIDTH] IN BLOOD BY AUTOMATED COUNT: 12.6 % (ref 11.5–14.5)
EST. GFR  (NO RACE VARIABLE): >60 ML/MIN/1.73 M^2
ESTIMATED AVG GLUCOSE: 85 MG/DL (ref 68–131)
FIO2: 21
GLUCOSE SERPL-MCNC: 90 MG/DL (ref 70–110)
HBA1C MFR BLD: 4.6 % (ref 4–5.6)
HCT VFR BLD AUTO: 37.2 % (ref 40–54)
HGB BLD-MCNC: 12.4 G/DL (ref 14–18)
IMM GRANULOCYTES # BLD AUTO: 0.05 K/UL (ref 0–0.04)
IMM GRANULOCYTES NFR BLD AUTO: 0.8 % (ref 0–0.5)
INFLUENZA A, MOLECULAR: POSITIVE
INFLUENZA B, MOLECULAR: NEGATIVE
LACTATE SERPL-SCNC: 0.9 MMOL/L (ref 0.5–2.2)
LDH SERPL L TO P-CCNC: 1.18 MMOL/L (ref 0.5–2.2)
LYMPHOCYTES # BLD AUTO: 0.4 K/UL (ref 1–4.8)
LYMPHOCYTES NFR BLD: 6.7 % (ref 18–48)
MAGNESIUM SERPL-MCNC: 1.8 MG/DL (ref 1.6–2.6)
MCH RBC QN AUTO: 34 PG (ref 27–31)
MCHC RBC AUTO-ENTMCNC: 33.3 G/DL (ref 32–36)
MCV RBC AUTO: 102 FL (ref 82–98)
MONOCYTES # BLD AUTO: 0.4 K/UL (ref 0.3–1)
MONOCYTES NFR BLD: 5.6 % (ref 4–15)
NEUTROPHILS # BLD AUTO: 5.7 K/UL (ref 1.8–7.7)
NEUTROPHILS NFR BLD: 86.4 % (ref 38–73)
NRBC BLD-RTO: 0 /100 WBC
OHS QRS DURATION: 102 MS
OHS QTC CALCULATION: 501 MS
PLATELET # BLD AUTO: 128 K/UL (ref 150–450)
PMV BLD AUTO: 10.3 FL (ref 9.2–12.9)
POTASSIUM SERPL-SCNC: 3.5 MMOL/L (ref 3.5–5.1)
RBC # BLD AUTO: 3.65 M/UL (ref 4.6–6.2)
SAMPLE: NORMAL
SODIUM SERPL-SCNC: 139 MMOL/L (ref 136–145)
SPECIMEN SOURCE: ABNORMAL
TROPONIN I SERPL DL<=0.01 NG/ML-MCNC: 0.01 NG/ML (ref 0–0.03)
WBC # BLD AUTO: 6.61 K/UL (ref 3.9–12.7)

## 2024-12-31 PROCEDURE — 83605 ASSAY OF LACTIC ACID: CPT

## 2024-12-31 PROCEDURE — 97166 OT EVAL MOD COMPLEX 45 MIN: CPT

## 2024-12-31 PROCEDURE — 25000003 PHARM REV CODE 250: Performed by: PHYSICIAN ASSISTANT

## 2024-12-31 PROCEDURE — 97530 THERAPEUTIC ACTIVITIES: CPT

## 2024-12-31 PROCEDURE — 80048 BASIC METABOLIC PNL TOTAL CA: CPT | Performed by: PHYSICIAN ASSISTANT

## 2024-12-31 PROCEDURE — 87040 BLOOD CULTURE FOR BACTERIA: CPT | Mod: 59

## 2024-12-31 PROCEDURE — 63600175 PHARM REV CODE 636 W HCPCS: Performed by: PHYSICIAN ASSISTANT

## 2024-12-31 PROCEDURE — 94761 N-INVAS EAR/PLS OXIMETRY MLT: CPT

## 2024-12-31 PROCEDURE — 25500020 PHARM REV CODE 255

## 2024-12-31 PROCEDURE — 83036 HEMOGLOBIN GLYCOSYLATED A1C: CPT | Performed by: PHYSICIAN ASSISTANT

## 2024-12-31 PROCEDURE — 63600175 PHARM REV CODE 636 W HCPCS: Performed by: HOSPITALIST

## 2024-12-31 PROCEDURE — 25000242 PHARM REV CODE 250 ALT 637 W/ HCPCS: Performed by: PHYSICIAN ASSISTANT

## 2024-12-31 PROCEDURE — 85025 COMPLETE CBC W/AUTO DIFF WBC: CPT | Performed by: PHYSICIAN ASSISTANT

## 2024-12-31 PROCEDURE — 83735 ASSAY OF MAGNESIUM: CPT | Performed by: PHYSICIAN ASSISTANT

## 2024-12-31 PROCEDURE — 84484 ASSAY OF TROPONIN QUANT: CPT

## 2024-12-31 PROCEDURE — 63600175 PHARM REV CODE 636 W HCPCS

## 2024-12-31 PROCEDURE — 87632 RESP VIRUS 6-11 TARGETS: CPT | Performed by: HOSPITALIST

## 2024-12-31 PROCEDURE — 21400001 HC TELEMETRY ROOM

## 2024-12-31 PROCEDURE — A4216 STERILE WATER/SALINE, 10 ML: HCPCS | Performed by: PHYSICIAN ASSISTANT

## 2024-12-31 PROCEDURE — 25000003 PHARM REV CODE 250

## 2024-12-31 PROCEDURE — 87040 BLOOD CULTURE FOR BACTERIA: CPT | Performed by: HOSPITALIST

## 2024-12-31 PROCEDURE — 25000003 PHARM REV CODE 250: Performed by: HOSPITALIST

## 2024-12-31 PROCEDURE — 97162 PT EVAL MOD COMPLEX 30 MIN: CPT

## 2024-12-31 PROCEDURE — 36415 COLL VENOUS BLD VENIPUNCTURE: CPT

## 2024-12-31 PROCEDURE — 99900035 HC TECH TIME PER 15 MIN (STAT)

## 2024-12-31 PROCEDURE — 87502 INFLUENZA DNA AMP PROBE: CPT | Performed by: HOSPITALIST

## 2024-12-31 PROCEDURE — 27000207 HC ISOLATION

## 2024-12-31 PROCEDURE — 92610 EVALUATE SWALLOWING FUNCTION: CPT

## 2024-12-31 RX ORDER — VANCOMYCIN 2 GRAM/500 ML IN 0.9 % SODIUM CHLORIDE INTRAVENOUS
2000 ONCE
Status: COMPLETED | OUTPATIENT
Start: 2024-12-31 | End: 2024-12-31

## 2024-12-31 RX ORDER — PREDNISONE 20 MG/1
40 TABLET ORAL DAILY
Status: COMPLETED | OUTPATIENT
Start: 2024-12-31 | End: 2025-01-04

## 2024-12-31 RX ORDER — IBUPROFEN 200 MG
24 TABLET ORAL
Status: DISCONTINUED | OUTPATIENT
Start: 2024-12-31 | End: 2025-01-05 | Stop reason: HOSPADM

## 2024-12-31 RX ORDER — METOPROLOL TARTRATE 50 MG/1
50 TABLET ORAL
Status: COMPLETED | OUTPATIENT
Start: 2024-12-31 | End: 2024-12-31

## 2024-12-31 RX ORDER — OSELTAMIVIR PHOSPHATE 75 MG/1
75 CAPSULE ORAL 2 TIMES DAILY
Status: COMPLETED | OUTPATIENT
Start: 2024-12-31 | End: 2025-01-04

## 2024-12-31 RX ORDER — SPIRONOLACTONE 25 MG/1
25 TABLET ORAL DAILY
Status: DISCONTINUED | OUTPATIENT
Start: 2024-12-31 | End: 2025-01-05 | Stop reason: HOSPADM

## 2024-12-31 RX ORDER — NALOXONE HCL 0.4 MG/ML
0.02 VIAL (ML) INJECTION
Status: DISCONTINUED | OUTPATIENT
Start: 2024-12-31 | End: 2025-01-05 | Stop reason: HOSPADM

## 2024-12-31 RX ORDER — SODIUM CHLORIDE 0.9 % (FLUSH) 0.9 %
10 SYRINGE (ML) INJECTION
Status: DISCONTINUED | OUTPATIENT
Start: 2024-12-31 | End: 2025-01-05 | Stop reason: HOSPADM

## 2024-12-31 RX ORDER — DONEPEZIL HYDROCHLORIDE 5 MG/1
5 TABLET, FILM COATED ORAL NIGHTLY
Status: DISCONTINUED | OUTPATIENT
Start: 2024-12-31 | End: 2025-01-05 | Stop reason: HOSPADM

## 2024-12-31 RX ORDER — TAMSULOSIN HYDROCHLORIDE 0.4 MG/1
0.4 CAPSULE ORAL DAILY
Status: DISCONTINUED | OUTPATIENT
Start: 2024-12-31 | End: 2025-01-05 | Stop reason: HOSPADM

## 2024-12-31 RX ORDER — GLUCAGON 1 MG
1 KIT INJECTION
Status: DISCONTINUED | OUTPATIENT
Start: 2024-12-31 | End: 2025-01-05 | Stop reason: HOSPADM

## 2024-12-31 RX ORDER — AMOXICILLIN 250 MG
1 CAPSULE ORAL 2 TIMES DAILY PRN
Status: DISCONTINUED | OUTPATIENT
Start: 2024-12-31 | End: 2025-01-05 | Stop reason: HOSPADM

## 2024-12-31 RX ORDER — LOSARTAN POTASSIUM 50 MG/1
100 TABLET ORAL DAILY
Status: DISCONTINUED | OUTPATIENT
Start: 2024-12-31 | End: 2025-01-04

## 2024-12-31 RX ORDER — OXYBUTYNIN CHLORIDE 5 MG/1
10 TABLET, EXTENDED RELEASE ORAL DAILY
Status: DISCONTINUED | OUTPATIENT
Start: 2024-12-31 | End: 2025-01-05 | Stop reason: HOSPADM

## 2024-12-31 RX ORDER — IBUPROFEN 200 MG
16 TABLET ORAL
Status: DISCONTINUED | OUTPATIENT
Start: 2024-12-31 | End: 2025-01-05 | Stop reason: HOSPADM

## 2024-12-31 RX ORDER — NAPROXEN SODIUM 220 MG/1
81 TABLET, FILM COATED ORAL DAILY
Status: DISCONTINUED | OUTPATIENT
Start: 2024-12-31 | End: 2025-01-05 | Stop reason: HOSPADM

## 2024-12-31 RX ORDER — FLUTICASONE PROPIONATE 50 MCG
2 SPRAY, SUSPENSION (ML) NASAL DAILY
Status: DISCONTINUED | OUTPATIENT
Start: 2024-12-31 | End: 2025-01-05 | Stop reason: HOSPADM

## 2024-12-31 RX ORDER — TALC
6 POWDER (GRAM) TOPICAL NIGHTLY PRN
Status: DISCONTINUED | OUTPATIENT
Start: 2024-12-31 | End: 2025-01-05 | Stop reason: HOSPADM

## 2024-12-31 RX ORDER — FUROSEMIDE 10 MG/ML
40 INJECTION INTRAMUSCULAR; INTRAVENOUS 2 TIMES DAILY
Status: DISCONTINUED | OUTPATIENT
Start: 2024-12-31 | End: 2024-12-31

## 2024-12-31 RX ORDER — FUROSEMIDE 40 MG/1
40 TABLET ORAL DAILY
Status: DISCONTINUED | OUTPATIENT
Start: 2025-01-01 | End: 2025-01-05 | Stop reason: HOSPADM

## 2024-12-31 RX ORDER — ALBUTEROL SULFATE 2.5 MG/.5ML
2.5 SOLUTION RESPIRATORY (INHALATION) EVERY 4 HOURS PRN
Status: DISCONTINUED | OUTPATIENT
Start: 2024-12-31 | End: 2025-01-02

## 2024-12-31 RX ORDER — FAMOTIDINE 20 MG/1
20 TABLET, FILM COATED ORAL 2 TIMES DAILY PRN
Status: DISCONTINUED | OUTPATIENT
Start: 2024-12-31 | End: 2025-01-05 | Stop reason: HOSPADM

## 2024-12-31 RX ORDER — GUAIFENESIN AND DEXTROMETHORPHAN HYDROBROMIDE 10; 100 MG/5ML; MG/5ML
10 SYRUP ORAL EVERY 4 HOURS PRN
Status: DISCONTINUED | OUTPATIENT
Start: 2024-12-31 | End: 2025-01-05 | Stop reason: HOSPADM

## 2024-12-31 RX ORDER — ACETAMINOPHEN 500 MG
1000 TABLET ORAL
Status: COMPLETED | OUTPATIENT
Start: 2024-12-31 | End: 2024-12-31

## 2024-12-31 RX ORDER — ACETAMINOPHEN 325 MG/1
650 TABLET ORAL EVERY 6 HOURS PRN
Status: DISCONTINUED | OUTPATIENT
Start: 2024-12-31 | End: 2025-01-05 | Stop reason: HOSPADM

## 2024-12-31 RX ORDER — SODIUM CHLORIDE 0.9 % (FLUSH) 0.9 %
10 SYRINGE (ML) INJECTION EVERY 8 HOURS
Status: DISCONTINUED | OUTPATIENT
Start: 2024-12-31 | End: 2025-01-05 | Stop reason: HOSPADM

## 2024-12-31 RX ADMIN — ACETAMINOPHEN 1000 MG: 500 TABLET, FILM COATED ORAL at 03:12

## 2024-12-31 RX ADMIN — Medication 10 ML: at 03:12

## 2024-12-31 RX ADMIN — DONEPEZIL HYDROCHLORIDE 5 MG: 5 TABLET, FILM COATED ORAL at 09:12

## 2024-12-31 RX ADMIN — OSELTAMIVIR PHOSPHATE 75 MG: 75 CAPSULE ORAL at 03:12

## 2024-12-31 RX ADMIN — FLUTICASONE PROPIONATE 100 MCG: 50 SPRAY, METERED NASAL at 10:12

## 2024-12-31 RX ADMIN — ASPIRIN 81 MG CHEWABLE TABLET 81 MG: 81 TABLET CHEWABLE at 10:12

## 2024-12-31 RX ADMIN — METOPROLOL TARTRATE 50 MG: 50 TABLET, FILM COATED ORAL at 02:12

## 2024-12-31 RX ADMIN — PIPERACILLIN SODIUM AND TAZOBACTAM SODIUM 4.5 G: 4; .5 INJECTION, POWDER, LYOPHILIZED, FOR SOLUTION INTRAVENOUS at 07:12

## 2024-12-31 RX ADMIN — PIPERACILLIN SODIUM AND TAZOBACTAM SODIUM 4.5 G: 4; .5 INJECTION, POWDER, LYOPHILIZED, FOR SOLUTION INTRAVENOUS at 10:12

## 2024-12-31 RX ADMIN — TAMSULOSIN HYDROCHLORIDE 0.4 MG: 0.4 CAPSULE ORAL at 10:12

## 2024-12-31 RX ADMIN — GUAIFENESIN SYRUP AND DEXTROMETHORPHAN 10 ML: 100; 10 SYRUP ORAL at 03:12

## 2024-12-31 RX ADMIN — LOSARTAN POTASSIUM 100 MG: 50 TABLET, FILM COATED ORAL at 10:12

## 2024-12-31 RX ADMIN — Medication 10 ML: at 05:12

## 2024-12-31 RX ADMIN — PREDNISONE 40 MG: 20 TABLET ORAL at 10:12

## 2024-12-31 RX ADMIN — FUROSEMIDE 40 MG: 10 INJECTION, SOLUTION INTRAMUSCULAR; INTRAVENOUS at 10:12

## 2024-12-31 RX ADMIN — SPIRONOLACTONE 25 MG: 25 TABLET, FILM COATED ORAL at 10:12

## 2024-12-31 RX ADMIN — GUAIFENESIN SYRUP AND DEXTROMETHORPHAN 10 ML: 100; 10 SYRUP ORAL at 07:12

## 2024-12-31 RX ADMIN — OXYBUTYNIN CHLORIDE 10 MG: 5 TABLET, EXTENDED RELEASE ORAL at 10:12

## 2024-12-31 RX ADMIN — IOHEXOL 100 ML: 350 INJECTION, SOLUTION INTRAVENOUS at 01:12

## 2024-12-31 RX ADMIN — VANCOMYCIN HYDROCHLORIDE 2000 MG: 500 INJECTION, POWDER, LYOPHILIZED, FOR SOLUTION INTRAVENOUS at 07:12

## 2024-12-31 RX ADMIN — APIXABAN 2.5 MG: 2.5 TABLET, FILM COATED ORAL at 10:12

## 2024-12-31 RX ADMIN — PIPERACILLIN SODIUM AND TAZOBACTAM SODIUM 4.5 G: 4; .5 INJECTION, POWDER, LYOPHILIZED, FOR SOLUTION INTRAVENOUS at 03:12

## 2024-12-31 RX ADMIN — APIXABAN 2.5 MG: 2.5 TABLET, FILM COATED ORAL at 09:12

## 2024-12-31 RX ADMIN — Medication 10 ML: at 09:12

## 2024-12-31 RX ADMIN — OSELTAMIVIR PHOSPHATE 75 MG: 75 CAPSULE ORAL at 09:12

## 2024-12-31 NOTE — H&P
[facility] Medicine  History & Physical    Patient Name: Sam Gamino  MRN: [id number]  Patient Class: OP- Observation  Admission Date: 12/30/2024  Attending Physician: Estrella Bryant MD   Primary Care Provider: JAYLYN Zamora MD         Patient information was obtained from patient, past medical records, and ER records.     Subjective:     Principal Problem:Acute on chronic combined systolic and diastolic heart failure    Chief Complaint:   Chief Complaint   Patient presents with    Chest Pain     Non-radiating intermittent L anterior chest wall pain X3 days  Does not presents as reproducible pain  Denies taking any ASA today     Fatigue     Generalized Weakness        HPI: Mr. aSm Gamino is a 79 y.o. 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  The pain has been intermittent, and is worse with exertion.  The pain is not reproducible with movement, or palpation.  He notes associated shortness of breath, and lower extremity swelling.  He admits to indulging in salt rich foods over the holiday, and also missing a few doses of his Lasix.  He denies nausea, vomiting, abdominal pain. He further notes generalized weakness, and fatigue.  He states that he had a fall on the day of presentation due to feeling very weak in the legs.  Denies LOC, head injury.  He was evaluated in the ED with labs that showed H&H of 13.5/40.5.  He had no leukocytosis or left shift.  A metabolic panel showed normal values save for total bilirubin which was 1.9.  A BNP was elevated at 164.  Troponins were negative x2.  While in the ED he had a measured temperature of 101.7° F by mouth.  He was  negative for COVID, and influenza a/B.  A urinalysis showed no UTI.  A chest x-ray showed no acute cardiopulmonary process.  A CT of the head showed no acute intracranial abnormalities.  A CTA of the chest showed no PE, small bilateral pleural effusions right greater than left, a small pericardial effusion (similar to prior imaging), and a bilateral paramediastinal consolidation consistent with post radiation change, and finally a mildly prominent fluid-filled distal thoracic esophagus concerning for esophageal dysmotility versus reflux.  He was treated in the ED with Tylenol for the fever, aspirin, 40 mg IV Lasix, and a dose of metoprolol.  He was placed on observation.    Past Medical History:   Diagnosis Date    Abnormal echocardiogram 02/08/2013    Mild MVR 3/07    Atherosclerosis of aorta 11/01/2023 2023: Mentioned in chart.    CAD (coronary artery disease) 02/08/2013    Angio 3/07 Dr. Lin    Chronic diastolic (congestive) heart failure     History of pulmonary embolism 11/14/2023    CTA - 11/14/23 - Bilateral    History of subdural hemorrhage 11/04/2023 11/5/2023: SA[facility].    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 Pelayo       Past Surgical History:   Procedure Laterality Date    EYE SURGERY Bilateral 2016    Cataracts       Review of patient's allergies indicates:   Allergen Reactions    Lotensin [benazepril]      cough       No current facility-administered medications on file prior to encounter.     Current Outpatient Medications on File Prior to Encounter   Medication Sig    acetaminophen (TYLENOL) 500 MG tablet Take 2 tablets (1,000 mg total) by mouth every 6 (six) hours as needed.    apixaban (ELIQUIS) 2.5 mg Tab  Take 1 tablet (2.5 mg total) by mouth 2 (two) times daily.    aspirin 81 MG Chew Take 81 mg by mouth once daily.    cetirizine (ZYRTEC) 10 MG tablet Take 10 mg by mouth daily as needed.    cholecalciferol, vitamin D3, (VITAMIN D3) 50 mcg (2,000 unit) Cap Take 1 capsule by mouth once daily.    clotrimazole-betamethasone 1-0.05% (LOTRISONE) cream Apply topically 2 (two) times daily as needed (rash).    cyanocobalamin 1,000 mcg/mL injection INJECT 1ML IN THE MUSCLE EVERY 30 DAYS    donepeziL (ARICEPT) 5 MG tablet Take 1 tablet (5 mg total) by mouth every evening.    empagliflozin (JARDIANCE) 10 mg tablet Take 1 tablet (10 mg total) by mouth once daily.    famotidine (PEPCID) 20 MG tablet Take 1 tablet (20 mg total) by mouth 2 (two) times daily as needed for Heartburn.    fluticasone propionate (FLONASE) 50 mcg/actuation nasal spray SHAKE LIQUID AND USE 2 SPRAYS(100 MCG) IN EACH NOSTRIL EVERY DAY    furosemide (LASIX) 40 MG tablet Take 1 tablet (40 mg total) by mouth once daily.    ibuprofen (ADVIL,MOTRIN) 800 MG tablet Take 1 tablet (800 mg total) by mouth daily as needed for Pain. Take with food    linaCLOtide (LINZESS) 290 mcg Cap capsule Take 1 capsule (290 mcg total) by mouth before breakfast.    losartan (COZAAR) 100 MG tablet Take 1 tablet (100 mg total) by mouth once daily.    omeprazole 20 mg TbEC Take 1 tablet by mouth once daily.    solifenacin (VESICARE) 10 MG tablet TAKE 1 TABLET(10 MG) BY MOUTH EVERY DAY    spironolactone (ALDACTONE) 25 MG tablet Take 1 tablet (25 mg total) by mouth once daily.    tamsulosin (FLOMAX) 0.4 mg Cap Take 1 capsule (0.4 mg total) by mouth once daily.     Family History       Problem Relation (Age of Onset)    Cancer Mother    Hypertension Brother    No Known Problems Father, Daughter, Sister, Sister, Brother, Brother, Brother    Thyroid disease Sister          Tobacco Use    Smoking status: Never    Smokeless tobacco: Never   Substance and Sexual Activity    Alcohol use: Yes      Alcohol/week: 1.0 standard drink of alcohol     Types: 1 Standard drinks or equivalent per week     Comment: ocassionally    Drug use: No    Sexual activity: Not Currently     Partners: Female     Review of Systems   Constitutional:  Negative for chills, diaphoresis and fever.   Respiratory:  Positive for shortness of breath. Negative for cough and wheezing.    Cardiovascular:  Positive for leg swelling. Negative for chest pain and palpitations.   Gastrointestinal:  Negative for abdominal pain, constipation, diarrhea, nausea and vomiting.   Musculoskeletal:  Negative for arthralgias, back pain, myalgias, neck pain and neck stiffness.   Skin:  Negative for color change, rash and wound.   Neurological:  Negative for dizziness, syncope, weakness, light-headedness and headaches.   Hematological:  Does not bruise/bleed easily.   Psychiatric/Behavioral:  Negative for agitation and confusion.      Objective:     Vital Signs (Most Recent):  Temp: (!) 101.7 °F (38.7 °C) (12/31/24 0432)  Pulse: 87 (12/31/24 0519)  Resp: 20 (12/31/24 0432)  BP: 128/66 (12/31/24 0432)  SpO2: 97 % (12/31/24 0432) Vital Signs (24h Range):  Temp:  [98.2 °F (36.8 °C)-102.2 °F (39 °C)] 101.7 °F (38.7 °C)  Pulse:  [] 87  Resp:  [18-20] 20  SpO2:  [95 %-98 %] 97 %  BP: (123-156)/(66-95) 128/66     Weight: 108.2 kg (238 lb 8.6 oz)  Body mass index is 32.35 kg/m².     Physical Exam  Vitals and nursing note reviewed.   Constitutional:       General: He is not in acute distress.     Appearance: Normal appearance. He is well-developed and normal weight. He is not ill-appearing, toxic-appearing or diaphoretic.   HENT:      Head: Normocephalic and atraumatic.      Right Ear: External ear normal.      Left Ear: External ear normal.   Eyes:      General: No scleral icterus.        Right eye: No discharge.         Left eye: No discharge.      Conjunctiva/sclera: Conjunctivae normal.   Neck:      Vascular: No JVD.      Trachea: No tracheal deviation.  "  Cardiovascular:      Rate and Rhythm: Normal rate and regular rhythm.      Heart sounds: Normal heart sounds. No murmur heard.     No gallop.      Comments: 2+ b/l pedal edema to mid shin    Pulmonary:      Effort: Pulmonary effort is normal. No respiratory distress.      Breath sounds: No stridor. Wheezing present. No rales.   Abdominal:      General: Bowel sounds are normal. There is no distension.      Palpations: Abdomen is soft. There is no mass.      Tenderness: There is no abdominal tenderness. There is no guarding.   Musculoskeletal:         General: No deformity. Normal range of motion.      Cervical back: Normal range of motion and neck supple.   Skin:     General: Skin is warm and dry.   Neurological:      General: No focal deficit present.      Mental Status: He is alert and oriented to person, place, and time.      Cranial Nerves: No cranial nerve deficit.      Motor: No abnormal muscle tone.      Coordination: Coordination normal.   Psychiatric:         Mood and Affect: Mood normal.         Behavior: Behavior normal.         Thought Content: Thought content normal.         Judgment: Judgment normal.                Significant Labs: All pertinent labs within the past 24 hours have been reviewed.  BMP:   Recent Labs   Lab 12/30/24 2056   GLU 88      K 3.8      CO2 22*   BUN 15   CREATININE 1.0   CALCIUM 9.6   MG 1.7     CBC:   Recent Labs   Lab 12/30/24 2056   WBC 7.21   HGB 13.5*   HCT 40.5        CMP:   Recent Labs   Lab 12/30/24 2056      K 3.8      CO2 22*   GLU 88   BUN 15   CREATININE 1.0   CALCIUM 9.6   PROT 8.1   ALBUMIN 4.0   BILITOT 1.9*   ALKPHOS 77   AST 33   ALT 33   ANIONGAP 10     Cardiac Markers:   Recent Labs   Lab 12/30/24 2056   *     Troponin:   Recent Labs   Lab 12/30/24 2056 12/31/24  0046   TROPONINI 0.010 0.011     Urine Culture: No results for input(s): "LABURIN" in the last 48 hours.  Urine Studies:   Recent Labs   Lab 12/30/24 2142 "   COLORU Yellow   APPEARANCEUA Clear   PHUR 6.0   SPECGRAV 1.030   PROTEINUA Negative   GLUCUA 4+*   KETONESU 1+*   BILIRUBINUA Negative   OCCULTUA Trace*   NITRITE Negative   UROBILINOGEN Negative   LEUKOCYTESUR Negative   RBCUA 2   WBCUA 1   BACTERIA None   SQUAMEPITHEL 0       Significant Imaging: I have reviewed all pertinent imaging results/findings within the past 24 hours.  Imaging Results              CTA Chest Non-Coronary (PE Studies) (Final result)  Result time 12/31/24 01:48:18      Final result by Jose A Salazar MD (12/31/24 01:48:18)                   Impression:      No convincing evidence of pulmonary thromboembolism to the level of the proximal segmental arteries.    Small bilateral pleural effusions, right greater than left.    Bilateral paramediastinal consolidation which may reflect post radiation related change in this patient with reported history of lymphoma.    Small pericardial effusion.  Small volume of anterior mediastinal fluid, similar to prior CT examination.    Mildly prominent fluid-filled distal thoracic esophagus.  This may relate to underlying esophageal dysmotility or reflux.  Clinical correlation advised.    Additional findings as above.      Electronically signed by: Jose A Salazar MD  Date:    12/31/2024  Time:    01:48               Narrative:    EXAMINATION:  CTA CHEST NON CORONARY (PE STUDIES)    CLINICAL HISTORY:  Pulmonary embolism (PE) suspected, high prob;    TECHNIQUE:  Low dose axial images, sagittal and coronal reformations were obtained from the thoracic inlet to the lung bases following the IV administration of 100 mL of Omnipaque 350.  Contrast timing was optimized to evaluate the pulmonary arteries.  MIP images were performed.    COMPARISON:  CTA chest 04/28/2022, chest radiograph 12/30/2024    FINDINGS:  Visualized soft tissue structures at the base of the neck demonstrate no acute abnormality.  Nonspecific collateral vessels noted throughout the posterior  thorax.  There is bilateral symmetric gynecomastia.    The thoracic aorta maintains normal caliber, contour, and course with mild atherosclerotic calcification within its course.  There is no evidence of aneurysmal dilation or dissection. The heart is not significantly enlarged and there is a small volume of pericardial fluid present.  There is coronary artery calcification present.  The distal thoracic esophagus is mildly prominent and fluid-filled.  No bulky axillary lymph node enlargement appreciated.  There are scattered small mediastinal and bilateral hilar lymph nodes which do not appear significantly enlarged by CT criteria.  Small volume of nonspecific fluid noted within the anterior mediastinum, deep to the sternum, measuring approximately 1.6 x 3.2 cm in transaxial diameter.  This appears similar to prior CT examination.    The trachea is midline and the proximal airways are patent. Detailed evaluation of the lung parenchyma is significantly limited by respiratory motion artifact. There is no pneumothorax. There is stable appearing bilateral paramediastinal consolidation which may reflect post radiation related changes in this patient with a reported history of lymphoma.  There are small bilateral pleural effusions, right greater than left.  There is mild bibasilar atelectasis.    Evaluation of the pulmonary arteries is limited secondary to patient respiratory motion artifact.  There is no large central saddle type pulmonary or definite filling defect to the level of the proximal segmental arteries.  Evaluation more distally, particularly at the lung bases is significantly limited..    Limited images of the upper abdomen obtained during the course of this dedicated thoracic CT demonstrate no acute abnormalities.    The osseous structures demonstrate degenerative changes of the visualized spine..                                       CT Head Without Contrast (Final result)  Result time 12/30/24 22:13:14       Final result by Cheli Bermeo MD (12/30/24 22:13:14)                   Impression:      No acute intracranial abnormalities identified.      Electronically signed by: Cheli Bermeo MD  Date:    12/30/2024  Time:    22:13               Narrative:    EXAMINATION:  CT HEAD WITHOUT CONTRAST    CLINICAL HISTORY:  Head trauma, minor (Age >= 65y);    TECHNIQUE:  Low dose axial images were obtained through the head.  Coronal and sagittal reformations were also performed. Contrast was not administered.    COMPARISON:  CT head 04/05/2024.    FINDINGS:  There is mild chronic microvascular ischemic disease.  No evidence of acute/recent major vascular distribution cerebral infarction, intraparenchymal hemorrhage, or intra-axial space occupying lesion. The ventricular system is stable in size and configuration.  No effacement of the skull-base cisterns. No abnormal extra-axial fluid collections or blood products. Visualized paranasal sinuses and mastoid air cells are clear. The calvarium shows no significant abnormality.                                       X-Ray Chest 1 View (Final result)  Result time 12/30/24 19:43:35   Procedure changed from X-Ray Chest AP Portable     Final result by Cheli Bermeo MD (12/30/24 19:43:35)                   Impression:      No acute cardiopulmonary process identified.      Electronically signed by: Cheli Bermeo MD  Date:    12/30/2024  Time:    19:43               Narrative:    EXAMINATION:  XR CHEST 1 VIEW    CLINICAL HISTORY:  Chest Pain;    TECHNIQUE:  Single frontal view of the chest was performed.    COMPARISON:  04/05/2024.    FINDINGS:  Cardiac silhouette is normal in size.  Lungs are symmetrically expanded.  No evidence of focal consolidative process, pneumothorax, or significant pleural effusion.  No acute osseous abnormality identified.                                       Assessment/Plan:     * Acute on chronic combined systolic and diastolic heart failure  - .  Sam Gamino presents with shortness of breath leg swelling  - he admits to missing doses of Lasix, and dietary indiscretion  - BNP elevated 164  - chest x-ray without pulmonary edema  - lower extremities pitting edema on exam  - last Echo 10/4/24:     Summary  Show Result Comparison     Left Ventricle: The left ventricle is normal in size. Ventricular mass is normal. Normal wall thickness. There is normal systolic function. Ejection fraction is approximately 60%. Grade II diastolic dysfunction.    Right Ventricle: Normal right ventricular cavity size. Wall thickness is normal. Systolic function is normal.    Aortic Valve: The aortic valve is a trileaflet valve. There is mild aortic valve sclerosis. There is moderate aortic regurgitation with a centrally directed jet.    Mitral Valve: There is moderate regurgitation with a posterolateral eccentriccally directed jet.    Tricuspid Valve: There is mild to moderate regurgitation with a centrally directed jet.    Pulmonary Artery: There is moderate pulmonary hypertension. The estimated pulmonary artery systolic pressure is 53 mmHg.    Pericardium: There is a small circumferential effusion.  - CHF pathways initiated  - status post 40 mg IV Lasix in ED, continue 40 mg b.i.d.  - monitor I&Os, daily weights    Fever  - patient noted to have fever of 101.7° F p.o. in ED  - status post treatment with Tylenol  - blood cultures ordered, pending  - initial dose of IV antibiotics ordered by ED MD, pending administration for 2nd set of blood cultures being drawn  - flu and COVID negative in ED  - patient does not report any URI symptoms or other symptoms that would indicate cause of fever  - all imaging is negative for acute process      Dysphagia  - NPO, speech eval      History of pulmonary embolism  - continue Eliquis      Primary hypertension  Patient's blood pressure range in the last 24 hours was: BP  Min: 123/78  Max: 156/79.The patient's inpatient anti-hypertensive  regimen is listed below:  Current Antihypertensives  furosemide injection 40 mg, 2 times daily, Intravenous  losartan tablet 100 mg, Daily, Oral  spironolactone tablet 25 mg, Daily, Oral    Plan  - BP is controlled, no changes needed to their regimen    Coronary artery disease  - continue ASA, statin      VTE Risk Mitigation (From admission, onward)           Ordered     apixaban tablet 2.5 mg  2 times daily         12/31/24 0521     Place sequential compression device  Until discontinued         12/31/24 0519     IP VTE HIGH RISK PATIENT  Once         12/31/24 0519     Place sequential compression device  Until discontinued         12/31/24 0357                         On 12/31/2024, patient should be placed in hospital observation services under the care of Dr. Estrella Bryant MD.           MIKKI KimbleC  Department of Hospital Medicine  [facility] - Emergency Dept

## 2024-12-31 NOTE — ASSESSMENT & PLAN NOTE
- Mr. Sam Gamino presents with shortness of breath leg swelling  - he admits to missing doses of Lasix, and dietary indiscretion  - BNP elevated 164  - chest x-ray without pulmonary edema  - lower extremities pitting edema on exam  - last Echo 10/4/24:     Summary  Show Result Comparison     Left Ventricle: The left ventricle is normal in size. Ventricular mass is normal. Normal wall thickness. There is normal systolic function. Ejection fraction is approximately 60%. Grade II diastolic dysfunction.    Right Ventricle: Normal right ventricular cavity size. Wall thickness is normal. Systolic function is normal.    Aortic Valve: The aortic valve is a trileaflet valve. There is mild aortic valve sclerosis. There is moderate aortic regurgitation with a centrally directed jet.    Mitral Valve: There is moderate regurgitation with a posterolateral eccentriccally directed jet.    Tricuspid Valve: There is mild to moderate regurgitation with a centrally directed jet.    Pulmonary Artery: There is moderate pulmonary hypertension. The estimated pulmonary artery systolic pressure is 53 mmHg.    Pericardium: There is a small circumferential effusion.  - CHF pathways initiated  - status post 40 mg IV Lasix in ED, continue 40 mg b.i.d.  - monitor I&Os, daily weights

## 2024-12-31 NOTE — PT/OT/SLP EVAL
Speech Language Pathology Evaluation  Bedside Swallow    Patient Name:  Sam Gamino   MRN:  [id number]  Admitting Diagnosis: Acute on chronic diastolic heart failure    Recommendations:                 General Recommendations: SLP will follow up 3-4x/week to ensure diet tolerance and determine when/if diet advancement is appropriate.     Diet recommendations:  Soft & Bite Sized Diet - IDDSI Level 6, Thin liquids - IDDSI Level 0   Aspiration Precautions: 1 bite/sip at a time, Assistance with meals, Frequent oral care, HOB to 90 degrees, Meds whole buried in puree, Monitor for s/s of aspiration, Small bites/sips, and Standard aspiration precautions   General Precautions: Standard, aspiration, fall, dental soft  Communication strategies:  provide increased time to answer and go to room if call light pushed    Assessment:     Sam Gamino is a 79 y.o. male with an SLP diagnosis of mild Dysphagia and dysphonia.  Patient tolerated thin liquids and puree without displaying overt signs of airway compromise. Patient displayed mildly prolonged mastication and coughing with regular solids though important to note that patient had a dry cough prior to PO trials. Unable to fully rule out aspiration at the bedside. SLP recommends a soft and bite sized diet with thin liquids. SLP will follow up.     History:     Past Medical History:   Diagnosis Date    Abnormal echocardiogram 02/08/2013    Mild MVR 3/07    Atherosclerosis of aorta 11/01/2023 2023: Mentioned in chart.    CAD (coronary artery disease) 02/08/2013    Angio 3/07 Dr. Lin    Chronic diastolic (congestive) heart failure     History of pulmonary embolism 11/14/2023    CTA - 11/14/23 - Bilateral    History of subdural hemorrhage 11/04/2023 11/5/2023: SAH.    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 Pelayo       Past Surgical History:   Procedure Laterality Date    EYE SURGERY Bilateral 2016    Cataracts     HPI: "Patient a 79 y.o. 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  The pain has been intermittent, and is worse with exertion.  The pain is not reproducible with movement, or palpation.  He notes associated shortness of breath, and lower extremity swelling.  He admits to indulging in salt rich foods over the holiday, and also missing a few doses of his Lasix.  He denies nausea, vomiting, abdominal pain. He further notes generalized weakness, and fatigue.  He states that he had a fall on the day of presentation due to feeling very weak in the legs.  Denies LOC, head injury.  He was evaluated in the ED with labs that showed H&H of 13.5/40.5.  He had no leukocytosis or left shift.  A metabolic panel showed normal values save for total bilirubin which was 1.9.  A BNP was elevated at 164.  Troponins were negative x2.  While in the ED he had a measured temperature of 101.7° F by mouth.  He was negative for COVID, and influenza a/B.  A urinalysis showed no UTI.  A chest x-ray showed no acute cardiopulmonary process.  A CT of the head showed no acute intracranial abnormalities.  A CTA of the chest showed no PE, small bilateral pleural effusions right greater than left, a small pericardial effusion (similar to prior imaging), and a bilateral paramediastinal consolidation consistent with post radiation change, and finally a mildly prominent fluid-filled distal thoracic esophagus " "concerning for esophageal dysmotility versus reflux.  He was treated in the ED with Tylenol for the fever, aspirin, 40 mg IV Lasix, and a dose of metoprolol.  He was placed on observation."    Prior Intubation HX:  None this admission.    Modified Barium Swallow: None this admission.    Chest X-Rays: 12/30: "Cardiac silhouette is normal in size. Lungs are symmetrically expanded. No evidence of focal consolidative process, pneumothorax, or significant pleural effusion. No acute osseous abnormality identified."    Prior diet: Per chart review, patient NPO pending SLP evaluation. However, patient reports regular/thin diet pta.       Subjective     Patient awake/alert and agreeable to SLP session. Patient with evidence of a dry cough prior to PO trials. He requested cough medication at the start of the session. RN informed.     "The chocolate pudding is going to help with my breathing."     Pain/Comfort:  Pain Rating 1: 0/10  Pain Rating Post-Intervention 1: 0/10    Respiratory Status: Room air    Objective:     BRIEF COGNITIVE/LINGUISTIC STATUS  Patient awake/alert. Able to sustain alertness and attention for entire session without need for redirection  Patient oriented to person and place IND  Not oriented to situation, month, year, or current date   Following education, patient with good recall of holiday (new years bradley) toward the end of the session.  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   Patient followed simple directives throughout assessment with 100% accuracy IND.  Able to answer simple questions with 100% accuracy this date  Patient presents without signs of expressive language deficits during conversational speech.    Patient with appropriate length responses to open ended question though intermittently required increased response time.  Patient with spontaneous utterances regarding topics seen on TV (e.g., skating, [place] Eduardo, " "and new years bradley) during session.  Patient presents without signs of receptive language deficits of simple information.   Motor speech appears intact; "dysarthria not noted, speech is 100% intelligible with precise articulation    OROPHARYNGEAL SWALLOW:   Secretion management  Adequate secretion management     Oral motor evaluation:  Lingual function  Lingual protrusion to midline with no noted weakness and functional range of motion during lateralization   Motor speech appears intact; "dysarthria not noted, speech is 100% intelligible with precise articulation  Labial function  Labial corners with functional retraction bilaterally, Face grossly symmetric at rest and during speech/swallowing tasks, and No overt facial droop noted     Mandibular function:  Adequate mandibular depression/elevation with functional strength to break solid bolus  Velar function  Velar elevation is symmetrical during OME, no hypernasality/hyponasality noted during speech, adequate function  Vocal quality  Vocal quality clear, rough, and reduced vocal intensity (though improved over the course of the evaluation)  Cough assessment:  Volitional cough is weak indicating risk for reduced airway clearance   Dentition  Pt presents with scattered dentition    PO TRIALS :   Thin liquid (water) ~6 oz via cup and straw  Puree x5  Solids (whole brigid cracker) x1    ORAL PHASE:  Functional mandibular depression and elevation for adequate bolus acceptance  Adequate labial seal on cup/utensil for functional bolus stripping  No anterior loss of bolus  Functional mandibular depression and elevation for adequate initial biteforce of solid bolus  Mildly prolonged mastication of regular solid  Adequate bolus formation and A-P transfer  Oral cavity clear/without residue after the swallow.     PHARYNGEAL PHASE:  Single swallows per bolus noted  Overt s/s of aspiration present with regular solids. Signs noted including coughing during mastication and coughing " following consumption of regular solids   Laryngeal rise noted in subjectively perceived timely manner- unable to objectively assess at bedside.    The jolei swallow protocol (3oz water test) was administered. Patient was required to consume 3 oz thin water from cup uninterrupted following brief cognitive screen. Patient passed the jolie swallow protocol. Patient was able to consume 3 oz thin liquids consecutively without s/s aspiration during or immediately after completion. (Fleming et al., 2020)      IMPRESSION   Patient demonstrates signs of oral, pharyngeal, and oropharyngeal dysphagia characterized by mildly prolonged mastication and coughing with regular solids. Recommend initiation of a soft and bite sized diet with thin liquids given implementation of safe swallow strategies. SLP will follow for mentioned deficits.     Education: SLP provided education provided re: role of SLP, diet recs, diet types, swallow precs, signs of airway compromise, and POC.  Pt verbalized understanding and agreement.       Goals:   Multidisciplinary Problems       SLP Goals          Problem: SLP    Goal Priority Disciplines Outcome   SLP Goal     SLP Progressing   Description: Speech Language Pathology Goals  Goals expected to be met by 1/14:     1. The patient will tolerate a least restrictive diet without displaying overt signs of airway compromise.   2. The patient will orient to situation, month, date, year, and CRAIG given min verbal cues.                                Plan:     Patient to be seen:  2 x/week, 3 x/week   Plan of Care expires:  01/28/25  Plan of Care reviewed with:  patient   SLP Follow-Up:  Yes       Discharge recommendations:      Barriers to Discharge:  Level of Skilled Assistance Needed      Time Tracking:     SLP Treatment Date:   12/31/24  Speech Start Time:  0825  Speech Stop Time:  0842     Speech Total Time (min):  17 min    Billable Minutes: Eval Swallow and Oral Function 17    12/31/2024

## 2024-12-31 NOTE — PROGRESS NOTES
[facility], [place], operated by [facility] Surg [address] Medicine  Progress Note    Patient Name: Sam Gamino  MRN: [id number]  Patient Class: OP- Observation   Admission Date: 12/30/2024  Length of Stay: 0 days  Attending Physician: Estrella Bryant MD  Primary Care Provider: JAYLYN Zamora MD        Subjective     Principal Problem:Influenza A        HPI:  HPI by Britni Betts PA:  Mr. Sam Gamino is a 79 y.o. 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  The pain has been intermittent, and is worse with exertion.  The pain is not reproducible with movement, or palpation.  He notes associated shortness of breath, and lower extremity swelling.  He admits to indulging in salt rich foods over the holiday, and also missing a few doses of his Lasix.  He denies nausea, vomiting, abdominal pain. He further notes generalized weakness, and fatigue.  He states that he had a fall on the day of presentation due to feeling very weak in the legs.  Denies LOC, head injury.  He was evaluated in the ED with labs that showed H&H of 13.5/40.5.  He had no leukocytosis or left shift.  A metabolic panel showed normal values save for total bilirubin which was 1.9.  A BNP was elevated at 164.  Troponins were negative x2.  While in the ED he had a measured temperature of 101.7° F by mouth.  He was negative for COVID, and influenza a/B.  A urinalysis showed no UTI.  A chest x-ray showed no acute cardiopulmonary process.  A CT of the head showed no acute intracranial abnormalities.  A CTA of the chest showed no PE, small bilateral pleural effusions right greater than left, a small pericardial effusion (similar to prior imaging),  and a bilateral paramediastinal consolidation consistent with post radiation change, and finally a mildly prominent fluid-filled distal thoracic esophagus concerning for esophageal dysmotility versus reflux.  He was treated in the ED with Tylenol for the fever, aspirin, 40 mg IV Lasix, and a dose of metoprolol.  He was placed on observation.    Overview/Hospital Course:  Patient was admitted on empiric antibiotics with blood cultures pending.  He was febrile to 102.2 overnight and was weak, confused and complained of cough.  Respiratory virus antigens panel was sent.  Because suspicion for influenza was high despite the negative ED test a PCR was sent and was positive for influenza A.  Patient was started on a course of Tamiflu.    Interval History:  Patient very weak and can barely move.  Denies muscle aching but has been coughing a lot and is audibly wheezing.  He also did not know he had a fever.    Review of Systems   Constitutional:  Negative for chills and fever.   Respiratory:  Positive for cough. Negative for shortness of breath.    Cardiovascular:  Positive for chest pain. Negative for palpitations.   Neurological:  Positive for weakness.   Psychiatric/Behavioral:  Positive for confusion.      Objective:     Vital Signs (Most Recent):  Temp: 99.8 °F (37.7 °C) (12/31/24 1213)  Pulse: 98 (12/31/24 1213)  Resp: 15 (12/31/24 1213)  BP: 133/63 (12/31/24 1213)  SpO2: 96 % (12/31/24 1213) Vital Signs (24h Range):  Temp:  [97.8 °F (36.6 °C)-102.2 °F (39 °C)] 99.8 °F (37.7 °C)  Pulse:  [] 98  Resp:  [15-20] 15  SpO2:  [95 %-98 %] 96 %  BP: (123-156)/(60-95) 133/63     Weight: 108.2 kg (238 lb 8.6 oz)  Body mass index is 32.35 kg/m².    Intake/Output Summary (Last 24 hours) at 12/31/2024 1318  Last data filed at 12/31/2024 1047  Gross per 24 hour   Intake 120 ml   Output 1675 ml   Net -1555 ml         Physical Exam  Constitutional:       Comments: Lethargic, weak   Cardiovascular:      Rate and Rhythm: Normal  rate and regular rhythm.      Pulses: Normal pulses.      Heart sounds: Murmur heard.      No gallop.   Pulmonary:      Effort: Pulmonary effort is normal.      Breath sounds: Wheezing present.   Abdominal:      General: Bowel sounds are normal.      Palpations: Abdomen is soft.   Musculoskeletal:      Comments: Trace BLE edema   Skin:     General: Skin is warm and dry.   Neurological:      General: No focal deficit present.      Comments: Not fully oriented, unable to give much useful history.             Significant Labs: All pertinent labs within the past 24 hours have been reviewed.    Significant Imaging: I have reviewed all pertinent imaging results/findings within the past 24 hours.    Assessment and Plan     * Influenza A  - Tested negative on POCT docking device in ED - repeated PCR as symptoms were consistent with influenza or another respiratory viral infection - second test positive for Influenza A.  - Unclear when symptoms started - Tamiflu ordered and starting today.  - Continue supportive care with prn oxygen, antitussive, respiratory treatments (wheezing significantly on exam and no history of this).  - Hold off on IVF given heart failure/pulmonary HTN.  - PT ordered but will hold off on aggressive workouts for now.      Encephalopathy, metabolic  - Patient confused and weak, likely due to acute viral infection  - Had trouble sitting up due to weakness  - PT consulted and he was unable to participate, then tested positive for influenza      Acute on chronic diastolic heart failure  Pulmonary hypertension - due to valvular heart disease vs diastolic dysfunction  Influenza A  - Patient presented with chest pain.  Has ruled out for MI with negative troponin times 2.  - chest x-ray without pulmonary edema  - lower extremities mild edema on exam - appears at baseline.  Possible mild exacerbation due to viral infection.  - last Echo 10/4/24:     Summary  Show Result Comparison     Left Ventricle: The left  "ventricle is normal in size. Ventricular mass is normal. Normal wall thickness. There is normal systolic function. Ejection fraction is approximately 60%. Grade II diastolic dysfunction.    Right Ventricle: Normal right ventricular cavity size. Wall thickness is normal. Systolic function is normal.    Aortic Valve: The aortic valve is a trileaflet valve. There is mild aortic valve sclerosis. There is moderate aortic regurgitation with a centrally directed jet.    Mitral Valve: There is moderate regurgitation with a posterolateral eccentriccally directed jet.    Tricuspid Valve: There is mild to moderate regurgitation with a centrally directed jet.    Pulmonary Artery: There is moderate pulmonary hypertension. The estimated pulmonary artery systolic pressure is 53 mmHg.    Pericardium: There is a small circumferential effusion.    - Heart failure pathways initiated - might have mild exacerbation related to this viral infection  - status post 40 mg IV Lasix in ED, OK to resume home regimen  - Treat viral infection  - monitor I&Os, daily weights    Pulmonary hypertension associated with unclear multi-factorial mechanisms  See "heart failure"      Fever  - Patient febrile on presentation and started on empiric antibiotics  - blood cultures ordered, pending  - Will get viral antigen panel - need to include influenza as reportedly test was negative but it was the docking device and he seems to have flu.  - COVID negative in ED  - patient has a cough and fever but no PNA seen on CTA  - Imaging is negative for acute process  - Blood cultures pending  - Influenza A PCR positive      Dysphagia  - NPO, speech eval  - Approved for soft diet.  Might need MBSS but very weak right now      History of pulmonary embolism  - continue Eliquis      Primary hypertension  Patient's blood pressure range in the last 24 hours was: BP  Min: 123/60  Max: 156/79.The patient's inpatient anti-hypertensive regimen is listed below:  Current " Antihypertensives  furosemide injection 40 mg, 2 times daily, Intravenous  losartan tablet 100 mg, Daily, Oral  spironolactone tablet 25 mg, Daily, Oral    Plan  - BP is controlled, no changes needed to their regimen    Coronary artery disease  - continue ASA, statin      VTE Risk Mitigation (From admission, onward)           Ordered     apixaban tablet 2.5 mg  2 times daily         12/31/24 0521     Place sequential compression device  Until discontinued         12/31/24 0519     IP VTE HIGH RISK PATIENT  Once         12/31/24 0519     Place sequential compression device  Until discontinued         12/31/24 0357                    Discharge Planning   ROLANDO:      Code Status: Full Code   Medical Readiness for Discharge Date:   Discharge Plan A: Home with family                Please place Justification for DME        Estrella Blue MD  Department of Hospital Medicine   [facility] ([address])

## 2024-12-31 NOTE — ASSESSMENT & PLAN NOTE
- Patient confused and weak on presentation, likely due to acute viral infection  - Had trouble sitting up due to weakness  - PT consulted and he was unable to participate, then tested positive for influenza  - Today much better with encephalopathy resolved

## 2024-12-31 NOTE — ASSESSMENT & PLAN NOTE
- patient noted to have fever of 101.7° F p.o. in ED  - status post treatment with Tylenol  - blood cultures ordered, pending  - initial dose of IV antibiotics ordered by ED MD, pending administration for 2nd set of blood cultures being drawn  - flu and COVID negative in ED  - patient does not report any URI symptoms or other symptoms that would indicate cause of fever  - all imaging is negative for acute process

## 2024-12-31 NOTE — HOSPITAL COURSE
Patient was admitted on empiric antibiotics with blood cultures pending.  He was febrile to 102.2 overnight and was weak, confused and complained of cough.  Respiratory virus antigens panel was sent.  Because suspicion for influenza was high despite the negative ED test a PCR was sent and was positive for influenza A.  Patient was started on a course of Tamiflu along with empiric antibiotics.  Overnight he made a good improvement and in the morning was oriented again and feeling better.  He still had significant wheezing and respiratory treatments were ordered.    PT/OT evaluated patient and recommended moderate intensity therapy in SNF.  Patient improved markedly over the next few days however, and as his wife (who also had influenza) was feeling better at home the recommendation changed to home with home health.  He completed the course of Tamiflu while he was here.  He was seen and examined on the day of discharge and his mental status had resolved to baseline, he was tolerating a diet, ambulating with a walker and wheezing had resolved.

## 2024-12-31 NOTE — PLAN OF CARE
Problem: Adult Inpatient Plan of Care  Goal: Plan of Care Review  Outcome: Progressing  Goal: Patient-Specific Goal (Individualized)  Outcome: Progressing  Goal: Absence of Hospital-Acquired Illness or Injury  Outcome: Progressing  Goal: Optimal Comfort and Wellbeing  Outcome: Progressing     Problem: Skin Injury Risk Increased  Goal: Skin Health and Integrity  Outcome: Progressing     POC reviewed with patient. All questions and concerns addressed. Fall/safety precautions implemented and maintained. Incontinent care performed. 1.5L FR in progress. Strict I & O's. No acute events noted this shift. Please see flowsheet for full assessment and vitals. Bed locked in lowest position. Side rails up x2. Call bell within reach.

## 2024-12-31 NOTE — PLAN OF CARE
Problem: Occupational Therapy  Goal: Occupational Therapy Goal  Description: Goals to be met by: 1/31/2025      Patient will increase functional independence with ADLs by performing:    UE Dressing with Minimum Assistance.  LE Dressing with Moderate Assistance.  Grooming while standing with stand by assistance.  Toileting from bedside commode with Minimal Assistance for hygiene and clothing management.   Toilet transfer to bedside commode with Minimal Assistance.  Increased functional strength to WFL for self care.  Upper extremity exercise program x10 reps per handout, with assistance as needed.     Outcome: Progressing    Pt would benefit from continued OT to address deficits in self care and functional mobility. Recommending moderate intensity therapy; DME needs TBD pending progress with therapies as pt unsafe for standing trials with OT today. Cont OT POC

## 2024-12-31 NOTE — CONSULTS
Food & Nutrition  Education    Diet Education: fluid and sodium restriction diet  Time Spent: 15 min  Learners: patient      Nutrition Education provided with handouts:   Heart Failure Nutrition Therapy    Comments:  Patient with family in room. Pt receiving IDDSI 6 soft & bite size diet. Tolerated less than half of his meal. Family reports they have been eating out at restaurants and will sometimes salt his food. Sometimes uses salt substitutes. Discussed cooking at home vs dining out at restaurants. If dining out, should look for heart healthy options. Avoid fast food and fried foods. Discussed how increase in salt and fluids can cause build up of fluids. Encouraged increase in home cooked meals with fresh vegetables, lean proteins and limiting canned/boxed/processed foods. Pt not currently on fluid restricted diet, but discussed monitoring fluid intake r/t heart failure. Discussed recommended intake and conversions. Provided handouts. Pt/family voiced understanding.      All questions and concerns answered. Dietitian's contact information provided.       Follow-Up: yes    Please Re-consult as needed    Thanks!  Celine Adamson, MARYN, LDN

## 2024-12-31 NOTE — PLAN OF CARE
Problem: SLP  Goal: SLP Goal  Description: Speech Language Pathology Goals  Goals expected to be met by 1/14:     1. The patient will tolerate a least restrictive diet without displaying overt signs of airway compromise.   2. The patient will orient to situation, month, date, year, and CRAIG given min verbal cues.     Outcome: Progressing     Bedside swallow evaluation completed. SLP with recommendation for diet advancement to soft and bite sized solids with thin liquids given implementation of safe swallow strategies. SLP will continue to follow.

## 2024-12-31 NOTE — PLAN OF CARE
Problem: Physical Therapy  Goal: Physical Therapy Goal  Description: Goals to be met by: 25    Patient will increase functional independence with mobility by performin. Supine<>sit with Laly with appropriate use of HB features.   2. Sit<>stand with Laly with RW.  3. Gait x 10 feet with Laly with RW.  4. Sitting EOB x5 min with supervision without UE support.  Outcome: Progressing

## 2024-12-31 NOTE — ASSESSMENT & PLAN NOTE
Pulmonary hypertension - due to valvular heart disease vs diastolic dysfunction  Influenza A  - Patient presented with chest pain.  Has ruled out for MI with negative troponin times 2.  - chest x-ray without pulmonary edema  - lower extremities mild edema on exam - appears at baseline.  Possible mild exacerbation due to viral infection.  - last Echo 10/4/24:     Summary  Show Result Comparison     Left Ventricle: The left ventricle is normal in size. Ventricular mass is normal. Normal wall thickness. There is normal systolic function. Ejection fraction is approximately 60%. Grade II diastolic dysfunction.    Right Ventricle: Normal right ventricular cavity size. Wall thickness is normal. Systolic function is normal.    Aortic Valve: The aortic valve is a trileaflet valve. There is mild aortic valve sclerosis. There is moderate aortic regurgitation with a centrally directed jet.    Mitral Valve: There is moderate regurgitation with a posterolateral eccentriccally directed jet.    Tricuspid Valve: There is mild to moderate regurgitation with a centrally directed jet.    Pulmonary Artery: There is moderate pulmonary hypertension. The estimated pulmonary artery systolic pressure is 53 mmHg.    Pericardium: There is a small circumferential effusion.    - Heart failure pathways initiated - might have mild exacerbation related to this viral infection  - status post 40 mg IV Lasix in ED, OK to resume home regimen  - Treat viral infection  - monitor I&Os, daily weights

## 2024-12-31 NOTE — PT/OT/SLP EVAL
Physical Therapy Evaluation    Patient Name:  Sam Gamino   MRN:  [id number]    Recommendations:     Discharge Recommendations: Moderate Intensity Therapy   Discharge Equipment Recommendations: to be determined by next level of care, wheelchair, bedside commode   Barriers to discharge: Inaccessible home and Decreased caregiver support    Assessment:     Sam Gamino is a 79 y.o. male admitted with a medical diagnosis of Acute on chronic diastolic heart failure - increased fatigue/weakness in past few days leading to fall day of admission. 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 He presents with the following impairments/functional limitations: weakness, impaired endurance, impaired self care skills, impaired functional mobility, gait instability, impaired balance, decreased coordination, decreased upper extremity function, decreased lower extremity function, decreased safety awareness, decreased ROM, impaired coordination, edema, impaired cardiopulmonary response to activity.    Patient evaluated by PT and goals established. Patient with gross weakness of all extremities (R>L) and poor balance requiring hands on support while sitting without trunk support. Required maxA for standing with RW with poor coordination of B UE to provide assistance/ RW. PT will continue to follow and progress as tolerated. Rec for dc to Moderate Intensity Therapy.    Rehab Prognosis: Good; patient would benefit from acute skilled PT services to address these deficits and reach maximum level of function.    Recent Surgery: * No surgery found *      Plan:     During this hospitalization, patient to be seen 5 x/week to address the identified rehab impairments via gait training, therapeutic activities, therapeutic exercises, neuromuscular re-education and progress toward the following goals:    Plan of Care Expires:  01/30/25    Subjective     Chief Complaint: Denies  "pain, unaware that he is having urinary incontinence until told, reports the bed is "bad" for his recovery  Patient/Family Comments/goals: Goal to get up to chair; Patient agreeable to evaluation.  Pain/Comfort:  Pain Rating 1: 0/10  Pain Rating Post-Intervention 1: 0/10    Patients cultural, spiritual, Islam conflicts given the current situation: no    Living Environment:  Lives with his spouse in 3rd floor apartment with elevator access.  Prior to admission, patients level of function was mod(I)-indep, reports wasn't using RW much for household mobility.  Equipment used at home: walker, rolling.  Upon discharge, patient will have assistance from his spouse.    Objective:     Communicated with MAT Bojorquez prior to session.  Patient found HOB elevated with peripheral IV, bed alarm  upon PT entry to room. Bed soiled with urine    General Precautions: Standard, aspiration, fall  Orthopedic Precautions:N/A   Braces: N/A  Respiratory Status: Room air    Patient donned non slip socks and gait belt for OOB mobility.    Exams:  Cognition:   Patient is oriented to person and date.  Pt follows approximately 80% of one step commands.    Delayed command following  Mood: Pleasant and cooperative, flat affect  Safety Awareness: Impaired  Musculoskeletal:  BMI: 32.35  Posture:  Forward head, rounded shoulders, tendency for L lateral lean (HOB towards L)  LE ROM/Strength:   R ROM: WFL, except limited hip/knee flexion 2/2 edema  L ROM: WFL, except limited hip/knee flexion 2/2 edema  R Strength:   Knee extension: 2/5  Dorsiflexion: 2/5   L Strength:   Knee extension: 3-/5  Dorsiflexion: 3/5   Neuromuscular:  Tone/Reflexes: Hypotonic  Coordination: General bradykinesia and hypometria (RUE>LUE)  Balance:   Static sitting: Requires BUE support (when provided uni UE requires CGA to prevent posterior LOB, when provided 0 UE requires min-modA to prevent posterior LOB)  Static standing: Requires maxA and BUE support on RW 2/2 " weakness  Visual-vestibular: No impairments identified with functional mobility. No formal testing performed.  Integument: Visible skin intact  Cardiopulmonary:  Edema: Generalized edema, suspect some abd edema due to difficulty with forward flexion of trunk while sitting    Functional Mobility:  Bed Mobility:     Bridging: minimum assistance  Supine to Sit: maximal assistance and use of HB features  Sit to Supine: total assistance  Transfers:     Sit to Stand:  maximal assistance and total assistance with rolling walker  2x from EOB  Significantly decreased forward flexion of trunk/anterior weight shift and requiring totalA to complete stand  Poor UE coordination - multiple slow adjustments to UE placement both with cueing and without cueing ; minimal force provided from either hands  Improved performance with height of bed elevated  Gait: x3 lateral steps along EOB with RW and maxA  Slow step sequencing, requiring assistance for RW management  Balance:   Static sitting x10 min with variable assistance (CGA-modA) with tendency for posterolateral lean towards L and inability to correct LOB without assistance.   Static standing 2x30s with RW and modA. Maintains crouched posture, difficulty gripping RW BUE to provide UE support.       AM-PAC 6 CLICK MOBILITY  Total Score:9       Treatment & Education:  Extended time in sitting for trunk strengthening while completing nursing cares  Cueing for transfers  PT educated patient re:   PT plan of care/role of PT  Safety with OOB mobility  Use of RW, HB features  Discharge disposition    Pt without verbalized understanding       Patient left supine with all lines intact, call button in reach, bed alarm on, and RN and student PCT present.    GOALS:   Multidisciplinary Problems       Physical Therapy Goals          Problem: Physical Therapy    Goal Priority Disciplines Outcome Interventions   Physical Therapy Goal     PT, PT/OT Progressing    Description: Goals to be met by:  25    Patient will increase functional independence with mobility by performin. Supine<>sit with Laly with appropriate use of HB features.   2. Sit<>stand with Laly with RW.  3. Gait x 10 feet with Laly with RW.  4. Sitting EOB x5 min with supervision without UE support.                       History:     Past Medical History:   Diagnosis Date    Abnormal echocardiogram 2013    Mild MVR 3/07    Atherosclerosis of aorta 2023: Mentioned in chart.    CAD (coronary artery disease) 2013    Angio 3/07 Dr. Lin    Chronic diastolic (congestive) heart failure     History of pulmonary embolism 2023    CTA - 23 - Bilateral    History of subdural hemorrhage 2023: [facility].    Hypercholesterolemia 2022    Lymphoma in remission 2013    S/p chemo/XRT 1992 Relapse  BMT     Normal cardiac stress test 2013    Nuclear stress     Obesity (BMI 30.0-34.9) 2024    Pernicious anemia 2013    Presbylarynx 2024    Thyroid nodule 2013    Right s/p Bx  Dr. Pelayo       Past Surgical History:   Procedure Laterality Date    EYE SURGERY Bilateral 2016    Cataracts       Time Tracking:     PT Received On: 24  PT Start Time: 1034     PT Stop Time: 1104  PT Total Time (min): 30 min     Billable Minutes: Evaluation 20 and Therapeutic Activity 10      2024

## 2024-12-31 NOTE — SUBJECTIVE & OBJECTIVE
Past Medical History:   Diagnosis Date    Abnormal echocardiogram 02/08/2013    Mild MVR 3/07    Atherosclerosis of aorta 11/01/2023 2023: Mentioned in chart.    CAD (coronary artery disease) 02/08/2013    Angio 3/07 Dr. Lin    Chronic diastolic (congestive) heart failure     History of pulmonary embolism 11/14/2023    CTA - 11/14/23 - Bilateral    History of subdural hemorrhage 11/04/2023 11/5/2023: [facility].    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 Pelayo       Past Surgical History:   Procedure Laterality Date    EYE SURGERY Bilateral 2016    Cataracts       Review of patient's allergies indicates:   Allergen Reactions    Lotensin [benazepril]      cough       No current facility-administered medications on file prior to encounter.     Current Outpatient Medications on File Prior to Encounter   Medication Sig    acetaminophen (TYLENOL) 500 MG tablet Take 2 tablets (1,000 mg total) by mouth every 6 (six) hours as needed.    apixaban (ELIQUIS) 2.5 mg Tab Take 1 tablet (2.5 mg total) by mouth 2 (two) times daily.    aspirin 81 MG Chew Take 81 mg by mouth once daily.    cetirizine (ZYRTEC) 10 MG tablet Take 10 mg by mouth daily as needed.    cholecalciferol, vitamin D3, (VITAMIN D3) 50 mcg (2,000 unit) Cap Take 1 capsule by mouth once daily.    clotrimazole-betamethasone 1-0.05% (LOTRISONE) cream Apply topically 2 (two) times daily as needed (rash).    cyanocobalamin 1,000 mcg/mL injection INJECT 1ML IN THE MUSCLE EVERY 30 DAYS    donepeziL (ARICEPT) 5 MG tablet Take 1 tablet (5 mg total) by mouth every evening.    empagliflozin (JARDIANCE) 10 mg tablet Take 1 tablet (10 mg total) by mouth once daily.    famotidine (PEPCID) 20  MG tablet Take 1 tablet (20 mg total) by mouth 2 (two) times daily as needed for Heartburn.    fluticasone propionate (FLONASE) 50 mcg/actuation nasal spray SHAKE LIQUID AND USE 2 SPRAYS(100 MCG) IN EACH NOSTRIL EVERY DAY    furosemide (LASIX) 40 MG tablet Take 1 tablet (40 mg total) by mouth once daily.    ibuprofen (ADVIL,MOTRIN) 800 MG tablet Take 1 tablet (800 mg total) by mouth daily as needed for Pain. Take with food    linaCLOtide (LINZESS) 290 mcg Cap capsule Take 1 capsule (290 mcg total) by mouth before breakfast.    losartan (COZAAR) 100 MG tablet Take 1 tablet (100 mg total) by mouth once daily.    omeprazole 20 mg TbEC Take 1 tablet by mouth once daily.    solifenacin (VESICARE) 10 MG tablet TAKE 1 TABLET(10 MG) BY MOUTH EVERY DAY    spironolactone (ALDACTONE) 25 MG tablet Take 1 tablet (25 mg total) by mouth once daily.    tamsulosin (FLOMAX) 0.4 mg Cap Take 1 capsule (0.4 mg total) by mouth once daily.     Family History       Problem Relation (Age of Onset)    Cancer Mother    Hypertension Brother    No Known Problems Father, Daughter, Sister, Sister, Brother, Brother, Brother    Thyroid disease Sister          Tobacco Use    Smoking status: Never    Smokeless tobacco: Never   Substance and Sexual Activity    Alcohol use: Yes     Alcohol/week: 1.0 standard drink of alcohol     Types: 1 Standard drinks or equivalent per week     Comment: ocassionally    Drug use: No    Sexual activity: Not Currently     Partners: Female     Review of Systems   Constitutional:  Negative for chills, diaphoresis and fever.   Respiratory:  Positive for shortness of breath. Negative for cough and wheezing.    Cardiovascular:  Positive for leg swelling. Negative for chest pain and palpitations.   Gastrointestinal:  Negative for abdominal pain, constipation, diarrhea, nausea and vomiting.   Musculoskeletal:  Negative for arthralgias, back pain, myalgias, neck pain and neck stiffness.   Skin:  Negative for color change, rash  and wound.   Neurological:  Negative for dizziness, syncope, weakness, light-headedness and headaches.   Hematological:  Does not bruise/bleed easily.   Psychiatric/Behavioral:  Negative for agitation and confusion.      Objective:     Vital Signs (Most Recent):  Temp: (!) 101.7 °F (38.7 °C) (12/31/24 0432)  Pulse: 87 (12/31/24 0519)  Resp: 20 (12/31/24 0432)  BP: 128/66 (12/31/24 0432)  SpO2: 97 % (12/31/24 0432) Vital Signs (24h Range):  Temp:  [98.2 °F (36.8 °C)-102.2 °F (39 °C)] 101.7 °F (38.7 °C)  Pulse:  [] 87  Resp:  [18-20] 20  SpO2:  [95 %-98 %] 97 %  BP: (123-156)/(66-95) 128/66     Weight: 108.2 kg (238 lb 8.6 oz)  Body mass index is 32.35 kg/m².     Physical Exam  Vitals and nursing note reviewed.   Constitutional:       General: He is not in acute distress.     Appearance: Normal appearance. He is well-developed and normal weight. He is not ill-appearing, toxic-appearing or diaphoretic.   HENT:      Head: Normocephalic and atraumatic.      Right Ear: External ear normal.      Left Ear: External ear normal.   Eyes:      General: No scleral icterus.        Right eye: No discharge.         Left eye: No discharge.      Conjunctiva/sclera: Conjunctivae normal.   Neck:      Vascular: No JVD.      Trachea: No tracheal deviation.   Cardiovascular:      Rate and Rhythm: Normal rate and regular rhythm.      Heart sounds: Normal heart sounds. No murmur heard.     No gallop.      Comments: 2+ b/l pedal edema to mid shin    Pulmonary:      Effort: Pulmonary effort is normal. No respiratory distress.      Breath sounds: No stridor. Wheezing present. No rales.   Abdominal:      General: Bowel sounds are normal. There is no distension.      Palpations: Abdomen is soft. There is no mass.      Tenderness: There is no abdominal tenderness. There is no guarding.   Musculoskeletal:         General: No deformity. Normal range of motion.      Cervical back: Normal range of motion and neck supple.   Skin:     General:  "Skin is warm and dry.   Neurological:      General: No focal deficit present.      Mental Status: He is alert and oriented to person, place, and time.      Cranial Nerves: No cranial nerve deficit.      Motor: No abnormal muscle tone.      Coordination: Coordination normal.   Psychiatric:         Mood and Affect: Mood normal.         Behavior: Behavior normal.         Thought Content: Thought content normal.         Judgment: Judgment normal.                Significant Labs: All pertinent labs within the past 24 hours have been reviewed.  BMP:   Recent Labs   Lab 12/30/24 2056   GLU 88      K 3.8      CO2 22*   BUN 15   CREATININE 1.0   CALCIUM 9.6   MG 1.7     CBC:   Recent Labs   Lab 12/30/24 2056   WBC 7.21   HGB 13.5*   HCT 40.5        CMP:   Recent Labs   Lab 12/30/24 2056      K 3.8      CO2 22*   GLU 88   BUN 15   CREATININE 1.0   CALCIUM 9.6   PROT 8.1   ALBUMIN 4.0   BILITOT 1.9*   ALKPHOS 77   AST 33   ALT 33   ANIONGAP 10     Cardiac Markers:   Recent Labs   Lab 12/30/24 2056   *     Troponin:   Recent Labs   Lab 12/30/24 2056 12/31/24  0046   TROPONINI 0.010 0.011     Urine Culture: No results for input(s): "LABURIN" in the last 48 hours.  Urine Studies:   Recent Labs   Lab 12/30/24 2142   COLORU Yellow   APPEARANCEUA Clear   PHUR 6.0   SPECGRAV 1.030   PROTEINUA Negative   GLUCUA 4+*   KETONESU 1+*   BILIRUBINUA Negative   OCCULTUA Trace*   NITRITE Negative   UROBILINOGEN Negative   LEUKOCYTESUR Negative   RBCUA 2   WBCUA 1   BACTERIA None   SQUAMEPITHEL 0       Significant Imaging: I have reviewed all pertinent imaging results/findings within the past 24 hours.  Imaging Results              CTA Chest Non-Coronary (PE Studies) (Final result)  Result time 12/31/24 01:48:18      Final result by Jose A Salazar MD (12/31/24 01:48:18)                   Impression:      No convincing evidence of pulmonary thromboembolism to the level of the proximal segmental " arteries.    Small bilateral pleural effusions, right greater than left.    Bilateral paramediastinal consolidation which may reflect post radiation related change in this patient with reported history of lymphoma.    Small pericardial effusion.  Small volume of anterior mediastinal fluid, similar to prior CT examination.    Mildly prominent fluid-filled distal thoracic esophagus.  This may relate to underlying esophageal dysmotility or reflux.  Clinical correlation advised.    Additional findings as above.      Electronically signed by: Jose A Salazar MD  Date:    12/31/2024  Time:    01:48               Narrative:    EXAMINATION:  CTA CHEST NON CORONARY (PE STUDIES)    CLINICAL HISTORY:  Pulmonary embolism (PE) suspected, high prob;    TECHNIQUE:  Low dose axial images, sagittal and coronal reformations were obtained from the thoracic inlet to the lung bases following the IV administration of 100 mL of Omnipaque 350.  Contrast timing was optimized to evaluate the pulmonary arteries.  MIP images were performed.    COMPARISON:  CTA chest 04/28/2022, chest radiograph 12/30/2024    FINDINGS:  Visualized soft tissue structures at the base of the neck demonstrate no acute abnormality.  Nonspecific collateral vessels noted throughout the posterior thorax.  There is bilateral symmetric gynecomastia.    The thoracic aorta maintains normal caliber, contour, and course with mild atherosclerotic calcification within its course.  There is no evidence of aneurysmal dilation or dissection. The heart is not significantly enlarged and there is a small volume of pericardial fluid present.  There is coronary artery calcification present.  The distal thoracic esophagus is mildly prominent and fluid-filled.  No bulky axillary lymph node enlargement appreciated.  There are scattered small mediastinal and bilateral hilar lymph nodes which do not appear significantly enlarged by CT criteria.  Small volume of nonspecific fluid noted  within the anterior mediastinum, deep to the sternum, measuring approximately 1.6 x 3.2 cm in transaxial diameter.  This appears similar to prior CT examination.    The trachea is midline and the proximal airways are patent. Detailed evaluation of the lung parenchyma is significantly limited by respiratory motion artifact. There is no pneumothorax. There is stable appearing bilateral paramediastinal consolidation which may reflect post radiation related changes in this patient with a reported history of lymphoma.  There are small bilateral pleural effusions, right greater than left.  There is mild bibasilar atelectasis.    Evaluation of the pulmonary arteries is limited secondary to patient respiratory motion artifact.  There is no large central saddle type pulmonary or definite filling defect to the level of the proximal segmental arteries.  Evaluation more distally, particularly at the lung bases is significantly limited..    Limited images of the upper abdomen obtained during the course of this dedicated thoracic CT demonstrate no acute abnormalities.    The osseous structures demonstrate degenerative changes of the visualized spine..                                       CT Head Without Contrast (Final result)  Result time 12/30/24 22:13:14      Final result by Cheli Bermeo MD (12/30/24 22:13:14)                   Impression:      No acute intracranial abnormalities identified.      Electronically signed by: Cheli Bermeo MD  Date:    12/30/2024  Time:    22:13               Narrative:    EXAMINATION:  CT HEAD WITHOUT CONTRAST    CLINICAL HISTORY:  Head trauma, minor (Age >= 65y);    TECHNIQUE:  Low dose axial images were obtained through the head.  Coronal and sagittal reformations were also performed. Contrast was not administered.    COMPARISON:  CT head 04/05/2024.    FINDINGS:  There is mild chronic microvascular ischemic disease.  No evidence of acute/recent major vascular distribution cerebral  infarction, intraparenchymal hemorrhage, or intra-axial space occupying lesion. The ventricular system is stable in size and configuration.  No effacement of the skull-base cisterns. No abnormal extra-axial fluid collections or blood products. Visualized paranasal sinuses and mastoid air cells are clear. The calvarium shows no significant abnormality.                                       X-Ray Chest 1 View (Final result)  Result time 12/30/24 19:43:35   Procedure changed from X-Ray Chest AP Portable     Final result by Cheli Bermeo MD (12/30/24 19:43:35)                   Impression:      No acute cardiopulmonary process identified.      Electronically signed by: Cheli Bermeo MD  Date:    12/30/2024  Time:    19:43               Narrative:    EXAMINATION:  XR CHEST 1 VIEW    CLINICAL HISTORY:  Chest Pain;    TECHNIQUE:  Single frontal view of the chest was performed.    COMPARISON:  04/05/2024.    FINDINGS:  Cardiac silhouette is normal in size.  Lungs are symmetrically expanded.  No evidence of focal consolidative process, pneumothorax, or significant pleural effusion.  No acute osseous abnormality identified.

## 2024-12-31 NOTE — ASSESSMENT & PLAN NOTE
- Patient febrile on presentation and started on empiric antibiotics  - blood cultures ordered, pending  - Will get viral antigen panel - need to include influenza as reportedly test was negative but it was the docking device and he seems to have flu.  - COVID negative in ED  - patient has a cough and fever but no PNA seen on CTA  - Imaging is negative for acute process  - Blood cultures pending  - Influenza A PCR positive

## 2024-12-31 NOTE — ED PROVIDER NOTES
Encounter Date: 12/30/2024       History     Chief Complaint   Patient presents with    Chest Pain     Non-radiating intermittent L anterior chest wall pain X3 days  Does not presents as reproducible pain  Denies taking any ASA today     Fatigue     Generalized Weakness     79-year-old male with history of diastolic heart failure, pulmonary embolism on Eliquis, coronary artery disease is presenting to the emergency department complaining of left-sided chest pain that started today.  Pain has been coming and going.  Worse with exertion.  Also reports associated shortness of breath, lower extremity swelling.  No nausea, vomiting, abdominal pain.  Pain does not radiate.  Pain is not reproducible with movement.  Admits to nonadherence to his diuretic.  Also had a fall today, feels generally weak especially in the bilateral lower extremities.  Did not hit head or lose consciousness.    The history is provided by the patient.     Review of patient's allergies indicates:   Allergen Reactions    Lotensin [benazepril]      cough     Past Medical History:   Diagnosis Date    Abnormal echocardiogram 02/08/2013    Mild MVR 3/07    Atherosclerosis of aorta 11/01/2023 2023: Mentioned in chart.    CAD (coronary artery disease) 02/08/2013    Angio 3/07 Dr. Lin    Chronic diastolic (congestive) heart failure     History of pulmonary embolism 11/14/2023    CTA - 11/14/23 - Bilateral    History of subdural hemorrhage 11/04/2023 11/5/2023: [facility].    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 Pelayo     Past Surgical History:   Procedure Laterality Date    EYE SURGERY Bilateral 2016    Cataracts     Family History   Problem Relation Name Age of  Onset    Cancer Mother          uterine    No Known Problems Father      Hypertension Brother Orlando     No Known Problems Daughter Katherine     No Known Problems Sister Lynda     No Known Problems Sister Cheryl     No Known Problems Brother Black     No Known Problems Brother Naseem     No Known Problems Brother      Thyroid disease Sister Halina      Social History     Tobacco Use    Smoking status: Never    Smokeless tobacco: Never   Substance Use Topics    Alcohol use: Yes     Alcohol/week: 1.0 standard drink of alcohol     Types: 1 Standard drinks or equivalent per week     Comment: ocassionally    Drug use: No     Review of Systems    Physical Exam     Initial Vitals [12/30/24 1928]   BP Pulse Resp Temp SpO2   (!) 142/77 (!) 111 18 98.2 °F (36.8 °C) 97 %      MAP       --         Physical Exam    Nursing note and vitals reviewed.  Constitutional: He is not diaphoretic. No distress.   HENT:   Head: Normocephalic and atraumatic.   Neck: Neck supple.   Normal range of motion.  Cardiovascular:  Regular rhythm.           Tachycardic   Pulmonary/Chest: No respiratory distress. He has no wheezes. He has rhonchi. He has rales.   Abdominal retractions with breathing   Abdominal: Abdomen is soft. He exhibits no distension. There is no abdominal tenderness. There is no rebound and no guarding.   Musculoskeletal:         General: Edema (2+ bilateral pitting edema to the mid-shin) present.      Cervical back: Normal range of motion and neck supple.     Neurological: He is alert.   Skin: Skin is warm and dry.   Psychiatric: He has a normal mood and affect. Thought content normal.         ED Course   Procedures  Labs Reviewed   CBC W/ AUTO DIFFERENTIAL - Abnormal       Result Value    WBC 7.21      RBC 3.95 (*)     Hemoglobin 13.5 (*)     Hematocrit 40.5       (*)     MCH 34.2 (*)     MCHC 33.3      RDW 12.4      Platelets 157      MPV 9.8      Immature Granulocytes 0.1      Gran # (ANC) 6.3      Immature Grans (Abs) 0.01       Lymph # 0.5 (*)     Mono # 0.3      Eos # 0.0      Baso # 0.02      nRBC 0      Gran % 88.0 (*)     Lymph % 7.2 (*)     Mono % 4.0      Eosinophil % 0.4      Basophil % 0.3      Differential Method Automated     COMPREHENSIVE METABOLIC PANEL - Abnormal    Sodium 138      Potassium 3.8      Chloride 106      CO2 22 (*)     Glucose 88      BUN 15      Creatinine 1.0      Calcium 9.6      Total Protein 8.1      Albumin 4.0      Total Bilirubin 1.9 (*)     Alkaline Phosphatase 77      AST 33      ALT 33      eGFR >60      Anion Gap 10     B-TYPE NATRIURETIC PEPTIDE - Abnormal     (*)    URINALYSIS, REFLEX TO URINE CULTURE - Abnormal    Specimen UA Urine, Clean Catch      Color, UA Yellow      Appearance, UA Clear      pH, UA 6.0      Specific Gravity, UA 1.030      Protein, UA Negative      Glucose, UA 4+ (*)     Ketones, UA 1+ (*)     Bilirubin (UA) Negative      Occult Blood UA Trace (*)     Nitrite, UA Negative      Urobilinogen, UA Negative      Leukocytes, UA Negative      Narrative:     Specimen Source->Urine   CULTURE, BLOOD   CULTURE, BLOOD   TROPONIN I    Troponin I 0.010     MAGNESIUM   PHOSPHORUS   MAGNESIUM    Magnesium 1.7     PHOSPHORUS    Phosphorus 2.7     URINALYSIS MICROSCOPIC    RBC, UA 2      WBC, UA 1      Bacteria None      Yeast, UA None      Squam Epithel, UA 0      Microscopic Comment SEE COMMENT      Narrative:     Specimen Source->Urine   TROPONIN I    Troponin I 0.011     LACTIC ACID, PLASMA   SARS-COV-2 RDRP GENE    POC Rapid COVID Negative       Acceptable Yes     POCT INFLUENZA A/B MOLECULAR    POC Molecular Influenza A Ag Negative      POC Molecular Influenza B Ag Negative       Acceptable Yes       EKG Readings: (Independently Interpreted)   Initial Reading: No STEMI. Previous EKG: Compared with most recent EKG Rhythm: Accelerated Junctional Rhythm. Heart Rate: 114. ST Segments: Normal ST Segments.   Patient's prior EKG was with left anterior  fascicular block       Imaging Results              CTA Chest Non-Coronary (PE Studies) (Final result)  Result time 12/31/24 01:48:18      Final result by Jose A Salazar MD (12/31/24 01:48:18)                   Impression:      No convincing evidence of pulmonary thromboembolism to the level of the proximal segmental arteries.    Small bilateral pleural effusions, right greater than left.    Bilateral paramediastinal consolidation which may reflect post radiation related change in this patient with reported history of lymphoma.    Small pericardial effusion.  Small volume of anterior mediastinal fluid, similar to prior CT examination.    Mildly prominent fluid-filled distal thoracic esophagus.  This may relate to underlying esophageal dysmotility or reflux.  Clinical correlation advised.    Additional findings as above.      Electronically signed by: Jose A Salazar MD  Date:    12/31/2024  Time:    01:48               Narrative:    EXAMINATION:  CTA CHEST NON CORONARY (PE STUDIES)    CLINICAL HISTORY:  Pulmonary embolism (PE) suspected, high prob;    TECHNIQUE:  Low dose axial images, sagittal and coronal reformations were obtained from the thoracic inlet to the lung bases following the IV administration of 100 mL of Omnipaque 350.  Contrast timing was optimized to evaluate the pulmonary arteries.  MIP images were performed.    COMPARISON:  CTA chest 04/28/2022, chest radiograph 12/30/2024    FINDINGS:  Visualized soft tissue structures at the base of the neck demonstrate no acute abnormality.  Nonspecific collateral vessels noted throughout the posterior thorax.  There is bilateral symmetric gynecomastia.    The thoracic aorta maintains normal caliber, contour, and course with mild atherosclerotic calcification within its course.  There is no evidence of aneurysmal dilation or dissection. The heart is not significantly enlarged and there is a small volume of pericardial fluid present.  There is coronary  artery calcification present.  The distal thoracic esophagus is mildly prominent and fluid-filled.  No bulky axillary lymph node enlargement appreciated.  There are scattered small mediastinal and bilateral hilar lymph nodes which do not appear significantly enlarged by CT criteria.  Small volume of nonspecific fluid noted within the anterior mediastinum, deep to the sternum, measuring approximately 1.6 x 3.2 cm in transaxial diameter.  This appears similar to prior CT examination.    The trachea is midline and the proximal airways are patent. Detailed evaluation of the lung parenchyma is significantly limited by respiratory motion artifact. There is no pneumothorax. There is stable appearing bilateral paramediastinal consolidation which may reflect post radiation related changes in this patient with a reported history of lymphoma.  There are small bilateral pleural effusions, right greater than left.  There is mild bibasilar atelectasis.    Evaluation of the pulmonary arteries is limited secondary to patient respiratory motion artifact.  There is no large central saddle type pulmonary or definite filling defect to the level of the proximal segmental arteries.  Evaluation more distally, particularly at the lung bases is significantly limited..    Limited images of the upper abdomen obtained during the course of this dedicated thoracic CT demonstrate no acute abnormalities.    The osseous structures demonstrate degenerative changes of the visualized spine..                                       CT Head Without Contrast (Final result)  Result time 12/30/24 22:13:14      Final result by Cheli Bermeo MD (12/30/24 22:13:14)                   Impression:      No acute intracranial abnormalities identified.      Electronically signed by: Cheli Bermeo MD  Date:    12/30/2024  Time:    22:13               Narrative:    EXAMINATION:  CT HEAD WITHOUT CONTRAST    CLINICAL HISTORY:  Head trauma, minor (Age >=  65y);    TECHNIQUE:  Low dose axial images were obtained through the head.  Coronal and sagittal reformations were also performed. Contrast was not administered.    COMPARISON:  CT head 04/05/2024.    FINDINGS:  There is mild chronic microvascular ischemic disease.  No evidence of acute/recent major vascular distribution cerebral infarction, intraparenchymal hemorrhage, or intra-axial space occupying lesion. The ventricular system is stable in size and configuration.  No effacement of the skull-base cisterns. No abnormal extra-axial fluid collections or blood products. Visualized paranasal sinuses and mastoid air cells are clear. The calvarium shows no significant abnormality.                                       X-Ray Chest 1 View (Final result)  Result time 12/30/24 19:43:35   Procedure changed from X-Ray Chest AP Portable     Final result by Cheli Bermeo MD (12/30/24 19:43:35)                   Impression:      No acute cardiopulmonary process identified.      Electronically signed by: Cheli Bermeo MD  Date:    12/30/2024  Time:    19:43               Narrative:    EXAMINATION:  XR CHEST 1 VIEW    CLINICAL HISTORY:  Chest Pain;    TECHNIQUE:  Single frontal view of the chest was performed.    COMPARISON:  04/05/2024.    FINDINGS:  Cardiac silhouette is normal in size.  Lungs are symmetrically expanded.  No evidence of focal consolidative process, pneumothorax, or significant pleural effusion.  No acute osseous abnormality identified.                                       Medications   piperacillin-tazobactam (ZOSYN) 4.5 g in D5W 100 mL IVPB (MB+) (has no administration in time range)   vancomycin 2 g in 0.9% sodium chloride 500 mL IVPB (has no administration in time range)   aspirin tablet 325 mg (325 mg Oral Given 12/30/24 2106)   furosemide injection 40 mg (40 mg Intravenous Given 12/30/24 2203)   iohexoL (OMNIPAQUE 350) injection 100 mL (100 mLs Intravenous Given 12/31/24 0101)   metoprolol tartrate  (LOPRESSOR) tablet 50 mg (50 mg Oral Given 12/31/24 0221)   acetaminophen tablet 1,000 mg (1,000 mg Oral Given 12/31/24 0316)     Medical Decision Making  79-year-old male with history of diastolic heart failure, pulmonary embolism on Eliquis, coronary artery disease is presenting to the emergency department complaining of left-sided chest pain that started today.  Pain has been coming and going.  Worse with exertion.  Bilateral lower extremity edema and tachycardic on arrival.  Concern for CHF exacerbation.  Satting normally on room air but rhonchorous breath sounds.  Viral swabs ordered in addition to ACS workup.  See ED course.    Amount and/or Complexity of Data Reviewed  External Data Reviewed: labs, radiology, ECG and notes.     Details: See ED course  Labs: ordered. Decision-making details documented in ED Course.  Radiology: ordered. Decision-making details documented in ED Course.    Risk  OTC drugs.  Prescription drug management.               ED Course as of 12/31/24 0322   Mon Dec 30, 2024   2052 From 9/2024:  Summary  Show Result Comparison   ·  Left Ventricle: The left ventricle is normal in size. Ventricular mass is normal. Normal wall thickness. There is normal systolic function. Ejection fraction is approximately 60%. Grade II diastolic dysfunction.  ·  Right Ventricle: Normal right ventricular cavity size. Wall thickness is normal. Systolic function is normal.  ·  Aortic Valve: The aortic valve is a trileaflet valve. There is mild aortic valve sclerosis. There is moderate aortic regurgitation with a centrally directed jet.  ·  Mitral Valve: There is moderate regurgitation with a posterolateral eccentriccally directed jet.  ·  Tricuspid Valve: There is mild to moderate regurgitation with a centrally directed jet.  ·  Pulmonary Artery: There is moderate pulmonary hypertension. The estimated pulmonary artery systolic pressure is 53 mmHg.  ·  Pericardium: There is a small circumferential effusion.    [KL]   2130 BNP(!): 164  BNP mildly elevated. [KL]   2130 Comprehensive metabolic panel(!)  CMP grossly unremarkable.  No significant acidosis.  Electrolytes within normal limits.  Normal kidney function. Normal LFTs. [KL]   2133 CBC auto differential(!)  CBC is grossly unremarkable.  No leukocytosis.  Chronic, stable anemia.  [KL]   2134 Patient had intraventricular and subdural hemorrhage in November of this year. [KL]   2136 Pulse(!): 111  Patient tachycardic.  States he took his metoprolol this morning, and only takes this once daily.  Patient is prescribed apixaban for history of bilateral PE that was unprovoked but given recent hospitalization with intracranial hemorrhage, assume that there were some missed doses of this.  He has abdominal retractions, lower extremity swelling which raised suspicion for congestive heart failure exacerbation, however BNP remains only slightly elevated and chest x-ray without significant pulmonary edema.  CTA to further evaluate for possible progression/worsening or new PE.  He has normal kidney function.  Will give dose of diuretic care given that he has not been taking this recently as congestive heart failure exacerbation remains high in the differential as a cause of his exertional chest pain and shortness of breath today.  Troponin is within normal limits, less concern for ACS.  Will trend the troponin. [KL]   2143 SARS-CoV-2 RNA, Amplification, Qual: Negative [KL]   2157 Magnesium : 1.7 [KL]   2157 Phosphorus Level: 2.7  Electrolytes within normal limits [KL]   2157 POC Molecular Influenza A Ag: Negative  Influenza negative [KL]   2225 CT Head Without Contrast  No acute abnormality on CT head. [KL]   Tue Dec 31, 2024   0002 Pulse(!): 120  Patient continues to be tachycardic. [KL]   0158 CTA Chest Non-Coronary (PE Studies)  No PE on CTA.  Bilateral pleural effusions consistent with fluid overload/CHF exacerbation.  Will order patient's home dose of metoprolol given he has  been persistently tachycardic. [KL]   0227 Pulse(!): 113  Patient remains tachycardic.  Will reassess heart rate after metoprolol, his home medication. [KL]   0309 Temp(!): 102.2 °F (39 °C)  Patient febrile, explains patient's tachycardia.  Will bring into hospital on give broad-spectrum antibiotics, add on blood cultures. [KL]   0319 Discussed case with Hospital Medicine. Plan for observation in the hospital for continued monitoring, evaluation and treatment.  Blood cultures pending at the time of observation. To be followed by inpatient team.  [KL]   0319 This patient does not have evidence of infective focus  My overall impression is sepsis.  Source: Unknown  Antibiotics given- Antibiotics (72h ago, onward)    Start     Stop Route Frequency Ordered    12/31/24 0315  piperacillin-tazobactam (ZOSYN) 4.5 g in D5W 100 mL IVPB   (MB+)  (ED Adult Sepsis Treatment)         01/01/25 0314 IV Every 8 hours (non-standard times) 12/31/24 0310 12/31/24 0315  vancomycin 2 g in 0.9% sodium chloride 500 mL IVPB  (ED   Adult Sepsis Treatment)         -- IV Once 12/31/24 0310      Latest lactate reviewed-  @[id number](lactate,poclac)@  Organ dysfunction indicated by Encephalopathy    Fluid challenge Contraindicated- Fluid bolus is contraindicated in this patient due to Congestive Heart Failure and Volume overload due to- CHF     Post- resuscitation assessment Yes Perfusion exam was performed within 6 hours of septic shock presentation after bolus shows Adequate tissue perfusion assessed by non-invasive monitoring       Will Start Pressors- Levophed for MAP of 65  Source control achieved by:  María Elena and Sharondasyarun   [KL]      ED Course User Index  [KL] Christianne Montanez MD                           Clinical Impression:  Final diagnoses:  [R07.9] Chest pain  [I50.9] Acute on chronic congestive heart failure, unspecified heart failure type (Primary)  [R00.0] Tachycardia  [R50.9] Fever, unspecified fever cause          ED Disposition  Condition    Observation                 Christianne Montanez MD  12/31/24 0321       Christianne Montanez MD  12/31/24 0322

## 2024-12-31 NOTE — ED TRIAGE NOTES
Sam Gamino, an 79 y.o. male presents to the ED complaining of left chest pain, SOB, and generalized weakness.      Chief Complaint   Patient presents with    Chest Pain     Non-radiating intermittent L anterior chest wall pain X3 days  Does not presents as reproducible pain  Denies taking any ASA today     Fatigue     Generalized Weakness     Review of patient's allergies indicates:   Allergen Reactions    Lotensin [benazepril]      cough     Past Medical History:   Diagnosis Date    Abnormal echocardiogram 02/08/2013    Mild MVR 3/07    Atherosclerosis of aorta 11/01/2023 2023: Mentioned in chart.    CAD (coronary artery disease) 02/08/2013    Angio 3/07 Dr. Lin    Chronic diastolic (congestive) heart failure     History of pulmonary embolism 11/14/2023    CTA - 11/14/23 - Bilateral    History of subdural hemorrhage 11/04/2023 11/5/2023: SA[facility].    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 Pelayo

## 2024-12-31 NOTE — ASSESSMENT & PLAN NOTE
Patient's blood pressure range in the last 24 hours was: BP  Min: 123/60  Max: 156/79.The patient's inpatient anti-hypertensive regimen is listed below:  Current Antihypertensives  furosemide injection 40 mg, 2 times daily, Intravenous  losartan tablet 100 mg, Daily, Oral  spironolactone tablet 25 mg, Daily, Oral    Plan  - BP is controlled, no changes needed to their regimen

## 2024-12-31 NOTE — ASSESSMENT & PLAN NOTE
- Approved for soft diet by Speech.  Might need MBSS but very weak right now  - Speech re-evaluated today (1/1) - OK for soft and bite sized diet.  No need for MBSS as he is recovering well.

## 2024-12-31 NOTE — ASSESSMENT & PLAN NOTE
Patient's blood pressure range in the last 24 hours was: BP  Min: 123/78  Max: 156/79.The patient's inpatient anti-hypertensive regimen is listed below:  Current Antihypertensives  furosemide injection 40 mg, 2 times daily, Intravenous  losartan tablet 100 mg, Daily, Oral  spironolactone tablet 25 mg, Daily, Oral    Plan  - BP is controlled, no changes needed to their regimen

## 2024-12-31 NOTE — PT/OT/SLP EVAL
Occupational Therapy   Evaluation    Name: Sam Gamino  MRN: [id number]  Admitting Diagnosis: Acute on chronic diastolic heart failure  Recent Surgery: * No surgery found *      Recommendations:     Discharge Recommendations: Moderate Intensity Therapy  Discharge Equipment Recommendations:  to be determined by next level of care  Barriers to discharge:   (Pt requires significantly increased assistance at this time)    Assessment:     Sam Gamino is a 79 y.o. male with a medical diagnosis of Acute on chronic diastolic heart failure.  He presents with deconditioning, lethargy, and poor static seated balance, unsafe for standing attempts this session. Performance deficits affecting function: weakness, impaired endurance, impaired sensation, impaired self care skills, impaired functional mobility, gait instability, impaired balance, decreased lower extremity function, decreased upper extremity function, decreased coordination, impaired cognition, decreased safety awareness, decreased ROM, edema, impaired fine motor, impaired coordination.      Rehab Prognosis:  Guarded ; patient would benefit from acute skilled OT services to address these deficits and reach maximum level of function.       Plan:     Patient to be seen 4 x/week to address the above listed problems via self-care/home management, therapeutic activities, therapeutic exercises  Plan of Care Expires: 01/31/25  Plan of Care Reviewed with: patient    Subjective     Chief Complaint: No complaints noted. Pt reported to have flu just after OT evaluation  Patient/Family Comments/goals: Pt stated that he wanted to walk to the bathroom.     Occupational Profile: Pt is a questionable historian, per PT stating he lives in 3rd floor apartment with elevator access. All information gathered below was provided by pt and may require corroboration.   Living Environment: Pt states he lives with spouse SSH, ~12 AMBER but states home is not raised and that he does not live  in 2nd floor apartment, and states that the stairs are not within his home. Tub/sh per pt report  Previous level of function: Pt states indep ADLs and functional mobility   Roles and Routines: Per pt, Caretaker to self and home. Cooks, cleans, drives, completes own grocery shopping. Pt reports no hobbies.   Equipment Used at Home: shower chair (Per PT note pt has RW but pt states he does not have RW in OT session)  Assistance upon Discharge: Unknown at this time    Pain/Comfort:  Pain Rating 1: 0/10    Patients cultural, spiritual, [employer]aritan conflicts given the current situation:      Objective:     Communicated with: tera prior to session.  Patient found HOB elevated with bed alarm, telemetry, peripheral IV, PureWick upon OT entry to room.    General Precautions: Standard, aspiration, fall  Orthopedic Precautions: N/A  Braces: N/A  Respiratory Status: Room air    Occupational Performance:    Bed Mobility:    Patient completed Rolling/Turning to Left with  moderate assistance  Patient completed Scooting/Bridging with maximal assistance  Patient completed Supine to Sit with maximal assistance and total assistance  Patient completed Sit to Supine with total assistance    Functional Mobility/Transfers:    Functional Mobility: Poor static seated balance with L lateral and posterior lean. No protective reflexes noted and pt minimally verbal while seated EOB; blank stare noted but pt able to answer questions and reported that he was not dizzy.     Activities of Daily Living:  Grooming: maximal assistance to wipe face with wash cloth supine with HOB elevated  Upper Body Dressing: maximal assistance to don/doff gown as robe and don/doff gown seated EOB and with HOB elevated  Lower Body Dressing: total assistance to don B socks supine in bed    Cognitive/Visual Perceptual:  Cognitive/Psychosocial Skills:     -       Oriented to: Person, Place, Time and Situation   -       Follows Commands/attention:Follows one and two step  commands  -       Communication: minimally conversant but may be 2/2 lethargy; clear/fluent  -       Memory: Questionable  -       Safety awareness/insight to disability: impaired  -       Mood/Affect/Coping skills/emotional control: Cooperative but lethargic    Physical Exam:  Postural examination/scapula alignment:    -       Rounded shoulders, forward head  Motor Planning:    -       WFL  Dominant hand:    -       R handed  Upper Extremity Range of Motion: B shoulder flexion ~0-80* B elbow flexion lacking ~10* lacks elbow extension ~10* with shoulder flexion  Upper Extremity Strength:  BUE grossly 2+ to 3-/5 but MMT may be limited 2/2 lethargy   Strength:  B hands 3/5  Fine Motor Coordination:    -       Ifair  Gross motor coordination:   WFL     AMPAC 6 Click ADL:  AMPAC Total Score: 9    Treatment & Education:  Pt educated on role of OT and POC.   Pt performing skills as listed above.     Patient left HOB elevated with all lines intact, call button in reach, bed alarm on, and nsg notified    GOALS:   Multidisciplinary Problems       Occupational Therapy Goals          Problem: Occupational Therapy    Goal Priority Disciplines Outcome Interventions   Occupational Therapy Goal     OT, PT/OT Progressing    Description: Goals to be met by: 1/31/2025      Patient will increase functional independence with ADLs by performing:    UE Dressing with Minimum Assistance.  LE Dressing with Moderate Assistance.  Grooming while standing with stand by assistance.  Toileting from bedside commode with Minimal Assistance for hygiene and clothing management.   Toilet transfer to bedside commode with Minimal Assistance.  Increased functional strength to WF for self care.  Upper extremity exercise program x10 reps per handout, with assistance as needed.                          History:     Past Medical History:   Diagnosis Date    Abnormal echocardiogram 02/08/2013    Mild MVR 3/07    Atherosclerosis of aorta 11/01/2023     2023: Mentioned in chart.    CAD (coronary artery disease) 02/08/2013    Angio 3/07 Dr. Lin    Chronic diastolic (congestive) heart failure     History of pulmonary embolism 11/14/2023    CTA - 11/14/23 - Bilateral    History of subdural hemorrhage 11/04/2023 11/5/2023: SAH.    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 Pelayo         Past Surgical History:   Procedure Laterality Date    EYE SURGERY Bilateral 2016    Cataracts       Time Tracking:     OT Date of Treatment: 12/31/24  OT Start Time: 1204  OT Stop Time: 1224  OT Total Time (min): 20 min    Billable Minutes:Evaluation 10  Therapeutic Activity 10    12/31/2024

## 2024-12-31 NOTE — PLAN OF CARE
12/31/24 0920   HINDS Message   Medicare Outpatient and Observation Notification regarding financial responsibility Given to patient/caregiver;Explained to patient/caregiver;Signed/date by patient/caregiver   Date HINDS was signed 12/31/24   Time HINDS was signed 0910

## 2024-12-31 NOTE — SUBJECTIVE & OBJECTIVE
Interval History:  Patient very weak and can barely move.  Denies muscle aching but has been coughing a lot and is audibly wheezing.  He also did not know he had a fever.    Review of Systems   Constitutional:  Negative for chills and fever.   Respiratory:  Positive for cough. Negative for shortness of breath.    Cardiovascular:  Positive for chest pain. Negative for palpitations.   Neurological:  Positive for weakness.   Psychiatric/Behavioral:  Positive for confusion.      Objective:     Vital Signs (Most Recent):  Temp: 99.8 °F (37.7 °C) (12/31/24 1213)  Pulse: 98 (12/31/24 1213)  Resp: 15 (12/31/24 1213)  BP: 133/63 (12/31/24 1213)  SpO2: 96 % (12/31/24 1213) Vital Signs (24h Range):  Temp:  [97.8 °F (36.6 °C)-102.2 °F (39 °C)] 99.8 °F (37.7 °C)  Pulse:  [] 98  Resp:  [15-20] 15  SpO2:  [95 %-98 %] 96 %  BP: (123-156)/(60-95) 133/63     Weight: 108.2 kg (238 lb 8.6 oz)  Body mass index is 32.35 kg/m².    Intake/Output Summary (Last 24 hours) at 12/31/2024 1318  Last data filed at 12/31/2024 1047  Gross per 24 hour   Intake 120 ml   Output 1675 ml   Net -1555 ml         Physical Exam  Constitutional:       Comments: Lethargic, weak   Cardiovascular:      Rate and Rhythm: Normal rate and regular rhythm.      Pulses: Normal pulses.      Heart sounds: Murmur heard.      No gallop.   Pulmonary:      Effort: Pulmonary effort is normal.      Breath sounds: Wheezing present.   Abdominal:      General: Bowel sounds are normal.      Palpations: Abdomen is soft.   Musculoskeletal:      Comments: Trace BLE edema   Skin:     General: Skin is warm and dry.   Neurological:      General: No focal deficit present.      Comments: Not fully oriented, unable to give much useful history.             Significant Labs: All pertinent labs within the past 24 hours have been reviewed.    Significant Imaging: I have reviewed all pertinent imaging results/findings within the past 24 hours.

## 2024-12-31 NOTE — ASSESSMENT & PLAN NOTE
- Tested negative on POCT docking device in ED - repeated PCR as symptoms were consistent with influenza or another respiratory viral infection - second test positive for Influenza A.  - Unclear when symptoms started - Tamiflu ordered and starting today.  - Continue supportive care with prn oxygen, antitussive, respiratory treatments (wheezing significantly on exam and no history of this).  - Hold off on IVF given heart failure/pulmonary HTN.  - PT ordered but will hold off on aggressive workouts for now.

## 2024-12-31 NOTE — ASSESSMENT & PLAN NOTE
Pulmonary hypertension - due to valvular heart disease vs diastolic dysfunction  Influenza A  - Patient presented with chest pain.  Has ruled out for MI with negative troponin times 2.  - chest x-ray without pulmonary edema  - lower extremities mild edema on exam - appears at baseline  - last Echo 10/4/24:     Summary  Show Result Comparison     Left Ventricle: The left ventricle is normal in size. Ventricular mass is normal. Normal wall thickness. There is normal systolic function. Ejection fraction is approximately 60%. Grade II diastolic dysfunction.    Right Ventricle: Normal right ventricular cavity size. Wall thickness is normal. Systolic function is normal.    Aortic Valve: The aortic valve is a trileaflet valve. There is mild aortic valve sclerosis. There is moderate aortic regurgitation with a centrally directed jet.    Mitral Valve: There is moderate regurgitation with a posterolateral eccentriccally directed jet.    Tricuspid Valve: There is mild to moderate regurgitation with a centrally directed jet.    Pulmonary Artery: There is moderate pulmonary hypertension. The estimated pulmonary artery systolic pressure is 53 mmHg.    Pericardium: There is a small circumferential effusion.    - Heart failure pathways initiated - might have mild exacerbation related to this viral infection  - status post 40 mg IV Lasix in ED, OK to resume home regimen  - Treat viral infection  - monitor I&Os, daily weights

## 2024-12-31 NOTE — HPI
HPI by Britni CHAN:  Mr. Sam Gamino is a 79 y.o. 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  The pain has been intermittent, and is worse with exertion.  The pain is not reproducible with movement, or palpation.  He notes associated shortness of breath, and lower extremity swelling.  He admits to indulging in salt rich foods over the holiday, and also missing a few doses of his Lasix.  He denies nausea, vomiting, abdominal pain. He further notes generalized weakness, and fatigue.  He states that he had a fall on the day of presentation due to feeling very weak in the legs.  Denies LOC, head injury.  He was evaluated in the ED with labs that showed H&H of 13.5/40.5.  He had no leukocytosis or left shift.  A metabolic panel showed normal values save for total bilirubin which was 1.9.  A BNP was elevated at 164.  Troponins were negative x2.  While in the ED he had a measured temperature of 101.7° F by mouth.  He was negative for COVID, and influenza a/B.  A urinalysis showed no UTI.  A chest x-ray showed no acute cardiopulmonary process.  A CT of the head showed no acute intracranial abnormalities.  A CTA of the chest showed no PE, small bilateral pleural effusions right greater than left, a small pericardial effusion (similar to prior imaging), and a bilateral paramediastinal consolidation consistent with post radiation change, and finally a mildly prominent fluid-filled distal thoracic esophagus concerning for esophageal dysmotility versus reflux.  He was treated in the ED with Tylenol for the fever, aspirin, 40 mg IV Lasix, and a dose of metoprolol.  He was placed on observation.

## 2024-12-31 NOTE — PLAN OF CARE
Pt is AAOx3.  Independent at baseline.  DME includes a standard walker.  Spouse will transport pt to home when discharged.     12/31/24 0951   Discharge Assessment   Assessment Type Discharge Planning Assessment   Confirmed/corrected address, phone number and insurance Yes   Confirmed Demographics Correct on Facesheet   Source of Information patient   Does patient/caregiver understand observation status Yes   Communicated ROLANDO with patient/caregiver Date not available/Unable to determine   Reason For Admission Acute on chronic diastolic heart failure   People in Home spouse   Do you expect to return to your current living situation? Yes   Do you have help at home or someone to help you manage your care at home? Yes   Who are your caregiver(s) and their phone number(s)? Eileen Gamino (Spouse)  [phone]   Prior to hospitilization cognitive status: Alert/Oriented   Current cognitive status: Alert/Oriented   Walking or Climbing Stairs Difficulty yes   Walking or Climbing Stairs ambulation difficulty, requires equipment   Dressing/Bathing Difficulty no   Equipment Currently Used at Home walker, standard   Readmission within 30 days? No   Patient currently being followed by outpatient case management? No   Do you currently have service(s) that help you manage your care at home? No   Do you take prescription medications? Yes   Do you have prescription coverage? Yes   Do you have any problems affording any of your prescribed medications? No   Is the patient taking medications as prescribed? yes   Who is going to help you get home at discharge? spouse   How do you get to doctors appointments? family or friend will provide   Are you on dialysis? No   Do you take coumadin? No   Discharge Plan A Home with family   Discharge Plan B Home Health   DME Needed Upon Discharge  none   Discharge Plan discussed with: Patient   Transition of Care Barriers None   Physical Activity   On average, how many days per week do you engage in  moderate to strenuous exercise (like a brisk walk)? 0 days   On average, how many minutes do you engage in exercise at this level? 0 min   Financial Resource Strain   How hard is it for you to pay for the very basics like food, housing, medical care, and heating? Somewhat   Housing Stability   In the last 12 months, was there a time when you were not able to pay the mortgage or rent on time? N   At any time in the past 12 months, were you homeless or living in a shelter (including now)? N   Transportation Needs   Has the lack of transportation kept you from medical appointments, meetings, work or from getting things needed for daily living? No   Food Insecurity   Within the past 12 months, you worried that your food would run out before you got the money to buy more. Never true   Within the past 12 months, the food you bought just didn't last and you didn't have money to get more. Never true   Stress   Do you feel stress - tense, restless, nervous, or anxious, or unable to sleep at night because your mind is troubled all the time - these days? To some exte   Social Isolation   How often do you feel lonely or isolated from those around you?  Sometimes   Alcohol Use   Q1: How often do you have a drink containing alcohol? Monthly or l   Q2: How many drinks containing alcohol do you have on a typical day when you are drinking? 1 or 2   Q3: How often do you have six or more drinks on one occasion? Never   Utilities   In the past 12 months has the electric, gas, oil, or water company threatened to shut off services in your home? No   Health Literacy   How often do you need to have someone help you when you read instructions, pamphlets, or other written material from your doctor or pharmacy? Never     Sp[facility] - Med Surg ([address])  Initial Discharge Assessment       Primary Care Provider: JAYLYN Zamora MD    Admission Diagnosis: Tachycardia [R00.0]  Chest pain [R07.9]  Fever, unspecified fever cause [R50.9]  Acute  on chronic congestive heart failure, unspecified heart failure type [I50.9]    Admission Date: 12/30/2024  Expected Discharge Date:     Transition of Care Barriers: (P) None    Payor: MEDICARE / Plan: MEDICARE PART A & B / Product Type: Government /     Extended Emergency Contact Information  Primary Emergency Contact: Eileen Gamino  Address: [address] [address] [facility]  Home Phone: [phone]  Mobile Phone: [phone]  Relation: Spouse   needed? No    Discharge Plan A: (P) Home with family  Discharge Plan B: (P) [facility]      [id number] - [place], [place] - [address] [facility] [address]  [address] [facility] [address]  Phone: [phone] Fax: [phone]    [facility] - [place], [place] - [address] [place] [address]  [address] [facility] [address]  Phone: [phone] Fax: [phone]      Initial Assessment (most recent)       Adult Discharge Assessment - 12/31/24 0951          Discharge Assessment    Assessment Type Discharge Planning Assessment (P)      Confirmed/corrected address, phone number and insurance Yes (P)      Confirmed Demographics Correct on Facesheet (P)      Source of Information patient (P)      Does patient/caregiver understand observation status Yes (P)      Communicated ROLANDO with patient/caregiver Date not available/Unable to determine (P)      Reason For Admission Acute on chronic diastolic heart failure (P)      People in Home spouse (P)      Do you expect to return to your current living situation? Yes (P)      Do you have help at home or someone to help you manage your care at home? Yes (P)      Who are your caregiver(s) and their phone number(s)? Eileen Gamino (Spouse)  [phone] (P)      Prior to hospitilization cognitive status: Alert/Oriented (P)      Current cognitive status: Alert/Oriented (P)      Walking or Climbing Stairs  Difficulty yes (P)      Walking or Climbing Stairs ambulation difficulty, requires equipment (P)      Dressing/Bathing Difficulty no (P)      Equipment Currently Used at Home walker, standard (P)      Readmission within 30 days? No (P)      Patient currently being followed by outpatient case management? No (P)      Do you currently have service(s) that help you manage your care at home? No (P)      Do you take prescription medications? Yes (P)      Do you have prescription coverage? Yes (P)      Do you have any problems affording any of your prescribed medications? No (P)      Is the patient taking medications as prescribed? yes (P)      Who is going to help you get home at discharge? spouse (P)      How do you get to doctors appointments? family or friend will provide (P)      Are you on dialysis? No (P)      Do you take coumadin? No (P)      Discharge Plan A Home with family (P)      Discharge Plan B Home Health (P)      DME Needed Upon Discharge  none (P)      Discharge Plan discussed with: Patient (P)      Transition of Care Barriers None (P)         Physical Activity    On average, how many days per week do you engage in moderate to strenuous exercise (like a brisk walk)? 0 days (P)      On average, how many minutes do you engage in exercise at this level? 0 min (P)         Financial Resource Strain    How hard is it for you to pay for the very basics like food, housing, medical care, and heating? Somewhat hard (P)         Housing Stability    In the last 12 months, was there a time when you were not able to pay the mortgage or rent on time? No (P)      At any time in the past 12 months, were you homeless or living in a shelter (including now)? No (P)         Transportation Needs    Has the lack of transportation kept you from medical appointments, meetings, work or from getting things needed for daily living? No (P)         Food Insecurity    Within the past 12 months, you worried that your food would run out  before you got the money to buy more. Never true (P)      Within the past 12 months, the food you bought just didn't last and you didn't have money to get more. Never true (P)         Stress    Do you feel stress - tense, restless, nervous, or anxious, or unable to sleep at night because your mind is troubled all the time - these days? To some extent (P)         Social Isolation    How often do you feel lonely or isolated from those around you?  Sometimes (P)         Alcohol Use    Q1: How often do you have a drink containing alcohol? Monthly or less (P)      Q2: How many drinks containing alcohol do you have on a typical day when you are drinking? 1 or 2 (P)      Q3: How often do you have six or more drinks on one occasion? Never (P)         Utilities    In the past 12 months has the electric, gas, oil, or water company threatened to shut off services in your home? No (P)         Health Literacy    How often do you need to have someone help you when you read instructions, pamphlets, or other written material from your doctor or pharmacy? Never (P)

## 2025-01-01 LAB
ANION GAP SERPL CALC-SCNC: 12 MMOL/L (ref 8–16)
BASOPHILS # BLD AUTO: 0.01 K/UL (ref 0–0.2)
BASOPHILS NFR BLD: 0.2 % (ref 0–1.9)
BUN SERPL-MCNC: 20 MG/DL (ref 8–23)
CALCIUM SERPL-MCNC: 9.2 MG/DL (ref 8.7–10.5)
CHLORIDE SERPL-SCNC: 105 MMOL/L (ref 95–110)
CO2 SERPL-SCNC: 22 MMOL/L (ref 23–29)
CREAT SERPL-MCNC: 1.1 MG/DL (ref 0.5–1.4)
DIFFERENTIAL METHOD BLD: ABNORMAL
EOSINOPHIL # BLD AUTO: 0 K/UL (ref 0–0.5)
EOSINOPHIL NFR BLD: 0 % (ref 0–8)
ERYTHROCYTE [DISTWIDTH] IN BLOOD BY AUTOMATED COUNT: 12.5 % (ref 11.5–14.5)
EST. GFR  (NO RACE VARIABLE): >60 ML/MIN/1.73 M^2
GLUCOSE SERPL-MCNC: 106 MG/DL (ref 70–110)
HCT VFR BLD AUTO: 37.8 % (ref 40–54)
HGB BLD-MCNC: 12.7 G/DL (ref 14–18)
IMM GRANULOCYTES # BLD AUTO: 0.02 K/UL (ref 0–0.04)
IMM GRANULOCYTES NFR BLD AUTO: 0.4 % (ref 0–0.5)
LYMPHOCYTES # BLD AUTO: 0.9 K/UL (ref 1–4.8)
LYMPHOCYTES NFR BLD: 18 % (ref 18–48)
MAGNESIUM SERPL-MCNC: 1.8 MG/DL (ref 1.6–2.6)
MCH RBC QN AUTO: 34.3 PG (ref 27–31)
MCHC RBC AUTO-ENTMCNC: 33.6 G/DL (ref 32–36)
MCV RBC AUTO: 102 FL (ref 82–98)
MONOCYTES # BLD AUTO: 0.5 K/UL (ref 0.3–1)
MONOCYTES NFR BLD: 10.7 % (ref 4–15)
NEUTROPHILS # BLD AUTO: 3.4 K/UL (ref 1.8–7.7)
NEUTROPHILS NFR BLD: 70.7 % (ref 38–73)
NRBC BLD-RTO: 0 /100 WBC
PLATELET # BLD AUTO: 127 K/UL (ref 150–450)
PMV BLD AUTO: 10 FL (ref 9.2–12.9)
POTASSIUM SERPL-SCNC: 3.6 MMOL/L (ref 3.5–5.1)
RBC # BLD AUTO: 3.7 M/UL (ref 4.6–6.2)
SODIUM SERPL-SCNC: 139 MMOL/L (ref 136–145)
WBC # BLD AUTO: 4.77 K/UL (ref 3.9–12.7)

## 2025-01-01 PROCEDURE — 92526 ORAL FUNCTION THERAPY: CPT

## 2025-01-01 PROCEDURE — 25000242 PHARM REV CODE 250 ALT 637 W/ HCPCS: Performed by: PHYSICIAN ASSISTANT

## 2025-01-01 PROCEDURE — 27000207 HC ISOLATION

## 2025-01-01 PROCEDURE — 99900035 HC TECH TIME PER 15 MIN (STAT)

## 2025-01-01 PROCEDURE — 94761 N-INVAS EAR/PLS OXIMETRY MLT: CPT

## 2025-01-01 PROCEDURE — 25000003 PHARM REV CODE 250: Performed by: HOSPITALIST

## 2025-01-01 PROCEDURE — A4216 STERILE WATER/SALINE, 10 ML: HCPCS | Performed by: PHYSICIAN ASSISTANT

## 2025-01-01 PROCEDURE — 25000003 PHARM REV CODE 250: Performed by: PHYSICIAN ASSISTANT

## 2025-01-01 PROCEDURE — 83735 ASSAY OF MAGNESIUM: CPT | Performed by: PHYSICIAN ASSISTANT

## 2025-01-01 PROCEDURE — 80048 BASIC METABOLIC PNL TOTAL CA: CPT | Performed by: PHYSICIAN ASSISTANT

## 2025-01-01 PROCEDURE — 97535 SELF CARE MNGMENT TRAINING: CPT

## 2025-01-01 PROCEDURE — 85025 COMPLETE CBC W/AUTO DIFF WBC: CPT | Performed by: PHYSICIAN ASSISTANT

## 2025-01-01 PROCEDURE — 36415 COLL VENOUS BLD VENIPUNCTURE: CPT | Performed by: PHYSICIAN ASSISTANT

## 2025-01-01 PROCEDURE — 21400001 HC TELEMETRY ROOM

## 2025-01-01 PROCEDURE — 63600175 PHARM REV CODE 636 W HCPCS: Performed by: HOSPITALIST

## 2025-01-01 RX ADMIN — APIXABAN 2.5 MG: 2.5 TABLET, FILM COATED ORAL at 08:01

## 2025-01-01 RX ADMIN — Medication 10 ML: at 09:01

## 2025-01-01 RX ADMIN — SPIRONOLACTONE 25 MG: 25 TABLET, FILM COATED ORAL at 09:01

## 2025-01-01 RX ADMIN — FLUTICASONE PROPIONATE 100 MCG: 50 SPRAY, METERED NASAL at 09:01

## 2025-01-01 RX ADMIN — OSELTAMIVIR PHOSPHATE 75 MG: 75 CAPSULE ORAL at 08:01

## 2025-01-01 RX ADMIN — TAMSULOSIN HYDROCHLORIDE 0.4 MG: 0.4 CAPSULE ORAL at 09:01

## 2025-01-01 RX ADMIN — DONEPEZIL HYDROCHLORIDE 5 MG: 5 TABLET, FILM COATED ORAL at 08:01

## 2025-01-01 RX ADMIN — ASPIRIN 81 MG CHEWABLE TABLET 81 MG: 81 TABLET CHEWABLE at 09:01

## 2025-01-01 RX ADMIN — OXYBUTYNIN CHLORIDE 10 MG: 5 TABLET, EXTENDED RELEASE ORAL at 09:01

## 2025-01-01 RX ADMIN — OSELTAMIVIR PHOSPHATE 75 MG: 75 CAPSULE ORAL at 09:01

## 2025-01-01 RX ADMIN — LOSARTAN POTASSIUM 100 MG: 50 TABLET, FILM COATED ORAL at 09:01

## 2025-01-01 RX ADMIN — FUROSEMIDE 40 MG: 40 TABLET ORAL at 09:01

## 2025-01-01 RX ADMIN — PREDNISONE 40 MG: 20 TABLET ORAL at 09:01

## 2025-01-01 RX ADMIN — Medication 10 ML: at 05:01

## 2025-01-01 RX ADMIN — APIXABAN 2.5 MG: 2.5 TABLET, FILM COATED ORAL at 09:01

## 2025-01-01 RX ADMIN — Medication 10 ML: at 02:01

## 2025-01-01 NOTE — SUBJECTIVE & OBJECTIVE
Interval History: Much better today - weakness nearly resolved, afebrile, cough improved.  No longer confused now and able to give a decent history.  Diet upgraded.    Review of Systems   Constitutional:  Negative for chills and fever.   Respiratory:  Positive for cough. Negative for shortness of breath.    Cardiovascular:  Negative for chest pain and palpitations.   Neurological:  Positive for weakness.   Psychiatric/Behavioral:  Negative for confusion.      Objective:     Vital Signs (Most Recent):  Temp: 98 °F (36.7 °C) (01/01/25 1216)  Pulse: 82 (01/01/25 1450)  Resp: 18 (01/01/25 1216)  BP: 118/61 (01/01/25 1216)  SpO2: 97 % (01/01/25 1216) Vital Signs (24h Range):  Temp:  [98 °F (36.7 °C)-99.2 °F (37.3 °C)] 98 °F (36.7 °C)  Pulse:  [] 82  Resp:  [16-20] 18  SpO2:  [95 %-98 %] 97 %  BP: (111-126)/(55-73) 118/61     Weight: 108.2 kg (238 lb 8.6 oz)  Body mass index is 32.35 kg/m².    Intake/Output Summary (Last 24 hours) at 1/1/2025 1644  Last data filed at 1/1/2025 0532  Gross per 24 hour   Intake 1524.29 ml   Output 1100 ml   Net 424.29 ml         Physical Exam  Cardiovascular:      Rate and Rhythm: Normal rate and regular rhythm.      Pulses: Normal pulses.      Heart sounds: Murmur heard.      No gallop.   Pulmonary:      Effort: Pulmonary effort is normal.      Breath sounds: No wheezing.   Abdominal:      General: Bowel sounds are normal.      Palpations: Abdomen is soft.   Musculoskeletal:      Comments: Trace BLE edema   Skin:     General: Skin is warm and dry.   Neurological:      General: No focal deficit present.      Mental Status: He is alert and oriented to person, place, and time.             Significant Labs: All pertinent labs within the past 24 hours have been reviewed.    Significant Imaging: I have reviewed all pertinent imaging results/findings within the past 24 hours.

## 2025-01-01 NOTE — PLAN OF CARE
Problem: Adult Inpatient Plan of Care  Goal: Plan of Care Review  Outcome: Progressing  Goal: Patient-Specific Goal (Individualized)  Outcome: Progressing  Goal: Absence of Hospital-Acquired Illness or Injury  Outcome: Progressing  Goal: Optimal Comfort and Wellbeing  Outcome: Progressing  Goal: Readiness for Transition of Care  Outcome: Progressing     Problem: Skin Injury Risk Increased  Goal: Skin Health and Integrity  Outcome: Progressing     Problem: Fall Injury Risk  Goal: Absence of Fall and Fall-Related Injury  Outcome: Progressing     Problem: Infection  Goal: Absence of Infection Signs and Symptoms  Outcome: Progressing      1.2

## 2025-01-01 NOTE — ASSESSMENT & PLAN NOTE
Patient's blood pressure range in the last 24 hours was: BP  Min: 111/62  Max: 126/58.The patient's inpatient anti-hypertensive regimen is listed below:  Current Antihypertensives  losartan tablet 100 mg, Daily, Oral  spironolactone tablet 25 mg, Daily, Oral  furosemide tablet 40 mg, Daily, Oral    Plan  - BP is controlled, no changes needed to their regimen  - Changed furosemide to home dose

## 2025-01-01 NOTE — ASSESSMENT & PLAN NOTE
- Patient febrile on presentation and started on empiric antibiotics  - blood cultures ordered, pending  - Will get viral antigen panel - need to include influenza as reportedly test was negative but it was the docking device and he seems to have flu.  - COVID negative in ED  - patient has a cough and fever but no PNA seen on CTA  - Imaging is negative for acute process  - Blood cultures NGTD  - Influenza A PCR positive - empiric antibiotics discontinued.

## 2025-01-01 NOTE — PT/OT/SLP PROGRESS
"Speech Language Pathology Treatment    Patient Name:  Sam Gamino   MRN:  [id number]  Admitting Diagnosis: Influenza A    Recommendations:                 General Recommendations:  SLP will follow up 3-4x/week to monitor diet tolerance and provide patient/caregiver education   Diet recommendations:  Regular Diet - IDDSI Level 7, Liquid Diet Level: Thin liquids - IDDSI Level 0   Aspiration Precautions:  1 bite/sip at a time  Frequent oral care  HOB to 90 degrees  Remain upright in bed for 30 minute following meal   Meds whole with water  Monitor for s/s of aspiration  Small bites/sips  Standard aspiration precautions   General Precautions: Standard, aspiration, fall, dental soft  Communication strategies:  provide increased time to answer and go to room if call light pushed    Assessment:     Sam Gamino is a 79 y.o. male with an SLP diagnosis of  resolving dysphagia and mild dysphonia .  Patient tolerated soft solids, regular solids, and thin liquids without overt signs of airway compromise. Patient with improvement in overall presentation this date. SLP with recommendation for diet advancement to regular/thin with standard aspiration precautions. Spouse endorses patient with acute short-term memory deficits. SLP will continue to follow to monitor diet tolerance and provide patient/caregiver education.     Subjective     Patient awake/alert and agreeable to SLP session.     "Do you mind if I call my wife so you can give her an update?"    Pain/Comfort:  Pain Rating 1: 0/10  Pain Rating Post-Intervention 1: 0/10    Respiratory Status: Room air    Objective:     Has the patient been evaluated by SLP for swallowing?   Yes  Keep patient NPO? No     BRIEF COGNITIVE/LINGUISTIC STATUS  Patient awake/alert. Able to sustain alertness and attention for entire session without need for redirection.   Patient oriented to person, place, month, year, and current date IND.   He required min cues to state current situation. "   Patient's spouse with report of and concerns about patient's acute change in short-term memory. Shortly before admission, spouse reports patient with difficulty recalling specific details from his day to day.   At the end of the session, patient unable to recall therapist name or type of therapy. Patient stated that he was working with Dr. Bryant though the MD was not present in the session.   Patient with good recall of long-term information (e.g., number of kids, where they live, his occupation, and places he has lived abroad, etc.).   Following education, patient recalled x1 signs of airway compromise.   Patient followed simple directives throughout assessment with 100% accuracy IND.  Able to answer simple and open ended questions with 100% accuracy this date  Patient with appropriate length responses to open ended question. Improvement in response time noted this date.  Patient with fluent language and evidence of appropriate length spontaneous utterances t/o session.   No evidence of dysarthria, speech was 100% intelligible at the conversation level.   Voice was rough with adequate intensity. Intermittent wetness noted during conversation.    OROPHARYNGEAL SWALLOW:   PO TRIALS :   Thin liquid: ~6 oz of water via single and consecutive sips from the straw  Soft solids: cracker dipped in pudding x2  Solids: 1 1/2 brigid crackers      ORAL PHASE:  Self presented all PO   Functional mandibular depression and elevation for adequate bolus acceptance  Adequate labial seal on cup/utensil for functional bolus stripping  No anterior loss of bolus  Functional mandibular depression and elevation for adequate initial biteforce of solid bolus  Timely mastication of regular solid  Adequate bolus formation and A-P transfer  Oral cavity clear/without residue after the swallow.      PHARYNGEAL PHASE:  Single swallows per bolus noted  No overt signs of airway compromise noted across consistencies, I.e., coughing, throat  clearing, change in respiration, change in vital signs, or change in vocal quality   Laryngeal rise noted in subjectively perceived timely manner- unable to objectively assess at bedside.     IMPRESSION   Patient demonstrates resolving dysphagia. Recommend diet upgrade to regular/thin given implementation of safe swallow strategies. SLP will follow for patient/caregiver education and to monitor diet tolerance.      Education: SLP provided education provided re: role of SLP, diet recs, diet types, swallow anatomy, swallow precs, signs of airway compromise, internal/external memory strategies, and POC. Handout on memory strategies left at bedside. Patient and spouse verbalized understanding and agreement. Patient remained upright in bed with all needs met and no report of pain prior to exiting. RN present at bedside. SLP provided an update on overall impressions and recommendations to the medical team upon completion of session.    Goals:   Multidisciplinary Problems       SLP Goals          Problem: SLP    Goal Priority Disciplines Outcome   SLP Goal     SLP Progressing   Description: Speech Language Pathology Goals  Goals expected to be met by 1/14:     1. The patient will tolerate a least restrictive diet without displaying overt signs of airway compromise.   2. The patient will orient to situation, month, date, year, and CRAIG given min verbal cues.                                Plan:     Patient to be seen:  3 x/week   Plan of Care expires:  01/28/25  Plan of Care reviewed with:  patient, spouse (Spoke with spouse via patient's cell phone.)   SLP Follow-Up:  Yes       Time Tracking:     SLP Treatment Date:   01/01/25  Speech Start Time:  0843  Speech Stop Time:  0915     Speech Total Time (min):  32 min    Billable Minutes: Treatment Swallowing Dysfunction 24 and Self Care/Home Management Training 8    01/01/2025

## 2025-01-01 NOTE — PROGRESS NOTES
[facility] Surg [address] Medicine  Progress Note    Patient Name: Sam Gamino  MRN: [id number]  Patient Class: IP- Inpatient   Admission Date: 12/30/2024  Length of Stay: 1 days  Attending Physician: Estrella Bryant MD  Primary Care Provider: JAYLYN Zamora MD        Subjective     Principal Problem:Influenza A        HPI:  HPI by Britni Betts PA:  Mr. Sam Gamino is a 79 y.o. 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  The pain has been intermittent, and is worse with exertion.  The pain is not reproducible with movement, or palpation.  He notes associated shortness of breath, and lower extremity swelling.  He admits to indulging in salt rich foods over the holiday, and also missing a few doses of his Lasix.  He denies nausea, vomiting, abdominal pain. He further notes generalized weakness, and fatigue.  He states that he had a fall on the day of presentation due to feeling very weak in the legs.  Denies LOC, head injury.  He was evaluated in the ED with labs that showed H&H of 13.5/40.5.  He had no leukocytosis or left shift.  A metabolic panel showed normal values save for total bilirubin which was 1.9.  A BNP was elevated at 164.  Troponins were negative x2.  While in the ED he had a measured temperature of 101.7° F by mouth.  He was negative for COVID, and influenza a/B.  A urinalysis showed no UTI.  A chest x-ray showed no acute cardiopulmonary process.  A CT of the head showed no acute intracranial abnormalities.  A CTA of the chest showed no PE, small bilateral pleural effusions right greater than left, a small pericardial effusion (similar to prior imaging),  and a bilateral paramediastinal consolidation consistent with post radiation change, and finally a mildly prominent fluid-filled distal thoracic esophagus concerning for esophageal dysmotility versus reflux.  He was treated in the ED with Tylenol for the fever, aspirin, 40 mg IV Lasix, and a dose of metoprolol.  He was placed on observation.    Overview/Hospital Course:  Patient was admitted on empiric antibiotics with blood cultures pending.  He was febrile to 102.2 overnight and was weak, confused and complained of cough.  Respiratory virus antigens panel was sent.  Because suspicion for influenza was high despite the negative ED test a PCR was sent and was positive for influenza A.  Patient was started on a course of Tamiflu along with empiric antibiotics.  Overnight he made a good improvement and in the morning was oriented again and feeling better.    Interval History: Much better today - weakness nearly resolved, afebrile, cough improved.  No longer confused now and able to give a decent history.  Diet upgraded.    Review of Systems   Constitutional:  Negative for chills and fever.   Respiratory:  Positive for cough. Negative for shortness of breath.    Cardiovascular:  Negative for chest pain and palpitations.   Neurological:  Positive for weakness.   Psychiatric/Behavioral:  Negative for confusion.      Objective:     Vital Signs (Most Recent):  Temp: 98 °F (36.7 °C) (01/01/25 1216)  Pulse: 82 (01/01/25 1450)  Resp: 18 (01/01/25 1216)  BP: 118/61 (01/01/25 1216)  SpO2: 97 % (01/01/25 1216) Vital Signs (24h Range):  Temp:  [98 °F (36.7 °C)-99.2 °F (37.3 °C)] 98 °F (36.7 °C)  Pulse:  [] 82  Resp:  [16-20] 18  SpO2:  [95 %-98 %] 97 %  BP: (111-126)/(55-73) 118/61     Weight: 108.2 kg (238 lb 8.6 oz)  Body mass index is 32.35 kg/m².    Intake/Output Summary (Last 24 hours) at 1/1/2025 1644  Last data filed at 1/1/2025 0532  Gross per 24 hour   Intake 1524.29 ml   Output 1100 ml   Net 424.29 ml          Physical Exam  Cardiovascular:      Rate and Rhythm: Normal rate and regular rhythm.      Pulses: Normal pulses.      Heart sounds: Murmur heard.      No gallop.   Pulmonary:      Effort: Pulmonary effort is normal.      Breath sounds: No wheezing.   Abdominal:      General: Bowel sounds are normal.      Palpations: Abdomen is soft.   Musculoskeletal:      Comments: Trace BLE edema   Skin:     General: Skin is warm and dry.   Neurological:      General: No focal deficit present.      Mental Status: He is alert and oriented to person, place, and time.             Significant Labs: All pertinent labs within the past 24 hours have been reviewed.    Significant Imaging: I have reviewed all pertinent imaging results/findings within the past 24 hours.    Assessment and Plan     * Influenza A  - Tested negative on POCT docking device in ED - repeated PCR as symptoms were consistent with influenza or another respiratory viral infection - second test positive for Influenza A.  - Unclear when symptoms started - Tamiflu ordered and starting today.  - Continue supportive care with prn oxygen, antitussive, respiratory treatments (wheezing significantly on exam and no history of this).  - Hold off on IVF given heart failure/pulmonary HTN.  - PT ordered but will hold off on aggressive workouts for now.      Encephalopathy, metabolic  - Patient confused and weak on presentation, likely due to acute viral infection  - Had trouble sitting up due to weakness  - PT consulted and he was unable to participate, then tested positive for influenza  - Today much better with encephalopathy resolved      Acute on chronic diastolic heart failure  Pulmonary hypertension - due to valvular heart disease vs diastolic dysfunction  Influenza A  - Patient presented with chest pain.  Has ruled out for MI with negative troponin times 2.  - chest x-ray without pulmonary edema  - lower extremities mild edema on exam - appears at baseline.  Possible mild  "exacerbation due to viral infection.  - last Echo 10/4/24:     Summary  Show Result Comparison     Left Ventricle: The left ventricle is normal in size. Ventricular mass is normal. Normal wall thickness. There is normal systolic function. Ejection fraction is approximately 60%. Grade II diastolic dysfunction.    Right Ventricle: Normal right ventricular cavity size. Wall thickness is normal. Systolic function is normal.    Aortic Valve: The aortic valve is a trileaflet valve. There is mild aortic valve sclerosis. There is moderate aortic regurgitation with a centrally directed jet.    Mitral Valve: There is moderate regurgitation with a posterolateral eccentriccally directed jet.    Tricuspid Valve: There is mild to moderate regurgitation with a centrally directed jet.    Pulmonary Artery: There is moderate pulmonary hypertension. The estimated pulmonary artery systolic pressure is 53 mmHg.    Pericardium: There is a small circumferential effusion.    - Heart failure pathways initiated - might have mild exacerbation related to this viral infection  - status post 40 mg IV Lasix in ED, OK to resume home regimen  - Treat viral infection  - monitor I&Os, daily weights    Pulmonary hypertension associated with unclear multi-factorial mechanisms  See "heart failure"      Fever  - Patient febrile on presentation and started on empiric antibiotics  - blood cultures ordered, pending  - Will get viral antigen panel - need to include influenza as reportedly test was negative but it was the docking device and he seems to have flu.  - COVID negative in ED  - patient has a cough and fever but no PNA seen on CTA  - Imaging is negative for acute process  - Blood cultures NGTD  - Influenza A PCR positive - empiric antibiotics discontinued.      Dysphagia  - Approved for soft diet by Speech.  Might need MBSS but very weak right now  - Speech re-evaluated today (1/1) - OK for soft and bite sized diet.  No need for MBSS as he is " recovering well.    History of pulmonary embolism  - continue Eliquis      Primary hypertension  Patient's blood pressure range in the last 24 hours was: BP  Min: 111/62  Max: 126/58.The patient's inpatient anti-hypertensive regimen is listed below:  Current Antihypertensives  losartan tablet 100 mg, Daily, Oral  spironolactone tablet 25 mg, Daily, Oral  furosemide tablet 40 mg, Daily, Oral    Plan  - BP is controlled, no changes needed to their regimen  - Changed furosemide to home dose      Coronary artery disease  - continue ASA, statin      VTE Risk Mitigation (From admission, onward)           Ordered     apixaban tablet 2.5 mg  2 times daily         12/31/24 0521     Place sequential compression device  Until discontinued         12/31/24 0519     IP VTE HIGH RISK PATIENT  Once         12/31/24 0519     Place sequential compression device  Until discontinued         12/31/24 0357                    Discharge Planning   ROLANDO:      Code Status: Full Code   Medical Readiness for Discharge Date:   Discharge Plan A: Home with family                Please place Justification for DME        Estrella Blue MD  Department of Hospital Medicine   [facility] Med Surg ([address])

## 2025-01-02 LAB
ANION GAP SERPL CALC-SCNC: 9 MMOL/L (ref 8–16)
BASOPHILS # BLD AUTO: 0 K/UL (ref 0–0.2)
BASOPHILS NFR BLD: 0 % (ref 0–1.9)
BUN SERPL-MCNC: 29 MG/DL (ref 8–23)
CALCIUM SERPL-MCNC: 9.3 MG/DL (ref 8.7–10.5)
CHLORIDE SERPL-SCNC: 106 MMOL/L (ref 95–110)
CO2 SERPL-SCNC: 24 MMOL/L (ref 23–29)
CREAT SERPL-MCNC: 1 MG/DL (ref 0.5–1.4)
DIFFERENTIAL METHOD BLD: ABNORMAL
EOSINOPHIL # BLD AUTO: 0 K/UL (ref 0–0.5)
EOSINOPHIL NFR BLD: 0 % (ref 0–8)
ERYTHROCYTE [DISTWIDTH] IN BLOOD BY AUTOMATED COUNT: 12.4 % (ref 11.5–14.5)
EST. GFR  (NO RACE VARIABLE): >60 ML/MIN/1.73 M^2
GLUCOSE SERPL-MCNC: 96 MG/DL (ref 70–110)
HCT VFR BLD AUTO: 38.2 % (ref 40–54)
HGB BLD-MCNC: 12.8 G/DL (ref 14–18)
IMM GRANULOCYTES # BLD AUTO: 0.01 K/UL (ref 0–0.04)
IMM GRANULOCYTES NFR BLD AUTO: 0.3 % (ref 0–0.5)
LYMPHOCYTES # BLD AUTO: 0.8 K/UL (ref 1–4.8)
LYMPHOCYTES NFR BLD: 22.8 % (ref 18–48)
MAGNESIUM SERPL-MCNC: 2 MG/DL (ref 1.6–2.6)
MCH RBC QN AUTO: 33.9 PG (ref 27–31)
MCHC RBC AUTO-ENTMCNC: 33.5 G/DL (ref 32–36)
MCV RBC AUTO: 101 FL (ref 82–98)
MONOCYTES # BLD AUTO: 0.4 K/UL (ref 0.3–1)
MONOCYTES NFR BLD: 11.3 % (ref 4–15)
NEUTROPHILS # BLD AUTO: 2.4 K/UL (ref 1.8–7.7)
NEUTROPHILS NFR BLD: 65.6 % (ref 38–73)
NRBC BLD-RTO: 0 /100 WBC
PLATELET # BLD AUTO: 152 K/UL (ref 150–450)
PMV BLD AUTO: 10.1 FL (ref 9.2–12.9)
POTASSIUM SERPL-SCNC: 3.6 MMOL/L (ref 3.5–5.1)
RBC # BLD AUTO: 3.78 M/UL (ref 4.6–6.2)
SODIUM SERPL-SCNC: 139 MMOL/L (ref 136–145)
WBC # BLD AUTO: 3.64 K/UL (ref 3.9–12.7)

## 2025-01-02 PROCEDURE — 97530 THERAPEUTIC ACTIVITIES: CPT

## 2025-01-02 PROCEDURE — 85025 COMPLETE CBC W/AUTO DIFF WBC: CPT | Performed by: PHYSICIAN ASSISTANT

## 2025-01-02 PROCEDURE — 94799 UNLISTED PULMONARY SVC/PX: CPT

## 2025-01-02 PROCEDURE — 25000003 PHARM REV CODE 250: Performed by: HOSPITALIST

## 2025-01-02 PROCEDURE — 97116 GAIT TRAINING THERAPY: CPT

## 2025-01-02 PROCEDURE — 27000207 HC ISOLATION

## 2025-01-02 PROCEDURE — 94761 N-INVAS EAR/PLS OXIMETRY MLT: CPT

## 2025-01-02 PROCEDURE — 36415 COLL VENOUS BLD VENIPUNCTURE: CPT | Performed by: PHYSICIAN ASSISTANT

## 2025-01-02 PROCEDURE — 25000003 PHARM REV CODE 250: Performed by: PHYSICIAN ASSISTANT

## 2025-01-02 PROCEDURE — 63600175 PHARM REV CODE 636 W HCPCS: Performed by: HOSPITALIST

## 2025-01-02 PROCEDURE — 25000242 PHARM REV CODE 250 ALT 637 W/ HCPCS: Performed by: PHYSICIAN ASSISTANT

## 2025-01-02 PROCEDURE — 94640 AIRWAY INHALATION TREATMENT: CPT

## 2025-01-02 PROCEDURE — 83735 ASSAY OF MAGNESIUM: CPT | Performed by: PHYSICIAN ASSISTANT

## 2025-01-02 PROCEDURE — 80048 BASIC METABOLIC PNL TOTAL CA: CPT | Performed by: PHYSICIAN ASSISTANT

## 2025-01-02 PROCEDURE — A4216 STERILE WATER/SALINE, 10 ML: HCPCS | Performed by: PHYSICIAN ASSISTANT

## 2025-01-02 PROCEDURE — 21400001 HC TELEMETRY ROOM

## 2025-01-02 PROCEDURE — 25000242 PHARM REV CODE 250 ALT 637 W/ HCPCS: Performed by: HOSPITALIST

## 2025-01-02 RX ORDER — ALBUTEROL SULFATE 2.5 MG/.5ML
2.5 SOLUTION RESPIRATORY (INHALATION)
Status: DISCONTINUED | OUTPATIENT
Start: 2025-01-02 | End: 2025-01-04

## 2025-01-02 RX ADMIN — FLUTICASONE PROPIONATE 100 MCG: 50 SPRAY, METERED NASAL at 09:01

## 2025-01-02 RX ADMIN — Medication 10 ML: at 09:01

## 2025-01-02 RX ADMIN — ALBUTEROL SULFATE 2.5 MG: 2.5 SOLUTION RESPIRATORY (INHALATION) at 07:01

## 2025-01-02 RX ADMIN — LOSARTAN POTASSIUM 100 MG: 50 TABLET, FILM COATED ORAL at 09:01

## 2025-01-02 RX ADMIN — FUROSEMIDE 40 MG: 40 TABLET ORAL at 09:01

## 2025-01-02 RX ADMIN — ALBUTEROL SULFATE 2.5 MG: 2.5 SOLUTION RESPIRATORY (INHALATION) at 01:01

## 2025-01-02 RX ADMIN — Medication 6 MG: at 01:01

## 2025-01-02 RX ADMIN — TAMSULOSIN HYDROCHLORIDE 0.4 MG: 0.4 CAPSULE ORAL at 09:01

## 2025-01-02 RX ADMIN — OXYBUTYNIN CHLORIDE 10 MG: 5 TABLET, EXTENDED RELEASE ORAL at 09:01

## 2025-01-02 RX ADMIN — SPIRONOLACTONE 25 MG: 25 TABLET, FILM COATED ORAL at 09:01

## 2025-01-02 RX ADMIN — DONEPEZIL HYDROCHLORIDE 5 MG: 5 TABLET, FILM COATED ORAL at 09:01

## 2025-01-02 RX ADMIN — APIXABAN 2.5 MG: 2.5 TABLET, FILM COATED ORAL at 09:01

## 2025-01-02 RX ADMIN — OSELTAMIVIR PHOSPHATE 75 MG: 75 CAPSULE ORAL at 09:01

## 2025-01-02 RX ADMIN — ALBUTEROL SULFATE 2.5 MG: 2.5 SOLUTION RESPIRATORY (INHALATION) at 08:01

## 2025-01-02 RX ADMIN — ALBUTEROL SULFATE 2.5 MG: 2.5 SOLUTION RESPIRATORY (INHALATION) at 04:01

## 2025-01-02 RX ADMIN — ASPIRIN 81 MG CHEWABLE TABLET 81 MG: 81 TABLET CHEWABLE at 09:01

## 2025-01-02 RX ADMIN — PREDNISONE 40 MG: 20 TABLET ORAL at 09:01

## 2025-01-02 RX ADMIN — Medication 10 ML: at 05:01

## 2025-01-02 NOTE — PROGRESS NOTES
[facility] Surg [address] Medicine  Progress Note    Patient Name: Sam Gamino  MRN: [id number]  Patient Class: IP- Inpatient   Admission Date: 12/30/2024  Length of Stay: 2 days  Attending Physician: Estrella Bryant MD  Primary Care Provider: JAYLYN Zamora MD        Subjective     Principal Problem:Influenza A        HPI:  HPI by Britni Betts PA:  Mr. Sam Gamino is a 79 y.o. 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  The pain has been intermittent, and is worse with exertion.  The pain is not reproducible with movement, or palpation.  He notes associated shortness of breath, and lower extremity swelling.  He admits to indulging in salt rich foods over the holiday, and also missing a few doses of his Lasix.  He denies nausea, vomiting, abdominal pain. He further notes generalized weakness, and fatigue.  He states that he had a fall on the day of presentation due to feeling very weak in the legs.  Denies LOC, head injury.  He was evaluated in the ED with labs that showed H&H of 13.5/40.5.  He had no leukocytosis or left shift.  A metabolic panel showed normal values save for total bilirubin which was 1.9.  A BNP was elevated at 164.  Troponins were negative x2.  While in the ED he had a measured temperature of 101.7° F by mouth.  He was negative for COVID, and influenza a/B.  A urinalysis showed no UTI.  A chest x-ray showed no acute cardiopulmonary process.  A CT of the head showed no acute intracranial abnormalities.  A CTA of the chest showed no PE, small bilateral pleural effusions right greater than left, a small pericardial effusion (similar to prior imaging),  and a bilateral paramediastinal consolidation consistent with post radiation change, and finally a mildly prominent fluid-filled distal thoracic esophagus concerning for esophageal dysmotility versus reflux.  He was treated in the ED with Tylenol for the fever, aspirin, 40 mg IV Lasix, and a dose of metoprolol.  He was placed on observation.    Overview/Hospital Course:  Patient was admitted on empiric antibiotics with blood cultures pending.  He was febrile to 102.2 overnight and was weak, confused and complained of cough.  Respiratory virus antigens panel was sent.  Because suspicion for influenza was high despite the negative ED test a PCR was sent and was positive for influenza A.  Patient was started on a course of Tamiflu along with empiric antibiotics.  Overnight he made a good improvement and in the morning was oriented again and feeling better.  He still had significant wheezing and respiratory treatments were ordered.    Interval History: Says he feels better but seems weak and is still wheezing significantly on exam.  Denies shortness of breath.  Has trouble getting into a seated position.  Respiratory treatments ordered prn but he has not had any.    Review of Systems   Constitutional:  Negative for chills and fever.   Respiratory:  Negative for cough and shortness of breath.    Cardiovascular:  Negative for chest pain and palpitations.   Neurological:  Positive for weakness.   Psychiatric/Behavioral:  Negative for confusion.      Objective:     Vital Signs (Most Recent):  Temp: 98 °F (36.7 °C) (01/02/25 0407)  Pulse: (!) 117 (01/02/25 0708)  Resp: 18 (01/02/25 0407)  BP: 130/68 (01/02/25 0407)  SpO2: 96 % (01/02/25 0407) Vital Signs (24h Range):  Temp:  [97.6 °F (36.4 °C)-98.5 °F (36.9 °C)] 98 °F (36.7 °C)  Pulse:  [] 117  Resp:  [16-19] 18  SpO2:  [96 %-97 %] 96 %  BP: (118-130)/(61-75) 130/68     Weight: 108.2 kg (238 lb 8.6 oz)  Body mass index is 32.35 kg/m².    Intake/Output Summary (Last 24  hours) at 1/2/2025 0738  Last data filed at 1/2/2025 0637  Gross per 24 hour   Intake 1050 ml   Output 2310 ml   Net -1260 ml         Physical Exam  Cardiovascular:      Rate and Rhythm: Regular rhythm. Tachycardia present.      Pulses: Normal pulses.      Heart sounds: Murmur heard.      No gallop.   Pulmonary:      Effort: Pulmonary effort is normal.      Breath sounds: Wheezing present.   Abdominal:      General: Bowel sounds are normal.      Palpations: Abdomen is soft.   Musculoskeletal:      Comments: Trace BLE edema   Skin:     General: Skin is warm and dry.   Neurological:      General: No focal deficit present.      Mental Status: He is alert and oriented to person, place, and time.             Significant Labs: All pertinent labs within the past 24 hours have been reviewed.    Significant Imaging: I have reviewed all pertinent imaging results/findings within the past 24 hours.    Assessment and Plan     * Influenza A  - Tested negative on POCT docking device in ED - repeated PCR as symptoms were consistent with influenza or another respiratory viral infection - second test positive for Influenza A.  - Unclear when symptoms started - Tamiflu ordered and starting today.  - Continue supportive care with prn oxygen, antitussive, respiratory treatments (wheezing significantly on exam and no history of this).  - Hold off on IVF given heart failure/pulmonary HTN.  - PT ordered but will hold off on aggressive workouts for now.  - Respiratory treatments much needed given ongoing wheezing - change to q4 while awake      Encephalopathy, metabolic  - Patient confused and weak on presentation, likely due to acute viral infection  - Had trouble sitting up due to weakness  - PT consulted and he was unable to participate, then tested positive for influenza  - Today much better with encephalopathy resolved      Acute on chronic diastolic heart failure  Pulmonary hypertension - due to valvular heart disease vs diastolic  "dysfunction  Influenza A  - Patient presented with chest pain.  Has ruled out for MI with negative troponin times 2.  - chest x-ray without pulmonary edema  - lower extremities mild edema on exam - appears at baseline.  Possible mild exacerbation due to viral infection.  - last Echo 10/4/24:     Summary  Show Result Comparison     Left Ventricle: The left ventricle is normal in size. Ventricular mass is normal. Normal wall thickness. There is normal systolic function. Ejection fraction is approximately 60%. Grade II diastolic dysfunction.    Right Ventricle: Normal right ventricular cavity size. Wall thickness is normal. Systolic function is normal.    Aortic Valve: The aortic valve is a trileaflet valve. There is mild aortic valve sclerosis. There is moderate aortic regurgitation with a centrally directed jet.    Mitral Valve: There is moderate regurgitation with a posterolateral eccentriccally directed jet.    Tricuspid Valve: There is mild to moderate regurgitation with a centrally directed jet.    Pulmonary Artery: There is moderate pulmonary hypertension. The estimated pulmonary artery systolic pressure is 53 mmHg.    Pericardium: There is a small circumferential effusion.    - Heart failure pathways initiated - might have mild exacerbation related to this viral infection  - status post 40 mg IV Lasix in ED, OK to resume home regimen  - Treat viral infection  - monitor I&Os, daily weights    Pulmonary hypertension associated with unclear multi-factorial mechanisms  See "heart failure"      Fever  - Patient febrile on presentation and started on empiric antibiotics  - blood cultures ordered, pending  - Will get viral antigen panel - need to include influenza as reportedly test was negative but it was the docking device and he seems to have flu.  - COVID negative in ED  - patient has a cough and fever but no PNA seen on CTA  - Imaging is negative for acute process  - Blood cultures NGTD  - Influenza A PCR " positive - empiric antibiotics discontinued.      Dysphagia  - Approved for soft diet by Speech.  Might need MBSS but very weak right now  - Speech re-evaluated today (1/1) - OK for soft and bite sized diet.  No need for MBSS as he is recovering well.    History of pulmonary embolism  - continue Eliquis      Primary hypertension  Patient's blood pressure range in the last 24 hours was: BP  Min: 111/62  Max: 126/58.The patient's inpatient anti-hypertensive regimen is listed below:  Current Antihypertensives  losartan tablet 100 mg, Daily, Oral  spironolactone tablet 25 mg, Daily, Oral  furosemide tablet 40 mg, Daily, Oral    Plan  - BP is controlled, no changes needed to their regimen  - Changed furosemide to home dose      Coronary artery disease  - continue ASA, statin      VTE Risk Mitigation (From admission, onward)           Ordered     apixaban tablet 2.5 mg  2 times daily         12/31/24 0521     Place sequential compression device  Until discontinued         12/31/24 0519     IP VTE HIGH RISK PATIENT  Once         12/31/24 0519     Place sequential compression device  Until discontinued         12/31/24 0357                    Discharge Planning   ROLANDO:      Code Status: Full Code   Medical Readiness for Discharge Date:   Discharge Plan A: Home with family                Please place Justification for DME        Estrella Blue MD  Department of Hospital Medicine   [facility] - Med Surg ([address])

## 2025-01-02 NOTE — ASSESSMENT & PLAN NOTE
- Tested negative on POCT docking device in ED - repeated PCR as symptoms were consistent with influenza or another respiratory viral infection - second test positive for Influenza A.  - Unclear when symptoms started - Tamiflu ordered and starting today.  - Continue supportive care with prn oxygen, antitussive, respiratory treatments (wheezing significantly on exam and no history of this).  - Hold off on IVF given heart failure/pulmonary HTN.  - PT ordered but will hold off on aggressive workouts for now.  - Respiratory treatments much needed given ongoing wheezing - change to q4 while awake

## 2025-01-02 NOTE — PT/OT/SLP PROGRESS
Physical Therapy Treatment    Patient Name:  Sam Gamino   MRN:  [id number]    Recommendations:     Discharge Recommendations: Moderate Intensity Therapy (potential progress to LIT within 1-2 days)  Discharge Equipment Recommendations: none  Barriers to discharge:  increased caregiver burden    Assessment:     Sam Gamino is a 79 y.o. male admitted with a medical diagnosis of Influenza A - increased fatigue/weakness in past few days leading to fall day of admission. 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  He presents with the following impairments/functional limitations: impaired endurance, weakness, gait instability, impaired balance, decreased coordination, decreased upper extremity function, decreased lower extremity function, decreased safety awareness, edema.    Marked improvement with AMPAC improving from 9 to 20 in 2 days since initial evaluation, met all goals this date and POC updated to reflect improved functional status. Continues to require supervision and RW for standing activity. Rec for DC anticipate to LIT pending OT reassessment, but will maintain MILE recommendation until then as pt's wife is also sick with the flu and might provide limited physical assistance for ADLs.    Rehab Prognosis: Good; patient would benefit from acute skilled PT services to address these deficits and reach maximum level of function.    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  Pain/Comfort:  Pain Rating 1: 0/10  Pain Rating Post-Intervention 1: 0/10      Objective:     Communicated with PCT prior to session.  Patient found supine with peripheral IV upon PT entry to room.     General Precautions: Standard, droplet, fall  Orthopedic Precautions: N/A  Braces: N/A  Respiratory Status: Room air     Patient donned non slip socks and gait belt for OOB mobility.    Functional Mobility:  Bed Mobility:     Supine to Sit: modified independence  Transfers:     Sit to Stand:  stand by assistance and contact guard assistance with rolling walker  Initially pulls to standing on RW with CGA 2/2 posterior LOB and general instability   Blocked practice after ambulation with demo of hand placement and marked improvement in ease of transfers  Gait: x60 ft with RW and CGA progressing to supervision.   Slow gait with forward flexion of trunk that improved with adjustment of RW height.   No overt LOB or gross moments of gait instability.   Pt reports doesn't like using the RW because it makes opening/closing doors difficult.       AM-PAC 6 CLICK MOBILITY  Turning over in bed (including adjusting bedclothes, sheets and blankets)?: 4  Sitting down on and standing up from a chair with arms (e.g., wheelchair, bedside commode, etc.): 3  Moving from lying on back to sitting on the side of the bed?: 4  Moving to and from a bed to a chair (including a wheelchair)?: 3  Need to walk in hospital room?: 3  Climbing 3-5 steps with a railing?: 3  Basic Mobility Total Score: 20       Treatment & Education:  Extended rest break between gait bout and blocked practice of transfers    Patient left sitting edge of bed with all lines intact, call button in reach, and RN notified..    GOALS:   Multidisciplinary Problems       Physical Therapy Goals          Problem: Physical Therapy    Goal Priority Disciplines Outcome Interventions    Physical Therapy Goal     PT, PT/OT Progressing    Description: Goals to be met by: 25    Patient will increase functional independence with mobility by performin. Gait x100 ft with supervision with LRAD.  2. Sit<>stand with supervision without need for UE support.   3. TUG time of <13 sec with LRAD to indicate decreased risk for falls.     Met Goals:  -Supine<>sit with Laly with appropriate use of HB features. MET 1/2  -Sit<>stand with Laly with RW. MET 1/2  -Gait x 10 feet with Laly with RW. MET 1/2  -Sitting EOB x5 min with supervision without UE support. MET 1/2                       Time Tracking:     PT Received On: 25  PT Start Time: 1457     PT Stop Time: 1520  PT Total Time (min): 23 min     Billable Minutes: Gait Training 10 and Therapeutic Activity 13    Treatment Type: Treatment  PT/PTA: PT     Number of PTA visits since last PT visit: 0     2025

## 2025-01-02 NOTE — PLAN OF CARE
Problem: Physical Therapy  Goal: Physical Therapy Goal  Description: Goals to be met by: 25    Patient will increase functional independence with mobility by performin. Gait x100 ft with supervision with LRAD.  2. Sit<>stand with supervision without need for UE support.   3. TUG time of <13 sec with LRAD to indicate decreased risk for falls.     Met Goals:  -Supine<>sit with Laly with appropriate use of HB features. MET 1/2  -Sit<>stand with Laly with RW. MET 1/2  -Gait x 10 feet with Laly with RW. MET 1/2  -Sitting EOB x5 min with supervision without UE support. MET 1/2  Outcome: Progressing     Marked improvement, met all goals this date and POC updated to reflect improved functional status. Continues to require supervision and RW for standing activity. Rec for DC anticipate to LIT pending OT reassessment.

## 2025-01-02 NOTE — SUBJECTIVE & OBJECTIVE
Interval History: Says he feels better but seems weak and is still wheezing significantly on exam.  Denies shortness of breath.  Has trouble getting into a seated position.  Respiratory treatments ordered prn but he has not had any.    Review of Systems   Constitutional:  Negative for chills and fever.   Respiratory:  Negative for cough and shortness of breath.    Cardiovascular:  Negative for chest pain and palpitations.   Neurological:  Positive for weakness.   Psychiatric/Behavioral:  Negative for confusion.      Objective:     Vital Signs (Most Recent):  Temp: 98 °F (36.7 °C) (01/02/25 0407)  Pulse: (!) 117 (01/02/25 0708)  Resp: 18 (01/02/25 0407)  BP: 130/68 (01/02/25 0407)  SpO2: 96 % (01/02/25 0407) Vital Signs (24h Range):  Temp:  [97.6 °F (36.4 °C)-98.5 °F (36.9 °C)] 98 °F (36.7 °C)  Pulse:  [] 117  Resp:  [16-19] 18  SpO2:  [96 %-97 %] 96 %  BP: (118-130)/(61-75) 130/68     Weight: 108.2 kg (238 lb 8.6 oz)  Body mass index is 32.35 kg/m².    Intake/Output Summary (Last 24 hours) at 1/2/2025 0738  Last data filed at 1/2/2025 0637  Gross per 24 hour   Intake 1050 ml   Output 2310 ml   Net -1260 ml         Physical Exam  Cardiovascular:      Rate and Rhythm: Regular rhythm. Tachycardia present.      Pulses: Normal pulses.      Heart sounds: Murmur heard.      No gallop.   Pulmonary:      Effort: Pulmonary effort is normal.      Breath sounds: Wheezing present.   Abdominal:      General: Bowel sounds are normal.      Palpations: Abdomen is soft.   Musculoskeletal:      Comments: Trace BLE edema   Skin:     General: Skin is warm and dry.   Neurological:      General: No focal deficit present.      Mental Status: He is alert and oriented to person, place, and time.             Significant Labs: All pertinent labs within the past 24 hours have been reviewed.    Significant Imaging: I have reviewed all pertinent imaging results/findings within the past 24 hours.

## 2025-01-03 PROBLEM — R50.9 FEVER: Status: RESOLVED | Noted: 2024-12-31 | Resolved: 2025-01-03

## 2025-01-03 PROBLEM — G93.41 ENCEPHALOPATHY, METABOLIC: Status: RESOLVED | Noted: 2024-12-31 | Resolved: 2025-01-03

## 2025-01-03 PROCEDURE — 25000003 PHARM REV CODE 250: Performed by: PHYSICIAN ASSISTANT

## 2025-01-03 PROCEDURE — 92507 TX SP LANG VOICE COMM INDIV: CPT

## 2025-01-03 PROCEDURE — 97116 GAIT TRAINING THERAPY: CPT

## 2025-01-03 PROCEDURE — 25000242 PHARM REV CODE 250 ALT 637 W/ HCPCS: Performed by: HOSPITALIST

## 2025-01-03 PROCEDURE — 94761 N-INVAS EAR/PLS OXIMETRY MLT: CPT

## 2025-01-03 PROCEDURE — 21400001 HC TELEMETRY ROOM

## 2025-01-03 PROCEDURE — 97535 SELF CARE MNGMENT TRAINING: CPT

## 2025-01-03 PROCEDURE — 27000207 HC ISOLATION

## 2025-01-03 PROCEDURE — 63600175 PHARM REV CODE 636 W HCPCS: Performed by: HOSPITALIST

## 2025-01-03 PROCEDURE — A4216 STERILE WATER/SALINE, 10 ML: HCPCS | Performed by: PHYSICIAN ASSISTANT

## 2025-01-03 PROCEDURE — 94799 UNLISTED PULMONARY SVC/PX: CPT

## 2025-01-03 PROCEDURE — 25000003 PHARM REV CODE 250: Performed by: HOSPITALIST

## 2025-01-03 PROCEDURE — 94640 AIRWAY INHALATION TREATMENT: CPT

## 2025-01-03 RX ORDER — OSELTAMIVIR PHOSPHATE 75 MG/1
75 CAPSULE ORAL 2 TIMES DAILY
Start: 2025-01-03 | End: 2025-01-05 | Stop reason: HOSPADM

## 2025-01-03 RX ORDER — PREDNISONE 20 MG/1
40 TABLET ORAL DAILY
Start: 2025-01-04 | End: 2025-01-05 | Stop reason: HOSPADM

## 2025-01-03 RX ADMIN — OXYBUTYNIN CHLORIDE 10 MG: 5 TABLET, EXTENDED RELEASE ORAL at 08:01

## 2025-01-03 RX ADMIN — DONEPEZIL HYDROCHLORIDE 5 MG: 5 TABLET, FILM COATED ORAL at 09:01

## 2025-01-03 RX ADMIN — OSELTAMIVIR PHOSPHATE 75 MG: 75 CAPSULE ORAL at 09:01

## 2025-01-03 RX ADMIN — ALBUTEROL SULFATE 2.5 MG: 2.5 SOLUTION RESPIRATORY (INHALATION) at 04:01

## 2025-01-03 RX ADMIN — Medication 10 ML: at 05:01

## 2025-01-03 RX ADMIN — GUAIFENESIN SYRUP AND DEXTROMETHORPHAN 10 ML: 100; 10 SYRUP ORAL at 09:01

## 2025-01-03 RX ADMIN — SPIRONOLACTONE 25 MG: 25 TABLET, FILM COATED ORAL at 08:01

## 2025-01-03 RX ADMIN — APIXABAN 2.5 MG: 2.5 TABLET, FILM COATED ORAL at 09:01

## 2025-01-03 RX ADMIN — FUROSEMIDE 40 MG: 40 TABLET ORAL at 08:01

## 2025-01-03 RX ADMIN — ALBUTEROL SULFATE 2.5 MG: 2.5 SOLUTION RESPIRATORY (INHALATION) at 12:01

## 2025-01-03 RX ADMIN — Medication 10 ML: at 02:01

## 2025-01-03 RX ADMIN — LOSARTAN POTASSIUM 100 MG: 50 TABLET, FILM COATED ORAL at 08:01

## 2025-01-03 RX ADMIN — FLUTICASONE PROPIONATE 100 MCG: 50 SPRAY, METERED NASAL at 08:01

## 2025-01-03 RX ADMIN — ALBUTEROL SULFATE 2.5 MG: 2.5 SOLUTION RESPIRATORY (INHALATION) at 08:01

## 2025-01-03 RX ADMIN — ASPIRIN 81 MG CHEWABLE TABLET 81 MG: 81 TABLET CHEWABLE at 08:01

## 2025-01-03 RX ADMIN — OSELTAMIVIR PHOSPHATE 75 MG: 75 CAPSULE ORAL at 08:01

## 2025-01-03 RX ADMIN — APIXABAN 2.5 MG: 2.5 TABLET, FILM COATED ORAL at 08:01

## 2025-01-03 RX ADMIN — TAMSULOSIN HYDROCHLORIDE 0.4 MG: 0.4 CAPSULE ORAL at 08:01

## 2025-01-03 RX ADMIN — PREDNISONE 40 MG: 20 TABLET ORAL at 08:01

## 2025-01-03 RX ADMIN — ALBUTEROL SULFATE 2.5 MG: 2.5 SOLUTION RESPIRATORY (INHALATION) at 07:01

## 2025-01-03 NOTE — PLAN OF CARE
Pt is Aaox4, care plan with reviewed pt.        Problem: Adult Inpatient Plan of Care  Goal: Plan of Care Review  Outcome: Progressing  Goal: Patient-Specific Goal (Individualized)  Outcome: Progressing  Goal: Absence of Hospital-Acquired Illness or Injury  Outcome: Progressing  Goal: Optimal Comfort and Wellbeing  Outcome: Progressing  Goal: Readiness for Transition of Care  Outcome: Progressing     Problem: Skin Injury Risk Increased  Goal: Skin Health and Integrity  Outcome: Progressing     Problem: Fall Injury Risk  Goal: Absence of Fall and Fall-Related Injury  Outcome: Progressing     Problem: Infection  Goal: Absence of Infection Signs and Symptoms  Outcome: Progressing

## 2025-01-03 NOTE — ASSESSMENT & PLAN NOTE
- Approved for soft diet by Speech.  Might need MBSS but very weak right now  - Speech re-evaluated 1/1 - OK for soft and bite sized diet.  No need for MBSS as he is recovering well.

## 2025-01-03 NOTE — PLAN OF CARE
SKILLED NURSING ORDERS    01/03/2025  [facility] LOCATION ([facility])  [facility] ([address])  [address]  [facility] [address]  Dept: [phone]  Loc: [phone]     Admit to Skilled Nursing:      Diagnoses:  Active Hospital Problems    Diagnosis  POA    *Influenza A [J10.1]  Yes     Priority: 1 - High    Acute on chronic diastolic heart failure [I50.33]  Yes     Priority: 3     Pulmonary hypertension associated with unclear multi-factorial mechanisms [I27.29]  Yes    Dysphagia [R13.10]  Yes    History of pulmonary embolism [Z86.711]  Yes     CTA - 11/14/23 - Bilateral      Primary hypertension [I10]  Yes     2002: Diagnosed.         Coronary artery disease [I25.10]  Yes     Angio 3/07  Dr. Lin        Resolved Hospital Problems    Diagnosis Date Resolved POA    Encephalopathy, metabolic [G93.41] 01/03/2025 Yes     Priority: 2     Fever [R50.9] 01/03/2025 Yes       Allergies:  Review of patient's allergies indicates:   Allergen Reactions    Lotensin [benazepril]      cough       Vitals:  Routine    Diet:  Soft and bite-sized    Activities:   Activity as tolerated    Goals of Care Treatment Preferences:  Code Status: Full Code      Nursing Precautions:  Aspiration , Fall, and Pressure ulcer prevention    Consults:   PT to evaluate and treat- 5 times a week, OT to evaluate and treat- 5 times a week, and ST to evaluate and treat- 5 times a week         Medications: Discontinue all previous medication orders, if any. See new list below.     Medication List        START taking these medications      oseltamivir 75 MG capsule  Commonly known as: TAMIFLU  Take 1 capsule (75 mg total) by mouth 2 (two) times daily. for 2 days     predniSONE 20 MG tablet  Commonly known as: DELTASONE  Take 2 tablets (40 mg total) by mouth once daily. for 1 day  Start taking on: January 4, 2025            CONTINUE taking these medications      apixaban 2.5 mg Tab  Commonly known as: ELIQUIS  Take 1 tablet (2.5 mg  total) by mouth 2 (two) times daily.     cholecalciferol (vitamin D3) 50 mcg (2,000 unit) Cap capsule  Commonly known as: VITAMIN D3  Take 1 capsule by mouth once daily.     cyanocobalamin 1,000 mcg/mL injection  INJECT 1ML IN THE MUSCLE EVERY 30 DAYS     donepeziL 5 MG tablet  Commonly known as: ARICEPT  Take 1 tablet (5 mg total) by mouth every evening.     empagliflozin 10 mg tablet  Commonly known as: JARDIANCE  Take 1 tablet (10 mg total) by mouth once daily.     fluticasone propionate 50 mcg/actuation nasal spray  Commonly known as: FLONASE  SHAKE LIQUID AND USE 2 SPRAYS(100 MCG) IN EACH NOSTRIL EVERY DAY     furosemide 40 MG tablet  Commonly known as: LASIX  Take 1 tablet (40 mg total) by mouth once daily.     losartan 100 MG tablet  Commonly known as: COZAAR  Take 1 tablet (100 mg total) by mouth once daily.     solifenacin 10 MG tablet  Commonly known as: VESICARE  TAKE 1 TABLET(10 MG) BY MOUTH EVERY DAY     spironolactone 25 MG tablet  Commonly known as: ALDACTONE  Take 1 tablet (25 mg total) by mouth once daily.     tamsulosin 0.4 mg Cap  Commonly known as: FLOMAX  Take 1 capsule (0.4 mg total) by mouth once daily.            STOP taking these medications      aspirin 81 MG Chew     ibuprofen 800 MG tablet  Commonly known as: SHAD CASH            _________________________________  Estrella Blue MD  01/03/2025

## 2025-01-03 NOTE — PLAN OF CARE
"SW called in [facility], faxed MAINOR--- awaiting 142.     Your fax has been successfully sent to [phone] at [phone].  ------------------------------------------------------------  From: [id number]  ------------------------------------------------------------  1/3/2025 9:55:30 AM Transmission Record          Sent to [phone] with remote ID "[phone] "          Result: (0/339;0/0) Success          Page record: 1 - 6          Elapsed time: 02:21 on channel 60    "

## 2025-01-03 NOTE — SUBJECTIVE & OBJECTIVE
Interval History: Continues to improve.  Wife worried about him coming home at this point as she also has influenza.    Review of Systems   Constitutional:  Negative for chills and fever.   Respiratory:  Negative for cough and shortness of breath.    Cardiovascular:  Negative for chest pain and palpitations.   Neurological:  Positive for weakness.   Psychiatric/Behavioral:  Negative for confusion.      Objective:     Vital Signs (Most Recent):  Temp: 97.9 °F (36.6 °C) (01/03/25 1616)  Pulse: 93 (01/03/25 1631)  Resp: 16 (01/03/25 1631)  BP: (!) 98/58 (01/03/25 1616)  SpO2: 97 % (01/03/25 1631) Vital Signs (24h Range):  Temp:  [97.5 °F (36.4 °C)-98.5 °F (36.9 °C)] 97.9 °F (36.6 °C)  Pulse:  [] 93  Resp:  [16-20] 16  SpO2:  [94 %-98 %] 97 %  BP: ()/(57-68) 98/58     Weight: 108.2 kg (238 lb 8.6 oz)  Body mass index is 32.35 kg/m².    Intake/Output Summary (Last 24 hours) at 1/3/2025 1705  Last data filed at 1/3/2025 1407  Gross per 24 hour   Intake 1110 ml   Output 1350 ml   Net -240 ml         Physical Exam  Cardiovascular:      Rate and Rhythm: Regular rhythm. Tachycardia present.      Pulses: Normal pulses.      Heart sounds: Murmur heard.      No gallop.   Pulmonary:      Effort: Pulmonary effort is normal.      Breath sounds: No wheezing.   Abdominal:      General: Bowel sounds are normal.      Palpations: Abdomen is soft.   Musculoskeletal:      Comments: Trace BLE edema   Skin:     General: Skin is warm and dry.   Neurological:      General: No focal deficit present.      Mental Status: He is alert and oriented to person, place, and time.             Significant Labs: All pertinent labs within the past 24 hours have been reviewed.    Significant Imaging: I have reviewed all pertinent imaging results/findings within the past 24 hours.

## 2025-01-03 NOTE — PT/OT/SLP PROGRESS
Physical Therapy Treatment    Patient Name:  Sam Gamino   MRN:  [id number]    Recommendations:     Discharge Recommendations: Moderate Intensity Therapy  Discharge Equipment Recommendations: none  Barriers to discharge:  increased caregiver burden    Assessment:     Sam Gamino is a 79 y.o. male admitted with a medical diagnosis of Influenza A - increased fatigue/weakness in past few days leading to fall day of admission. 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  He presents with the following impairments/functional limitations: weakness, impaired endurance, impaired self care skills, gait instability, impaired functional mobility, impaired balance, decreased coordination, decreased safety awareness, edema.    Continued improved level of (I) with mobility and tolerance for standing activity. Progressed gait training to no AD (as is his preference for home mobility) and required Laly for stability progressing to CGA. TUG of 30 sec indicates continued high risk for falls. Pt reports his wife is very ill with flu and unable to provide assistance if he were to DC home. Rec for dc to SNF.    Patient currently demonstrates a need for Moderate Intensity Therapy on a daily basis secondary to a decline in functional status due to influenza and extended hospitalization.    Rehab Prognosis: Good; patient would benefit from acute skilled PT services to address these deficits and reach maximum level of function.    Recent Surgery: * No surgery found *      Plan:     During this hospitalization, patient to be seen 4 x/week to address the identified rehab impairments via gait training, therapeutic activities, therapeutic exercises, neuromuscular re-education and progress toward the following goals:    Plan of Care Expires:  01/30/25    Subjective     Chief Complaint: Denies pain, reports his wife says he can't come home until he's indep  Patient/Family  "Comments/goals: Talkative and reports he travelled a lot; Patient agreeable to PT treatment.  Pain/Comfort:  Pain Rating 1: 0/10  Pain Rating Post-Intervention 1: 0/10      Objective:     Communicated with MD prior to session.  Patient found up in chair with peripheral IV upon PT entry to room.     General Precautions: Standard, droplet, fall  Orthopedic Precautions: N/A  Braces: N/A  Respiratory Status: Room air     Patient donned non slip socks and gait belt for OOB mobility.    Functional Mobility:  Transfers:     Sit to Stand:  stand by assistance and contact guard assistance with no AD  Performed 4x from chair  Gait: x40 ft then 2x20 ft with no Ad and Laly progressing to CGA.   Slow gait with decreased cash and foot clearance compared to with RW. No overt LOB, but increased lateral sway and irregular foot placement requiring Laly for initial 30 ft of gait. Furniture cruising when able, kalen as pt fatigued.       AM-PAC 6 CLICK MOBILITY  Turning over in bed (including adjusting bedclothes, sheets and blankets)?: 4  Sitting down on and standing up from a chair with arms (e.g., wheelchair, bedside commode, etc.): 3  Moving from lying on back to sitting on the side of the bed?: 4  Moving to and from a bed to a chair (including a wheelchair)?: 3  Need to walk in hospital room?: 3  Climbing 3-5 steps with a railing?: 3  Basic Mobility Total Score: 20       Treatment & Education:  Timed Up & Go Test (TUG)  Instructions to the patient:   From sitting in a chair, stand up, walk 3 meters, turn around, walk back, and sit down  Scoring:   Assistive Device and/or Bracing Used: No Assistive Device  TUG Time: 29.7 seconds   "Community Dwelling Older Adult = > 13.5 sec. are associated with high fall risk "    Patient left up in chair with all lines intact and call button in reach..    GOALS:   Multidisciplinary Problems       Physical Therapy Goals          Problem: Physical Therapy    Goal Priority Disciplines Outcome " Interventions   Physical Therapy Goal     PT, PT/OT Progressing    Description: Goals to be met by: 25    Patient will increase functional independence with mobility by performin. Gait x100 ft with supervision with LRAD.  2. Sit<>stand with supervision without need for UE support.   3. TUG time of <13 sec with LRAD to indicate decreased risk for falls.     Met Goals:  -Supine<>sit with Laly with appropriate use of HB features. MET 1/2  -Sit<>stand with Laly with RW. MET 1/2  -Gait x 10 feet with Laly with RW. MET 1/2  -Sitting EOB x5 min with supervision without UE support. MET 1/2                       Time Tracking:     PT Received On: 25  PT Start Time: 1440     PT Stop Time: 1459  PT Total Time (min): 19 min     Billable Minutes: Gait Training 15    Treatment Type: Treatment  PT/PTA: PT     Number of PTA visits since last PT visit: 0     2025

## 2025-01-03 NOTE — PROGRESS NOTES
[facility] [address] Medicine  Progress Note    Patient Name: Sam Gamino  MRN: [id number]  Patient Class: IP- Inpatient   Admission Date: 12/30/2024  Length of Stay: 3 days  Attending Physician: Estrella Bryant MD  Primary Care Provider: JAYLYN Zamora MD        Subjective     Principal Problem:Influenza A        HPI:  HPI by Britni Betts PA:  Mr. Sam Gamino is a 79 y.o. 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  The pain has been intermittent, and is worse with exertion.  The pain is not reproducible with movement, or palpation.  He notes associated shortness of breath, and lower extremity swelling.  He admits to indulging in salt rich foods over the holiday, and also missing a few doses of his Lasix.  He denies nausea, vomiting, abdominal pain. He further notes generalized weakness, and fatigue.  He states that he had a fall on the day of presentation due to feeling very weak in the legs.  Denies LOC, head injury.  He was evaluated in the ED with labs that showed H&H of 13.5/40.5.  He had no leukocytosis or left shift.  A metabolic panel showed normal values save for total bilirubin which was 1.9.  A BNP was elevated at 164.  Troponins were negative x2.  While in the ED he had a measured temperature of 101.7° F by mouth.  He was negative for COVID, and influenza a/B.  A urinalysis showed no UTI.  A chest x-ray showed no acute cardiopulmonary process.  A CT of the head showed no acute intracranial abnormalities.  A CTA of the chest showed no PE, small bilateral pleural effusions right greater than left, a small pericardial effusion (similar to prior imaging),  and a bilateral paramediastinal consolidation consistent with post radiation change, and finally a mildly prominent fluid-filled distal thoracic esophagus concerning for esophageal dysmotility versus reflux.  He was treated in the ED with Tylenol for the fever, aspirin, 40 mg IV Lasix, and a dose of metoprolol.  He was placed on observation.    Overview/Hospital Course:  Patient was admitted on empiric antibiotics with blood cultures pending.  He was febrile to 102.2 overnight and was weak, confused and complained of cough.  Respiratory virus antigens panel was sent.  Because suspicion for influenza was high despite the negative ED test a PCR was sent and was positive for influenza A.  Patient was started on a course of Tamiflu along with empiric antibiotics.  Overnight he made a good improvement and in the morning was oriented again and feeling better.  He still had significant wheezing and respiratory treatments were ordered.    PT/OT evaluated patient and recommended moderate intensity therapy in SNF.    Interval History: Continues to improve.  Wife worried about him coming home at this point as she also has influenza.    Review of Systems   Constitutional:  Negative for chills and fever.   Respiratory:  Negative for cough and shortness of breath.    Cardiovascular:  Negative for chest pain and palpitations.   Neurological:  Positive for weakness.   Psychiatric/Behavioral:  Negative for confusion.      Objective:     Vital Signs (Most Recent):  Temp: 97.9 °F (36.6 °C) (01/03/25 1616)  Pulse: 93 (01/03/25 1631)  Resp: 16 (01/03/25 1631)  BP: (!) 98/58 (01/03/25 1616)  SpO2: 97 % (01/03/25 1631) Vital Signs (24h Range):  Temp:  [97.5 °F (36.4 °C)-98.5 °F (36.9 °C)] 97.9 °F (36.6 °C)  Pulse:  [] 93  Resp:  [16-20] 16  SpO2:  [94 %-98 %] 97 %  BP: ()/(57-68) 98/58     Weight: 108.2 kg (238 lb 8.6 oz)  Body mass index is 32.35 kg/m².    Intake/Output Summary (Last 24 hours) at 1/3/2025 1706  Last data  filed at 1/3/2025 1407  Gross per 24 hour   Intake 1110 ml   Output 1350 ml   Net -240 ml         Physical Exam  Cardiovascular:      Rate and Rhythm: Regular rhythm. Tachycardia present.      Pulses: Normal pulses.      Heart sounds: Murmur heard.      No gallop.   Pulmonary:      Effort: Pulmonary effort is normal.      Breath sounds: No wheezing.   Abdominal:      General: Bowel sounds are normal.      Palpations: Abdomen is soft.   Musculoskeletal:      Comments: Trace BLE edema   Skin:     General: Skin is warm and dry.   Neurological:      General: No focal deficit present.      Mental Status: He is alert and oriented to person, place, and time.             Significant Labs: All pertinent labs within the past 24 hours have been reviewed.    Significant Imaging: I have reviewed all pertinent imaging results/findings within the past 24 hours.    Assessment and Plan     * Influenza A  - Tested negative on POCT docking device in ED - repeated PCR as symptoms were consistent with influenza or another respiratory viral infection - second test positive for Influenza A.  Patient febrile on presentation.  - Blood cultures done, NGTD.  Nothing acute on imaging.  - Unclear when symptoms started - Tamiflu ordered and starting today.  - Continue supportive care with prn oxygen, antitussive, respiratory treatments (wheezing significantly on exam and no history of this).  - Hold off on IVF given heart failure/pulmonary HTN.  - PT ordered but will hold off on aggressive workouts for now.  - Respiratory treatments much needed given ongoing wheezing - changed to q4 while awake and doing better.      Acute on chronic diastolic heart failure  Pulmonary hypertension - due to valvular heart disease vs diastolic dysfunction  Influenza A  - Patient presented with chest pain.  Has ruled out for MI with negative troponin times 2.  - chest x-ray without pulmonary edema  - lower extremities mild edema on exam - appears at baseline.   "Possible mild exacerbation due to viral infection.  - last Echo 10/4/24:     Summary  Show Result Comparison     Left Ventricle: The left ventricle is normal in size. Ventricular mass is normal. Normal wall thickness. There is normal systolic function. Ejection fraction is approximately 60%. Grade II diastolic dysfunction.    Right Ventricle: Normal right ventricular cavity size. Wall thickness is normal. Systolic function is normal.    Aortic Valve: The aortic valve is a trileaflet valve. There is mild aortic valve sclerosis. There is moderate aortic regurgitation with a centrally directed jet.    Mitral Valve: There is moderate regurgitation with a posterolateral eccentriccally directed jet.    Tricuspid Valve: There is mild to moderate regurgitation with a centrally directed jet.    Pulmonary Artery: There is moderate pulmonary hypertension. The estimated pulmonary artery systolic pressure is 53 mmHg.    Pericardium: There is a small circumferential effusion.    - Heart failure pathways initiated - might have mild exacerbation related to this viral infection  - status post 40 mg IV Lasix in ED, OK to resume home regimen  - Treat viral infection  - monitor I&Os, daily weights    Pulmonary hypertension associated with unclear multi-factorial mechanisms  See "heart failure"      Dysphagia  - Approved for soft diet by Speech.  Might need MBSS but very weak right now  - Speech re-evaluated 1/1 - OK for soft and bite sized diet.  No need for MBSS as he is recovering well.    History of pulmonary embolism  - continue Eliquis      Primary hypertension  Patient's blood pressure range in the last 24 hours was: BP  Min: 98/58  Max: 160/63.The patient's inpatient anti-hypertensive regimen is listed below:  Current Antihypertensives  losartan tablet 100 mg, Daily, Oral  spironolactone tablet 25 mg, Daily, Oral  furosemide tablet 40 mg, Daily, Oral    Plan  - BP is controlled, no changes needed to their regimen  - Changed " furosemide to home dose      Coronary artery disease  - continue ASA, statin      VTE Risk Mitigation (From admission, onward)           Ordered     apixaban tablet 2.5 mg  2 times daily         12/31/24 0521     Place sequential compression device  Until discontinued         12/31/24 0519     IP VTE HIGH RISK PATIENT  Once         12/31/24 0519     Place sequential compression device  Until discontinued         12/31/24 0357                    Discharge Planning   ROLANDO: 1/4/2025     Code Status: Full Code   Medical Readiness for Discharge Date:   Discharge Plan A: Home with family                Please place Justification for DME        Estrella Blue MD  Department of Hospital Medicine   [facility] ([address])

## 2025-01-03 NOTE — PT/OT/SLP PROGRESS
Occupational Therapy   Treatment    Name: Sam Gamino  MRN: [id number]  Admitting Diagnosis:  Influenza A       Recommendations:     Discharge Recommendations: Moderate Intensity Therapy  Discharge Equipment Recommendations:  to be determined by next level of care  Barriers to discharge:  Decreased caregiver support (Current functional level, pt's wife is sick as well with the flu)    Assessment:     Sam Gamino is a 79 y.o. male with a medical diagnosis of Influenza A.  He presents with the following performance deficits affecting function:  weakness, impaired endurance, impaired functional mobility, impaired self care skills, gait instability, impaired balance, decreased coordination, edema, impaired fine motor, decreased safety awareness, decreased lower extremity function.     Rehab Prognosis:   Good to return to OF ; patient would benefit from acute skilled OT services to address these deficits and reach maximum level of function.       Plan:     Patient to be seen 4 x/week to address the above listed problems via self-care/home management, therapeutic exercises, therapeutic activities  Plan of Care Expires: 01/31/25  Plan of Care Reviewed with: patient    Subjective     Chief Complaint: None  Patient/Family Comments/goals: Pt stating that his wife wants him to go to someplace else to get more therapy before going home.  Pt stating that his wife is also sick with the flu.  Pain/Comfort:  Pain Rating 1: 0/10  Pain Rating Post-Intervention 1: 0/10    Objective:     Communicated with: nurse prior to session.  Patient found HOB elevated with peripheral IV upon OT entry to room.    General Precautions: Standard, droplet, fall    Orthopedic Precautions:N/A  Braces: N/A  Respiratory Status: Room air     Occupational Performance:     Bed Mobility:    Supine to sit: SBA with bed rail    Functional Mobility/Transfers:  Sit to stand: CGA with RW, pt having LOB posteriorly twice on attempt to stand from bed   Chair  transfer: CGA with RW, verbal and tactile cues for correct/safe use of RW  Functional Mobility: CGA with RW    Activities of Daily Living:  LB Dressing: Donning tennis shoes: Set up/SBA sitting EOB  Grooming: SBA to wash hands standing at sink after education/training on correct placement of RW when standing at sink and pt standing inside RW      [facility] 6 Click ADL: 16    Treatment & Education:  Role of OT, POC, ADL and ADL mobility training and safety, correct/safe use of RW during ADL, use of call button to call for assist when needing to get up from chair to decrease fall risk, discharge recs     Patient left up in chair with all lines intact, call button in reach, and nurse notified    GOALS:   Multidisciplinary Problems       Occupational Therapy Goals          Problem: Occupational Therapy    Goal Priority Disciplines Outcome Interventions   Occupational Therapy Goal     OT, PT/OT Progressing    Description: Goals to be met by: 1/31/2025      Patient will increase functional independence with ADLs by performing:    UE Dressing with Minimum Assistance.  LE Dressing with Moderate Assistance.  Grooming while standing with stand by assistance.  Toileting from bedside commode with Minimal Assistance for hygiene and clothing management.   Toilet transfer to bedside commode with Minimal Assistance.  Increased functional strength to WFL for self care.  Upper extremity exercise program x10 reps per handout, with assistance as needed.                          Time Tracking:     OT Date of Treatment: 01/03/25  OT Start Time: 1337  OT Stop Time: 1400  OT Total Time (min): 23 min    Billable Minutes:Self Care/Home Management 23    OT/RICHY: OT     Number of RICHY visits since last OT visit: 0    1/3/2025

## 2025-01-03 NOTE — ASSESSMENT & PLAN NOTE
Patient's blood pressure range in the last 24 hours was: BP  Min: 98/58  Max: 160/63.The patient's inpatient anti-hypertensive regimen is listed below:  Current Antihypertensives  losartan tablet 100 mg, Daily, Oral  spironolactone tablet 25 mg, Daily, Oral  furosemide tablet 40 mg, Daily, Oral    Plan  - BP is controlled, no changes needed to their regimen  - Changed furosemide to home dose

## 2025-01-03 NOTE — PT/OT/SLP PROGRESS
Speech Language Pathology Treatment    Patient Name:  Sam Gamino   MRN:  [id number]  Admitting Diagnosis: Influenza A    Recommendations:     General Recommendations: SLP will follow up 3-4x/week to monitor diet tolerance and provide patient/caregiver education.   Diet Recommendations:  SOLIDS: Regular Diet - IDDSI Level 7  LIQUIDS: Thin liquids - IDDSI Level 0   Aspiration Precautions:  1 bite/sip at a time  Frequent oral care  HOB to 90 degrees  Remain upright in bed for 30 minute following meal   Meds whole with water  Monitor for s/s of aspiration  Small bites/sips  Standard aspiration precautions   General Precautions: Standard, aspiration, fall, dental soft  Communication Strategies:  provide increased time to answer and go to room if call light pushed    Assessment:     Sam Gamino is a 79 y.o. male with an SLP diagnosis of  resolving dysphagia and mild dysphonia. Patient tolerated soft solids, regular solids, and thin liquids without overt signs of airway compromise during initial bedside swallow eval; patient spouse endorses patient with acute short-term memory deficits-- SLP will continue to follow to monitor diet tolerance and provide patient/caregiver education.     Subjective     - RN provided clearance for pt to be seen-- pt awake in bed watching TV; reports just finished eating lunch     Pain/Comfort: pt denied pain/discomfort in session this date     Respiratory Status: Room air    Objective:     Cognitive-Linguistic Status: in today's session, pt...  Awake, alert and cooperative   Oriented to self stating name   Oriented to place stating hospital but patient trying to recall name of street hospital is on-- able to state street given increased time to answer   Oriented to city and state with 100% acc  Some hesitations noted with orienting to year but able to eventually stating 2025  Oriented to month with 100% acc  Not oriented to date or day of the week but given min cueing to utilize phone as  viusal aid, pt able to state January 3rd    Ongoing Bedside Swallow Eval   Consistencies Assessed:   Thin Liquids: singular strawsips of water x2   Comments: Pt kindly declined further PO trials this date, reporting that he just finished eating lunch     Oral and Pharyngeal Phase:   Adequate labial seal to straw, demonstrating ability to siphon thin liquids without anterior loss   Adequate bolus formation and A-P transfer  Single swallows per bolus noted  No signs of airway threat or overt s/s of aspiration observed with intake of thin liquids this date-- no coughing, throat clearing or wet vocal quality  Laryngeal rise noted in subjectively perceived timely manner-- unable to objectively assess at bedside    Education: SLP educated pt re: therapist's role and purpose of follow up this date; reviewed safe swallow strategies/aspiration precautions. SLP provided education re: use of visual aids to increase orientation and discussed with pt recommendation to continue regular diet with thin liquids gven use of safe swallow strategies/ aspiration precautions-- pt verbalized understanding and agreement of all discussed this date.     Goals:   Multidisciplinary Problems       SLP Goals          Problem: SLP    Goal Priority Disciplines Outcome   SLP Goal     SLP Progressing   Description: Speech Language Pathology Goals  Goals expected to be met by 1/14:     1. The patient will tolerate a least restrictive diet without displaying overt signs of airway compromise.   2. The patient will orient to situation, month, date, year, and CRAIG given min verbal cues.                              Plan:     Patient to be seen:  3 x/week   Plan of Care expires:  01/28/25  Plan of Care reviewed with:  patient   SLP Follow-Up:  Yes       Discharge Recommendations:  (follow up with SLP at next level of care)   Barriers to Discharge:  None    Time Tracking:     SLP Treatment Date:   01/03/25  Speech Start Time:  1320  Speech Stop Time:  1330      Speech Total Time (min):  10 min    Billable Minutes: Speech Therapy Individual 10    01/03/2025

## 2025-01-03 NOTE — ASSESSMENT & PLAN NOTE
- Tested negative on POCT docking device in ED - repeated PCR as symptoms were consistent with influenza or another respiratory viral infection - second test positive for Influenza A.  Patient febrile on presentation.  - Blood cultures done, NGTD.  Nothing acute on imaging.  - Unclear when symptoms started - Tamiflu ordered and starting today.  - Continue supportive care with prn oxygen, antitussive, respiratory treatments (wheezing significantly on exam and no history of this).  - Hold off on IVF given heart failure/pulmonary HTN.  - PT ordered but will hold off on aggressive workouts for now.  - Respiratory treatments much needed given ongoing wheezing - changed to q4 while awake and doing better.

## 2025-01-04 PROCEDURE — 94761 N-INVAS EAR/PLS OXIMETRY MLT: CPT

## 2025-01-04 PROCEDURE — 94799 UNLISTED PULMONARY SVC/PX: CPT

## 2025-01-04 PROCEDURE — 25000003 PHARM REV CODE 250: Performed by: HOSPITALIST

## 2025-01-04 PROCEDURE — 25000003 PHARM REV CODE 250: Performed by: PHYSICIAN ASSISTANT

## 2025-01-04 PROCEDURE — 63600175 PHARM REV CODE 636 W HCPCS: Performed by: HOSPITALIST

## 2025-01-04 PROCEDURE — 11000001 HC ACUTE MED/SURG PRIVATE ROOM

## 2025-01-04 PROCEDURE — 27000207 HC ISOLATION

## 2025-01-04 PROCEDURE — 94640 AIRWAY INHALATION TREATMENT: CPT

## 2025-01-04 PROCEDURE — 25000242 PHARM REV CODE 250 ALT 637 W/ HCPCS: Performed by: HOSPITALIST

## 2025-01-04 PROCEDURE — A4216 STERILE WATER/SALINE, 10 ML: HCPCS | Performed by: PHYSICIAN ASSISTANT

## 2025-01-04 RX ORDER — ALBUTEROL SULFATE 2.5 MG/.5ML
2.5 SOLUTION RESPIRATORY (INHALATION) EVERY 4 HOURS PRN
Status: DISCONTINUED | OUTPATIENT
Start: 2025-01-04 | End: 2025-01-05 | Stop reason: HOSPADM

## 2025-01-04 RX ORDER — LOSARTAN POTASSIUM 25 MG/1
25 TABLET ORAL DAILY
Status: DISCONTINUED | OUTPATIENT
Start: 2025-01-04 | End: 2025-01-05 | Stop reason: HOSPADM

## 2025-01-04 RX ADMIN — APIXABAN 2.5 MG: 2.5 TABLET, FILM COATED ORAL at 09:01

## 2025-01-04 RX ADMIN — OSELTAMIVIR PHOSPHATE 75 MG: 75 CAPSULE ORAL at 08:01

## 2025-01-04 RX ADMIN — GUAIFENESIN SYRUP AND DEXTROMETHORPHAN 10 ML: 100; 10 SYRUP ORAL at 08:01

## 2025-01-04 RX ADMIN — Medication 10 ML: at 03:01

## 2025-01-04 RX ADMIN — FUROSEMIDE 40 MG: 40 TABLET ORAL at 09:01

## 2025-01-04 RX ADMIN — ASPIRIN 81 MG CHEWABLE TABLET 81 MG: 81 TABLET CHEWABLE at 09:01

## 2025-01-04 RX ADMIN — FLUTICASONE PROPIONATE 100 MCG: 50 SPRAY, METERED NASAL at 09:01

## 2025-01-04 RX ADMIN — APIXABAN 2.5 MG: 2.5 TABLET, FILM COATED ORAL at 08:01

## 2025-01-04 RX ADMIN — ALBUTEROL SULFATE 2.5 MG: 2.5 SOLUTION RESPIRATORY (INHALATION) at 07:01

## 2025-01-04 RX ADMIN — SPIRONOLACTONE 25 MG: 25 TABLET, FILM COATED ORAL at 09:01

## 2025-01-04 RX ADMIN — OSELTAMIVIR PHOSPHATE 75 MG: 75 CAPSULE ORAL at 09:01

## 2025-01-04 RX ADMIN — PREDNISONE 40 MG: 20 TABLET ORAL at 09:01

## 2025-01-04 RX ADMIN — OXYBUTYNIN CHLORIDE 10 MG: 5 TABLET, EXTENDED RELEASE ORAL at 09:01

## 2025-01-04 RX ADMIN — LOSARTAN POTASSIUM 25 MG: 25 TABLET, FILM COATED ORAL at 09:01

## 2025-01-04 RX ADMIN — DONEPEZIL HYDROCHLORIDE 5 MG: 5 TABLET, FILM COATED ORAL at 08:01

## 2025-01-04 RX ADMIN — Medication 10 ML: at 09:01

## 2025-01-04 RX ADMIN — TAMSULOSIN HYDROCHLORIDE 0.4 MG: 0.4 CAPSULE ORAL at 09:01

## 2025-01-04 NOTE — ASSESSMENT & PLAN NOTE
Pulmonary hypertension - due to valvular heart disease vs diastolic dysfunction  Influenza A  - Patient presented with chest pain.  Has ruled out for MI with negative troponin times 2.  - chest x-ray without pulmonary edema  - lower extremities mild edema on exam - appears at baseline.  Possible mild exacerbation due to viral infection.  - last Echo 10/4/24:     Summary  Show Result Comparison     Left Ventricle: The left ventricle is normal in size. Ventricular mass is normal. Normal wall thickness. There is normal systolic function. Ejection fraction is approximately 60%. Grade II diastolic dysfunction.    Right Ventricle: Normal right ventricular cavity size. Wall thickness is normal. Systolic function is normal.    Aortic Valve: The aortic valve is a trileaflet valve. There is mild aortic valve sclerosis. There is moderate aortic regurgitation with a centrally directed jet.    Mitral Valve: There is moderate regurgitation with a posterolateral eccentriccally directed jet.    Tricuspid Valve: There is mild to moderate regurgitation with a centrally directed jet.    Pulmonary Artery: There is moderate pulmonary hypertension. The estimated pulmonary artery systolic pressure is 53 mmHg.    Pericardium: There is a small circumferential effusion.    - Heart failure pathways initiated - might have mild exacerbation related to this viral infection  - status post 40 mg IV Lasix in ED, OK to resume home regimen  - Treat viral infection  - monitor I&Os, daily weights  - PT/OT recommending SNF, did not qualify by Friday and is here for the weekend.  If feeling well tomorrow might discharge home with home health.

## 2025-01-04 NOTE — PROGRESS NOTES
[facility], [place], operated by [facility] Surg [address] Medicine  Progress Note    Patient Name: Sam Gamino  MRN: [id number]  Patient Class: IP- Inpatient   Admission Date: 12/30/2024  Length of Stay: 4 days  Attending Physician: Estrella Bryant MD  Primary Care Provider: JAYLYN Zamora MD        Subjective     Principal Problem:Influenza A        HPI:  HPI by Britni Betts PA:  Mr. Sam Gamino is a 79 y.o. 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  The pain has been intermittent, and is worse with exertion.  The pain is not reproducible with movement, or palpation.  He notes associated shortness of breath, and lower extremity swelling.  He admits to indulging in salt rich foods over the holiday, and also missing a few doses of his Lasix.  He denies nausea, vomiting, abdominal pain. He further notes generalized weakness, and fatigue.  He states that he had a fall on the day of presentation due to feeling very weak in the legs.  Denies LOC, head injury.  He was evaluated in the ED with labs that showed H&H of 13.5/40.5.  He had no leukocytosis or left shift.  A metabolic panel showed normal values save for total bilirubin which was 1.9.  A BNP was elevated at 164.  Troponins were negative x2.  While in the ED he had a measured temperature of 101.7° F by mouth.  He was negative for COVID, and influenza a/B.  A urinalysis showed no UTI.  A chest x-ray showed no acute cardiopulmonary process.  A CT of the head showed no acute intracranial abnormalities.  A CTA of the chest showed no PE, small bilateral pleural effusions right greater than left, a small pericardial effusion (similar to prior imaging),  and a bilateral paramediastinal consolidation consistent with post radiation change, and finally a mildly prominent fluid-filled distal thoracic esophagus concerning for esophageal dysmotility versus reflux.  He was treated in the ED with Tylenol for the fever, aspirin, 40 mg IV Lasix, and a dose of metoprolol.  He was placed on observation.    Overview/Hospital Course:  Patient was admitted on empiric antibiotics with blood cultures pending.  He was febrile to 102.2 overnight and was weak, confused and complained of cough.  Respiratory virus antigens panel was sent.  Because suspicion for influenza was high despite the negative ED test a PCR was sent and was positive for influenza A.  Patient was started on a course of Tamiflu along with empiric antibiotics.  Overnight he made a good improvement and in the morning was oriented again and feeling better.  He still had significant wheezing and respiratory treatments were ordered.    PT/OT evaluated patient and recommended moderate intensity therapy in SNF.    Interval History: Patient doing OK, sitting up in a chair.  Wheezing is much improved and he is breathing comfortably.    Review of Systems   Constitutional:  Negative for chills and fever.   Respiratory:  Negative for cough and shortness of breath.    Cardiovascular:  Negative for chest pain and palpitations.   Neurological:  Positive for weakness.   Psychiatric/Behavioral:  Negative for confusion.      Objective:     Vital Signs (Most Recent):  Temp: 97.6 °F (36.4 °C) (01/04/25 0822)  Pulse: 99 (01/04/25 0822)  Resp: 20 (01/04/25 0822)  BP: 115/61 (01/04/25 0822)  SpO2: 99 % (01/04/25 0822) Vital Signs (24h Range):  Temp:  [97.4 °F (36.3 °C)-98.2 °F (36.8 °C)] 97.6 °F (36.4 °C)  Pulse:  [] 99  Resp:  [15-20] 20  SpO2:  [95 %-99 %] 99 %  BP: ()/(56-63) 115/61     Weight: 108.2 kg (238 lb 8.6 oz)  Body mass index is 32.35 kg/m².    Intake/Output Summary (Last 24 hours) at 1/4/2025 0938  Last data  filed at 1/3/2025 2257  Gross per 24 hour   Intake 1040 ml   Output 500 ml   Net 540 ml         Physical Exam  Cardiovascular:      Rate and Rhythm: Regular rhythm. Tachycardia present.      Pulses: Normal pulses.      Heart sounds: Murmur heard.      No gallop.   Pulmonary:      Effort: Pulmonary effort is normal.      Breath sounds: No wheezing.   Abdominal:      General: Bowel sounds are normal.      Palpations: Abdomen is soft.   Musculoskeletal:      Right lower leg: Edema present.      Left lower leg: Edema present.      Comments: Sitting up in a chair with legs down   Skin:     General: Skin is warm and dry.   Neurological:      General: No focal deficit present.      Mental Status: He is alert and oriented to person, place, and time.             Significant Labs: All pertinent labs within the past 24 hours have been reviewed.    Significant Imaging: I have reviewed all pertinent imaging results/findings within the past 24 hours.    Assessment and Plan     * Influenza A  - Tested negative on POCT docking device in ED - repeated PCR as symptoms were consistent with influenza or another respiratory viral infection - second test positive for Influenza A.  Patient febrile on presentation.  - Blood cultures done, NGTD.  Nothing acute on imaging.  - Unclear when symptoms started - Tamiflu ordered and starting today.  - Continue supportive care with prn oxygen, antitussive, respiratory treatments (wheezing significantly on exam and no history of this).  - Hold off on IVF given heart failure/pulmonary HTN.  - PT ordered but will hold off on aggressive workouts for now.  - Respiratory treatments much needed given ongoing wheezing - changed to q4 while awake and doing better.  OK to change back to prn respiratory treatments.      Acute on chronic diastolic heart failure  Pulmonary hypertension - due to valvular heart disease vs diastolic dysfunction  Influenza A  - Patient presented with chest pain.  Has ruled out for  "MI with negative troponin times 2.  - chest x-ray without pulmonary edema  - lower extremities mild edema on exam - appears at baseline.  Possible mild exacerbation due to viral infection.  - last Echo 10/4/24:     Summary  Show Result Comparison     Left Ventricle: The left ventricle is normal in size. Ventricular mass is normal. Normal wall thickness. There is normal systolic function. Ejection fraction is approximately 60%. Grade II diastolic dysfunction.    Right Ventricle: Normal right ventricular cavity size. Wall thickness is normal. Systolic function is normal.    Aortic Valve: The aortic valve is a trileaflet valve. There is mild aortic valve sclerosis. There is moderate aortic regurgitation with a centrally directed jet.    Mitral Valve: There is moderate regurgitation with a posterolateral eccentriccally directed jet.    Tricuspid Valve: There is mild to moderate regurgitation with a centrally directed jet.    Pulmonary Artery: There is moderate pulmonary hypertension. The estimated pulmonary artery systolic pressure is 53 mmHg.    Pericardium: There is a small circumferential effusion.    - Heart failure pathways initiated - might have mild exacerbation related to this viral infection  - status post 40 mg IV Lasix in ED, OK to resume home regimen  - Treat viral infection  - monitor I&Os, daily weights  - PT/OT recommending SNF, did not qualify by Friday and is here for the weekend.  If feeling well tomorrow might discharge home with home health.    Pulmonary hypertension associated with unclear multi-factorial mechanisms  See "heart failure"      Dysphagia  - Approved for soft diet by Speech.  Might need MBSS but very weak right now  - Speech re-evaluated 1/1 - OK for soft and bite sized diet.  No need for MBSS as he is recovering well.    History of pulmonary embolism  - continue Eliquis      Primary hypertension  Patient's blood pressure range in the last 24 hours was: BP  Min: 98/58  Max: 124/59.The " patient's inpatient anti-hypertensive regimen is listed below:  Current Antihypertensives  spironolactone tablet 25 mg, Daily, Oral  furosemide tablet 40 mg, Daily, Oral  losartan tablet 25 mg, Daily, Oral    Plan  - BP is controlled, no changes needed to their regimen  - Changed furosemide to home dose  - 1/4 - BP relatively low probably due to acute illness, will change losartan to 25 mg daily and monitor      Coronary artery disease  - continue ASA, statin      VTE Risk Mitigation (From admission, onward)           Ordered     apixaban tablet 2.5 mg  2 times daily         12/31/24 0521     Place sequential compression device  Until discontinued         12/31/24 0519     IP VTE HIGH RISK PATIENT  Once         12/31/24 0519     Place sequential compression device  Until discontinued         12/31/24 0357                    Discharge Planning   ROLANDO: 1/4/2025     Code Status: Full Code   Medical Readiness for Discharge Date:   Discharge Plan A: Home with family                Please place Justification for DME        Estrella Blue MD  Department of Hospital Medicine   [facility] ([address])

## 2025-01-04 NOTE — SUBJECTIVE & OBJECTIVE
Interval History: Patient doing OK, sitting up in a chair.  Wheezing is much improved and he is breathing comfortably.    Review of Systems   Constitutional:  Negative for chills and fever.   Respiratory:  Negative for cough and shortness of breath.    Cardiovascular:  Negative for chest pain and palpitations.   Neurological:  Positive for weakness.   Psychiatric/Behavioral:  Negative for confusion.      Objective:     Vital Signs (Most Recent):  Temp: 97.6 °F (36.4 °C) (01/04/25 0822)  Pulse: 99 (01/04/25 0822)  Resp: 20 (01/04/25 0822)  BP: 115/61 (01/04/25 0822)  SpO2: 99 % (01/04/25 0822) Vital Signs (24h Range):  Temp:  [97.4 °F (36.3 °C)-98.2 °F (36.8 °C)] 97.6 °F (36.4 °C)  Pulse:  [] 99  Resp:  [15-20] 20  SpO2:  [95 %-99 %] 99 %  BP: ()/(56-63) 115/61     Weight: 108.2 kg (238 lb 8.6 oz)  Body mass index is 32.35 kg/m².    Intake/Output Summary (Last 24 hours) at 1/4/2025 0914  Last data filed at 1/3/2025 2257  Gross per 24 hour   Intake 1040 ml   Output 500 ml   Net 540 ml         Physical Exam  Cardiovascular:      Rate and Rhythm: Regular rhythm. Tachycardia present.      Pulses: Normal pulses.      Heart sounds: Murmur heard.      No gallop.   Pulmonary:      Effort: Pulmonary effort is normal.      Breath sounds: No wheezing.   Abdominal:      General: Bowel sounds are normal.      Palpations: Abdomen is soft.   Musculoskeletal:      Right lower leg: Edema present.      Left lower leg: Edema present.      Comments: Sitting up in a chair with legs down   Skin:     General: Skin is warm and dry.   Neurological:      General: No focal deficit present.      Mental Status: He is alert and oriented to person, place, and time.             Significant Labs: All pertinent labs within the past 24 hours have been reviewed.    Significant Imaging: I have reviewed all pertinent imaging results/findings within the past 24 hours.

## 2025-01-04 NOTE — ASSESSMENT & PLAN NOTE
Patient's blood pressure range in the last 24 hours was: BP  Min: 98/58  Max: 124/59.The patient's inpatient anti-hypertensive regimen is listed below:  Current Antihypertensives  spironolactone tablet 25 mg, Daily, Oral  furosemide tablet 40 mg, Daily, Oral  losartan tablet 25 mg, Daily, Oral    Plan  - BP is controlled, no changes needed to their regimen  - Changed furosemide to home dose  - 1/4 - BP relatively low probably due to acute illness, will change losartan to 25 mg daily and monitor

## 2025-01-04 NOTE — PLAN OF CARE
Pt is Aaox4, Care plan reviewed with pt.        Problem: Adult Inpatient Plan of Care  Goal: Plan of Care Review  Outcome: Progressing  Goal: Patient-Specific Goal (Individualized)  Outcome: Progressing  Goal: Absence of Hospital-Acquired Illness or Injury  Outcome: Progressing  Goal: Optimal Comfort and Wellbeing  Outcome: Progressing  Goal: Readiness for Transition of Care  Outcome: Progressing     Problem: Skin Injury Risk Increased  Goal: Skin Health and Integrity  Outcome: Progressing     Problem: Fall Injury Risk  Goal: Absence of Fall and Fall-Related Injury  Outcome: Progressing     Problem: Infection  Goal: Absence of Infection Signs and Symptoms  Outcome: Progressing

## 2025-01-04 NOTE — ASSESSMENT & PLAN NOTE
- Tested negative on POCT docking device in ED - repeated PCR as symptoms were consistent with influenza or another respiratory viral infection - second test positive for Influenza A.  Patient febrile on presentation.  - Blood cultures done, NGTD.  Nothing acute on imaging.  - Unclear when symptoms started - Tamiflu ordered and starting today.  - Continue supportive care with prn oxygen, antitussive, respiratory treatments (wheezing significantly on exam and no history of this).  - Hold off on IVF given heart failure/pulmonary HTN.  - PT ordered but will hold off on aggressive workouts for now.  - Respiratory treatments much needed given ongoing wheezing - changed to q4 while awake and doing better.  OK to change back to prn respiratory treatments.

## 2025-01-05 VITALS
HEIGHT: 72 IN | TEMPERATURE: 98 F | RESPIRATION RATE: 13 BRPM | WEIGHT: 238.38 LBS | OXYGEN SATURATION: 96 % | BODY MASS INDEX: 32.29 KG/M2 | SYSTOLIC BLOOD PRESSURE: 112 MMHG | HEART RATE: 86 BPM | DIASTOLIC BLOOD PRESSURE: 58 MMHG

## 2025-01-05 DIAGNOSIS — K59.01 SLOW TRANSIT CONSTIPATION: ICD-10-CM

## 2025-01-05 LAB
ALBUMIN SERPL BCP-MCNC: 3.5 G/DL (ref 3.5–5.2)
ALP SERPL-CCNC: 55 U/L (ref 40–150)
ALT SERPL W/O P-5'-P-CCNC: 31 U/L (ref 10–44)
ANION GAP SERPL CALC-SCNC: 10 MMOL/L (ref 8–16)
AST SERPL-CCNC: 31 U/L (ref 10–40)
BACTERIA BLD CULT: NORMAL
BACTERIA BLD CULT: NORMAL
BASOPHILS # BLD AUTO: 0 K/UL (ref 0–0.2)
BASOPHILS NFR BLD: 0 % (ref 0–1.9)
BILIRUB SERPL-MCNC: 1.3 MG/DL (ref 0.1–1)
BUN SERPL-MCNC: 25 MG/DL (ref 8–23)
CALCIUM SERPL-MCNC: 10 MG/DL (ref 8.7–10.5)
CHLORIDE SERPL-SCNC: 101 MMOL/L (ref 95–110)
CO2 SERPL-SCNC: 25 MMOL/L (ref 23–29)
CREAT SERPL-MCNC: 0.9 MG/DL (ref 0.5–1.4)
DIFFERENTIAL METHOD BLD: ABNORMAL
EOSINOPHIL # BLD AUTO: 0 K/UL (ref 0–0.5)
EOSINOPHIL NFR BLD: 0.2 % (ref 0–8)
ERYTHROCYTE [DISTWIDTH] IN BLOOD BY AUTOMATED COUNT: 12 % (ref 11.5–14.5)
EST. GFR  (NO RACE VARIABLE): >60 ML/MIN/1.73 M^2
GLUCOSE SERPL-MCNC: 95 MG/DL (ref 70–110)
HCT VFR BLD AUTO: 42.2 % (ref 40–54)
HGB BLD-MCNC: 13.5 G/DL (ref 14–18)
IMM GRANULOCYTES # BLD AUTO: 0.03 K/UL (ref 0–0.04)
IMM GRANULOCYTES NFR BLD AUTO: 0.6 % (ref 0–0.5)
LYMPHOCYTES # BLD AUTO: 1.3 K/UL (ref 1–4.8)
LYMPHOCYTES NFR BLD: 25.2 % (ref 18–48)
MCH RBC QN AUTO: 33.3 PG (ref 27–31)
MCHC RBC AUTO-ENTMCNC: 32 G/DL (ref 32–36)
MCV RBC AUTO: 104 FL (ref 82–98)
MONOCYTES # BLD AUTO: 0.6 K/UL (ref 0.3–1)
MONOCYTES NFR BLD: 10.5 % (ref 4–15)
NEUTROPHILS # BLD AUTO: 3.3 K/UL (ref 1.8–7.7)
NEUTROPHILS NFR BLD: 63.5 % (ref 38–73)
NRBC BLD-RTO: 0 /100 WBC
PLATELET # BLD AUTO: 154 K/UL (ref 150–450)
PMV BLD AUTO: 9.8 FL (ref 9.2–12.9)
POTASSIUM SERPL-SCNC: 4.4 MMOL/L (ref 3.5–5.1)
PROT SERPL-MCNC: 7.4 G/DL (ref 6–8.4)
RBC # BLD AUTO: 4.06 M/UL (ref 4.6–6.2)
SODIUM SERPL-SCNC: 136 MMOL/L (ref 136–145)
WBC # BLD AUTO: 5.23 K/UL (ref 3.9–12.7)

## 2025-01-05 PROCEDURE — 36415 COLL VENOUS BLD VENIPUNCTURE: CPT | Performed by: HOSPITALIST

## 2025-01-05 PROCEDURE — 25000003 PHARM REV CODE 250: Performed by: HOSPITALIST

## 2025-01-05 PROCEDURE — 85025 COMPLETE CBC W/AUTO DIFF WBC: CPT | Performed by: HOSPITALIST

## 2025-01-05 PROCEDURE — A4216 STERILE WATER/SALINE, 10 ML: HCPCS | Performed by: PHYSICIAN ASSISTANT

## 2025-01-05 PROCEDURE — 25000242 PHARM REV CODE 250 ALT 637 W/ HCPCS: Performed by: PHYSICIAN ASSISTANT

## 2025-01-05 PROCEDURE — 25000003 PHARM REV CODE 250: Performed by: PHYSICIAN ASSISTANT

## 2025-01-05 PROCEDURE — 80053 COMPREHEN METABOLIC PANEL: CPT | Performed by: HOSPITALIST

## 2025-01-05 RX ORDER — LINACLOTIDE 290 UG/1
CAPSULE, GELATIN COATED ORAL
Qty: 90 CAPSULE | Refills: 3 | Status: SHIPPED | OUTPATIENT
Start: 2025-01-05

## 2025-01-05 RX ADMIN — GUAIFENESIN SYRUP AND DEXTROMETHORPHAN 10 ML: 100; 10 SYRUP ORAL at 05:01

## 2025-01-05 RX ADMIN — TAMSULOSIN HYDROCHLORIDE 0.4 MG: 0.4 CAPSULE ORAL at 08:01

## 2025-01-05 RX ADMIN — Medication 10 ML: at 05:01

## 2025-01-05 RX ADMIN — FUROSEMIDE 40 MG: 40 TABLET ORAL at 08:01

## 2025-01-05 RX ADMIN — FLUTICASONE PROPIONATE 100 MCG: 50 SPRAY, METERED NASAL at 08:01

## 2025-01-05 RX ADMIN — OXYBUTYNIN CHLORIDE 10 MG: 5 TABLET, EXTENDED RELEASE ORAL at 08:01

## 2025-01-05 RX ADMIN — LOSARTAN POTASSIUM 25 MG: 25 TABLET, FILM COATED ORAL at 08:01

## 2025-01-05 RX ADMIN — APIXABAN 2.5 MG: 2.5 TABLET, FILM COATED ORAL at 08:01

## 2025-01-05 RX ADMIN — ASPIRIN 81 MG CHEWABLE TABLET 81 MG: 81 TABLET CHEWABLE at 08:01

## 2025-01-05 RX ADMIN — SPIRONOLACTONE 25 MG: 25 TABLET, FILM COATED ORAL at 08:01

## 2025-01-05 NOTE — PLAN OF CARE
[facility] ([address])      HOME HEALTH ORDERS  FACE TO FACE ENCOUNTER    Patient Name: Sam Gamino  YOB: 1945    PCP: JAYLYN Zamora MD   [address] [place] [address] / [facility] [address]  PCP Phone Number: [phone]  PCP Fax: [phone]    Encounter Date: 12/30/24    Admit to Home Health    Diagnoses:  Active Hospital Problems    Diagnosis  POA    *Influenza A [J10.1]  Yes     Priority: 1 - High    Acute on chronic diastolic heart failure [I50.33]  Yes     Priority: 3     Pulmonary hypertension associated with unclear multi-factorial mechanisms [I27.29]  Yes    Dysphagia [R13.10]  Yes    History of pulmonary embolism [Z86.711]  Yes     CTA - 11/14/23 - Bilateral      Primary hypertension [I10]  Yes     2002: Diagnosed.         Coronary artery disease [I25.10]  Yes     Angio 3/07  Dr. Lin        Resolved Hospital Problems    Diagnosis Date Resolved POA    Encephalopathy, metabolic [G93.41] 01/03/2025 Yes     Priority: 2     Fever [R50.9] 01/03/2025 Yes       Follow Up Appointments:  Future Appointments   Date Time Provider Department Center   1/7/2025  1:45 PM JAYLYN Zamora MD [facility]   3/31/2025 10:15 AM Ema Montemayor MD [id number]       Allergies:  Review of patient's allergies indicates:   Allergen Reactions    Lotensin [benazepril]      cough       Medications: Review discharge medications with patient and family and provide education.         Medication List        CONTINUE taking these medications      apixaban 2.5 mg Tab  Commonly known as: ELIQUIS  Take 1 tablet (2.5 mg total) by mouth 2 (two) times daily.     cholecalciferol (vitamin D3) 50 mcg (2,000 unit) Cap capsule  Commonly known as: VITAMIN D3  Take 1 capsule by mouth once daily.     cyanocobalamin 1,000 mcg/mL injection  INJECT 1ML IN THE MUSCLE EVERY 30 DAYS     donepeziL 5 MG tablet  Commonly known as: ARICEPT  Take 1 tablet (5 mg total) by mouth every evening.      empagliflozin 10 mg tablet  Commonly known as: JARDIANCE  Take 1 tablet (10 mg total) by mouth once daily.     fluticasone propionate 50 mcg/actuation nasal spray  Commonly known as: FLONASE  SHAKE LIQUID AND USE 2 SPRAYS(100 MCG) IN EACH NOSTRIL EVERY DAY     furosemide 40 MG tablet  Commonly known as: LASIX  Take 1 tablet (40 mg total) by mouth once daily.     losartan 100 MG tablet  Commonly known as: COZAAR  Take 1 tablet (100 mg total) by mouth once daily.     solifenacin 10 MG tablet  Commonly known as: VESICARE  TAKE 1 TABLET(10 MG) BY MOUTH EVERY DAY     spironolactone 25 MG tablet  Commonly known as: ALDACTONE  Take 1 tablet (25 mg total) by mouth once daily.     tamsulosin 0.4 mg Cap  Commonly known as: FLOMAX  Take 1 capsule (0.4 mg total) by mouth once daily.            STOP taking these medications      aspirin 81 MG Chew     ibuprofen 800 MG tablet  Commonly known as: SHAD CASH                I have seen and examined this patient within the last 30 days. My clinical findings that support the need for the home health skilled services and home bound status are the following:no   Weakness/numbness causing balance and gait disturbance due to Infection making it taxing to leave home.     Diet:   cardiac diet    Referrals/ Consults  Physical Therapy to evaluate and treat. Evaluate for home safety and equipment needs; Establish/upgrade home exercise program. Perform / instruct on therapeutic exercises, gait training, transfer training, and Range of Motion.  Occupational Therapy to evaluate and treat. Evaluate home environment for safety and equipment needs. Perform/Instruct on transfers, ADL training, ROM, and therapeutic exercises.    Activities:   activity as tolerated    Nursing:   Agency to admit patient within 24 hours of hospital discharge unless specified on physician order or at patient request    SN to complete comprehensive assessment including routine vital signs. Instruct on disease process  and s/s of complications to report to MD. Review/verify medication list sent home with the patient at time of discharge  and instruct patient/caregiver as needed. Frequency may be adjusted depending on start of care date.     Skilled nurse to perform up to 3 visits PRN for symptoms related to diagnosis    Notify MD if SBP > 160 or < 90; DBP > 90 or < 50; HR > 120 or < 50; Temp > 101; O2 < 88%    Ok to schedule additional visits based on staff availability and patient request on consecutive days within the home health episode.    When multiple disciplines ordered:    Start of Care occurs on Sunday - Wednesday schedule remaining discipline evaluations as ordered on separate consecutive days following the start of care.    Thursday SOC -schedule subsequent evaluations Friday and Monday the following week.     Friday - Saturday SOC - schedule subsequent discipline evaluations on consecutive days starting Monday of the following week.    For all post-discharge communication and subsequent orders please contact patient's primary care physician.    I certify that this patient is confined to his home and needs physical therapy and occupational therapy.

## 2025-01-05 NOTE — DISCHARGE SUMMARY
[facility] - [facility] Surg ([address] Medicine  Discharge Summary      Patient Name: Sam Gamino  MRN: [id number]  TABBY: [id number]  Patient Class: IP- Inpatient  Admission Date: 12/30/2024  Hospital Length of Stay: 5 days  Discharge Date and Time:  01/05/2025 8:14 AM  Attending Physician: Estrella Bryant MD   Discharging Provider: Estrella Bryant MD  Primary Care Provider: JAYLYN Zamora MD    Primary Care Team: Networked reference to record PCT     HPI:   HPI by Britni Betts PA:  Mr. Sam Gamino is a 79 y.o. 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  The pain has been intermittent, and is worse with exertion.  The pain is not reproducible with movement, or palpation.  He notes associated shortness of breath, and lower extremity swelling.  He admits to indulging in salt rich foods over the holiday, and also missing a few doses of his Lasix.  He denies nausea, vomiting, abdominal pain. He further notes generalized weakness, and fatigue.  He states that he had a fall on the day of presentation due to feeling very weak in the legs.  Denies LOC, head injury.  He was evaluated in the ED with labs that showed H&H of 13.5/40.5.  He had no leukocytosis or left shift.  A metabolic panel showed normal values save for total bilirubin which was 1.9.  A BNP was elevated at 164.  Troponins were negative x2.  While in the ED he had a measured temperature of 101.7° F by mouth.  He was negative for COVID, and influenza a/B.  A urinalysis showed no UTI.  A chest x-ray showed no acute cardiopulmonary process.  A CT of the head showed no acute intracranial abnormalities.  A CTA of the chest showed  no PE, small bilateral pleural effusions right greater than left, a small pericardial effusion (similar to prior imaging), and a bilateral paramediastinal consolidation consistent with post radiation change, and finally a mildly prominent fluid-filled distal thoracic esophagus concerning for esophageal dysmotility versus reflux.  He was treated in the ED with Tylenol for the fever, aspirin, 40 mg IV Lasix, and a dose of metoprolol.  He was placed on observation.          Hospital Course:   Patient was admitted on empiric antibiotics with blood cultures pending.  He was febrile to 102.2 overnight and was weak, confused and complained of cough.  Respiratory virus antigens panel was sent.  Because suspicion for influenza was high despite the negative ED test a PCR was sent and was positive for influenza A.  Patient was started on a course of Tamiflu along with empiric antibiotics.  Overnight he made a good improvement and in the morning was oriented again and feeling better.  He still had significant wheezing and respiratory treatments were ordered.    PT/OT evaluated patient and recommended moderate intensity therapy in SNF.  Patient improved markedly over the next few days however, and as his wife (who also had influenza) was feeling better at home the recommendation changed to home with home health.  He completed the course of Tamiflu while he was here.  He was seen and examined on the day of discharge and his mental status had resolved to baseline, he was tolerating a diet, ambulating with a walker and wheezing had resolved.     Goals of Care Treatment Preferences:  Code Status: Full Code       Consults:   Consults (From admission, onward)          Status Ordering Provider     Inpatient consult to Social Work/Case Management  Once        Provider:  (Not yet assigned)    Completed FATIMAH LEAVITT     Inpatient consult to Registered Dietitian/Nutritionist  Once        Provider:  (Not yet assigned)    Completed  FATIMAH LEAVITT              Final Active Diagnoses:    Diagnosis Date Noted POA    PRINCIPAL PROBLEM:  Influenza A [J10.1] 12/31/2024 Yes    Acute on chronic diastolic heart failure [I50.33] 12/31/2024 Yes    Pulmonary hypertension associated with unclear multi-factorial mechanisms [I27.29] 12/31/2024 Yes    Dysphagia [R13.10] 03/25/2024 Yes    History of pulmonary embolism [Z86.711] 11/14/2023 Yes    Primary hypertension [I10] 05/16/2022 Yes    Coronary artery disease [I25.10] 02/08/2013 Yes      Problems Resolved During this Admission:    Diagnosis Date Noted Date Resolved POA    Encephalopathy, metabolic [G93.41] 12/31/2024 01/03/2025 Yes    Fever [R50.9] 12/31/2024 01/03/2025 Yes       Discharged Condition: stable    Disposition: Home-Health Care [facility]    Follow Up:   Follow-up Information       JAYLYN Zamora MD Follow up.    Specialty: Internal Medicine  Why: Follow up in 1-2 weeks  Contact information:  [address]  [address]  [facility] [id number] [phone]               Ema Montemayor MD Follow up.    Specialties: Cardiology, Interventional Cardiology  Why: Follow up for the next available appointment  Contact information:  [address]  [address]  [facility] [id number] [phone]                           Patient Instructions:      Ambulatory referral/consult to Outpatient Case Management   Referral Priority: Routine Referral Type: Consultation   Referral Reason: Specialty Services Required   Number of Visits Requested: 1     Diet Cardiac     Activity as tolerated         Medications:  Reconciled Home Medications:      Medication List        CONTINUE taking these medications      apixaban 2.5 mg Tab  Commonly known as: ELIQUIS  Take 1 tablet (2.5 mg total) by mouth 2 (two) times daily.     cholecalciferol (vitamin D3) 50 mcg (2,000 unit) Cap capsule  Commonly known as: VITAMIN D3  Take 1 capsule by mouth once daily.     cyanocobalamin 1,000 mcg/mL injection  INJECT 1ML IN THE MUSCLE  EVERY 30 DAYS     donepeziL 5 MG tablet  Commonly known as: ARICEPT  Take 1 tablet (5 mg total) by mouth every evening.     empagliflozin 10 mg tablet  Commonly known as: JARDIANCE  Take 1 tablet (10 mg total) by mouth once daily.     fluticasone propionate 50 mcg/actuation nasal spray  Commonly known as: FLONASE  SHAKE LIQUID AND USE 2 SPRAYS(100 MCG) IN EACH NOSTRIL EVERY DAY     furosemide 40 MG tablet  Commonly known as: LASIX  Take 1 tablet (40 mg total) by mouth once daily.     losartan 100 MG tablet  Commonly known as: COZAAR  Take 1 tablet (100 mg total) by mouth once daily.     solifenacin 10 MG tablet  Commonly known as: VESICARE  TAKE 1 TABLET(10 MG) BY MOUTH EVERY DAY     spironolactone 25 MG tablet  Commonly known as: ALDACTONE  Take 1 tablet (25 mg total) by mouth once daily.     tamsulosin 0.4 mg Cap  Commonly known as: FLOMAX  Take 1 capsule (0.4 mg total) by mouth once daily.            STOP taking these medications      aspirin 81 MG Chew     ibuprofen 800 MG tablet  Commonly known as: SHAD CASH              Time spent on the discharge of patient: <30 minutes         Estrella Blue MD  Department of Hospital Medicine  [facility] - Med Surg ([address])

## 2025-01-05 NOTE — PLAN OF CARE
01/05/25 0942   Medicare Message   Important Message from Medicare regarding Discharge Appeal Rights Given to patient/caregiver;Explained to patient/caregiver;Signed/date by patient/caregiver   Date IMM was signed 01/05/25   Time IMM was signed 0930

## 2025-01-05 NOTE — PLAN OF CARE
I certify I provided patient choice and a list to the patient/family of CMS rated Home Health, SNF, IRF, LTACH, [facility] .  Patient/Family signed Patient's Choice Disclosure Form choosing the following the first available.         01/05/25 1024   Post-Acute Status   Post-Acute Authorization Home Health   Home Health Status Set-up Complete/Auth obtained   Patient choice form signed by patient/caregiver List with quality metrics by geographic area provided;List from CMS Compare;List from System Post-Acute Care   Discharge Delays None known at this time

## 2025-01-05 NOTE — ASSESSMENT & PLAN NOTE
Patient's blood pressure range in the last 24 hours was: BP  Min: 108/74  Max: 134/76.The patient's inpatient anti-hypertensive regimen is listed below:  Current Antihypertensives  spironolactone tablet 25 mg, Daily, Oral  furosemide tablet 40 mg, Daily, Oral  losartan tablet 25 mg, Daily, Oral    Plan  - BP is controlled, no changes needed to their regimen  - Changed furosemide to home dose  - 1/4 - BP relatively low probably due to acute illness, changed losartan to 25 mg daily, continue to monitor

## 2025-01-05 NOTE — NURSING
POC reviewed with the patient. AOX4 VSS on RA. All due medications given. No further complaints noted. AVS handed to patient. Teaching done. IV removed aseptically. Patient will transport the patient home.

## 2025-01-05 NOTE — PLAN OF CARE
Case Management Final Discharge Note      Discharge Disposition: Home with [facility]     New DME ordered / company name: None    Relevant SDOH / Transition of Care Barriers:  None    Person available to provide assistance at home when needed and their contact information: Eileen Gamino, spouse, [phone]    Scheduled followup appointment: Dr. Herber Zamora, PCP on 01-07-25 at 1:45PM.  Appointment was noted on AVS    Referrals placed: None    Transportation: Spouse        Patient and family educated on discharge services and updated on DC plan. Bedside RN notified, patient clear to discharge from Case Management Perspective.      01/05/25 1028   Final Note   Assessment Type Final Discharge Note   Anticipated Discharge Disposition Home-Health   What phone number can be called within the next 1-3 days to see how you are doing after discharge? [phone]   Hospital Resources/Appts/Education Provided Provided patient/caregiver with written discharge plan information;Appointments scheduled and added to AVS   Post-Acute Status   Discharge Delays None known at this time     [facility] ([address])  Discharge Final Note    Primary Care Provider: JAYLYN Zamora MD    Expected Discharge Date: 1/5/2025    Final Discharge Note (most recent)       Final Note - 01/05/25 1028          Final Note    Assessment Type Final Discharge Note (P)      Anticipated Discharge Disposition Home-Health Care Svc (P)      What phone number can be called within the next 1-3 days to see how you are doing after discharge? [phone] (P)      Hospital Resources/Appts/Education Provided Provided patient/caregiver with written discharge plan information;Appointments scheduled and added to AVS (P)         Post-Acute Status    Discharge Delays None known at this time (P)                      Important Message from Medicare  Important Message from Medicare regarding Discharge Appeal Rights: Given to patient/caregiver, Explained to patient/caregiver,  Signed/date by patient/caregiver     Date IMM was signed: 01/05/25  Time IMM was signed: 0930    Contact Info       JAYLYN Zamora MD   Specialty: Internal Medicine   Relationship: PCP - General    [address] [facility] [address]  [address]  [facility] [address]   Phone: [phone]       Next Steps: Follow up    Instructions: Follow up in 1-2 weeks    Ema Montemayor MD   Specialty: Cardiology, Interventional Cardiology    [address] [facility]  [address]  [facility] [address]   Phone: [phone]       Next Steps: Follow up    Instructions: Follow up for the next available appointment    *[facility]   Specialty: Home Health Services, Home Therapy Services, Home Living Aide Services    [address] [place] [address], [address]  [facility] [address]   Phone: [phone]       Next Steps: Follow up    Instructions: will follow up with patient on tomorrow

## 2025-01-05 NOTE — ASSESSMENT & PLAN NOTE
- Tested negative on POCT docking device in ED - repeated PCR as symptoms were consistent with influenza or another respiratory viral infection - second test positive for Influenza A.  Patient febrile on presentation.  - Blood cultures done, NGTD.  Nothing acute on imaging.  - Unclear when symptoms started - Tamiflu ordered and started on presentation.  - Continue supportive care with prn oxygen, antitussive, respiratory treatments (wheezing significantly on exam and no history of this).  - Hold off on IVF given heart failure/pulmonary HTN.  - PT ordered but will hold off on aggressive workouts for now.  - Respiratory treatments much needed given ongoing wheezing - changed to q4 while awake and doing better.  OK to change back to prn respiratory treatments.  - Completed 5 day course Tamiflu while here

## 2025-01-05 NOTE — PLAN OF CARE
Problem: Adult Inpatient Plan of Care  Goal: Plan of Care Review  Outcome: Progressing  Flowsheets (Taken 1/4/2025 2010)  Plan of Care Reviewed With: patient     Problem: Infection  Goal: Absence of Infection Signs and Symptoms  Outcome: Progressing  Intervention: Prevent or Manage Infection  Flowsheets (Taken 1/4/2025 2010)  Fever Reduction/Comfort Measures: lightweight clothing  Isolation Precautions:   droplet   precautions maintained

## 2025-01-06 DIAGNOSIS — R41.3 MEMORY LOSS: Primary | ICD-10-CM

## 2025-01-06 LAB
ENTEROVIRUS/RHINOVIRUS: NOT DETECTED
HUMAN BOCAVIRUS: NOT DETECTED
HUMAN CORONAVIRUS, COMMON COLD VIRUS: NOT DETECTED
INFLUENZA A - H1N1-09: POSITIVE
PARAINFLUENZA: NOT DETECTED
RVP - ADENOVIRUS: NOT DETECTED
RVP - HUMAN METAPNEUMOVIRUS (HMPV): NOT DETECTED
RVP - INFLUENZA A: NOT DETECTED
RVP - INFLUENZA B: NOT DETECTED
RVP - RESPIRATORY SYNCTIAL VIRUS (RSV) A: NOT DETECTED
RVP - RESPIRATORY VIRAL PANEL, SOURCE: ABNORMAL

## 2025-01-07 ENCOUNTER — PATIENT OUTREACH (OUTPATIENT)
Dept: ADMINISTRATIVE | Facility: CLINIC | Age: 80
End: 2025-01-07
Payer: MEDICARE

## 2025-01-07 ENCOUNTER — OFFICE VISIT (OUTPATIENT)
Dept: INTERNAL MEDICINE | Facility: CLINIC | Age: 80
End: 2025-01-07
Attending: INTERNAL MEDICINE
Payer: MEDICARE

## 2025-01-07 VITALS
SYSTOLIC BLOOD PRESSURE: 92 MMHG | HEIGHT: 72 IN | OXYGEN SATURATION: 98 % | DIASTOLIC BLOOD PRESSURE: 56 MMHG | HEART RATE: 104 BPM | WEIGHT: 232 LBS | BODY MASS INDEX: 31.42 KG/M2

## 2025-01-07 DIAGNOSIS — J10.1 INFLUENZA A: ICD-10-CM

## 2025-01-07 DIAGNOSIS — I10 PRIMARY HYPERTENSION: ICD-10-CM

## 2025-01-07 DIAGNOSIS — C85.90 NON-HODGKIN'S LYMPHOMA, UNSPECIFIED BODY REGION, UNSPECIFIED NON-HODGKIN LYMPHOMA TYPE: ICD-10-CM

## 2025-01-07 DIAGNOSIS — E78.00 HYPERCHOLESTEROLEMIA: ICD-10-CM

## 2025-01-07 DIAGNOSIS — Z94.81 STATUS POST BONE MARROW TRANSPLANT: ICD-10-CM

## 2025-01-07 DIAGNOSIS — I34.0 NONRHEUMATIC MITRAL VALVE REGURGITATION: Primary | ICD-10-CM

## 2025-01-07 DIAGNOSIS — I50.32 HEART FAILURE, DIASTOLIC, CHRONIC: ICD-10-CM

## 2025-01-07 NOTE — PATIENT INSTRUCTIONS
You can remove the [employer] heart monitor the next time you are in the shower and throw it away.        In order to minimize memory loss please do the followin.   Follow a MIND diet (Mediterranean-DASH diet). A low sodium diet (<1500 mg of Sodium per day), low in saturated fat and minimize simple sugars.  Include 1 serving of leafy green vegetables   daily, as well as grains, nuts, berries, poultry, and fish.  Use Olive oil instead of butter are margarine.  Minimize pastries, sweets, red meat, cheese, and butter.    Minimize or quit drinking alcohol.    2.  Do not smoke. Quit smoking if you do smoke  3.  Get frequent exercise.  Ideally 30 min of moderate exercise 5 times a week.  4.  Keep your brain active by doing puzzles and other brain activities.  5.  Have a good social network and spend time with friends and family.  6.  Try to follow the same daily routine,, i.e. get up and go to bed at about the same time and eat your meals at about the same time each day.  7.  Make sure your hearing is adequate, if not consider getting hearing aids.  8.  Make sure your vision is adequate, if not consider getting glasses for distance and/or for reading.    Continue the donepezil 5 mg each night.

## 2025-01-07 NOTE — PROGRESS NOTES
C3 nurse attempted to contact Sam Gamino for a TCC post hospital discharge follow up call. No answer. No voicemail available. The patient has a scheduled HOSFU appointment with JAYLYN Zamora on 01/07/25 @ 3038.

## 2025-01-08 NOTE — PROGRESS NOTES
C3 nurse attempted to contact Sam Gamino's wife Eileen for a TCC post hospital discharge follow up call. The patient is unable to conduct the call @ this time. The patient requested a callback after lunch.

## 2025-01-08 NOTE — PROGRESS NOTES
C3 nurse attempted to contact Sam Gamino's wife for a TCC post hospital discharge follow up call. The patient is unable to conduct the call @ this time. The patient requested a callback.

## 2025-01-08 NOTE — PROGRESS NOTES
C3 nurse attempted to contact patient for a TCC post hospital discharge follow-up call. The patient declined call at this time.

## 2025-01-09 ENCOUNTER — OUTPATIENT CASE MANAGEMENT (OUTPATIENT)
Dept: ADMINISTRATIVE | Facility: OTHER | Age: 80
End: 2025-01-09
Payer: MEDICARE

## 2025-01-09 NOTE — LETTER
Sam Gamino  [address] [address]      Dear Sam Gamino,     I work with [facility]'s Outpatient Care Management Department. We received a referral to call you to discuss your medical history. These services are free of charge and are offered to [facility] patients who have recently been discharged from any of our facilities or who have medical conditions that may require the skill of a nurse to assist with management.     I am a Registered Nurse who specializes in connecting patients with available medical and financial resources as well as addressing any educational needs that may be indicated.    I attempted to reach you by telephone, but I was unsuccessful. Please call our department so that we can go over some questions with you, regarding your health.    The Outpatient Care Management Department can be reached at [phone], from 8:00AM to 4:30 PM, on Monday thru Friday.     Additionally, [facility] also has a program where a nurse is available 24/7 to answer questions or provide medical advice, their number is [phone].      Thanks,    Laura Noel RN  Outpatient Care Management  Phone #: [phone]

## 2025-01-22 ENCOUNTER — OUTPATIENT CASE MANAGEMENT (OUTPATIENT)
Dept: ADMINISTRATIVE | Facility: OTHER | Age: 80
End: 2025-01-22
Payer: MEDICARE

## 2025-01-22 NOTE — PROGRESS NOTES
Outpatient Care Management  Patient Does Not Consent    Patient: Sam Gamino  MRN:  [id number]  Date of Service:  1/22/2025  Completed by:  Fanny Noel RN    Chief Complaint   Patient presents with    Case Closure       Patient Summary           Consent Received:  Decline  Decline Reason:  Not interested

## 2025-01-29 NOTE — PROGRESS NOTES
[facility] [address] - NEUROLOGY [address]TH [address] [facility], [place]    Date: 2/7/25  Patient Name: Sam Gamino   MRN: [id number]   PCP: JAYLYN Zamora  Referring Provider: Self, Aaareferral    Assessment:   Sam Gamino is a 79 y.o. male presenting for memory impairment. 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 History is positive for memory impairment, gait impairment, and urinary incontinence. Exam notable for hypometric saccades in general, moreso with vertical gaze, upwards in particular. Also with bilateral lower extremity weakness, motor breakdown evident while performing movements that progressively incorporate more motor units. No rigidity in bilateral upper extremities. MOCA is 18/30. Prior CTHs demonstrate communicating hydrocephalus, which is presumably a complication of intraventricular extension of SAH. Requires neurosurgical evaluation for this.    Aside from this, description of left arm movements per both the patient and his wife is reminiscent of alien limb phenomenon: this is a classic finding in some varieties of neurodegenerative disease--corticobasilar syndrome comes to mind--but is not necessarily pathognomonic for this. Does not seem to have neglect, extinction, but is bradyphrenic and perhaps somewhat logopenic. Should have repeat MRI brain, reversible causes work-up, and neurosurgical evaluation for communicating hydrocephalus first. After this is done, may reassess for other forms of pathology so that clinical picture is clouded as little as possible.    Plan:   RTC in my next clinic block, late March, preferably after neurosurgical evaluation, etc.. Remainder of plan as detailed above/below.    Problem List Items Addressed This Visit          Neuro    Memory loss    Relevant Orders    VITAMIN B1    FOLATE    HOMOCYSTEINE, SERUM    VITAMIN B12    METHYLMALONIC ACID, SERUM     Treponema Pallidium Antibodies IgG, IgM    TSH    T4, FREE    MRI Brain Without Contrast     Other Visit Diagnoses       Communicating hydrocephalus    -  Primary    Relevant Orders    Ambulatory referral/consult to Neurosurgery    MRI Brain Without Contrast    Other amnesia        Relevant Orders    MRI Brain Without Contrast          ROBERTA Carbajal D.O.  Neurology PGY-III  [facility] [address]    Subjective:   Patient seen in consultation at the request of Self, Aaareferral for the evaluation of memory impairment.     History of Present Illness: 2/7/25  Mr. 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 He presents to resident neurology clinic today for memory impairment, accompanied by his wife Eileen. Eileen says that Sam has been experiencing progressive memory impairment for ~4-5 months: she first noticed difficulty with working memory, but this has progressed within the last 2-3 months to being unable to recall where he is when they are not at home. In addition to this, she notes progressive gait impairment over the last 2 months, and urinary incontinence. He is sometimes unable to make it to the bathroom due to gait difficulty, however at times is unaware of urinary incontinence. Has required the use of a walker for the last 2 months, previously was walking unassisted. She notes that he does not pick his feet up off the floor when ambulating. Has experienced several falls. The patient agrees regarding gait impairment and urinary incontinence. He does acknowledge some degree of memory impairment, but believes it to be lesser than what his wife  "communicates. Was hospitalized recently for influenza: encephalopathic/delirious during admission, but reportedly "returned to baseline." Though Eileen states that she noticed memory impairment ~4-5 months ago, the patient was prescribed donepezil by his family physician in August of last year at her request, so it is likely that his difficulties are more long-standing (at least since the SAH). Prior to all of this, she states that he was alert and oriented to person, place, time, situation, fully functional/mobile although she has always been the one to take care of finances, etc. Of note, the patient says that sometimes he doesn't know that his left hand exists. His wife notes that she sometimes catches him raising the left arm up, to which he is oblivious.    PAST MEDICAL HISTORY:  Past Medical History:   Diagnosis Date    Abnormal echocardiogram 02/08/2013    Mild MVR 3/07    Atherosclerosis of aorta 11/01/2023 2023: Mentioned in chart.    CAD (coronary artery disease) 02/08/2013    Angio 3/07 Dr. Lin    Chronic diastolic (congestive) heart failure     History of pulmonary embolism 11/14/2023    CTA - 11/14/23 - Bilateral    History of subdural hemorrhage 11/04/2023 11/5/2023: SAH.    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 Pelayo       PAST SURGICAL HISTORY:  Past Surgical History:   Procedure Laterality Date    EYE SURGERY Bilateral 2016    Cataracts       CURRENT MEDS:  Current Outpatient Medications   Medication Sig Dispense Refill    apixaban (ELIQUIS) 2.5 mg Tab Take 1 tablet (2.5 mg total) by mouth 2 (two) times daily. 60 tablet 11    cholecalciferol, vitamin D3, (VITAMIN D3) 50 mcg (2,000 unit) Cap Take 1 capsule " by mouth once daily.      cyanocobalamin 1,000 mcg/mL injection INJECT 1ML IN THE MUSCLE EVERY 30 DAYS 10 mL 1    donepeziL (ARICEPT) 5 MG tablet Take 1 tablet (5 mg total) by mouth every evening. 90 tablet 3    empagliflozin (JARDIANCE) 10 mg tablet Take 1 tablet (10 mg total) by mouth once daily. 90 tablet 3    fluticasone propionate (FLONASE) 50 mcg/actuation nasal spray SHAKE LIQUID AND USE 2 SPRAYS(100 MCG) IN EACH NOSTRIL EVERY DAY 16 g 3    furosemide (LASIX) 40 MG tablet Take 1 tablet (40 mg total) by mouth once daily. 90 tablet 3    LINZESS 290 mcg Cap capsule TAKE 1 CAPSULE(290 MCG) BY MOUTH BEFORE BREAKFAST 90 capsule 3    losartan (COZAAR) 100 MG tablet Take 1 tablet (100 mg total) by mouth once daily. 90 tablet 3    solifenacin (VESICARE) 10 MG tablet TAKE 1 TABLET(10 MG) BY MOUTH EVERY DAY 90 tablet 3    spironolactone (ALDACTONE) 25 MG tablet Take 1 tablet (25 mg total) by mouth once daily. 90 tablet 3    tamsulosin (FLOMAX) 0.4 mg Cap Take 1 capsule (0.4 mg total) by mouth once daily. 90 capsule 1     No current facility-administered medications for this visit.       ALLERGIES:  Review of patient's allergies indicates:   Allergen Reactions    Lotensin [benazepril]      cough       FAMILY HISTORY:  Family History   Problem Relation Name Age of Onset    Cancer Mother          uterine    No Known Problems Father      Hypertension Brother Orlando     No Known Problems Daughter Katherine     No Known Problems Sister Lynda     No Known Problems Sister Cheryl     No Known Problems Brother Black     No Known Problems Brother Naseem     No Known Problems Brother      Thyroid disease Sister Halina        SOCIAL HISTORY:  Social History     Tobacco Use    Smoking status: Never    Smokeless tobacco: Never   Substance Use Topics    Alcohol use: Yes     Alcohol/week: 1.0 standard drink of alcohol     Types: 1 Standard drinks or equivalent per week     Comment: ocassionally    Drug use: No       Review of Systems:  12  "system review of systems is negative except for the symptoms mentioned in HPI.      Objective:   General Examination  Vitals:    02/07/25 1359   BP: 119/69   BP Location: Right arm   Patient Position: Sitting   Pulse: 94     General: NAD, well nourished, in wheelchair  Eyes: no tearing, discharge, no erythema   ENT: moist mucous membranes of the oral cavity, nares patent    Neck: Supple, full range of motion  Cardiovascular: Warm and well perfused, pulses equal and symmetrical  Lungs: Normal work of breathing, normal chest wall excursions  Skin: No rash, lesions, or breakdown on exposed skin  Psychiatry: Mood and affect are appropriate   Abdomen: soft, non tender, non distended  Extremeties: No cyanosis, clubbing or edema.    Neurological Examination  MENTAL STATUS  Level of consciousness: alert  Orientation: oriented to person, place, situation, states that year is "2026"  Attention: impaired, requires repeated reorientation to complete multi-step tasks  Concentration: impaired  Speech: bradyphrenic, seems to be logopenic as well    CRANIAL NERVES  CN II: visual fields full to confrontation  CN III, IV, VI: PERRL, requires repeat prompting for testing of smooth pursuit but appears intact, saccades are hypometric throughout: degree of impairment--vertical (upgaze > downgaze) > horizontal; impairment of upward saccades is particularly notable with use of optokinetic tape  CN V: facial sensation intact, muscles of mastication intact  CN VII: facial expression symmetric, but reduced range (hypomimia?)    MOTOR EXAM  Muscle tone: normal in bilateral upper extremities    Strength - Upper Extremities   Arm abduction Elbow flexion Elbow extension Finger abduction   Right 5/5 5/5 5/5 5/5   Left 5/5 5/5 5/5 5/5     Strength - Lower Extremities   Hip flexion Knee flexion Knee extension Dorsiflexion Plantarflexion   Right 4+/5 4+/5 4+/5 4+/5 4+/5   Left 4+/5 4+/5 4+/5 4+/5 4+/5     REFLEXES   Biceps Triceps Brachioradialis " Patellar Achilles   Right +1 +1 +1 +1 +1   Left +1 +1 +1 +1 +1       SENSORY EXAM  Light touch: intact in all 4 extremities    COORDINATION  Finger to nose: normal  Tremor: none  Gait: deferred    MOCA: 18/30  -visuospatial/executive: 3/5  -naming: 3/3  -attention: 4/6  -language: 1/3  -abstraction: 2/2  -delayed recall: 0/5  -orientation: 5/6    Chart Review    Neuroimaging reviewed/interpreted  CTA Head & Neck: 11/4/23  CTA head: Unremarkable CTA of the head specifically without evidence for proximal significant stenosis or large vessel occlusion or definite aneurysm. CTA neck: No significant arterial stenosis throughout the neck.. CT head: Small volume scattered intraventricular hemorrhage.  Please note there is moderate distension lateral and 3rd ventricles which may be compensatory to volume loss however component of early hydrocephalus not excluded without priors for comparison. In addition there is small volume subdural hemorrhage along the ventral and dorsal aspect of the skull base and extending to the proximal cervical canal through the craniocervical junction allowing for CT technique.        MRI/MRA brain w/wo contract: 11/5/23  MRI brain: Redemonstration small volume intraventricular hemorrhage posterior horns lateral ventricles and 4th ventricle. In addition there is evolving extra-axial hemorrhage along the skull and craniocervical junction extending to the proximal cervical canal partially visualized compatible with evolving subdural hemorrhage and probable small subarachnoid hemorrhage. Allowing for artifact from motion there is no definite abnormal parenchymal enhancement. Stable prominence of the ventricles which may be compensatory to volume loss without increased sized from yesterday CT or transependymal resorption of CSF to suggest definite hydrocephalus. MRA head: Allowing for artifact from motion no definite proximal high-grade stenosis or aneurysm. No evidence for abnormal wall enhancement  associated with the [id number] allowing for artifact from motion.        Cerebral Angiogram: 11/5/23  Normal cerebral angiogram, no evidence of aneurysm, AVM, AVM to account for patient's most recent nontraumatic subarachnoid hemorrhage.     CTA Head: 11/11/23  No aneurysm or AVM is identified. No evidence of significant vaso spasm appreciated.     CTA Head & Neck: 11/14/23  Limited evaluation of the upper chest shows bilateral pulmonary thromboembolism. CT head shows no acute major vascular territory infarct or new intracranial hemorrhage.  Continued interval decrease in patient's known subarachnoid and intraventricular hemorrhage. CTA head and neck shows no high-grade stenosis, large vessel occlusion or aneurysm.  No findings to indicate significant vasospasm.    CTH: 11/15/23  Stable ventriculomegaly with small volume residual intraventricular hemorrhage. Resolving subarachnoid hemorrhage.    CTA Head & Neck: 12/14/23  Resorption of previously identified subarachnoid hemorrhage with no new acute intracranial process. CTA demonstrates no evidence of vasospasm aneurysm or AVM/vascular malformation.    CTH: 4/5/24  Head: Blood: No acute intracranial hemorrhage. Parenchyma: No definite loss of gray-white differentiation to suggest acute or subacute transcortical infarct. Parenchymal volume loss.  Nonspecific areas of white matter hypoattenuation.  Small remote infarct superior left cerebellum. Ventricles/Extra-axial spaces: Overall similar size and configuration of the ventricles relative to 12/14/2023.  As before relined enlargement of the ventricles out of proportion to sulci, finding which may be seen the setting of normal pressure hydrocephalus, the patient did experience ataxia.  Basal cisterns patent.        CTH: 12/30/24  There is mild chronic microvascular ischemic disease. No evidence of acute/recent major vascular distribution cerebral infarction, intraparenchymal hemorrhage, or intra-axial space  occupying lesion. The ventricular system is stable in size and configuration. No effacement of the skull-base cisterns. No abnormal extra-axial fluid collections or blood products. Visualized paranasal sinuses and mastoid air cells are clear. The calvarium shows no significant abnormality.       Laboratories reviewed  TSH  -11/5/23: 1.291  -2/1/24: 2.094    B12  -3/29/23: 561  -2/1/24: 467    HIV I/II Ag/Ab  -4/5/24: non-reactive

## 2025-02-07 ENCOUNTER — OFFICE VISIT (OUTPATIENT)
Dept: NEUROLOGY | Facility: CLINIC | Age: 80
End: 2025-02-07
Payer: MEDICARE

## 2025-02-07 VITALS — HEART RATE: 94 BPM | SYSTOLIC BLOOD PRESSURE: 119 MMHG | DIASTOLIC BLOOD PRESSURE: 69 MMHG

## 2025-02-07 DIAGNOSIS — G91.0 COMMUNICATING HYDROCEPHALUS: Primary | ICD-10-CM

## 2025-02-07 DIAGNOSIS — R41.3 MEMORY LOSS: ICD-10-CM

## 2025-02-07 DIAGNOSIS — R41.3 OTHER AMNESIA: ICD-10-CM

## 2025-02-07 PROCEDURE — 99213 OFFICE O/P EST LOW 20 MIN: CPT | Mod: PBBFAC

## 2025-02-07 PROCEDURE — 99499 UNLISTED E&M SERVICE: CPT | Mod: S$PBB,,, | Performed by: PSYCHIATRY & NEUROLOGY

## 2025-02-07 PROCEDURE — 99214 OFFICE O/P EST MOD 30 MIN: CPT | Mod: S$PBB,GC,, | Performed by: PSYCHIATRY & NEUROLOGY

## 2025-02-07 PROCEDURE — 99999 PR PBB SHADOW E&M-EST. PATIENT-LVL III: CPT | Mod: PBBFAC,GC,,

## 2025-02-11 NOTE — PROGRESS NOTES
I have reviewed the notes, assessments, and/or procedures performed this visit, and I concur with the documentation. I was present for the visit and personally examined the patient.    Sarina Wilhelm MD  Department of Neurology  Neuro-oncology, Movement Disorders

## 2025-02-16 NOTE — DISCHARGE INSTRUCTIONS
Important discharge information      Please follow up on all of your appointments listed on this discharge summary. If you do not see a primary care physician appointment, please call and make a primary care physician appointment.  It is important that you have a hospital, follow up with your primary care physician within two weeks of your hospitalization.   The patient is a 67y Female complaining of abnormal lab result.

## 2025-02-17 ENCOUNTER — EXTERNAL HOME HEALTH (OUTPATIENT)
Dept: HOME HEALTH SERVICES | Facility: HOSPITAL | Age: 80
End: 2025-02-17
Payer: MEDICARE

## 2025-02-17 RX ORDER — METOPROLOL SUCCINATE 50 MG/1
50 TABLET, EXTENDED RELEASE ORAL
Qty: 90 TABLET | Refills: 3 | OUTPATIENT
Start: 2025-02-17

## 2025-02-20 ENCOUNTER — DOCUMENT SCAN (OUTPATIENT)
Dept: HOME HEALTH SERVICES | Facility: HOSPITAL | Age: 80
End: 2025-02-20
Payer: MEDICARE

## 2025-02-21 ENCOUNTER — DOCUMENT SCAN (OUTPATIENT)
Dept: HOME HEALTH SERVICES | Facility: HOSPITAL | Age: 80
End: 2025-02-21
Payer: MEDICARE

## 2025-02-26 ENCOUNTER — DOCUMENTATION ONLY (OUTPATIENT)
Dept: NEUROLOGY | Facility: HOSPITAL | Age: 80
End: 2025-02-26
Payer: MEDICARE

## 2025-02-26 ENCOUNTER — HOSPITAL ENCOUNTER (OUTPATIENT)
Dept: RADIOLOGY | Facility: HOSPITAL | Age: 80
Discharge: HOME | End: 2025-02-26
Payer: MEDICARE

## 2025-02-26 ENCOUNTER — HOSPITAL ENCOUNTER (EMERGENCY)
Facility: HOSPITAL | Age: 80
Discharge: HOME | End: 2025-02-26
Attending: EMERGENCY MEDICINE
Payer: MEDICARE

## 2025-02-26 VITALS
SYSTOLIC BLOOD PRESSURE: 128 MMHG | HEART RATE: 87 BPM | BODY MASS INDEX: 31.42 KG/M2 | OXYGEN SATURATION: 97 % | WEIGHT: 232 LBS | DIASTOLIC BLOOD PRESSURE: 61 MMHG | RESPIRATION RATE: 17 BRPM | TEMPERATURE: 98 F | HEIGHT: 72 IN

## 2025-02-26 DIAGNOSIS — R93.89 ABNORMAL MRI: Primary | ICD-10-CM

## 2025-02-26 DIAGNOSIS — R41.3 MEMORY LOSS: ICD-10-CM

## 2025-02-26 DIAGNOSIS — G91.0 COMMUNICATING HYDROCEPHALUS: ICD-10-CM

## 2025-02-26 DIAGNOSIS — R90.89 ABNORMAL IMAGING OF CENTRAL NERVOUS SYSTEM: Primary | ICD-10-CM

## 2025-02-26 DIAGNOSIS — R41.3 OTHER AMNESIA: ICD-10-CM

## 2025-02-26 LAB
ALBUMIN SERPL BCP-MCNC: 3.5 G/DL (ref 3.5–5.2)
ALP SERPL-CCNC: 60 U/L (ref 40–150)
ALT SERPL W/O P-5'-P-CCNC: 16 U/L (ref 10–44)
ANION GAP SERPL CALC-SCNC: 8 MMOL/L (ref 8–16)
AST SERPL-CCNC: 27 U/L (ref 10–40)
BASOPHILS # BLD AUTO: 0.03 K/UL (ref 0–0.2)
BASOPHILS NFR BLD: 0.5 % (ref 0–1.9)
BILIRUB SERPL-MCNC: 0.9 MG/DL (ref 0.1–1)
BUN SERPL-MCNC: 18 MG/DL (ref 8–23)
CALCIUM SERPL-MCNC: 9.4 MG/DL (ref 8.7–10.5)
CHLORIDE SERPL-SCNC: 110 MMOL/L (ref 95–110)
CO2 SERPL-SCNC: 23 MMOL/L (ref 23–29)
CREAT SERPL-MCNC: 1 MG/DL (ref 0.5–1.4)
DIFFERENTIAL METHOD BLD: ABNORMAL
EOSINOPHIL # BLD AUTO: 0.1 K/UL (ref 0–0.5)
EOSINOPHIL NFR BLD: 2.2 % (ref 0–8)
ERYTHROCYTE [DISTWIDTH] IN BLOOD BY AUTOMATED COUNT: 12.2 % (ref 11.5–14.5)
EST. GFR  (NO RACE VARIABLE): >60 ML/MIN/1.73 M^2
GLUCOSE SERPL-MCNC: 98 MG/DL (ref 70–110)
HCT VFR BLD AUTO: 41.9 % (ref 40–54)
HGB BLD-MCNC: 13.1 G/DL (ref 14–18)
IMM GRANULOCYTES # BLD AUTO: 0.01 K/UL (ref 0–0.04)
IMM GRANULOCYTES NFR BLD AUTO: 0.2 % (ref 0–0.5)
LYMPHOCYTES # BLD AUTO: 1.9 K/UL (ref 1–4.8)
LYMPHOCYTES NFR BLD: 30.4 % (ref 18–48)
MCH RBC QN AUTO: 32.9 PG (ref 27–31)
MCHC RBC AUTO-ENTMCNC: 31.3 G/DL (ref 32–36)
MCV RBC AUTO: 105 FL (ref 82–98)
MONOCYTES # BLD AUTO: 0.6 K/UL (ref 0.3–1)
MONOCYTES NFR BLD: 9.1 % (ref 4–15)
NEUTROPHILS # BLD AUTO: 3.7 K/UL (ref 1.8–7.7)
NEUTROPHILS NFR BLD: 57.6 % (ref 38–73)
NRBC BLD-RTO: 0 /100 WBC
PLATELET # BLD AUTO: 167 K/UL (ref 150–450)
PMV BLD AUTO: 10.9 FL (ref 9.2–12.9)
POTASSIUM SERPL-SCNC: 4.2 MMOL/L (ref 3.5–5.1)
PROT SERPL-MCNC: 7.3 G/DL (ref 6–8.4)
RBC # BLD AUTO: 3.98 M/UL (ref 4.6–6.2)
SODIUM SERPL-SCNC: 141 MMOL/L (ref 136–145)
WBC # BLD AUTO: 6.34 K/UL (ref 3.9–12.7)

## 2025-02-26 PROCEDURE — 99285 EMERGENCY DEPT VISIT HI MDM: CPT | Mod: 25

## 2025-02-26 PROCEDURE — 70551 MRI BRAIN STEM W/O DYE: CPT | Mod: 26,,, | Performed by: RADIOLOGY

## 2025-02-26 PROCEDURE — 70551 MRI BRAIN STEM W/O DYE: CPT | Mod: TC

## 2025-02-26 PROCEDURE — 85025 COMPLETE CBC W/AUTO DIFF WBC: CPT | Performed by: EMERGENCY MEDICINE

## 2025-02-26 PROCEDURE — A9585 GADOBUTROL INJECTION: HCPCS | Performed by: EMERGENCY MEDICINE

## 2025-02-26 PROCEDURE — 80053 COMPREHEN METABOLIC PANEL: CPT | Performed by: EMERGENCY MEDICINE

## 2025-02-26 PROCEDURE — 25500020 PHARM REV CODE 255: Performed by: EMERGENCY MEDICINE

## 2025-02-26 RX ORDER — GADOBUTROL 604.72 MG/ML
10 INJECTION INTRAVENOUS
Status: COMPLETED | OUTPATIENT
Start: 2025-02-26 | End: 2025-02-26

## 2025-02-26 RX ADMIN — GADOBUTROL 10 ML: 604.72 INJECTION INTRAVENOUS at 08:02

## 2025-02-26 NOTE — PROGRESS NOTES
General neurology inpatient team notified by radiology of concern for cerebral venous sinus thrombosis (transverse/sigmoid sinus) on outpatient MRI brain w/o contrast. Discussed over the phone with patient and his spouse: he is not clearly symptomatic from what they relate, however, in context of his history I have a strong preference for obtaining expedited imaging. Advised him to present to the main [place] ER, to which he is agreeable.     Should have MRI brain with/without contrast mprage protocol to better assess for cerebral venous sinus thrombosis.

## 2025-02-26 NOTE — FIRST PROVIDER EVALUATION
Medical screening examination initiated.  I have conducted a focused provider triage encounter, findings are as follows:    Brief history of present illness:  sent for contrast MRI given today's noncon MRI    There were no vitals filed for this visit.    Pertinent physical exam:  ambulatory    Brief workup plan:  MRI    Preliminary workup initiated; this workup will be continued and followed by the physician or advanced practice provider that is assigned to the patient when roomed.

## 2025-02-26 NOTE — PROGRESS NOTES
General Neurology on call notified that patient's MRI Brain w/o contrast completed today showed heterogeneous signal in left transverse and sigmoid sinus concerning for possible thrombus with recommendation for further imaging with MRI Brain w/ contrast. Notified patient's neurologist Dr. Carbajal.

## 2025-02-27 NOTE — ED PROVIDER NOTES
Encounter Date: 2/26/2025       History     Chief Complaint   Patient presents with    Mri     Mri today and told to come to er to get another mri of head     HPI    Patient is a 79-year-old male past medical history subarachnoid hemorrhage a CAD hypercholesterolemia that is presenting for abnormal imaging.  Patient recently saw neurology on 02/07/2025, had outpatient MRI ordered.  The patient had MRI done today, concern for possible cerebral venous sinus thrombosis with noncontrast.  Patient was sent here as per Neurology for MRI with contrast for further evaluation.  Patient at this time denies any symptoms.  Patient denies any fevers, chills, chest pains, shortness of breath, nausea vomiting, diarrhea, abdominal pain, weakness, sensation changes.  Family states that patient is at otherwise baseline mentation, no worsening of memory impairment, no new symptoms.    Review of patient's allergies indicates:   Allergen Reactions    Lotensin [benazepril]      cough     Past Medical History:   Diagnosis Date    Abnormal echocardiogram 02/08/2013    Mild MVR 3/07    Atherosclerosis of aorta 11/01/2023 2023: Mentioned in chart.    CAD (coronary artery disease) 02/08/2013    Angio 3/07 Dr. Lin    Chronic diastolic (congestive) heart failure     History of pulmonary embolism 11/14/2023    CTA - 11/14/23 - Bilateral    History of subdural hemorrhage 11/04/2023 11/5/2023: SAH.    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 Pelayo     Past Surgical History:   Procedure Laterality Date    EYE SURGERY Bilateral 2016    Cataracts     Family History   Problem Relation Name Age of Onset    Cancer Mother          uterine    No Known Problems  Father      Hypertension Brother Orlando     No Known Problems Daughter Katherine     No Known Problems Sister Lynda     No Known Problems Sister Cheryl     No Known Problems Brother Black     No Known Problems Brother Naseem     No Known Problems Brother      Thyroid disease Sister Halina      Social History[1]  Review of Systems   Constitutional:         No other system positives other than aforementioned as reported by patient       Physical Exam     Initial Vitals [02/26/25 1720]   BP Pulse Resp Temp SpO2   (!) 156/88 100 20 98.3 °F (36.8 °C) 97 %      MAP       --         Physical Exam    Vitals reviewed.  Constitutional: He appears well-developed and well-nourished. He is not diaphoretic. No distress.   Well-appearing 79-year-old male, no distress, appropriately conversational to simple questions.   HENT:   Head: Normocephalic and atraumatic.   Cardiovascular:  Normal rate, regular rhythm, normal heart sounds and intact distal pulses.     Exam reveals no gallop and no friction rub.       No murmur heard.  Pulmonary/Chest: Breath sounds normal. No respiratory distress. He has no wheezes. He has no rales.   Abdominal: Abdomen is soft. Bowel sounds are normal. He exhibits no distension. There is no abdominal tenderness. There is no rebound and no guarding.   Musculoskeletal:         General: No tenderness or edema. Normal range of motion.     Neurological: He is alert. No cranial nerve deficit or sensory deficit. GCS score is 15.   Patient has 5/5 strength bilateral upper and lower extremities.  Patient is awake, alert, appropriately conversational to simple questions.  No obvious focal neurological deficits at this time.   Skin: Skin is warm and dry. No rash noted. No erythema. No pallor.         ED Course   Procedures  Labs Reviewed   CBC W/ AUTO DIFFERENTIAL - Abnormal       Result Value    WBC 6.34      RBC 3.98 (*)     Hemoglobin 13.1 (*)     Hematocrit 41.9       (*)     MCH 32.9 (*)     MCHC 31.3 (*)      RDW 12.2      Platelets 167      MPV 10.9      Immature Granulocytes 0.2      Gran # (ANC) 3.7      Immature Grans (Abs) 0.01      Lymph # 1.9      Mono # 0.6      Eos # 0.1      Baso # 0.03      nRBC 0      Gran % 57.6      Lymph % 30.4      Mono % 9.1      Eosinophil % 2.2      Basophil % 0.5      Differential Method Automated     COMPREHENSIVE METABOLIC PANEL    Sodium 141      Potassium 4.2      Chloride 110      CO2 23      Glucose 98      BUN 18      Creatinine 1.0      Calcium 9.4      Total Protein 7.3      Albumin 3.5      Total Bilirubin 0.9      Alkaline Phosphatase 60      AST 27      ALT 16      eGFR >60.0      Anion Gap 8     ISTAT CHEM8          Imaging Results              MRI Brain W WO Contrast (Final result)  Result time 02/26/25 21:39:45      Final result by Paco Reyes MD (02/26/25 21:39:45)                   Impression:      Thin ribbon-like filling defect seen in the left transverse and sigmoid sinus.  Similar configuration with seen on prior MR dated 2023, noting the prior examination with significantly motion-limited.  This finding is favored to relate to chronic etiology or anatomical variation, such as an arachnoid trabeculation, over an acute etiology such as dural venous sinus thrombus.    Brain appears otherwise unchanged compared to same-date prior.  No diffusion restriction to suggest acute infarct.    Findings above were relayed by Abhay Feng MD  via [employer] secure chat to Zachery Crum MD with receipt confirmed on 02/26/2025 at 21:23.    Electronically signed by resident: Abhay Feng  Date:    02/26/2025  Time:    21:06    Electronically signed by: Paco Reyes MD  Date:    02/26/2025  Time:    21:39               Narrative:    EXAMINATION:  MRI BRAIN W WO CONTRAST    CLINICAL HISTORY:  Headache, new or worsening (Age >= 50y);MPRage protocol for cerebral venous thrombosis;    TECHNIQUE:  Multiplanar multisequence MR imaging of the brain was performed before and after the  administration of 10 mL Gadavist intravenous contrast.    COMPARISON:  MR brain same-date, 11/05/2023.    CTA head neck 12/14/2023.    FINDINGS:  Redemonstrated loss of flow void in the left transverse and sigmoid sinus.  This corresponds with a thin ribbon-like filling defect seen on post-contrast sequences (coronal 18:72-76, sagittal 17:37).    Unchanged prominence of the ventricles and sulci.  No hydrocephalus.    The brain otherwise appears unchanged compared to same-date prior.  No new parenchymal mass, hemorrhage, edema or recent or remote major vascular distribution infarct.    Remote left cerebellar infarct.  Patchy and confluent T2/FLAIR hyperintensity in the supratentorial white matter, nonspecific but most likely reflecting chronic microvascular ischemic changes.    No extra-axial blood or fluid collections.    Remaining T2 skull base flow voids are preserved.  Bone marrow signal intensity unremarkable.  Bilateral lens implants.                                       Medications   gadobutroL (GADAVIST) injection 10 mL (10 mLs Intravenous Given 2/26/25 2030)     Medical Decision Making  1. Differential Diagnosis includes:  Abnormal MRI, cerebral sinus thrombosis, chronic brain changes      2. Co Morbidities include:  Elderly   Increased patient risk:  History of subarachnoid hemorrhage      3. External notes reviewed:  Neurology notes      4. History sources independently obtained include:  Family      5. Discussion of management with: n/a      6. Independent intrepretation of tests include: n/a      7. Diagnostic tests or therapies considered but not ordered: n/a      8. Social determinants of health: n/a      9. Shared decision making includes:  Patient is a 79-year-old male presenting for possible abnormal MRI.  Patient was sent here for further evaluation with new imaging MRI including contrast.  Otherwise patient and family deny any new symptoms.  MRI reassuring.  At this time we will discharge.  The  patient and family agree with treatment and plan.  They understand return precautions which include but not limited to worsening of symptoms, new focal neurological deficits.  Otherwise advised to follow up with neurologist.  Patient and family have no questions at this time.    Risk  Prescription drug management.               ED Course as of 02/26/25 2214 Wed Feb 26, 2025 2123 As per radiology: there is a filling defect of the L transverse and sigmoid sinus, but I don't think it's thrombus, and it's not acute. looks ribbon-like, possibly arachnoid trabeculation. looked like that in 2023. no DWI [RT]   2158 MRI finding:  Thin ribbon-like filling defect seen in the left transverse and sigmoid sinus.  Similar configuration with seen on prior MR dated 2023, noting the prior examination with significantly motion-limited.  This finding is favored to relate to chronic etiology or anatomical variation, such as an arachnoid trabeculation, over an acute etiology such as dural venous sinus thrombus.     Brain appears otherwise unchanged compared to same-date prior.  No diffusion restriction to suggest acute infarct.   [RT]   2159 Reviewed with the patient and family.  At this time we will discharge.  The patient and family agree with treatment and plan [RT]      ED Course User Index  [RT] Zachery Crum MD                           Clinical Impression:  Final diagnoses:  [R90.89] Abnormal imaging of central nervous system (Primary)          ED Disposition Condition    Discharge Stable          ED Prescriptions    None       Follow-up Information       Follow up With Specialties Details Why Contact Info    JAYLYN Zamora MD Internal Medicine In 2 days For re-evaluation [address]  [address]  [facility] [address]  [phone]      Raghav Carbajal DO Neurology In 2 days  [address] [facility] [id number] [phone]                 [1]   Social History  Tobacco Use    Smoking status: Never    Smokeless  tobacco: Never   Substance Use Topics    Alcohol use: Yes     Alcohol/week: 1.0 standard drink of alcohol     Types: 1 Standard drinks or equivalent per week     Comment: ocassionally    Drug use: No        Zachery Crum MD  02/26/25 2703

## 2025-02-27 NOTE — ED TRIAGE NOTES
Pt had MRI today and was told to come to the ER to get a repeat MRI to rule out a blood clot. Pt denies any and all symptoms at this time.

## 2025-03-05 ENCOUNTER — OFFICE VISIT (OUTPATIENT)
Dept: NEUROSURGERY | Facility: CLINIC | Age: 80
End: 2025-03-05
Payer: MEDICARE

## 2025-03-05 VITALS — DIASTOLIC BLOOD PRESSURE: 72 MMHG | HEART RATE: 94 BPM | SYSTOLIC BLOOD PRESSURE: 118 MMHG

## 2025-03-05 DIAGNOSIS — G91.0 COMMUNICATING HYDROCEPHALUS: Primary | ICD-10-CM

## 2025-03-05 DIAGNOSIS — Z86.79 HISTORY OF SUBDURAL HEMORRHAGE: ICD-10-CM

## 2025-03-05 PROCEDURE — 99213 OFFICE O/P EST LOW 20 MIN: CPT | Mod: PBBFAC | Performed by: NEUROLOGICAL SURGERY

## 2025-03-05 PROCEDURE — 99215 OFFICE O/P EST HI 40 MIN: CPT | Mod: S$PBB,,, | Performed by: NEUROLOGICAL SURGERY

## 2025-03-05 PROCEDURE — 99999 PR PBB SHADOW E&M-EST. PATIENT-LVL III: CPT | Mod: PBBFAC,,, | Performed by: NEUROLOGICAL SURGERY

## 2025-03-05 NOTE — PROGRESS NOTES
CHIEF COMPLAINT:  R/o NPH    HPI:  Sam Gamino is a 79 y.o.  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  Patient has a history of perimesencephalic subarachnoid hemorrhage in 2023.  Patient has been followed closely by Neurology for memory disturbance.  Patient presents with his wife and both report difficulties with short-term memory, trouble with completing tasks, and remembering directions while driving.  He also notes difficulty picking up his legs which results in stumbling and falls.  He also reports urinary incontinence which may have more to do with not being able to get to the bathroom quick enough.  Patient's wife states that the symptoms have been present since his subarachnoid hemorrhage.    Review of patient's allergies indicates:   Allergen Reactions    Lotensin [benazepril]      cough       Past Medical History:   Diagnosis Date    Abnormal echocardiogram 02/08/2013    Mild MVR 3/07    Atherosclerosis of aorta 11/01/2023 2023: Mentioned in chart.    CAD (coronary artery disease) 02/08/2013    Angio 3/07 Dr. Lin    Chronic diastolic (congestive) heart failure     History of pulmonary embolism 11/14/2023    CTA - 11/14/23 - Bilateral    History of subdural hemorrhage 11/04/2023 11/5/2023: SAH.    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 Pelayo     Past Surgical History:   Procedure Laterality Date    EYE SURGERY Bilateral 2016    Cataracts     Family History   Problem Relation Name Age of Onset    Cancer Mother          uterine    No Known Problems Father      Hypertension Brother Orlando     No Known Problems Daughter Katherine     No Known Problems  Sister Lynda     No Known Problems Sister Cheryl     No Known Problems Brother Black     No Known Problems Brother Naseem     No Known Problems Brother      Thyroid disease Sister Halina      Social History[1]     Review of Systems   Constitutional: Negative.    HENT:  Negative for congestion, ear discharge, ear pain, hearing loss, nosebleeds, sinus pain and tinnitus.    Eyes: Negative.    Respiratory: Negative.     Cardiovascular:  Negative for chest pain, palpitations, claudication and leg swelling.   Gastrointestinal:  Negative for abdominal pain, blood in stool, constipation, diarrhea, melena and vomiting.   Genitourinary:  Positive for urgency. Negative for flank pain and frequency.   Musculoskeletal:  Positive for falls.   Skin: Negative.    Neurological: Negative.  Negative for dizziness, tingling, tremors, sensory change, speech change, focal weakness, seizures, loss of consciousness, weakness and headaches.   Endo/Heme/Allergies:  Does not bruise/bleed easily.   Psychiatric/Behavioral:  Positive for memory loss. Negative for depression, hallucinations, substance abuse and suicidal ideas. The patient is not nervous/anxious and does not have insomnia.        OBJECTIVE:   Vital Signs:  Pulse: 94 (03/05/25 1035)  BP: 118/72 (03/05/25 1035)    Physical Exam:  Constitutional: Patient sitting comfortably in chair. Appears well developed and well nourished.  Skin: Exposed areas are intact without abnormal markings, rashes or other lesions.  HEENT: Normocephalic. Normal conjunctivae.  Cardiovascular: Normal rate and regular rhythm.  Respiratory: Chest wall rises and falls symmetrically, without signs of respiratory distress.  Abdomen: Soft and non-tender.  Extremities: Warm and without edema. Calves supple, non-tender.  Psych/Behavior: Normal affect.    Neurological:    Mental status: Alert and oriented. Conversational and appropriate.       Cranial Nerves: VFF to confrontation. PERRL. EOMI without nystagmus. Facial STLT  normal and symmetric. Strong, symmetric muscles of mastication. Facial strength full and symmetric. Hearing equal bilaterally to finger rub. Palate and uvula rise and fall normally in midline. Shoulder shrug 5/5 strength. Tongue midline.     Motor:    Upper:  Deltoids Triceps Biceps WE WF     R 5/5 5/5 5/5 5/5 5/5 5/5    L 5/5 5/5 5/5 5/5 5/5 5/5      Lower:  HF KE KF DF PF EHL    R 5/5 5/5 5/5 5/5 5/5 5/5    L 5/5 5/5 5/5 5/5 5/5 5/5     Sensory: Intact sensation to light touch in all extremities. Romberg negative.    Reflexes:          DTR: 2+ symmetrically throughout.     Figueredo's: Negative.     Babinski's: Negative.     Clonus: Negative.    Cerebellar: Finger-to-nose and rapid alternating movements normal.     Gait:  In wheel chair    Diagnostic Results:  All imaging was independently reviewed by me.    MRI brain, dated 2/26/25:  1. Ventriculomegaly without significant periventricular edema  2. Castro ratio 0.36      ASSESSMENT/PLAN:     Problem List Items Addressed This Visit          Neuro    History of subdural hemorrhage     Other Visit Diagnoses         Communicating hydrocephalus    -  Primary    Relevant Orders    FL LUMBAR PUNCTURE THERAPEUTIC WITH IMAGING            Patient has possible triad of NPH including memory disturbance, gait disturbance, and urinary dysfunction.  He was also found to have ventriculomegaly with an Castro ratio of 0.36.  Given his history of subarachnoid hemorrhage, he is predisposed to communicating hydrocephalus.  I recommend he undergo a high-volume lumbar puncture with physical therapy evaluation.    - ordered high volume lumbar puncture (removal of 30-40 cc)  - PT eval before and after lumbar puncture  - return to clinic once above is completed    The patient understands and agrees with the plan of care. All questions were answered.    Time spent on this encounter: 40 minutes. This includes face-to-face time and non-face to face time preparing to see the patient (eg,  review of tests), obtaining and/or reviewing separately obtained history, documenting clinical information in the electronic or other health record, independently interpreting results and communicating results to the patient/family/caregiver, or care coordinator.          .               [1]   Social History  Tobacco Use    Smoking status: Never    Smokeless tobacco: Never   Substance Use Topics    Alcohol use: Yes     Alcohol/week: 1.0 standard drink of alcohol     Types: 1 Standard drinks or equivalent per week     Comment: ocassionally    Drug use: No

## 2025-03-18 ENCOUNTER — LAB VISIT (OUTPATIENT)
Dept: LAB | Facility: HOSPITAL | Age: 80
End: 2025-03-18
Payer: MEDICARE

## 2025-03-18 DIAGNOSIS — R41.3 MEMORY LOSS: ICD-10-CM

## 2025-03-18 LAB
FOLATE SERPL-MCNC: 6.2 NG/ML (ref 4–24)
HCYS SERPL-SCNC: 7.5 UMOL/L (ref 4–16.5)
T4 FREE SERPL-MCNC: 1.03 NG/DL (ref 0.71–1.51)
TREPONEMA PALLIDUM IGG+IGM AB [PRESENCE] IN SERUM OR PLASMA BY IMMUNOASSAY: NONREACTIVE
TSH SERPL DL<=0.005 MIU/L-ACNC: 2.16 UIU/ML (ref 0.4–4)
VIT B12 SERPL-MCNC: 321 PG/ML (ref 210–950)

## 2025-03-18 PROCEDURE — 83090 ASSAY OF HOMOCYSTEINE: CPT

## 2025-03-18 PROCEDURE — 84439 ASSAY OF FREE THYROXINE: CPT

## 2025-03-18 PROCEDURE — 82607 VITAMIN B-12: CPT

## 2025-03-18 PROCEDURE — 84425 ASSAY OF VITAMIN B-1: CPT

## 2025-03-18 PROCEDURE — 86593 SYPHILIS TEST NON-TREP QUANT: CPT

## 2025-03-18 PROCEDURE — 83921 ORGANIC ACID SINGLE QUANT: CPT

## 2025-03-18 PROCEDURE — 36415 COLL VENOUS BLD VENIPUNCTURE: CPT

## 2025-03-18 PROCEDURE — 82746 ASSAY OF FOLIC ACID SERUM: CPT

## 2025-03-18 PROCEDURE — 84443 ASSAY THYROID STIM HORMONE: CPT

## 2025-03-20 ENCOUNTER — TELEPHONE (OUTPATIENT)
Dept: INTERVENTIONAL RADIOLOGY/VASCULAR | Facility: CLINIC | Age: 80
End: 2025-03-20
Payer: MEDICARE

## 2025-03-21 ENCOUNTER — TELEPHONE (OUTPATIENT)
Dept: NEUROLOGY | Facility: CLINIC | Age: 80
End: 2025-03-21
Payer: MEDICARE

## 2025-03-21 DIAGNOSIS — G91.0 COMMUNICATING HYDROCEPHALUS: Primary | ICD-10-CM

## 2025-03-21 NOTE — TELEPHONE ENCOUNTER
Attempted to call pt. LVM stating that MD wanted to cancel appt since he wanted to see pt after her f/u with IR prod with neurosurgery. Gave name and call back number if pt had any further questions or concerns and currently have time slot on hold for Friday, May 2nd @ 2pm for new f/u

## 2025-03-23 LAB — METHYLMALONATE SERPL-SCNC: 0.13 UMOL/L

## 2025-03-24 LAB — VIT B1 BLD-MCNC: 53 UG/L (ref 38–122)

## 2025-03-31 ENCOUNTER — OFFICE VISIT (OUTPATIENT)
Dept: CARDIOLOGY | Facility: CLINIC | Age: 80
End: 2025-03-31
Attending: INTERNAL MEDICINE
Payer: MEDICARE

## 2025-03-31 VITALS
BODY MASS INDEX: 31.42 KG/M2 | WEIGHT: 232 LBS | HEART RATE: 95 BPM | DIASTOLIC BLOOD PRESSURE: 78 MMHG | SYSTOLIC BLOOD PRESSURE: 122 MMHG | HEIGHT: 72 IN

## 2025-03-31 DIAGNOSIS — Z86.79 HISTORY OF SUBDURAL HEMORRHAGE: ICD-10-CM

## 2025-03-31 DIAGNOSIS — I44.0 ATRIOVENTRICULAR BLOCK, FIRST DEGREE: ICD-10-CM

## 2025-03-31 DIAGNOSIS — G91.0 COMMUNICATING HYDROCEPHALUS: Primary | ICD-10-CM

## 2025-03-31 DIAGNOSIS — E04.1 THYROID NODULE: ICD-10-CM

## 2025-03-31 DIAGNOSIS — I36.1 NONRHEUMATIC TRICUSPID VALVE REGURGITATION: ICD-10-CM

## 2025-03-31 DIAGNOSIS — I50.42 HEART FAILURE, SYSTOLIC AND DIASTOLIC, CHRONIC: ICD-10-CM

## 2025-03-31 DIAGNOSIS — I34.0 NONRHEUMATIC MITRAL VALVE REGURGITATION: ICD-10-CM

## 2025-03-31 DIAGNOSIS — I27.22 PULMONARY HYPERTENSION DUE TO LEFT HEART DISEASE: ICD-10-CM

## 2025-03-31 DIAGNOSIS — C85.9A LYMPHOMA IN REMISSION: ICD-10-CM

## 2025-03-31 DIAGNOSIS — I25.10 CORONARY ARTERY DISEASE INVOLVING NATIVE CORONARY ARTERY OF NATIVE HEART WITHOUT ANGINA PECTORIS: ICD-10-CM

## 2025-03-31 DIAGNOSIS — Z79.01 CHRONIC ANTICOAGULATION: ICD-10-CM

## 2025-03-31 DIAGNOSIS — E78.00 HYPERCHOLESTEROLEMIA: ICD-10-CM

## 2025-03-31 DIAGNOSIS — I10 PRIMARY HYPERTENSION: ICD-10-CM

## 2025-03-31 DIAGNOSIS — Z86.711 HISTORY OF PULMONARY EMBOLISM: ICD-10-CM

## 2025-03-31 DIAGNOSIS — I50.32 HEART FAILURE, DIASTOLIC, CHRONIC: ICD-10-CM

## 2025-03-31 DIAGNOSIS — I70.0 ATHEROSCLEROSIS OF AORTA: ICD-10-CM

## 2025-03-31 LAB
OHS QRS DURATION: 116 MS
OHS QTC CALCULATION: 422 MS

## 2025-03-31 PROCEDURE — 99999 PR PBB SHADOW E&M-EST. PATIENT-LVL III: CPT | Mod: PBBFAC,,, | Performed by: INTERNAL MEDICINE

## 2025-03-31 PROCEDURE — 93010 ELECTROCARDIOGRAM REPORT: CPT | Mod: ,,, | Performed by: INTERNAL MEDICINE

## 2025-03-31 PROCEDURE — 93005 ELECTROCARDIOGRAM TRACING: CPT

## 2025-03-31 PROCEDURE — 99214 OFFICE O/P EST MOD 30 MIN: CPT | Mod: 25,S$PBB,, | Performed by: INTERNAL MEDICINE

## 2025-03-31 PROCEDURE — 99213 OFFICE O/P EST LOW 20 MIN: CPT | Mod: PBBFAC,25 | Performed by: INTERNAL MEDICINE

## 2025-03-31 PROCEDURE — 93005 ELECTROCARDIOGRAM TRACING: CPT | Mod: PBBFAC | Performed by: INTERNAL MEDICINE

## 2025-03-31 RX ORDER — FUROSEMIDE 40 MG/1
40 TABLET ORAL DAILY
Qty: 120 TABLET | Refills: 3 | Status: SHIPPED | OUTPATIENT
Start: 2025-03-31

## 2025-03-31 RX ORDER — LOSARTAN POTASSIUM 100 MG/1
100 TABLET ORAL DAILY
Qty: 90 TABLET | Refills: 3 | Status: SHIPPED | OUTPATIENT
Start: 2025-03-31

## 2025-03-31 RX ORDER — SPIRONOLACTONE 25 MG/1
25 TABLET ORAL DAILY
Qty: 90 TABLET | Refills: 3 | Status: SHIPPED | OUTPATIENT
Start: 2025-03-31

## 2025-03-31 NOTE — PROGRESS NOTES
Subjective:     Sam Gamino is a 79 y.o. male with hypertension and hypercholesterolemia. He is mildly obese but used to be severely obese. He was diagnosed with lymphoma in 1992. He underwent an autologous bone marrow transplantation at [facility]. In 5/2022 he was admitted with heart failure. He was noted to have moderate to severe mitral regurgitation and severe pulmonary hypertension. He was diuresed. On 5/16/2022 he had an echocardiogram that revealed normal left ventricular size and systolic function. The ejection fraction was 55%. There was moderate mitral regurgitation. There was mild to moderate tricuspid regurgitation. He denies any exertional chest pain. In 9/2022 he was able to walk about two blocks before he had to stop due to dyspnea. He had mild edema of his legs. He was given empagliflozin and he was breathing better and able to walk farther. The edema resolved. On 11/1/2023 he is noted to have a first degree atrioventricular block with a MS interval of 320 ms. On 11/5/2023 he had sudden onset of a severe headache. He found to have had a subarachnoid hemorrhage. He was treated conservatively. During the stay he had a computed tomography angiography scan of his head and neck. The images revealed bilateral pulmonary emboli and he was anticoagulated with apixiban. In 4/2024 he was admitted with heart failure. He was continuing rehabilitation until 8/2024. No chest pain or shortness of breath. No palpitations or weak spells. No bleeding. No clear issues with any of his prescribed medications. Feeling well overall.      Congestive Heart Failure  Presents for follow-up visit. Pertinent negatives include no abdominal pain, chest pain, chest pressure, claudication, edema, fatigue, muscle weakness, near-syncope, nocturia, orthopnea, palpitations, paroxysmal nocturnal dyspnea, shortness of breath or unexpected weight change. The symptoms have been stable.   Coronary Artery Disease  Presents  for follow-up visit. Pertinent negatives include no chest pain, chest pressure, chest tightness, dizziness, leg swelling, muscle weakness, palpitations, shortness of breath or weight gain. Risk factors include obesity.   Hypertension  This is a chronic problem. The current episode started more than 1 year ago. The problem is unchanged. The problem is controlled (usually 110-120/70-80 mmHg at home). Pertinent negatives include no anxiety, blurred vision, chest pain, headaches, malaise/fatigue, neck pain, orthopnea, palpitations, peripheral edema, PND, shortness of breath or sweats. There is no history of chronic renal disease.   Hyperlipidemia  This is a chronic problem. The current episode started more than 1 year ago. The problem is controlled. Recent lipid tests were reviewed and are normal. Exacerbating diseases include obesity. He has no history of chronic renal disease, diabetes, hypothyroidism, liver disease or nephrotic syndrome. Pertinent negatives include no chest pain, focal sensory loss, focal weakness, leg pain, myalgias or shortness of breath.   Cerebrovascular Accident  Pertinent negatives include no abdominal pain, chest pain, chills, coughing, fatigue, fever, headaches, myalgias, nausea, neck pain, numbness, rash, vertigo, vomiting or weakness.       Review of Systems   Constitutional: Negative for chills, fatigue, fever, malaise/fatigue, unexpected weight change and weight gain.   HENT:  Negative for nosebleeds and tinnitus.    Eyes:  Negative for blurred vision, double vision, vision loss in left eye and vision loss in right eye.   Cardiovascular:  Negative for chest pain, claudication, dyspnea on exertion, irregular heartbeat, leg swelling, near-syncope, orthopnea, palpitations, paroxysmal nocturnal dyspnea and syncope.   Respiratory:  Negative for chest tightness, cough, hemoptysis, shortness of breath and wheezing.    Endocrine: Negative for cold intolerance and heat intolerance.    Hematologic/Lymphatic: Negative for bleeding problem. Does not bruise/bleed easily.   Skin:  Negative for color change and rash.   Musculoskeletal:  Negative for back pain, falls, muscle weakness, myalgias and neck pain.   Gastrointestinal:  Negative for abdominal pain, diarrhea, dysphagia, heartburn, hematemesis, hematochezia, hemorrhoids, jaundice, melena, nausea and vomiting.   Genitourinary:  Negative for dysuria, hematuria and nocturia.   Neurological:  Negative for dizziness, focal weakness, headaches, light-headedness, loss of balance, numbness, tremors, vertigo and weakness.   Psychiatric/Behavioral:  Negative for altered mental status, depression and memory loss. The patient is not nervous/anxious.    Allergic/Immunologic: Negative for hives and persistent infections.       Current Outpatient Medications on File Prior to Visit   Medication Sig Dispense Refill    apixaban (ELIQUIS) 2.5 mg Tab Take 1 tablet (2.5 mg total) by mouth 2 (two) times daily. 60 tablet 11    cholecalciferol, vitamin D3, (VITAMIN D3) 50 mcg (2,000 unit) Cap Take 1 capsule by mouth once daily.      cyanocobalamin 1,000 mcg/mL injection INJECT 1ML IN THE MUSCLE EVERY 30 DAYS 10 mL 1    donepeziL (ARICEPT) 5 MG tablet Take 1 tablet (5 mg total) by mouth every evening. 90 tablet 3    empagliflozin (JARDIANCE) 10 mg tablet Take 1 tablet (10 mg total) by mouth once daily. 90 tablet 3    fluticasone propionate (FLONASE) 50 mcg/actuation nasal spray SHAKE LIQUID AND USE 2 SPRAYS(100 MCG) IN EACH NOSTRIL EVERY DAY 16 g 3    furosemide (LASIX) 40 MG tablet Take 1 tablet (40 mg total) by mouth once daily. 90 tablet 3    LINZESS 290 mcg Cap capsule TAKE 1 CAPSULE(290 MCG) BY MOUTH BEFORE BREAKFAST 90 capsule 3    solifenacin (VESICARE) 10 MG tablet TAKE 1 TABLET(10 MG) BY MOUTH EVERY DAY 90 tablet 3    spironolactone (ALDACTONE) 25 MG tablet Take 1 tablet (25 mg total) by mouth once daily. 90 tablet 3    tamsulosin (FLOMAX) 0.4 mg Cap Take 1  capsule (0.4 mg total) by mouth once daily. 90 capsule 1    losartan (COZAAR) 100 MG tablet Take 1 tablet (100 mg total) by mouth once daily. (Patient not taking: Reported on 3/31/2025) 90 tablet 3     No current facility-administered medications on file prior to visit.       /78   Pulse 95   Ht 6' (1.829 m)   Wt 105.2 kg (232 lb)   SpO2 (P) 98%   BMI 31.46 kg/m²     Objective:     Physical Exam  Constitutional:       General: He is not in acute distress.     Appearance: Normal appearance. He is well-developed. He is not toxic-appearing or diaphoretic.   HENT:      Head: Normocephalic and atraumatic.      Nose: Nose normal.   Eyes:      General:         Right eye: No discharge.         Left eye: No discharge.      Conjunctiva/sclera:      Right eye: Right conjunctiva is not injected.      Left eye: Left conjunctiva is not injected.      Pupils: Pupils are equal.      Right eye: Pupil is round.      Left eye: Pupil is round.   Neck:      Thyroid: No thyromegaly.      Vascular: No carotid bruit or JVD.   Cardiovascular:      Rate and Rhythm: Normal rate and regular rhythm. No extrasystoles are present.     Chest Wall: PMI is not displaced.      Pulses:           Radial pulses are 2+ on the right side and 2+ on the left side.        Femoral pulses are 2+ on the right side and 2+ on the left side.       Dorsalis pedis pulses are 2+ on the right side and 2+ on the left side.        Posterior tibial pulses are 2+ on the right side and 2+ on the left side.      Heart sounds: S1 normal and S2 normal. Murmur heard.      High-pitched blowing holosystolic murmur is present at the apex. P2 pronounced.      Gallop present. S3 and S4 sounds present.   Pulmonary:      Effort: Pulmonary effort is normal.      Breath sounds: No rales.   Abdominal:      Palpations: Abdomen is soft.      Tenderness: There is no abdominal tenderness.   Musculoskeletal:      Cervical back: Neck supple.      Right lower leg: No swelling. No  edema.      Left lower leg: No swelling. No edema.   Lymphadenopathy:      Head:      Right side of head: No submandibular adenopathy.      Left side of head: No submandibular adenopathy.      Cervical: No cervical adenopathy.   Skin:     General: Skin is warm and dry.      Findings: No rash.      Nails: There is no clubbing.   Neurological:      General: No focal deficit present.      Mental Status: He is alert and oriented to person, place, and time. He is not disoriented.      Cranial Nerves: No cranial nerve deficit.   Psychiatric:         Attention and Perception: Attention and perception normal.         Mood and Affect: Mood and affect normal.         Speech: Speech normal.         Behavior: Behavior normal.         Thought Content: Thought content normal.         Cognition and Memory: Cognition and memory normal.         Judgment: Judgment normal.         Assessment:     1. Heart failure, diastolic, chronic    2. Nonrheumatic mitral valve regurgitation    3. Nonrheumatic tricuspid valve regurgitation    4. Pulmonary hypertension due to left heart disease    5. Coronary artery disease involving native coronary artery of native heart without angina pectoris    6. Atrioventricular block, first degree    7. History of pulmonary embolism    8. History of subdural hemorrhage    9. Atherosclerosis of aorta    10. Primary hypertension    11. Hypercholesterolemia    12. Lymphoma in remission    13. Thyroid nodule          Plan:     1. Heart Failure, Diastolic, Chronic   5/16/2022: Echo: Normal left ventricular size and systolic function. EF 55%. Moderate MR. Mild to moderate TR.   6/6/2023: Echo: Normal left ventricular size and systolic function. EF 60%. LVGLS -15%. Moderate diastolic dysfunction. Mild to moderate AR. Mild to moderate MR. Mildly enlarged ascending aorta. Small pericardial effusion.   10/4/2024: Echo: Normal left ventricular size and systolic function. EF 60%. Moderate diastolic dysfunction. Mild  aortic valve sclerosis. Moderate AR. Moderate MR. Mild to moderate TR. SPAP 53. Small pericardial effusion.   8/8/2022: Spironolactone 25 mg Q24 was begun and KCl 20 mEq Q24 was discontinued.   10/10/2022: Empagliflozin 10 mg Q24 was begun.   10/27/2022: BNP 69.   11/1/2023: Metoprolol 50 mg Q24 was discontinued as the NJ was 320 ms on an ECG.    On empagliflozin 10 mg Q24, losartan 100 mg Q24, spironolactone 25 mg Q24 and furosemide 40 mg Q24.   May take additional furosemide 40 mg Q24 in early afternoon PRN for edema which he has not needed.   Encouraged to use pressure stockings.   Appears well compensated.       2. Mitral Regurgitation   5/16/2022: Echo: Moderate MR.   6/6/2023: Echo: Mild to moderate MR.   10/4/2024: Echo: Moderate MR.     3. Tricuspid Regurgitation   5/16/2022: Echo: Mild to moderate TR.   10/4/2024: Echo: Mild to moderate TR.    4. Pulmonary Hypertension   Appears due to left sided heart disease as well as history of PE.   10/4/2024: Echo: SPAP 53.    5. Coronary Artery Disease   3/2007: Underwent coronary angiogram. Appeared to have had mild disease.   No aspirin as anticoagulated with apixiban.    6. Atrioventricular Block, First Degree   11/1/2023: ECG: NJ interval 320 ms.   11/1/2023: Metoprolol 50 mg Q24 was discontinued.    12/19/2023: Holter: Sinus rhythm 94 () bpm. Long NJ interval. Narrow QRS. No prolonged pauses. Frequent APC's - 15/hr. Rare VPC's - 2/hr. 3 V couplets. 2 V triplets. No reported symptoms.  No betablocker.   To be followed.    7. Chronic Anticoagulation   11/14/2023: CTA of Head and Neck: Bilateral pulmonary emboli.   Appears the PE was unprovoked.   9/19/2024: Apixiban 2.5 mg Q12 was resumed. Then no aspirin.    On apixiban 2.5 mg Q12.   No bleeding.     8. History of Pulmonary Embolism   11/14/2023: CTA of Head and Neck: Bilateral pulmonary emboli.   Appears the PE was unprovoked.   On apixiban.    9. History of Subarachnoid Hemorrhage   11/2023:  SAH.   Conservative care.   11/2023: Diagnosed with pulmonary embolism and benefit with anticoagulation felt to outweigh risk.     10. Atherosclerosis of Aorta   2023: Mentioned in chart.   No aspirin.    11. Hypertension   2002: Diagnosed.   11/1/2023: Metoprolol 50 mg Q24 was discontinued.     On losartan 100 mg Q24, spironolactone 25 mg Q24 and furosemide 40 mg Q24.   Keeping log at home.   Well controlled.      12. Hypercholesterolemia   2002: Statin was begun.   On atorvastatin 40 mg Q24.   4/12/2022: Chol 132. HDL 41. LDL 71. .   On atorvastatin 40 mg Q24.   Very favorable lipid panel.    13. Mild Obesity   6/27/2022: Weight 117 kg. BMI 35.   6/28/2023: Weight 119 kg. BMI 35.   12/7/2023: Weight 110 kg. BMI 32.   3/31/2025: Weight 105 kg. BMI 31.   Encouraged to lose more weight.    14. 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    15. Primary Care   Dr. Herber Zamora.    F/u 4 months.    Ema Montemayor M.D.

## 2025-04-04 ENCOUNTER — TELEPHONE (OUTPATIENT)
Dept: NEUROSURGERY | Facility: CLINIC | Age: 80
End: 2025-04-04
Payer: MEDICARE

## 2025-04-04 NOTE — TELEPHONE ENCOUNTER
Called and spoke to pt's wife abt lumbar puncture scheduled for Mon 4/7. Explained we need a baseline PT evaluation prior to the lumbar puncture so we will have to r/s until after he is evaluated on 4/17. She confirmed understanding. I let her know [facility] has been notified and they should give them a call to reschedule soon and the LP must be after 4/17 when he is scheduled for PT. They can call us back with any other questions in the meantime.

## 2025-04-08 ENCOUNTER — PATIENT MESSAGE (OUTPATIENT)
Dept: OTOLARYNGOLOGY | Facility: CLINIC | Age: 80
End: 2025-04-08
Payer: MEDICARE

## 2025-04-08 ENCOUNTER — OFFICE VISIT (OUTPATIENT)
Dept: INTERNAL MEDICINE | Facility: CLINIC | Age: 80
End: 2025-04-08
Attending: INTERNAL MEDICINE
Payer: MEDICARE

## 2025-04-08 VITALS
HEIGHT: 72 IN | SYSTOLIC BLOOD PRESSURE: 102 MMHG | HEART RATE: 84 BPM | DIASTOLIC BLOOD PRESSURE: 68 MMHG | BODY MASS INDEX: 31.15 KG/M2 | OXYGEN SATURATION: 96 % | WEIGHT: 230 LBS

## 2025-04-08 DIAGNOSIS — Z13.89 SCREENING FOR OBESITY: ICD-10-CM

## 2025-04-08 DIAGNOSIS — I10 PRIMARY HYPERTENSION: Primary | ICD-10-CM

## 2025-04-08 DIAGNOSIS — Z13.31 SCREENING FOR DEPRESSION: ICD-10-CM

## 2025-04-08 DIAGNOSIS — E66.811 OBESITY (BMI 30.0-34.9): ICD-10-CM

## 2025-04-08 DIAGNOSIS — R41.3 MEMORY LOSS: ICD-10-CM

## 2025-04-08 DIAGNOSIS — Z13.39 SCREENING FOR ALCOHOLISM: ICD-10-CM

## 2025-04-08 DIAGNOSIS — Z86.79 HISTORY OF SUBARACHNOID HEMORRHAGE: ICD-10-CM

## 2025-04-08 DIAGNOSIS — E78.00 HYPERCHOLESTEROLEMIA: ICD-10-CM

## 2025-04-08 DIAGNOSIS — I50.32 HEART FAILURE, DIASTOLIC, CHRONIC: ICD-10-CM

## 2025-04-08 DIAGNOSIS — I34.0 NONRHEUMATIC MITRAL VALVE REGURGITATION: ICD-10-CM

## 2025-04-08 PROCEDURE — G0444 DEPRESSION SCREEN ANNUAL: HCPCS | Mod: 59,S$GLB,, | Performed by: INTERNAL MEDICINE

## 2025-04-08 PROCEDURE — 99214 OFFICE O/P EST MOD 30 MIN: CPT | Mod: 25,S$GLB,, | Performed by: INTERNAL MEDICINE

## 2025-04-08 PROCEDURE — G0447 BEHAVIOR COUNSEL OBESITY 15M: HCPCS | Mod: 59,S$GLB,, | Performed by: INTERNAL MEDICINE

## 2025-04-08 PROCEDURE — G0442 ANNUAL ALCOHOL SCREEN 15 MIN: HCPCS | Mod: 59,S$GLB,, | Performed by: INTERNAL MEDICINE

## 2025-04-08 NOTE — PROGRESS NOTES
Subjective     Patient ID: Sam Gamino is a 79 y.o. male.    Chief Complaint: Annual Exam (Fells a bit hoarse since being in the office )    He had a subarachnoid hemorrhage in November 2023.  Since then he has had poor balance, memory loss, and urinary incontinence.  He is going to get a lumbar puncture to evaluate for normal pressure hydrocephalus.  He has lost 15 pounds over the past 6 months.      Hypertension  This is a chronic problem. The current episode started more than 1 year ago. The problem is controlled.             Review of Systems   Genitourinary:  Positive for bladder incontinence.   Neurological:  Positive for coordination difficulties.          Objective     Physical Exam  Vitals and nursing note reviewed.   Constitutional:       Appearance: He is well-developed.   HENT:      Head: Normocephalic and atraumatic.   Eyes:      Pupils: Pupils are equal, round, and reactive to light.   Neck:      Vascular: No JVD.   Cardiovascular:      Rate and Rhythm: Normal rate and regular rhythm. Occasional Extrasystoles are present.     Heart sounds: Murmur heard.      Systolic murmur is present with a grade of 3/6.      Comments: Holosystolic murmur greatest at the left lower sternal border.  Trace edema  Pulmonary:      Effort: Pulmonary effort is normal.   Abdominal:      General: Abdomen is protuberant. Bowel sounds are normal.      Palpations: Abdomen is soft.      Tenderness: There is no rebound.   Musculoskeletal:      Right lower leg: Edema present.      Left lower leg: Edema present.   Neurological:      Mental Status: He is alert.            Assessment and Plan     1. Primary hypertension  Overview:  2002: Diagnosed.         2. Nonrheumatic mitral valve regurgitation  Overview:  2007 - Mild  4/22 - Mod/Severe      3. Hypercholesterolemia  Overview:  2002: Statin was begun.      4. Memory loss    5. Obesity (BMI 30.0-34.9)    6. Heart failure, diastolic, chronic  Overview:  5/16/2022: Echo: Normal  left ventricular size and systolic function. EF 55%. Moderate MR. Mild to moderate TR.   6/6/2023: Echo: Normal left ventricular size and systolic function. EF 60%. LVGLS -15%. Moderate diastolic dysfunction. Mild to moderate AR. Mild to moderate MR. Mildly enlarged ascending aorta. Small pericardial effusion.  10/4/2024: Echo: Normal left ventricular size and systolic function. EF 60%. Moderate diastolic dysfunction. Mild aortic valve sclerosis. Moderate AR. Moderate MR. Mild to moderate TR. SPAP 53. Small pericardial effusion.          7. History of subarachnoid hemorrhage  Overview:  11/5/2023: SAH.          PLAN:  Per orders and D/C instructions.  Continue diet and/or meds for HTN, and high cholesterol, which are stable.  Continue with weight loss for obesity, which is improving.  Follow-up with cardiology for mitral valve regurgitation and chronic diastolic heart failure.  Follow-up with neurology for history of subarachnoid hemorrhage and memory loss.     Screening: The patient was screened for depression with the PHQ2 questionnaire and possible health consequences were discussed with the patient, who understands (15 minutes spent).       The patient was screened for the misuse of alcohol, by asking the number of drinks per average week, and if pt has had more than 4 drinks (more than 3 for women and elderly) in 1 day within the past year. The health and legal consequences of misuse were discussed (15 minutes spent).       The patient was screened for obesity (BMI>30), Since the current BMI is > 30, the possible consequences of obesity, as well as the benefits of diet, exercise, and weight loss were discussed. Any behavioral risks were identified, and methods to achieve appropriate treatment goals were discussed (15 minutes spent).    Follow up in about 4 months (around 8/8/2025).

## 2025-04-16 NOTE — PROGRESS NOTES
Outpatient Rehab    Physical Therapy Evaluation (only)    Patient Name: Sam Gamino  MRN: [id number]  YOB: 1945  Encounter Date: 4/17/2025    Therapy Diagnosis:   Encounter Diagnosis   Name Primary?    Communicating hydrocephalus      Physician: Roddy Peres DO    Physician Orders: Eval and Treat  Medical Diagnosis: Communicating hydrocephalus    Visit # / Visits Authorized:  1 / 1  Insurance Authorization Period: 3/31/2025 to 3/31/2026  Date of Evaluation: 4/17/2025  Plan of Care Certification: 4/17/2025 to 4/25/25     Time In: 1531   Time Out: 1616  Total Time: 45   Total Billable Time: 45 minutes    Intake Outcome Measure for FOTO Survey    Therapist reviewed FOTO scores for Sam Gamino on 4/17/2025.   FOTO report - see Media section or FOTO account episode details.     Intake Score: 0% FOTO survey not issued due to pt only scheduled for 2 visits    Precautions     Standard and fall      Subjective   History of Present Illness  Sam is a 79 y.o. male who reports to physical therapy with a chief concern of walking and balance.                 History of Present Condition/Illness: Pt is a pleasant 80 y/o male referred by Dr. Peres due to communicating hydrocephalus. Pt is scheduled to undergo lumbar puncture on 4/22/25 and he is here to establish baseline measurements with PT. Pt's wife Eileen is present and provides the history. She indicates pt had a SAH in 2023 and recovered fairly well. He had inpatient rehab for 2-3 weeks and then a short period of home therapy. Pt then experienced a fall and was admitted to rehab once again. Pt's wife feels the current difficulties he is experiencing began in January of this year. She noticed pt was having more difficulty with his balance and memory.          Prior Therapy: had PT after SAH in 2023~ did about 2-3 weeks in inpatient rehab; also went to rehab after a fall; had HH therapy for a short time  Social History:  lives with his spouse  Falls:  had a fall while crossing the street about 2 months ago   DME: Walker, RW, tub bench( step in tub), elevated toilet   Home Environment: 3rd floor condo on [address]   Exercise Routine / History: none   Family Present at time of Eval: wife, Eileen   Occupation: retired   Prior Level of Function: pt had regained most of his independence after SAH in 2023~ began to decline in early 2024  Current Level of Function: pt is able to do his own care at home, but wife keeps an eye on him    Pain:  Current 0/10, worst 0/10, best 0/10   Location: N/A       Taken from pt's visit to neurology on 3/5/25:    HPI:  Sam Gamino is a 79 y.o.  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  Patient has a history of perimesencephalic subarachnoid hemorrhage in 2023.  Patient has been followed closely by Neurology for memory disturbance.  Patient presents with his wife and both report difficulties with short-term memory, trouble with completing tasks, and remembering directions while driving.  He also notes difficulty picking up his legs which results in stumbling and falls.  He also reports urinary incontinence which may have more to do with not being able to get to the bathroom quick enough.  Patient's wife states that the symptoms have been present since his subarachnoid hemorrhage.      Past Medical History/Physical Systems Review:   Sam Gamino  has a past medical history of Abnormal echocardiogram, Atherosclerosis of aorta, CAD (coronary artery disease), Chronic diastolic (congestive) heart failure, History of pulmonary embolism, History of subdural hemorrhage, Hypercholesterolemia, Lymphoma in remission, Normal cardiac stress test, Obesity (BMI 30.0-34.9), Pernicious anemia, Presbylarynx, and Thyroid nodule.    Sam Gamino  has a past surgical history that includes Eye surgery (Bilateral, 2016).    Sam has a current medication list which includes the  following prescription(s): apixaban, cholecalciferol (vitamin d3), cyanocobalamin, donepezil, empagliflozin, fluticasone propionate, furosemide, linzess, losartan, solifenacin, spironolactone, and tamsulosin.    Review of patient's allergies indicates:   Allergen Reactions    Lotensin [benazepril]      cough        Objective           Observation: pleasant and cooperative   Speech: mostly clear, but volume is low~ wife has noticed this    Mental status: alert, not oriented to time( pt gives incorrect answers for day, month and year)  Appearance: Casually dressed and Well groomed  Behavior:  calm, cooperative, and adequate rapport can be established  Attention Span and Concentration:  Normal    Posture Alignment : Pt demonstrates forward head, rounded shoulders, increased thoracic kyphosis in sitting  In standing, pt demonstrates B knee flexion, flexed forward trunk, flexed hips and forward head    Dominant hand:  right     Skin integrity:  Intact where visible    Visual/Auditory: denies changes since prior eye glasses prescription about 6 months ago      ROM:   GROSS AROM/PROM  UPPER EXTREMITY  (R) UE: NT  (L) UE: NT  LOWER EXTREMITY  (R) LE: appears WFL via observation during LE MMT  (L) LE: appears WFL via observation during LE MMT    Edema: minimal to B LE       Lower Extremity Strength  Right LE  Left LE    Hip flexion:  4/5 Hip flexion: 4/5   Knee extension: 5/5 Knee extension: 5/5   Knee flexion: 5/5 Knee flexion: 5/5   Ankle dorsiflexion:  5/5 Ankle dorsiflexion: 5/5   Ankle plantarflexion:  4+/5 Ankle plantarflexion: 4+/5   Hip abduction: 4+/5 Hip abduction: 4+/5   Hip adduction: 4+/5 Hip adduction 4+/5   Hip extension: 4+/5 Hip extension: 4+/5     Upper extremity strength: Not tested due to time constraints    Coordination:   Rapid Alternating Movements: NT  Point to Point:    -Finger to Nose: NT   -Heel to Shin: NT    Sensation: appears intact to B UE and LE LT  Proprioception: NT    Tone/Spasticity: No  abnormal tone or spasticity noted to B UE or LE    Functional Mobility (Bed mobility, transfers)  Mod I sit<> stand transfers        Evaluation   Single Limb Stance R LE NT  (<10 sec = HIGH FALL RISK)   Single Limb Stance L LE NT  (<10 sec = HIGH FALL RISK)   30 second Chair Rise 6 completed with arms   5 times sit to stand NT   TUG 23 seconds   Self selected walking speed 0.46 m/sec (6m/13s)   Fast walking speed NT     30 second chair rise below average score:   Age  Men  Women    75-79  <14  <13    A below average score indicates a risk for fall.    5x sit to stand normative information:   >12 sec= fall risk for general elderly  >16 sec= fall risk for Parkinson's disease  >10 sec= balance/vestibular dysfunction (<61 y/o)  >14.2 sec= balance/vestibular dysfunction (>61 y/o)  >12 sec= fall risk for CVA        Gait Assessment:   - AD used: none  - Assistance: SBA  - Stairs: Not tested    GAIT DEVIATIONS:   Sam displays the following deviations with ambulation: decreased step and stride lengths, decreased B foot clearance during swing phase, decreased arm swing( L more so than R), flexed posturing, wide base of support   Impairments contributing to deviations: impaired motor control, decreased balance, postural limitations        Tinetti Balance Assessment  Balance:  1. Sitting Balance 1   Leans/ slides in chair= 0   Steady= 1  2. Rises from chair 1   Unable without help= 0   Able, uses arms= 1   Able without use of arms= 2  3. Attempts to rise 1   Unable without help= 0   Able, >1 attempt required-= 1   Able, 1 attempt= 2  4. Immediate standing balance (1st 5 seconds) 2   Unsteady (swaggers, moves feet, trunk sway)= 0   Steady but uses walker or other support= 1   Steady without walker or other support= 2  5. Nudged 2   Begins to fall= 0   Staggers, grabs, catches self= 1   Steady= 2  6. Standing balance 1   Unsteady= 0   Steady but wide ADRIAN or uses AD=1   Narrow ADRIAN without AD=2  7. Eyes closed 1   Unsteady=  "0   Steady= 1  8. Turning 360 degrees 2   Discontinuous steps= 0   Continuous steps= 1   Unsteady= 0   Steady= 1  9. Sitting down 1   Unsafe= 0   Uses arms or not in a smooth motion= 1   Safe, smooth motion= 2  Balance score= 12  Gait:  1. Initiation 1   hesitates or multiple attempts to start= 0   No hesitancy= 1  2. Step length 0   Step to= 0   One foot passes= 1   reciprocal pattern= 2  3. Step height 0    Neither foot clears floor= 0   One foot clears floor= 1   Both feet clear floor= 2  4. Step symmetry 1   Not symmetrical= 0   Appears symmetrical= 1  5. Step continuity 1   Not continuous= 0   Appears continuous= 1  6. Path 2   Marked deviation= 0   Mild/moderate deviation or uses A.D.= 1   Straight without A.D.= 2  7. Trunk 1   Marked sway or uses A.D.= 0   No sway, but flexes knees or back, spread arms out while walking= 1   No sway, no flexion, no use of UE, no use of A.D.= 2  8. Walking stance 0   Heels apart= 0   Heels almost touching while walking= 1  Gait score= 6  Total score= 18 , high fall risk    0-18= high fall risk  19-23= moderate fall risk  24-28= low fall risk      Endurance Deficit: at least minimal       Time Entry(in minutes):  PT Evaluation (Moderate) Time Entry: 45    Assessment & Plan   Assessment  Sam presents with a condition of Moderate complexity.   Presentation of Symptoms: Changing  Will Comorbidities Impact Care: No       Functional Limitations: Activity tolerance, Ambulating on uneven surfaces, Carrying objects, Decreased ambulation distance/endurance, Gait limitations, Increased risk of fall, Maintaining balance, Standing tolerance  Impairments: Abnormal gait, Impaired cognition, Impaired balance, Activity intolerance, Impaired physical strength  Personal Factors Affecting Prognosis: Cognitive impairment, Physical limitations    Patient Goal for Therapy (PT): " to make me better"  Prognosis: Fair  Assessment Details: Sam is a very pleasant male who comes to rehab today " for evaluation prior to lumbar puncture on 4/22/25. His wife indicates pt's functional status and memory changed sometime around January of this year. She endorses changes to his gait pattern and memory. PT was able to establish baseline measures prior to his lumbar puncture. Pt's B LE strength is mildly impaired, most notably at his hip flexors. His 30 second chair rise score of 6 reps is very limited and is indicative of fall risk. Pt's TUG score of 23 seconds also places him at risk for falls. Pt's Tinetti Balance and Gait Assessment score of 18/26 places him at the high end of the high fall risk group. Lastly, pt's current self-selected walking speed of 0.46 m/sec places him in the 60-79 % limited range when compared to independent, safe ambulators. Pt's gait pattern is shuffling in nature, with decreased arm swing. PT will perform the same objective measures when he returns to clinic on 4/22/25 to see if there is any change following lumbar puncture.    Plan  From a physical therapy perspective, the patient would benefit from: Skilled Rehab Services    Planned therapy interventions include: Other (Comment). Repeat objective measures to assess pt following lumbar puncture          Visit Frequency: 1 times Per Week for 1 Weeks.       This plan was discussed with Patient and Caregiver.   Discussion participants: Agreed Upon Plan of Care  Plan details: Pt to return to clinic on 4/22/25 for repeat of objective measures taken today. Pt scheduled for lumbar puncture in the AM and will come to therapy in the afternoon.          Patient's spiritual, cultural, and educational needs considered and patient agreeable to plan of care and goals.     Education  Education was done with Patient and Other recipient present. The patient's learning style includes Listening.   They identified as Spouse/significant other. The reported learning style is Listening. The recipient Verbalizes understanding.     PT reviewed pt's test  results with pt and wife and indicated that these are baseline measures. These will be repeated on 4/22/25 to see if there are changes following lumbar puncture.       Goals:   Active       Short term goals       Pt will increase his 30 second chair rise score to 7-8 reps for improved functional LE strength and muscular endurance       Start:  04/17/25    Expected End:  04/25/25            Pt will improve his TUG score to 20 seconds for safer household ambulation with decreased fall risk       Start:  04/17/25    Expected End:  04/25/25            Pt will increase his self-selected walking speed to 0.55 m/sec for safer limited community ambulation with decreased fall risk       Start:  04/17/25    Expected End:  04/25/25            Pt will improve his score on the Tinetti Balance and Gait Assessment to 20/26 or > for overall decrease in fall risk       Start:  04/17/25    Expected End:  04/25/25                Jase Evans, PT

## 2025-04-17 ENCOUNTER — CLINICAL SUPPORT (OUTPATIENT)
Dept: REHABILITATION | Facility: HOSPITAL | Age: 80
End: 2025-04-17
Attending: NEUROLOGICAL SURGERY
Payer: MEDICARE

## 2025-04-17 DIAGNOSIS — G91.0 COMMUNICATING HYDROCEPHALUS: ICD-10-CM

## 2025-04-17 PROCEDURE — 97162 PT EVAL MOD COMPLEX 30 MIN: CPT | Mod: PO

## 2025-04-18 RX ORDER — TAMSULOSIN HYDROCHLORIDE 0.4 MG/1
0.4 CAPSULE ORAL
Qty: 90 CAPSULE | Refills: 1 | Status: SHIPPED | OUTPATIENT
Start: 2025-04-18

## 2025-04-21 NOTE — PROGRESS NOTES
"  Outpatient Rehab    Physical Therapy Discharge    Patient Name: Sam Gamino  MRN: [id number]  YOB: 1945  Encounter Date: 4/22/2025    Therapy Diagnosis:   Encounter Diagnosis   Name Primary?    Impaired functional mobility, balance, gait, and endurance Yes     Physician: Roddy Peres DO    Physician Orders: Eval and Treat  Medical Diagnosis: Communicating hydrocephalus    Visit # / Visits Authorized:  1 / 12  Insurance Authorization Period: 4/22/2025 to 12/31/2025  Date of Evaluation: 4/17/25  Plan of Care Certification: 4/17/25 to 4/25/25      Time In: 1601   Time Out: 1645  Total Time: 44   Total Billable Time: 44 minutes           Precautions     Standard and fall        Subjective   Pt reports his lumbar puncture went well this morning. " I think my head is a little clearer.".  Family / care giver present for this visit:  (wife)  Pain reported as 0/10.      Objective            Lower Extremity Strength~ tested in sitting  Right LE eval  4/22/25 Left LE eval  4/22/25   Hip flexion:  4/5 4+/5 Hip flexion: 4/5 4+/5   Knee extension: 5/5 5/5 Knee extension: 5/5 5/5   Knee flexion: 5/5 5/5 Knee flexion: 5/5 4+/5   Ankle dorsiflexion:  5/5 5/5 Ankle dorsiflexion: 5/5 5/5   Ankle plantarflexion:  4+/5 5/5 Ankle plantarflexion: 4+/5 5/5   Hip abduction: 4+/5 5/5 Hip abduction: 4+/5 5/5   Hip adduction: 4+/5 5/5 Hip adduction 4+/5 5/5   Hip extension: 4+/5 4+/5 Hip extension: 4+/5 4+/5         Evaluation 4/22/25   Single Limb Stance R LE NT  (<10 sec = HIGH FALL RISK) NT  (<10 sec = HIGH FALL RISK)   Single Limb Stance L LE NT  (<10 sec = HIGH FALL RISK) NT  (<10 sec = HIGH FALL RISK)   30 second Chair Rise 6 completed with arms 9 completed with arms   5 times sit to stand NT NT   TUG 23 seconds 17.5 seconds, average of 2 trials   Self selected walking speed 0.46 m/sec (6m/13s) 0.55 m/sec (6m/11s)   Fast walking speed NT NT      30 second chair rise below average score:   Age                 Men      "            Women     75-79               <14                  <13     A below average score indicates a risk for fall.     5x sit to stand normative information:   >12 sec= fall risk for general elderly  >16 sec= fall risk for Parkinson's disease  >10 sec= balance/vestibular dysfunction (<61 y/o)  >14.2 sec= balance/vestibular dysfunction (>61 y/o)  >12 sec= fall risk for CVA    Gait Assessment:   - AD used: none  - Assistance: SBA  - Stairs: Not tested     GAIT DEVIATIONS:              Sam displays the following deviations with ambulation: decreased step and stride lengths, decreased B foot clearance during swing phase(improved on R), decreased arm swing( L more so than R), flexed posturing, wide base of support              Impairments contributing to deviations: impaired motor control, decreased balance, postural limitations            Tinetti Balance Assessment  Balance:  1. Sitting Balance 1              Leans/ slides in chair= 0              Steady= 1  2. Rises from chair 1              Unable without help= 0              Able, uses arms= 1              Able without use of arms= 2  3. Attempts to rise 2              Unable without help= 0              Able, >1 attempt required-= 1              Able, 1 attempt= 2  4. Immediate standing balance (1st 5 seconds) 2              Unsteady (swaggers, moves feet, trunk sway)= 0              Steady but uses walker or other support= 1              Steady without walker or other support= 2  5. Nudged 2              Begins to fall= 0              Staggers, grabs, catches self= 1              Steady= 2  6. Standing balance 1              Unsteady= 0              Steady but wide ADRIAN or uses AD=1              Narrow ADRIAN without AD=2  7. Eyes closed 1              Unsteady= 0              Steady= 1  8. Turning 360 degrees 2              Discontinuous steps= 0              Continuous steps= 1              Unsteady= 0              Steady= 1  9. Sitting down 1               Unsafe= 0              Uses arms or not in a smooth motion= 1              Safe, smooth motion= 2  Balance score= 13  Gait:  1. Initiation 1              hesitates or multiple attempts to start= 0              No hesitancy= 1  2. Step length 2              Step to= 0              One foot passes= 1              reciprocal pattern= 2  3. Step height 0                         Neither foot clears floor= 0              One foot clears floor= 1              Both feet clear floor= 2  4. Step symmetry 1              Not symmetrical= 0              Appears symmetrical= 1  5. Step continuity 1              Not continuous= 0              Appears continuous= 1  6. Path 2              Marked deviation= 0              Mild/moderate deviation or uses A.D.= 1              Straight without A.D.= 2  7. Trunk 1              Marked sway or uses A.D.= 0              No sway, but flexes knees or back, spread arms out while walking= 1              No sway, no flexion, no use of UE, no use of A.D.= 2  8. Walking stance 0              Heels apart= 0              Heels almost touching while walking= 1  Gait score= 8  Total score= 18, high fall risk at time of evaluation; 21, moderate fall risk on 4/22/25     0-18= high fall risk  19-23= moderate fall risk  24-28= low fall risk         Treatment:  Therapeutic Activity  TA 1: time to perform all objective measurements and review with pt and wife    Time Entry(in minutes):  Therapeutic Activity Time Entry: 44    Assessment & Plan   Assessment: Mr. Gamino returned to PT clinic today after undergoing lumbar puntcure this morning. Based on the procedure notes, 32 mL of CSF was removed and pt had no complications. With regard to objective testing, pt demonstrated several improvements based on his scores at time of evaluation. First, he demonstrated slightly increased strength grades for B LE hip flexion, hip abduction/adduction and ankle plantarflexion. He increased his 30 second chair rise score  from 6 reps to 9 reps. TUG time improved from 23 seconds to 17.5 seconds and self-selected walking speed increased from 0.46 m/sec to 0.55 m/sec. Lastly, pt's Tinetti Balance and Gait Assessment score improved from 18/28 to 21/28, moving pt from the high fall risk category to the medium fall risk group. While pt still tends to shuffle his feet during ambulation, his R foot is clearing the floor a bit better and his step/stride lengths seem to be slightly greater than at time of evaluation.  Evaluation/Treatment Tolerance: Patient tolerated treatment well    Patient's spiritual, cultural, and educational needs considered and patient agreeable to plan of care and goals.     Education  Education was done with Patient and Other recipient present. The patient's learning style includes Listening. The patient Verbalizes understanding. Eileen participated in education. They identified as Spouse/significant other. The reported learning style is Listening. The recipient Verbalizes understanding.     PT reviewed all objective tests and comparisons to evaluation numbers. PT indicated that pt's medical team will read therapist's note and decide on the next steps for pt from a medical standpoint. PT answered all questions to pt and wife's satisfaction.       Plan: Pt is discharged from [facility] at this time, following completion of pre/post lumbar puncture measurements.    Goals:   Resolved       Short term goals       Pt will increase his 30 second chair rise score to 7-8 reps for improved functional LE strength and muscular endurance (Met)       Start:  04/17/25    Expected End:  04/25/25    Resolved:  04/22/25         Pt will improve his TUG score to 20 seconds for safer household ambulation with decreased fall risk (Met)       Start:  04/17/25    Expected End:  04/25/25    Resolved:  04/22/25         Pt will increase his self-selected walking speed to 0.55 m/sec for safer limited community ambulation with decreased fall risk (Met)        Start:  04/17/25    Expected End:  04/25/25    Resolved:  04/22/25         Pt will improve his score on the Tinetti Balance and Gait Assessment to 20/26 or > for overall decrease in fall risk (Met)       Start:  04/17/25    Expected End:  04/25/25    Resolved:  04/22/25             Jase Evans, PT

## 2025-04-22 ENCOUNTER — CLINICAL SUPPORT (OUTPATIENT)
Dept: REHABILITATION | Facility: HOSPITAL | Age: 80
End: 2025-04-22
Attending: NEUROLOGICAL SURGERY
Payer: MEDICARE

## 2025-04-22 ENCOUNTER — HOSPITAL ENCOUNTER (OUTPATIENT)
Dept: RADIOLOGY | Facility: HOSPITAL | Age: 80
Discharge: HOME | End: 2025-04-22
Attending: NEUROLOGICAL SURGERY
Payer: MEDICARE

## 2025-04-22 DIAGNOSIS — G91.0 COMMUNICATING HYDROCEPHALUS: ICD-10-CM

## 2025-04-22 DIAGNOSIS — Z74.09 IMPAIRED FUNCTIONAL MOBILITY, BALANCE, GAIT, AND ENDURANCE: Primary | ICD-10-CM

## 2025-04-22 PROCEDURE — 97530 THERAPEUTIC ACTIVITIES: CPT | Mod: PO

## 2025-04-22 PROCEDURE — 62329 THER SPI PNXR CSF FLUOR/CT: CPT | Mod: ,,,

## 2025-04-22 PROCEDURE — 62329 THER SPI PNXR CSF FLUOR/CT: CPT | Performed by: RADIOLOGY

## 2025-04-22 PROCEDURE — 63600175 PHARM REV CODE 636 W HCPCS: Performed by: NEUROLOGICAL SURGERY

## 2025-04-22 RX ORDER — LIDOCAINE HYDROCHLORIDE 10 MG/ML
5 INJECTION, SOLUTION INFILTRATION; PERINEURAL ONCE
Status: COMPLETED | OUTPATIENT
Start: 2025-04-22 | End: 2025-04-22

## 2025-04-22 RX ADMIN — LIDOCAINE HYDROCHLORIDE 5 ML: 10 INJECTION, SOLUTION INFILTRATION; PERINEURAL at 10:04

## 2025-04-22 NOTE — PROCEDURES
Radiology Post-Procedure Note    Pre Op Diagnosis: NPH    Post Op Diagnosis: Same    Procedure: lumbar puncture    Procedure performed by: Jules Silverio PA-C, Rosalie De Oliveira MD    Written Informed Consent Obtained: Yes    Specimen Removed: 32 mL CSF    Estimated Blood Loss: Minimal    Findings: Following written informed consent and sterile prep and drape, a 22 gauge spinal needle was inserted at L2 - L3 intralaminar space under fluoroscopic surveillance. Opening pressure was measured as 24 cm H2O. Closing pressure was measured as <16 cm H2O.  32 mL clear CSF removed and sent to the lab for further analysis.  There were no complications.    Patient tolerated procedure well.  See report for further details.    Jules Silverio PA-C  Interventional Radiology

## 2025-04-22 NOTE — H&P
Radiology History & Physical      SUBJECTIVE:     Chief Complaint: Memory and coordination disturbances    History of Present Illness:  Sam Gamino is a 79 y.o. male who presents for lumbar puncture for r/o NPH  Past Medical History:   Diagnosis Date    Abnormal echocardiogram 02/08/2013    Mild MVR 3/07    Atherosclerosis of aorta 11/01/2023 2023: Mentioned in chart.    CAD (coronary artery disease) 02/08/2013    Angio 3/07 Dr. Lin    Chronic diastolic (congestive) heart failure     History of pulmonary embolism 11/14/2023    CTA - 11/14/23 - Bilateral    History of subdural hemorrhage 11/04/2023 11/5/2023: SAH.    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 Pelayo     Past Surgical History:   Procedure Laterality Date    EYE SURGERY Bilateral 2016    Cataracts       Home Meds:   Prior to Admission medications    Medication Sig Start Date End Date Taking? Authorizing Provider   apixaban (ELIQUIS) 2.5 mg Tab Take 1 tablet (2.5 mg total) by mouth 2 (two) times daily. 3/31/25   Ema Montemayor MD   cholecalciferol, vitamin D3, (VITAMIN D3) 50 mcg (2,000 unit) Cap Take 1 capsule by mouth once daily.    Provider, Historical   cyanocobalamin 1,000 mcg/mL injection INJECT 1ML IN THE MUSCLE EVERY 30 DAYS 9/19/24   JAYLYN Zamora MD   donepeziL (ARICEPT) 5 MG tablet Take 1 tablet (5 mg total) by mouth every evening. 8/14/24 8/14/25  JAYLYN Zamora MD   empagliflozin (JARDIANCE) 10 mg tablet Take 1 tablet (10 mg total) by mouth once daily. 3/31/25   Ema Montemayor MD   fluticasone propionate (FLONASE) 50 mcg/actuation nasal spray SHAKE LIQUID AND USE 2 SPRAYS(100 MCG) IN EACH NOSTRIL EVERY DAY 3/7/24   JAYLYN Zamora MD    furosemide (LASIX) 40 MG tablet Take 1 tablet (40 mg total) by mouth once daily. 3/31/25   Ema Montemayor MD   LINZESS 290 mcg Cap capsule TAKE 1 CAPSULE(290 MCG) BY MOUTH BEFORE BREAKFAST 1/5/25   JAYLYN Zamora MD   losartan (COZAAR) 100 MG tablet Take 1 tablet (100 mg total) by mouth once daily. 3/31/25   Ema Montemayor MD   solifenacin (VESICARE) 10 MG tablet TAKE 1 TABLET(10 MG) BY MOUTH EVERY DAY 8/4/24   JAYLYN Zamora MD   spironolactone (ALDACTONE) 25 MG tablet Take 1 tablet (25 mg total) by mouth once daily. 3/31/25   Ema Montemayor MD   tamsulosin (FLOMAX) 0.4 mg Cap TAKE 1 CAPSULE(0.4 MG) BY MOUTH EVERY DAY 4/18/25   JAYLYN Zamora MD     Anticoagulants/Antiplatelets:  Eliquis - held for 5 days    Allergies:   Review of patient's allergies indicates:   Allergen Reactions    Lotensin [benazepril]      cough     Sedation History:  no adverse reactions    Review of Systems:   Hematological: no known coagulopathies  Respiratory: no shortness of breath  Cardiovascular: no chest pain  Gastrointestinal: no abdominal pain  Genito-Urinary: no dysuria  Musculoskeletal: negative  Neurological: no TIA or stroke symptoms         OBJECTIVE:     Vital Signs (Most Recent)       Physical Exam:    General: no acute distress  Mental Status: alert and oriented to person, place and time  HEENT: normocephalic, atraumatic  Chest: unlabored breathing  Heart: regular heart rate  Abdomen: distended  Extremity: moves all extremities    ASSESSMENT/PLAN:     Sedation Plan: local only  Patient will undergo: lumbar puncture    Jules Silverio PA-C  Interventional Radiology

## 2025-04-29 ENCOUNTER — EXTERNAL HOME HEALTH (OUTPATIENT)
Dept: HOME HEALTH SERVICES | Facility: HOSPITAL | Age: 80
End: 2025-04-29
Payer: MEDICARE

## 2025-04-29 ENCOUNTER — DOCUMENTATION ONLY (OUTPATIENT)
Dept: INTERNAL MEDICINE | Facility: CLINIC | Age: 80
End: 2025-04-29
Payer: MEDICARE

## 2025-04-29 DIAGNOSIS — Z78.9 KNOWN MEDICAL PROBLEMS: ICD-10-CM

## 2025-04-29 DIAGNOSIS — I10 PRIMARY HYPERTENSION: Primary | ICD-10-CM

## 2025-04-29 DIAGNOSIS — I50.32 HEART FAILURE, DIASTOLIC, CHRONIC: ICD-10-CM

## 2025-04-29 NOTE — PROGRESS NOTES
The patient's electronic chart was reviewed during this month. The patient's medical, functional, and psychosocial needs were assessed. Need for Home health care, PT, OT, , psychiatric care, and hospice was assessed. All preventative care measures were reviewed and updated. All medications were reviewed and reconciled. Potential drug interactions and medication adherence was reviewed. Prescriptions were renewed as appropriate. Education was provided to the patient and/or caregiver as needed, and all questions were answered. Over 30 minutes were spent providing these non-face-to-face services during this calendar month.    These services were provided directly by myself, the physician.       Home health plan of care was reviewed and signed.

## 2025-04-30 ENCOUNTER — TELEPHONE (OUTPATIENT)
Dept: NEUROLOGY | Facility: CLINIC | Age: 80
End: 2025-04-30
Payer: MEDICARE

## 2025-04-30 ENCOUNTER — OFFICE VISIT (OUTPATIENT)
Dept: NEUROSURGERY | Facility: CLINIC | Age: 80
End: 2025-04-30
Payer: MEDICARE

## 2025-04-30 VITALS — DIASTOLIC BLOOD PRESSURE: 66 MMHG | SYSTOLIC BLOOD PRESSURE: 99 MMHG | HEART RATE: 93 BPM

## 2025-04-30 DIAGNOSIS — Z86.79 HISTORY OF SUBARACHNOID HEMORRHAGE: ICD-10-CM

## 2025-04-30 DIAGNOSIS — G91.2 NPH (NORMAL PRESSURE HYDROCEPHALUS): Primary | ICD-10-CM

## 2025-04-30 PROCEDURE — 99999 PR PBB SHADOW E&M-EST. PATIENT-LVL III: CPT | Mod: PBBFAC,,, | Performed by: NEUROLOGICAL SURGERY

## 2025-04-30 PROCEDURE — 99213 OFFICE O/P EST LOW 20 MIN: CPT | Mod: PBBFAC | Performed by: NEUROLOGICAL SURGERY

## 2025-04-30 PROCEDURE — 99215 OFFICE O/P EST HI 40 MIN: CPT | Mod: S$PBB,,, | Performed by: NEUROLOGICAL SURGERY

## 2025-04-30 NOTE — H&P (VIEW-ONLY)
CHIEF COMPLAINT:  R/o NPH    INTERVAL HISTORY (4/30/25):  Patient returns after undergoing high-volume lumbar puncture (32cc removed, OP 24, CP 16).  According to physical therapy patient improved and multiple domains and wife thought that he improved with his walking.  However he had 2 falls since the lumbar puncture, 1 occurred shortly after in 1 occurred about 1 week after.  The wife did not notice any significant change in his cognition.    Both patient and wife would be interested in pursuing shunt placement.    HPI:  Sam Gamino is a 79 y.o.  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  Patient has a history of perimesencephalic subarachnoid hemorrhage in 2023.  Patient has been followed closely by Neurology for memory disturbance.  Patient presents with his wife and both report difficulties with short-term memory, trouble with completing tasks, and remembering directions while driving.  He also notes difficulty picking up his legs which results in stumbling and falls.  He also reports urinary incontinence which may have more to do with not being able to get to the bathroom quick enough.  Patient's wife states that the symptoms have been present since his subarachnoid hemorrhage.    Review of patient's allergies indicates:   Allergen Reactions    Lotensin [benazepril]      cough       Past Medical History:   Diagnosis Date    Abnormal echocardiogram 02/08/2013    Mild MVR 3/07    Atherosclerosis of aorta 11/01/2023 2023: Mentioned in chart.    CAD (coronary artery disease) 02/08/2013    Angio 3/07 Dr. Lin    Chronic diastolic (congestive) heart failure     History of pulmonary embolism 11/14/2023    CTA - 11/14/23 - Bilateral    History of subdural hemorrhage 11/04/2023 11/5/2023: SAH.    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 Pelayo     Past Surgical History:   Procedure Laterality Date    EYE SURGERY Bilateral 2016    Cataracts     Family History   Problem Relation Name Age of Onset    Cancer Mother          uterine    No Known Problems Father      Hypertension Brother Orlando     No Known Problems Daughter Katherine     No Known Problems Sister Lynda     No Known Problems Sister Cheryl     No Known Problems Brother Black     No Known Problems Brother Naseem     No Known Problems Brother      Thyroid disease Sister Halina      Social History[1]     Review of Systems   Constitutional: Negative.    HENT:  Negative for congestion, ear discharge, ear pain, hearing loss, nosebleeds, sinus pain and tinnitus.    Eyes: Negative.    Respiratory: Negative.     Cardiovascular:  Negative for chest pain, palpitations, claudication and leg swelling.   Gastrointestinal:  Negative for abdominal pain, blood in stool, constipation, diarrhea, melena and vomiting.   Genitourinary:  Positive for urgency. Negative for flank pain and frequency.   Musculoskeletal:  Positive for falls.   Skin: Negative.    Neurological: Negative.  Negative for dizziness, tingling, tremors, sensory change, speech change, focal weakness, seizures, loss of consciousness, weakness and headaches.   Endo/Heme/Allergies:  Does not bruise/bleed easily.   Psychiatric/Behavioral:  Positive for memory loss. Negative for depression, hallucinations, substance abuse and suicidal ideas. The patient is not nervous/anxious and does not have insomnia.        OBJECTIVE:   Vital Signs:  Pulse: 93 (04/30/25 1507)  BP: 99/66 (04/30/25 1507)    Physical Exam:  Constitutional: Patient sitting comfortably in chair. Appears well developed and well nourished.  Skin: Exposed areas are intact without abnormal markings, rashes or other  lesions.  HEENT: Normocephalic. Normal conjunctivae.  Cardiovascular: Normal rate and regular rhythm.  Respiratory: Chest wall rises and falls symmetrically, without signs of respiratory distress.  Abdomen: Soft and non-tender.  Extremities: Warm and without edema. Calves supple, non-tender.  Psych/Behavior: Normal affect.    Neurological:    Mental status: Alert and oriented. Conversational and appropriate.       Cranial Nerves: VFF to confrontation. PERRL. EOMI without nystagmus. Facial STLT normal and symmetric. Strong, symmetric muscles of mastication. Facial strength full and symmetric. Hearing equal bilaterally to finger rub. Palate and uvula rise and fall normally in midline. Shoulder shrug 5/5 strength. Tongue midline.     Motor:    Upper:  Deltoids Triceps Biceps WE WF     R 5/5 5/5 5/5 5/5 5/5 5/5    L 5/5 5/5 5/5 5/5 5/5 5/5      Lower:  HF KE KF DF PF EHL    R 5/5 5/5 5/5 5/5 5/5 5/5    L 5/5 5/5 5/5 5/5 5/5 5/5     Sensory: Intact sensation to light touch in all extremities. Romberg negative.    Reflexes:          DTR: 2+ symmetrically throughout.     Figueredo's: Negative.     Babinski's: Negative.     Clonus: Negative.    Cerebellar: Finger-to-nose and rapid alternating movements normal.     Gait:  In wheel chair    Diagnostic Results:  All imaging was independently reviewed by me.    MRI brain, dated 2/26/25:  1. Ventriculomegaly without significant periventricular edema  2. Castro ratio 0.36      ASSESSMENT/PLAN:     Problem List Items Addressed This Visit          Neuro    History of subarachnoid hemorrhage    NPH (normal pressure hydrocephalus) - Primary    Relevant Orders    Case Request Operating Room: INSERTION, SHUNT, VENTRICULOPERITONEAL, USING COMPUTER-ASSISTED NAVIGATION (Completed)       Patient has triad of NPH including memory disturbance, gait disturbance, and urinary dysfunction.  He was also found to have ventriculomegaly with an Castro ratio of 0.36.  Given his history of  subarachnoid hemorrhage, he is predisposed to communicating hydrocephalus.      He underwent a high-volume lumbar puncture in which 32 cc of fluid was removed.  His opening pressure of 24 is higher than would be expected for normal pressure hydrocephalus.  Regardless, he improved objectively on multiple physical therapy measures.  His wife also subjectively reported improvements in his walking ability but no obvious improvements in his cognition.  Given these improvements both wife and patient would like to pursue  shunt placement.    - Surgery scheduled for 5/19/25 at [facility]  - Preop clearance needed from [facility] (may also need cards)  - Preop labs, EKG, CXR ordered   - Preop PAT appointment requested  - STOP TAKING ASPIRIN 7 days prior and ELIQUIS 3 days prior    The patient understands and agrees with the plan of care. All questions were answered.          .                 [1]   Social History  Tobacco Use    Smoking status: Never    Smokeless tobacco: Never   Substance Use Topics    Alcohol use: Yes     Alcohol/week: 1.0 standard drink of alcohol     Types: 1 Standard drinks or equivalent per week     Comment: ocassionally    Drug use: No

## 2025-04-30 NOTE — PROGRESS NOTES
CHIEF COMPLAINT:  R/o NPH    INTERVAL HISTORY (4/30/25):  Patient returns after undergoing high-volume lumbar puncture (32cc removed, OP 24, CP 16).  According to physical therapy patient improved and multiple domains and wife thought that he improved with his walking.  However he had 2 falls since the lumbar puncture, 1 occurred shortly after in 1 occurred about 1 week after.  The wife did not notice any significant change in his cognition.    Both patient and wife would be interested in pursuing shunt placement.    HPI:  Sam Gamino is a 79 y.o.  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  Patient has a history of perimesencephalic subarachnoid hemorrhage in 2023.  Patient has been followed closely by Neurology for memory disturbance.  Patient presents with his wife and both report difficulties with short-term memory, trouble with completing tasks, and remembering directions while driving.  He also notes difficulty picking up his legs which results in stumbling and falls.  He also reports urinary incontinence which may have more to do with not being able to get to the bathroom quick enough.  Patient's wife states that the symptoms have been present since his subarachnoid hemorrhage.    Review of patient's allergies indicates:   Allergen Reactions    Lotensin [benazepril]      cough       Past Medical History:   Diagnosis Date    Abnormal echocardiogram 02/08/2013    Mild MVR 3/07    Atherosclerosis of aorta 11/01/2023 2023: Mentioned in chart.    CAD (coronary artery disease) 02/08/2013    Angio 3/07 Dr. Lin    Chronic diastolic (congestive) heart failure     History of pulmonary embolism 11/14/2023    CTA - 11/14/23 - Bilateral    History of subdural hemorrhage 11/04/2023 11/5/2023: SAH.    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 Pelayo     Past Surgical History:   Procedure Laterality Date    EYE SURGERY Bilateral 2016    Cataracts     Family History   Problem Relation Name Age of Onset    Cancer Mother          uterine    No Known Problems Father      Hypertension Brother Orlando     No Known Problems Daughter Katherine     No Known Problems Sister Lynda     No Known Problems Sister Cheryl     No Known Problems Brother Black     No Known Problems Brother Naseem     No Known Problems Brother      Thyroid disease Sister Halina      Social History[1]     Review of Systems   Constitutional: Negative.    HENT:  Negative for congestion, ear discharge, ear pain, hearing loss, nosebleeds, sinus pain and tinnitus.    Eyes: Negative.    Respiratory: Negative.     Cardiovascular:  Negative for chest pain, palpitations, claudication and leg swelling.   Gastrointestinal:  Negative for abdominal pain, blood in stool, constipation, diarrhea, melena and vomiting.   Genitourinary:  Positive for urgency. Negative for flank pain and frequency.   Musculoskeletal:  Positive for falls.   Skin: Negative.    Neurological: Negative.  Negative for dizziness, tingling, tremors, sensory change, speech change, focal weakness, seizures, loss of consciousness, weakness and headaches.   Endo/Heme/Allergies:  Does not bruise/bleed easily.   Psychiatric/Behavioral:  Positive for memory loss. Negative for depression, hallucinations, substance abuse and suicidal ideas. The patient is not nervous/anxious and does not have insomnia.        OBJECTIVE:   Vital Signs:  Pulse: 93 (04/30/25 1507)  BP: 99/66 (04/30/25 1507)    Physical Exam:  Constitutional: Patient sitting comfortably in chair. Appears well developed and well nourished.  Skin: Exposed areas are intact without abnormal markings, rashes or other  lesions.  HEENT: Normocephalic. Normal conjunctivae.  Cardiovascular: Normal rate and regular rhythm.  Respiratory: Chest wall rises and falls symmetrically, without signs of respiratory distress.  Abdomen: Soft and non-tender.  Extremities: Warm and without edema. Calves supple, non-tender.  Psych/Behavior: Normal affect.    Neurological:    Mental status: Alert and oriented. Conversational and appropriate.       Cranial Nerves: VFF to confrontation. PERRL. EOMI without nystagmus. Facial STLT normal and symmetric. Strong, symmetric muscles of mastication. Facial strength full and symmetric. Hearing equal bilaterally to finger rub. Palate and uvula rise and fall normally in midline. Shoulder shrug 5/5 strength. Tongue midline.     Motor:    Upper:  Deltoids Triceps Biceps WE WF     R 5/5 5/5 5/5 5/5 5/5 5/5    L 5/5 5/5 5/5 5/5 5/5 5/5      Lower:  HF KE KF DF PF EHL    R 5/5 5/5 5/5 5/5 5/5 5/5    L 5/5 5/5 5/5 5/5 5/5 5/5     Sensory: Intact sensation to light touch in all extremities. Romberg negative.    Reflexes:          DTR: 2+ symmetrically throughout.     Figueredo's: Negative.     Babinski's: Negative.     Clonus: Negative.    Cerebellar: Finger-to-nose and rapid alternating movements normal.     Gait:  In wheel chair    Diagnostic Results:  All imaging was independently reviewed by me.    MRI brain, dated 2/26/25:  1. Ventriculomegaly without significant periventricular edema  2. Castro ratio 0.36      ASSESSMENT/PLAN:     Problem List Items Addressed This Visit          Neuro    History of subarachnoid hemorrhage    NPH (normal pressure hydrocephalus) - Primary    Relevant Orders    Case Request Operating Room: INSERTION, SHUNT, VENTRICULOPERITONEAL, USING COMPUTER-ASSISTED NAVIGATION (Completed)       Patient has triad of NPH including memory disturbance, gait disturbance, and urinary dysfunction.  He was also found to have ventriculomegaly with an Castro ratio of 0.36.  Given his history of  subarachnoid hemorrhage, he is predisposed to communicating hydrocephalus.      He underwent a high-volume lumbar puncture in which 32 cc of fluid was removed.  His opening pressure of 24 is higher than would be expected for normal pressure hydrocephalus.  Regardless, he improved objectively on multiple physical therapy measures.  His wife also subjectively reported improvements in his walking ability but no obvious improvements in his cognition.  Given these improvements both wife and patient would like to pursue  shunt placement.    - Surgery scheduled for 5/19/25 at [facility]  - Preop clearance needed from [facility] (may also need cards)  - Preop labs, EKG, CXR ordered   - Preop PAT appointment requested  - STOP TAKING ASPIRIN 7 days prior and ELIQUIS 3 days prior    The patient understands and agrees with the plan of care. All questions were answered.          .                 [1]   Social History  Tobacco Use    Smoking status: Never    Smokeless tobacco: Never   Substance Use Topics    Alcohol use: Yes     Alcohol/week: 1.0 standard drink of alcohol     Types: 1 Standard drinks or equivalent per week     Comment: ocassionally    Drug use: No

## 2025-05-02 ENCOUNTER — OFFICE VISIT (OUTPATIENT)
Dept: NEUROLOGY | Facility: CLINIC | Age: 80
End: 2025-05-02
Payer: MEDICARE

## 2025-05-02 VITALS
BODY MASS INDEX: 32.95 KG/M2 | DIASTOLIC BLOOD PRESSURE: 77 MMHG | WEIGHT: 243.25 LBS | SYSTOLIC BLOOD PRESSURE: 124 MMHG | HEART RATE: 91 BPM | HEIGHT: 72 IN

## 2025-05-02 DIAGNOSIS — G91.0 COMMUNICATING HYDROCEPHALUS: Primary | ICD-10-CM

## 2025-05-02 PROCEDURE — 99213 OFFICE O/P EST LOW 20 MIN: CPT | Mod: S$PBB,,, | Performed by: PSYCHIATRY & NEUROLOGY

## 2025-05-02 PROCEDURE — 99213 OFFICE O/P EST LOW 20 MIN: CPT | Mod: PBBFAC

## 2025-05-02 PROCEDURE — 99999 PR PBB SHADOW E&M-EST. PATIENT-LVL III: CPT | Mod: PBBFAC,GC,,

## 2025-05-02 NOTE — PROGRESS NOTES
[facility] - NEUROLOGY 7TH [place] [facility], [place]    Date: 5/2/25  Patient Name: Sam Gamino   MRN: [id number]   PCP: JAYLYN Zamora  Referring Provider: No ref. provider found    Assessment:   Sam Gamino is a 79 y.o. 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 Following with neurosurgery, pending shunt placement after is cleared medically.     Plan:   Will see how he is doing after shunt placement, which is planned for 5/19 as of now.    Consider if needs further evaluation at that time.    RTC 2-3 months.    Problem List Items Addressed This Visit    None  Visit Diagnoses         Communicating hydrocephalus    -  Primary            ROBERTA Carbajal D.O.  Neurology PGY-III  [facility] [address]    Subjective:   Patient seen in consultation at the request of No ref. provider found for the evaluation of memory impairment.     HPI: 2/7/25  Mr. 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 He presents to resident neurology clinic today for memory impairment, accompanied by his wife Eileen. Eileen says that Sam has been experiencing progressive memory impairment for ~4-5 months: she first noticed difficulty with working memory, but this has progressed within the last 2-3 months to being unable to recall where he is when they are not at home. In addition to this, she notes progressive gait impairment over the last 2 months, and urinary incontinence. He is sometimes unable to make it to the bathroom due to gait difficulty, however at times is unaware of urinary incontinence. Has  "required the use of a walker for the last 2 months, previously was walking unassisted. She notes that he does not pick his feet up off the floor when ambulating. Has experienced several falls. The patient agrees regarding gait impairment and urinary incontinence. He does acknowledge some degree of memory impairment, but believes it to be lesser than what his wife communicates. Was hospitalized recently for influenza: encephalopathic/delirious during admission, but reportedly "returned to baseline." Though Eileen states that she noticed memory impairment ~4-5 months ago, the patient was prescribed donepezil by his family physician in August of last year at her request, so it is likely that his difficulties are more long-standing (at least since the SAH). Prior to all of this, she states that he was alert and oriented to person, place, time, situation, fully functional/mobile although she has always been the one to take care of finances, etc. Of note, the patient says that sometimes he doesn't know that his left hand exists. His wife notes that she sometimes catches him raising the left arm up, to which he is oblivious.    Interval: 5/2/25  -initial MRI though to have a CVST, sent him to the ER and MPRAGE protocol MRI was negative  -saw Dr. Peres, sent for high volume LP, improved after this per PT and his wife  ->pending shunt placement, medical clearance  -reports that he is still taking ASA in addition to Eliquis, though not listed on medications and was previously discontinued  ->prior cardiology notes say discontinue ASA, as is taking Eliquis  ->unclear why he is still taking  -Prior PE considered to be unprovoked, hence is still on Eliquis  -2 falls since seeing Dr. Peres, wife is worried that he is not being careful    PAST MEDICAL HISTORY:  Past Medical History:   Diagnosis Date    Abnormal echocardiogram 02/08/2013    Mild MVR 3/07    Atherosclerosis of aorta 11/01/2023 2023: Mentioned in chart.    CAD " (coronary artery disease) 02/08/2013    Angio 3/07 Dr. Lin    Chronic diastolic (congestive) heart failure     History of pulmonary embolism 11/14/2023    CTA - 11/14/23 - Bilateral    History of subdural hemorrhage 11/04/2023 11/5/2023: SAH.    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 Pelayo       PAST SURGICAL HISTORY:  Past Surgical History:   Procedure Laterality Date    EYE SURGERY Bilateral 2016    Cataracts       CURRENT MEDS:  Current Outpatient Medications   Medication Sig Dispense Refill    apixaban (ELIQUIS) 2.5 mg Tab Take 1 tablet (2.5 mg total) by mouth 2 (two) times daily. 180 tablet 3    cholecalciferol, vitamin D3, (VITAMIN D3) 50 mcg (2,000 unit) Cap Take 1 capsule by mouth once daily.      cyanocobalamin 1,000 mcg/mL injection INJECT 1ML IN THE MUSCLE EVERY 30 DAYS 10 mL 1    donepeziL (ARICEPT) 5 MG tablet Take 1 tablet (5 mg total) by mouth every evening. 90 tablet 3    empagliflozin (JARDIANCE) 10 mg tablet Take 1 tablet (10 mg total) by mouth once daily. 90 tablet 3    fluticasone propionate (FLONASE) 50 mcg/actuation nasal spray SHAKE LIQUID AND USE 2 SPRAYS(100 MCG) IN EACH NOSTRIL EVERY DAY 16 g 3    furosemide (LASIX) 40 MG tablet Take 1 tablet (40 mg total) by mouth once daily. 120 tablet 3    LINZESS 290 mcg Cap capsule TAKE 1 CAPSULE(290 MCG) BY MOUTH BEFORE BREAKFAST 90 capsule 3    losartan (COZAAR) 100 MG tablet Take 1 tablet (100 mg total) by mouth once daily. 90 tablet 3    solifenacin (VESICARE) 10 MG tablet TAKE 1 TABLET(10 MG) BY MOUTH EVERY DAY 90 tablet 3    spironolactone (ALDACTONE) 25 MG tablet Take 1 tablet (25 mg total) by mouth once daily. 90 tablet 3    tamsulosin (FLOMAX) 0.4 mg  Cap TAKE 1 CAPSULE(0.4 MG) BY MOUTH EVERY DAY 90 capsule 1     No current facility-administered medications for this visit.       ALLERGIES:  Review of patient's allergies indicates:   Allergen Reactions    Lotensin [benazepril]      cough       FAMILY HISTORY:  Family History   Problem Relation Name Age of Onset    Cancer Mother          uterine    No Known Problems Father      Hypertension Brother Orlando     No Known Problems Daughter Katherine     No Known Problems Sister Lynda     No Known Problems Sister Cheryl     No Known Problems Brother Black     No Known Problems Brother Naseem     No Known Problems Brother      Thyroid disease Sister Halina        SOCIAL HISTORY:  Social History     Tobacco Use    Smoking status: Never    Smokeless tobacco: Never   Substance Use Topics    Alcohol use: Yes     Alcohol/week: 1.0 standard drink of alcohol     Types: 1 Standard drinks or equivalent per week     Comment: ocassionally    Drug use: No       Review of Systems:  12 system review of systems is negative except for the symptoms mentioned in HPI.      Objective:   General Examination  Vitals:    05/02/25 1431   BP: 124/77   BP Location: Left arm   Patient Position: Sitting   Pulse: 91   Weight: 110.3 kg (243 lb 4.4 oz)   Height: 6' (1.829 m)       General: NAD, well nourished, in wheelchair  Eyes: no tearing, discharge, no erythema   ENT: moist mucous membranes of the oral cavity, nares patent    Neck: Supple, full range of motion  Cardiovascular: Warm and well perfused, pulses equal and symmetrical  Lungs: Normal work of breathing, normal chest wall excursions  Skin: No rash, lesions, or breakdown on exposed skin  Psychiatry: Mood and affect are appropriate   Abdomen: soft, non tender, non distended  Extremeties: No cyanosis, clubbing or edema.    Neurological Examination  MENTAL STATUS  Level of consciousness: alert  Orientation: oriented to person, place, situation  Attention: impaired, requires repeated reorientation  to complete multi-step tasks  Concentration: impaired  Speech: bradyphrenic, seems to be logopenic as well    CRANIAL NERVES  CN II: visual fields full to confrontation  CN III, IV, VI: PERRL, requires repeat prompting for testing of smooth pursuit but appears intact, saccades are hypometric throughout: degree of impairment--vertical (upgaze > downgaze) > horizontal; upward saccades slightly less impaired than previous  CN V: facial sensation intact, muscles of mastication intact  CN VII: facial expression symmetric, but reduced range    MOTOR EXAM  Muscle tone: normal in bilateral upper extremities    Strength - Upper Extremities   Arm abduction Elbow flexion Elbow extension Finger abduction   Right 5/5 5/5 5/5 5/5   Left 5/5 5/5 5/5 5/5     Strength - Lower Extremities   Hip flexion Knee flexion Knee extension Dorsiflexion Plantarflexion   Right 4+/5 4+/5 4+/5 4+/5 4+/5   Left 4+/5 4+/5 4+/5 4+/5 4+/5     REFLEXES   Biceps Triceps Brachioradialis Patellar Achilles   Right +1 +1 +1 +1 +1   Left +1 +1 +1 +1 +1       SENSORY EXAM  Light touch: intact in all 4 extremities    COORDINATION  Finger to nose: normal  Tremor: none  Gait: deferred    Chart Review    Neuroimaging reviewed/interpreted  CTA Head & Neck: 11/4/23  CTA head: Unremarkable CTA of the head specifically without evidence for proximal significant stenosis or large vessel occlusion or definite aneurysm. CTA neck: No significant arterial stenosis throughout the neck.. CT head: Small volume scattered intraventricular hemorrhage.  Please note there is moderate distension lateral and 3rd ventricles which may be compensatory to volume loss however component of early hydrocephalus not excluded without priors for comparison. In addition there is small volume subdural hemorrhage along the ventral and dorsal aspect of the skull base and extending to the proximal cervical canal through the craniocervical junction allowing for CT technique.        MRI/MRA brain  w/wo contract: 11/5/23  MRI brain: Redemonstration small volume intraventricular hemorrhage posterior horns lateral ventricles and 4th ventricle. In addition there is evolving extra-axial hemorrhage along the skull and craniocervical junction extending to the proximal cervical canal partially visualized compatible with evolving subdural hemorrhage and probable small subarachnoid hemorrhage. Allowing for artifact from motion there is no definite abnormal parenchymal enhancement. Stable prominence of the ventricles which may be compensatory to volume loss without increased sized from yesterday CT or transependymal resorption of CSF to suggest definite hydrocephalus. MRA head: Allowing for artifact from motion no definite proximal high-grade stenosis or aneurysm. No evidence for abnormal wall enhancement associated with the [id number] allowing for artifact from motion.        Cerebral Angiogram: 11/5/23  Normal cerebral angiogram, no evidence of aneurysm, AVM, AVM to account for patient's most recent nontraumatic subarachnoid hemorrhage.     CTA Head: 11/11/23  No aneurysm or AVM is identified. No evidence of significant vaso spasm appreciated.     CTA Head & Neck: 11/14/23  Limited evaluation of the upper chest shows bilateral pulmonary thromboembolism. CT head shows no acute major vascular territory infarct or new intracranial hemorrhage.  Continued interval decrease in patient's known subarachnoid and intraventricular hemorrhage. CTA head and neck shows no high-grade stenosis, large vessel occlusion or aneurysm.  No findings to indicate significant vasospasm.    CTH: 11/15/23  Stable ventriculomegaly with small volume residual intraventricular hemorrhage. Resolving subarachnoid hemorrhage.    CTA Head & Neck: 12/14/23  Resorption of previously identified subarachnoid hemorrhage with no new acute intracranial process. CTA demonstrates no evidence of vasospasm aneurysm or AVM/vascular malformation.    CTH:  4/5/24  Head: Blood: No acute intracranial hemorrhage. Parenchyma: No definite loss of gray-white differentiation to suggest acute or subacute transcortical infarct. Parenchymal volume loss.  Nonspecific areas of white matter hypoattenuation.  Small remote infarct superior left cerebellum. Ventricles/Extra-axial spaces: Overall similar size and configuration of the ventricles relative to 12/14/2023.  As before relined enlargement of the ventricles out of proportion to sulci, finding which may be seen the setting of normal pressure hydrocephalus, the patient did experience ataxia.  Basal cisterns patent.        CTH: 12/30/24  There is mild chronic microvascular ischemic disease. No evidence of acute/recent major vascular distribution cerebral infarction, intraparenchymal hemorrhage, or intra-axial space occupying lesion. The ventricular system is stable in size and configuration. No effacement of the skull-base cisterns. No abnormal extra-axial fluid collections or blood products. Visualized paranasal sinuses and mastoid air cells are clear. The calvarium shows no significant abnormality.       MRI Brain w/o Contrast: 2/26/25  Generalized cerebral volume loss similar to prior with scattered T2 FLAIR signal hyperintensity supratentorial white matter while nonspecific suggestive for underlying chronic ischemic change.  No evidence for acute infarction     Prominence of the lateral 3rd ventricles similar to CT though slightly disproportionate to degree of volume loss normal pressure hydrocephalus to be considered in differential     Absence of the left transverse and sigmoid T2 flow void which may be turbulent flow however dural venous sinus occlusion not excluded.  Further evaluation with post-contrast MR brain imaging and or MR V head recommended    MRI Brain w/wo Contrast: 2/26/25  Thin ribbon-like filling defect seen in the left transverse and sigmoid sinus.  Similar configuration with seen on prior MR dated 2023,  noting the prior examination with significantly motion-limited.  This finding is favored to relate to chronic etiology or anatomical variation, such as an arachnoid trabeculation, over an acute etiology such as dural venous sinus thrombus.     Brain appears otherwise unchanged compared to same-date prior.  No diffusion restriction to suggest acute infarct.    Laboratories reviewed  TSH  -11/5/23: 1.291  -2/1/24: 2.094    B12  -3/29/23: 561  -2/1/24: 467    HIV I/II Ag/Ab  -4/5/24: non-reactive

## 2025-05-05 ENCOUNTER — TELEPHONE (OUTPATIENT)
Dept: PREADMISSION TESTING | Facility: HOSPITAL | Age: 80
End: 2025-05-05
Payer: MEDICARE

## 2025-05-05 ENCOUNTER — TELEPHONE (OUTPATIENT)
Dept: CARDIOLOGY | Facility: CLINIC | Age: 80
End: 2025-05-05
Payer: MEDICARE

## 2025-05-05 DIAGNOSIS — Z79.01 CURRENT USE OF LONG TERM ANTICOAGULATION: ICD-10-CM

## 2025-05-05 DIAGNOSIS — Z01.818 PREOPERATIVE TESTING: Primary | ICD-10-CM

## 2025-05-05 NOTE — TELEPHONE ENCOUNTER
----- Message from Viktoriya sent at 5/5/2025  9:11 AM CDT -----  Contact: Eileen  Type:  Patient CallWho Called: Patient wife - Eileen Does the patient know what this is regarding?: Requesting a call back about having clearance for a procedure on 5/19 by Dr Peres ; please advise Would the patient rather a call back or a response via [facility]?call Best Call Back Number: [phone] Additional Information:

## 2025-05-05 NOTE — PRE-PROCEDURE INSTRUCTIONS
Patient gave me permission to talk with his wife, Eileen Gamino about his medical. He has memory issues. She stated he has not had any problem with anesthesia in the past. Will need medical optimization  from Dr. Banegas per Dr. Peres. Will need poc appt, labs, & ua. Our  will call to set up these appts.  Will need cardiac clearance from Dr. Collin Montemayor.  She will make an appt.  I will ask Dr. Montemayor for ASA 81 and Eliquis instructions.      Preop instructions given. Hold other aspirin containing products, nsaids( Aleve, Advil, Motrin, Ibuprofen, Naprosyn, Naproxen, Voltaren, Diclofenac, Mobic, Meloxicam, Celebrex, Celecoxib), vitamins and supplements one week prior to surgery.  May take Tylenol. Await instructions for Aspirin 81 mg and Eliquis from Dr. Montemayor.  ( Also mailed these instructions to Patient.)   Verbalizes understanding.

## 2025-05-05 NOTE — TELEPHONE ENCOUNTER
----- Message from Ema Montemayor MD sent at 5/5/2025  2:55 PM CDT -----  He should not be taking aspirin.He may hold apixiban for 3 days with acceptable risk.Ema Montemayor M.D.  ----- Message -----  From: Christelle Renner RN  Sent: 5/5/2025   9:18 AM CDT  To: Christelle Renner RN; Ema Montemayor MD; Kjellgr#    Patient is scheduled for insertion of  shunt on  5/19 with Dr. Peres .( Approximately  180 minutes of general anesthesia) He will need medical clearance. Please schedule a preop clearance appt.  I will need ASA 81 mg instructions.- Dr. Peres would like him to be off for 7 days. I will need Eliquis instructions.- Dr Peres would like him to be off 3 days.Please provide stop time instructions, by entering a note in EPIC.   Thanks!

## 2025-05-05 NOTE — ANESTHESIA PAT ROS NOTE
2025  Sam Gamino is a 79 y.o., male with NORMAL PRESSURE HYDROCEPHALUS, [facility] for Optimization with Dr. Banegas and preop anesthesia assessment      Pre-op Assessment    I have reviewed the Patient Summary Reports.     I have reviewed the Nursing Notes. I have reviewed the NPO Status.   I have reviewed the Medications.     Review of Systems  Anesthesia Hx:  No problems with previous Anesthesia             Denies Family Hx of Anesthesia complications.    Denies Personal Hx of Anesthesia complications.                    Social:  Non-Smoker, Social Alcohol Use  Socioeconomic History     Occupation    Marital Status    Spouse Name  Eileen  [phone]   Number of Children  1+3        Family History   Mother ( at age 52)             Uterine Cancer          Hematology/Oncology:       -- Anemia:               Hematology Comments:   Pernicious anemia    2023  Electrolyte abnormality             --  Cancer in past history:                 Oncology Comments: Lymphoma in remission  Non-Hodgkin's lymphoma, unspecified body region, unspecified non-Hodgkin lymphoma type     @ [facility]  Status post bone marrow transplant     EENT/Dental:  chronic allergic rhinitis  Eyes: Visual Impairment   Has Bilateral and S/P Extraction - Bilateral Catarract         2016  Eye surgery (Bilateral)            Cardiovascular:  Exercise tolerance: poor   Denies Pacemaker. Hypertension, well controlled   CAD    Dysrhythmias   CHF    hyperlipidemia       Functional Capacity very limited / < 3 METS   Coronary Artery Disease:             Aortic Stenosis (AS)  Mitral Regurgitation (MR), mild    2024    Summary  Show Result Comparison   ·  Left Ventricle: The left ventricle is normal in size. Ventricular mass is normal. Normal wall thickness. There is normal  "systolic function. Ejection fraction is approximately 60%. Grade II diastolic dysfunction.  ·  Right Ventricle: Normal right ventricular cavity size. Wall thickness is normal. Systolic function is normal.  ·  Aortic Valve: The aortic valve is a trileaflet valve. There is mild aortic valve sclerosis. There is moderate aortic regurgitation with a centrally directed jet.  ·  Mitral Valve: There is moderate regurgitation with a posterolateral eccentriccally directed jet.  ·  Tricuspid Valve: There is mild to moderate regurgitation with a centrally directed jet.  ·  Pulmonary Artery: There is moderate pulmonary hypertension. The estimated pulmonary artery systolic pressure is 53 mmHg.  ·  Pericardium: There is a small circumferential effusion.     Vitals    Height Weight BMI (Calculated) BSA (Calculated - sq m) BP Pulse  6' 1" (1.854 m) 112 kg (247 lb) 32.6 2.4 sq meters 111/59 100    Study Details A complete echo was performed using complete 2D, color flow Doppler and spectral Doppler. During the study, the apical, parasternal, subcostal and suprasternal views were captured.  Overall the study quality was technically difficult.   The study was difficult due to patient's heart rhythm and poor endocardial visualization.    Echocardiography Findings    Left Ventricle   The left ventricle is normal in size.   Ventricular mass is normal.   Normal wall thickness.   Normal wall motion.   There is normal systolic function.   Ejection fraction is approximately 60%.   Grade II diastolic dysfunction.  Right Ventricle Normal right ventricular cavity size.   Wall thickness is normal.   Right ventricle wall motion  is normal. Systolic function is normal.  Left Atrium   Normal left atrial size.  Right Atrium   Normal right atrial size.  Aortic Valve   The aortic valve is a trileaflet valve. There is mild aortic valve sclerosis.   There is normal leaflet mobility. Aortic valve peak velocity is 1.4 m/s.   Mean gradient is 3.9 mmHg. " There is moderate aortic regurgitation with a centrally directed jet.  Mitral Valve   The mitral valve is structurally normal.   There is normal leaflet mobility.   There is moderate regurgitation with a posterolateral eccentriccally directed jet.  Tricuspid Valve   The tricuspid valve is structurally normal.   There is normal leaflet mobility.   There is mild to moderate regurgitation with a centrally directed jet.  Pulmonic Valve   The pulmonic valve is structurally normal.   There is normal leaflet mobility.   There is no significant regurgitation.  Ascending Aorta   Aortic root is normal in size measuring 3.3 cm.   Ascending aorta is normal measuring 3.23 cm.  Pericardium and Other Findings   There is a small circumferential effusion.  Pulmonary Artery   There is moderate pulmonary hypertension.   The estimated pulmonary artery systolic pressure is 53 mmHg.       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 Recent URI  History of pulmonary embolism    12/31/2024  Influenza A    12/31/2024  CTA CHEST NON CORONARY (PE STUDIES)     CLINICAL HISTORY:  Pulmonary embolism (PE) suspected, high prob;     TECHNIQUE:  Low dose axial images, sagittal and coronal reformations were obtained from the thoracic inlet to the lung bases following the IV administration of 100 mL of Omnipaque 350.  Contrast timing was optimized to evaluate the pulmonary arteries.  MIP images were performed.      COMPARISON:  CTA chest 04/28/2022, chest radiograph 12/30/2024     FINDINGS:  Visualized soft tissue structures at the base of the neck demonstrate no acute abnormality.  Nonspecific collateral vessels noted throughout the posterior thorax.  There is bilateral symmetric gynecomastia.     The thoracic aorta maintains normal caliber, contour, and course with mild atherosclerotic calcification within its course.  There is no evidence of aneurysmal dilation or dissection. The heart is not significantly enlarged and there is a small volume of pericardial fluid present.  There is coronary artery calcification present.  The distal thoracic esophagus is mildly prominent and fluid-filled.  No bulky axillary lymph node enlargement appreciated.  There are scattered small mediastinal and bilateral hilar lymph nodes which do not appear significantly enlarged by CT criteria.  Small volume of nonspecific fluid noted within the anterior mediastinum, deep to the sternum, measuring approximately 1.6 x 3.2 cm in transaxial diameter.  This appears similar to prior CT examination.     The trachea is midline and the proximal airways are patent. Detailed evaluation of the lung parenchyma is significantly limited by respiratory motion artifact. There is no pneumothorax. There is stable appearing bilateral paramediastinal consolidation which may reflect post radiation related changes in this patient with a reported history of lymphoma.  There are small bilateral pleural effusions, right greater than left.  There is mild bibasilar atelectasis.     Evaluation of the pulmonary arteries is limited secondary to patient respiratory motion artifact.  There is no large central saddle type pulmonary or definite filling defect to the level of the proximal segmental arteries.  Evaluation more distally, particularly at the lung bases is significantly limited..     Limited images of the upper abdomen obtained during the course of this dedicated  thoracic CT demonstrate no acute abnormalities.     The osseous structures demonstrate degenerative changes of the visualized spine..     Impression:   No convincing evidence of pulmonary thromboembolism to the level of the proximal segmental arteries.     Small bilateral pleural effusions, right greater than left.     Bilateral paramediastinal consolidation which may reflect post radiation related change in this patient with reported history of lymphoma.     Small pericardial effusion.    Small volume of anterior mediastinal fluid, similar to prior CT examination.     Mildly prominent fluid-filled distal thoracic esophagus.    This may relate to underlying esophageal dysmotility or reflux.    Clinical correlation advised.     Additional findings as above.   Electronically signed by:Jose A Salazar MD                   Renal/:   Denies Chronic Renal Disease. no renal calculi BPH Urinary frequency             Hepatic/GI:   PUD,  GERD, well controlled Denies Liver Disease.  Denies Hepatitis. Slow transit constipation  Hx PUD             Musculoskeletal:  Arthritis    Musculoskeletal General/Symptoms: low back pain. Functional capacity is wheelchair dependant. 12/7/2023  Impaired functional mobility, balance, gait, and endurance          Spine Disorders: lumbar Degenerative disease and Disc disease       Cervical Spine Disorder, Cervical Disc Disease 4/5/2024  CT HEAD WITHOUT CONTRAST; CT CERVICAL SPINE WITHOUT CONTRAST     CLINICAL HISTORY:  Mental status change, unknown cause;; Neck trauma (Age >= 65y);     TECHNIQUE:  Low dose axial images were obtained through the head and cervical spine..  Coronal and sagittal reformations were also performed. Contrast was not administered.     COMPARISON:  CT head and CTA head neck 12/14/2023.     FINDINGS:  Head:     Blood: No acute intracranial hemorrhage.     Parenchyma: No definite loss of gray-white differentiation to suggest acute or subacute transcortical infarct.  Parenchymal volume loss.  Nonspecific areas of white matter hypoattenuation.  Small remote infarct superior left cerebellum.     Ventricles/Extra-axial spaces: Overall similar size and configuration of the ventricles relative to 12/14/2023.  As before relined enlargement of the ventricles out of proportion to sulci, finding which may be seen the setting of normal pressure hydrocephalus, the patient did experience ataxia.  Basal cisterns patent.     Vessels: Mild atherosclerotic calcifications.     Orbits: Status post bilateral lens replacements.     Scalp: Unremarkable.     Skull: There are no depressed skull fractures or destructive bone lesions.     Sinuses and mastoids: Scattered relatively modest paranasal sinus mucosal thickening.  Small retention cysts.     Other findings: None     Cervical spine:     Fractures: No acute fractures     Alignment: Minimal grade 1 anterolisthesis of C3 on C4 and of C4 on C5.  Atlanto-axial and atlanto-occipital joints: Atlanto-axial and atlanto-occipital intervals are not widened.  Facet joints: There is no traumatic facet joint widening.  Vertebral bodies: Degenerative endplate change.  Discs: Degenerative disc disease.  Spinal canal and foraminal narrowing: Although CT does not optimally evaluate the soft tissue contents of the spinal canal and foramina, no critical stenosis is suggested.     At C2-3, moderate central spinal canal stenosis and moderate to severe right and moderate left foraminal narrowing     At C3-4, moderate severe central spinal canal stenosis and moderate to severe right and mild left foraminal narrowing     At C4-5, moderate to severe central spinal canal stenosis and moderate right and severe left foraminal narrowing     At C5-6, moderate central spinal canal stenosis and mild-to-moderate left foraminal narrowing     At C6-7, moderate severe central spinal canal stenosis and moderate to severe bilateral foraminal narrowing  Paraspinal soft tissues:  Unremarkable.     Upper Lungs:No acute abnormality.     Impression:   1. No acute intracranial findings.  2. No acute cervical spine fracture.  3. Degenerative findings in the cervical spine, as discussed.      Electronically signed by:Asael Rosen  Date:                                            04/05/2024    Lumbar Spine Disorders, Lumbar Disc Disease   Neurological:   CVA Neuromuscular Disease,   Denies Seizures.    4/6/2024  Acute metabolic encephalopathy   Memory loss    FL LUMBAR PUNCTURE THERAPEUTIC WITH IMAGING [id number]     CLINICAL HISTORY:  R/o NPH, high volume LP;Communicating hydrocephalus     TECHNIQUE:  The risks and potential complications of the procedure were discussed with the patient and written informed consent was obtained.  The patient was positioned prone on the fluoroscopy table.  A time-out was performed to verify the patient and procedure.  The skin overlying the lumbar spine was prepped and draped using aseptic technique.  Fluoroscopy was used to localize the L2-L3 interlaminar space.  Local anesthesia was provided using 1% lidocaine.  A 22 gauge 5 inch (12.7 cm) spinal needle and stylet were advanced under fluoroscopic guidance via a paramedianapproach into the thecal sac. The needle was inserted approximately 8 cm of its length before return of CSF.     Opening pressure was measured as 24 cm H2O.  Closing pressure was measured as less than 16 cm H2O.   32 mL of clear CSF was passively collected per neurosurgery orders for NPH rule out.  The stylet was replaced and the needle and stylet were removed.  The site was cleaned and a band-aid was applied over the entry site.  The patient tolerated the procedure without complication.  Instructions were provided regarding possible postprocedural headache or pain.     Estimated blood loss: Less than 1 mL.     Complications: None.     Fluoroscopy time: 1.5 minutes     Performing Provider: Jules Silverio PA-C     Resident: Rosalie De Oliveira MD      Impression:   Lumbar puncture using fluoroscopic guidance.  32 cc of CSF removed as requested.      2/26/2025  MRI BRAIN W WO CONTRAST     CLINICAL HISTORY:  Headache, new or worsening (Age >= 50y);MPRage protocol for cerebral venous thrombosis;     TECHNIQUE:  Multiplanar multisequence MR imaging of the brain was performed before and after the administration of 10 mL Gadavist intravenous contrast.     COMPARISON:  MR brain same-date, 11/05/2023.     CTA head neck 12/14/2023.     FINDINGS:  Redemonstrated loss of flow void in the left transverse and sigmoid sinus.  This corresponds with a thin ribbon-like filling defect seen on post-contrast sequences (coronal 18:72-76, sagittal 17:37).     Unchanged prominence of the ventricles and sulci.  No hydrocephalus.     The brain otherwise appears unchanged compared to same-date prior.  No new parenchymal mass, hemorrhage, edema or recent or remote major vascular distribution infarct.     Remote left cerebellar infarct.  Patchy and confluent T2/FLAIR hyperintensity in the supratentorial white matter, nonspecific but most likely reflecting chronic microvascular ischemic changes.     No extra-axial blood or fluid collections.     Remaining T2 skull base flow voids are preserved.  Bone marrow signal intensity unremarkable.  Bilateral lens implants.     Impression:     Thin ribbon-like filling defect seen in the left transverse and sigmoid sinus.  Similar configuration with seen on prior MR dated 2023, noting the prior examination with significantly motion-limited.    This finding is favored to relate to chronic etiology or anatomical variation, such as an arachnoid trabeculation, over an acute etiology such as dural venous sinus thrombus.     Brain appears otherwise unchanged compared to same-date prior.  No diffusion restriction to suggest acute infarct.     Findings above were relayed by Abhay Feng MD  via [employer] secure chat to Zachery Crum MD with receipt confirmed on  02/26/2025 at 21:23.       2/26/2025  EXAMINATION:  MRI BRAIN WITHOUT CONTRAST     CLINICAL HISTORY:  Memory loss;Other amnesia     TECHNIQUE:  Multiplanar multisequence MR imaging of the brain was performed without intravenous contrast.     COMPARISON:  CT head 12/30/2024, MRI brain 11/05/2023     FINDINGS:  Generalized cerebral volume loss similar to prior..  Stable prominence of the lateral and 3rd ventricles without evidence for acute hydrocephalus.     Scattered small and confluent regions of T2 FLAIR signal hyperintensity supratentorial white matter while nonspecific suggestive for underlying chronic ischemic change.  Small encephalomalacia left cerebellum compatible with remote infarction.     Absence of the left transverse and sigmoid sinus T2 flow void which may be related to turbulent flow however dural venous sinus occlusion not excluded.  Clinical correlation and further evaluation with post-contrast brain and or MR V head advised.  This report was flagged in Epic as abnormal.  Case was discussed with on-call resident Dr. Delbert Alvarado by Dr. Brown on 02/26/2025 at 15:10     Impression:     Generalized cerebral volume loss similar to prior with scattered T2 FLAIR signal hyperintensity supratentorial white matter while nonspecific suggestive for underlying chronic ischemic change.  No evidence for acute infarction     Prominence of the lateral 3rd ventricles similar to CT though slightly disproportionate to degree of volume loss normal pressure hydrocephalus to be considered in differential     Absence of the left transverse and sigmoid T2 flow void which may be turbulent flow however dural venous sinus occlusion not excluded.    Further evaluation with post-contrast MR brain imaging and or MR V head recommended        Neuro Symptoms of pain    Pain Etiology/Diagnosis, Low Back Pain, Lumbar Radiculopathy  Sciatica             CVA - Cerebrovasular Accident, Hemorrhagic Stroke , Most recent CVA was on  12/30/2024  , residual deficits are memory impairment, parathesia (tingling).   Head Injury 12/30/2024  CT HEAD WITHOUT CONTRAST     CLINICAL HISTORY:  Head trauma, minor (Age >= 65y);     TECHNIQUE:  Low dose axial images were obtained through the head.  Coronal and sagittal reformations were also performed. Contrast was not administered.     COMPARISON:  CT head 04/05/2024.     FINDINGS:  There is mild chronic microvascular ischemic disease.  No evidence of acute/recent major vascular distribution cerebral infarction, intraparenchymal hemorrhage, or intra-axial space occupying lesion. The ventricular system is stable in size and configuration.  No effacement of the skull-base cisterns. No abnormal extra-axial fluid collections or blood products. Visualized paranasal sinuses and mastoid air cells are clear. The calvarium shows no significant abnormality.     Impression:   No acute intracranial abnormalities identified.      Electronically signed by:Cheli Bermeo MD      4/5/2024  CT HEAD WITHOUT CONTRAST; CT CERVICAL SPINE WITHOUT CONTRAST     CLINICAL HISTORY:  Mental status change, unknown cause;; Neck trauma (Age >= 65y);     TECHNIQUE:  Low dose axial images were obtained through the head and cervical spine..  Coronal and sagittal reformations were also performed. Contrast was not administered.     COMPARISON:  CT head and CTA head neck 12/14/2023.     FINDINGS:  Head:     Blood: No acute intracranial hemorrhage.     Parenchyma: No definite loss of gray-white differentiation to suggest acute or subacute transcortical infarct. Parenchymal volume loss.  Nonspecific areas of white matter hypoattenuation.  Small remote infarct superior left cerebellum.     Ventricles/Extra-axial spaces: Overall similar size and configuration of the ventricles relative to 12/14/2023.  As before relined enlargement of the ventricles out of proportion to sulci, finding which may be seen the setting of normal pressure hydrocephalus,  the patient did experience ataxia.  Basal cisterns patent.     Vessels: Mild atherosclerotic calcifications.     Orbits: Status post bilateral lens replacements.     Scalp: Unremarkable.     Skull: There are no depressed skull fractures or destructive bone lesions.     Sinuses and mastoids: Scattered relatively modest paranasal sinus mucosal thickening.  Small retention cysts.     Other findings: None     Cervical spine:     Fractures: No acute fractures     Alignment: Minimal grade 1 anterolisthesis of C3 on C4 and of C4 on C5.  Atlanto-axial and atlanto-occipital joints: Atlanto-axial and atlanto-occipital intervals are not widened.  Facet joints: There is no traumatic facet joint widening.  Vertebral bodies: Degenerative endplate change.  Discs: Degenerative disc disease.  Spinal canal and foraminal narrowing: Although CT does not optimally evaluate the soft tissue contents of the spinal canal and foramina, no critical stenosis is suggested.     At C2-3, moderate central spinal canal stenosis and moderate to severe right and moderate left foraminal narrowing     At C3-4, moderate severe central spinal canal stenosis and moderate to severe right and mild left foraminal narrowing     At C4-5, moderate to severe central spinal canal stenosis and moderate right and severe left foraminal narrowing     At C5-6, moderate central spinal canal stenosis and mild-to-moderate left foraminal narrowing     At C6-7, moderate severe central spinal canal stenosis and moderate to severe bilateral foraminal narrowing  Paraspinal soft tissues: Unremarkable.     Upper Lungs:No acute abnormality.     Impression:     1. No acute intracranial findings.  2. No acute cervical spine fracture.  3. Degenerative findings in the cervical spine, as discussed.      Electronically signed by:Asael Rosen  Date:                                            04/05/2024       Intracranial Hemorrhage , IVH Grade Subarachnoid Hemorrhage      Neuromuscular Disease   Endocrine:  Diabetes, well controlled, type 2 Denies Hypothyroidism.  Denies Hyperthyroidism.  Diabetes, Type 2 Diabetes  , Complications include Atherosclerotic CV Disease , controlled by diet, oral hypoglycemics. Typical AM glucose range: 85 , most recent HgA1c value was 4.6 on 12/31/2024.              Denies Obesity / BMI > 30, Denies Morbid Obesity / BMI > 40  Dermatological:  Skin Normal    Psych:  Psychiatric Normal                    Physical Exam  General: Well nourished, Cooperative, Alert and Oriented    Airway:  Mallampati: III   Mouth Opening: Normal  Tongue: Normal  Neck ROM: Extension Decreased, Left Lateral Motion Decreased    Dental:  Periodontal disease, Dentures    Chest/Lungs:  Clear to auscultation, Normal Respiratory Rate    Heart:  Rate: Tachycardia  Rhythm: Regular Rhythm  Sounds: Normal          Anesthesia Assessment: Preoperative EQUATION    Planned Procedure: Procedure(s) (LRB):  INSERTION, SHUNT, VENTRICULOPERITONEAL, USING COMPUTER-ASSISTED NAVIGATION (Right)  Requested Anesthesia Type:General  Surgeon: Roddy Peres DO  Service: Neurosurgery  Known or anticipated Date of Surgery:5/19/2025    Surgeon notes: reviewed    Electronic QUestionnaire Assessment completed via nurse interview with patient.        Triage considerations:     The patient has no apparent active cardiac condition (No unstable coronary Syndrome such as severe unstable angina or recent [<1 month] myocardial infarction, decompensated CHF, severe valvular   disease or significant arrhythmia)    Previous anesthesia records:No problems and Not available    Last PCP note: within 3 months , within [facility]   Subspecialty notes: Cardiology: General, ENT, Neurology, Neurosurgery    Other important co-morbidities: PER EPIC: CHF, HLD, HTN, Obesity, and NPH, BPH, CAD, PULM HTN      Tests already available:  Available tests,  within 3 months , within [facility] .   3/31/2025 EKG, 3/18/2025 TSH, FT4, 2/26/2025  CMP, CBC, 2/26/2025 MRI BRAIN W  W/O CONTRAST          Instructions given. (See in Nurse's note)    Optimization:  Anesthesia Preop Clinic Assessment  Indicated    Medical Opinion Indicated       Sub-specialist consult indicated:   CARDIOLOGY       Plan:    Testing:  PT/INR, PTT, T&S, and UA   Pre-anesthesia  visit       Visit focus: concerns in complex and/or prolonged anesthesia, COMORBIDITIES     Consultation:IM Perioperative Hospitalist PER DR NOE     Patient  has previously scheduled Medical Appointment:NONE    Navigation: Tests Scheduled. TBD             Consults scheduled.TBD             Results will be tracked by Preop Clinic.  5/5 Ema Montemayor MD Prieur, Gaye, MAT  Caller: Unspecified (Today,  9:15 AM)  He should not be taking aspirin.    He may hold apixiban for 3 days with acceptable risk.    Ema Montemayor M.D.  Christelle Renner RN BSN

## 2025-05-06 NOTE — DISCHARGE INSTRUCTIONS
Your surgery has been scheduled for:__5/19/2025__    You should report to:  ____[facility], located on the [facility] side of the            Second floor of the [facility] ([phone])    Please Note   Tell your doctor if you take Aspirin, products containing Aspirin, herbal medications  or blood thinners, such as Coumadin, Ticlid, or Plavix.  (Consult your provider regarding holding or stopping before surgery).  Arrange for someone to drive you home following surgery.  You will not be allowed to leave the surgical facility alone or drive yourself home following sedation and anesthesia.    Before Surgery  Stop taking all herbal medications, vitamins, and supplements 7 days prior to surgery  No Motrin/Advil (Ibuprofen) 7 days before surgery  No Aleve (Naproxen) 7 days before surgery  Stop Taking Asprin, products containing Asprin __7___days before surgery  Stop taking blood thinners_______days before surgery  No Goody's/BC  Powder 7 days before surgery  Refrain from drinking alcoholic beverages for 24hours before and after surgery  Stop or limit smoking ____7_____days before surgery  You may take Tylenol for pain    Night before Surgery  Do not eat or drink after midnight  Take a shower or bath (shower is recommended).  Bathe with Hibiclens soap or an antibacterial soap from the neck down.  If not supplied by your surgeon, hibiclens soap will need to be purchased over the counter in pharmacy.  Rinse soap off thoroughly.  Shampoo your hair with your regular shampoo    The Day of Surgery  Take another bath or shower with hibiclens or any antibacterial soap, to reduce the chance of infection.  Take heart and blood pressure medications with a small sip of water, as advised by the perioperative team.  Do not take fluid pills  You may brush your teeth and rinse your mouth, but do not swall any additional water.   Do not apply perfumes, powder, body lotions or deodorant on the day  of surgery.  Nail polish should be removed.  Do not wear makeup or moisturizer  Wear comfortable clothes, such as a button front shirt and loose fitting pants.  Leave all jewelry, including body piercings, and valuables at home.    Bring any devices you will neeed after surgery such as crutches or canes.  If you have sleep apnea, please bring your CPAP machine  In the event that your physical condition changes including the onset of a cold or respiratory illness, or if you have to delay or cancel your surgery, please notify your surgeon.     Anesthesia: General Anesthesia     You are watched continuously during your procedure by your anesthesia provider.     Youre due to have surgery. During surgery, youll be given medicine called anesthesia or anesthetic. This will keep you comfortable and pain-free. Your anesthesia provider will use general anesthesia.  What is general anesthesia?  General anesthesia puts you into a state like deep sleep. It goes into the bloodstream (IV anesthetics), into the lungs (gas anesthetics), or both. You feel nothing during the procedure. You will not remember it. During the procedure, the anesthesia provider monitors you continuously. He or she checks your heart rate and rhythm, blood pressure, breathing, and blood oxygen.  IV anesthetics. IV anesthetics are given through an IV line in your arm. Theyre often given first. This is so you are asleep before a gas anesthetic is started. Some kinds of IV anesthetics relieve pain. Others relax you. Your doctor will decide which kind is best in your case.  Gas anesthetics. Gas anesthetics are breathed into the lungs. They are often used to keep you asleep. They can be given through a facemask or a tube placed in your larynx or trachea (breathing tube).  If you have a facemask, your anesthesia provider will most likely place it over your nose and mouth while youre still awake. Youll breathe oxygen through the mask as your IV anesthetic is  started. Gas anesthetic may be added through the mask.  If you have a tube in the larynx or trachea, it will be inserted into your throat after youre asleep.  Anesthesia tools and medicines  You will likely have:  IV anesthetics. These are put into an IV line into your bloodstream.  Gas anesthetics. You breathe these anesthetics into your lungs, where they pass into your bloodstream.  Pulse oximeter. This is a small clip that is attached to the end of your finger. This measures your blood oxygen level.  Electrocardiography leads (electrodes). These are small sticky pads that are placed on your chest. They record your heart rate and rhythm.  Blood pressure cuff. This reads your blood pressure.  Risks and possible complications  General anesthesia has some risks. These include:  Breathing problems  Nausea and vomiting  Sore throat or hoarseness (usually temporary)  Allergic reaction to the anesthetic  Irregular heartbeat (rare)  Cardiac arrest (rare)   Anesthesia safety  Follow all instructions you are given for how long not to eat or drink before your procedure.  Be sure your doctor knows what medicines and drugs you take. This includes over-the-counter medicines, herbs, supplements, alcohol or other drugs. You will be asked when those were last taken.  Have an adult family member or friend drive you home after the procedure.  For the first 24 hours after your surgery:  Do not drive or use heavy equipment.  Do not make important decisions or sign legal documents. If important decisions or signing legal documents is necessary during the first 24 hours after surgery, have a trusted family member or spouse act on your behalf.  Avoid alcohol.  Have a responsible adult stay with you. He or she can watch for problems and help keep you safe.  Date Last Reviewed: 12/1/2016 © 2000-2017 [employer]. [address] [place] [address], [place] [address]. All rights reserved. This information is not intended as a substitute  for professional medical care. Always follow your healthcare professional's instructions.

## 2025-05-06 NOTE — PROGRESS NOTES
I have reviewed the notes, assessments, and/or procedures performed this visit, and I concur with the documentation. Examination was completed and patient personally seen.    Sarina Wilhelm MD  Department of Neurology  Neuro-oncology, Movement Disorders

## 2025-05-06 NOTE — PRE-PROCEDURE INSTRUCTIONS
Spoke with Eileen Gamino , patient's wife. Instructed to hold Eliquis 3 days prior to surgery per Dr. Collin Montemayor( last dose 5/15) Verbalizes understanding.

## 2025-05-08 ENCOUNTER — HOSPITAL ENCOUNTER (OUTPATIENT)
Dept: PREADMISSION TESTING | Facility: HOSPITAL | Age: 80
Discharge: HOME | End: 2025-05-08
Attending: NEUROLOGICAL SURGERY
Payer: MEDICARE

## 2025-05-08 ENCOUNTER — OFFICE VISIT (OUTPATIENT)
Dept: INTERNAL MEDICINE | Facility: CLINIC | Age: 80
End: 2025-05-08
Payer: MEDICARE

## 2025-05-08 VITALS
HEART RATE: 97 BPM | TEMPERATURE: 98 F | WEIGHT: 243.19 LBS | BODY MASS INDEX: 32.98 KG/M2 | OXYGEN SATURATION: 97 % | DIASTOLIC BLOOD PRESSURE: 70 MMHG | SYSTOLIC BLOOD PRESSURE: 131 MMHG

## 2025-05-08 DIAGNOSIS — M54.41 CHRONIC BILATERAL LOW BACK PAIN WITH BILATERAL SCIATICA: ICD-10-CM

## 2025-05-08 DIAGNOSIS — T78.40XD ALLERGY, SUBSEQUENT ENCOUNTER: ICD-10-CM

## 2025-05-08 DIAGNOSIS — I10 PRIMARY HYPERTENSION: ICD-10-CM

## 2025-05-08 DIAGNOSIS — E04.1 THYROID NODULE: ICD-10-CM

## 2025-05-08 DIAGNOSIS — R32 URINARY INCONTINENCE, UNSPECIFIED TYPE: ICD-10-CM

## 2025-05-08 DIAGNOSIS — Z79.01 CHRONIC ANTICOAGULATION: ICD-10-CM

## 2025-05-08 DIAGNOSIS — N40.1 BENIGN PROSTATIC HYPERPLASIA WITH URINARY FREQUENCY: ICD-10-CM

## 2025-05-08 DIAGNOSIS — R35.0 BENIGN PROSTATIC HYPERPLASIA WITH URINARY FREQUENCY: ICD-10-CM

## 2025-05-08 DIAGNOSIS — Z86.711 HISTORY OF PULMONARY EMBOLISM: ICD-10-CM

## 2025-05-08 DIAGNOSIS — K59.01 SLOW TRANSIT CONSTIPATION: ICD-10-CM

## 2025-05-08 DIAGNOSIS — Z86.79 HISTORY OF SUBARACHNOID HEMORRHAGE: Primary | ICD-10-CM

## 2025-05-08 DIAGNOSIS — R41.3 MEMORY LOSS: ICD-10-CM

## 2025-05-08 DIAGNOSIS — C85.9A LYMPHOMA IN REMISSION: ICD-10-CM

## 2025-05-08 DIAGNOSIS — R60.0 LOWER EXTREMITY EDEMA: ICD-10-CM

## 2025-05-08 DIAGNOSIS — I25.10 CORONARY ARTERY DISEASE INVOLVING NATIVE CORONARY ARTERY OF NATIVE HEART WITHOUT ANGINA PECTORIS: ICD-10-CM

## 2025-05-08 DIAGNOSIS — R53.1 GENERALIZED WEAKNESS: ICD-10-CM

## 2025-05-08 DIAGNOSIS — G89.29 CHRONIC BILATERAL LOW BACK PAIN WITH BILATERAL SCIATICA: ICD-10-CM

## 2025-05-08 DIAGNOSIS — D51.0 PERNICIOUS ANEMIA: ICD-10-CM

## 2025-05-08 DIAGNOSIS — I70.0 AORTIC ATHEROSCLEROSIS: ICD-10-CM

## 2025-05-08 DIAGNOSIS — R06.83 SNORING: ICD-10-CM

## 2025-05-08 DIAGNOSIS — K21.9 GASTROESOPHAGEAL REFLUX DISEASE, UNSPECIFIED WHETHER ESOPHAGITIS PRESENT: ICD-10-CM

## 2025-05-08 DIAGNOSIS — D64.9 ANEMIA, UNSPECIFIED TYPE: ICD-10-CM

## 2025-05-08 DIAGNOSIS — M54.42 CHRONIC BILATERAL LOW BACK PAIN WITH BILATERAL SCIATICA: ICD-10-CM

## 2025-05-08 DIAGNOSIS — G91.2 NPH (NORMAL PRESSURE HYDROCEPHALUS): ICD-10-CM

## 2025-05-08 PROBLEM — E87.8 ELECTROLYTE ABNORMALITY: Status: RESOLVED | Noted: 2023-11-14 | Resolved: 2025-05-08

## 2025-05-08 PROBLEM — J06.9 VIRAL URI WITH COUGH: Status: RESOLVED | Noted: 2024-04-06 | Resolved: 2025-05-08

## 2025-05-08 PROBLEM — G93.41 ACUTE METABOLIC ENCEPHALOPATHY: Status: RESOLVED | Noted: 2024-04-06 | Resolved: 2025-05-08

## 2025-05-08 PROBLEM — E87.6 HYPOKALEMIA: Status: RESOLVED | Noted: 2024-04-08 | Resolved: 2025-05-08

## 2025-05-08 PROBLEM — I50.33 ACUTE ON CHRONIC DIASTOLIC HEART FAILURE: Status: RESOLVED | Noted: 2024-12-31 | Resolved: 2025-05-08

## 2025-05-08 PROBLEM — J10.1 INFLUENZA A: Status: RESOLVED | Noted: 2024-12-31 | Resolved: 2025-05-08

## 2025-05-08 PROBLEM — R13.10 DYSPHAGIA: Status: RESOLVED | Noted: 2024-03-25 | Resolved: 2025-05-08

## 2025-05-08 PROBLEM — T78.40XA ALLERGIES: Status: ACTIVE | Noted: 2025-05-08

## 2025-05-08 PROCEDURE — 99211 OFF/OP EST MAY X REQ PHY/QHP: CPT | Mod: PBBFAC | Performed by: HOSPITALIST

## 2025-05-08 PROCEDURE — 99999 PR PBB SHADOW E&M-EST. PATIENT-LVL I: CPT | Mod: PBBFAC,,, | Performed by: HOSPITALIST

## 2025-05-08 RX ORDER — POTASSIUM CHLORIDE 1500 MG/1
TABLET, EXTENDED RELEASE ORAL
COMMUNITY

## 2025-05-08 RX ORDER — PANTOPRAZOLE SODIUM 40 MG/1
TABLET, DELAYED RELEASE ORAL
COMMUNITY

## 2025-05-08 RX ORDER — CLOTRIMAZOLE AND BETAMETHASONE DIPROPIONATE 10; .64 MG/G; MG/G
0.05 CREAM TOPICAL 2 TIMES DAILY PRN
COMMUNITY
Start: 2024-08-14

## 2025-05-08 RX ORDER — BENZONATATE 100 MG/1
CAPSULE ORAL
COMMUNITY
Start: 2024-06-17

## 2025-05-08 RX ORDER — METOPROLOL SUCCINATE 50 MG/1
50 TABLET, EXTENDED RELEASE ORAL
COMMUNITY

## 2025-05-08 RX ORDER — NAPROXEN SODIUM 220 MG/1
81 TABLET, FILM COATED ORAL DAILY
COMMUNITY

## 2025-05-08 NOTE — ASSESSMENT & PLAN NOTE
11/14/2023: CTA of Head and Neck: Bilateral pulmonary emboli.  He was having subarachnoid hemorrhage at that time    11/2023: Diagnosed with pulmonary embolism and benefit with anticoagulation felt to outweigh risk  Hold apixiban for 3 days with acceptable risk.      Episode1   Associated with reduced mobility around the time of the thrombosis, no long journeys, no cancer around the time of thrombosis, no prior surgery around the time thrombosis, Hospital stay around the time of thrombosis,no  HRT    IVC filter  - none       Thrombo-embolic event within the last 3 months- none     - Restoration of normal sinus rhythm from atrial fibrillation or atrial flutter, either by cardioversion or ablation, within the last month- none     - Pulmonary vein isolation within the last 2 months- none     - Left atrial appendage thrombus within the last 3 months- none .    No contraindications to holding anticoagulation for surgery     Date of surgery - 5/19  Type of Anesthesia- General  Last day of  use pre op-2 days pre op - will work with the surgeon on apixaban hold  He seems to had a provoked pulmonary embolism when he had subarachnoid hemorrhage    Dec 2024 -No convincing evidence of pulmonary thromboembolism to the level of the proximal segmental arteries.   No IVC filter placement

## 2025-05-08 NOTE — ASSESSMENT & PLAN NOTE
He is watching his diet and is able to lose some weight  BMI 32.98     Weight related conditions     Known to have     HTN  Hyperlipidemia   Acid reflux   Fatty liver   Osteoarthritis    Not troubled with / Not known to have        Type 2 Diabetes   Prediabetes   Gout   \Fatty liver     Encouraged maintaining healthy weight for improved health

## 2025-05-08 NOTE — ASSESSMENT & PLAN NOTE
Acid reflux Protonix      Does not sound Cardiac         GERD-  I suggest continuation of the Proton pump inhibitor in the perioperative period . I suggest aspiration precautions

## 2025-05-08 NOTE — ASSESSMENT & PLAN NOTE
Edema- I suggested avoidance of added salt,avoidance of NSAID's, unless advised or ordered  and suggested Limb elevation and stocking use

## 2025-05-08 NOTE — ASSESSMENT & PLAN NOTE
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    No recurrence  He had chemotherapy and radiation to the chest

## 2025-05-08 NOTE — ASSESSMENT & PLAN NOTE
Doing good on Linzess  Suggested working on bowel movements in preparation for surgery   Constipation- I suggest giving bowel movement regimen as opioid use,reduced ambulation  can increase the constipation

## 2025-05-08 NOTE — ASSESSMENT & PLAN NOTE
HTN-losartan, metoprolol       Hypertension-  Blood pressure is acceptable . I suggest continuation of ---metoprolol----- during the entire perioperative period. I suggest holding --losartan------ on the morning of the surgery and can continue that  post operatively under blood pressure, electrolyte and renal function monitoring as long as they are acceptable.I suggest addressing pain control as uncontrolled pain can increased blood pressure            Oct 2024     Left Ventricle: The left ventricle is normal in size. Ventricular mass is normal. Normal wall thickness. There is normal systolic function. Ejection fraction is approximately 60%. Grade II diastolic dysfunction.    Right Ventricle: Normal right ventricular cavity size. Wall thickness is normal. Systolic function is normal.    Aortic Valve: The aortic valve is a trileaflet valve. There is mild aortic valve sclerosis. There is moderate aortic regurgitation with a centrally directed jet.    Mitral Valve: There is moderate regurgitation with a posterolateral eccentriccally directed jet.    Tricuspid Valve: There is mild to moderate regurgitation with a centrally directed jet.    Pulmonary Artery: There is moderate pulmonary hypertension. The estimated pulmonary artery systolic pressure is 53 mmHg.    Pericardium: There is a small circumferential effusion.    On     CHF, Edema, -Jardiance, Lasix, potassium spironolactone    He is doing good from a breathing standpoint    Pulmonary arterial hypertension       WHO functional classification     Class 1 - No limitation ( with reference to fatigue or dyspnea, chest pain, or heart syncope)       Clinical classification - based on etiology       Group 2  Group 4     [place] heart association functional class     Class 1         Planned Anesthesia-  General             Suggest avoidance of Intra vascular volume over load or reduced pre load          I suggest that the anaesthesiologist be aware of the Pulmonary  artery hypertension , so that sedation,  anesthetic plans can be made with consideration of Pulmonary Hypertension  .

## 2025-05-08 NOTE — PROGRESS NOTES
[facility]  Progress Note    Patient Name: Sam Gamino  MRN: [id number]  Date of Evaluation- 05/08/2025  PCP- JAYLYN Zamora MD    Future cases for Sam Gamino [[id number]]       Case ID Status Date Time [place] Procedure Provider Location    [id number] [facility] 5/19/2025  7:00  INSERTION, SHUNT, VENTRICULOPERITONEAL, USING COMPUTER-ASSISTED NAVIGATION Roddy Peres, DO [[id number]] NOM OR 2ND FLR            HPI:  History of present illness- I had the pleasure of meeting this pleasant 79 y.o. gentleman in the pre op clinic prior to his elective Neuro surgery. The patient is new to me .    I have obtained the history by speaking to the patient and by reviewing the electronic health records.    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    Symptoms- has balance problems but no urinary incontinence  He has memory problem    He had lumbar puncture and the balance problem got better    He has no troublesome headache    Relevant health conditions of significance for the perioperative period/ History of presenting illness -    Pulmonary embolism-Eliquis  CHF, Edema, -Jardiance, Lasix, spironolactone  Allergies-Flonase  Constipation-Linzess  HTN-losartan  Urinary incontinence-VESIcare  BMI 32.98   BPH-Flomax   Oct 2024 Moderate pulmonary hypertension. The estimated pulmonary artery systolic pressure is 53 mmHg.  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  PE was unprovoked.  11/2023: SAH. Conservative care.  11/2023: Diagnosed with  pulmonary embolism and benefit with anticoagulation felt to outweigh risk  Hold apixiban for 3 days with acceptable risk.  Acid reflux      Lives with wife in a 3rd level apartment complex that has a an elevator  Retired from the   He did  after retired from   Pets- None  Children -2 daughters and 2 sons all of them are grown and 1 live local who is a daughter  Has help post op    Not known to have   Prediabetes , Diabetes Mellitus, Lung problem, Thyroid problem, Kidney problem, sleep apnea, COVID infection, fatty liver , blood vessels stent , tobacco smoking, mental health problems , gout, allergies          Subjective/ Objective:     Chief complaint-Preoperative evaluation, Perioperative Medical management, complication reduction plan     Active cardiac conditions- none    Revised cardiac risk index predictors- history of heart failure    Functional capacity -Examples of physical activity  can take a flight of stairs holding on to the railing----- He can undertake all the above activities without  chest pain,chest tightness, Shortness of breath ,dizziness,lightheadedness making his exercise tolerance more,    than 4 Mets.        Review of Systems   Constitutional:  Negative for chills and fever.   HENT:          STOPBANG score  / 8    Loud Snoring     Witnessed stopping of breathing   Elevated BP    Age over 50 years     Male gender   Eyes:         No unusual vision changes   Respiratory:          Allergy cough  No Hemoptysis   Cardiovascular:         As noted   Gastrointestinal:         Bowels-he had colonoscopies about 4 years ago constipated  No overt GI/ blood losses   Endocrine:        Prednisone use > 20 mg daily for 3 weeks- none   Genitourinary:  Negative for dysuria.        No urinary hesitancy    Musculoskeletal:         As above      Skin:  Negative for rash.   Neurological:  Negative for syncope.        No unilateral weakness   Hematological:         Current use of  Anticoagulants  Yes   Psychiatric/Behavioral:          No Depression,Anxiety          No past medical history pertinent negatives.        No anesthesia, bleeding, cardiac problems, PONV with previous surgeries/procedures.  Medications and Allergies reviewed in epic.   FH- No anesthesia,bleeding / venous thrombosis ,   in family      Physical Exam    Physical Exam  Constitutional-   General appearance-Conscious,Coherent  Eyes- No conjunctival icterus,pupils  round , reactive to light , and  bilateral intra ocular lenses  ENT-Oral cavity- moist    , Hearing grossly normal   Neck- No thyromegaly ,Trachea -central, No jugular venous distension,   No Carotid Bruit   Cardiovascular -Heart Sounds- Normal ,  no murmur , and  occasional irregularity suggestive of ectopy   , No gallop rhythm   Respiratory - Normal Respiratory Effort, Normal breath sounds,  no wheeze , and  no forced expiratory wheeze    Peripheral pitting pedal edema-- mild, no calf pain   Gastrointestinal -Soft abdomen, No palpable masses, Non Tender,Liver,Spleen not palpable. No-- free fluid and shifting dullness  Musculoskeletal- No finger Clubbing. Strength grossly normal   Lymphatic-No Palpable cervical, axillary,Inguinal lymphadenopathy   Psychiatric - normal effect,Orientation  Rt Dorsalis pedis pulses-palpable    Lt Dorsalis pedis pulses- palpable   Rt Posterior tibial pulses -palpable   Left posterior tibial pulses -palpable   Miscellaneous -  no renal bruit and  examined on wheel chair          Investigations  Lab and Imaging have been reviewed in [facility].    Review of Medicine tests    EKG- I had independently reviewed the EKG from--3/2025  It was reported to be showing     Sinus rhythm with 1st degree A-V block   Left anterior fascicular block   Abnormal ECG     Review of clinical lab tests:  Lab Results   Component Value Date    CREATININE 1.0 02/26/2025    HGB 13.1 (L) 02/26/2025     02/26/2025         Review of old records- Was done and  information gathered regards to events leading to surgery and health conditions of significance in the perioperative period         Review of old records- Was done and information gathered regards to events leading to surgery and health conditions of significance in the perioperative period.        Preoperative cardiac risk assessment-  The patient does not have any active cardiac conditions . Revised cardiac risk index predictors- -1--.Functional capacity is more than 4 Mets. He will be undergoing a Neuro procedure that carries a Moderate Risk risk     Risk of a major Cardiac event ( Defined as death, myocardial infarction, or cardiac arrest at 30 days after noncardiac surgery), based on RCRI score     6.0%       No further cardiac work up is indicated prior to proceeding with the surgery          American Society of Anesthesiologists Physical status classification ( ASA ) class: 3     Postoperative pulmonary complication risk assessment:      ARISCAT ( Canet) risk index- risk class -  Low, if duration of surgery is under 3 hours, intermediate, if duration of surgery is over 3 hours          Assessment/Plan:     History of subarachnoid hemorrhage  November 2023  Subarachnoid hemorrhage in 2023 (atraumatic, vessel imaging and angiogram failed to demonstrate aneurysm, vascular malformation),  No recurrence of symptoms  No seizures  11/2023: SAH. Conservative care.    Memory loss    Memory problem-Aricept  Not known to have dementia  Wife helps with his appointments    NPH (normal pressure hydrocephalus)  For surgery  He is having balance problems    Aortic atherosclerosis  HLD-I  suggest continuation of statin during the entire perioperative period.     Coronary artery disease  He is on metoprolol  He is holding aspirin for surgery  Suggested discussing with his cardiologist's about aspirin and apixaban use  His mobility is limited due to NPH  He has no chest pain or chest tightness    Primary  hypertension    HTN-losartan, metoprolol       Hypertension-  Blood pressure is acceptable . I suggest continuation of ---metoprolol----- during the entire perioperative period. I suggest holding --losartan------ on the morning of the surgery and can continue that  post operatively under blood pressure, electrolyte and renal function monitoring as long as they are acceptable.I suggest addressing pain control as uncontrolled pain can increased blood pressure            Oct 2024     Left Ventricle: The left ventricle is normal in size. Ventricular mass is normal. Normal wall thickness. There is normal systolic function. Ejection fraction is approximately 60%. Grade II diastolic dysfunction.    Right Ventricle: Normal right ventricular cavity size. Wall thickness is normal. Systolic function is normal.    Aortic Valve: The aortic valve is a trileaflet valve. There is mild aortic valve sclerosis. There is moderate aortic regurgitation with a centrally directed jet.    Mitral Valve: There is moderate regurgitation with a posterolateral eccentriccally directed jet.    Tricuspid Valve: There is mild to moderate regurgitation with a centrally directed jet.    Pulmonary Artery: There is moderate pulmonary hypertension. The estimated pulmonary artery systolic pressure is 53 mmHg.    Pericardium: There is a small circumferential effusion.    On     CHF, Edema, -Jardiance, Lasix, potassium spironolactone    He is doing good from a breathing standpoint    Pulmonary arterial hypertension       WHO functional classification     Class 1 - No limitation ( with reference to fatigue or dyspnea, chest pain, or heart syncope)       Clinical classification - based on etiology       Group 2  Group 4     [place] heart association functional class     Class 1         Planned Anesthesia-  General             Suggest avoidance of Intra vascular volume over load or reduced pre load          I suggest that the anaesthesiologist be aware of the  Pulmonary artery hypertension , so that sedation,  anesthetic plans can be made with consideration of Pulmonary Hypertension  .      Benign prostatic hyperplasia with urinary frequency  BPH-Flomax     Urinating well  Emptying well  Increased risk of post operative urinary retention  I suggest monitoring the  bladder volume with a view to decompress the bladder to avoid bladder stretching and resultant urinary retention   To minimize the risk of postoperative urinary retention address constipation( if applicable), increased ambulation, minimize opioid and anticholinergic use     Continue Flomax perioperatively       Chronic anticoagulation  Apixaban for pulmonary embolism  Not for AFib    History of pulmonary embolism  11/14/2023: CTA of Head and Neck: Bilateral pulmonary emboli.  He was having subarachnoid hemorrhage at that time    11/2023: Diagnosed with pulmonary embolism and benefit with anticoagulation felt to outweigh risk  Hold apixiban for 3 days with acceptable risk.      Episode1   Associated with reduced mobility around the time of the thrombosis, no long journeys, no cancer around the time of thrombosis, no prior surgery around the time thrombosis, Hospital stay around the time of thrombosis,no  HRT    IVC filter  - none       Thrombo-embolic event within the last 3 months- none     - Restoration of normal sinus rhythm from atrial fibrillation or atrial flutter, either by cardioversion or ablation, within the last month- none     - Pulmonary vein isolation within the last 2 months- none     - Left atrial appendage thrombus within the last 3 months- none .    No contraindications to holding anticoagulation for surgery     Date of surgery - 5/19  Type of Anesthesia- General  Last day of  use pre op-2 days pre op - will work with the surgeon on apixaban hold  He seems to had a provoked pulmonary embolism when he had subarachnoid hemorrhage    Dec 2024 -No convincing evidence of pulmonary thromboembolism to  the level of the proximal segmental arteries.   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 at [facility]    No recurrence  He had chemotherapy and radiation to the chest    Pernicious anemia    Pernicious anemia and vitamin B12 deficiency on intramuscular B12  No suggestions of neuropathy  No bariatric surgery    BMI 32.0-32.9,adult  He is watching his diet and is able to lose some weight  BMI 32.98     Weight related conditions     Known to have     HTN  Hyperlipidemia   Acid reflux   Fatty liver   Osteoarthritis    Not troubled with / Not known to have        Type 2 Diabetes   Prediabetes   Gout   \Fatty liver     Encouraged maintaining healthy weight for improved health     Thyroid nodule  No difficulty swallowing  TSH normal    Slow transit constipation  Doing good on Linzess  Suggested working on bowel movements in preparation for surgery   Constipation- I suggest giving bowel movement regimen as opioid use,reduced ambulation  can increase the constipation      Chronic bilateral low back pain with bilateral sciatica  Not trouble much with the back pain    Generalized weakness  Likely deconditioning suggested working on his strength    Lower extremity edema       Edema- I suggested avoidance of added salt,avoidance of NSAID's, unless advised or ordered  and suggested Limb elevation and stocking use     Acid reflux  Acid reflux Protonix      Does not sound Cardiac         GERD-  I suggest continuation of the Proton pump inhibitor in the perioperative period . I suggest aspiration precautions       Allergies  Flonase helping   Has no eczema or asthma    Urinary incontinence  Urge incontinence no no suggestions of urinary tract infection    Snoring    Possible sleep apnea- I suggest a sleep study and suggest caution with usage of medication that can cause respiratory suppression in the perioperative  period  potential ramifications of untreated sleep apnea, which could include daytime sleepiness, hypertension, heart disease and stroke were discussed  Suggested not to drive, if feels sleepy    Avoidance of  supine sleep, weight gain , alcoholic beverages , care with , sedative , CNS depressant use indicated  since all of these can worsen JAIRO      Sleep apnea could be contributing to pulmonary hypertension    He wants to address this with his care team    Anemia  Mild   Suggested follow up        Preventive perioperative care    Thromboembolic prophylaxis:  His risk factors for thrombosis include surgical procedure, previous history of thrombosis, age, and reduced mobility.I suggest  thromboembolic prophylaxis ( mechanical/pharmacological, weighing the risk benefits of pharmacological agent use considering [facility] procedural bleeding )  during the perioperative period.I suggested being active in the post operative period.      Postoperative pulmonary complication prophylaxis-Risk factors for post operative pulmonary complications include age over 65 years and ASA class >2- I suggest incentive spirometry use, early ambulation, and end tidal carbon dioxide monitoring  , oral care , head end of bed elevation      Renal complication prophylaxis-Risk factors for renal complications include hypertension . I suggest keeping him well hydrated in the perioperative period.       Surgical site Infection Prophylaxis-I  suggest appropriate antibiotic for Prophylaxis against Surgical site infections  No reported Staph infection  Skin antibacterial discussed       Delirium prophylaxis-Risk factors - Advanced Age - I suggest avoidance / minimizing the use of  Benzodiazepines ( unless the patient has been taking it on a regular basis ),Anticholinergic medication,Antihistamines ( like  Benadryl).I suggest minimizing the use of opioid medication and use of IV tylenol,if it is appropriate. I suggest using the lowest possible dose of  opioids for the shortest duration possible in the perioperative period. I suggest to Keep shades/blinds open during the day, lights off and shades closed at night to encourage normal sleep/wake cycle.I encourage the presence of the family member with the patient at all times, if at all possible as mental status changes can be picked up early by the family members and they help with reorientation. I encouraged the presence of family to help with orientation in the perioperative period. Benadryl avoidance suggested      In view of enlarged prostate the patient  is at risk of postoperative urinary retention.  I suggest avoidance / minimizing the of  Benzodiazepines,Anticholinergic medication,antihistamines ( Benadryl) , if possible in the perioperative period. I suggest using the minimum possible use of opioids for the minimum period of time in the perioperative period. Benadryl avoidance suggested      This visit was focused on Preoperative evaluation, Perioperative Medical management, complication reduction plans. I suggest that the patient follows up with primary care or relevant sub specialists for ongoing health care.    I appreciate the opportunity to be involved in this patients care. Please feel free to contact me if there were any questions about this consultation.    Patient is optimized    Patient/ care giver/ Family member was instructed to call and update me about any changes to health,  medication, office visits ,testing out side of the [facility] , hospitalizations between now and surgery      Ting Banegas MD  Internal Medicine  [facility]   Cell Phone- [phone]    History of COVID - no       COVID screening     No fever   No SOB  No sore throat   No loss of taste or smell   No muscle aches   No nausea, vomiting , diarrhea     Apixaban use Last day of  use pre op-2 days pre op - will work with the surgeon on apixaban hold  He seems to had a provoked pulmonary  embolism when he had subarachnoid hemorrhage    Hold Jardiance 3 days before surgery discussed with the patient-last day of use preop is May 15th    I have spent --75---- minutes of time which includes, time spent to prepare to see the patient , obtaining history ,performing examination, counseling/Educating the patient , Documenting clinical information in the record   ---    5/8- 16 46   Checked for over-the-counter medication   INR, APTT are normal  Bilirubin normal on the recent CMP     Apixaban use Last day of  use pre op-2 days pre op - will work with the surgeon on apixaban hold  He seems to had a provoked pulmonary embolism when he had subarachnoid hemorrhage  He seems to be on a reduced dose of apixaban perhaps that being used in the long term    Based on chart review-we will hold apixaban 3 days before surgery-last day of use preop with the May 15th    Called and spoke to wife hold apixaban 3 days before surgery-last day of use preop with the May 15th   No dark urine, pale stool or unusual itching    Message sent to the surgeon about possible dural venous sinus thrombosis  Was taking aspirin preventative reasons-not known to have circulation problem

## 2025-05-08 NOTE — ASSESSMENT & PLAN NOTE
BPH-Flomax     Urinating well  Emptying well  Increased risk of post operative urinary retention  I suggest monitoring the  bladder volume with a view to decompress the bladder to avoid bladder stretching and resultant urinary retention   To minimize the risk of postoperative urinary retention address constipation( if applicable), increased ambulation, minimize opioid and anticholinergic use     Continue Flomax perioperatively

## 2025-05-08 NOTE — OUTPATIENT SUBJECTIVE & OBJECTIVE
Outpatient Subjective & Objective     Chief complaint-Preoperative evaluation, Perioperative Medical management, complication reduction plan     Active cardiac conditions- none    Revised cardiac risk index predictors- history of heart failure    Functional capacity -Examples of physical activity  can take a flight of stairs holding on to the railing----- He can undertake all the above activities without  chest pain,chest tightness, Shortness of breath ,dizziness,lightheadedness making his exercise tolerance more,    than 4 Mets.        Review of Systems   Constitutional:  Negative for chills and fever.   HENT:          STOPBANG score  / 8    Loud Snoring     Witnessed stopping of breathing   Elevated BP    Age over 50 years     Male gender   Eyes:         No unusual vision changes   Respiratory:          Allergy cough  No Hemoptysis   Cardiovascular:         As noted   Gastrointestinal:         Bowels-he had colonoscopies about 4 years ago constipated  No overt GI/ blood losses   Endocrine:        Prednisone use > 20 mg daily for 3 weeks- none   Genitourinary:  Negative for dysuria.        No urinary hesitancy    Musculoskeletal:         As above      Skin:  Negative for rash.   Neurological:  Negative for syncope.        No unilateral weakness   Hematological:         Current use of Anticoagulants  Yes   Psychiatric/Behavioral:          No Depression,Anxiety          No past medical history pertinent negatives.        No anesthesia, bleeding, cardiac problems, PONV with previous surgeries/procedures.  Medications and Allergies reviewed in epic.   FH- No anesthesia,bleeding / venous thrombosis ,   in family      Physical Exam    Physical Exam  Constitutional-   General appearance-Conscious,Coherent  Eyes- No conjunctival icterus,pupils  round , reactive to light , and  bilateral intra ocular lenses  ENT-Oral cavity- moist    , Hearing grossly normal   Neck- No thyromegaly ,Trachea -central, No jugular venous  distension,   No Carotid Bruit   Cardiovascular -Heart Sounds- Normal ,  no murmur , and  occasional irregularity suggestive of ectopy   , No gallop rhythm   Respiratory - Normal Respiratory Effort, Normal breath sounds,  no wheeze , and  no forced expiratory wheeze    Peripheral pitting pedal edema-- mild, no calf pain   Gastrointestinal -Soft abdomen, No palpable masses, Non Tender,Liver,Spleen not palpable. No-- free fluid and shifting dullness  Musculoskeletal- No finger Clubbing. Strength grossly normal   Lymphatic-No Palpable cervical, axillary,Inguinal lymphadenopathy   Psychiatric - normal effect,Orientation  Rt Dorsalis pedis pulses-palpable    Lt Dorsalis pedis pulses- palpable   Rt Posterior tibial pulses -palpable   Left posterior tibial pulses -palpable   Miscellaneous -  no renal bruit and  examined on wheel chair          Investigations  Lab and Imaging have been reviewed in epic.    Review of Medicine tests    EKG- I had independently reviewed the EKG from--3/2025  It was reported to be showing     Sinus rhythm with 1st degree A-V block   Left anterior fascicular block   Abnormal ECG     Review of clinical lab tests:  Lab Results   Component Value Date    CREATININE 1.0 02/26/2025    HGB 13.1 (L) 02/26/2025     02/26/2025         Review of old records- Was done and information gathered regards to events leading to surgery and health conditions of significance in the perioperative period         Review of old records- Was done and information gathered regards to events leading to surgery and health conditions of significance in the perioperative period.    Outpatient Subjective & Objective

## 2025-05-08 NOTE — ASSESSMENT & PLAN NOTE
November 2023  Subarachnoid hemorrhage in 2023 (atraumatic, vessel imaging and angiogram failed to demonstrate aneurysm, vascular malformation),  No recurrence of symptoms  No seizures  11/2023: SAH. Conservative care.

## 2025-05-08 NOTE — ASSESSMENT & PLAN NOTE
He is on metoprolol  He is holding aspirin for surgery  Suggested discussing with his cardiologist's about aspirin and apixaban use  His mobility is limited due to NPH  He has no chest pain or chest tightness

## 2025-05-08 NOTE — ASSESSMENT & PLAN NOTE
Possible sleep apnea- I suggest a sleep study and suggest caution with usage of medication that can cause respiratory suppression in the perioperative period  potential ramifications of untreated sleep apnea, which could include daytime sleepiness, hypertension, heart disease and stroke were discussed  Suggested not to drive, if feels sleepy    Avoidance of  supine sleep, weight gain , alcoholic beverages , care with , sedative , CNS depressant use indicated  since all of these can worsen JAIRO      Sleep apnea could be contributing to pulmonary hypertension    He wants to address this with his care team

## 2025-05-08 NOTE — ASSESSMENT & PLAN NOTE
Pernicious anemia and vitamin B12 deficiency on intramuscular B12  No suggestions of neuropathy  No bariatric surgery

## 2025-05-16 ENCOUNTER — PATIENT MESSAGE (OUTPATIENT)
Dept: NEUROSURGERY | Facility: CLINIC | Age: 80
End: 2025-05-16
Payer: MEDICARE

## 2025-05-16 ENCOUNTER — ANESTHESIA EVENT (OUTPATIENT)
Dept: SURGERY | Facility: HOSPITAL | Age: 80
End: 2025-05-16
Payer: MEDICARE

## 2025-05-16 NOTE — TELEPHONE ENCOUNTER
Called and spoke w/ patient's wife with arrival time and instructions for the night before/morning of surgery. Answered all questions and portal message sent with details.

## 2025-05-18 NOTE — ANESTHESIA PREPROCEDURE EVALUATION
[facility]  Anesthesia Pre-Operative Evaluation         Patient Name: Sam Gamino  YOB: 1945  MRN: [id number]    SUBJECTIVE:     Pre-operative evaluation for Procedure(s) (LRB):  INSERTION,  SHUNT, W/ COMPUTER-ASSISTED NAVIGATION (Right)     05/18/2025    Sam Gamino is a 79 y.o. male w/ a significant PMHx of SAH 2023 (supportive care), NPH c/b balance, memory problems, hx PE on Eliquis, diastolic CHF EF 60%, mod AR, mod MR, pulm HTN (PAP 53), CAD, HTN, lymphoma 1992.    Patient now presents for the above procedure(s).      Prev airway: None documented.      Problem List[1]    Review of patient's allergies indicates:   Allergen Reactions    Lotensin [benazepril]      cough       Current Inpatient Medications:      Medications Ordered Prior to Encounter[2]    Past Surgical History:   Procedure Laterality Date    EYE SURGERY Bilateral 2016    Cataracts       Social History[3]    OBJECTIVE:     Vital Signs Range (Last 24H):         Significant Labs:  Lab Results   Component Value Date    WBC 6.34 02/26/2025    HGB 13.1 (L) 02/26/2025    HCT 41.9 02/26/2025     02/26/2025    CHOL 123 02/01/2024    TRIG 61 02/01/2024    HDL 36 (L) 02/01/2024    ALT 16 02/26/2025    AST 27 02/26/2025     02/26/2025    K 4.2 02/26/2025     02/26/2025    CREATININE 1.0 02/26/2025    BUN 18 02/26/2025    CO2 23 02/26/2025    TSH 2.156 03/18/2025    PSA 0.08 03/29/2023    INR 1.0 05/08/2025    HGBA1C 4.6 12/31/2024       Diagnostic Studies: No relevant studies.    EKG:   Results for orders placed or performed in visit on 03/31/25   EKG 12-lead    Collection Time: 03/31/25 10:14 AM   Result Value Ref Range    QRS Duration 116 ms    [facility] QTC Calculation 422 ms    Narrative    Test Reason : I44.0,    Vent. Rate :  95 BPM     Atrial Rate :  95 BPM     P-R Int : 312 ms          QRS Dur : 116 ms      QT Int : 336 ms       P-R-T Axes : -73 -60  85 degrees    QTcB Int : 422 ms    Sinus rhythm  with 1st degree A-V block  Left anterior fascicular block  Abnormal ECG    Confirmed by Ema Montemayor ([id number]) on 3/31/2025 12:57:37 PM    Referred By: NELSY           Confirmed By: Ema Montemayor       2D ECHO:  TTE:  Results for orders placed or performed during the hospital encounter of 10/04/24   Echo Saline Bubble? No   Result Value Ref Range    BSA 2.4 m2    LVOT stroke volume 62.3 cm3    LVIDd 4.5 3.5 - 6.0 cm    LV Systolic Volume 34.51 mL    LV Systolic Volume Index 14.7 mL/m2    LVIDs 3.0 2.1 - 4.0 cm    LV ESV A2C 64.63 mL    LV Diastolic Volume 94.45 mL    LV ESV A4C 69.94 mL    LV Diastolic Volume Index 40.19 mL/m2    Left Ventricular End Systolic Volume by Teichholz Method 34.51 mL    Left Ventricular End Diastolic Volume by Teichholz Method 94.45 mL    IVS 0.9 0.6 - 1.1 cm    LVOT diameter 2.1 cm    LVOT area 3.5 cm2    FS 33.3 28 - 44 %    Left Ventricle Relative Wall Thickness 0.40 cm    PW 0.9 0.6 - 1.1 cm    LV mass 132.8 g    LV Mass Index 56.5 g/m2    MV Peak E Mak 1.14 m/s    TDI LATERAL 0.14 m/s    TDI SEPTAL 0.13 m/s    E/E' ratio 8.44 m/s    TR Max Mak 3.63 m/s    IVRT 139.87 msec    E wave deceleration time 111.19 msec    LV SEPTAL E/E' RATIO 8.77 m/s    LV LATERAL E/E' RATIO 8.14 m/s    PV Peak S Mak 0.62 m/s    PV Peak D Mak 0.21 m/s    Pulm vein S/D ratio 2.95     LVOT peak mak 1.1 m/s    Left Ventricular Outflow Tract Mean Velocity 0.68 cm/s    Left Ventricular Outflow Tract Mean Gradient 2.24 mmHg    RV- mckinley basal diam 4.0 cm    RV S' 9.99 cm/s    RVOT peak VTI 12.5 cm    RV/LV Ratio 0.89 cm    LA size 3.39 cm    Left Atrium Minor Axis 5.64 cm    Left Atrium Major Axis 6.08 cm    LA Vol (MOD) 67.14 mL    RALPH (MOD) 28.6 mL/m2    RA Major Axis 4.78 cm    AV regurgitation pressure 1/2 time 388.0 ms    AR Max Mak 3.04 m/s    AV mean gradient 3.9 mmHg    AV peak gradient 7.8 mmHg    Ao peak mak 1.4 m/s    Ao VTI 22.3 cm    LVOT peak VTI 18.0 cm    AV valve area 2.8 cm²    AV Velocity  Ratio 0.79     AV index (prosthetic) 0.81     GERSON by Velocity Ratio 2.7 cm²    Mr max petr 4.62 m/s    MV stenosis pressure 1/2 time 32.25 ms    MV valve area p 1/2 method 6.82 cm2    Triscuspid Valve Regurgitation Peak Gradient 53 mmHg    PV mean gradient 2 mmHg    PV PEAK VELOCITY 1.00 m/s    PV peak gradient 4 mmHg    RVOT peak petr 0.83 m/s    STJ 3.29 cm    Ascending aorta 3.23 cm    Mean e' 0.14 m/s    ZLVIDS -5.36     ZLVIDD -7.81     LA area A4C 22.59 cm2    LA area A2C 21.47 cm2    RALPH 28.0 mL/m2    LA Vol 65.76 cm3    TAPSE 2.20 cm    LA WIDTH 3.9 cm    RA Width 4.1 cm    Sinus 3.3 cm    TV resting pulmonary artery pressure 53 mmHg    RV TB RVSP 4 mmHg    Est. RA pres 0 mmHg    EF 60 %    Narrative      Left Ventricle: The left ventricle is normal in size. Ventricular mass   is normal. Normal wall thickness. There is normal systolic function.   Ejection fraction is approximately 60%. Grade II diastolic dysfunction.    Right Ventricle: Normal right ventricular cavity size. Wall thickness   is normal. Systolic function is normal.    Aortic Valve: The aortic valve is a trileaflet valve. There is mild   aortic valve sclerosis. There is moderate aortic regurgitation with a   centrally directed jet.    Mitral Valve: There is moderate regurgitation with a posterolateral   eccentriccally directed jet.    Tricuspid Valve: There is mild to moderate regurgitation with a   centrally directed jet.    Pulmonary Artery: There is moderate pulmonary hypertension. The   estimated pulmonary artery systolic pressure is 53 mmHg.    Pericardium: There is a small circumferential effusion.         KATTY:  No results found for this or any previous visit.    ASSESSMENT/PLAN:       Pre-op Assessment    I have reviewed the Patient Summary Reports.     I have reviewed the Nursing Notes. I have reviewed the NPO Status.   I have reviewed the Medications.     Review of Systems  Anesthesia Hx:  No problems with previous Anesthesia   History  of prior surgery of interest to airway management or planning:  Previous anesthesia: General        Denies Family Hx of Anesthesia complications.    Denies Personal Hx of Anesthesia complications.                    Hematology/Oncology:  Hematology Normal   Oncology Normal                                   EENT/Dental:  EENT/Dental Normal           Cardiovascular:     Hypertension   Denies MI. CAD       CHF                                   Pulmonary:  Pulmonary Normal   Denies COPD.  Denies Asthma.                    Renal/:  Renal/ Normal                 Hepatic/GI:     GERD                Musculoskeletal:  Musculoskeletal Normal                Neurological:  Neurology Normal   Denies CVA.        SAH 2023, NPH                            Endocrine:  Endocrine Normal Denies Diabetes.           Dermatological:  Skin Normal    Psych:  Psychiatric Normal                       Anesthesia Plan  Type of Anesthesia, risks & benefits discussed:    Anesthesia Type: Gen ETT  Intra-op Monitoring Plan: Standard ASA Monitors  Post Op Pain Control Plan: multimodal analgesia and IV/PO Opioids PRN  Induction:  IV  Airway Plan: Direct, Post-Induction  Informed Consent: Informed consent signed with the Patient and all parties understand the risks and agree with anesthesia plan.  All questions answered.   ASA Score: 3  Day of Surgery Review of History & Physical: H&P Update referred to the surgeon/provider.    Ready For Surgery From Anesthesia Perspective.     .           [1]   Patient Active Problem List  Diagnosis    Lymphoma in remission    Pernicious anemia    Coronary artery disease    Thyroid nodule    Normal cardiac stress test    Slow transit constipation    Chronic bilateral low back pain with bilateral sciatica    Benign prostatic hyperplasia with urinary frequency    Mitral regurgitation    Tricuspid regurgitation    Lower extremity edema    Primary hypertension    Heart failure, diastolic, chronic     Hypercholesterolemia    Atrioventricular block, first degree    Aortic atherosclerosis    History of subarachnoid hemorrhage    History of pulmonary embolism    Impaired functional mobility, balance, gait, and endurance    Status post bone marrow transplant    BMI 32.0-32.9,adult    Chronic anticoagulation    Presbylarynx    Generalized weakness    Known medical problems    Memory loss    Pulmonary hypertension associated with unclear multi-factorial mechanisms    Non-Hodgkin's lymphoma, unspecified body region, unspecified non-Hodgkin lymphoma type    Pulmonary hypertension due to left heart disease    NPH (normal pressure hydrocephalus)    Acid reflux    Allergies    Urinary incontinence    Snoring    Anemia   [2]   No current facility-administered medications on file prior to encounter.     Current Outpatient Medications on File Prior to Encounter   Medication Sig Dispense Refill    apixaban (ELIQUIS) 2.5 mg Tab Take 1 tablet (2.5 mg total) by mouth 2 (two) times daily. 180 tablet 3    cholecalciferol, vitamin D3, (VITAMIN D3) 50 mcg (2,000 unit) Cap Take 1 capsule by mouth once daily.      cyanocobalamin 1,000 mcg/mL injection INJECT 1ML IN THE MUSCLE EVERY 30 DAYS (Patient taking differently: Takes the first day of  the month.) 10 mL 1    donepeziL (ARICEPT) 5 MG tablet Take 1 tablet (5 mg total) by mouth every evening. 90 tablet 3    empagliflozin (JARDIANCE) 10 mg tablet Take 1 tablet (10 mg total) by mouth once daily. 90 tablet 3    fluticasone propionate (FLONASE) 50 mcg/actuation nasal spray SHAKE LIQUID AND USE 2 SPRAYS(100 MCG) IN EACH NOSTRIL EVERY DAY 16 g 3    furosemide (LASIX) 40 MG tablet Take 1 tablet (40 mg total) by mouth once daily. 120 tablet 3    LINZESS 290 mcg Cap capsule TAKE 1 CAPSULE(290 MCG) BY MOUTH BEFORE BREAKFAST 90 capsule 3    losartan (COZAAR) 100 MG tablet Take 1 tablet (100 mg total) by mouth once daily. 90 tablet 3    solifenacin (VESICARE) 10 MG tablet TAKE 1 TABLET(10 MG) BY  MOUTH EVERY DAY 90 tablet 3    spironolactone (ALDACTONE) 25 MG tablet Take 1 tablet (25 mg total) by mouth once daily. 90 tablet 3    tamsulosin (FLOMAX) 0.4 mg Cap TAKE 1 CAPSULE(0.4 MG) BY MOUTH EVERY DAY 90 capsule 1   [3]   Social History  Socioeconomic History    Marital status:      Spouse name: Eileen    Number of children: 1+3   Occupational History    Occupation:    Tobacco Use    Smoking status: Never    Smokeless tobacco: Never   Substance and Sexual Activity    Alcohol use: Yes     Alcohol/week: 1.0 standard drink of alcohol     Types: 1 Standard drinks or equivalent per week     Comment: ocassionally    Drug use: No    Sexual activity: Not Currently     Partners: Female   Social History Narrative    No exercise.    Wants to retire in 2019    Going to retire - 12/21 1/24 - he walks with a walker     Social Drivers of Health     Financial Resource Strain: Medium Risk (12/31/2024)    Overall Financial Resource Strain (CARDIA)     Difficulty of Paying Living Expenses: Somewhat hard   Food Insecurity: No Food Insecurity (12/31/2024)    Hunger Vital Sign     Worried About Running Out of Food in the Last Year: Never true     Ran Out of Food in the Last Year: Never true   Transportation Needs: No Transportation Needs (12/31/2024)    TRANSPORTATION NEEDS     Transportation : No   Physical Activity: Inactive (12/31/2024)    Exercise Vital Sign     Days of Exercise per Week: 0 days     Minutes of Exercise per Session: 0 min   Stress: Stress Concern Present (12/31/2024)    [employer] - Occupational Stress Questionnaire     Feeling of Stress : To some extent   Housing Stability: Unknown (12/31/2024)    Housing Stability Vital Sign     Unable to Pay for Housing in the Last Year: No     Homeless in the Last Year: No

## 2025-05-19 ENCOUNTER — ANESTHESIA (OUTPATIENT)
Dept: SURGERY | Facility: HOSPITAL | Age: 80
End: 2025-05-19
Payer: MEDICARE

## 2025-05-19 ENCOUNTER — HOSPITAL ENCOUNTER (INPATIENT)
Facility: HOSPITAL | Age: 80
LOS: 3 days | Discharge: NURSING, REHAB OR LONG-TERM CARE | DRG: 031 | End: 2025-05-22
Attending: NEUROLOGICAL SURGERY | Admitting: NEUROLOGICAL SURGERY
Payer: MEDICARE

## 2025-05-19 DIAGNOSIS — G91.2 NPH (NORMAL PRESSURE HYDROCEPHALUS): ICD-10-CM

## 2025-05-19 PROBLEM — Z98.2 VENTRICULAR SHUNT IN PLACE: Status: ACTIVE | Noted: 2025-05-19

## 2025-05-19 LAB
ABORH RETYPE: NORMAL
ABORH RETYPE: NORMAL

## 2025-05-19 PROCEDURE — 37000008 HC ANESTHESIA 1ST 15 MINUTES: Performed by: NEUROLOGICAL SURGERY

## 2025-05-19 PROCEDURE — 37000009 HC ANESTHESIA EA ADD 15 MINS: Performed by: NEUROLOGICAL SURGERY

## 2025-05-19 PROCEDURE — 27800903 OPTIME MED/SURG SUP & DEVICES OTHER IMPLANTS: Performed by: NEUROLOGICAL SURGERY

## 2025-05-19 PROCEDURE — 71000016 HC POSTOP RECOV ADDL HR: Performed by: NEUROLOGICAL SURGERY

## 2025-05-19 PROCEDURE — 71000033 HC RECOVERY, INTIAL HOUR: Performed by: NEUROLOGICAL SURGERY

## 2025-05-19 PROCEDURE — 63600175 PHARM REV CODE 636 W HCPCS: Performed by: STUDENT IN AN ORGANIZED HEALTH CARE EDUCATION/TRAINING PROGRAM

## 2025-05-19 PROCEDURE — 99900035 HC TECH TIME PER 15 MIN (STAT)

## 2025-05-19 PROCEDURE — 00160J6 BYPASS CEREBRAL VENTRICLE TO PERITONEAL CAVITY WITH SYNTHETIC SUBSTITUTE, OPEN APPROACH: ICD-10-PCS | Performed by: NEUROLOGICAL SURGERY

## 2025-05-19 PROCEDURE — 25000003 PHARM REV CODE 250

## 2025-05-19 PROCEDURE — 27000221 HC OXYGEN, UP TO 24 HOURS

## 2025-05-19 PROCEDURE — 63600175 PHARM REV CODE 636 W HCPCS

## 2025-05-19 PROCEDURE — 25000003 PHARM REV CODE 250: Performed by: STUDENT IN AN ORGANIZED HEALTH CARE EDUCATION/TRAINING PROGRAM

## 2025-05-19 PROCEDURE — 11000001 HC ACUTE MED/SURG PRIVATE ROOM

## 2025-05-19 PROCEDURE — C1729 CATH, DRAINAGE: HCPCS | Performed by: NEUROLOGICAL SURGERY

## 2025-05-19 PROCEDURE — 25000003 PHARM REV CODE 250: Performed by: NEUROLOGICAL SURGERY

## 2025-05-19 PROCEDURE — 71000015 HC POSTOP RECOV 1ST HR: Performed by: NEUROLOGICAL SURGERY

## 2025-05-19 PROCEDURE — 94761 N-INVAS EAR/PLS OXIMETRY MLT: CPT

## 2025-05-19 PROCEDURE — 62223 ESTABLISH BRAIN CAVITY SHUNT: CPT | Mod: 62,GC,, | Performed by: NEUROLOGICAL SURGERY

## 2025-05-19 PROCEDURE — 36000713 HC OR TIME LEV V EA ADD 15 MIN: Performed by: NEUROLOGICAL SURGERY

## 2025-05-19 PROCEDURE — 8E09XBZ COMPUTER ASSISTED PROCEDURE OF HEAD AND NECK REGION: ICD-10-PCS | Performed by: NEUROLOGICAL SURGERY

## 2025-05-19 PROCEDURE — 27201423 OPTIME MED/SURG SUP & DEVICES STERILE SUPPLY: Performed by: NEUROLOGICAL SURGERY

## 2025-05-19 PROCEDURE — 62223 ESTABLISH BRAIN CAVITY SHUNT: CPT | Mod: 62,,, | Performed by: SURGERY

## 2025-05-19 PROCEDURE — 0WJG4ZZ INSPECTION OF PERITONEAL CAVITY, PERCUTANEOUS ENDOSCOPIC APPROACH: ICD-10-PCS | Performed by: SURGERY

## 2025-05-19 PROCEDURE — 61781 SCAN PROC CRANIAL INTRA: CPT | Mod: ,,, | Performed by: NEUROLOGICAL SURGERY

## 2025-05-19 PROCEDURE — 36000712 HC OR TIME LEV V 1ST 15 MIN: Performed by: NEUROLOGICAL SURGERY

## 2025-05-19 DEVICE — VALVE CHPU PROGRAMMABLE: Type: IMPLANTABLE DEVICE | Site: SCALP | Status: FUNCTIONAL

## 2025-05-19 DEVICE — CATH BACTISEAL PERITONEAL: Type: IMPLANTABLE DEVICE | Site: SCALP | Status: FUNCTIONAL

## 2025-05-19 DEVICE — CATH BACTISEAL VENTRICULAR: Type: IMPLANTABLE DEVICE | Site: SCALP | Status: FUNCTIONAL

## 2025-05-19 RX ORDER — DONEPEZIL HYDROCHLORIDE 5 MG/1
5 TABLET, FILM COATED ORAL NIGHTLY
Status: DISCONTINUED | OUTPATIENT
Start: 2025-05-19 | End: 2025-05-22 | Stop reason: HOSPADM

## 2025-05-19 RX ORDER — ACETAMINOPHEN 325 MG/1
650 TABLET ORAL EVERY 4 HOURS PRN
Status: DISCONTINUED | OUTPATIENT
Start: 2025-05-19 | End: 2025-05-22 | Stop reason: HOSPADM

## 2025-05-19 RX ORDER — HALOPERIDOL LACTATE 5 MG/ML
0.5 INJECTION, SOLUTION INTRAMUSCULAR EVERY 10 MIN PRN
Status: DISCONTINUED | OUTPATIENT
Start: 2025-05-19 | End: 2025-05-19 | Stop reason: HOSPADM

## 2025-05-19 RX ORDER — CEFTRIAXONE 2 G/1
2 INJECTION, POWDER, FOR SOLUTION INTRAMUSCULAR; INTRAVENOUS
Status: COMPLETED | OUTPATIENT
Start: 2025-05-19 | End: 2025-05-19

## 2025-05-19 RX ORDER — MUPIROCIN 20 MG/G
1 OINTMENT TOPICAL 2 TIMES DAILY
Status: DISCONTINUED | OUTPATIENT
Start: 2025-05-19 | End: 2025-05-19

## 2025-05-19 RX ORDER — LIDOCAINE HYDROCHLORIDE 20 MG/ML
INJECTION, SOLUTION EPIDURAL; INFILTRATION; INTRACAUDAL; PERINEURAL
Status: DISCONTINUED | OUTPATIENT
Start: 2025-05-19 | End: 2025-05-19

## 2025-05-19 RX ORDER — METOPROLOL TARTRATE 1 MG/ML
INJECTION, SOLUTION INTRAVENOUS
Status: DISCONTINUED | OUTPATIENT
Start: 2025-05-19 | End: 2025-05-19

## 2025-05-19 RX ORDER — GLUCAGON 1 MG
1 KIT INJECTION
Status: DISCONTINUED | OUTPATIENT
Start: 2025-05-19 | End: 2025-05-19 | Stop reason: HOSPADM

## 2025-05-19 RX ORDER — TAMSULOSIN HYDROCHLORIDE 0.4 MG/1
0.4 CAPSULE ORAL DAILY
Status: DISCONTINUED | OUTPATIENT
Start: 2025-05-19 | End: 2025-05-22 | Stop reason: HOSPADM

## 2025-05-19 RX ORDER — LOSARTAN POTASSIUM 50 MG/1
100 TABLET ORAL DAILY
Status: DISCONTINUED | OUTPATIENT
Start: 2025-05-19 | End: 2025-05-22 | Stop reason: HOSPADM

## 2025-05-19 RX ORDER — CEFAZOLIN SODIUM 1 G/3ML
INJECTION, POWDER, FOR SOLUTION INTRAMUSCULAR; INTRAVENOUS
Status: DISCONTINUED | OUTPATIENT
Start: 2025-05-19 | End: 2025-05-19

## 2025-05-19 RX ORDER — ONDANSETRON HYDROCHLORIDE 2 MG/ML
4 INJECTION, SOLUTION INTRAVENOUS EVERY 12 HOURS PRN
Status: DISCONTINUED | OUTPATIENT
Start: 2025-05-19 | End: 2025-05-22 | Stop reason: HOSPADM

## 2025-05-19 RX ORDER — FENTANYL CITRATE 50 UG/ML
25 INJECTION, SOLUTION INTRAMUSCULAR; INTRAVENOUS EVERY 5 MIN PRN
Status: DISCONTINUED | OUTPATIENT
Start: 2025-05-19 | End: 2025-05-19 | Stop reason: HOSPADM

## 2025-05-19 RX ORDER — PROPOFOL 10 MG/ML
VIAL (ML) INTRAVENOUS
Status: DISCONTINUED | OUTPATIENT
Start: 2025-05-19 | End: 2025-05-19

## 2025-05-19 RX ORDER — DEXAMETHASONE SODIUM PHOSPHATE 4 MG/ML
INJECTION, SOLUTION INTRA-ARTICULAR; INTRALESIONAL; INTRAMUSCULAR; INTRAVENOUS; SOFT TISSUE
Status: DISCONTINUED | OUTPATIENT
Start: 2025-05-19 | End: 2025-05-19

## 2025-05-19 RX ORDER — METOPROLOL SUCCINATE 50 MG/1
50 TABLET, EXTENDED RELEASE ORAL DAILY
Status: DISCONTINUED | OUTPATIENT
Start: 2025-05-19 | End: 2025-05-22 | Stop reason: HOSPADM

## 2025-05-19 RX ORDER — ONDANSETRON HYDROCHLORIDE 2 MG/ML
INJECTION, SOLUTION INTRAVENOUS
Status: DISCONTINUED | OUTPATIENT
Start: 2025-05-19 | End: 2025-05-19

## 2025-05-19 RX ORDER — ROCURONIUM BROMIDE 10 MG/ML
INJECTION, SOLUTION INTRAVENOUS
Status: DISCONTINUED | OUTPATIENT
Start: 2025-05-19 | End: 2025-05-19

## 2025-05-19 RX ORDER — FENTANYL CITRATE 50 UG/ML
INJECTION, SOLUTION INTRAMUSCULAR; INTRAVENOUS
Status: DISCONTINUED | OUTPATIENT
Start: 2025-05-19 | End: 2025-05-19

## 2025-05-19 RX ORDER — MUPIROCIN 20 MG/G
OINTMENT TOPICAL
Status: DISCONTINUED | OUTPATIENT
Start: 2025-05-19 | End: 2025-05-19

## 2025-05-19 RX ORDER — BACITRACIN ZINC 500 UNIT/G
OINTMENT (GRAM) TOPICAL
Status: DISCONTINUED | OUTPATIENT
Start: 2025-05-19 | End: 2025-05-19

## 2025-05-19 RX ORDER — HYDROCODONE BITARTRATE AND ACETAMINOPHEN 5; 325 MG/1; MG/1
1 TABLET ORAL EVERY 4 HOURS PRN
Refills: 0 | Status: DISCONTINUED | OUTPATIENT
Start: 2025-05-19 | End: 2025-05-22 | Stop reason: HOSPADM

## 2025-05-19 RX ORDER — HEPARIN SODIUM 5000 [USP'U]/ML
5000 INJECTION, SOLUTION INTRAVENOUS; SUBCUTANEOUS EVERY 8 HOURS
Status: DISCONTINUED | OUTPATIENT
Start: 2025-05-20 | End: 2025-05-22 | Stop reason: HOSPADM

## 2025-05-19 RX ADMIN — TAMSULOSIN HYDROCHLORIDE 0.4 MG: 0.4 CAPSULE ORAL at 09:05

## 2025-05-19 RX ADMIN — LIDOCAINE HYDROCHLORIDE 80 MG: 20 INJECTION, SOLUTION EPIDURAL; INFILTRATION; INTRACAUDAL at 07:05

## 2025-05-19 RX ADMIN — DEXAMETHASONE SODIUM PHOSPHATE 4 MG: 4 INJECTION INTRA-ARTICULAR; INTRALESIONAL; INTRAMUSCULAR; INTRAVENOUS; SOFT TISSUE at 07:05

## 2025-05-19 RX ADMIN — ROCURONIUM BROMIDE 40 MG: 10 INJECTION, SOLUTION INTRAVENOUS at 07:05

## 2025-05-19 RX ADMIN — SODIUM CHLORIDE: 0.9 INJECTION, SOLUTION INTRAVENOUS at 07:05

## 2025-05-19 RX ADMIN — CEFAZOLIN 2 G: 330 INJECTION, POWDER, FOR SOLUTION INTRAMUSCULAR; INTRAVENOUS at 07:05

## 2025-05-19 RX ADMIN — MUPIROCIN: 20 OINTMENT TOPICAL at 06:05

## 2025-05-19 RX ADMIN — METOPROLOL TARTRATE 3 MG: 5 INJECTION, SOLUTION INTRAVENOUS at 07:05

## 2025-05-19 RX ADMIN — ROCURONIUM BROMIDE 10 MG: 10 INJECTION, SOLUTION INTRAVENOUS at 08:05

## 2025-05-19 RX ADMIN — PROPOFOL 70 MG: 10 INJECTION, EMULSION INTRAVENOUS at 07:05

## 2025-05-19 RX ADMIN — SUGAMMADEX 200 MG: 100 INJECTION, SOLUTION INTRAVENOUS at 08:05

## 2025-05-19 RX ADMIN — ROCURONIUM BROMIDE 20 MG: 10 INJECTION, SOLUTION INTRAVENOUS at 08:05

## 2025-05-19 RX ADMIN — CEFTRIAXONE SODIUM 2 G: 2 INJECTION, POWDER, FOR SOLUTION INTRAMUSCULAR; INTRAVENOUS at 02:05

## 2025-05-19 RX ADMIN — DONEPEZIL HYDROCHLORIDE 5 MG: 5 TABLET, FILM COATED ORAL at 08:05

## 2025-05-19 RX ADMIN — METOPROLOL SUCCINATE 50 MG: 50 TABLET, EXTENDED RELEASE ORAL at 09:05

## 2025-05-19 RX ADMIN — FENTANYL CITRATE 100 MCG: 50 INJECTION INTRAMUSCULAR; INTRAVENOUS at 07:05

## 2025-05-19 RX ADMIN — LOSARTAN POTASSIUM 100 MG: 50 TABLET, FILM COATED ORAL at 09:05

## 2025-05-19 RX ADMIN — ONDANSETRON 4 MG: 2 INJECTION INTRAMUSCULAR; INTRAVENOUS at 08:05

## 2025-05-19 NOTE — PLAN OF CARE
Problem: Adult Inpatient Plan of Care  Goal: Plan of Care Review  Outcome: Progressing  Goal: Patient-Specific Goal (Individualized)  Outcome: Progressing  Goal: Optimal Comfort and Wellbeing  Outcome: Progressing     Problem: Wound  Goal: Optimal Pain Control and Function  Outcome: Progressing  Goal: Skin Health and Integrity  Outcome: Progressing

## 2025-05-19 NOTE — BRIEF OP NOTE
Operative Note       Surgery Date: 5/19/2025     Surgeons and Role:     * Roddy Peres, DO - Primary     * Orlando Ledbetter MD - Resident - Assisting    Pre-op Diagnosis:  NPH (normal pressure hydrocephalus) [G91.2]    Post-op Diagnosis:  NPH (normal pressure hydrocephalus) [G91.2]    Procedure(s) (LRB):  INSERTION,  SHUNT, W/ COMPUTER-ASSISTED NAVIGATION (Right)    Anesthesia: General    Procedure in Detail/Findings:   without apparent complication.  No adhesions noted.    Estimated Blood Loss: Minimal           Specimens (From admission, onward)      None          Implants:   Implant Name Type Inv. Item Serial No.  Lot No. LRB No. Used Action   CATH BACTISEAL PERITONEAL - [id number]  CATH BACTISEAL PERITONEAL  [employer] [id number] Right 1 Implanted   CATH BACTISEAL VENTRICULAR - [id number]  CATH BACTISEAL VENTRICULAR  [employer] [id number] Right 1 Implanted   VALVE CHPU PROGRAMMABLE - [id number]  VALVE CHPU PROGRAMMABLE  [employer] [id number] Right 1 Implanted              Disposition: PACU - hemodynamically stable.           Condition: Good    Attestation:  I was present and scrubbed for the entire procedure.

## 2025-05-19 NOTE — ANESTHESIA PROCEDURE NOTES
Intubation    Date/Time: 5/19/2025 7:14 AM    Performed by: Barak Evans DO  Authorized by: Abdi Lucas MD    Intubation:     Induction:  Intravenous    Intubated:  Postinduction    Mask Ventilation:  Easy mask    Attempts:  2    Attempted By:  Resident anesthesiologist    Method of Intubation:  Direct    Blade:  Dennis 2    Laryngeal View Grade: Grade IIb - only the arytenoids and epiglottis seen      Method of Intubation (2nd Attempt):  Video laryngoscopy    Blade (2nd Attempt):  Peguero 3    Laryngeal View Grade (2nd Attempt): Grade IIa - cords partially seen      Difficult Airway Encountered?: No      Complications:  None    Airway Device:  Oral endotracheal tube    Airway Device Size:  7.5    Style/Cuff Inflation:  Cuffed    Tube secured:  23    Secured at:  The lips    Placement Verified By:  Capnometry    Complicating Factors:  Anterior larynx    Findings Post-Intubation:  BS equal bilateral and atraumatic/condition of teeth unchanged

## 2025-05-19 NOTE — PROGRESS NOTES
New serous drainage noted to L scalp dressing. Neuro checks intact, drainage stable. Dr Garcia notified, ok to transfer to NPU per Dr Garcia

## 2025-05-19 NOTE — BRIEF OP NOTE
[facility] - Surgery ([facility])  Brief Operative Note    SUMMARY     Surgery Date: 5/19/2025     Surgeons and Role:     * Roddy Peres, DO - Primary     * Orlando Ledbetter MD - Resident - Assisting        Pre-op Diagnosis:  NPH (normal pressure hydrocephalus) [G91.2]    Post-op Diagnosis:  Post-Op Diagnosis Codes:     * NPH (normal pressure hydrocephalus) [G91.2]    Procedure(s) (LRB):  INSERTION,  SHUNT, W/ COMPUTER-ASSISTED NAVIGATION (Right)    Anesthesia: General    Implants:  Implant Name Type Inv. Item Serial No.  Lot No. LRB No. Used Action   CATH BACTISEAL PERITONEAL - [id number]  CATH BACTISEAL PERITONEAL  [employer] [id number] Right 1 Implanted   CATH BACTISEAL VENTRICULAR - [id number]  CATH BACTISEAL VENTRICULAR  [employer] [id number] Right 1 Implanted   VALVE CHPU PROGRAMMABLE - [id number]  VALVE CHPU PROGRAMMABLE  [employer] [id number] Right 1 Implanted       Operative Findings: Left VPS insertion. Hakim valve at 160    Estimated Blood Loss: * No values recorded between 5/19/2025  7:02 AM and 5/19/2025  9:03 AM *    Estimated Blood Loss has been documented.         Specimens:   Specimen (24h ago, onward)      None          * No specimens in log *    [id number]

## 2025-05-19 NOTE — PLAN OF CARE
Pt IV flushed. Pt does not c/o pain or discomfort. L scalp incision clean and intact, sanguinous and serous drainage noted. Lap sites to abdomen CDI. Shunt series x-rays and CT scan head completed. SBP < 160. 3L NC

## 2025-05-19 NOTE — NURSING TRANSFER
Nursing Transfer Note      5/19/2025   11:40 AM    Nurse giving handoff:Oscar LICEA RN  Nurse receiving handoff:Ladonna NPNUZHAT    Reason patient is being transferred: meets criteria    Transfer To: 950    Transfer via stretcher    Transfer with 3L to O2    Transported by transport    Transfer Vital Signs:  Blood Pressure:136/79  Heart Rate:92  O2:94  Temperature:97.7  Respirations:20    Telemetry: Rhythm sr  Order for Tele Monitor? No    Additional Lines: oxygen    Medicines sent: none    Any special needs or follow-up needed: no    Patient belongings transferred with patient: No    Chart send with patient: Yes    Notified: spouse    Patient reassessed at: 5/19

## 2025-05-19 NOTE — TRANSFER OF CARE
Anesthesia Transfer of Care Note    Patient: Sam Gamino    Procedure(s) Performed: Procedure(s) (LRB):  INSERTION,  SHUNT, W/ COMPUTER-ASSISTED NAVIGATION (Right)    Patient location: PACU    Anesthesia Type: general    Transport from OR: Transported from OR on 6-10 L/min O2 by face mask with adequate spontaneous ventilation    Post pain: adequate analgesia    Post assessment: no apparent anesthetic complications and tolerated procedure well    Post vital signs: stable    Level of consciousness: awake, alert and oriented    Nausea/Vomiting: no nausea/vomiting    Complications: none    Transfer of care protocol was followed      Last vitals: Visit Vitals  BP (!) 141/68   Pulse 89   Temp 36.3 °C (97.3 °F) (Temporal)   Resp 18   Ht 6' (1.829 m)   Wt 110.2 kg (243 lb)   SpO2 100%   BMI 32.96 kg/m²

## 2025-05-19 NOTE — CARE UPDATE
I have reviewed the chart of Sam Gamino who is hospitalized for the following:    Active Hospital Problems    Diagnosis    *NPH (normal pressure hydrocephalus)    Ventricular shunt in place    Pulmonary hypertension due to left heart disease     Appears due to left sided heart disease as well as history of PE.  10/4/2024: Echo: SPAP 53.      Non-Hodgkin's lymphoma, unspecified body region, unspecified non-Hodgkin lymphoma type    Chronic anticoagulation     11/14/2023: CTA of Head and Neck: Bilateral pulmonary emboli.      History of pulmonary embolism     CTA - 11/14/23 - Bilateral      History of subarachnoid hemorrhage     11/5/2023: SAH.      Heart failure, diastolic, chronic     5/16/2022: Echo: Normal left ventricular size and systolic function. EF 55%. Moderate MR. Mild to moderate TR.   6/6/2023: Echo: Normal left ventricular size and systolic function. EF 60%. LVGLS -15%. Moderate diastolic dysfunction. Mild to moderate AR. Mild to moderate MR. Mildly enlarged ascending aorta. Small pericardial effusion.  10/4/2024: Echo: Normal left ventricular size and systolic function. EF 60%. Moderate diastolic dysfunction. Mild aortic valve sclerosis. Moderate AR. Moderate MR. Mild to moderate TR. SPAP 53. Small pericardial effusion.          Primary hypertension     2002: Diagnosed.         Benign prostatic hyperplasia with urinary frequency    Lymphoma in remission     S/p chemo/XRT 1992  Relapse 1993  BMT 1993      Coronary artery disease     Angio 3/07  Dr. Riley Vergara, NP  Unit Based [facility]

## 2025-05-19 NOTE — NURSING
Patient Transferred to NPU Room 950 @1240      Upon arrival to the floor, patient greeted and oriented to room. Complete head to toe assessment completed per protocol. VSS, see flowsheet for details. Neuro assessment completed. Cardiac monitoring orders reviewed. Patient added to tele monitor in nursing station, rate and rhythm verified. Primary team notified of patient's transfer to floor. All current and transfer orders reviewed/reconciled per protocol. All emergency equipment set up in patient's room. Fall/seizure precautions initiated per providers orders. 4 Eyes skin assessment performed, see flowsheets for details. Reviewed assessment and rounding frequency with patient and family. Questions were encouraged and addressed. Repositioned patient for comfort with bed locked in lowest position, side rails up x 3, bed alarm set, and call light within reach. Instructed patient to call staff for mobility/assistance, verbalized understanding. No acute signs of distress noted at this time.

## 2025-05-19 NOTE — OP NOTE
Surgery Date: 5/19/2025     Surgeons and Role:     * Roddy Peres, DO - Primary     * Orlando Ledbetter MD - Resident - Assisting    Pre-op Diagnosis:  NPH (normal pressure hydrocephalus) [G91.2]    Post-op Diagnosis:  NPH (normal pressure hydrocephalus) [G91.2]    Procedure(s) (LRB):  INSERTION,  SHUNT, W/ COMPUTER-ASSISTED NAVIGATION (Right)    Anesthesia: General    PROCEDURE: The patient was placed under general anesthesia. The patient was   prepped and draped in the usual manner. The access to the peritoneum was gained  through luq abdomen using Optiview trocar under direct vision.   Pneumoperitoneum to 15 mmHg with CO2 gas was attained. The shunt was in the   Upper midline. It was brought into the abdominal cavity on a mosquito. The suture passer was placed through the left upper quadrant under direct vision and pulled the shunt into the abdominal cavity to its full extent. The abdomen was inspected for hemostasis. Pneumoperitoneum was released. The trocar was removed. The skin incisions were closed with 4-0 plain catgut, and reinforced   with Dermabond. The patient tolerated the procedure  well, was brought to Recovery Room in stable condition. Sponge and needle   counts were correct at the end of the case.    PATHOLOGY:  for my part of the procedure is none.  COMPLICATIONS: None.  BLOOD LOSS: Minimal.

## 2025-05-20 PROBLEM — I50.9 ACUTE EXACERBATION OF CHRONIC HEART FAILURE: Status: ACTIVE | Noted: 2024-12-31

## 2025-05-20 PROBLEM — I26.99 PULMONARY EMBOLISM: Status: ACTIVE | Noted: 2025-05-20

## 2025-05-20 LAB
ABSOLUTE EOSINOPHIL (OHS): 0 K/UL
ABSOLUTE MONOCYTE (OHS): 0.87 K/UL (ref 0.3–1)
ABSOLUTE NEUTROPHIL COUNT (OHS): 8.53 K/UL (ref 1.8–7.7)
ANION GAP (OHS): 9 MMOL/L (ref 8–16)
BASOPHILS # BLD AUTO: 0.02 K/UL
BASOPHILS NFR BLD AUTO: 0.2 %
BUN SERPL-MCNC: 21 MG/DL (ref 8–23)
CALCIUM SERPL-MCNC: 9.1 MG/DL (ref 8.7–10.5)
CHLORIDE SERPL-SCNC: 108 MMOL/L (ref 95–110)
CO2 SERPL-SCNC: 22 MMOL/L (ref 23–29)
CREAT SERPL-MCNC: 1 MG/DL (ref 0.5–1.4)
ERYTHROCYTE [DISTWIDTH] IN BLOOD BY AUTOMATED COUNT: 12.4 % (ref 11.5–14.5)
GFR SERPLBLD CREATININE-BSD FMLA CKD-EPI: >60 ML/MIN/1.73/M2
GLUCOSE SERPL-MCNC: 122 MG/DL (ref 70–110)
HCT VFR BLD AUTO: 42.5 % (ref 40–54)
HGB BLD-MCNC: 13.6 GM/DL (ref 14–18)
IMM GRANULOCYTES # BLD AUTO: 0.03 K/UL (ref 0–0.04)
IMM GRANULOCYTES NFR BLD AUTO: 0.3 % (ref 0–0.5)
LYMPHOCYTES # BLD AUTO: 1.17 K/UL (ref 1–4.8)
MCH RBC QN AUTO: 33.9 PG (ref 27–31)
MCHC RBC AUTO-ENTMCNC: 32 G/DL (ref 32–36)
MCV RBC AUTO: 106 FL (ref 82–98)
NUCLEATED RBC (/100WBC) (OHS): 0 /100 WBC
PLATELET # BLD AUTO: 137 K/UL (ref 150–450)
PMV BLD AUTO: 11.4 FL (ref 9.2–12.9)
POCT GLUCOSE: 103 MG/DL (ref 70–110)
POTASSIUM SERPL-SCNC: 4.2 MMOL/L (ref 3.5–5.1)
RBC # BLD AUTO: 4.01 M/UL (ref 4.6–6.2)
RELATIVE EOSINOPHIL (OHS): 0 %
RELATIVE LYMPHOCYTE (OHS): 11 % (ref 18–48)
RELATIVE MONOCYTE (OHS): 8.2 % (ref 4–15)
RELATIVE NEUTROPHIL (OHS): 80.3 % (ref 38–73)
SODIUM SERPL-SCNC: 139 MMOL/L (ref 136–145)
WBC # BLD AUTO: 10.62 K/UL (ref 3.9–12.7)

## 2025-05-20 PROCEDURE — 85025 COMPLETE CBC W/AUTO DIFF WBC: CPT | Performed by: STUDENT IN AN ORGANIZED HEALTH CARE EDUCATION/TRAINING PROGRAM

## 2025-05-20 PROCEDURE — 97162 PT EVAL MOD COMPLEX 30 MIN: CPT

## 2025-05-20 PROCEDURE — 11000001 HC ACUTE MED/SURG PRIVATE ROOM

## 2025-05-20 PROCEDURE — 97116 GAIT TRAINING THERAPY: CPT

## 2025-05-20 PROCEDURE — 82565 ASSAY OF CREATININE: CPT | Performed by: PHYSICIAN ASSISTANT

## 2025-05-20 PROCEDURE — 25000003 PHARM REV CODE 250: Performed by: PHYSICIAN ASSISTANT

## 2025-05-20 PROCEDURE — 97165 OT EVAL LOW COMPLEX 30 MIN: CPT

## 2025-05-20 PROCEDURE — 99024 POSTOP FOLLOW-UP VISIT: CPT | Mod: POP,,, | Performed by: PHYSICIAN ASSISTANT

## 2025-05-20 PROCEDURE — 63600175 PHARM REV CODE 636 W HCPCS: Performed by: STUDENT IN AN ORGANIZED HEALTH CARE EDUCATION/TRAINING PROGRAM

## 2025-05-20 PROCEDURE — 92610 EVALUATE SWALLOWING FUNCTION: CPT

## 2025-05-20 PROCEDURE — 97530 THERAPEUTIC ACTIVITIES: CPT

## 2025-05-20 PROCEDURE — 25000003 PHARM REV CODE 250: Performed by: STUDENT IN AN ORGANIZED HEALTH CARE EDUCATION/TRAINING PROGRAM

## 2025-05-20 PROCEDURE — 36415 COLL VENOUS BLD VENIPUNCTURE: CPT | Performed by: PHYSICIAN ASSISTANT

## 2025-05-20 PROCEDURE — 36415 COLL VENOUS BLD VENIPUNCTURE: CPT | Performed by: STUDENT IN AN ORGANIZED HEALTH CARE EDUCATION/TRAINING PROGRAM

## 2025-05-20 PROCEDURE — 63600175 PHARM REV CODE 636 W HCPCS

## 2025-05-20 RX ORDER — FUROSEMIDE 10 MG/ML
40 INJECTION INTRAMUSCULAR; INTRAVENOUS ONCE
Status: COMPLETED | OUTPATIENT
Start: 2025-05-20 | End: 2025-05-20

## 2025-05-20 RX ORDER — GUAIFENESIN 100 MG/5ML
200 LIQUID ORAL EVERY 6 HOURS PRN
Status: DISCONTINUED | OUTPATIENT
Start: 2025-05-20 | End: 2025-05-22 | Stop reason: HOSPADM

## 2025-05-20 RX ORDER — AMOXICILLIN 250 MG
1 CAPSULE ORAL DAILY PRN
Status: DISCONTINUED | OUTPATIENT
Start: 2025-05-20 | End: 2025-05-22 | Stop reason: HOSPADM

## 2025-05-20 RX ADMIN — DONEPEZIL HYDROCHLORIDE 5 MG: 5 TABLET, FILM COATED ORAL at 09:05

## 2025-05-20 RX ADMIN — HEPARIN SODIUM 5000 UNITS: 5000 INJECTION INTRAVENOUS; SUBCUTANEOUS at 06:05

## 2025-05-20 RX ADMIN — LOSARTAN POTASSIUM 100 MG: 50 TABLET, FILM COATED ORAL at 08:05

## 2025-05-20 RX ADMIN — METOPROLOL SUCCINATE 50 MG: 50 TABLET, EXTENDED RELEASE ORAL at 09:05

## 2025-05-20 RX ADMIN — ACETAMINOPHEN 650 MG: 325 TABLET ORAL at 08:05

## 2025-05-20 RX ADMIN — HEPARIN SODIUM 5000 UNITS: 5000 INJECTION INTRAVENOUS; SUBCUTANEOUS at 02:05

## 2025-05-20 RX ADMIN — GUAIFENESIN 200 MG: 200 SOLUTION ORAL at 02:05

## 2025-05-20 RX ADMIN — TAMSULOSIN HYDROCHLORIDE 0.4 MG: 0.4 CAPSULE ORAL at 08:05

## 2025-05-20 RX ADMIN — FUROSEMIDE 40 MG: 10 INJECTION, SOLUTION INTRAMUSCULAR; INTRAVENOUS at 05:05

## 2025-05-20 RX ADMIN — HEPARIN SODIUM 5000 UNITS: 5000 INJECTION INTRAVENOUS; SUBCUTANEOUS at 09:05

## 2025-05-20 NOTE — ASSESSMENT & PLAN NOTE
79M w/PE (eliquis), HF (EF 60%), CAD, HTN, BPH, pulm HTN, pernicious anemia, lymphoma in remission s/p SCT, hx SAH cb NPH s/p  shunt placement 5/19/25.  was consulted for persistent hypoxia and the inability to wean off NC. The patient notes new BISHOP, and the patient's jardiance, lasix, spironolactone during hospitalization. He is on 2 L NC. His PE is concerning for JVD and LE swelling. His CXR is concerning for pulmonary edema. Overall his clinical picture is consistent with a HF exacerbation.     - IV lasix once  - agree with continuing metoprolol succinate  - strict I's and O's  - monitor UOP  - daily weights   - reassess tomorrow  - OK to continue holding jardiance, lasix, spironolactone

## 2025-05-20 NOTE — CONSULTS
[facility] - Neurosurgery ([facility])  [facility] Medicine  Consult Note    Patient Name: Sam Gamino  MRN: [id number]  Admission Date: 5/19/2025  Hospital Length of Stay: 1 days  Attending Physician: Roddy Peres DO   Primary Care Provider: JAYLYN Zamora MD           Patient information was obtained from patient, spouse/SO, and past medical records.     Inpatient consult to [facility] Medicine-General  Consult performed by: Marcelina Pozo DO  Consult ordered by: Lizeth Martinez PA-C        Subjective:     Principal Problem: NPH (normal pressure hydrocephalus)    Chief Complaint: No chief complaint on file.       HPI: 79M w/PE (eliquis), HF (EF 60%), CAD, HTN, BPH, pulm HTN,pernicious anemia, lymphoma in remission s/p SCT, hx SAH cb NPH s/p  shunt placement 5/19/25. HM was consulted for worsening hypoxia. The patient states he has been feeling BISHOP with a nonproductive cough for the past week and has noticed LLE swelling for the past 1-2 months. The patient also had a fever on POD0 with chills, and new onset dysphagia to solids. The patient denies CP, chest tightness, hemoptysis, or sore throat. The patient held his eliquis for 7 days pre op and he has been on heparin DVT ppx, he got his first dose on 5/20/25. During admission his home lasix, spironolactone, and jardiance have been held. He is a never smoker and his last drink of alcohol was 1 month ago.     Past Medical History:   Diagnosis Date    Abnormal echocardiogram 02/08/2013    Mild MVR 3/07    Atherosclerosis of aorta 11/01/2023 2023: Mentioned in chart.    CAD (coronary artery disease) 02/08/2013    Angio 3/07 Dr. Lin    Chronic diastolic (congestive) heart failure     History of pulmonary embolism 11/14/2023    CTA - 11/14/23 - Bilateral    History of subdural hemorrhage 11/04/2023 11/5/2023: SAH.    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 Pelayo       Past Surgical History:   Procedure Laterality Date    CREATION OF VENTRICULOPERITONEAL SHUNT USING COMPUTER-ASSISTED NAVIGATION Right 5/19/2025    Procedure: INSERTION,  SHUNT, W/ COMPUTER-ASSISTED NAVIGATION;  Surgeon: Roddy Peres DO;  Location: [facility] OR [address] [place] [address];  Service: Neurosurgery;  Laterality: Right;  (Right parietal  shunt placement)    EYE SURGERY Bilateral 2016    Cataracts    INSERTION, SHUNT  5/19/2025    Procedure: INSERTION, SHUNT;  Surgeon: Kalen Saucedo MD;  Location: [facility] OR [address] [place] [address];  Service: General;;       Review of patient's allergies indicates:   Allergen Reactions    Lotensin [benazepril]      cough       No current facility-administered medications on file prior to encounter.     Current Outpatient Medications on File Prior to Encounter   Medication Sig    apixaban (ELIQUIS) 2.5 mg Tab Take 1 tablet (2.5 mg total) by mouth 2 (two) times daily.    cholecalciferol, vitamin D3, (VITAMIN D3) 50 mcg (2,000 unit) Cap Take 1 capsule by mouth once daily.    cyanocobalamin 1,000 mcg/mL injection INJECT 1ML IN THE MUSCLE EVERY 30 DAYS (Patient taking differently: Takes the first day of  the month.)    donepeziL (ARICEPT) 5 MG tablet Take 1 tablet (5 mg total) by mouth every evening.    empagliflozin (JARDIANCE) 10 mg tablet Take 1 tablet (10 mg total) by mouth once daily.    fluticasone propionate (FLONASE) 50 mcg/actuation nasal spray SHAKE LIQUID AND USE 2 SPRAYS(100 MCG) IN EACH NOSTRIL EVERY DAY    furosemide (LASIX) 40 MG tablet Take 1 tablet (40 mg total) by mouth once daily.    LINZESS 290 mcg Cap capsule TAKE 1 CAPSULE(290 MCG) BY MOUTH BEFORE BREAKFAST    losartan (COZAAR) 100 MG tablet Take 1 tablet (100 mg total) by mouth once daily.    solifenacin  (VESICARE) 10 MG tablet TAKE 1 TABLET(10 MG) BY MOUTH EVERY DAY    spironolactone (ALDACTONE) 25 MG tablet Take 1 tablet (25 mg total) by mouth once daily.    tamsulosin (FLOMAX) 0.4 mg Cap TAKE 1 CAPSULE(0.4 MG) BY MOUTH EVERY DAY     Family History       Problem Relation (Age of Onset)    Cancer Mother    Hypertension Brother    No Known Problems Father, Daughter, Sister, Sister, Brother, Brother, Brother    Thyroid disease Sister          Tobacco Use    Smoking status: Never    Smokeless tobacco: Never   Substance and Sexual Activity    Alcohol use: Yes     Alcohol/week: 1.0 standard drink of alcohol     Types: 1 Standard drinks or equivalent per week     Comment: ocassionally    Drug use: No    Sexual activity: Not Currently     Partners: Female     Review of Systems   Constitutional:  Negative for chills and fever.   HENT:  Negative for sore throat.    Respiratory:  Positive for shortness of breath. Negative for cough.    Cardiovascular:  Positive for leg swelling. Negative for chest pain.   Gastrointestinal:  Negative for abdominal distention.   Genitourinary:  Negative for difficulty urinating.   Musculoskeletal:  Negative for back pain.   Neurological:  Negative for headaches.     Objective:     Vital Signs (Most Recent):  Temp: 99.4 °F (37.4 °C) (05/20/25 1202)  Pulse: 106 (05/20/25 1202)  Resp: 18 (05/20/25 1202)  BP: 114/74 (05/20/25 1202)  SpO2: (!) 92 % (05/20/25 1202) Vital Signs (24h Range):  Temp:  [98.2 °F (36.8 °C)-100.4 °F (38 °C)] 99.4 °F (37.4 °C)  Pulse:  [] 106  Resp:  [18] 18  SpO2:  [92 %-97 %] 92 %  BP: (114-143)/(65-76) 114/74     Weight: 110.2 kg (243 lb)  Body mass index is 32.96 kg/m².     Physical Exam  Constitutional:       Appearance: Normal appearance.   HENT:      Head: Normocephalic and atraumatic.      Right Ear: External ear normal.      Left Ear: External ear normal.      Nose: Nose normal.   Eyes:      General:         Right eye: No discharge.         Left eye: No  discharge.   Cardiovascular:      Pulses: Normal pulses.      Heart sounds: Normal heart sounds.      Comments: Bulging right external jugular vein  Pulmonary:      Effort: Pulmonary effort is normal.      Breath sounds: Rales present.   Abdominal:      General: Abdomen is flat. There is no distension.      Palpations: Abdomen is soft.   Musculoskeletal:      Right lower leg: Edema present.      Left lower leg: Edema present.   Neurological:      Mental Status: He is alert and oriented to person, place, and time.   Psychiatric:         Mood and Affect: Mood normal.         Behavior: Behavior normal.          Significant Labs: All pertinent labs within the past 24 hours have been reviewed.    Significant Imaging: I have reviewed all pertinent imaging results/findings within the past 24 hours.  Assessment/Plan:     Pulmonary embolism  The patient has a history of provoked PE diagnosed 11/14/2023 2/2 to immobility with his SAH. The patient held his eliquis for 7 days pre operatively. The patient has been on heparin DVT ppx, he got his first dose on 5/20/25. He also notes LLE swelling that has been present for the past 1-2 months. Well's score for PE is 4.5 which is 16.2% chance for PE.     - agree with CTA and LE US to assess for PE and DVT in the setting of hypoxia  - agree with holding eliquis in the perioperative setting       Anemia  Anemia is likely due to pernicious anemia and B12 deficiency .     Plan  - Monitor serial CBC: Daily  - Transfuse PRBC if patient becomes hemodynamically unstable, symptomatic or H/H drops below 7/21.  - Patient has not received any PRBC transfusions to date  - Patient's anemia is currently stable    Acute exacerbation of chronic heart failure  79M w/PE (eliquis), HF (EF 60%), CAD, HTN, BPH, pulm HTN, pernicious anemia, lymphoma in remission s/p SCT, hx SAH cb NPH s/p  shunt placement 5/19/25. HM was consulted for persistent hypoxia and the inability to wean off NC. The patient notes  new BISHOP, and the patient's jardiance, lasix, spironolactone during hospitalization. He is on 2 L NC. His PE is concerning for JVD and LE swelling. His CXR is concerning for pulmonary edema. Overall his clinical picture is consistent with a HF exacerbation.     - IV lasix once  - agree with continuing metoprolol succinate  - strict I's and O's  - monitor UOP  - daily weights   - reassess tomorrow  - OK to continue holding jardiance, lasix, spironolactone    Dysphagia  The patient has new onset dysphagia which could be related to his  shunt    - agree with SLP eval    Heart failure, diastolic, chronic  See acute exacerbation of chronic heart failure plan     Primary hypertension  Patient's blood pressure range in the last 24 hours was: BP  Min: 114/74  Max: 143/65.The patient's inpatient anti-hypertensive regimen is listed below:  Current Antihypertensives  losartan tablet 100 mg, Daily, Oral  metoprolol succinate (TOPROL-XL) 24 hr tablet 50 mg, Daily, Oral  furosemide injection 40 mg, Once, Intravenous    Plan  - BP is controlled, no changes needed to their regimen  - agree with continuing losartan inpatient.     Benign prostatic hyperplasia with urinary frequency  The patient's ayala has remained in the post operative setting    - agree with continuing flomax in the perioperative setting      Slow transit constipation  - agree with holding linzess in the perioperative setting, can restart if the patient develops constipation       Coronary artery disease  Patient with known CAD, which is controlled Will for S/Sx of angina/ACS.    - agree with holding home ASA in the perioperative setting       VTE Risk Mitigation (From admission, onward)           Ordered     heparin (porcine) injection 5,000 Units  Every 8 hours         05/19/25 0907     IP VTE HIGH RISK PATIENT  Once         05/19/25 0907     Place sequential compression device  Until discontinued         05/19/25 0907                        Thank you for your  consult. I will follow-up with patient. Please contact us if you have any additional questions.    Marcelina Pozo DO  Department of Hospital Medicine   [facility]o - Neurosurgery ([facility]

## 2025-05-20 NOTE — PLAN OF CARE
Problem: Occupational Therapy  Goal: Occupational Therapy Goal  Description: Goals to be met by: 6/20/2025     Patient will increase functional independence with ADLs by performing:    UE Dressing with Supervision.  LE Dressing with Supervision.  Grooming while standing at sink with Supervision.  Toileting from toilet with Supervision for hygiene and clothing management.   Step transfer with Supervision  Toilet transfer to toilet with Supervision.    Outcome: Progressing

## 2025-05-20 NOTE — HOSPITAL COURSE
5/20: NAEON. Neuro exam stable. Tmax 100.5, tachycardic, on 3L O2. No complaints of CP or SOB. Reports cough and new dysphagia, but able to tolerate liquids midday and jello without choking. SLP consulted.  consulted for hypoxemia workup. Hx PE and off Eliquis periop.    5/21: NAEON. Neuro exam stable. O2 sats >90% on room air. Denies CP or SOB. Cleared for regular diet by SLP. Hospital Medicine following for medical management. Appreciate their assistance. Discharge pending medical stability.   5/22: NAEON. Neuro exam stable. Stable on RA. Denies SOB, CP. BLE US negative for DVT. Medically stable for dc to IPR

## 2025-05-20 NOTE — ASSESSMENT & PLAN NOTE
Patient's blood pressure range in the last 24 hours was: BP  Min: 114/74  Max: 143/65.The patient's inpatient anti-hypertensive regimen is listed below:  Current Antihypertensives  losartan tablet 100 mg, Daily, Oral  metoprolol succinate (TOPROL-XL) 24 hr tablet 50 mg, Daily, Oral  furosemide injection 40 mg, Once, Intravenous    Plan  - BP is controlled, no changes needed to their regimen

## 2025-05-20 NOTE — PLAN OF CARE
Problem: Adult Inpatient Plan of Care  Goal: Plan of Care Review  Outcome: Progressing  Goal: Patient-Specific Goal (Individualized)  Outcome: Progressing  Goal: Absence of Hospital-Acquired Illness or Injury  Outcome: Progressing  Goal: Optimal Comfort and Wellbeing  Outcome: Progressing  Goal: Readiness for Transition of Care  Outcome: Progressing     Problem: Skin Injury Risk Increased  Goal: Skin Health and Integrity  Outcome: Progressing     Problem: Wound  Goal: Optimal Coping  Outcome: Progressing  Goal: Optimal Functional Ability  Outcome: Progressing  Goal: Absence of Infection Signs and Symptoms  Outcome: Progressing  Goal: Improved Oral Intake  Outcome: Progressing  Goal: Optimal Pain Control and Function  Outcome: Progressing  Goal: Skin Health and Integrity  Outcome: Progressing  Goal: Optimal Wound Healing  Outcome: Progressing     POC reviewed and updated with the patient at the bedside. Questions regarding POC were encouraged and addressed. VSS, see flowsheets.   Patient is AOx 4 at this time. Fall and safety precautions maintained, no signs of injury noted during shift. Telesitter at bedside due to patient repeatedly trying to get out of bed without assistance. Upon exiting room, patient's bed locked in low position, side rails up x 3, bed alarm on, with call light within reach. Instructed patient to call staff for mobility, verbalized understanding.  No acute signs of distress noted at this time.

## 2025-05-20 NOTE — PT/OT/SLP EVAL
"Occupational Therapy   Evaluation    Name: Sam Gamino  MRN: [id number]  Admitting Diagnosis: NPH (normal pressure hydrocephalus)  Recent Surgery: Procedure(s) (LRB):  INSERTION,  SHUNT, W/ COMPUTER-ASSISTED NAVIGATION (Right)  INSERTION, SHUNT 1 Day Post-Op    Recommendations:     Discharge Recommendations: Moderate Intensity Therapy  Discharge Equipment Recommendations:  bedside commode  Barriers to discharge:       Assessment:     Sam Gamino is a 79 y.o. male with a medical diagnosis of NPH (normal pressure hydrocephalus).  Performance deficits affecting function: weakness, impaired endurance, impaired functional mobility, impaired self care skills, gait instability, impaired balance, decreased coordination, decreased safety awareness, impaired coordination.  Pt agreeable to therapy and tolerated well. Pt performed bed mobility, UB dressing, and home mobility at this date, per pt's wife, pt appears below baseline and she does not believe he is safe to discharge home. Min A required for mobility and safety cueing.  Pt remains limited in ADLs, functional mobility, and functional transfers. Patient continues to demonstrate the need for moderate intensity therapy on a daily basis post acute exhibited by decreased independence with self-care and functional mobility      Rehab Prognosis: Good; patient would benefit from acute skilled OT services to address these deficits and reach maximum level of function.       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 2 falls within the past few months 2/2 BLE weakness  Roles and Routines:   Equipment Used at Home: walker, rolling, bath bench  Assistance upon Discharge: limited. Wife can't physically assist     Pain/Comfort:  Pain Rating 1: 0/10  Pain Rating Post-Intervention 1: 0/10    Patients cultural, spiritual, Shinto conflicts given the current situation: no    Objective:     Communicated with: Nurse prior to session.  Patient found HOB elevated with bed alarm, Condom Catheter, telemetry upon OT entry to room.    General Precautions: Standard, fall  Orthopedic Precautions:    Braces: N/A  Respiratory Status: Room air    Occupational Performance:    Bed Mobility:    Patient completed Scooting/Bridging with minimum assistance  Patient completed Supine to Sit with minimum assistance  Patient completed Sit to Supine with moderate assistance    Functional Mobility/Transfers:  Patient completed Sit <> Stand Transfer with minimum assistance  with  rolling walker 2x from EOB  Functional Mobility: Pt engaged in functional mobility throughout hospital room ~ 10 ft with RW and  Min A with v/c to maximize functional endurance and standing balance required for home/community mobility and occupational engagement.     Activities of Daily Living:  Upper Body Dressing: minimum assistance doffed/donned gown over front sitting EOB  Toileting: total assistance condom catheter    Cognitive/Visual Perceptual:  Cognitive/Psychosocial Skills:     -       Oriented to: Person, Place, Situation, and unable to name month   -       Follows Commands/attention:Follows two-step commands  -       Communication: clear/fluent  -       Memory: slight impairments noted  -       Safety awareness/insight to disability: impaired   -       Mood/Affect/Coping skills/emotional control: Appropriate to situation    Physical Exam:  Balance:    -       CGA EOB with posterior lean requiring hands on  correction, Min A for mobility  Sensation:    -       Intact  Dominant hand:    -       Right  Upper Extremity Range of Motion:     -       Right Upper Extremity: WNL  -       Left Upper Extremity: WNL  Upper Extremity Strength:    -       Right Upper Extremity: WNL  -       Left Upper Extremity: WNL   Strength:    -       Right Upper Extremity: WNL  -       Left Upper Extremity: WNL  Fine Motor Coordination:    -       Intact    AMPAC 6 Click ADL:  AMPAC Total Score: 17    Treatment & Education:  -Education on energy conservation and task modification to maximize safety and (I) during ADLs and mobility  -Education on importance of OOB activity to improve overall activity tolerance and promote recovery  -Pt educated to call for assistance and to transfer with hospital staff only  -Provided education regarding role of OT, POC, & discharge recommendations with pt and wife verbalizing understanding.  Pt had no further questions & when asked whether there were any concerns pt reported none.      Patient left HOB elevated with all lines intact, call button in reach, bed alarm on, nurse notified, and wife present    GOALS:   Multidisciplinary Problems       Occupational Therapy Goals          Problem: Occupational Therapy    Goal Priority Disciplines Outcome Interventions   Occupational Therapy Goal     OT, PT/OT Progressing    Description: Goals to be met by: 6/20/2025     Patient will increase functional independence with ADLs by performing:    UE Dressing with Supervision.  LE Dressing with Supervision.  Grooming while standing at sink with Supervision.  Toileting from toilet with Supervision for hygiene and clothing management.   Step transfer with Supervision  Toilet transfer to toilet with Supervision.                         History:     Past Medical History:   Diagnosis Date    Abnormal echocardiogram 02/08/2013    Mild MVR 3/07    Atherosclerosis of aorta 11/01/2023 2023: Mentioned in chart.    CAD  (coronary artery disease) 02/08/2013    Angio 3/07 Dr. Lin    Chronic diastolic (congestive) heart failure     History of pulmonary embolism 11/14/2023    CTA - 11/14/23 - Bilateral    History of subdural hemorrhage 11/04/2023 11/5/2023: SAH.    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 Pelayo         Past Surgical History:   Procedure Laterality Date    CREATION OF VENTRICULOPERITONEAL SHUNT USING COMPUTER-ASSISTED NAVIGATION Right 5/19/2025    Procedure: INSERTION,  SHUNT, W/ COMPUTER-ASSISTED NAVIGATION;  Surgeon: Roddy Peres DO;  Location: [facility] OR [address] [place] [address];  Service: Neurosurgery;  Laterality: Right;  (Right parietal  shunt placement)    EYE SURGERY Bilateral 2016    Cataracts    INSERTION, SHUNT  5/19/2025    Procedure: INSERTION, SHUNT;  Surgeon: Kalen Saucedo MD;  Location: [facility] OR [address] [place] [address];  Service: General;;       Time Tracking:     OT Date of Treatment: 05/20/25  OT Start Time: 1036  OT Stop Time: 1059  OT Total Time (min): 23 min    Billable Minutes:Evaluation 10 min  Therapeutic Activity 13 min    5/20/2025

## 2025-05-20 NOTE — PLAN OF CARE
PT Eval complete, appropriate goals created    Problem: Physical Therapy  Goal: Physical Therapy Goal  Description: Goals to be completed by: 6/1/25    Pt will be able to stand up from EOB w/ stand by assistance using LRAD  Pt will ambulate 50 feet w/ stand by assistance using LRAD  Pt will be independent w/ HEP therex on BLE w/ good form and ROM   Pt will ascend/descend 2 stairs w/ unilateral railing with contact guard assistance  Outcome: Progressing

## 2025-05-20 NOTE — ASSESSMENT & PLAN NOTE
The patient's ayala has remained in the post operative setting    - agree with continuing flomax in the perioperative setting

## 2025-05-20 NOTE — ASSESSMENT & PLAN NOTE
Patient's blood pressure range in the last 24 hours was: BP  Min: 114/74  Max: 143/65.The patient's inpatient anti-hypertensive regimen is listed below:  Current Antihypertensives  losartan tablet 100 mg, Daily, Oral  metoprolol succinate (TOPROL-XL) 24 hr tablet 50 mg, Daily, Oral  furosemide injection 40 mg, Once, Intravenous    Plan  - BP is controlled, no changes needed to their regimen  - agree with continuing losartan inpatient.    no

## 2025-05-20 NOTE — SUBJECTIVE & OBJECTIVE
Past Medical History:   Diagnosis Date    Abnormal echocardiogram 02/08/2013    Mild MVR 3/07    Atherosclerosis of aorta 11/01/2023 2023: Mentioned in chart.    CAD (coronary artery disease) 02/08/2013    Angio 3/07 Dr. Lin    Chronic diastolic (congestive) heart failure     History of pulmonary embolism 11/14/2023    CTA - 11/14/23 - Bilateral    History of subdural hemorrhage 11/04/2023 11/5/2023: SAH.    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 Pelayo       Past Surgical History:   Procedure Laterality Date    CREATION OF VENTRICULOPERITONEAL SHUNT USING COMPUTER-ASSISTED NAVIGATION Right 5/19/2025    Procedure: INSERTION,  SHUNT, W/ COMPUTER-ASSISTED NAVIGATION;  Surgeon: Roddy Peres DO;  Location: [facility] OR [address] [place] [address];  Service: Neurosurgery;  Laterality: Right;  (Right parietal  shunt placement)    EYE SURGERY Bilateral 2016    Cataracts    INSERTION, SHUNT  5/19/2025    Procedure: INSERTION, SHUNT;  Surgeon: Kalen Saucedo MD;  Location: [facility] OR [address] [place] [address];  Service: General;;       Review of patient's allergies indicates:   Allergen Reactions    Lotensin [benazepril]      cough       No current facility-administered medications on file prior to encounter.     Current Outpatient Medications on File Prior to Encounter   Medication Sig    apixaban (ELIQUIS) 2.5 mg Tab Take 1 tablet (2.5 mg total) by mouth 2 (two) times daily.    cholecalciferol, vitamin D3, (VITAMIN D3) 50 mcg (2,000 unit) Cap Take 1 capsule by mouth once daily.    cyanocobalamin 1,000 mcg/mL injection INJECT 1ML IN THE MUSCLE EVERY 30 DAYS (Patient taking differently: Takes the first day of  the month.)    donepeziL  (ARICEPT) 5 MG tablet Take 1 tablet (5 mg total) by mouth every evening.    empagliflozin (JARDIANCE) 10 mg tablet Take 1 tablet (10 mg total) by mouth once daily.    fluticasone propionate (FLONASE) 50 mcg/actuation nasal spray SHAKE LIQUID AND USE 2 SPRAYS(100 MCG) IN EACH NOSTRIL EVERY DAY    furosemide (LASIX) 40 MG tablet Take 1 tablet (40 mg total) by mouth once daily.    LINZESS 290 mcg Cap capsule TAKE 1 CAPSULE(290 MCG) BY MOUTH BEFORE BREAKFAST    losartan (COZAAR) 100 MG tablet Take 1 tablet (100 mg total) by mouth once daily.    solifenacin (VESICARE) 10 MG tablet TAKE 1 TABLET(10 MG) BY MOUTH EVERY DAY    spironolactone (ALDACTONE) 25 MG tablet Take 1 tablet (25 mg total) by mouth once daily.    tamsulosin (FLOMAX) 0.4 mg Cap TAKE 1 CAPSULE(0.4 MG) BY MOUTH EVERY DAY     Family History       Problem Relation (Age of Onset)    Cancer Mother    Hypertension Brother    No Known Problems Father, Daughter, Sister, Sister, Brother, Brother, Brother    Thyroid disease Sister          Tobacco Use    Smoking status: Never    Smokeless tobacco: Never   Substance and Sexual Activity    Alcohol use: Yes     Alcohol/week: 1.0 standard drink of alcohol     Types: 1 Standard drinks or equivalent per week     Comment: ocassionally    Drug use: No    Sexual activity: Not Currently     Partners: Female     Review of Systems   Constitutional:  Negative for chills and fever.   HENT:  Negative for sore throat.    Respiratory:  Positive for shortness of breath. Negative for cough.    Cardiovascular:  Positive for leg swelling. Negative for chest pain.   Gastrointestinal:  Negative for abdominal distention.   Genitourinary:  Negative for difficulty urinating.   Musculoskeletal:  Negative for back pain.   Neurological:  Negative for headaches.     Objective:     Vital Signs (Most Recent):  Temp: 99.4 °F (37.4 °C) (05/20/25 1202)  Pulse: 106 (05/20/25 1202)  Resp: 18 (05/20/25 1202)  BP: 114/74 (05/20/25 1202)  SpO2: (!) 92 %  (05/20/25 1202) Vital Signs (24h Range):  Temp:  [98.2 °F (36.8 °C)-100.4 °F (38 °C)] 99.4 °F (37.4 °C)  Pulse:  [] 106  Resp:  [18] 18  SpO2:  [92 %-97 %] 92 %  BP: (114-143)/(65-76) 114/74     Weight: 110.2 kg (243 lb)  Body mass index is 32.96 kg/m².     Physical Exam  Constitutional:       Appearance: Normal appearance.   HENT:      Head: Normocephalic and atraumatic.      Right Ear: External ear normal.      Left Ear: External ear normal.      Nose: Nose normal.   Eyes:      General:         Right eye: No discharge.         Left eye: No discharge.   Cardiovascular:      Pulses: Normal pulses.      Heart sounds: Normal heart sounds.      Comments: Bulging right external jugular vein  Pulmonary:      Effort: Pulmonary effort is normal.      Breath sounds: Rales present.   Abdominal:      General: Abdomen is flat. There is no distension.      Palpations: Abdomen is soft.   Musculoskeletal:      Right lower leg: Edema present.      Left lower leg: Edema present.   Neurological:      Mental Status: He is alert and oriented to person, place, and time.   Psychiatric:         Mood and Affect: Mood normal.         Behavior: Behavior normal.          Significant Labs: All pertinent labs within the past 24 hours have been reviewed.    Significant Imaging: I have reviewed all pertinent imaging results/findings within the past 24 hours.

## 2025-05-20 NOTE — ASSESSMENT & PLAN NOTE
The patient has a history of provoked PE diagnosed 11/14/2023 2/2 to immobility with his SAH. The patient held his eliquis for 7 days pre operatively. The patient has been on heparin DVT ppx, he got his first dose on 5/20/25. He also notes LLE swelling that has been present for the past 1-2 months. Well's score for PE is 4.5 which is 16.2% chance for PE.     - agree with CTA and LE US to assess for PE and DVT in the setting of hypoxia  - agree with holding eliquis in the perioperative setting

## 2025-05-20 NOTE — ASSESSMENT & PLAN NOTE
Anemia is likely due to pernicious anemia and B12 deficiency .     Plan  - Monitor serial CBC: Daily  - Transfuse PRBC if patient becomes hemodynamically unstable, symptomatic or H/H drops below 7/21.  - Patient has not received any PRBC transfusions to date  - Patient's anemia is currently stable

## 2025-05-20 NOTE — ASSESSMENT & PLAN NOTE
Patient with known CAD, which is controlled Will for S/Sx of angina/ACS.    - agree with holding home ASA in the perioperative setting

## 2025-05-20 NOTE — HPI
79M w/PE (eliquis), HF (EF 60%), CAD, HTN, BPH, pulm HTN,pernicious anemia, lymphoma in remission s/p SCT, hx SAH cb NPH s/p  shunt placement 5/19/25.  was consulted for worsening hypoxia. The patient states he has been feeling BISHOP with a nonproductive cough for the past week and has noticed LLE swelling for the past 1-2 months. The patient also had a fever on POD0 with chills, and new onset dysphagia to solids. The patient denies CP, chest tightness, hemoptysis, or sore throat. The patient held his eliquis for 7 days pre op and he has been on heparin DVT ppx, he got his first dose on 5/20/25. During admission his home lasix, spironolactone, and jardiance have been held. He is a never smoker and his last drink of alcohol was 1 month ago.

## 2025-05-20 NOTE — PLAN OF CARE
[facility] (H[facility])  Initial Discharge Assessment       Primary Care Provider: JAYLYN Zamora MD    Admission Diagnosis: NPH (normal pressure hydrocephalus) [G91.2]    Admission Date: 5/19/2025  Expected Discharge Date:     Transition of Care Barriers: None    Payor: MEDICARE / Plan: MEDICARE PART A & B / Product Type: Government /     Extended Emergency Contact Information  Primary Emergency Contact: Eileen Gamino  Address: [address] [place] [address] [facility]  Home Phone: [phone]  Mobile Phone: [phone]  Relation: Spouse   needed? No    Discharge Plan A: Rehab  Discharge Plan B: Home with family, Home Health      [id number] - [facility] [address] [facility]  [address] [facility] [address]  Phone: [phone] Fax: [phone]    [facility] - [address] [address]  Phone: [phone] Fax: [phone]    CM obtained discharge planning assessment with patient.  Initial Assessment (most recent)       Adult Discharge Assessment - 05/20/25 1542          Discharge Assessment    Assessment Type Discharge Planning Assessment     Confirmed/corrected address, phone number and insurance Yes     Confirmed Demographics Correct on Facesheet     Source of Information patient     People in Home spouse     Name(s) of People in Home Eileen Gamino - spouse [phone]     Do you expect to return to your current living situation? Yes     Do you have help at home or someone to help you manage your care at home? Yes     Who are your caregiver(s) and their phone number(s)? Eileen Gamino - spouse [phone]     Prior to hospitilization cognitive status: Alert/Oriented     Current cognitive status: Alert/Oriented     Walking or Climbing Stairs Difficulty yes     Walking or Climbing Stairs ambulation  difficulty, requires equipment     Mobility Management walker     Equipment Currently Used at Home walker, rolling     Readmission within 30 days? No     Patient currently being followed by outpatient case management? No     Do you currently have service(s) that help you manage your care at home? No     Do you take prescription medications? Yes     Do you have prescription coverage? Yes     Do you have any problems affording any of your prescribed medications? No     Is the patient taking medications as prescribed? yes     Who is going to help you get home at discharge? family     How do you get to doctors appointments? family or friend will provide     Are you on dialysis? No     Do you take coumadin? No     Discharge Plan A Rehab     Discharge Plan B Home with family;Home Health     DME Needed Upon Discharge  none     Discharge Plan discussed with: Patient     Transition of Care Barriers None        Physical Activity    On average, how many days per week do you engage in moderate to strenuous exercise (like a brisk walk)? 0 days     On average, how many minutes do you engage in exercise at this level? 0 min        Financial Resource Strain    How hard is it for you to pay for the very basics like food, housing, medical care, and heating? Not hard at all        Housing Stability    In the last 12 months, was there a time when you were not able to pay the mortgage or rent on time? No     At any time in the past 12 months, were you homeless or living in a shelter (including now)? No        Transportation Needs    In the past 12 months, has lack of transportation kept you from medical appointments or from getting medications? No     In the past 12 months, has lack of transportation kept you from meetings, work, or from getting things needed for daily living? No        Food Insecurity    Within the past 12 months, you worried that your food would run out before you got the money to buy more. Never true     Within the  past 12 months, the food you bought just didn't last and you didn't have money to get more. Never true        Stress    Do you feel stress - tense, restless, nervous, or anxious, or unable to sleep at night because your mind is troubled all the time - these days? Not at all        Social Isolation    How often do you feel lonely or isolated from those around you?  Patient unable to answer        Alcohol Use    Q1: How often do you have a drink containing alcohol? 4 or more times a week     Q2: How many drinks containing alcohol do you have on a typical day when you are drinking? 1 or 2     Q3: How often do you have six or more drinks on one occasion? Never        Utilities    In the past 12 months has the electric, gas, oil, or water company threatened to shut off services in your home? No        Health Literacy    How often do you need to have someone help you when you read instructions, pamphlets, or other written material from your doctor or pharmacy? Patient unable to respond

## 2025-05-20 NOTE — PT/OT/SLP EVAL
Physical Therapy Evaluation and Treatment    Patient Name: Sam Gamino   MRN: [id number]  Recent Surgery: Procedure(s) (LRB):  INSERTION,  SHUNT, W/ COMPUTER-ASSISTED NAVIGATION (Right)  INSERTION, SHUNT 1 Day Post-Op    Recommendations:     Discharge Recommendations: Moderate Intensity Therapy    Discharge Equipment Recommendations: bedside commode, wheelchair   Barriers to discharge: Increased level of assist and Decreased caregiver support  Nursing Mobilization: Patient able to perform step transfer w/ 1 A and RW.    Assessment:     Sam Gamino is a 79 y.o. male admitted with a medical diagnosis of NPH (normal pressure hydrocephalus). He presents with the following impairments/functional limitations: weakness, impaired self care skills, impaired functional mobility, gait instability, impaired balance, impaired cognition, decreased coordination, decreased lower extremity function, decreased upper extremity function, decreased safety awareness, pain, impaired cardiopulmonary response to activity. Pt w/ inc WOB and frequent coughing and throat clearing while eating breakfast on attempt in AM, so RN and MD informed and therapist returned for remainder of eval later in day. Pt demonstrates decline in functional mobility requiring inc assistance for transfers and ambulation compared to baseline. Patient currently demonstrates a need for moderate intensity therapy on a daily basis post acute secondary to a decline in functional status due to surgical procedure.     Rehab Prognosis: Good; patient would benefit from acute skilled PT services to address these deficits and reach maximum level of function.  Recent Surgery: Procedure(s) (LRB):  INSERTION,  SHUNT, W/ COMPUTER-ASSISTED NAVIGATION (Right)  INSERTION, SHUNT 1 Day Post-Op    Plan:     During this hospitalization, patient to be seen 4 x/week to address the identified rehab impairments via gait training, therapeutic activities, therapeutic exercises,  "neuromuscular re-education and progress toward the following goals:    Plan of Care Expires: 06/01/25    Subjective     Chief Complaint: mild head pain and SOB  Patient/Family Comments/Goals: pt reporting feeling like "stuff is stuck in the back of my throat" w/ frequent coughing and wheezing breathing while eating breakfast (SPO2 at 92% on 3L/min); therapist obtained RN and informed MD  Pain/Comfort:  Pain Rating 1: 2/10  Location - Side 1: Left  Location 1: head  Pain Addressed 1: Reposition, Distraction  Pain Rating Post-Intervention 1: 2/10    Patients cultural, spiritual, Faith conflicts given the current situation: no    Social History:  Living Environment: Patient lives with their spouse in a third floor apartment, number of outside stairs: 2 (to get into building), elevator.  Prior Level of Function: Prior to admission, patient was independent with ADLs. He occassionally needed assist w/ donning/doffing socks and shoes, uses RW half the time and the other half he will refuse to.  Equipment Used at Home: bath bench, walker, rolling    DME owned (not currently used): none  Assistance Upon Discharge: son-in-law but he works; wife unable to provide physical assist    Objective:     Communicated with RN prior to session. Patient found HOB elevated with bed alarm, Condom Catheter, telemetry upon PT entry to room.    General Precautions: Standard, fall   Orthopedic Precautions:N/A   Braces: N/A  Respiratory Status: Nasal cannula, flow 3 L/min on AM session; 2L/min in PM    Exams:  Cognitive Exam: Patient is oriented to Person, Place, Situation, month not year, follows commands 100% of the time  RLE ROM: WFL  RLE Strength: WFL  LLE ROM: WFL  LLE Strength: WFL  Sensation: Intact light touch to BLEs    Functional Mobility:  Bed Mobility:  Supine to Sit: contact guard assistance w/ HOB elevated, inc time, and use of bed rails  Transfers:  Sit to Stand:   minimum assistance with rolling walker with cues for hand " placement  Following 2 reps CGA w/ RW when pt placed hands on chair arm rests instead of RW  Bed to Chair: minimum assistance with rolling walker with cues for hand placement and foot placement using Step Transfer  Gait:   Patient ambulated 34' + 24' with rolling walker and contact guard assistance. Patient demonstrates occasional unsteady gait, decreased step length, wide base of support, decreased weight shift, decreased foot clearance, ambulates outside ADRIAN of RW, flexed posture, and decreased cash. Shuffling steps, dec safety awareness, and inc time to process cueing when provided. All lines remained intact throughout ambulation trial, gait belt utilized, chair follow for patient safety, portable Supplemental O2 2L utilized.  Balance:   Static Sitting: supervision at EOB  Dynamic Sitting: stand by assistance at EOB  Static Standing: stand by assistance with rolling walker  Dynamic Standing: contact guard assistance with rolling walker    Therapeutic Activities and Exercises:  Patient educated on role of acute care PT and PT POC, safety while in hospital including calling nurse for mobility, and call light usage  Patient educated about importance of OOB mobility  Gait training w/ verbal, visual, and tactile cueing for proper use of AD, step length, step width, step height, postural control, safety awareness, obstacle avoidance, width of ADRIAN, and proximity to AD     AM-PAC 6 CLICK MOBILITY  Turning over in bed (including adjusting bedclothes, sheets and blankets)?: 3  Sitting down on and standing up from a chair with arms (e.g., wheelchair, bedside commode, etc.): 3  Moving from lying on back to sitting on the side of the bed?: 3  Moving to and from a bed to a chair (including a wheelchair)?: 3  Need to walk in hospital room?: 3  Climbing 3-5 steps with a railing?: 2  Basic Mobility Total Score: 17     Patient left up in chair with all lines intact, call button in reach, RN notified, chair alarm on, and spouse  present.    GOALS:   Multidisciplinary Problems       Physical Therapy Goals          Problem: Physical Therapy    Goal Priority Disciplines Outcome Interventions   Physical Therapy Goal     PT, PT/OT Progressing    Description: Goals to be completed by: 6/1/25    Pt will be able to stand up from EOB w/ stand by assistance using LRAD  Pt will ambulate 50 feet w/ stand by assistance using LRAD  Pt will be independent w/ HEP therex on BLE w/ good form and ROM   Pt will ascend/descend 2 stairs w/ unilateral railing with contact guard assistance                       History:     Past Medical History:   Diagnosis Date    Abnormal echocardiogram 02/08/2013    Mild MVR 3/07    Atherosclerosis of aorta 11/01/2023 2023: Mentioned in chart.    CAD (coronary artery disease) 02/08/2013    Angio 3/07 Dr. Lin    Chronic diastolic (congestive) heart failure     History of pulmonary embolism 11/14/2023    CTA - 11/14/23 - Bilateral    History of subdural hemorrhage 11/04/2023 11/5/2023: SAH.    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 Pelayo       Past Surgical History:   Procedure Laterality Date    CREATION OF VENTRICULOPERITONEAL SHUNT USING COMPUTER-ASSISTED NAVIGATION Right 5/19/2025    Procedure: INSERTION,  SHUNT, W/ COMPUTER-ASSISTED NAVIGATION;  Surgeon: Roddy Peres DO;  Location: [facility] OR [address] [place] [address];  Service: Neurosurgery;  Laterality: Right;  (Right parietal  shunt placement)    EYE SURGERY Bilateral 2016    Cataracts    INSERTION, SHUNT  5/19/2025    Procedure: INSERTION, SHUNT;  Surgeon: Kalen Saucedo MD;  Location: [facility] OR [address] [place] [address];  Service: General;;       Time Tracking:     PT Received  On: 05/20/25  PT Start Time: 0822 (Part 2: 1018-2505)  PT Stop Time: 0843  PT Total Time (min): 21 min     Billable Minutes: Part 1: Eval 11 min TA 10min; Part 2: Gait 15min TA 14min    05/20/2025

## 2025-05-20 NOTE — PLAN OF CARE
Problem: Adult Inpatient Plan of Care  Goal: Plan of Care Review  Outcome: Progressing  Goal: Patient-Specific Goal (Individualized)  Outcome: Progressing  Goal: Optimal Comfort and Wellbeing  Outcome: Progressing     Problem: Skin Injury Risk Increased  Goal: Skin Health and Integrity  Outcome: Progressing     Problem: Wound  Goal: Skin Health and Integrity  Outcome: Progressing

## 2025-05-20 NOTE — PROGRESS NOTES
[facility] - Neurosurgery ([facility])  Neurosurgery  Progress Note    Subjective:     History of Present Illness: HPI:  Sam Gamino is a 79 y.o.  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  Patient has a history of perimesencephalic subarachnoid hemorrhage in 2023.  Patient has been followed closely by Neurology for memory disturbance.  Patient presents with his wife and both report difficulties with short-term memory, trouble with completing tasks, and remembering directions while driving.  He also notes difficulty picking up his legs which results in stumbling and falls.  He also reports urinary incontinence which may have more to do with not being able to get to the bathroom quick enough.  Patient's wife states that the symptoms have been present since his subarachnoid hemorrhage.    INTERVAL HISTORY (4/30/25):  Patient returns after undergoing high-volume lumbar puncture (32cc removed, OP 24, CP 16).  According to physical therapy patient improved and multiple domains and wife thought that he improved with his walking.  However he had 2 falls since the lumbar puncture, 1 occurred shortly after in 1 occurred about 1 week after.  The wife did not notice any significant change in his cognition.     Both patient and wife would be interested in pursuing shunt placement.       Post-Op Info:  Procedure(s) (LRB):  INSERTION,  SHUNT, W/ COMPUTER-ASSISTED NAVIGATION (Right)  INSERTION, SHUNT   1 Day Post-Op   Interval History: NAEON. Neuro exam stable. Tmax 100.5, tachycardic, on 3L O2. No complaints of CP or SOB. Reports cough and new dysphagia, but able to tolerate liquids midday and jello without choking. SLP consulted. HM consulted for hypoxemia workup. Hx PE and off Eliquis periop.      Medications:  Continuous Infusions:  Scheduled Meds:   donepeziL  5 mg Oral QHS    furosemide (LASIX) injection  40 mg Intravenous Once    heparin (porcine)  5,000 Units  Subcutaneous Q8H    losartan  100 mg Oral Daily    metoprolol succinate  50 mg Oral Daily    tamsulosin  0.4 mg Oral Daily     PRN Meds:  Current Facility-Administered Medications:     acetaminophen, 650 mg, Oral, Q4H PRN    guaiFENesin 100 mg/5 ml, 200 mg, Oral, Q6H PRN    HYDROcodone-acetaminophen, 1 tablet, Oral, Q4H PRN    ondansetron, 4 mg, Intravenous, Q12H PRN     Review of Systems  Objective:     Weight: 110.2 kg (243 lb)  Body mass index is 32.96 kg/m².  Vital Signs (Most Recent):  Temp: 98.4 °F (36.9 °C) (05/20/25 1638)  Pulse: 102 (05/20/25 1638)  Resp: 18 (05/20/25 1638)  BP: 127/79 (05/20/25 1638)  SpO2: (!) 93 % (05/20/25 1638) Vital Signs (24h Range):  Temp:  [98.2 °F (36.8 °C)-100.4 °F (38 °C)] 98.4 °F (36.9 °C)  Pulse:  [] 102  Resp:  [18] 18  SpO2:  [92 %-97 %] 93 %  BP: (114-143)/(65-79) 127/79     Date 05/20/25 0700 - 05/21/25 0659   Shift 1597-5766 1626-4329 7759-1133 24 Hour Total   INTAKE   P.O. 60   60   Shift Total(mL/kg) 60(0.5)   60(0.5)   OUTPUT   Shift Total(mL/kg)       Weight (kg) 110.2 110.2 110.2 110.2                       Male External Urinary Catheter 05/20/25 0626 (Active)   Collection Container Standard drainage bag 05/20/25 0800   Skin no redness;no breakdown 05/20/25 0800   Tolerance no signs/symptoms of discomfort 05/20/25 0800   Catheter Change Date 05/20/25 05/20/25 0800   Catheter Change Time 0800 05/20/25 0800          Physical Exam         Neurosurgery Physical Exam    General: well developed, well nourished, no distress.   Neurologic: Alert and oriented. Thought content appropriate.  GCS: Motor: 6/Verbal: 5/Eyes: 4 GCS Total: 15  Mental Status: Awake, Alert, Oriented x 4  Language: No aphasia  Speech: No dysarthria  Cranial nerves: face symmetric, tongue midline, CN II-XII grossly intact.   Eyes: pupils equal, round, reactive to light with accomodation, EOMI.  Pulmonary: normal respirations, no signs of respiratory distress, on 3L  Sensory: intact to light touch  "throughout  Motor Strength: Moves all extremities spontaneously with good tone.  Full strength upper and lower extremities. No abnormal movements seen.   Pronator Drift: no drift noted  Finger-to-nose: Intact bilaterally  Skin: Skin is warm, dry and intact.  Incision covered with dressing. Abdominal incisions c/d/I with dermabond.      Significant Labs:  Recent Labs   Lab 05/20/25  1257   *      K 4.2      CO2 22*   BUN 21   CREATININE 1.0   CALCIUM 9.1     Recent Labs   Lab 05/20/25  0325   WBC 10.62   HGB 13.6*   HCT 42.5   *     No results for input(s): "LABPT", "INR", "APTT" in the last 48 hours.  Microbiology Results (last 7 days)       ** No results found for the last 168 hours. **          Recent Lab Results         05/20/25  1257   05/20/25  0838   05/20/25  0325        Anion Gap 9           Baso #     0.02       Basophil %     0.2       BUN 21           Calcium 9.1           Chloride 108           CO2 22           Creatinine 1.0           eGFR >60  Comment: Estimated GFR calculated using the CKD-EPI creatinine (2021) equation.           Eos #     0.00       Eos %     0.0       Glucose 122           Gran # (ANC)     8.53       Hematocrit     42.5       Hemoglobin     13.6       Immature Grans (Abs)     0.03  Comment: Mild elevation in immature granulocytes is non specific and can be seen in a variety of conditions including stress response, acute inflammation, trauma and pregnancy. Correlation with other laboratory and clinical findings is essential.       Immature Granulocytes     0.3       Lymph #     1.17       Lymph %     11.0       MCH     33.9       MCHC     32.0       MCV     106       Mono #     0.87       Mono %     8.2       MPV     11.4       Neut %     80.3       nRBC     0       Platelet Count     137       POCT Glucose   103         Potassium 4.2           RBC     4.01       RDW     12.4       Sodium 139           WBC     10.62             All pertinent labs from the " last 24 hours have been reviewed.    Significant Diagnostics:  I have reviewed all pertinent imaging results/findings within the past 24 hours.  Assessment/Plan:     * NPH (normal pressure hydrocephalus)  Patient is a 78 yo male with PMH lymphoma, heart failure, PE, HTN, BPH, NPH presents for VPS placement. Now s/p L VPS placement 5/19.    - Neurologically stable. Monitor headaches.   -  consulted for hypoxemia workup. Appreciate assistance.   - Pain control: continue current regimen  - Post op CTH and XR shunt series with expected findings.   - Continue PT/OT/OOB. OOB > 6hrs/day  - GI: Diet as tolerated. Continue bowel regimen.   - Voiding spontaneously. Bladder scan ordered to ensure emptying at baseline.   - DVT ppx: SQH  - Atelectasis ppx: IS at bedside. Encourage use every hour while awake.  - Discussed with Dr. Peres    - Dispo: Pending placement once medically stable      Dysphagia  SLP consulted.    History of pulmonary embolism  Hold Eliquis until POD10.    Primary hypertension  Patient's blood pressure range in the last 24 hours was: BP  Min: 114/74  Max: 143/65.The patient's inpatient anti-hypertensive regimen is listed below:  Current Antihypertensives  losartan tablet 100 mg, Daily, Oral  metoprolol succinate (TOPROL-XL) 24 hr tablet 50 mg, Daily, Oral  furosemide injection 40 mg, Once, Intravenous    Plan  - BP is controlled, no changes needed to their regimen    Benign prostatic hyperplasia with urinary frequency  Continue home dose flomax     Coronary artery disease  Hold aspirin until POD10. Continue to monitor on telemetry.         Lizeth Martinez PA-C  Neurosurgery  [facility] - Neurosurgery ([facility])

## 2025-05-20 NOTE — PT/OT/SLP EVAL
Speech Language Pathology Evaluation  Bedside Swallow    Patient Name:  Sam Gamino   MRN:  [id number]  Admitting Diagnosis: NPH (normal pressure hydrocephalus)    Recommendations:                 General Recommendations:  follow up x 1 to ensure diet tolerance and check results of chest CT 5/20  Diet recommendations:  Regular Diet - IDDSI Level 7, Thin liquids - IDDSI Level 0   Aspiration Precautions: 1 bite/sip at a time, Alternating bites/sips, Avoid talking while eating, HOB to 90 degrees, Meds whole 1 at a time, Monitor for s/s of aspiration, Small bites/sips, and Strict aspiration precautions   General Precautions: Standard, aspiration, fall  Communication strategies:  none    Assessment:     Sam Gamino is a 79 y.o. male.  Swallowing abilities appear to be intact. Pt appears safe to continue on regular consistency diet with thin liquids.  SLP services will follow up x 1 to ensure diet tolerance and ensure CT chest results do not raise concerns for aspiration.     History:     Past Medical History:   Diagnosis Date    Abnormal echocardiogram 02/08/2013    Mild MVR 3/07    Atherosclerosis of aorta 11/01/2023 2023: Mentioned in chart.    CAD (coronary artery disease) 02/08/2013    Angio 3/07 Dr. Lin    Chronic diastolic (congestive) heart failure     History of pulmonary embolism 11/14/2023    CTA - 11/14/23 - Bilateral    History of subdural hemorrhage 11/04/2023 11/5/2023: SAH.    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 Pelayo       Past Surgical History:   Procedure Laterality Date    CREATION OF VENTRICULOPERITONEAL SHUNT USING COMPUTER-ASSISTED NAVIGATION Right 5/19/2025    Procedure:  "INSERTION,  SHUNT, W/ COMPUTER-ASSISTED NAVIGATION;  Surgeon: Roddy Peres DO;  Location: [facility] OR [address] [place] [address];  Service: Neurosurgery;  Laterality: Right;  (Right parietal  shunt placement)    EYE SURGERY Bilateral 2016    Cataracts    INSERTION, SHUNT  5/19/2025    Procedure: INSERTION, SHUNT;  Surgeon: Kalen Saucedo MD;  Location: [facility] OR [address] [place] [address];  Service: General;;     HPI:  Sam Gamino is a 79 y.o.  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  Patient has a history of perimesencephalic subarachnoid hemorrhage in 2023.  Patient has been followed closely by Neurology for memory disturbance.  Patient presents with his wife and both report difficulties with short-term memory, trouble with completing tasks, and remembering directions while driving.  He also notes difficulty picking up his legs which results in stumbling and falls.  He also reports urinary incontinence which may have more to do with not being able to get to the bathroom quick enough.  Patient's wife states that the symptoms have been present since his subarachnoid hemorrhage.       Prior Intubation HX:  5/19 for procedure    Modified Barium Swallow: none on file    Chest X-Rays: 5/20/25: Impression:   Prominent is similarly bilaterally, stable compared to prior study in December 2024.   Paramediastinal fullness may related to history of lymphoma.    Prior diet: regular/thins      Subjective     "I cough sometimes."     Pain/Comfort:  Pain Rating 1: 0/10    Respiratory Status: Nasal cannula, flow 2 L/min    Objective:     Oral Musculature Evaluation  Oral Musculature: WFL  Dentition: present and adequate  Secretion Management: adequate  Mucosal Quality: adequate  Mandibular Strength and Mobility: WFL  Oral Labial Strength and Mobility: WFL  Lingual Strength and Mobility: WFL  Velar Elevation: WFL  Buccal Strength and Mobility: WFL  Volitional Cough: adequate  Volitional Swallow: " elicited  Voice Prior to PO Intake: dry, clear    Bedside Swallow Eval:   Consistencies Assessed:  Thin liquids cup sip x 1, straw sips x 3  Solids 1/4 cracker x 2     Oral Phase:   WFL    Pharyngeal Phase:   Difficult to determine if present prior to PO trials, but SLP noted slightly increased rate of respiration and slightly crackly breathy sounds. - NSGY arrived to room just prior to SLP and stated pt is going for CT of chest due to concerns for excess fluids, which may be contributing to SLP's observations    Compensatory Strategies  None    Treatment: SLP services will follow up to ensure diet tolerance and review results of CT of chest to ensure there are no concerns for aspiration. Will likely d/c from services if no concerns for aspiration are present.     Goals:   Multidisciplinary Problems       SLP Goals          Problem: SLP    Goal Priority Disciplines Outcome   SLP Goal     SLP    Description: Speech Language Pathology Goals  Goals expected to be met by 5/27:  1. Pt will tolerate regular consistency diet and thin liquids without concerns for airway compromise.                               Plan:     Patient to be seen:  3 x/week   Plan of Care expires:  06/19/25  Plan of Care reviewed with:  patient   SLP Follow-Up:  Yes       Discharge recommendations:   (tbd)     Time Tracking:     SLP Treatment Date:   05/20/25  Speech Start Time:  1509  Speech Stop Time:  1520     Speech Total Time (min):  11 min    Billable Minutes: Eval Swallow and Oral Function 11    05/20/2025

## 2025-05-20 NOTE — ASSESSMENT & PLAN NOTE
Patient is a 80 yo male with PMH lymphoma, heart failure, PE, HTN, BPH, NPH presents for VPS placement. Now s/p L VPS placement 5/19.    - Neurologically stable. Monitor headaches.   -  consulted for hypoxemia workup. Appreciate assistance.   - Pain control: continue current regimen  - Post op CTH and XR shunt series with expected findings.   - Continue PT/OT/OOB. OOB > 6hrs/day  - GI: Diet as tolerated. Continue bowel regimen.   - Voiding spontaneously. Bladder scan ordered to ensure emptying at baseline.   - DVT ppx: SQH  - Atelectasis ppx: IS at bedside. Encourage use every hour while awake.  - Discussed with Dr. Peres    - Dispo: Pending placement once medically stable

## 2025-05-20 NOTE — SUBJECTIVE & OBJECTIVE
Interval History: NAEON. Neuro exam stable. Tmax 100.5, tachycardic, on 3L O2. No complaints of CP or SOB. Reports cough and new dysphagia, but able to tolerate liquids midday and jello without choking. SLP consulted. HM consulted for hypoxemia workup. Hx PE and off Eliquis periop.      Medications:  Continuous Infusions:  Scheduled Meds:   donepeziL  5 mg Oral QHS    furosemide (LASIX) injection  40 mg Intravenous Once    heparin (porcine)  5,000 Units Subcutaneous Q8H    losartan  100 mg Oral Daily    metoprolol succinate  50 mg Oral Daily    tamsulosin  0.4 mg Oral Daily     PRN Meds:  Current Facility-Administered Medications:     acetaminophen, 650 mg, Oral, Q4H PRN    guaiFENesin 100 mg/5 ml, 200 mg, Oral, Q6H PRN    HYDROcodone-acetaminophen, 1 tablet, Oral, Q4H PRN    ondansetron, 4 mg, Intravenous, Q12H PRN     Review of Systems  Objective:     Weight: 110.2 kg (243 lb)  Body mass index is 32.96 kg/m².  Vital Signs (Most Recent):  Temp: 98.4 °F (36.9 °C) (05/20/25 1638)  Pulse: 102 (05/20/25 1638)  Resp: 18 (05/20/25 1638)  BP: 127/79 (05/20/25 1638)  SpO2: (!) 93 % (05/20/25 1638) Vital Signs (24h Range):  Temp:  [98.2 °F (36.8 °C)-100.4 °F (38 °C)] 98.4 °F (36.9 °C)  Pulse:  [] 102  Resp:  [18] 18  SpO2:  [92 %-97 %] 93 %  BP: (114-143)/(65-79) 127/79     Date 05/20/25 0700 - 05/21/25 0659   Shift 0910-7250 1155-0628 1363-6371 24 Hour Total   INTAKE   P.O. 60   60   Shift Total(mL/kg) 60(0.5)   60(0.5)   OUTPUT   Shift Total(mL/kg)       Weight (kg) 110.2 110.2 110.2 110.2                       Male External Urinary Catheter 05/20/25 0626 (Active)   Collection Container Standard drainage bag 05/20/25 0800   Skin no redness;no breakdown 05/20/25 0800   Tolerance no signs/symptoms of discomfort 05/20/25 0800   Catheter Change Date 05/20/25 05/20/25 0800   Catheter Change Time 0800 05/20/25 0800          Physical Exam         Neurosurgery Physical Exam    General: well developed, well nourished, no  "distress.   Neurologic: Alert and oriented. Thought content appropriate.  GCS: Motor: 6/Verbal: 5/Eyes: 4 GCS Total: 15  Mental Status: Awake, Alert, Oriented x 4  Language: No aphasia  Speech: No dysarthria  Cranial nerves: face symmetric, tongue midline, CN II-XII grossly intact.   Eyes: pupils equal, round, reactive to light with accomodation, EOMI.  Pulmonary: normal respirations, no signs of respiratory distress, on 3L  Sensory: intact to light touch throughout  Motor Strength: Moves all extremities spontaneously with good tone.  Full strength upper and lower extremities. No abnormal movements seen.   Pronator Drift: no drift noted  Finger-to-nose: Intact bilaterally  Skin: Skin is warm, dry and intact.  Incision covered with dressing. Abdominal incisions c/d/I with dermabond.      Significant Labs:  Recent Labs   Lab 05/20/25  1257   *      K 4.2      CO2 22*   BUN 21   CREATININE 1.0   CALCIUM 9.1     Recent Labs   Lab 05/20/25  0325   WBC 10.62   HGB 13.6*   HCT 42.5   *     No results for input(s): "LABPT", "INR", "APTT" in the last 48 hours.  Microbiology Results (last 7 days)       ** No results found for the last 168 hours. **          Recent Lab Results         05/20/25  1257   05/20/25  0838   05/20/25  0325        Anion Gap 9           Baso #     0.02       Basophil %     0.2       BUN 21           Calcium 9.1           Chloride 108           CO2 22           Creatinine 1.0           eGFR >60  Comment: Estimated GFR calculated using the CKD-EPI creatinine (2021) equation.           Eos #     0.00       Eos %     0.0       Glucose 122           Gran # (ANC)     8.53       Hematocrit     42.5       Hemoglobin     13.6       Immature Grans (Abs)     0.03  Comment: Mild elevation in immature granulocytes is non specific and can be seen in a variety of conditions including stress response, acute inflammation, trauma and pregnancy. Correlation with other laboratory and clinical " findings is essential.       Immature Granulocytes     0.3       Lymph #     1.17       Lymph %     11.0       MCH     33.9       MCHC     32.0       MCV     106       Mono #     0.87       Mono %     8.2       MPV     11.4       Neut %     80.3       nRBC     0       Platelet Count     137       POCT Glucose   103         Potassium 4.2           RBC     4.01       RDW     12.4       Sodium 139           WBC     10.62             All pertinent labs from the last 24 hours have been reviewed.    Significant Diagnostics:  I have reviewed all pertinent imaging results/findings within the past 24 hours.

## 2025-05-20 NOTE — HPI
HPI:  Sam Gamino is a 79 y.o.  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  Patient has a history of perimesencephalic subarachnoid hemorrhage in 2023.  Patient has been followed closely by Neurology for memory disturbance.  Patient presents with his wife and both report difficulties with short-term memory, trouble with completing tasks, and remembering directions while driving.  He also notes difficulty picking up his legs which results in stumbling and falls.  He also reports urinary incontinence which may have more to do with not being able to get to the bathroom quick enough.  Patient's wife states that the symptoms have been present since his subarachnoid hemorrhage.    INTERVAL HISTORY (4/30/25):  Patient returns after undergoing high-volume lumbar puncture (32cc removed, OP 24, CP 16).  According to physical therapy patient improved and multiple domains and wife thought that he improved with his walking.  However he had 2 falls since the lumbar puncture, 1 occurred shortly after in 1 occurred about 1 week after.  The wife did not notice any significant change in his cognition.     Both patient and wife would be interested in pursuing shunt placement.

## 2025-05-20 NOTE — PLAN OF CARE
Patient is moderate intensity therapy level. CM met with patient who is in agreement to rehab. PMR placed and referral sent.   05/20/25 0543   Post-Acute Status   Post-Acute Authorization Placement   Post-Acute Placement Status Referrals Sent   Discharge Plan   Discharge Plan A Rehab   Discharge Plan B Home with family;Home Health

## 2025-05-20 NOTE — ASSESSMENT & PLAN NOTE
- agree with holding linzess in the perioperative setting, can restart if the patient develops constipation

## 2025-05-21 LAB
CREAT SERPL-MCNC: 0.9 MG/DL (ref 0.5–1.4)
GFR SERPLBLD CREATININE-BSD FMLA CKD-EPI: >60 ML/MIN/1.73/M2

## 2025-05-21 PROCEDURE — 99222 1ST HOSP IP/OBS MODERATE 55: CPT | Mod: ,,, | Performed by: NURSE PRACTITIONER

## 2025-05-21 PROCEDURE — 25000003 PHARM REV CODE 250: Performed by: PHYSICIAN ASSISTANT

## 2025-05-21 PROCEDURE — 63600175 PHARM REV CODE 636 W HCPCS: Performed by: STUDENT IN AN ORGANIZED HEALTH CARE EDUCATION/TRAINING PROGRAM

## 2025-05-21 PROCEDURE — 82565 ASSAY OF CREATININE: CPT | Performed by: PHYSICIAN ASSISTANT

## 2025-05-21 PROCEDURE — 36415 COLL VENOUS BLD VENIPUNCTURE: CPT | Performed by: PHYSICIAN ASSISTANT

## 2025-05-21 PROCEDURE — 99024 POSTOP FOLLOW-UP VISIT: CPT | Mod: POP,,, | Performed by: PHYSICIAN ASSISTANT

## 2025-05-21 PROCEDURE — 11000001 HC ACUTE MED/SURG PRIVATE ROOM

## 2025-05-21 PROCEDURE — 25000003 PHARM REV CODE 250: Performed by: STUDENT IN AN ORGANIZED HEALTH CARE EDUCATION/TRAINING PROGRAM

## 2025-05-21 PROCEDURE — 25000003 PHARM REV CODE 250

## 2025-05-21 RX ORDER — SPIRONOLACTONE 25 MG/1
25 TABLET ORAL DAILY
Status: DISCONTINUED | OUTPATIENT
Start: 2025-05-21 | End: 2025-05-22 | Stop reason: HOSPADM

## 2025-05-21 RX ORDER — FUROSEMIDE 40 MG/1
40 TABLET ORAL DAILY
Status: DISCONTINUED | OUTPATIENT
Start: 2025-05-21 | End: 2025-05-22 | Stop reason: HOSPADM

## 2025-05-21 RX ADMIN — METOPROLOL SUCCINATE 50 MG: 50 TABLET, EXTENDED RELEASE ORAL at 09:05

## 2025-05-21 RX ADMIN — HEPARIN SODIUM 5000 UNITS: 5000 INJECTION INTRAVENOUS; SUBCUTANEOUS at 02:05

## 2025-05-21 RX ADMIN — HEPARIN SODIUM 5000 UNITS: 5000 INJECTION INTRAVENOUS; SUBCUTANEOUS at 06:05

## 2025-05-21 RX ADMIN — TAMSULOSIN HYDROCHLORIDE 0.4 MG: 0.4 CAPSULE ORAL at 09:05

## 2025-05-21 RX ADMIN — LOSARTAN POTASSIUM 100 MG: 50 TABLET, FILM COATED ORAL at 09:05

## 2025-05-21 RX ADMIN — HEPARIN SODIUM 5000 UNITS: 5000 INJECTION INTRAVENOUS; SUBCUTANEOUS at 09:05

## 2025-05-21 RX ADMIN — FUROSEMIDE 40 MG: 40 TABLET ORAL at 12:05

## 2025-05-21 RX ADMIN — GUAIFENESIN 200 MG: 200 SOLUTION ORAL at 12:05

## 2025-05-21 RX ADMIN — SPIRONOLACTONE 25 MG: 25 TABLET, FILM COATED ORAL at 12:05

## 2025-05-21 RX ADMIN — DONEPEZIL HYDROCHLORIDE 5 MG: 5 TABLET, FILM COATED ORAL at 09:05

## 2025-05-21 RX ADMIN — ACETAMINOPHEN 650 MG: 325 TABLET ORAL at 09:05

## 2025-05-21 NOTE — ASSESSMENT & PLAN NOTE
79M w/PE (eliquis), HF (EF 60%), CAD, HTN, BPH, pulm HTN, pernicious anemia, lymphoma in remission s/p SCT, hx SAH cb NPH s/p  shunt placement 5/19/25.  was consulted for persistent hypoxia and the inability to wean off NC. The patient notes new BISHOP, and the patient's jardiance, lasix, spironolactone during hospitalization. He is on 2 L NC. His PE is concerning for JVD and LE swelling. His CXR is concerning for pulmonary edema. Overall his clinical picture is consistent with a HF exacerbation.     - restarted home lasix and spironolactone   - agree with continuing metoprolol succinate  - strict I's and O's  - monitor UOP  - daily weights   - OK to continue holding jardiance

## 2025-05-21 NOTE — CONSULTS
Inpatient consult to Physical Medicine Rehab  Consult performed by: Vaishali Dawson NP  Consult ordered by: Roddy Peres DO      Consult received.      Vaishali Dawson NP  Physical Medicine & Rehabilitation   05/21/2025

## 2025-05-21 NOTE — HOSPITAL COURSE
After diuresis the patient was weaned off oxygen. The patient had a mild fever on POD1 and POD2. The patient denies other infectious signs, possibly 2/2 to inflammatory response to surgery. Holding on infectious work up at this time. Will continue to obtain US of lower extremities to rule out DVT given his unilateral LE swelling in the setting of the patient holding his AC for 7 days. After one day of IV diuresis the patient was switched back to his home PO diuretics of lasix and spironolactone. US negative for DVT bilaterally.

## 2025-05-21 NOTE — ASSESSMENT & PLAN NOTE
The patient has a history of provoked PE diagnosed 11/14/2023 2/2 to immobility with his SAH. The patient held his eliquis for 7 days pre operatively. The patient has been on heparin DVT ppx, he got his first dose on 5/20/25. He also notes LLE swelling that has been present for the past 1-2 months. Well's score for PE is 4.5 which is 16.2% chance for PE.     - agree with LE US to assess for PE and DVT in the setting of LE swelling while his AC was held   - agree with holding eliquis in the perioperative setting   - ok to discontinue CTA given the patient's hypoxia improved with diuresis

## 2025-05-21 NOTE — ASSESSMENT & PLAN NOTE
S/P vps 05/19/2025.  -S/P post op XR shunt series.  -PT/OT rec for HIT. Pt amenable. Wants short stay.  -SLP follow for diet tolerance.

## 2025-05-21 NOTE — SUBJECTIVE & OBJECTIVE
Interval History: the patient has been weaned off O2 and feels well. Had a mild fever overnight but no symptoms.     Review of Systems  Objective:     Vital Signs (Most Recent):  Temp: 98.9 °F (37.2 °C) (05/21/25 0718)  Pulse: 97 (05/21/25 1112)  Resp: 18 (05/21/25 0718)  BP: 122/68 (05/21/25 0718)  SpO2: (!) 94 % (05/21/25 0718) Vital Signs (24h Range):  Temp:  [98.4 °F (36.9 °C)-100.5 °F (38.1 °C)] 98.9 °F (37.2 °C)  Pulse:  [] 97  Resp:  [18] 18  SpO2:  [92 %-95 %] 94 %  BP: ()/(67-79) 122/68     Weight: 110.2 kg (243 lb)  Body mass index is 32.96 kg/m².    Intake/Output Summary (Last 24 hours) at 5/21/2025 1154  Last data filed at 5/20/2025 1910  Gross per 24 hour   Intake --   Output 1600 ml   Net -1600 ml         Physical Exam  Constitutional:       Appearance: Normal appearance.   HENT:      Head: Normocephalic and atraumatic.      Right Ear: External ear normal.      Left Ear: External ear normal.      Nose: Nose normal.   Eyes:      General:         Right eye: No discharge.         Left eye: No discharge.   Cardiovascular:      Pulses: Normal pulses.      Heart sounds: Normal heart sounds.      Comments: Less distended right external jugular vein  Pulmonary:      Effort: Pulmonary effort is normal.      Breath sounds: Rales present.      Comments: Off NC on RA  Abdominal:      General: Abdomen is flat. There is no distension.      Palpations: Abdomen is soft.   Musculoskeletal:      Right lower leg: Edema present.      Left lower leg: Edema present.   Neurological:      Mental Status: He is alert and oriented to person, place, and time.   Psychiatric:         Mood and Affect: Mood normal.         Behavior: Behavior normal.               Significant Labs: All pertinent labs within the past 24 hours have been reviewed.    Significant Imaging: I have reviewed all pertinent imaging results/findings within the past 24 hours.

## 2025-05-21 NOTE — HPI
Per chart review, 79M w/PE (eliquis), HF (EF 60%), CAD, HTN, BPH, pulm HTN,pernicious anemia, lymphoma in remission s/p SCT, hx SAH cb NPH s/p  shunt placement 5/19/25. Post op course was complicated by mild fever and hypoxia. Pt required diuresis as per HM.  US BLE was recommended. Hypoxia improved. Pt tolerated RA. After one day of IV Lasix, pt was switched to PO Lasix and Spironolactone. PM &R was consulted to evaluate pt for post acute placement recommendation.       Functional History: Patient lives  with spouse.  Prior to admission, Pt was  I with PRN use of RW.  DME: [facility].

## 2025-05-21 NOTE — CONSULTS
[facility]       Bed:950/950 A    20G x 1.75IN PIV placed in Left Forearm by GAETANO using Ultrasound Guidance.    Indication: PVA  Attempts: 1    Mary Shelby RN

## 2025-05-21 NOTE — PLAN OF CARE
Problem: Adult Inpatient Plan of Care  Goal: Plan of Care Review  Outcome: Progressing  Goal: Patient-Specific Goal (Individualized)  Outcome: Progressing  Goal: Optimal Comfort and Wellbeing  Outcome: Progressing     Problem: Skin Injury Risk Increased  Goal: Skin Health and Integrity  Outcome: Progressing

## 2025-05-21 NOTE — PROGRESS NOTES
[facility] - Neurosurgery ([facility])  [facility] Medicine  Progress Note    Patient Name: Sam Gamino  MRN: [id number]  Patient Class: IP- Inpatient   Admission Date: 5/19/2025  Length of Stay: 2 days  Attending Physician: Roddy Peres DO  Primary Care Provider: JAYLYN Zamora MD        Subjective     Principal Problem:NPH (normal pressure hydrocephalus)        HPI:  79M w/PE (eliquis), HF (EF 60%), CAD, HTN, BPH, pulm HTN,pernicious anemia, lymphoma in remission s/p SCT, hx SAH cb NPH s/p  shunt placement 5/19/25. HM was consulted for worsening hypoxia. The patient states he has been feeling BISHOP with a nonproductive cough for the past week and has noticed LLE swelling for the past 1-2 months. The patient also had a fever on POD0 with chills, and new onset dysphagia to solids. The patient denies CP, chest tightness, hemoptysis, or sore throat. The patient held his eliquis for 7 days pre op and he has been on heparin DVT ppx, he got his first dose on 5/20/25. During admission his home lasix, spironolactone, and jardiance have been held. He is a never smoker and his last drink of alcohol was 1 month ago.     Overview/Hospital Course:  After diuresis the patient was weaned off oxygen. The patient had a mild fever on POD1 and POD2. The patient denies other infectious signs, possibly 2/2 to inflammatory response to surgery. Holding on infectious work up at this time. Will continue to obtain US of lower extremities to rule out DVT given his unilateral LE swelling in the setting of the patient holding his AC for 7 days. After one day of IV diuresis the patient was switched back to his home PO diuretics of lasix and spironolactone.     Interval History: the patient has been weaned off O2 and feels well. Had a mild fever overnight but no symptoms.     Review of Systems  Objective:     Vital Signs (Most Recent):  Temp: 98.9 °F (37.2 °C) (05/21/25 0718)  Pulse: 97 (05/21/25 1112)  Resp: 18 (05/21/25 0718)  BP: 122/68  (05/21/25 0718)  SpO2: (!) 94 % (05/21/25 0718) Vital Signs (24h Range):  Temp:  [98.4 °F (36.9 °C)-100.5 °F (38.1 °C)] 98.9 °F (37.2 °C)  Pulse:  [] 97  Resp:  [18] 18  SpO2:  [92 %-95 %] 94 %  BP: ()/(67-79) 122/68     Weight: 110.2 kg (243 lb)  Body mass index is 32.96 kg/m².    Intake/Output Summary (Last 24 hours) at 5/21/2025 1154  Last data filed at 5/20/2025 1910  Gross per 24 hour   Intake --   Output 1600 ml   Net -1600 ml         Physical Exam  Constitutional:       Appearance: Normal appearance.   HENT:      Head: Normocephalic and atraumatic.      Right Ear: External ear normal.      Left Ear: External ear normal.      Nose: Nose normal.   Eyes:      General:         Right eye: No discharge.         Left eye: No discharge.   Cardiovascular:      Pulses: Normal pulses.      Heart sounds: Normal heart sounds.      Comments: Less distended right external jugular vein  Pulmonary:      Effort: Pulmonary effort is normal.      Breath sounds: Rales present.      Comments: Off NC on RA  Abdominal:      General: Abdomen is flat. There is no distension.      Palpations: Abdomen is soft.   Musculoskeletal:      Right lower leg: Edema present.      Left lower leg: Edema present.   Neurological:      Mental Status: He is alert and oriented to person, place, and time.   Psychiatric:         Mood and Affect: Mood normal.         Behavior: Behavior normal.               Significant Labs: All pertinent labs within the past 24 hours have been reviewed.    Significant Imaging: I have reviewed all pertinent imaging results/findings within the past 24 hours.      Assessment & Plan  Acute exacerbation of chronic heart failure  79M w/PE (eliquis), HF (EF 60%), CAD, HTN, BPH, pulm HTN, pernicious anemia, lymphoma in remission s/p SCT, hx SAH cb NPH s/p  shunt placement 5/19/25.  was consulted for persistent hypoxia and the inability to wean off NC. The patient notes new BISHOP, and the patient's jardiance, lasix,  spironolactone during hospitalization. He is on 2 L NC. His PE is concerning for JVD and LE swelling. His CXR is concerning for pulmonary edema. Overall his clinical picture is consistent with a HF exacerbation.     - restarted home lasix and spironolactone   - agree with continuing metoprolol succinate  - strict I's and O's  - monitor UOP  - daily weights   - OK to continue holding jardiance  NPH (normal pressure hydrocephalus)  Patient has Chronic hydrocephalus which was seen on CT head. Will treat with surgical measures.The patient does have an indwelling  shunt. If treated with EVD and fails weaning trials then may require VPS placement.  Coronary artery disease  Patient with known CAD, which is controlled Will for S/Sx of angina/ACS.    - agree with holding home ASA in the perioperative setting   Slow transit constipation  - agree with holding linzess in the perioperative setting, can restart if the patient develops constipation     Benign prostatic hyperplasia with urinary frequency  The patient's ayala has remained in the post operative setting    - agree with continuing flomax in the perioperative setting    Primary hypertension  Patient's blood pressure range in the last 24 hours was: BP  Min: 99/67  Max: 127/79.The patient's inpatient anti-hypertensive regimen is listed below:  Current Antihypertensives  losartan tablet 100 mg, Daily, Oral  metoprolol succinate (TOPROL-XL) 24 hr tablet 50 mg, Daily, Oral  furosemide tablet 40 mg, Daily, Oral  spironolactone tablet 25 mg, Daily, Oral    Plan  - BP is controlled, no changes needed to their regimen  - agree with continuing losartan inpatient.   Heart failure, diastolic, chronic  See acute exacerbation of chronic heart failure plan   Dysphagia  The patient has new onset dysphagia which could be related to his  shunt    - agree with SLP eval  Fever  The patient presents with fever on POD1 and POD2. Could be 2/2 to pyretic response to surgery. The patient is  otherwise asymptomatic.    - continue to monitor, if the patient continues to fever can consider starting infectious work up     Anemia  Anemia is likely due to pernicious anemia and B12 deficiency .     Plan  - Monitor serial CBC: Daily  - Transfuse PRBC if patient becomes hemodynamically unstable, symptomatic or H/H drops below 7/21.  - Patient has not received any PRBC transfusions to date  - Patient's anemia is currently stable  Pulmonary embolism  The patient has a history of provoked PE diagnosed 11/14/2023 2/2 to immobility with his SAH. The patient held his eliquis for 7 days pre operatively. The patient has been on heparin DVT ppx, he got his first dose on 5/20/25. He also notes LLE swelling that has been present for the past 1-2 months. Well's score for PE is 4.5 which is 16.2% chance for PE.     - agree with LE US to assess for PE and DVT in the setting of LE swelling while his AC was held   - agree with holding eliquis in the perioperative setting   - ok to discontinue CTA given the patient's hypoxia improved with diuresis     VTE Risk Mitigation (From admission, onward)           Ordered     heparin (porcine) injection 5,000 Units  Every 8 hours         05/19/25 0907     IP VTE HIGH RISK PATIENT  Once         05/19/25 0907     Place sequential compression device  Until discontinued         05/19/25 0907                    Discharge Planning   ROLANDO: 5/21/2025     Code Status: Full Code   Medical Readiness for Discharge Date:   Discharge Plan A: Rehab                Please place Justification for DME        Marcelina Pozo DO  Department of Hospital Medicine   [facility] ([facility])

## 2025-05-21 NOTE — SUBJECTIVE & OBJECTIVE
Interval History: NAEON. Neuro exam stable. Denies headaches today. O2 sats >90% on room air. Denies CP or SOB. Cleared for regular diet by SLP. Hospital Medicine following for medical management. Appreciate their assistance. Discharge pending medical stability.     Medications:  Continuous Infusions:  Scheduled Meds:   donepeziL  5 mg Oral QHS    heparin (porcine)  5,000 Units Subcutaneous Q8H    losartan  100 mg Oral Daily    metoprolol succinate  50 mg Oral Daily    tamsulosin  0.4 mg Oral Daily     PRN Meds:  Current Facility-Administered Medications:     acetaminophen, 650 mg, Oral, Q4H PRN    guaiFENesin 100 mg/5 ml, 200 mg, Oral, Q6H PRN    HYDROcodone-acetaminophen, 1 tablet, Oral, Q4H PRN    ondansetron, 4 mg, Intravenous, Q12H PRN    senna-docusate, 1 tablet, Oral, Daily PRN     Review of Systems  Objective:     Weight: 110.2 kg (243 lb)  Body mass index is 32.96 kg/m².  Vital Signs (Most Recent):  Temp: 98.9 °F (37.2 °C) (05/21/25 0718)  Pulse: 85 (05/21/25 0718)  Resp: 18 (05/21/25 0718)  BP: 122/68 (05/21/25 0718)  SpO2: (!) 94 % (05/21/25 0718) Vital Signs (24h Range):  Temp:  [98.4 °F (36.9 °C)-100.5 °F (38.1 °C)] 98.9 °F (37.2 °C)  Pulse:  [] 85  Resp:  [18] 18  SpO2:  [92 %-95 %] 94 %  BP: ()/(67-79) 122/68                         Male External Urinary Catheter 05/20/25 0626 (Active)   Collection Container Standard drainage bag 05/20/25 1910   Skin no redness;no breakdown 05/20/25 1910   Tolerance no signs/symptoms of discomfort 05/20/25 1910   Output (mL) 1600 mL 05/20/25 1910   Catheter Change Date 05/20/25 05/20/25 1910   Catheter Change Time 0800 05/20/25 0800          Physical Exam         Neurosurgery Physical Exam  General: well developed, well nourished, no distress.   Head: normocephalic, left cranial incision c/d/I with dissolvable suture  Neurologic: Alert and oriented. Thought content appropriate.  GCS: Motor: 6/Verbal: 5/Eyes: 4 GCS Total: 15  Mental Status: Awake, Alert,  "Oriented x 4  Language: No aphasia  Speech: No dysarthria  Cranial nerves: face symmetric, tongue midline, CN II-XII grossly intact.   Eyes: pupils equal, round, reactive to light with accommodation, EOMI.   Pulmonary: normal respirations, no signs of respiratory distress  Abdomen: soft, non-distended, not tender to palpation  Skin: Skin is warm, dry and intact.  Sensory: intact to light touch throughout    Motor Strength:Moves all extremities spontaneously with good tone.  Full strength upper and lower extremities. No abnormal movements seen.       Cerebellar:   Finger-to-nose: intact bilaterally   Pronator drift: absent bilaterally      Significant Labs:  Recent Labs   Lab 05/20/25  1257 05/21/25  0944   *  --      --    K 4.2  --      --    CO2 22*  --    BUN 21  --    CREATININE 1.0 0.9   CALCIUM 9.1  --      Recent Labs   Lab 05/20/25  0325   WBC 10.62   HGB 13.6*   HCT 42.5   *     No results for input(s): "LABPT", "INR", "APTT" in the last 48 hours.  Microbiology Results (last 7 days)       ** No results found for the last 168 hours. **          All pertinent labs from the last 24 hours have been reviewed.    Significant Diagnostics:    "

## 2025-05-21 NOTE — PROGRESS NOTES
[facility] - Neurosurgery ([facility])  Neurosurgery  Progress Note    Subjective:     History of Present Illness: HPI:  Sam Gamino is a 79 y.o.  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  Patient has a history of perimesencephalic subarachnoid hemorrhage in 2023.  Patient has been followed closely by Neurology for memory disturbance.  Patient presents with his wife and both report difficulties with short-term memory, trouble with completing tasks, and remembering directions while driving.  He also notes difficulty picking up his legs which results in stumbling and falls.  He also reports urinary incontinence which may have more to do with not being able to get to the bathroom quick enough.  Patient's wife states that the symptoms have been present since his subarachnoid hemorrhage.    INTERVAL HISTORY (4/30/25):  Patient returns after undergoing high-volume lumbar puncture (32cc removed, OP 24, CP 16).  According to physical therapy patient improved and multiple domains and wife thought that he improved with his walking.  However he had 2 falls since the lumbar puncture, 1 occurred shortly after in 1 occurred about 1 week after.  The wife did not notice any significant change in his cognition.     Both patient and wife would be interested in pursuing shunt placement.       Post-Op Info:  Procedure(s) (LRB):  INSERTION,  SHUNT, W/ COMPUTER-ASSISTED NAVIGATION (Right)  INSERTION, SHUNT   2 Days Post-Op   Interval History: NAEON. Neuro exam stable. Denies headaches today. O2 sats >90% on room air. Denies CP or SOB. Cleared for regular diet by SLP. Hospital Medicine following for medical management. Appreciate their assistance. Discharge pending medical stability.     Medications:  Continuous Infusions:  Scheduled Meds:   donepeziL  5 mg Oral QHS    heparin (porcine)  5,000 Units Subcutaneous Q8H    losartan  100 mg Oral Daily    metoprolol succinate  50 mg  Oral Daily    tamsulosin  0.4 mg Oral Daily     PRN Meds:  Current Facility-Administered Medications:     acetaminophen, 650 mg, Oral, Q4H PRN    guaiFENesin 100 mg/5 ml, 200 mg, Oral, Q6H PRN    HYDROcodone-acetaminophen, 1 tablet, Oral, Q4H PRN    ondansetron, 4 mg, Intravenous, Q12H PRN    senna-docusate, 1 tablet, Oral, Daily PRN     Review of Systems  Objective:     Weight: 110.2 kg (243 lb)  Body mass index is 32.96 kg/m².  Vital Signs (Most Recent):  Temp: 98.9 °F (37.2 °C) (05/21/25 0718)  Pulse: 85 (05/21/25 0718)  Resp: 18 (05/21/25 0718)  BP: 122/68 (05/21/25 0718)  SpO2: (!) 94 % (05/21/25 0718) Vital Signs (24h Range):  Temp:  [98.4 °F (36.9 °C)-100.5 °F (38.1 °C)] 98.9 °F (37.2 °C)  Pulse:  [] 85  Resp:  [18] 18  SpO2:  [92 %-95 %] 94 %  BP: ()/(67-79) 122/68                         Male External Urinary Catheter 05/20/25 0626 (Active)   Collection Container Standard drainage bag 05/20/25 1910   Skin no redness;no breakdown 05/20/25 1910   Tolerance no signs/symptoms of discomfort 05/20/25 1910   Output (mL) 1600 mL 05/20/25 1910   Catheter Change Date 05/20/25 05/20/25 1910   Catheter Change Time 0800 05/20/25 0800          Physical Exam         Neurosurgery Physical Exam  General: well developed, well nourished, no distress.   Head: normocephalic, left cranial incision c/d/I with dissolvable suture  Neurologic: Alert and oriented. Thought content appropriate.  GCS: Motor: 6/Verbal: 5/Eyes: 4 GCS Total: 15  Mental Status: Awake, Alert, Oriented x 4  Language: No aphasia  Speech: No dysarthria  Cranial nerves: face symmetric, tongue midline, CN II-XII grossly intact.   Eyes: pupils equal, round, reactive to light with accommodation, EOMI.   Pulmonary: normal respirations, no signs of respiratory distress  Abdomen: soft, non-distended, not tender to palpation  Skin: Skin is warm, dry and intact.  Sensory: intact to light touch throughout    Motor Strength:Moves all extremities spontaneously  "with good tone.  Full strength upper and lower extremities. No abnormal movements seen.       Cerebellar:   Finger-to-nose: intact bilaterally   Pronator drift: absent bilaterally      Significant Labs:  Recent Labs   Lab 05/20/25  1257 05/21/25  0944   *  --      --    K 4.2  --      --    CO2 22*  --    BUN 21  --    CREATININE 1.0 0.9   CALCIUM 9.1  --      Recent Labs   Lab 05/20/25  0325   WBC 10.62   HGB 13.6*   HCT 42.5   *     No results for input(s): "LABPT", "INR", "APTT" in the last 48 hours.  Microbiology Results (last 7 days)       ** No results found for the last 168 hours. **          All pertinent labs from the last 24 hours have been reviewed.    Significant Diagnostics:    Assessment/Plan:     * NPH (normal pressure hydrocephalus)  Patient is a 80 yo male with PMH lymphoma, heart failure, PE, HTN, BPH, NPH presents for VPS placement. Now s/p L VPS placement 5/19.    - Neurologically stable. No more headaches today.   -  consulted for hypoxemia workup. Appreciate assistance.   - Pain control: continue current regimen  - Post op CTH and XR shunt series with expected findings. Shunt at 160  - Continue PT/OT/OOB. OOB > 6hrs/day  - GI: Diet as tolerated. Continue bowel regimen.   - Voiding spontaneously. Bladder scan ordered to ensure emptying at baseline.   - DVT ppx: SQH  - Atelectasis ppx: IS at bedside. Encourage use every hour while awake.  - Discussed with Dr. Peres    - Dispo: Pending placement once medically stable      Dysphagia  SLP consulted, cleared for regular diet     History of pulmonary embolism  Hold Eliquis until POD10.    Primary hypertension  Patient's blood pressure range in the last 24 hours was: BP  Min: 114/74  Max: 143/65.The patient's inpatient anti-hypertensive regimen is listed below:  Current Antihypertensives  losartan tablet 100 mg, Daily, Oral  metoprolol succinate (TOPROL-XL) 24 hr tablet 50 mg, Daily, Oral  furosemide injection 40 mg, Once, " Intravenous    Plan  - BP is controlled, no changes needed to their regimen    Benign prostatic hyperplasia with urinary frequency  Continue home dose flomax     Coronary artery disease  Hold aspirin until POD10. Continue to monitor on telemetry.         Koki Espinal PA-C  Neurosurgery  [facility] - Neurosurgery ([facility])

## 2025-05-21 NOTE — ASSESSMENT & PLAN NOTE
Patient is a 78 yo male with PMH lymphoma, heart failure, PE, HTN, BPH, NPH presents for VPS placement. Now s/p L VPS placement 5/19.    - Neurologically stable. No more headaches today.   - HM consulted for hypoxemia workup. Appreciate assistance.   - Pain control: continue current regimen  - Post op CTH and XR shunt series with expected findings. Shunt at 160  - Continue PT/OT/OOB. OOB > 6hrs/day  - GI: Diet as tolerated. Continue bowel regimen.   - Voiding spontaneously. Bladder scan ordered to ensure emptying at baseline.   - DVT ppx: SQH  - Atelectasis ppx: IS at bedside. Encourage use every hour while awake.  - Discussed with Dr. Peres    - Dispo: Pending placement once medically stable

## 2025-05-21 NOTE — ASSESSMENT & PLAN NOTE
The patient presents with fever on POD1 and POD2. Could be 2/2 to pyretic response to surgery. The patient is otherwise asymptomatic.    - continue to monitor, if the patient continues to fever can consider starting infectious work up

## 2025-05-21 NOTE — PLAN OF CARE
Problem: Adult Inpatient Plan of Care  Goal: Plan of Care Review  5/21/2025 1859 by Elizabeth Benson, RN  Outcome: Progressing  5/21/2025 1836 by Elizabeth Benson RN  Outcome: Progressing  Goal: Patient-Specific Goal (Individualized)  5/21/2025 1859 by Elizabeth Benson RN  Outcome: Progressing  5/21/2025 1836 by Elizabeth Benson RN  Outcome: Progressing  Goal: Optimal Comfort and Wellbeing  5/21/2025 1859 by Elizabeth Benson RN  Outcome: Progressing  5/21/2025 1836 by Elizabeth Benson RN  Outcome: Progressing     Problem: Skin Injury Risk Increased  Goal: Skin Health and Integrity  5/21/2025 1859 by Elizabeth Benson RN  Outcome: Progressing  5/21/2025 1836 by Elizabeth Benson RN  Outcome: Progressing     Problem: Wound  Goal: Skin Health and Integrity  Outcome: Progressing

## 2025-05-21 NOTE — HOSPITAL COURSE
PT- 05/20    Functional Mobility:  Bed Mobility:  Supine to Sit: contact guard assistance w/ HOB elevated, inc time, and use of bed rails  Transfers:  Sit to Stand:   minimum assistance with rolling walker with cues for hand placement  Following 2 reps CGA w/ RW when pt placed hands on chair arm rests instead of RW  Bed to Chair: minimum assistance with rolling walker with cues for hand placement and foot placement using Step Transfer  Gait:   Patient ambulated 34' + 24' with rolling walker and contact guard assistance.

## 2025-05-21 NOTE — PT/OT/SLP PROGRESS
Physical Therapy      Patient Name:  Sam Gamino   MRN:  [id number]    Patient not seen today secondary to Therapist assessment. Pt pending US of BLE to assess for DVT. Will follow-up as appropriate.  Rosaline Mackey, PT

## 2025-05-21 NOTE — ASSESSMENT & PLAN NOTE
Patient has Chronic hydrocephalus which was seen on CT head. Will treat with surgical measures.The patient does have an indwelling  shunt. If treated with EVD and fails weaning trials then may require VPS placement.

## 2025-05-21 NOTE — ASSESSMENT & PLAN NOTE
Patient's blood pressure range in the last 24 hours was: BP  Min: 99/67  Max: 127/79.The patient's inpatient anti-hypertensive regimen is listed below:  Current Antihypertensives  losartan tablet 100 mg, Daily, Oral  metoprolol succinate (TOPROL-XL) 24 hr tablet 50 mg, Daily, Oral  furosemide tablet 40 mg, Daily, Oral  spironolactone tablet 25 mg, Daily, Oral    Plan  - BP is controlled, no changes needed to their regimen  - agree with continuing losartan inpatient.

## 2025-05-21 NOTE — CONSULTS
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 Post op course was complicated by mild fever and hypoxia. Pt required diuresis as per HM.  US BLE was recommended. Hypoxia improved. Pt tolerated RA. After one day of IV Lasix, pt was switched to PO Lasix and Spironolactone. PM &R was consulted to evaluate pt for post acute placement recommendation.       Functional History: Patient lives  with spouse.  Prior to admission, Pt was  I with PRN use of RW.  DME: RW.      Hospital Course: PT- 05/20    Functional Mobility:  Bed Mobility:  Supine to Sit: contact guard assistance w/ HOB elevated, inc time, and use of bed rails  Transfers:  Sit to Stand:   minimum assistance with rolling walker with cues for hand placement  Following 2 reps CGA w/ RW when pt placed hands on chair arm rests instead of RW  Bed to Chair: minimum assistance with rolling walker with cues for hand placement and foot placement using Step Transfer  Gait:   Patient ambulated 34' + 24' with rolling walker and contact guard assistance.     No new subjective & objective note has been filed under this hospital service since the last note was generated.    Assessment/Plan:     * NPH (normal pressure hydrocephalus)  S/P vps 05/19/2025.  -S/P post op XR shunt series.  -PT/OT rec for HIT. Pt amenable. Wants short stay.  -SLP follow for diet tolerance.     Pulmonary embolism  -Hx of PE.  -Eliquis on hold till POD 10.    Anemia  -Monitor CBC daily.     Dysphagia  -Cleared for reg diet as per SLP.     PM&R Recommendation:     At this  time, the PM&R team has reviewed this patient's ongoing medical case including inpatient diagnosis, medical history, clinical examination, labs, vitals, current social and functional history to provide the post-acute recommendation as follows:     RECOMMENDATIONS: inpatient rehabilitation due to fair to good potential for improvement with therapies and need of physician oversight for management of ongoing active medical issues, once medically stable.     The patient will be admitted for comprehensive interdisciplinary inpatient rehabilitation to address the impairments due to his  medical diagnosis of NPH (normal pressure hydrocephalus). The patient will benefit from an inpatient rehabilitation program to promote functional recovery, implement compensatory strategies and will undergo assessment for needs for durable medical equipment for safe discharge to the community. This patient will benefit from a coordinated interdisciplinary rehabilitation program services that require close monitoring and treatment with 24-hour rehabilitative nursing and physical/occupational therapies for 3 hours/day for 5 days/week. This interdisciplinary program will be performed under the direction of a physiatrist.        We will continue to follow.       Thank you for your consult.     Vaishali Dawson NP  Department of Physical Medicine & Rehab  [facility])

## 2025-05-22 VITALS
BODY MASS INDEX: 32.91 KG/M2 | SYSTOLIC BLOOD PRESSURE: 110 MMHG | HEART RATE: 99 BPM | HEIGHT: 72 IN | TEMPERATURE: 99 F | WEIGHT: 243 LBS | DIASTOLIC BLOOD PRESSURE: 70 MMHG | RESPIRATION RATE: 18 BRPM | OXYGEN SATURATION: 94 %

## 2025-05-22 DIAGNOSIS — G91.2 NPH (NORMAL PRESSURE HYDROCEPHALUS): Primary | ICD-10-CM

## 2025-05-22 PROBLEM — J96.01 ACUTE HYPOXEMIC RESPIRATORY FAILURE: Status: RESOLVED | Noted: 2025-05-22 | Resolved: 2025-05-22

## 2025-05-22 PROBLEM — J96.01 ACUTE HYPOXEMIC RESPIRATORY FAILURE: Status: ACTIVE | Noted: 2025-05-22

## 2025-05-22 PROCEDURE — 25000003 PHARM REV CODE 250

## 2025-05-22 PROCEDURE — 92526 ORAL FUNCTION THERAPY: CPT

## 2025-05-22 PROCEDURE — 97530 THERAPEUTIC ACTIVITIES: CPT

## 2025-05-22 PROCEDURE — 97535 SELF CARE MNGMENT TRAINING: CPT

## 2025-05-22 PROCEDURE — 99024 POSTOP FOLLOW-UP VISIT: CPT | Mod: POP,,, | Performed by: PHYSICIAN ASSISTANT

## 2025-05-22 PROCEDURE — 25000003 PHARM REV CODE 250: Performed by: STUDENT IN AN ORGANIZED HEALTH CARE EDUCATION/TRAINING PROGRAM

## 2025-05-22 PROCEDURE — 63600175 PHARM REV CODE 636 W HCPCS: Performed by: STUDENT IN AN ORGANIZED HEALTH CARE EDUCATION/TRAINING PROGRAM

## 2025-05-22 RX ORDER — HYDROCODONE BITARTRATE AND ACETAMINOPHEN 5; 325 MG/1; MG/1
1 TABLET ORAL EVERY 4 HOURS PRN
Status: ON HOLD
Start: 2025-05-22

## 2025-05-22 RX ADMIN — LOSARTAN POTASSIUM 100 MG: 50 TABLET, FILM COATED ORAL at 08:05

## 2025-05-22 RX ADMIN — FUROSEMIDE 40 MG: 40 TABLET ORAL at 08:05

## 2025-05-22 RX ADMIN — SPIRONOLACTONE 25 MG: 25 TABLET, FILM COATED ORAL at 08:05

## 2025-05-22 RX ADMIN — TAMSULOSIN HYDROCHLORIDE 0.4 MG: 0.4 CAPSULE ORAL at 08:05

## 2025-05-22 RX ADMIN — METOPROLOL SUCCINATE 50 MG: 50 TABLET, EXTENDED RELEASE ORAL at 08:05

## 2025-05-22 RX ADMIN — HEPARIN SODIUM 5000 UNITS: 5000 INJECTION INTRAVENOUS; SUBCUTANEOUS at 06:05

## 2025-05-22 NOTE — ASSESSMENT & PLAN NOTE
79M w/PE (eliquis), HF (EF 60%), CAD, HTN, BPH, pulm HTN, pernicious anemia, lymphoma in remission s/p SCT, hx SAH cb NPH s/p  shunt placement 5/19/25.  was consulted for persistent hypoxia and the inability to wean off NC. The patient notes new BISHOP, and the patient's jardiance, lasix, spironolactone during hospitalization. He is on 2 L NC. His PE is concerning for JVD and LE swelling. His CXR is concerning for pulmonary edema. Overall his clinical picture is consistent with a HF exacerbation.     - restarted home lasix and spironolactone; responding well to home medications  - medically clear for DC to rehab  - agree with continuing metoprolol succinate  - strict I's and O's  - monitor UOP  - daily weights   - OK to continue holding jardiance

## 2025-05-22 NOTE — SUBJECTIVE & OBJECTIVE
Interval History: : NAEON. Neuro exam stable. Stable on RA. Denies SOB, CP. BLE US negative for DVT. Medically stable for dc to IPR.     Medications:  Continuous Infusions:  Scheduled Meds:   donepeziL  5 mg Oral QHS    furosemide  40 mg Oral Daily    heparin (porcine)  5,000 Units Subcutaneous Q8H    losartan  100 mg Oral Daily    metoprolol succinate  50 mg Oral Daily    spironolactone  25 mg Oral Daily    tamsulosin  0.4 mg Oral Daily     PRN Meds:  Current Facility-Administered Medications:     acetaminophen, 650 mg, Oral, Q4H PRN    guaiFENesin 100 mg/5 ml, 200 mg, Oral, Q6H PRN    HYDROcodone-acetaminophen, 1 tablet, Oral, Q4H PRN    ondansetron, 4 mg, Intravenous, Q12H PRN    senna-docusate, 1 tablet, Oral, Daily PRN     Review of Systems  Objective:     Weight: 110.2 kg (243 lb)  Body mass index is 32.96 kg/m².  Vital Signs (Most Recent):  Temp: 98.8 °F (37.1 °C) (05/22/25 0830)  Pulse: 91 (05/22/25 0830)  Resp: 18 (05/22/25 0830)  BP: 124/75 (05/22/25 0830)  SpO2: 97 % (05/22/25 0830) Vital Signs (24h Range):  Temp:  [98.4 °F (36.9 °C)-99.7 °F (37.6 °C)] 98.8 °F (37.1 °C)  Pulse:  [] 91  Resp:  [18] 18  SpO2:  [94 %-97 %] 97 %  BP: (120-130)/(70-77) 124/75                         Male External Urinary Catheter 05/20/25 0626 (Active)   Collection Container Standard drainage bag 05/21/25 0800   Skin no redness;no breakdown 05/21/25 2000   Tolerance no signs/symptoms of discomfort 05/21/25 2000   Output (mL) 1600 mL 05/20/25 1910   Catheter Change Date 05/21/25 05/21/25 0800   Catheter Change Time 0800 05/21/25 0800          Physical Exam         Neurosurgery Physical Exam  General: well developed, well nourished, no distress.   Head: normocephalic, left cranial incision c/d/I with dissolvable suture  Neurologic: Alert and oriented. Thought content appropriate.  GCS: Motor: 6/Verbal: 5/Eyes: 4 GCS Total: 15  Mental Status: Awake, Alert, Oriented x 4  Language: No aphasia  Speech: No dysarthria  Cranial  "nerves: face symmetric, tongue midline, CN II-XII grossly intact.   Eyes: pupils equal, round, reactive to light with accommodation, EOMI.   Pulmonary: normal respirations, no signs of respiratory distress  Abdomen: soft, non-distended, not tender to palpation  Skin: Skin is warm, dry and intact.  Sensory: intact to light touch throughout     Motor Strength:Moves all extremities spontaneously with good tone.  Full strength upper and lower extremities. No abnormal movements seen.         Cerebellar:   Finger-to-nose: intact bilaterally   Pronator drift: absent bilaterally    Significant Labs:  Recent Labs   Lab 05/20/25  1257 05/21/25  0944   *  --      --    K 4.2  --      --    CO2 22*  --    BUN 21  --    CREATININE 1.0 0.9   CALCIUM 9.1  --      No results for input(s): "WBC", "HGB", "HCT", "PLT" in the last 48 hours.  No results for input(s): "LABPT", "INR", "APTT" in the last 48 hours.  Microbiology Results (last 7 days)       ** No results found for the last 168 hours. **          All pertinent labs from the last 24 hours have been reviewed.    Significant Diagnostics:    "

## 2025-05-22 NOTE — PLAN OF CARE
05/22/25 1354   Medicare Message   Important Message from Medicare regarding Discharge Appeal Rights Given to patient/caregiver;Explained to patient/caregiver;Signed/date by patient/caregiver   Date IMM was signed 05/22/25   Time IMM was signed [id number]

## 2025-05-22 NOTE — PT/OT/SLP PROGRESS
Occupational Therapy   Treatment    Name: Sam Gamino  MRN: [id number]  Admitting Diagnosis:  NPH (normal pressure hydrocephalus)  3 Days Post-Op    Recommendations:     Discharge Recommendations: Moderate Intensity Therapy  Discharge Equipment Recommendations:  bedside commode, wheelchair  Barriers to discharge:  Other (Comment) (increased skilled A needed)    Assessment:     Sam Gamino is a 79 y.o. male with a medical diagnosis of NPH (normal pressure hydrocephalus).  He presents with continued decreased self-care and mobility independence. Performance deficits affecting function are weakness, gait instability, decreased upper extremity function, impaired cardiopulmonary response to activity, impaired endurance, impaired balance, decreased lower extremity function, impaired coordination, decreased safety awareness, impaired self care skills, impaired cognition, impaired functional mobility, decreased coordination. This date pt remaining agreeable to session activities with good participation. Pt with continued gait instability and decreased gait speed though no LOB with intermittent v/cs for RW management. Patient continues to demonstrate the need for moderate intensity therapy on a daily basis post acute exhibited by decreased independence with self-care and functional mobility     Rehab Prognosis:  Good; patient would benefit from acute skilled OT services to address these deficits and reach maximum level of function.       Plan:     Patient to be seen 4 x/week to address the above listed problems via self-care/home management, therapeutic activities, therapeutic exercises, neuromuscular re-education  Plan of Care Expires: 06/20/25  Plan of Care Reviewed with: patient, spouse    Subjective     Chief Complaint: n/a  Patient/Family Comments/goals: increase independence  Pain/Comfort:  Pain Rating 1: 0/10    Objective:     Communicated with: nursing prior to session.  Patient found HOB elevated with bed  alarm, telemetry upon OT entry to room.    General Precautions: Standard, fall, aspiration    Orthopedic Precautions:N/A  Braces: N/A  Respiratory Status: Room air     Occupational Performance:     Bed Mobility:    Patient completed Scooting/Bridging with contact guard assistance and increased time  Patient completed Supine to Sit with contact guard assistance and HOB elevated   Pt seated EOB with SBA    Functional Mobility/Transfers:  Patient completed Sit <> Stand Transfer with minimum assistance  with  rolling walker and 2 trials completed   Patient completed Bed <> Chair Transfer using Step Transfer technique with contact guard assistance with rolling walker and cushion added to increase height   Patient completed Toilet Transfer Step Transfer technique with minimum assistance with  rolling walker and grab bars  Functional Mobility: Pt engaged in functional mobility to simulate household/community distances to and from bathroom with CGA and RW in order to maximize functional endurance and standing balance required for engagement in occupations of choice - pt with decreased gait speed and step to method with mild instability though no LOB with minimal v/cs for RW management/safety    Activities of Daily Living:  Upper Body Dressing: moderate assistance to nena gown as robe while seated EOB   Lower Body Dressing: maximal assistance to adjust footwear while pt seated EOB  Toileting: moderate assistance for posterior clothing management      [facility] 6 Click ADL: 18    Treatment & Education:  Session this date targeted self-care and therapeutic activities to increase pt's independence   Pt educated on OT roles, POC, call button for assistance  Pt educated on call button for assistance with mobility activities     Patient left up in chair with all lines intact, call button in reach, chair alarm on    GOALS:   Multidisciplinary Problems       Occupational Therapy Goals          Problem: Occupational Therapy    Goal  Priority Disciplines Outcome Interventions   Occupational Therapy Goal     OT, PT/OT Progressing    Description: Goals to be met by: 6/20/2025     Patient will increase functional independence with ADLs by performing:    UE Dressing with Supervision.  LE Dressing with Supervision.  Grooming while standing at sink with Supervision.  Toileting from toilet with Supervision for hygiene and clothing management.   Step transfer with Supervision  Toilet transfer to toilet with Supervision.                         DME Justifications:   Sam requires a commode for home use   Sam's mobility limitation cannot be sufficiently resolved by the use of a cane. His functional mobility deficit can be sufficiently resolved with the use of a Rolling Walker. Patient's mobility limitation significantly impairs their ability to participate in one of more activities of daily living.  The use of a RW will significantly improve the patient's ability to participate in MRADLS and the patient will use it on regular basis in the home.    Time Tracking:     OT Date of Treatment: 05/22/25  OT Start Time: 1111  OT Stop Time: 1134  OT Total Time (min): 23 min    Billable Minutes:Self Care/Home Management 13  Therapeutic Activity 10    OT/RICHY: OT          5/22/2025

## 2025-05-22 NOTE — SUBJECTIVE & OBJECTIVE
Interval History: US negative for DVT bilaterally. The patient feels well and has no complaints. Urinated well on home diuretics     Review of Systems  Objective:     Vital Signs (Most Recent):  Temp: 98.5 °F (36.9 °C) (05/22/25 1157)  Pulse: 99 (05/22/25 1157)  Resp: 18 (05/22/25 1157)  BP: 110/70 (05/22/25 1157)  SpO2: (!) 94 % (05/22/25 1157) Vital Signs (24h Range):  Temp:  [98.5 °F (36.9 °C)-99.5 °F (37.5 °C)] 98.5 °F (36.9 °C)  Pulse:  [] 99  Resp:  [18] 18  SpO2:  [94 %-97 %] 94 %  BP: (110-130)/(70-77) 110/70     Weight: 110.2 kg (243 lb)  Body mass index is 32.96 kg/m².    Intake/Output Summary (Last 24 hours) at 5/22/2025 1836  Last data filed at 5/22/2025 0000  Gross per 24 hour   Intake --   Output 300 ml   Net -300 ml         Physical Exam  Constitutional:       Appearance: Normal appearance.   HENT:      Head: Normocephalic and atraumatic.      Right Ear: External ear normal.      Left Ear: External ear normal.      Nose: Nose normal.   Eyes:      General:         Right eye: No discharge.         Left eye: No discharge.   Cardiovascular:      Comments: Less distended right external jugular vein  Pulmonary:      Effort: Pulmonary effort is normal.      Comments: Off NC on RA  Musculoskeletal:      Right lower leg: No edema.      Left lower leg: No edema.   Neurological:      Mental Status: He is alert and oriented to person, place, and time.   Psychiatric:         Mood and Affect: Mood normal.         Behavior: Behavior normal.               Significant Labs: All pertinent labs within the past 24 hours have been reviewed.    Significant Imaging: I have reviewed all pertinent imaging results/findings within the past 24 hours.

## 2025-05-22 NOTE — ANESTHESIA POSTPROCEDURE EVALUATION
Anesthesia Post Evaluation    Patient: Sam Gamino    Procedure(s) Performed: Procedure(s) (LRB):  INSERTION,  SHUNT, W/ COMPUTER-ASSISTED NAVIGATION (Right)  INSERTION, SHUNT    Final Anesthesia Type: general      Patient location during evaluation: PACU  Patient participation: Yes- Able to Participate  Level of consciousness: awake and alert and oriented  Post-procedure vital signs: reviewed and stable  Pain management: adequate  Airway patency: patent    PONV status at discharge: No PONV  Anesthetic complications: no      Cardiovascular status: hemodynamically stable  Respiratory status: unassisted, spontaneous ventilation and room air  Hydration status: euvolemic  Follow-up not needed.          Vital signs stable, no anesthesia issues at this time.    Event Time   Out of Recovery 09:22:00

## 2025-05-22 NOTE — ASSESSMENT & PLAN NOTE
Patient's blood pressure range in the last 24 hours was: BP  Min: 110/70  Max: 130/74.The patient's inpatient anti-hypertensive regimen is listed below:  Current Antihypertensives       Plan  - BP is controlled, no changes needed to their regimen  - agree with continuing losartan inpatient.

## 2025-05-22 NOTE — CARE UPDATE
I have reviewed the chart of Sam Gamino who is hospitalized for the following:    Active Hospital Problems    Diagnosis    *NPH (normal pressure hydrocephalus)    Acute hypoxemic respiratory failure     ymptomatic dyspnea and use of supplemental O2. By history/exam, suspect acute on chronic diastolic heart failure in setting of held diuretics, IVF during surgery. IV lasix for diuresis.       Pulmonary embolism    Ventricular shunt in place    Anemia    Pulmonary hypertension due to left heart disease     Appears due to left sided heart disease as well as history of PE.  10/4/2024: Echo: SPAP 53.      Non-Hodgkin's lymphoma, unspecified body region, unspecified non-Hodgkin lymphoma type    Acute exacerbation of chronic heart failure    Fever    Dysphagia    Chronic anticoagulation     11/14/2023: CTA of Head and Neck: Bilateral pulmonary emboli.      History of pulmonary embolism     CTA - 11/14/23 - Bilateral      History of subarachnoid hemorrhage     11/5/2023: SAH.      Heart failure, diastolic, chronic     5/16/2022: Echo: Normal left ventricular size and systolic function. EF 55%. Moderate MR. Mild to moderate TR.   6/6/2023: Echo: Normal left ventricular size and systolic function. EF 60%. LVGLS -15%. Moderate diastolic dysfunction. Mild to moderate AR. Mild to moderate MR. Mildly enlarged ascending aorta. Small pericardial effusion.  10/4/2024: Echo: Normal left ventricular size and systolic function. EF 60%. Moderate diastolic dysfunction. Mild aortic valve sclerosis. Moderate AR. Moderate MR. Mild to moderate TR. SPAP 53. Small pericardial effusion.          Primary hypertension     2002: Diagnosed.         Benign prostatic hyperplasia with urinary frequency    Slow transit constipation    Lymphoma in remission     S/p chemo/XRT 1992  Relapse 1993  BMT 1993      Coronary artery disease     Angio 3/07  Dr. Riley Vergara, [facility]  Unit Based [facility]

## 2025-05-22 NOTE — PROGRESS NOTES
[facility]mo Al[facility]o - Neurosurgery ([facility]  [facility] Medicine  Progress Note    Patient Name: Sam Gamino  MRN: [id number]  Patient Class: IP- Inpatient   Admission Date: 5/19/2025  Length of Stay: 3 days  Attending Physician: No att. 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 HM was consulted for worsening hypoxia. The patient states he has been feeling BISHOP with a nonproductive cough for the past week and has noticed LLE swelling for the past 1-2 months. The patient also had a fever on POD0 with chills, and new onset dysphagia to solids. The patient denies CP, chest tightness, hemoptysis, or sore throat. The patient held his eliquis for 7 days pre op and he has been on heparin DVT ppx, he got his first dose on 5/20/25. During admission his home lasix, spironolactone, and jardiance have been held. He is a never smoker and his last drink of alcohol was 1 month ago.     Overview/Hospital Course:  After diuresis the patient was weaned off oxygen. The patient had a mild fever on POD1 and POD2. The patient denies other infectious signs, possibly 2/2 to inflammatory response to surgery. Holding on infectious work up at this time. Will continue to obtain US of lower extremities to rule out DVT given his unilateral LE swelling in the setting of the patient holding his AC for 7 days. After one day of IV diuresis the patient was switched back to his home PO diuretics of lasix and spironolactone. US negative for DVT bilaterally.     Interval History: US negative for DVT bilaterally. The patient feels well and has no complaints. Urinated well on home diuretics     Review of Systems  Objective:     Vital Signs (Most Recent):  Temp: 98.5 °F (36.9 °C) (05/22/25 1157)  Pulse: 99  (05/22/25 1157)  Resp: 18 (05/22/25 1157)  BP: 110/70 (05/22/25 1157)  SpO2: (!) 94 % (05/22/25 1157) Vital Signs (24h Range):  Temp:  [98.5 °F (36.9 °C)-99.5 °F (37.5 °C)] 98.5 °F (36.9 °C)  Pulse:  [] 99  Resp:  [18] 18  SpO2:  [94 %-97 %] 94 %  BP: (110-130)/(70-77) 110/70     Weight: 110.2 kg (243 lb)  Body mass index is 32.96 kg/m².    Intake/Output Summary (Last 24 hours) at 5/22/2025 1836  Last data filed at 5/22/2025 0000  Gross per 24 hour   Intake --   Output 300 ml   Net -300 ml         Physical Exam  Constitutional:       Appearance: Normal appearance.   HENT:      Head: Normocephalic and atraumatic.      Right Ear: External ear normal.      Left Ear: External ear normal.      Nose: Nose normal.   Eyes:      General:         Right eye: No discharge.         Left eye: No discharge.   Cardiovascular:      Comments: Less distended right external jugular vein  Pulmonary:      Effort: Pulmonary effort is normal.      Comments: Off NC on RA  Musculoskeletal:      Right lower leg: No edema.      Left lower leg: No edema.   Neurological:      Mental Status: He is alert and oriented to person, place, and time.   Psychiatric:         Mood and Affect: Mood normal.         Behavior: Behavior normal.               Significant Labs: All pertinent labs within the past 24 hours have been reviewed.    Significant Imaging: I have reviewed all pertinent imaging results/findings within the past 24 hours.      Assessment & Plan  Acute exacerbation of chronic heart failure  79M w/PE (eliquis), HF (EF 60%), CAD, HTN, BPH, pulm HTN, pernicious anemia, lymphoma in remission s/p SCT, hx SAH cb NPH s/p  shunt placement 5/19/25.  was consulted for persistent hypoxia and the inability to wean off NC. The patient notes new BISHOP, and the patient's jardiance, lasix, spironolactone during hospitalization. He is on 2 L NC. His PE is concerning for JVD and LE swelling. His CXR is concerning for pulmonary edema. Overall his  clinical picture is consistent with a HF exacerbation.     - restarted home lasix and spironolactone; responding well to home medications  - medically clear for DC to rehab  - agree with continuing metoprolol succinate  - strict I's and O's  - monitor UOP  - daily weights   - OK to continue holding jardiance  NPH (normal pressure hydrocephalus)  Patient has Chronic hydrocephalus which was seen on CT head. Will treat with surgical measures.The patient does have an indwelling  shunt. If treated with EVD and fails weaning trials then may require VPS placement.  Coronary artery disease  Patient with known CAD, which is controlled Will for S/Sx of angina/ACS.    - agree with holding home ASA in the perioperative setting   Slow transit constipation  - agree with holding linzess in the perioperative setting, can restart if the patient develops constipation     Benign prostatic hyperplasia with urinary frequency  The patient's ayala has remained in the post operative setting    - agree with continuing flomax in the perioperative setting    Primary hypertension  Patient's blood pressure range in the last 24 hours was: BP  Min: 110/70  Max: 130/74.The patient's inpatient anti-hypertensive regimen is listed below:  Current Antihypertensives       Plan  - BP is controlled, no changes needed to their regimen  - agree with continuing losartan inpatient.   Heart failure, diastolic, chronic  See acute exacerbation of chronic heart failure plan   Dysphagia  The patient has new onset dysphagia which could be related to his  shunt    - agree with SLP eval  Fever  The patient presents with fever on POD1 and POD2. Could be 2/2 to pyretic response to surgery. The patient is otherwise asymptomatic.    - continue to monitor, if the patient continues to fever can consider starting infectious work up     Anemia  Anemia is likely due to pernicious anemia and B12 deficiency .     Plan  - Monitor serial CBC: Daily  - Transfuse PRBC if  patient becomes hemodynamically unstable, symptomatic or H/H drops below 7/21.  - Patient has not received any PRBC transfusions to date  - Patient's anemia is currently stable  Pulmonary embolism  The patient has a history of provoked PE diagnosed 11/14/2023 2/2 to immobility with his SAH. The patient held his eliquis for 7 days pre operatively. The patient has been on heparin DVT ppx, he got his first dose on 5/20/25. He also notes LLE swelling that has been present for the past 1-2 months. Well's score for PE is 4.5 which is 16.2% chance for PE.     - agree with LE US to assess for PE and DVT in the setting of LE swelling while his AC was held; negative DVT US   - agree with holding eliquis in the perioperative setting   - ok to discontinue CTA given the patient's hypoxia improved with diuresis     History of subarachnoid hemorrhage      Lymphoma in remission      History of pulmonary embolism      Chronic anticoagulation      Non-Hodgkin's lymphoma, unspecified body region, unspecified non-Hodgkin lymphoma type      Pulmonary hypertension due to left heart disease      Ventricular shunt in place      VTE Risk Mitigation (From admission, onward)           Ordered     IP VTE HIGH RISK PATIENT  Once         05/19/25 0907                    Discharge Planning   ROLANDO: 5/22/2025     Code Status: Prior   Medical Readiness for Discharge Date: 5/22/2025  Discharge Plan A: Rehab                        Marcelina Pozo DO  Department of Hospital Medicine   Elmo ermelinda - Neurosurgery ([facility])

## 2025-05-22 NOTE — DISCHARGE SUMMARY
Elmo Alfaro - Neurosurgery ([facility])  Neurosurgery  Discharge Summary      Patient Name: Sam Gamino  MRN: [id number]  Admission Date: 5/19/2025  Hospital Length of Stay: 3 days  Discharge Date and Time: 5/22/2025  2:15 PM  Attending Physician: Roddy Peres DO  Discharging Provider: Koki Espinal PA-C  Primary Care Provider: JAYLYN Zamora MD    HPI:   HPI:  Sam Gamino is a 79 y.o.  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  Patient has a history of perimesencephalic subarachnoid hemorrhage in 2023.  Patient has been followed closely by Neurology for memory disturbance.  Patient presents with his wife and both report difficulties with short-term memory, trouble with completing tasks, and remembering directions while driving.  He also notes difficulty picking up his legs which results in stumbling and falls.  He also reports urinary incontinence which may have more to do with not being able to get to the bathroom quick enough.  Patient's wife states that the symptoms have been present since his subarachnoid hemorrhage.    INTERVAL HISTORY (4/30/25):  Patient returns after undergoing high-volume lumbar puncture (32cc removed, OP 24, CP 16).  According to physical therapy patient improved and multiple domains and wife thought that he improved with his walking.  However he had 2 falls since the lumbar puncture, 1 occurred shortly after in 1 occurred about 1 week after.  The wife did not notice any significant change in his cognition.     Both patient and wife would be interested in pursuing shunt placement.       Procedure(s) (LRB):  INSERTION,  SHUNT, W/ COMPUTER-ASSISTED NAVIGATION (Right)  INSERTION, SHUNT     Hospital Course: 5/20: NAEON. Neuro exam stable. Tmax 100.5, tachycardic, on 3L O2. No complaints of CP or SOB. Reports cough and new dysphagia, but able to tolerate liquids midday and jello without choking. SLP consulted. HM consulted for  hypoxemia workup. Hx PE and off Eliquis periop.    5/21: NAEON. Neuro exam stable. O2 sats >90% on room air. Denies CP or SOB. Cleared for regular diet by SLP. Hospital Medicine following for medical management. Appreciate their assistance. Discharge pending medical stability.   5/22: NAEON. Neuro exam stable. Stable on RA. Denies SOB, CP. BLE US negative for DVT. Medically stable for dc to IPR    Goals of Care Treatment Preferences:  Code Status: Full Code      Consults:   Consults (From admission, onward)          Status Ordering Provider     Inpatient consult to Midline team  Once        Provider:  (Not yet assigned)    Completed LOLA NOE     Inpatient consult to Physical Medicine Rehab  Once        Provider:  (Not yet assigned)    Completed LOLA NOE     Inpatient consult to Hospital Medicine-General  Once        Provider:  (Not yet assigned)    Completed GATO MELLO            Significant Diagnostic Studies: N/A    Pending Diagnostic Studies:       None          Final Active Diagnoses:    Diagnosis Date Noted POA    PRINCIPAL PROBLEM:  NPH (normal pressure hydrocephalus) [G91.2] 04/30/2025 Yes    Acute hypoxemic respiratory failure [J96.01] 05/22/2025 Yes    Pulmonary embolism [I26.99] 05/20/2025 Unknown    Ventricular shunt in place [Z98.2] 05/19/2025 Not Applicable    Anemia [D64.9] 05/08/2025 Yes    Pulmonary hypertension due to left heart disease [I27.22] 03/31/2025 Yes    Non-Hodgkin's lymphoma, unspecified body region, unspecified non-Hodgkin lymphoma type [C85.90] 01/07/2025 Yes    Acute exacerbation of chronic heart failure [I50.9] 12/31/2024 Yes    Fever [R50.9] 12/31/2024 Yes    Dysphagia [R13.10] 03/25/2024 Yes    Chronic anticoagulation [Z79.01] 02/15/2024 Not Applicable    History of pulmonary embolism [Z86.711] 11/14/2023 Yes    History of subarachnoid hemorrhage [Z86.79] 11/04/2023 Not Applicable    Heart failure, diastolic, chronic [I50.32] 05/16/2022 Yes    Primary hypertension  [I10] 05/16/2022 Yes    Benign prostatic hyperplasia with urinary frequency [N40.1, R35.0] 12/06/2021 Yes    Slow transit constipation [K59.01] 08/08/2017 Yes    Lymphoma in remission [C85.9A] 02/08/2013 Yes    Coronary artery disease [I25.10] 02/08/2013 Yes      Problems Resolved During this Admission:      Discharged Condition: good     Disposition: Home or Self Care    Follow Up:    Future Appointments   Date Time Provider Department Center   6/5/2025  9:40 AM Marycruz Raza RN [facility] NEUROS8 [facility]   6/25/2025  1:30 PM [facility] OIC-XRAY [facility] XRAY [facility]   6/25/2025  1:45 PM [facility] OIC-CT2 500 LB LIMIT [facility] CTSC [facility]   6/25/2025  2:30 PM Roddy Peres DO [facility] NEUROS8 [facility]   8/11/2025 10:15 AM JAYLYN Zamora MD [facility]   8/12/2025  1:30 PM Ema Montemayor MD [facility] [id number] [facility]       Patient Instructions:      ACCEPT - Ambulatory referral/consult to Heart Failure Transitional [facility]   Standing Status: Future   Referral Priority: Routine Referral Type: Consultation   Referral Reason: Specialty Services Required   Requested Specialty: Cardiology   Number of Visits Requested: 1     Notify your health care provider if you experience any of the following:  temperature >100.4     Notify your health care provider if you experience any of the following:  persistent nausea and vomiting or diarrhea     Notify your health care provider if you experience any of the following:  severe uncontrolled pain     Notify your health care provider if you experience any of the following:  redness, tenderness, or signs of infection (pain, swelling, redness, odor or green/yellow discharge around incision site)     Notify your health care provider if you experience any of the following:  difficulty breathing or increased cough     Notify your health care provider if you experience any of the following:  severe persistent headache     Notify your health care provider if you experience any of the  following:  worsening rash     Notify your health care provider if you experience any of the following:  persistent dizziness, light-headedness, or visual disturbances     Notify your health care provider if you experience any of the following:  increased confusion or weakness     Activity as tolerated     Medications:  Reconciled Home Medications:      Medication List        PAUSE taking these medications      apixaban 2.5 mg Tab  Wait to take this until: May 30, 2025  Commonly known as: ELIQUIS  Take 1 tablet (2.5 mg total) by mouth 2 (two) times daily.     aspirin 81 MG Chew  Wait to take this until: May 30, 2025  Take 81 mg by mouth once daily.            START taking these medications      HYDROcodone-acetaminophen 5-325 mg per tablet  Commonly known as: NORCO  Take 1 tablet by mouth every 4 (four) hours as needed for Pain.            CONTINUE taking these medications      benzonatate 100 MG capsule  Commonly known as: TESSALON     cholecalciferol (vitamin D3) 50 mcg (2,000 unit) Cap capsule  Commonly known as: VITAMIN D3  Take 1 capsule by mouth once daily.     clotrimazole-betamethasone 1-0.05% cream  Commonly known as: LOTRISONE  Apply 0.05 % topically 2 (two) times daily as needed.     cyanocobalamin 1,000 mcg/mL injection  INJECT 1ML IN THE MUSCLE EVERY 30 DAYS     donepeziL 5 MG tablet  Commonly known as: ARICEPT  Take 1 tablet (5 mg total) by mouth every evening.     empagliflozin 10 mg tablet  Commonly known as: JARDIANCE  Take 1 tablet (10 mg total) by mouth once daily.     fluticasone propionate 50 mcg/actuation nasal spray  Commonly known as: FLONASE  SHAKE LIQUID AND USE 2 SPRAYS(100 MCG) IN EACH NOSTRIL EVERY DAY     furosemide 40 MG tablet  Commonly known as: LASIX  Take 1 tablet (40 mg total) by mouth once daily.     LINZESS 290 mcg Cap capsule  Generic drug: linaCLOtide  TAKE 1 CAPSULE(290 MCG) BY MOUTH BEFORE BREAKFAST     losartan 100 MG tablet  Commonly known as: COZAAR  Take 1 tablet (100 mg  total) by mouth once daily.     metoprolol succinate 50 MG 24 hr tablet  Commonly known as: TOPROL-XL  Take 50 mg by mouth.     pantoprazole 40 MG tablet  Commonly known as: PROTONIX  Oral for 30 Days     potassium chloride 20 mEq  Commonly known as: K-TAB  Oral for 30 Days     solifenacin 10 MG tablet  Commonly known as: VESICARE  TAKE 1 TABLET(10 MG) BY MOUTH EVERY DAY     spironolactone 25 MG tablet  Commonly known as: ALDACTONE  Take 1 tablet (25 mg total) by mouth once daily.     tamsulosin 0.4 mg Cap  Commonly known as: FLOMAX  TAKE 1 CAPSULE(0.4 MG) BY MOUTH EVERY DAY              Koki Espinal PA-C  Neurosurgery  [facility] Neurosurgery [facility])

## 2025-05-22 NOTE — PLAN OF CARE
[facility] Alfaro - Neurosurgery (Hospital)  Discharge Final Note    Primary Care Provider: JAYLYN Zamora MD    Expected Discharge Date: 5/22/2025    Patient to be discharged to [facility]. [facility] to provide wheelchair transportation.  Neurosurgery clinic to schedule follow up appointment.    Nurse to call report to [phone] or [phone]. Wheelchair requested for 2:00 which is not a guaranteed arrival time.    Future Appointments   Date Time Provider Department Center   8/11/2025 10:15 AM JAYLYN Zamora MD [facility]   8/12/2025  1:30 PM Ema Montemayor MD [facility] [id number]        Final Discharge Note (most recent)       Final Note - 05/20/25 1546          Post-Acute Status    Post-Acute Authorization Placement     Post-Acute Placement Status Referrals Sent                     Important Message from Medicare

## 2025-05-22 NOTE — ASSESSMENT & PLAN NOTE
The patient has a history of provoked PE diagnosed 11/14/2023 2/2 to immobility with his SAH. The patient held his eliquis for 7 days pre operatively. The patient has been on heparin DVT ppx, he got his first dose on 5/20/25. He also notes LLE swelling that has been present for the past 1-2 months. Well's score for PE is 4.5 which is 16.2% chance for PE.     - agree with LE US to assess for PE and DVT in the setting of LE swelling while his AC was held; negative DVT US   - agree with holding eliquis in the perioperative setting   - ok to discontinue CTA given the patient's hypoxia improved with diuresis

## 2025-05-22 NOTE — PT/OT/SLP PROGRESS
Speech Language Pathology Treatment/Discharge    Patient Name:  Sam Gamino   MRN:  [id number]  Admitting Diagnosis: NPH (normal pressure hydrocephalus)    Recommendations:                 General Recommendations:  Follow-up not indicated  Diet recommendations:  Regular Diet - IDDSI Level 7, Liquid Diet Level: Thin liquids - IDDSI Level 0   Aspiration Precautions: HOB to 90 degrees, Monitor for s/s of aspiration, and Standard aspiration precautions   General Precautions: Standard, aspiration, fall  Communication strategies:  none    Assessment:     Sam Gamino is a 79 y.o. male.  Swallowing abilities appear to be intact. Pt continues to appear safe for a regular consistency diet and thin liquids. Standard aspiration precautions should be followed.  No further skilled SLP services warranted at this time.     Subjective     Pt sitting upright in bedside chair, awake/alert and in NAD. Pt pleasant and cooperative.     Pain/Comfort:  Pain Rating 1: 0/10    Respiratory Status: Room air    Objective:     Has the patient been evaluated by SLP for swallowing?   Yes  Keep patient NPO? No   Current Respiratory Status:        Pt seen for follow up to ensure diet tolerance.  Pt now on room air.  Pt reporting good tolerance of PO diet.  Pt agreeable to accepting PO trials of water via cup x 1 and straw x 2, as well as 1/4 cracker x 1.  Oral and pharyngeal phases of the swallow appeared to be WFL/WNL. Oral clearance was good and no overt s/s of aspiration observed.  SLP recommends pt continue on current diet. Standard aspiration precautions should be followed. SLP reviewed s/s of aspiration and encouraged pt to report to provider if noted. Pt expressed good understanding and agreement.   No further skilled SLP services warranted at this time.       Plan:     SLP Follow-Up:  No       Discharge recommendations:   (no further SLP services warranted at this time)     Time Tracking:     SLP Treatment Date:   05/22/25  Speech Start  Time:  1158  Speech Stop Time:  1203     Speech Total Time (min):  5 min    Billable Minutes: Treatment Swallowing Dysfunction 5    05/22/2025

## 2025-05-22 NOTE — ASSESSMENT & PLAN NOTE
Patient is a 78 yo male with PMH lymphoma, heart failure, PE, HTN, BPH, NPH presents for VPS placement. Now s/p L VPS placement 5/19.    - Neurologically stable. No more headaches today.   - HM consulted for hypoxemia workup. Appreciate assistance.   - Pain control: continue current regimen  - Post op CTH and XR shunt series with expected findings. Shunt at 160  - Continue PT/OT/OOB. OOB > 6hrs/day  - GI: Diet as tolerated. Continue bowel regimen.   - Voiding spontaneously. Bladder scan ordered to ensure emptying at baseline.   - DVT ppx: SQH, US BLE negative for DVT  - Atelectasis ppx: IS at bedside. Encourage use every hour while awake.      - Dispo:  medically stable, pending placement

## 2025-05-22 NOTE — PLAN OF CARE
[facility]    FACILITY TRANSFER ORDERS      Patient Name: Sam Gamino  YOB: 1945    PCP: JAYLYN Zamora MD   PCP Address: [address] [place] [address]  PCP Phone Number: [phone]  PCP Fax: [phone]    Encounter Date: 05/22/2025    Admit to: IPR    Vital Signs:  Routine    Diagnoses:   Active Hospital Problems    Diagnosis  POA    *NPH (normal pressure hydrocephalus) [G91.2]  Yes    Acute hypoxemic respiratory failure [J96.01]  Yes     ymptomatic dyspnea and use of supplemental O2. By history/exam, suspect acute on chronic diastolic heart failure in setting of held diuretics, IVF during surgery. IV lasix for diuresis.       Pulmonary embolism [I26.99]  Unknown    Ventricular shunt in place [Z98.2]  Not Applicable    Anemia [D64.9]  Yes    Pulmonary hypertension due to left heart disease [I27.22]  Yes     Appears due to left sided heart disease as well as history of PE.  10/4/2024: Echo: SPAP 53.      Non-Hodgkin's lymphoma, unspecified body region, unspecified non-Hodgkin lymphoma type [C85.90]  Yes    Acute exacerbation of chronic heart failure [I50.9]  Yes    Fever [R50.9]  Yes    Dysphagia [R13.10]  Yes    Chronic anticoagulation [Z79.01]  Not Applicable     11/14/2023: CTA of Head and Neck: Bilateral pulmonary emboli.      History of pulmonary embolism [Z86.711]  Yes     CTA - 11/14/23 - Bilateral      History of subarachnoid hemorrhage [Z86.79]  Not Applicable     11/5/2023: SAH.      Heart failure, diastolic, chronic [I50.32]  Yes     5/16/2022: Echo: Normal left ventricular size and systolic function. EF 55%. Moderate MR. Mild to moderate TR.   6/6/2023: Echo: Normal left ventricular size and systolic function. EF 60%. LVGLS -15%. Moderate diastolic dysfunction. Mild to moderate AR. Mild to moderate MR. Mildly enlarged ascending aorta. Small pericardial effusion.  10/4/2024: Echo: Normal left ventricular size and systolic function. EF 60%. Moderate  diastolic dysfunction. Mild aortic valve sclerosis. Moderate AR. Moderate MR. Mild to moderate TR. SPAP 53. Small pericardial effusion.          Primary hypertension [I10]  Yes     2002: Diagnosed.         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 Lin        Resolved Hospital Problems   No resolved problems to display.       Allergies:  Review of patient's allergies indicates:   Allergen Reactions    Lotensin [benazepril]      cough       Diet: regular diet    Activities: Activity as tolerated    Goals of Care Treatment Preferences:  Code Status: Full Code       Labs, DVT prophylaxis, and bowel regimen per facility protocol    CONSULTS:    Physical Therapy to evaluate and treat.  and Occupational Therapy to evaluate and treat.        WOUND CARE ORDERS  Yes: Surgical Wound:  Location: cranial and abdominal    Keep open to air. Do no apply anything to incisions. Okay to shower, no soaking or submerging in water.     Medications: Review discharge medications with patient and family and provide education.         Medication List        PAUSE taking these medications      apixaban 2.5 mg Tab  Wait to take this until: May 30, 2025  Commonly known as: ELIQUIS  Take 1 tablet (2.5 mg total) by mouth 2 (two) times daily.     aspirin 81 MG Chew  Wait to take this until: May 30, 2025  Take 81 mg by mouth once daily.            START taking these medications      HYDROcodone-acetaminophen 5-325 mg per tablet  Commonly known as: NORCO  Take 1 tablet by mouth every 4 (four) hours as needed for Pain.            CONTINUE taking these medications      benzonatate 100 MG capsule  Commonly known as: TESSALON     cholecalciferol (vitamin D3) 50 mcg (2,000 unit) Cap capsule  Commonly known as: VITAMIN D3  Take 1  capsule by mouth once daily.     clotrimazole-betamethasone 1-0.05% cream  Commonly known as: LOTRISONE  Apply 0.05 % topically 2 (two) times daily as needed.     cyanocobalamin 1,000 mcg/mL injection  INJECT 1ML IN THE MUSCLE EVERY 30 DAYS     donepeziL 5 MG tablet  Commonly known as: ARICEPT  Take 1 tablet (5 mg total) by mouth every evening.     empagliflozin 10 mg tablet  Commonly known as: JARDIANCE  Take 1 tablet (10 mg total) by mouth once daily.     fluticasone propionate 50 mcg/actuation nasal spray  Commonly known as: FLONASE  SHAKE LIQUID AND USE 2 SPRAYS(100 MCG) IN EACH NOSTRIL EVERY DAY     furosemide 40 MG tablet  Commonly known as: LASIX  Take 1 tablet (40 mg total) by mouth once daily.     LINZESS 290 mcg Cap capsule  Generic drug: linaCLOtide  TAKE 1 CAPSULE(290 MCG) BY MOUTH BEFORE BREAKFAST     losartan 100 MG tablet  Commonly known as: COZAAR  Take 1 tablet (100 mg total) by mouth once daily.     metoprolol succinate 50 MG 24 hr tablet  Commonly known as: TOPROL-XL  Take 50 mg by mouth.     pantoprazole 40 MG tablet  Commonly known as: PROTONIX  Oral for 30 Days     potassium chloride 20 mEq  Commonly known as: K-TAB  Oral for 30 Days     solifenacin 10 MG tablet  Commonly known as: VESICARE  TAKE 1 TABLET(10 MG) BY MOUTH EVERY DAY     spironolactone 25 MG tablet  Commonly known as: ALDACTONE  Take 1 tablet (25 mg total) by mouth once daily.     tamsulosin 0.4 mg Cap  Commonly known as: FLOMAX  TAKE 1 CAPSULE(0.4 MG) BY MOUTH EVERY DAY                Immunizations Administered as of 5/22/2025       Name Date Dose VIS Date Route Exp Date    COVID-19, MRNA, LN-S, PF (Pfizer) (Purple Cap) 1/28/2022 0.3 mL 5/10/2021 Intramuscular --    Site: Left arm     : Pf[employer]     Lot: [id number]     Comment: Adminis     COVID-19, MRNA, LN-S, PF (Pfizer) (Purple Cap) 4/26/2021 -- -- -- --    : Pf[employer] Inc     Lot: [id number]     Comment: Adminis     COVID-19, MRNA, LN-S, PF (Pfizer) (Purple  Cap) 4/5/2021 -- -- -- --    : Pf[employer] Inc     Lot: [id number]     Comment: Adminis             This patient has had both covid vaccinations    Some patients may experience side effects after vaccination.  These may include fever, headache, muscle or joint aches.  Most symptoms resolve with 24-48 hours and do not require urgent medical evaluation unless they persist for more than 72 hours or symptoms are concerning for an unrelated medical condition.          _________________________________  Koki Espinal PA-C  05/22/2025

## 2025-05-22 NOTE — PLAN OF CARE
Problem: Adult Inpatient Plan of Care  Goal: Plan of Care Review  Outcome: Progressing  Goal: Readiness for Transition of Care  Outcome: Progressing     Problem: Wound  Goal: Skin Health and Integrity  Outcome: Progressing     Problem: Skin Injury Risk Increased  Goal: Skin Health and Integrity  Outcome: Progressing

## 2025-05-22 NOTE — PROGRESS NOTES
[facility] Neurosurgery ([facility])  Neurosurgery  Progress Note    Subjective:     History of Present Illness: HPI:  Sam Gamino is a 79 y.o.  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  Patient has a history of perimesencephalic subarachnoid hemorrhage in 2023.  Patient has been followed closely by Neurology for memory disturbance.  Patient presents with his wife and both report difficulties with short-term memory, trouble with completing tasks, and remembering directions while driving.  He also notes difficulty picking up his legs which results in stumbling and falls.  He also reports urinary incontinence which may have more to do with not being able to get to the bathroom quick enough.  Patient's wife states that the symptoms have been present since his subarachnoid hemorrhage.    INTERVAL HISTORY (4/30/25):  Patient returns after undergoing high-volume lumbar puncture (32cc removed, OP 24, CP 16).  According to physical therapy patient improved and multiple domains and wife thought that he improved with his walking.  However he had 2 falls since the lumbar puncture, 1 occurred shortly after in 1 occurred about 1 week after.  The wife did not notice any significant change in his cognition.     Both patient and wife would be interested in pursuing shunt placement.       Post-Op Info:  Procedure(s) (LRB):  INSERTION,  SHUNT, W/ COMPUTER-ASSISTED NAVIGATION (Right)  INSERTION, SHUNT   3 Days Post-Op   Interval History: : NAEON. Neuro exam stable. Stable on RA. Denies SOB, CP. BLE US negative for DVT. Medically stable for dc to IPR.     Medications:  Continuous Infusions:  Scheduled Meds:   donepeziL  5 mg Oral QHS    furosemide  40 mg Oral Daily    heparin (porcine)  5,000 Units Subcutaneous Q8H    losartan  100 mg Oral Daily    metoprolol succinate  50 mg Oral Daily    spironolactone  25 mg Oral Daily    tamsulosin  0.4 mg Oral Daily     PRN  Meds:  Current Facility-Administered Medications:     acetaminophen, 650 mg, Oral, Q4H PRN    guaiFENesin 100 mg/5 ml, 200 mg, Oral, Q6H PRN    HYDROcodone-acetaminophen, 1 tablet, Oral, Q4H PRN    ondansetron, 4 mg, Intravenous, Q12H PRN    senna-docusate, 1 tablet, Oral, Daily PRN     Review of Systems  Objective:     Weight: 110.2 kg (243 lb)  Body mass index is 32.96 kg/m².  Vital Signs (Most Recent):  Temp: 98.8 °F (37.1 °C) (05/22/25 0830)  Pulse: 91 (05/22/25 0830)  Resp: 18 (05/22/25 0830)  BP: 124/75 (05/22/25 0830)  SpO2: 97 % (05/22/25 0830) Vital Signs (24h Range):  Temp:  [98.4 °F (36.9 °C)-99.7 °F (37.6 °C)] 98.8 °F (37.1 °C)  Pulse:  [] 91  Resp:  [18] 18  SpO2:  [94 %-97 %] 97 %  BP: (120-130)/(70-77) 124/75                         Male External Urinary Catheter 05/20/25 0626 (Active)   Collection Container Standard drainage bag 05/21/25 0800   Skin no redness;no breakdown 05/21/25 2000   Tolerance no signs/symptoms of discomfort 05/21/25 2000   Output (mL) 1600 mL 05/20/25 1910   Catheter Change Date 05/21/25 05/21/25 0800   Catheter Change Time 0800 05/21/25 0800          Physical Exam         Neurosurgery Physical Exam  General: well developed, well nourished, no distress.   Head: normocephalic, left cranial incision c/d/I with dissolvable suture  Neurologic: Alert and oriented. Thought content appropriate.  GCS: Motor: 6/Verbal: 5/Eyes: 4 GCS Total: 15  Mental Status: Awake, Alert, Oriented x 4  Language: No aphasia  Speech: No dysarthria  Cranial nerves: face symmetric, tongue midline, CN II-XII grossly intact.   Eyes: pupils equal, round, reactive to light with accommodation, EOMI.   Pulmonary: normal respirations, no signs of respiratory distress  Abdomen: soft, non-distended, not tender to palpation  Skin: Skin is warm, dry and intact.  Sensory: intact to light touch throughout     Motor Strength:Moves all extremities spontaneously with good tone.  Full strength upper and lower  "extremities. No abnormal movements seen.         Cerebellar:   Finger-to-nose: intact bilaterally   Pronator drift: absent bilaterally    Significant Labs:  Recent Labs   Lab 05/20/25  1257 05/21/25  0944   *  --      --    K 4.2  --      --    CO2 22*  --    BUN 21  --    CREATININE 1.0 0.9   CALCIUM 9.1  --      No results for input(s): "WBC", "HGB", "HCT", "PLT" in the last 48 hours.  No results for input(s): "LABPT", "INR", "APTT" in the last 48 hours.  Microbiology Results (last 7 days)       ** No results found for the last 168 hours. **          All pertinent labs from the last 24 hours have been reviewed.    Significant Diagnostics:    Assessment/Plan:     * NPH (normal pressure hydrocephalus)  Patient is a 80 yo male with PMH lymphoma, heart failure, PE, HTN, BPH, NPH presents for VPS placement. Now s/p L VPS placement 5/19.    - Neurologically stable. No more headaches today.   -  consulted for hypoxemia workup. Appreciate assistance.   - Pain control: continue current regimen  - Post op CTH and XR shunt series with expected findings. Shunt at 160  - Continue PT/OT/OOB. OOB > 6hrs/day  - GI: Diet as tolerated. Continue bowel regimen.   - Voiding spontaneously. Bladder scan ordered to ensure emptying at baseline.   - DVT ppx: SQH, US BLE negative for DVT  - Atelectasis ppx: IS at bedside. Encourage use every hour while awake.      - Dispo:  medically stable, pending placement      Dysphagia  SLP consulted, cleared for regular diet     History of pulmonary embolism  Hold Eliquis until POD10.    Primary hypertension  Patient's blood pressure range in the last 24 hours was: BP  Min: 114/74  Max: 143/65.The patient's inpatient anti-hypertensive regimen is listed below:  Current Antihypertensives  losartan tablet 100 mg, Daily, Oral  metoprolol succinate (TOPROL-XL) 24 hr tablet 50 mg, Daily, Oral  furosemide injection 40 mg, Once, Intravenous    Plan  - BP is controlled, no changes needed " to their regimen    Benign prostatic hyperplasia with urinary frequency  Continue home dose flomax     Coronary artery disease  Hold aspirin until POD10. Continue to monitor on telemetry.         Koki Espinal PA-C  Neurosurgery  [facility] ([facility])

## 2025-05-23 ENCOUNTER — PATIENT OUTREACH (OUTPATIENT)
Dept: ADMINISTRATIVE | Facility: CLINIC | Age: 80
End: 2025-05-23
Payer: MEDICARE

## 2025-05-23 NOTE — OP NOTE
Date of Operation: 5/19/25     Pre-Operative Diagnosis:   1.  Communicating hydrocephalus     Post-Operative Diagnosis:   Same as above     Operation Titles:  1.  Placement of right-parietal ventriculoperitoneal shunt (Hakim valve set at 160)  2.  Use of intraoperative Axiom Stealth navigation  3.  Use of General Surgery for laparoscopic placement of distal. peritoneal catheter (refer to Dr. Zelaya's Op note for specific details)     Surgeon: Roddy Peres D.O. (Neurosurgery)    Co-surgeon: Asa Zelaya M.D. (General surgery)    Asst. surgeon: Melo Garcia M.D.     Anesthesia: General      Level of involvement of attending surgeon: Full      Indications:   Patient has triad of NPH including memory disturbance, gait disturbance, and urinary dysfunction.  He was also found to have ventriculomegaly with an Castro ratio of 0.36.  Given his history of subarachnoid hemorrhage, he is predisposed to communicating hydrocephalus.       He underwent a high-volume lumbar puncture in which 32 cc of fluid was removed.  His opening pressure of 24 is higher than would be expected for normal pressure hydrocephalus.  Regardless, he improved objectively on multiple physical therapy measures.  His wife also subjectively reported improvements in his walking ability but no obvious improvements in his cognition.  Given these improvements both wife and patient would like to pursue  shunt placement.     The risks, benefits and alternative options were discussed with the patient.  Consent was given and they understood the risks and wished to proceed.      Procedure In Detail:   The patient was taken to the operating room, and induction of general endotracheal anesthesia was initiated. After placement of adequate IV access, the patient was positioned supine and all pressure points were carefully inspected and padded. A safety pause was performed with the indicated procedure and site of surgery stated and confirmed.       He had  significant jugular distension on right side therefore decision was made to place shunt on left.  The patient's head was placed on a donut and turned toward the right exposing the contralateral side.  Her hair was clipped using electrical clippers and then the scalp was pre-washed with alcohol scrub.  The head, neck, chest and abdomen was prepped and draped in the typical sterile fashion.  Next the head was registered using the Axiom Stealth navigation, a ventricular target and entry point were selected, and the incision was planned.     First, attention was directed at making the cranial bur hole.  A curvilinear incision was made over the Keen's point using a 15 blade scalpel.  Dissection was carried out using Bovie cautery down to the pericranium.  Next the scalp was flapped forward and held in place with a 2-0 Vicryl suture.  The pericranium was removed from the skull and a bur hole was made using the matchstick drill.      Next a subfascial pocket was fashioned using a tonsil.  The tunneling catheter was then used to tunnel the catheter to the abdominal incision.  The distal catheter was then connected to the valve and secured with a 2-0 silk suture tie.  Next the dura was opened with Bovie and bipolar cautery and a small corticotomy was made and then the proximal catheter was then placed under stereotactic guidance into the right ventricle and soft passed into the lateral ventricle.  The catheter was then advanced gently to a depth of 10 cm at which point brisk and clear CSF flowed nicely.  The proximal catheter was then cut to size and attached to the shunt valve and secured with another 2-0 silk tie.  Next the catheter was pulled into the subfascial pocket and all kinks were removed from the system.     General surgery gained access to the peritoneum laparoscopically (please refer to his note for particular details).  Once he had access to the peritoneal space he inserted the distal catheter into the  peritoneum and CSF flow was observed dripping from the distal catheter.     The cranial incision was closed in layered fashion first with 2-0 Vicryl's and the galea followed by staples on the skin the abdominal stab incisions were closed with Monocryl and Maxon Steri-Strips.  All wounds were clean dry and intact.  A Telfa and Medipore tape was placed over the cranial wound     Drain: None     Estimated Blood Loss:  35 ml     Needle/Sponge count: Correct      Anesthesia: General     Prognosis: Good     Condition: Stable     Wound: Clean

## 2025-05-26 ENCOUNTER — DOCUMENTATION ONLY (OUTPATIENT)
Dept: CARDIOLOGY | Facility: CLINIC | Age: 80
End: 2025-05-26
Payer: MEDICARE

## 2025-05-26 NOTE — PROGRESS NOTES
Heart Failure Transitional Care Clinic (HFTCC) Team notified of pt referral via Ambulatory Referral to Heart Failure Transitional Care ([id number]).    Patient screened today 5/26/2025 by provider and LPN for enrollment to program.      Pt was deemed not a candidate for enrollment at this time related to PP Hydrocephalus.     Pt will require additional follow up planning per primary team.     If pt status, diagnosis, or treatment plan changes , please place AMB referral to Heart Failure Transitional Care Clinic ([id number]) for HFTCC enrollment re-evalution.

## 2025-05-30 ENCOUNTER — DOCUMENTATION ONLY (OUTPATIENT)
Dept: INTERNAL MEDICINE | Facility: CLINIC | Age: 80
End: 2025-05-30
Payer: MEDICARE

## 2025-05-30 DIAGNOSIS — I50.32 HEART FAILURE, DIASTOLIC, CHRONIC: ICD-10-CM

## 2025-05-30 DIAGNOSIS — C85.90 NON-HODGKIN'S LYMPHOMA, UNSPECIFIED BODY REGION, UNSPECIFIED NON-HODGKIN LYMPHOMA TYPE: ICD-10-CM

## 2025-05-30 DIAGNOSIS — E78.00 HYPERCHOLESTEROLEMIA: ICD-10-CM

## 2025-05-30 DIAGNOSIS — R41.3 MEMORY LOSS: Primary | ICD-10-CM

## 2025-05-30 DIAGNOSIS — R35.0 BENIGN PROSTATIC HYPERPLASIA WITH URINARY FREQUENCY: ICD-10-CM

## 2025-05-30 DIAGNOSIS — Z78.9 KNOWN MEDICAL PROBLEMS: ICD-10-CM

## 2025-05-30 DIAGNOSIS — N40.1 BENIGN PROSTATIC HYPERPLASIA WITH URINARY FREQUENCY: ICD-10-CM

## 2025-05-30 NOTE — PROGRESS NOTES
Advanced primary care management (APCM) billing requirements have been met for this month.   Written consent was obtained and scanned in the electronic medical record.     My office is prepared and capable of delivering all the following APCM service elements to this Medicare beneficiary:     The patient is aware that only one practitioner can furnish and be paid for the service during a calendar month; that they have the right to stop the services at any time; and that cost sharing may apply.   Provide 24/7 access to care team/practitioner for urgent needs.  Provide continuity of care with the PCP, whom the patient can schedule successive routine appointments.  Deliver care in alternative ways to traditional office visits to best meet the patient's needs, such as Telemedecine visits.  There is overall comprehensive care management, systematic needs assessment (medical and psychosocial), and system-based approaches to ensure receipt of preventive services.    Perform medication management, reconciliation, and oversight of self-management.  Development, implementation, revision, and maintenance of an electronic patient-centered comprehensive care plan.  The care plan is available, can be routinely accessed, and updated by care team, and a copy of care plan can be given to patient/caregiver.  Ensure timely follow-up communication with the patient and/or caregiver after an emergency department visit, discharge from hospital, skilled nursing facility, or other health care facility, within 7 calendar days of discharge.  Provide coordination of care transitions between and among health care facilities.  Ensure timely exchange of electronic health information with other practitioners and providers to support continuity of care.   Provide ongoing communication and coordination with other practitioners and other health care facilities, and document communication regarding the patient's psychosocial needs, functional  deficits, goals, preferences, and desired outcomes, including cultural and linguistic factors, in the patient's medical record.  Provide enhanced opportunities for the patient or caregiver to communicate with the care team regarding the patient's care using non-face-to-face consultation methods, such as secure messaging, patient portal, or other patient-initiated digital communications that require a clinical decision.  Analyze patient population data to identify gaps in care and offer interventions.  Risk stratify the practice population based on defined diagnoses, claims, or other electronic data to identify and target services to patients.  Be assessed through performance measurement of primary care quality, total cost of care, and meaningful use of Certified EHR Technology.

## 2025-06-05 ENCOUNTER — HOSPITAL ENCOUNTER (OUTPATIENT)
Dept: RADIOLOGY | Facility: HOSPITAL | Age: 80
Discharge: HOME | End: 2025-06-05
Attending: PHYSICIAN ASSISTANT
Payer: MEDICARE

## 2025-06-05 ENCOUNTER — CLINICAL SUPPORT (OUTPATIENT)
Dept: NEUROSURGERY | Facility: CLINIC | Age: 80
End: 2025-06-05
Payer: MEDICARE

## 2025-06-05 DIAGNOSIS — G91.2 NPH (NORMAL PRESSURE HYDROCEPHALUS): ICD-10-CM

## 2025-06-05 DIAGNOSIS — G91.2 NPH (NORMAL PRESSURE HYDROCEPHALUS): Primary | ICD-10-CM

## 2025-06-05 PROCEDURE — 70250 X-RAY EXAM OF SKULL: CPT | Mod: 26,,, | Performed by: RADIOLOGY

## 2025-06-05 PROCEDURE — 70250 X-RAY EXAM OF SKULL: CPT | Mod: TC

## 2025-06-05 PROCEDURE — 70450 CT HEAD/BRAIN W/O DYE: CPT | Mod: 26,,, | Performed by: RADIOLOGY

## 2025-06-05 PROCEDURE — 62252 CSF SHUNT REPROGRAM: CPT | Mod: 26,S$PBB,, | Performed by: PHYSICIAN ASSISTANT

## 2025-06-05 PROCEDURE — 70450 CT HEAD/BRAIN W/O DYE: CPT | Mod: TC

## 2025-06-10 ENCOUNTER — OFFICE VISIT (OUTPATIENT)
Dept: INTERNAL MEDICINE | Facility: CLINIC | Age: 80
End: 2025-06-10
Attending: INTERNAL MEDICINE
Payer: MEDICARE

## 2025-06-10 VITALS
OXYGEN SATURATION: 99 % | WEIGHT: 241 LBS | BODY MASS INDEX: 32.64 KG/M2 | DIASTOLIC BLOOD PRESSURE: 52 MMHG | HEART RATE: 90 BPM | SYSTOLIC BLOOD PRESSURE: 94 MMHG | HEIGHT: 72 IN

## 2025-06-10 DIAGNOSIS — I50.32 HEART FAILURE, DIASTOLIC, CHRONIC: ICD-10-CM

## 2025-06-10 DIAGNOSIS — I10 PRIMARY HYPERTENSION: ICD-10-CM

## 2025-06-10 DIAGNOSIS — G91.2 NPH (NORMAL PRESSURE HYDROCEPHALUS): Primary | ICD-10-CM

## 2025-06-10 DIAGNOSIS — E78.00 HYPERCHOLESTEROLEMIA: ICD-10-CM

## 2025-06-10 DIAGNOSIS — I34.0 NONRHEUMATIC MITRAL VALVE REGURGITATION: ICD-10-CM

## 2025-06-10 PROCEDURE — 99214 OFFICE O/P EST MOD 30 MIN: CPT | Mod: S$GLB,,, | Performed by: INTERNAL MEDICINE

## 2025-06-10 RX ORDER — ATORVASTATIN CALCIUM 80 MG/1
40 TABLET, FILM COATED ORAL
COMMUNITY

## 2025-06-10 NOTE — PROGRESS NOTES
Subjective     Patient ID: Sam Gamino is a 79 y.o. male.    Chief Complaint: Follow-up (Hosp f/u )    Hospital Follow-up:    Admit date: 25  Discharge date: 6/3/25  Hospital D/c for: CHF exacerbation    Patient was called within 2 days of hospital discharge and made a follow-up appt with me within 7 calendar days of discharge.  The Discharge Summary was reviewed.       Family and/or Caretaker present at visit?  Yes, wife  Diagnostic tests reviewed/disposition:  Yes.  Disease/illness education: Yes.   Home health/community services discussion/referrals:  He is active with HHC, PT, and OT  Establishment or re-establishment of referral orders for community resources: None needed   Discussion with other health care providers: Notes in EPIC for review by all healthcare providers.            Review of Systems   Constitutional: Negative.    Respiratory: Negative.     Cardiovascular: Negative.    Musculoskeletal:  Positive for gait problem.   Neurological:  Positive for headaches.          Objective     Physical Exam  Vitals and nursing note reviewed.   Constitutional:       Appearance: He is well-developed.   HENT:      Head: Normocephalic and atraumatic.        Comments:  shunt behind left ear  Eyes:      Pupils: Pupils are equal, round, and reactive to light.   Neck:      Vascular: No JVD.   Cardiovascular:      Rate and Rhythm: Normal rate and regular rhythm.      Heart sounds: Murmur heard.      Systolic murmur is present with a grade of 3/6.      Comments: Holosystolic murmur greatest at the left lower sternal border.    Pulmonary:      Effort: Pulmonary effort is normal.   Abdominal:      General: Abdomen is protuberant. Bowel sounds are normal.      Palpations: Abdomen is soft.      Tenderness: There is no rebound.   Musculoskeletal:      Right lower le+ Edema present.      Left lower le+ Edema present.   Neurological:      Mental Status: He is alert.            Assessment and Plan     1. NPH  (normal pressure hydrocephalus)  Overview:   shunt - 5/19/25      2. Nonrheumatic mitral valve regurgitation  Overview:  2007 - Mild  4/22 - Mod/Severe  10/24 - Moderate      3. Primary hypertension  Overview:  2002: Diagnosed.         4. Heart failure, diastolic, chronic  Overview:  5/16/2022: Echo: Normal left ventricular size and systolic function. EF 55%. Moderate MR. Mild to moderate TR.   6/6/2023: Echo: Normal left ventricular size and systolic function. EF 60%. LVGLS -15%. Moderate diastolic dysfunction. Mild to moderate AR. Mild to moderate MR. Mildly enlarged ascending aorta. Small pericardial effusion.  10/4/2024: Echo: Normal left ventricular size and systolic function. EF 60%. Moderate diastolic dysfunction. Mild aortic valve sclerosis. Moderate AR. Moderate MR. Mild to moderate TR. SPAP 53. Small pericardial effusion.          5. Hypercholesterolemia  Overview:  2002: Statin was begun.          PLAN:  Per orders and D/C instructions.  Continue diet and/or meds for HTN, and high cholesterol, which are stable.  Follow-up with Neurosurgery for normal pressure hydrocephalus status post placement of  shunt on 05/19/2025.  Follow-up with cardiology for chronic diastolic heart failure and mitral valve regurgitation.       Follow up in 2 months (on 8/11/2025).

## 2025-06-23 ENCOUNTER — OFFICE VISIT (OUTPATIENT)
Dept: CARDIOLOGY | Facility: CLINIC | Age: 80
End: 2025-06-23
Attending: INTERNAL MEDICINE
Payer: MEDICARE

## 2025-06-23 VITALS
OXYGEN SATURATION: 97 % | HEART RATE: 88 BPM | BODY MASS INDEX: 32.85 KG/M2 | DIASTOLIC BLOOD PRESSURE: 62 MMHG | WEIGHT: 242.5 LBS | HEIGHT: 72 IN | SYSTOLIC BLOOD PRESSURE: 122 MMHG

## 2025-06-23 DIAGNOSIS — Z79.01 CHRONIC ANTICOAGULATION: ICD-10-CM

## 2025-06-23 DIAGNOSIS — C85.9A LYMPHOMA IN REMISSION: ICD-10-CM

## 2025-06-23 DIAGNOSIS — E04.1 THYROID NODULE: ICD-10-CM

## 2025-06-23 DIAGNOSIS — Z86.711 HISTORY OF PULMONARY EMBOLISM: ICD-10-CM

## 2025-06-23 DIAGNOSIS — I10 PRIMARY HYPERTENSION: ICD-10-CM

## 2025-06-23 DIAGNOSIS — I44.0 ATRIOVENTRICULAR BLOCK, FIRST DEGREE: ICD-10-CM

## 2025-06-23 DIAGNOSIS — E78.00 HYPERCHOLESTEROLEMIA: ICD-10-CM

## 2025-06-23 DIAGNOSIS — I50.32 HEART FAILURE, DIASTOLIC, CHRONIC: ICD-10-CM

## 2025-06-23 DIAGNOSIS — G91.2 NPH (NORMAL PRESSURE HYDROCEPHALUS): ICD-10-CM

## 2025-06-23 DIAGNOSIS — I34.0 NONRHEUMATIC MITRAL VALVE REGURGITATION: ICD-10-CM

## 2025-06-23 DIAGNOSIS — I36.1 NONRHEUMATIC TRICUSPID VALVE REGURGITATION: ICD-10-CM

## 2025-06-23 DIAGNOSIS — Z86.79 HISTORY OF SUBARACHNOID HEMORRHAGE: ICD-10-CM

## 2025-06-23 DIAGNOSIS — Z98.2 VENTRICULAR SHUNT IN PLACE: ICD-10-CM

## 2025-06-23 DIAGNOSIS — I25.10 CORONARY ARTERY DISEASE INVOLVING NATIVE CORONARY ARTERY OF NATIVE HEART WITHOUT ANGINA PECTORIS: ICD-10-CM

## 2025-06-23 DIAGNOSIS — I27.22 PULMONARY HYPERTENSION DUE TO LEFT HEART DISEASE: ICD-10-CM

## 2025-06-23 PROCEDURE — 93010 ELECTROCARDIOGRAM REPORT: CPT | Mod: S$PBB,,, | Performed by: INTERNAL MEDICINE

## 2025-06-23 PROCEDURE — 99213 OFFICE O/P EST LOW 20 MIN: CPT | Mod: PBBFAC | Performed by: INTERNAL MEDICINE

## 2025-06-23 PROCEDURE — 99999 PR PBB SHADOW E&M-EST. PATIENT-LVL III: CPT | Mod: PBBFAC,,, | Performed by: INTERNAL MEDICINE

## 2025-06-23 PROCEDURE — 93005 ELECTROCARDIOGRAM TRACING: CPT | Mod: PBBFAC | Performed by: INTERNAL MEDICINE

## 2025-06-23 PROCEDURE — 99215 OFFICE O/P EST HI 40 MIN: CPT | Mod: S$PBB,,, | Performed by: INTERNAL MEDICINE

## 2025-06-23 RX ORDER — LOSARTAN POTASSIUM 100 MG/1
100 TABLET ORAL DAILY
Qty: 90 TABLET | Refills: 3 | Status: SHIPPED | OUTPATIENT
Start: 2025-06-23

## 2025-06-23 RX ORDER — FUROSEMIDE 40 MG/1
40 TABLET ORAL DAILY
Qty: 120 TABLET | Refills: 3 | Status: SHIPPED | OUTPATIENT
Start: 2025-06-23

## 2025-06-23 RX ORDER — SPIRONOLACTONE 25 MG/1
25 TABLET ORAL DAILY
Qty: 90 TABLET | Refills: 3 | Status: SHIPPED | OUTPATIENT
Start: 2025-06-23

## 2025-06-23 RX ORDER — ATORVASTATIN CALCIUM 40 MG/1
40 TABLET, FILM COATED ORAL DAILY
Qty: 90 TABLET | Refills: 3 | Status: SHIPPED | OUTPATIENT
Start: 2025-06-23

## 2025-06-23 NOTE — PROGRESS NOTES
Subjective:     Sam Gamino is a 79 y.o. male with hypertension and hypercholesterolemia. He is mildly obese but used to be severely obese. He was diagnosed with lymphoma in 1992. He underwent an autologous bone marrow transplantation at [facility]. In 5/2022 he was admitted with heart failure. He was noted to have moderate to severe mitral regurgitation and severe pulmonary hypertension. He was diuresed. On 5/16/2022 he had an echocardiogram that revealed normal left ventricular size and systolic function. The ejection fraction was 55%. There was moderate mitral regurgitation. There was mild to moderate tricuspid regurgitation. He denies any exertional chest pain. In 9/2022 he was able to walk about two blocks before he had to stop due to dyspnea. He had mild edema of his legs. He was given empagliflozin and he was breathing better and able to walk farther. The edema resolved. On 11/1/2023 he is noted to have a first degree atrioventricular block with a ID interval of 320 ms. On 11/5/2023 he had sudden onset of a severe headache. He found to have had a subarachnoid hemorrhage. He was treated conservatively. During the stay he had a computed tomography angiography scan of his head and neck. The images revealed bilateral pulmonary emboli and he was anticoagulated with apixiban. In 4/2024 he was admitted with heart failure. He was continuing rehabilitation until 8/2024. He was diagnosed with normal pressure hydrocephalus. He received  a ventriculoperitoneal shunt on 5/19/2025. He went in to heart failure and was diuresed. No chest pain or shortness of breath. No palpitations or weak spells. No bleeding. No clear issues with any of his prescribed medications. Feeling well overall.    Congestive Heart Failure  Presents for follow-up visit. Pertinent negatives include no abdominal pain, chest pain, chest pressure, claudication, edema, fatigue, muscle weakness, near-syncope, nocturia, orthopnea,  palpitations, paroxysmal nocturnal dyspnea, shortness of breath or unexpected weight change. The symptoms have been stable.   Coronary Artery Disease  Presents for follow-up visit. Pertinent negatives include no chest pain, chest pressure, chest tightness, dizziness, leg swelling, muscle weakness, palpitations, shortness of breath or weight gain. Risk factors include obesity.   Hypertension  This is a chronic problem. The current episode started more than 1 year ago. The problem is unchanged. The problem is controlled (usually 110-120/70-80 mmHg at home). Pertinent negatives include no anxiety, blurred vision, chest pain, headaches, malaise/fatigue, neck pain, orthopnea, palpitations, peripheral edema, PND, shortness of breath or sweats. There is no history of chronic renal disease.   Hyperlipidemia  This is a chronic problem. The current episode started more than 1 year ago. The problem is controlled. Recent lipid tests were reviewed and are normal. Exacerbating diseases include obesity. He has no history of chronic renal disease, diabetes, hypothyroidism, liver disease or nephrotic syndrome. Pertinent negatives include no chest pain, focal sensory loss, focal weakness, leg pain, myalgias or shortness of breath.   Cerebrovascular Accident  Pertinent negatives include no abdominal pain, chest pain, chills, coughing, fatigue, fever, headaches, myalgias, nausea, neck pain, numbness, rash, vertigo, vomiting or weakness.     Review of Systems   Constitutional: Negative for chills, fatigue, fever, malaise/fatigue, unexpected weight change and weight gain.   HENT:  Negative for nosebleeds and tinnitus.    Eyes:  Negative for blurred vision, double vision, vision loss in left eye and vision loss in right eye.   Cardiovascular:  Negative for chest pain, claudication, dyspnea on exertion, irregular heartbeat, leg swelling, near-syncope, orthopnea, palpitations, paroxysmal nocturnal dyspnea and syncope.   Respiratory:   Negative for chest tightness, cough, hemoptysis, shortness of breath and wheezing.    Endocrine: Negative for cold intolerance and heat intolerance.   Hematologic/Lymphatic: Negative for bleeding problem. Does not bruise/bleed easily.   Skin:  Negative for color change and rash.   Musculoskeletal:  Negative for back pain, falls, muscle weakness, myalgias and neck pain.   Gastrointestinal:  Negative for abdominal pain, diarrhea, dysphagia, heartburn, hematemesis, hematochezia, hemorrhoids, jaundice, melena, nausea and vomiting.   Genitourinary:  Negative for dysuria, hematuria and nocturia.   Neurological:  Negative for dizziness, focal weakness, headaches, light-headedness, loss of balance, numbness, tremors, vertigo and weakness.   Psychiatric/Behavioral:  Negative for altered mental status, depression and memory loss. The patient is not nervous/anxious.    Allergic/Immunologic: Negative for hives and persistent infections.     Current Outpatient Medications on File Prior to Visit   Medication Sig Dispense Refill    apixaban (ELIQUIS) 2.5 mg Tab Take 1 tablet (2.5 mg total) by mouth 2 (two) times daily. 180 tablet 3    atorvastatin (LIPITOR) 80 MG tablet Take 40 mg by mouth.      cholecalciferol, vitamin D3, (VITAMIN D3) 50 mcg (2,000 unit) Cap Take 1 capsule by mouth once daily.      clotrimazole-betamethasone 1-0.05% (LOTRISONE) cream Apply 0.05 % topically 2 (two) times daily as needed.      cyanocobalamin 1,000 mcg/mL injection INJECT 1ML IN THE MUSCLE EVERY 30 DAYS 10 mL 1    donepeziL (ARICEPT) 5 MG tablet Take 1 tablet (5 mg total) by mouth every evening. 90 tablet 3    empagliflozin (JARDIANCE) 10 mg tablet Take 1 tablet (10 mg total) by mouth once daily. 90 tablet 3    fluticasone propionate (FLONASE) 50 mcg/actuation nasal spray SHAKE LIQUID AND USE 2 SPRAYS(100 MCG) IN EACH NOSTRIL EVERY DAY 16 g 3    furosemide (LASIX) 40 MG tablet Take 1 tablet (40 mg total) by mouth once daily. 120 tablet 3    LINZESS  290 mcg Cap capsule TAKE 1 CAPSULE(290 MCG) BY MOUTH BEFORE BREAKFAST 90 capsule 3    losartan (COZAAR) 100 MG tablet Take 1 tablet (100 mg total) by mouth once daily. 90 tablet 3    metoprolol succinate (TOPROL-XL) 50 MG 24 hr tablet Take 50 mg by mouth.      pantoprazole (PROTONIX) 40 MG tablet       solifenacin (VESICARE) 10 MG tablet TAKE 1 TABLET(10 MG) BY MOUTH EVERY DAY 90 tablet 3    spironolactone (ALDACTONE) 25 MG tablet Take 1 tablet (25 mg total) by mouth once daily. 90 tablet 3    tamsulosin (FLOMAX) 0.4 mg Cap TAKE 1 CAPSULE(0.4 MG) BY MOUTH EVERY DAY 90 capsule 1     No current facility-administered medications on file prior to visit.     Objective:     /62   Pulse 88   Ht 6' (1.829 m)   Wt 110 kg (242 lb 8 oz)   SpO2 97%   BMI 32.89 kg/m²     Physical Exam  Constitutional:       General: He is not in acute distress.     Appearance: Normal appearance. He is well-developed. He is not toxic-appearing or diaphoretic.   HENT:      Head: Normocephalic and atraumatic.      Nose: Nose normal.   Eyes:      General:         Right eye: No discharge.         Left eye: No discharge.      Conjunctiva/sclera:      Right eye: Right conjunctiva is not injected.      Left eye: Left conjunctiva is not injected.      Pupils: Pupils are equal.      Right eye: Pupil is round.      Left eye: Pupil is round.   Neck:      Thyroid: No thyromegaly.      Vascular: No carotid bruit or JVD.   Cardiovascular:      Rate and Rhythm: Normal rate and regular rhythm. No extrasystoles are present.     Chest Wall: PMI is not displaced.      Pulses:           Radial pulses are 2+ on the right side and 2+ on the left side.        Femoral pulses are 2+ on the right side and 2+ on the left side.       Dorsalis pedis pulses are 2+ on the right side and 2+ on the left side.        Posterior tibial pulses are 2+ on the right side and 2+ on the left side.      Heart sounds: S1 normal and S2 normal. Murmur heard.      High-pitched  blowing holosystolic murmur is present at the apex. P2 pronounced.      Gallop present. S3 and S4 sounds present.   Pulmonary:      Effort: Pulmonary effort is normal.      Breath sounds: No rales.   Abdominal:      Palpations: Abdomen is soft.      Tenderness: There is no abdominal tenderness.   Musculoskeletal:      Cervical back: Neck supple.      Right lower leg: No swelling. No edema.      Left lower leg: No swelling. No edema.   Lymphadenopathy:      Head:      Right side of head: No submandibular adenopathy.      Left side of head: No submandibular adenopathy.      Cervical: No cervical adenopathy.   Skin:     General: Skin is warm and dry.      Findings: No rash.      Nails: There is no clubbing.   Neurological:      General: No focal deficit present.      Mental Status: He is alert and oriented to person, place, and time. He is not disoriented.      Cranial Nerves: No cranial nerve deficit.   Psychiatric:         Attention and Perception: Attention and perception normal.         Mood and Affect: Mood and affect normal.         Speech: Speech normal.         Behavior: Behavior normal.         Thought Content: Thought content normal.         Cognition and Memory: Cognition and memory normal.         Judgment: Judgment normal.       Assessment:     1. Heart failure, diastolic, chronic    2. Nonrheumatic mitral valve regurgitation    3. Nonrheumatic tricuspid valve regurgitation    4. Pulmonary hypertension due to left heart disease    5. Coronary artery disease involving native coronary artery of native heart without angina pectoris    6. Atrioventricular block, first degree    7. Chronic anticoagulation    8. History of pulmonary embolism    9. History of subarachnoid hemorrhage    10. Primary hypertension    11. Hypercholesterolemia    12. Lymphoma in remission    13. Thyroid nodule    14. NPH (normal pressure hydrocephalus)    15. Ventricular shunt in place      Plan:     1. Heart Failure, Diastolic,  Chronic   5/16/2022: Echo: Normal left ventricular size and systolic function. EF 55%. Moderate MR. Mild to moderate TR.   6/6/2023: Echo: Normal left ventricular size and systolic function. EF 60%. LVGLS -15%. Moderate diastolic dysfunction. Mild to moderate AR. Mild to moderate MR. Mildly enlarged ascending aorta. Small pericardial effusion.   10/4/2024: Echo: Normal left ventricular size and systolic function. EF 60%. Moderate diastolic dysfunction. Mild aortic valve sclerosis. Moderate AR. Moderate MR. Mild to moderate TR. SPAP 53. Small pericardial effusion.   8/8/2022: Spironolactone 25 mg Q24 was begun and KCl 20 mEq Q24 was discontinued.   10/10/2022: Empagliflozin 10 mg Q24 was begun.   10/27/2022: BNP 69.   11/1/2023: Metoprolol 50 mg Q24 was discontinued as the SC was 320 ms on an ECG.    On empagliflozin 10 mg Q24, losartan 100 mg Q24, spironolactone 25 mg Q24 and furosemide 40 mg Q24.   May take additional furosemide 40 mg Q24 in early afternoon PRN for edema which he has not needed.   Encouraged to use pressure stockings.   Appears well compensated.       2. Mitral Regurgitation   5/16/2022: Echo: Moderate MR.   6/6/2023: Echo: Mild to moderate MR.   10/4/2024: Echo: Moderate MR.     3. Tricuspid Regurgitation   5/16/2022: Echo: Mild to moderate TR.   10/4/2024: Echo: Mild to moderate TR.    4. Pulmonary Hypertension   Appears due to left sided heart disease as well as history of PE.   10/4/2024: Echo: SPAP 53.    5. Coronary Artery Disease   3/2007: Underwent coronary angiogram. Appeared to have had mild disease.   No aspirin as anticoagulated with apixiban.    6. Atrioventricular Block, First Degree   11/1/2023: ECG: SC interval 320 ms.   11/1/2023: Metoprolol 50 mg Q24 was discontinued.    12/19/2023: Holter: Sinus rhythm 94 () bpm. Long SC interval. Narrow QRS. No prolonged pauses. Frequent APC's - 15/hr. Rare VPC's - 2/hr. 3 V couplets. 2 V triplets. No reported symptoms.  No  betablocker.  2025: EC ms. [Appears metoprolol 50 mg Q24 was resumed - no metoprolol].   To be followed.  Would consider stopping donepezil 5 mg Q24 as well.     7. Chronic Anticoagulation   2023: CTA of Head and Neck: Bilateral pulmonary emboli.   Appears the PE was unprovoked.   2024: Apixiban 2.5 mg Q12 was resumed. Then no aspirin.    On apixiban 2.5 mg Q12.   No bleeding.     8. History of Pulmonary Embolism   2023: CTA of Head and Neck: Bilateral pulmonary emboli.   Appears the PE was unprovoked.   On apixiban.    9. History of Subarachnoid Hemorrhage   2023: SAH.   Conservative care.   2023: Diagnosed with pulmonary embolism and benefit with anticoagulation felt to outweigh risk.     10. Atherosclerosis of Aorta   : Mentioned in chart.   No aspirin.    11. Hypertension   : Diagnosed.   2023: Metoprolol 50 mg Q24 was discontinued.     On losartan 100 mg Q24, spironolactone 25 mg Q24 and furosemide 40 mg Q24.   Keeping log at home.   Well controlled.      12. Hypercholesterolemia   : Statin was begun.   On atorvastatin 40 mg Q24.   2022: Chol 132. HDL 41. LDL 71. .   On atorvastatin 40 mg Q24.   Very favorable lipid panel.    13. Mild Obesity   2022: Weight 117 kg. BMI 35.   2023: Weight 119 kg. BMI 35.   2023: Weight 110 kg. BMI 32.   3: Weight 105 kg. BMI 31.   Encouraged to lose more weight.    14. History of Lymphoma   : Autologous BMT.    15. Normal Pressure Hydrocephalus   2025: Received ventriculoperitoneal shunt.    16. Primary Care   Dr. Herber Zamora.    F/u 2 months.    Ema Montemayor M.D.

## 2025-06-24 LAB
OHS QRS DURATION: 112 MS
OHS QTC CALCULATION: 491 MS

## 2025-06-25 ENCOUNTER — HOSPITAL ENCOUNTER (OUTPATIENT)
Dept: RADIOLOGY | Facility: HOSPITAL | Age: 80
Discharge: HOME | End: 2025-06-25
Attending: NEUROLOGICAL SURGERY
Payer: MEDICARE

## 2025-06-25 ENCOUNTER — OFFICE VISIT (OUTPATIENT)
Dept: NEUROSURGERY | Facility: CLINIC | Age: 80
End: 2025-06-25
Attending: NEUROLOGICAL SURGERY
Payer: MEDICARE

## 2025-06-25 ENCOUNTER — HOSPITAL ENCOUNTER (OUTPATIENT)
Dept: RADIOLOGY | Facility: HOSPITAL | Age: 80
Discharge: HOME | End: 2025-06-25
Attending: PHYSICIAN ASSISTANT
Payer: MEDICARE

## 2025-06-25 DIAGNOSIS — G91.2 NPH (NORMAL PRESSURE HYDROCEPHALUS): ICD-10-CM

## 2025-06-25 DIAGNOSIS — R41.3 OTHER AMNESIA: Primary | ICD-10-CM

## 2025-06-25 PROCEDURE — 71045 X-RAY EXAM CHEST 1 VIEW: CPT | Mod: 26,,, | Performed by: RADIOLOGY

## 2025-06-25 PROCEDURE — 99213 OFFICE O/P EST LOW 20 MIN: CPT | Mod: PBBFAC,25 | Performed by: PHYSICIAN ASSISTANT

## 2025-06-25 PROCEDURE — 70450 CT HEAD/BRAIN W/O DYE: CPT | Mod: 26,,, | Performed by: RADIOLOGY

## 2025-06-25 PROCEDURE — 70250 X-RAY EXAM OF SKULL: CPT | Mod: TC

## 2025-06-25 PROCEDURE — 70450 CT HEAD/BRAIN W/O DYE: CPT | Mod: TC

## 2025-06-25 PROCEDURE — 74018 RADEX ABDOMEN 1 VIEW: CPT | Mod: 26,,, | Performed by: RADIOLOGY

## 2025-06-25 PROCEDURE — 99024 POSTOP FOLLOW-UP VISIT: CPT | Mod: S$PBB,,, | Performed by: PHYSICIAN ASSISTANT

## 2025-06-25 PROCEDURE — 62252 CSF SHUNT REPROGRAM: CPT | Mod: 26,S$PBB,, | Performed by: PHYSICIAN ASSISTANT

## 2025-06-25 PROCEDURE — 72020 X-RAY EXAM OF SPINE 1 VIEW: CPT | Mod: 26,,, | Performed by: RADIOLOGY

## 2025-06-25 PROCEDURE — 99999 PR PBB SHADOW E&M-EST. PATIENT-LVL III: CPT | Mod: PBBFAC,,, | Performed by: PHYSICIAN ASSISTANT

## 2025-06-25 PROCEDURE — 70250 X-RAY EXAM OF SKULL: CPT | Mod: 26,,, | Performed by: RADIOLOGY

## 2025-06-25 PROCEDURE — 62252 CSF SHUNT REPROGRAM: CPT | Mod: PBBFAC | Performed by: PHYSICIAN ASSISTANT

## 2025-06-30 NOTE — PROGRESS NOTES
Neurosurgery  Established Patient    SUBJECTIVE:     History of Present Illness: Sam Gamino is a 79 y.o. male with NPH s/p VPS placement by Dr. Peres on 5/19/25 who presents for 6 week post op follow up. Patient was seen on 6/5/25 with complaints of positional headaches. At that time the shunt was dialed up to 180 mm H20. Patient is here today with his wife. They feel like over all he is walking a lot better. They feel his memory is also better. He walks with the help of a walker. His biggest complaint is continued headaches that resolve when lying flat.     Review of patient's allergies indicates:   Allergen Reactions    Lotensin [benazepril]      cough       Current Medications[1]    Past Medical History:   Diagnosis Date    Abnormal echocardiogram 02/08/2013    Mild MVR 3/07    Atherosclerosis of aorta 11/01/2023 2023: Mentioned in chart.    CAD (coronary artery disease) 02/08/2013    Angio 3/07 Dr. Lin    Chronic diastolic (congestive) heart failure     History of pulmonary embolism 11/14/2023    CTA - 11/14/23 - Bilateral    History of subdural hemorrhage 11/04/2023 11/5/2023: SAH.    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 Pelayo     Past Surgical History:   Procedure Laterality Date    CREATION OF VENTRICULOPERITONEAL SHUNT USING COMPUTER-ASSISTED NAVIGATION Right 5/19/2025    Procedure: INSERTION,  SHUNT, W/ COMPUTER-ASSISTED NAVIGATION;  Surgeon: Roddy Peres DO;  Location: [facility] OR [address] [place] [address];  Service: Neurosurgery;  Laterality: Right;  (Right parietal  shunt placement)    EYE SURGERY Bilateral 2016    Cataracts    INSERTION, SHUNT  5/19/2025    Procedure: INSERTION, SHUNT;   Surgeon: Kalen Saucedo MD;  Location: [facility] OR [address] [place] [address];  Service: General;;     Family History       Problem Relation (Age of Onset)    Cancer Mother    Hypertension Brother    No Known Problems Father, Daughter, Sister, Sister, Brother, Brother, Brother    Thyroid disease Sister          Social History     Socioeconomic History    Marital status:      Spouse name: Eileen    Number of children: 1+3   Occupational History    Occupation:    Tobacco Use    Smoking status: Never    Smokeless tobacco: Never   Vaping Use    Vaping status: Never Used   Substance and Sexual Activity    Alcohol use: Yes     Alcohol/week: 1.0 standard drink of alcohol     Types: 1 Standard drinks or equivalent per week     Comment: ocassionally    Drug use: No    Sexual activity: Not Currently     Partners: Female   Social History Narrative    No exercise.    Wants to retire in 2019    Going to retire - 12/21 1/24 - he walks with a walker     Social Drivers of Health     Financial Resource Strain: Low Risk  (5/23/2025)    Received from [facility]    Overall Financial Resource Strain (CARDIA)     Difficulty of Paying Living Expenses: Not hard at all   Food Insecurity: No Food Insecurity (5/23/2025)    Received from [facility]    Hunger Vital Sign     Worried About Running Out of Food in the Last Year: Never true     Ran Out of Food in the Last Year: Never true   Transportation Needs: No Transportation Needs (6/2/2025)    Received from [facility]     SDOH Transportation Source     Has lack of transportation kept you from medical appointments or from getting medications?: No     Has lack of transportation kept you from meetings, work, or from getting things needed for daily living?: No   Physical Activity: Inactive (5/20/2025)    Exercise Vital Sign     Days of Exercise per Week: 0 days     Minutes of Exercise per Session: 0 min   Stress: No Stress Concern Present (5/22/2025)    Received from [facility]  [facility] [employer] [facility] - Occupational Stress Questionnaire     Feeling of Stress : Not at all   Housing Stability: Low Risk  (5/23/2025)    Received from [facility]    Housing Stability Vital Sign     Unable to Pay for Housing in the Last Year: No     Number of Times Moved in the Last Year: 1     Homeless in the Last Year: No       Review of Systems    OBJECTIVE:     Vital Signs  Pain Score: 1   There is no height or weight on file to calculate BMI.    Neurosurgery Physical Exam  General: well developed, well nourished, no distress.   Head: normocephalic, incision well healed. Shunt pumps and refills briskly   Neurologic: Alert and oriented. Thought content appropriate.  GCS: Motor: 6/Verbal: 5/Eyes: 4 GCS Total: 15  Mental Status: Awake, Alert, Oriented x 4  Language: No aphasia  Speech: No dysarthria  Cranial nerves: face symmetric, tongue midline, CN II-XII grossly intact.   Eyes: pupils equal, round, reactive to light with accommodation, EOMI.   Pulmonary: normal respirations, no signs of respiratory distress  Abdomen: soft, non-distended, not tender to palpation  Skin: Skin is warm, dry and intact.  Sensory: intact to light touch throughout    Motor Strength:Moves all extremities spontaneously with good tone.  Full strength upper and lower extremities. No abnormal movements seen.       Gait: shuffling     Diagnostic Results:  CTH from 6/25/25 which I have personally reviewed shows stable ventriculomegaly. No subdural collections.     XRSS with intact catheter. Shunt at setting of 180    ASSESSMENT/PLAN:   Sam Gamino is a 79 y.o. male with NPH s/p VPS placement by Dr. Peres on 5/19/25. He continues to have positional headaches. Although his gait is still shuffling, he feels he is walking a lot better. Given the positional headaches are the most bothersome for him, we will dial him up to 190 today. He understands that this could affect his gait. We will see him back in 6 weeks  with a repeat CT. He knows he can call the clinic in the meantime with any questions or concerns.              [1]   Current Outpatient Medications   Medication Sig Dispense Refill    apixaban (ELIQUIS) 2.5 mg Tab Take 1 tablet (2.5 mg total) by mouth 2 (two) times daily. 180 tablet 3    atorvastatin (LIPITOR) 40 MG tablet Take 1 tablet (40 mg total) by mouth once daily. 90 tablet 3    cholecalciferol, vitamin D3, (VITAMIN D3) 50 mcg (2,000 unit) Cap Take 1 capsule by mouth once daily.      clotrimazole-betamethasone 1-0.05% (LOTRISONE) cream Apply 0.05 % topically 2 (two) times daily as needed.      cyanocobalamin 1,000 mcg/mL injection INJECT 1ML IN THE MUSCLE EVERY 30 DAYS 10 mL 1    donepeziL (ARICEPT) 5 MG tablet Take 1 tablet (5 mg total) by mouth every evening. 90 tablet 3    empagliflozin (JARDIANCE) 10 mg tablet Take 1 tablet (10 mg total) by mouth once daily. 90 tablet 3    fluticasone propionate (FLONASE) 50 mcg/actuation nasal spray SHAKE LIQUID AND USE 2 SPRAYS(100 MCG) IN EACH NOSTRIL EVERY DAY 16 g 3    furosemide (LASIX) 40 MG tablet Take 1 tablet (40 mg total) by mouth once daily. 120 tablet 3    LINZESS 290 mcg Cap capsule TAKE 1 CAPSULE(290 MCG) BY MOUTH BEFORE BREAKFAST 90 capsule 3    losartan (COZAAR) 100 MG tablet Take 1 tablet (100 mg total) by mouth once daily. 90 tablet 3    pantoprazole (PROTONIX) 40 MG tablet       solifenacin (VESICARE) 10 MG tablet TAKE 1 TABLET(10 MG) BY MOUTH EVERY DAY 90 tablet 3    spironolactone (ALDACTONE) 25 MG tablet Take 1 tablet (25 mg total) by mouth once daily. 90 tablet 3    tamsulosin (FLOMAX) 0.4 mg Cap TAKE 1 CAPSULE(0.4 MG) BY MOUTH EVERY DAY 90 capsule 1     No current facility-administered medications for this visit.

## 2025-06-30 NOTE — PROGRESS NOTES
Procedure:  Shunt reprogramming  Indication: Hydrocephalus        Mr. Gamino presents to clinic today to have his/her shunt reprogrammed. The Codman Hakim  was placed over the shunt valve, and the setting was reset to 190 mm of H2O. The patient tolerated this well, with no complications. He was informed to call the clinic with any further questions or concerns in the meantime.    Koki Espinal PA-C   Neurosurgery  [facility]

## 2025-07-13 DIAGNOSIS — J31.0 RHINITIS, UNSPECIFIED TYPE: ICD-10-CM

## 2025-07-14 RX ORDER — FLUTICASONE PROPIONATE 50 MCG
SPRAY, SUSPENSION (ML) NASAL
Qty: 16 G | Refills: 3 | Status: SHIPPED | OUTPATIENT
Start: 2025-07-14

## 2025-07-24 ENCOUNTER — TELEPHONE (OUTPATIENT)
Dept: NEUROSURGERY | Facility: CLINIC | Age: 80
End: 2025-07-24
Payer: MEDICARE

## 2025-07-24 NOTE — TELEPHONE ENCOUNTER
Called and spoke with pt's wife. Rescheduled CT and appt with Dr. Peres from 7/30 to Fri 8/1. CT at [facility] for 10:30 and appt with Dr. Peres in 8th floor clinic for 11 AM.

## 2025-08-01 ENCOUNTER — HOSPITAL ENCOUNTER (OUTPATIENT)
Dept: RADIOLOGY | Facility: HOSPITAL | Age: 80
Discharge: HOME | End: 2025-08-01
Attending: PHYSICIAN ASSISTANT
Payer: MEDICARE

## 2025-08-01 ENCOUNTER — OFFICE VISIT (OUTPATIENT)
Dept: NEUROSURGERY | Facility: CLINIC | Age: 80
End: 2025-08-01
Attending: PHYSICIAN ASSISTANT
Payer: MEDICARE

## 2025-08-01 VITALS — SYSTOLIC BLOOD PRESSURE: 141 MMHG | HEART RATE: 91 BPM | DIASTOLIC BLOOD PRESSURE: 71 MMHG

## 2025-08-01 DIAGNOSIS — G91.2 NPH (NORMAL PRESSURE HYDROCEPHALUS): ICD-10-CM

## 2025-08-01 DIAGNOSIS — R41.3 OTHER AMNESIA: ICD-10-CM

## 2025-08-01 DIAGNOSIS — Z98.2 S/P VP SHUNT: Primary | ICD-10-CM

## 2025-08-01 PROCEDURE — 99999 PR PBB SHADOW E&M-EST. PATIENT-LVL III: CPT | Mod: PBBFAC,,, | Performed by: NEUROLOGICAL SURGERY

## 2025-08-01 PROCEDURE — 70450 CT HEAD/BRAIN W/O DYE: CPT | Mod: 26,,, | Performed by: RADIOLOGY

## 2025-08-01 PROCEDURE — 99213 OFFICE O/P EST LOW 20 MIN: CPT | Mod: PBBFAC,25 | Performed by: NEUROLOGICAL SURGERY

## 2025-08-01 PROCEDURE — 99024 POSTOP FOLLOW-UP VISIT: CPT | Mod: POP,,, | Performed by: NEUROLOGICAL SURGERY

## 2025-08-01 PROCEDURE — 70450 CT HEAD/BRAIN W/O DYE: CPT | Mod: TC

## 2025-08-05 ENCOUNTER — DOCUMENT SCAN (OUTPATIENT)
Dept: HOME HEALTH SERVICES | Facility: HOSPITAL | Age: 80
End: 2025-08-05
Payer: MEDICARE

## 2025-08-05 NOTE — PROGRESS NOTES
CHIEF COMPLAINT:  R/o NPH    INTERVAL HISTORY (8/1/25)  Patient is now nearly 3 months status post  shunt placement on 5/19/25.  Patient was seen recently by the PA reporting that he was having postural headaches.  His shunt was dialed up to 1 90 and he reports that his headaches are somewhat improved.  He still gets a mild headache in the back and top front of the head.  The headaches are no longer daily.  They occur when he is standing up or lying down.  They last about 3-5 minutes and come and go    Patient and wife were improvements in walking and memory.  He still has urinary dysfunction.      INTERVAL HISTORY (4/30/25):  Patient returns after undergoing high-volume lumbar puncture (32cc removed, OP 24, CP 16).  According to physical therapy patient improved and multiple domains and wife thought that he improved with his walking.  However he had 2 falls since the lumbar puncture, 1 occurred shortly after in 1 occurred about 1 week after.  The wife did not notice any significant change in his cognition.    Both patient and wife would be interested in pursuing shunt placement.    HPI:  Sam Gamino is a 80 y.o.  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  Patient has a history of perimesencephalic subarachnoid hemorrhage in 2023.  Patient has been followed closely by Neurology for memory disturbance.  Patient presents with his wife and both report difficulties with short-term memory, trouble with completing tasks, and remembering directions while driving.  He also notes difficulty picking up his legs which results in stumbling and falls.  He also reports urinary incontinence which may have more to do with not being able to get to the bathroom quick enough.  Patient's wife states that the symptoms have been present since his subarachnoid hemorrhage.    Review of patient's allergies indicates:   Allergen Reactions    Lotensin [benazepril]      cough        Past Medical History:   Diagnosis Date    Abnormal echocardiogram 02/08/2013    Mild MVR 3/07    Atherosclerosis of aorta 11/01/2023 2023: Mentioned in chart.    CAD (coronary artery disease) 02/08/2013    Angio 3/07 Dr. Lin    Chronic diastolic (congestive) heart failure     History of pulmonary embolism 11/14/2023    CTA - 11/14/23 - Bilateral    History of subdural hemorrhage 11/04/2023 11/5/2023: SAH.    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 Pelayo     Past Surgical History:   Procedure Laterality Date    CREATION OF VENTRICULOPERITONEAL SHUNT USING COMPUTER-ASSISTED NAVIGATION Right 5/19/2025    Procedure: INSERTION,  SHUNT, W/ COMPUTER-ASSISTED NAVIGATION;  Surgeon: Roddy Peres DO;  Location: [facility] OR [address] [place] [address];  Service: Neurosurgery;  Laterality: Right;  (Right parietal  shunt placement)    EYE SURGERY Bilateral 2016    Cataracts    INSERTION, SHUNT  5/19/2025    Procedure: INSERTION, SHUNT;  Surgeon: Kalen Saucedo MD;  Location: [facility] OR [address] [place] [address];  Service: General;;     Family History   Problem Relation Name Age of Onset    Cancer Mother          uterine    No Known Problems Father      Hypertension Brother Orlando     No Known Problems Daughter Katherine     No Known Problems Sister Lynda     No Known Problems Sister Cheryl     No Known Problems Brother Lash     No Known Problems Brother Naseem     No Known Problems Brother      Thyroid disease Sister Halina      Social History[1]     Review of Systems   Constitutional: Negative.    HENT:  Negative for congestion, ear discharge, ear pain, hearing loss, nosebleeds, sinus pain and tinnitus.    Eyes: Negative.    Respiratory:  Negative.     Cardiovascular:  Negative for chest pain, palpitations, claudication and leg swelling.   Gastrointestinal:  Negative for abdominal pain, blood in stool, constipation, diarrhea, melena and vomiting.   Genitourinary:  Positive for urgency. Negative for flank pain and frequency.   Musculoskeletal:  Positive for falls.   Skin: Negative.    Neurological: Negative.  Negative for dizziness, tingling, tremors, sensory change, speech change, focal weakness, seizures, loss of consciousness, weakness and headaches.   Endo/Heme/Allergies:  Does not bruise/bleed easily.   Psychiatric/Behavioral:  Positive for memory loss. Negative for depression, hallucinations, substance abuse and suicidal ideas. The patient is not nervous/anxious and does not have insomnia.        OBJECTIVE:   Vital Signs:  Pulse: 91 (08/01/25 1118)  BP: (!) 141/71 (08/01/25 1118)    Physical Exam:  Constitutional: Patient sitting comfortably in chair. Appears well developed and well nourished.  Skin: Exposed areas are intact without abnormal markings, rashes or other lesions.  HEENT: Normocephalic. Normal conjunctivae.  Cardiovascular: Normal rate and regular rhythm.  Respiratory: Chest wall rises and falls symmetrically, without signs of respiratory distress.  Abdomen: Soft and non-tender.  Extremities: Warm and without edema. Calves supple, non-tender.  Psych/Behavior: Normal affect.    Neurological:    Mental status: Alert and oriented. Conversational and appropriate.       Cranial Nerves: VFF to confrontation. PERRL. EOMI without nystagmus. Facial STLT normal and symmetric. Strong, symmetric muscles of mastication. Facial strength full and symmetric. Hearing equal bilaterally to finger rub. Palate and uvula rise and fall normally in midline. Shoulder shrug 5/5 strength. Tongue midline.     Motor:    Upper:  Deltoids Triceps Biceps WE WF     R 5/5 5/5 5/5 5/5 5/5 5/5    L 5/5 5/5 5/5 5/5 5/5 5/5      Lower:  HF KE KF DF PF EHL    R 5/5  5/5 5/5 5/5 5/5 5/5    L 5/5 5/5 5/5 5/5 5/5 5/5     Sensory: Intact sensation to light touch in all extremities. Romberg negative.    Reflexes:          DTR: 2+ symmetrically throughout.     Figueredo's: Negative.     Babinski's: Negative.     Clonus: Negative.    Cerebellar: Finger-to-nose and rapid alternating movements normal.     Gait:  In wheel chair    Diagnostic Results:  All imaging was independently reviewed by me.    HCT, 8/1/25:  Stable     MRI brain, dated 2/26/25:  1. Ventriculomegaly without significant periventricular edema  2. Castro ratio 0.36      ASSESSMENT/PLAN:     Problem List Items Addressed This Visit          Neuro    NPH (normal pressure hydrocephalus)    Relevant Orders    CT Head Without Contrast    XR Skull Shunt Placement 1 View     Other Visit Diagnoses         S/P  shunt    -  Primary    Relevant Orders    CT Head Without Contrast    XR Skull Shunt Placement 1 View          Patient has triad of NPH including memory disturbance, gait disturbance, and urinary dysfunction.  He was also found to have ventriculomegaly with an Castro ratio of 0.36.  Given his history of subarachnoid hemorrhage, he is predisposed to communicating hydrocephalus.      He underwent a high-volume lumbar puncture in which 32 cc of fluid was removed.  His opening pressure of 24 is higher than would be expected for normal pressure hydrocephalus.  Regardless, he improved objectively on multiple physical therapy measures.  His wife also subjectively reported improvements in his walking ability but no obvious improvements in his cognition.  Given these improvements both wife and patient would like to pursue  shunt placement.    8/1/25:  Patient is now approximately 3 months status post  shunt placement.  In his wife report memory a mild headache which is worse when right but also present mental adjustment to his shunt valve up to 190 has helped the headaches.  I suspect his brain is still acclimate eating and time  for headaches to resolve.  I explained that adjusting the shunt to a higher setting might help his headaches but also might negate improvements in walking.    - RTC in 3m with HCT and skull xray    The patient understands and agrees with the plan of care. All questions were answered.    Time spent on this encounter: 25 minutes. This includes face-to-face time and non-face to face time preparing to see the patient (eg, review of tests), obtaining and/or reviewing separately obtained history, documenting clinical information in the electronic or other health record, independently interpreting results and communicating results to the patient/family/caregiver, or care coordinator.          .                   [1]   Social History  Tobacco Use    Smoking status: Never    Smokeless tobacco: Never   Vaping Use    Vaping status: Never Used   Substance Use Topics    Alcohol use: Yes     Alcohol/week: 1.0 standard drink of alcohol     Types: 1 Standard drinks or equivalent per week     Comment: ocassionally    Drug use: No

## 2025-08-07 ENCOUNTER — DOCUMENTATION ONLY (OUTPATIENT)
Dept: INTERNAL MEDICINE | Facility: CLINIC | Age: 80
End: 2025-08-07
Payer: MEDICARE

## 2025-08-07 DIAGNOSIS — Z78.9 KNOWN MEDICAL PROBLEMS: ICD-10-CM

## 2025-08-07 DIAGNOSIS — R41.3 MEMORY LOSS: Primary | ICD-10-CM

## 2025-08-07 DIAGNOSIS — I10 PRIMARY HYPERTENSION: ICD-10-CM

## 2025-08-07 DIAGNOSIS — C85.90 NON-HODGKIN'S LYMPHOMA, UNSPECIFIED BODY REGION, UNSPECIFIED NON-HODGKIN LYMPHOMA TYPE: ICD-10-CM

## 2025-08-07 NOTE — PROGRESS NOTES
Advanced primary care management (APCM) billing requirements have been met for this month.  Written consent was obtained and scanned in the electronic medical record.    My office is prepared and capable of delivering all the following APCM service elements to this Medicare beneficiary:     The patient is aware that only one practitioner can furnish and be paid for the service during a calendar month; that they have the right to stop the services at any time; and that cost sharing may apply.  Provide 24/7 access to care team/practitioner for urgent needs.  Provide continuity of care with the PCP, whom the patient can schedule successive routine appointments.  Deliver care in alternative ways to traditional office visits to best meet the patient's needs, such as Telemedecine visits.  There is overall comprehensive care management, systematic needs assessment (medical and psychosocial), and system-based approaches to ensure receipt of preventive services.    Perform medication management, reconciliation, and oversight of self-management.  Ensure timely follow-up communication with the patient and/or caregiver after an emergency department visit, discharge from hospital, skilled nursing facility, or other health care facility, within 7 calendar days of discharge.  Provide coordination of care transitions between and among health care facilities.  Ensure timely exchange of electronic health information with other practitioners and providers to support continuity of care.  Provide ongoing communication and coordination with other practitioners and other health care facilities, and document communication regarding the patient's psychosocial needs, functional deficits, goals, preferences, and desired outcomes, including cultural and linguistic factors, in the patient's medical record.  Provide enhanced opportunities for the patient or caregiver to communicate with the care team regarding the patient's care using  non-face-to-face consultation methods, such as secure messaging, patient portal, or other patient-initiated digital communications that require a clinical decision.  Analyze patient population data to identify gaps in care and offer interventions.  Risk stratify the practice population based on defined diagnoses, claims, or other electronic data to identify and target services to patients.  Be assessed through performance measurement of primary care quality, total cost of care, and meaningful use of Certified EHR Technology.  Development, implementation, revision, and maintenance of an electronic patient-centered comprehensive care plan.  The patient centered comprehensive care plan is listed under Patient Instructions for the 1st visit (or occasionally the 2nd visit) with me each calendar year. A copy is printed for the patient and it can be accessed at any time through the patient portal.  A typical comprehensive care plan includes the following recommendations:  1. Exercise  Exercise aerobically with a target heart rate of (220-age) x 0.8  Exercise 30-45 minutes on most days of the week  2. Diet and Supplements- All supplements can be obtained through a varied, healthy diet  Calcium: 1,000 - 1,200 mg each day  8 oz milk or Calcium fortified O.J. = 300, 8 oz Yogurt = 400 mg, 1 oz of cheese =100-200 mg              8 oz Oatmeal = 215 mg, 3 oz Clayton = 240 mg  Vitamin D: 800 iu each day- Can probably be obtained by 30 min. of direct sunlight    each day             3 oz. Clayton = 800 iu,  3 oz. Tuna =150 iu, Milk or fortified O.J. = 120 iu  Fish oil: 1-2 grams each day or about 840 mg of EPA and DHA (Omega-3 fatty acids) each day             3 oz. Clayton=2 grams,  3 oz. Tuna=1.3 grams,  3 oz. drained light Tuna= 0.25 grams  Folic acid 800 mcg each day for all women planning or capable of pregnancy  3. Lifestyle  Alcohol: 1 drink = 12 oz. domestic beer, 4 oz. wine, or 1 oz. hard (80 proof) liquor             Males:  </= 14 drinks per week with no more than 4 in any one day             Females: </= 7 drinks per week with no more than 3 in any one day  Salt: 1.2 - 3 grams of Sodium each day.  Tobacco: Don't smoke, or quit smoking (discuss with your doctor)  Depression: If you feel depressed discuss with your doctor  Weight: Maintain a healthy body weight. Stay within 10% of:             Males: 106 lbs. + 6 lbs per inch height above 5 feet             Females: 100 lbs + 5 lbs per inch height above 5 feet  4. Routine tests  Blood pressure check at each visit, or at least once each year  HIV screening (one time) if less than 65 years old  Hepatitis C screen (one time) if less than 80 years old  Cholesterol screening every 3 years starting at age 21  Glucose/Hemaglobin A1C check every 3 years starting at age 35.  TSH (thyroid screen) every 2 years starting at age 50  Colonoscopy at age 45, and repeat every 10 years until age 75. Consider screening until age 85. DNA stool test (Cologuard) every 3 years is an acceptable alternative.  Vision screen at age 65  Females:  Gyn exam with cervical HPV test every 3 years or Pap smear every three years starting at age 25                  Stop screening at age 65 if past 3 exams were normal                  No screening for women who have had a hysterectomy with removal of cervix  Mammogram every 1-2 years starting at age 40 until age 75  Consider continuing Mammograms every other year for those older than 75 with a life expectancy of more than 10 years  Bone density scan at about age 65  Males:  PSA screening annually at age 50, age 45 for  Americans, until age 75. Consider annual screening after age 75          5. Immunizations  Influenza vaccine every year in the fall, especially if >50 or with a chronic disease  Consider getting a Covid booster vaccine annually  Tetanus/Diphtheria/Pertussis (Tdap) vaccine once (after the age of 18), then Tetanus/Diphtheria (Td) or Tdap vaccine every  10 years  Shingles (Shingrix) vaccine after age 50 and get a 2nd dose after 2-6 months  RSV (respiratory syncytial virus) vaccine after age 60  Pneumonia vaccine (Prevnar-20) at age 65    6. Advanced Directive/End of life care planning  You should consider having a signed document which informs physicians and family of your end of life care wishes.  You can go to [email or web address]. Under Step 1 click download AdobePDF and print.  This is the [place] physician order for scope of Treatment ([employer]) form.  You can also request a blank copy of the [employer] form from my office.  Bring a copy of the signed document to my office so we can scan it in your medical chart.

## 2025-08-12 ENCOUNTER — OFFICE VISIT (OUTPATIENT)
Dept: CARDIOLOGY | Facility: CLINIC | Age: 80
End: 2025-08-12
Attending: INTERNAL MEDICINE
Payer: MEDICARE

## 2025-08-12 VITALS
HEIGHT: 72 IN | HEART RATE: 88 BPM | SYSTOLIC BLOOD PRESSURE: 134 MMHG | OXYGEN SATURATION: 98 % | WEIGHT: 243.38 LBS | DIASTOLIC BLOOD PRESSURE: 62 MMHG | BODY MASS INDEX: 32.97 KG/M2

## 2025-08-12 DIAGNOSIS — Z86.711 HISTORY OF PULMONARY EMBOLISM: ICD-10-CM

## 2025-08-12 DIAGNOSIS — Z79.01 CHRONIC ANTICOAGULATION: ICD-10-CM

## 2025-08-12 DIAGNOSIS — I25.10 CORONARY ARTERY DISEASE INVOLVING NATIVE CORONARY ARTERY OF NATIVE HEART WITHOUT ANGINA PECTORIS: ICD-10-CM

## 2025-08-12 DIAGNOSIS — I36.1 NONRHEUMATIC TRICUSPID VALVE REGURGITATION: ICD-10-CM

## 2025-08-12 DIAGNOSIS — I10 PRIMARY HYPERTENSION: ICD-10-CM

## 2025-08-12 DIAGNOSIS — E78.00 HYPERCHOLESTEROLEMIA: ICD-10-CM

## 2025-08-12 DIAGNOSIS — C85.9A LYMPHOMA IN REMISSION: ICD-10-CM

## 2025-08-12 DIAGNOSIS — I34.0 NONRHEUMATIC MITRAL VALVE REGURGITATION: ICD-10-CM

## 2025-08-12 DIAGNOSIS — I50.32 HEART FAILURE, DIASTOLIC, CHRONIC: ICD-10-CM

## 2025-08-12 DIAGNOSIS — I27.22 PULMONARY HYPERTENSION DUE TO LEFT HEART DISEASE: ICD-10-CM

## 2025-08-12 DIAGNOSIS — E04.1 THYROID NODULE: ICD-10-CM

## 2025-08-12 DIAGNOSIS — Z86.79 HISTORY OF SUBARACHNOID HEMORRHAGE: ICD-10-CM

## 2025-08-12 DIAGNOSIS — G91.2 NORMAL PRESSURE HYDROCEPHALUS: ICD-10-CM

## 2025-08-12 DIAGNOSIS — Z98.2 VENTRICULAR SHUNT IN PLACE: ICD-10-CM

## 2025-08-12 DIAGNOSIS — I44.0 ATRIOVENTRICULAR BLOCK, FIRST DEGREE: ICD-10-CM

## 2025-08-12 PROCEDURE — 99999 PR PBB SHADOW E&M-EST. PATIENT-LVL III: CPT | Mod: PBBFAC,,, | Performed by: INTERNAL MEDICINE

## 2025-08-12 PROCEDURE — 99213 OFFICE O/P EST LOW 20 MIN: CPT | Mod: PBBFAC | Performed by: INTERNAL MEDICINE

## 2025-08-12 PROCEDURE — 99215 OFFICE O/P EST HI 40 MIN: CPT | Mod: S$PBB,,, | Performed by: INTERNAL MEDICINE

## 2025-08-12 RX ORDER — DONEPEZIL HYDROCHLORIDE 5 MG/1
5 TABLET, FILM COATED ORAL NIGHTLY
Qty: 90 TABLET | Refills: 3 | Status: SHIPPED | OUTPATIENT
Start: 2025-08-12 | End: 2025-08-12

## 2025-08-14 LAB
OHS QRS DURATION: 112 MS
OHS QTC CALCULATION: 452 MS

## 2025-08-19 ENCOUNTER — OFFICE VISIT (OUTPATIENT)
Dept: INTERNAL MEDICINE | Facility: CLINIC | Age: 80
End: 2025-08-19
Attending: INTERNAL MEDICINE
Payer: MEDICARE

## 2025-08-19 VITALS
WEIGHT: 242 LBS | OXYGEN SATURATION: 97 % | HEART RATE: 92 BPM | DIASTOLIC BLOOD PRESSURE: 68 MMHG | SYSTOLIC BLOOD PRESSURE: 106 MMHG | HEIGHT: 72 IN | BODY MASS INDEX: 32.78 KG/M2

## 2025-08-19 DIAGNOSIS — I34.0 NONRHEUMATIC MITRAL VALVE REGURGITATION: Primary | ICD-10-CM

## 2025-08-19 DIAGNOSIS — E78.00 HYPERCHOLESTEROLEMIA: ICD-10-CM

## 2025-08-19 DIAGNOSIS — I10 PRIMARY HYPERTENSION: ICD-10-CM

## 2025-08-19 DIAGNOSIS — G91.2 NORMAL PRESSURE HYDROCEPHALUS: ICD-10-CM

## 2025-08-19 PROCEDURE — 90653 IIV ADJUVANT VACCINE IM: CPT | Mod: S$GLB,,, | Performed by: INTERNAL MEDICINE

## 2025-08-19 PROCEDURE — 99214 OFFICE O/P EST MOD 30 MIN: CPT | Mod: S$GLB,,, | Performed by: INTERNAL MEDICINE

## 2025-08-19 PROCEDURE — G0008 ADMIN INFLUENZA VIRUS VAC: HCPCS | Mod: S$GLB,,, | Performed by: INTERNAL MEDICINE

## 2025-08-19 RX ORDER — POTASSIUM CHLORIDE 1500 MG/1
TABLET, EXTENDED RELEASE ORAL
COMMUNITY

## 2025-08-19 RX ORDER — DONEPEZIL HYDROCHLORIDE 5 MG/1
5 TABLET, FILM COATED ORAL
COMMUNITY

## (undated) DEVICE — SUT VICRYL 0 27 CT-2

## (undated) DEVICE — ELECTRODE MEGADYNE RETURN DUAL

## (undated) DEVICE — STRIP MEDI WND CLSR 1/4X3IN

## (undated) DEVICE — TUBING INSUFFLATION HEATED

## (undated) DEVICE — ELECTRODE REM PLYHSV RETURN 9

## (undated) DEVICE — SUT VICRYL PLUS 0 CT1 18IN

## (undated) DEVICE — SUT GUT PL. 4-0 27 FS-2

## (undated) DEVICE — SUT SILK 2-0 SH 18IN BLACK

## (undated) DEVICE — STAPLER SKIN PROXIMATE WIDE

## (undated) DEVICE — TROCAR ENDOPATH XCEL 5MM 7.5CM

## (undated) DEVICE — BUR BONE CUT MICRO TPS 3X3.8MM

## (undated) DEVICE — TUBING HF INSUFFLATION W/ FLTR

## (undated) DEVICE — KIT ANTIFOG W/SPONG & FLUID

## (undated) DEVICE — SYR 10CC LUER LOCK

## (undated) DEVICE — TRACKER PATIENT NON INVASIVE

## (undated) DEVICE — POINTER AXIEM CRANIAL TRACER

## (undated) DEVICE — DRAPE EENT SPLIT STERILE

## (undated) DEVICE — NDL HYPO REG 25G X 1 1/2

## (undated) DEVICE — PENCIL ROCKER SWITCH 10FT CORD

## (undated) DEVICE — SUT 2-0 12-18IN SILK

## (undated) DEVICE — PASSER CATH SHORT 55 CM

## (undated) DEVICE — SUT MONOCRYL 4-0 PS-1 UND

## (undated) DEVICE — ELECTRODE BLADE INSULATED 1 IN

## (undated) DEVICE — TUBE FRAZIER 5MM 2FT SOFT TIP

## (undated) DEVICE — CORD BIPOLAR 12 FOOT

## (undated) DEVICE — TRAY NEURO OMC

## (undated) DEVICE — PAD CURAD NONADH 3X4IN

## (undated) DEVICE — BIT PERFORATOR LARGE

## (undated) DEVICE — MARKER SKIN RULER STERILE

## (undated) DEVICE — DURAPREP SURG SCRUB 26ML

## (undated) DEVICE — Device

## (undated) DEVICE — DRAPE INCISE IOBAN 2 23X17IN

## (undated) DEVICE — TROCAR ENDOPATH XCEL 15MM 10CM

## (undated) DEVICE — STYLET AXIEM 23CM SINGLE COIL

## (undated) DEVICE — SUT VICRYL 2 0 CT 2

## (undated) DEVICE — CATH PASSER DISPOSABLE